# Patient Record
Sex: MALE | Race: WHITE | NOT HISPANIC OR LATINO | Employment: OTHER | URBAN - METROPOLITAN AREA
[De-identification: names, ages, dates, MRNs, and addresses within clinical notes are randomized per-mention and may not be internally consistent; named-entity substitution may affect disease eponyms.]

---

## 2017-02-23 ENCOUNTER — TELEPHONE (OUTPATIENT)
Dept: OTHER | Facility: MEDICAL CENTER | Age: 52
End: 2017-02-23

## 2017-02-23 NOTE — PROGRESS NOTES
Phoned to schedule visit to deliver and discuss colorectal cancer treatment summary/ survivorship care plan.  No answer, left message to call back.

## 2018-09-04 RX ORDER — DEXAMETHASONE 4 MG/1
2 TABLET ORAL EVERY 6 HOURS
COMMUNITY
End: 2019-02-27

## 2018-09-04 RX ORDER — TRAZODONE HYDROCHLORIDE 50 MG/1
100 TABLET ORAL
COMMUNITY
End: 2019-02-27

## 2018-09-04 RX ORDER — HYDROMORPHONE HYDROCHLORIDE 4 MG/1
12 TABLET ORAL EVERY 4 HOURS PRN
Status: ON HOLD | COMMUNITY
End: 2018-09-06

## 2018-09-04 RX ORDER — PREDNISONE 5 MG/1
5 TABLET ORAL DAILY
COMMUNITY

## 2018-09-04 RX ORDER — TAMSULOSIN HYDROCHLORIDE 0.4 MG/1
0.4 CAPSULE ORAL DAILY
COMMUNITY

## 2018-09-06 ENCOUNTER — HOSPITAL ENCOUNTER (OUTPATIENT)
Facility: MEDICAL CENTER | Age: 53
End: 2018-09-06
Attending: INTERNAL MEDICINE | Admitting: INTERNAL MEDICINE
Payer: COMMERCIAL

## 2018-09-06 ENCOUNTER — APPOINTMENT (OUTPATIENT)
Dept: RADIOLOGY | Facility: MEDICAL CENTER | Age: 53
End: 2018-09-06
Attending: INTERNAL MEDICINE
Payer: COMMERCIAL

## 2018-09-06 VITALS
DIASTOLIC BLOOD PRESSURE: 102 MMHG | SYSTOLIC BLOOD PRESSURE: 157 MMHG | WEIGHT: 182.54 LBS | TEMPERATURE: 99.1 F | OXYGEN SATURATION: 94 % | HEART RATE: 82 BPM | BODY MASS INDEX: 22.23 KG/M2 | RESPIRATION RATE: 20 BRPM | HEIGHT: 76 IN

## 2018-09-06 DIAGNOSIS — J18.9 UNRESOLVED PNEUMONIA: ICD-10-CM

## 2018-09-06 DIAGNOSIS — C18.7 MALIGNANT NEOPLASM OF SIGMOID COLON (HCC): ICD-10-CM

## 2018-09-06 LAB
BUN BLD-MCNC: 22 MG/DL (ref 8–22)
CA-I BLD ISE-SCNC: 1.09 MMOL/L (ref 1.1–1.3)
CHLORIDE BLD-SCNC: 96 MMOL/L (ref 96–112)
CO2 BLD-SCNC: 27 MMOL/L (ref 20–33)
CREAT BLD-MCNC: 1 MG/DL (ref 0.5–1.4)
EKG IMPRESSION: NORMAL
GLUCOSE BLD-MCNC: 98 MG/DL (ref 65–99)
HCT VFR BLD CALC: 44 % (ref 42–52)
HGB BLD-MCNC: 15 G/DL (ref 14–18)
POTASSIUM BLD-SCNC: 4.1 MMOL/L (ref 3.6–5.5)
SODIUM BLD-SCNC: 132 MMOL/L (ref 135–145)

## 2018-09-06 PROCEDURE — 700111 HCHG RX REV CODE 636 W/ 250 OVERRIDE (IP)

## 2018-09-06 PROCEDURE — 85014 HEMATOCRIT: CPT

## 2018-09-06 PROCEDURE — 80047 BASIC METABLC PNL IONIZED CA: CPT

## 2018-09-06 PROCEDURE — 93010 ELECTROCARDIOGRAM REPORT: CPT | Performed by: INTERNAL MEDICINE

## 2018-09-06 PROCEDURE — 700101 HCHG RX REV CODE 250

## 2018-09-06 PROCEDURE — 93005 ELECTROCARDIOGRAM TRACING: CPT | Performed by: INTERNAL MEDICINE

## 2018-09-06 RX ORDER — LIDOCAINE HYDROCHLORIDE AND EPINEPHRINE BITARTRATE 20; .01 MG/ML; MG/ML
INJECTION, SOLUTION SUBCUTANEOUS
Status: DISCONTINUED
Start: 2018-09-06 | End: 2018-09-06 | Stop reason: HOSPADM

## 2018-09-06 RX ORDER — HYDROMORPHONE HYDROCHLORIDE 8 MG/1
16 TABLET ORAL EVERY 4 HOURS PRN
COMMUNITY
End: 2019-07-23

## 2018-09-06 RX ORDER — HYDROMORPHONE HYDROCHLORIDE 2 MG/ML
INJECTION, SOLUTION INTRAMUSCULAR; INTRAVENOUS; SUBCUTANEOUS
Status: DISCONTINUED
Start: 2018-09-06 | End: 2018-09-06 | Stop reason: HOSPADM

## 2018-09-06 RX ORDER — SODIUM CHLORIDE 9 MG/ML
1000 INJECTION, SOLUTION INTRAVENOUS
Status: COMPLETED | OUTPATIENT
Start: 2018-09-06 | End: 2018-09-06

## 2018-09-06 RX ORDER — LIDOCAINE HYDROCHLORIDE 10 MG/ML
INJECTION, SOLUTION INFILTRATION; PERINEURAL
Status: COMPLETED
Start: 2018-09-06 | End: 2018-09-06

## 2018-09-06 RX ORDER — LIDOCAINE HYDROCHLORIDE 10 MG/ML
0.5 INJECTION, SOLUTION INFILTRATION; PERINEURAL
Status: DISCONTINUED | OUTPATIENT
Start: 2018-09-06 | End: 2018-09-06 | Stop reason: HOSPADM

## 2018-09-06 RX ORDER — FENTANYL 100 UG/1
1 PATCH TRANSDERMAL
COMMUNITY
End: 2019-02-27

## 2018-09-06 RX ADMIN — LIDOCAINE HYDROCHLORIDE 0.5 ML: 10 INJECTION, SOLUTION INFILTRATION; PERINEURAL at 10:52

## 2018-09-06 RX ADMIN — SODIUM CHLORIDE 1000 ML: 9 INJECTION, SOLUTION INTRAVENOUS at 10:52

## 2018-09-06 ASSESSMENT — PAIN SCALES - GENERAL: PAINLEVEL_OUTOF10: 3

## 2018-09-06 NOTE — OR NURSING
Cancelled in pre-op due to IR scheduling issue. Patient will be rescheduled for another day.     Wife called and message left by RN in IR.

## 2018-09-07 ENCOUNTER — HOSPITAL ENCOUNTER (OUTPATIENT)
Facility: MEDICAL CENTER | Age: 53
End: 2018-09-07
Attending: INTERNAL MEDICINE | Admitting: INTERNAL MEDICINE
Payer: COMMERCIAL

## 2018-09-07 ENCOUNTER — APPOINTMENT (OUTPATIENT)
Dept: RADIOLOGY | Facility: MEDICAL CENTER | Age: 53
End: 2018-09-07
Attending: INTERNAL MEDICINE
Payer: COMMERCIAL

## 2018-09-07 VITALS
SYSTOLIC BLOOD PRESSURE: 125 MMHG | HEART RATE: 72 BPM | BODY MASS INDEX: 22.18 KG/M2 | RESPIRATION RATE: 20 BRPM | OXYGEN SATURATION: 96 % | HEIGHT: 76 IN | WEIGHT: 182.1 LBS | DIASTOLIC BLOOD PRESSURE: 88 MMHG | TEMPERATURE: 98.4 F

## 2018-09-07 DIAGNOSIS — C18.7 MALIGNANT NEOPLASM OF SIGMOID COLON (HCC): ICD-10-CM

## 2018-09-07 PROCEDURE — 700111 HCHG RX REV CODE 636 W/ 250 OVERRIDE (IP)

## 2018-09-07 PROCEDURE — 700101 HCHG RX REV CODE 250

## 2018-09-07 PROCEDURE — C1894 INTRO/SHEATH, NON-LASER: HCPCS

## 2018-09-07 PROCEDURE — 160002 HCHG RECOVERY MINUTES (STAT)

## 2018-09-07 RX ORDER — LIDOCAINE HYDROCHLORIDE AND EPINEPHRINE 10; 10 MG/ML; UG/ML
INJECTION, SOLUTION INFILTRATION; PERINEURAL
Status: COMPLETED
Start: 2018-09-07 | End: 2018-09-07

## 2018-09-07 RX ORDER — HYDROMORPHONE HYDROCHLORIDE 2 MG/ML
INJECTION, SOLUTION INTRAMUSCULAR; INTRAVENOUS; SUBCUTANEOUS
Status: COMPLETED
Start: 2018-09-07 | End: 2018-09-07

## 2018-09-07 RX ORDER — HYDROMORPHONE HYDROCHLORIDE 2 MG/1
12 TABLET ORAL EVERY 4 HOURS PRN
Status: DISCONTINUED | OUTPATIENT
Start: 2018-09-07 | End: 2018-09-10 | Stop reason: HOSPADM

## 2018-09-07 RX ORDER — LIDOCAINE HYDROCHLORIDE 10 MG/ML
INJECTION, SOLUTION INFILTRATION; PERINEURAL
Status: COMPLETED
Start: 2018-09-07 | End: 2018-09-07

## 2018-09-07 RX ORDER — HYDROMORPHONE HYDROCHLORIDE 2 MG/ML
INJECTION, SOLUTION INTRAMUSCULAR; INTRAVENOUS; SUBCUTANEOUS
Status: DISPENSED
Start: 2018-09-07 | End: 2018-09-08

## 2018-09-07 RX ADMIN — HYDROMORPHONE HYDROCHLORIDE 12 MG: 2 TABLET ORAL at 13:30

## 2018-09-07 RX ADMIN — HEPARIN 500 UNITS: 100 SYRINGE at 12:39

## 2018-09-07 RX ADMIN — LIDOCAINE HYDROCHLORIDE: 10 INJECTION, SOLUTION INFILTRATION; PERINEURAL at 12:38

## 2018-09-07 RX ADMIN — HYDROMORPHONE HYDROCHLORIDE: 2 INJECTION INTRAMUSCULAR; INTRAVENOUS; SUBCUTANEOUS at 13:05

## 2018-09-07 RX ADMIN — LIDOCAINE HYDROCHLORIDE,EPINEPHRINE BITARTRATE: 10; .01 INJECTION, SOLUTION INFILTRATION; PERINEURAL at 12:38

## 2018-09-07 ASSESSMENT — PAIN SCALES - GENERAL
PAINLEVEL_OUTOF10: 0
PAINLEVEL_OUTOF10: 6
PAINLEVEL_OUTOF10: 5
PAINLEVEL_OUTOF10: 5
PAINLEVEL_OUTOF10: 7
PAINLEVEL_OUTOF10: 4

## 2018-09-07 NOTE — DISCHARGE INSTRUCTIONS
ACTIVITY: Rest and take it easy for the first 24 hours.  A responsible adult is recommended to remain with you during that time.  It is normal to feel sleepy.  We encourage you to not do anything that requires balance, judgment or coordination.    MILD FLU-LIKE SYMPTOMS ARE NORMAL. YOU MAY EXPERIENCE GENERALIZED MUSCLE ACHES, THROAT IRRITATION, HEADACHE AND/OR SOME NAUSEA.    FOR 24 HOURS DO NOT:  Drive, operate machinery or run household appliances.  Drink beer or alcoholic beverages.   Make important decisions or sign legal documents.    SPECIAL INSTRUCTIONS: see below    DIET: To avoid nausea, slowly advance diet as tolerated, avoiding spicy or greasy foods for the first day.  Add more substantial food to your diet according to your physician's instructions.  Babies can be fed formula or breast milk as soon as they are hungry.  INCREASE FLUIDS AND FIBER TO AVOID CONSTIPATION.    SURGICAL DRESSING/BATHING: okay to remove dressing after 24 hours. Do not submerge surgical site for 7 days. No swimming, bathing, hot tubs, etc. for 7 day.    FOLLOW-UP APPOINTMENT:  A follow-up appointment should be arranged with your docto; call to schedule.    You should CALL YOUR PHYSICIAN if you develop:  Fever greater than 101 degrees F.  Pain not relieved by medication, or persistent nausea or vomiting.  Excessive bleeding (blood soaking through dressing) or unexpected drainage from the wound.  Extreme redness or swelling around the incision site, drainage of pus or foul smelling drainage.  Inability to urinate or empty your bladder within 8 hours.  Problems with breathing or chest pain.    You should call 911 if you develop problems with breathing or chest pain.  If you are unable to contact your doctor or surgical center, you should go to the nearest emergency room or urgent care center.  Physician's telephone #: [phone]    If any questions arise, call your doctor.  If your doctor is not available, please feel free to call  the Surgical Center at [phone].  The Center is open Monday through Friday from 7AM to 7PM.  You can also call the HEALTH HOTLINE open 24 hours/day, 7 days/week and speak to a nurse at [phone], or toll free at [phone].    A registered nurse may call you a few days after your surgery to see how you are doing after your procedure.    MEDICATIONS: Resume taking daily medication.  Take prescribed pain medication with food.  If no medication is prescribed, you may take non-aspirin pain medication if needed.  PAIN MEDICATION CAN BE VERY CONSTIPATING.  Take a stool softener or laxative such as senokot, pericolace, or milk of magnesia if needed.    If your physician has prescribed pain medication that includes Acetaminophen (Tylenol), do not take additional Acetaminophen (Tylenol) while taking the prescribed medication.    Depression / Suicide Risk    As you are discharged from this [facility] facility, it is important to learn how to keep safe from harming yourself.    Recognize the warning signs:  · Abrupt changes in personality, positive or negative- including increase in energy   · Giving away possessions  · Change in eating patterns- significant weight changes-  positive or negative  · Change in sleeping patterns- unable to sleep or sleeping all the time   · Unwillingness or inability to communicate  · Depression  · Unusual sadness, discouragement and loneliness  · Talk of wanting to die  · Neglect of personal appearance   · Rebelliousness- reckless behavior  · Withdrawal from people/activities they love  · Confusion- inability to concentrate     If you or a loved one observes any of these behaviors or has concerns about self-harm, here's what you can do:  · Talk about it- your feelings and reasons for harming yourself  · Remove any means that you might use to hurt yourself (examples: pills, rope, extension cords, firearm)  · Get professional help from the community (Mental Health, Substance  Abuse, psychological counseling)  · Do not be alone:Call your Safe Contact- someone whom you trust who will be there for you.  · Call your local CRISIS HOTLINE 963-7216 or 336-248-9807  · Call your local Children's Mobile Crisis Response Team [place] (553) 970-4843 or www.SiVerion  · Call the toll free National Suicide Prevention Hotlines   · National Suicide Prevention Lifeline 844-414-LFGU (5361)  · National Hope Line Network 800-SUICIDE (977-8036)      Implanted Port Insertion, Care After  Refer to this sheet in the next few weeks. These instructions provide you with information on caring for yourself after your procedure. Your health care provider may also give you more specific instructions. Your treatment has been planned according to current medical practices, but problems sometimes occur. Call your health care provider if you have any problems or questions after your procedure.  WHAT TO EXPECT AFTER THE PROCEDURE  After your procedure, it is typical to have the following:   · Discomfort at the port insertion site. Ice packs to the area will help.  · Bruising on the skin over the port. This will subside in 3-4 days.  HOME CARE INSTRUCTIONS  · After your port is placed, you will get a 's information card. The card has information about your port. Keep this card with you at all times.    · Know what kind of port you have. There are many types of ports available.    · Wear a medical alert bracelet in case of an emergency. This can help alert health care workers that you have a port.    · The port can stay in for as long as your health care provider believes it is necessary.    · A home health care nurse may give medicines and take care of the port.    · You or a family member can get special training and directions for giving medicine and taking care of the port at home.    SEEK MEDICAL CARE IF:   · Your port does not flush or you are unable to get a blood return.    · You have a fever  or chills.  SEEK IMMEDIATE MEDICAL CARE IF:  · You have new fluid or pus coming from your incision.    · You notice a bad smell coming from your incision site.    · You have swelling, pain, or more redness at the incision or port site.    · You have chest pain or shortness of breath.     This information is not intended to replace advice given to you by your health care provider. Make sure you discuss any questions you have with your health care provider.     Document Released: 10/08/2014 Document Revised: 12/23/2014 Document Reviewed: 10/08/2014  [employer] Interactive Patient Education ©2016 [employer]    Implanted Port Home Guide  An implanted port is a type of central line that is placed under the skin. Central lines are used to provide IV access when treatment or nutrition needs to be given through a person's veins. Implanted ports are used for long-term IV access. An implanted port may be placed because:   · You need IV medicine that would be irritating to the small veins in your hands or arms.    · You need long-term IV medicines, such as antibiotics.    · You need IV nutrition for a long period.    · You need frequent blood draws for lab tests.    · You need dialysis.    Implanted ports are usually placed in the chest area, but they can also be placed in the upper arm, the abdomen, or the leg. An implanted port has two main parts:   · [place]. The reservoir is round and will appear as a small, raised area under your skin. The reservoir is the part where a needle is inserted to give medicines or draw blood.    · Catheter. The catheter is a thin, flexible tube that extends from the reservoir. The catheter is placed into a large vein. Medicine that is inserted into the reservoir goes into the catheter and then into the vein.    HOW WILL I CARE FOR MY INCISION SITE?  Do not get the incision site wet. Bathe or shower as directed by your health care provider.   HOW IS MY PORT ACCESSED?  Special steps must be  "taken to access the port:   · Before the port is accessed, a numbing cream can be placed on the skin. This helps numb the skin over the port site.    · Your health care provider uses a sterile technique to access the port.  ¨ Your health care provider must put on a mask and sterile gloves.  ¨ The skin over your port is cleaned carefully with an antiseptic and allowed to dry.  ¨ The port is gently pinched between sterile gloves, and a needle is inserted into the port.  · Only \"non-coring\" port needles should be used to access the port. Once the port is accessed, a blood return should be checked. This helps ensure that the port is in the vein and is not clogged.    · If your port needs to remain accessed for a constant infusion, a clear (transparent) bandage will be placed over the needle site. The bandage and needle will need to be changed every week, or as directed by your health care provider.    · Keep the bandage covering the needle clean and dry. Do not get it wet. Follow your health care provider's instructions on how to take a shower or bath while the port is accessed.    · If your port does not need to stay accessed, no bandage is needed over the port.    WHAT IS FLUSHING?  Flushing helps keep the port from getting clogged. Follow your health care provider's instructions on how and when to flush the port. Ports are usually flushed with saline solution or a medicine called heparin. The need for flushing will depend on how the port is used.   · If the port is used for intermittent medicines or blood draws, the port will need to be flushed:    ¨ After medicines have been given.    ¨ After blood has been drawn.    ¨ As part of routine maintenance.    · If a constant infusion is running, the port may not need to be flushed.    HOW LONG WILL MY PORT STAY IMPLANTED?  The port can stay in for as long as your health care provider thinks it is needed. When it is time for the port to come out, surgery will be done to " remove it. The procedure is similar to the one performed when the port was put in.   WHEN SHOULD I SEEK IMMEDIATE MEDICAL CARE?  When you have an implanted port, you should seek immediate medical care if:   · You notice a bad smell coming from the incision site.    · You have swelling, redness, or drainage at the incision site.    · You have more swelling or pain at the port site or the surrounding area.    · You have a fever that is not controlled with medicine.     This information is not intended to replace advice given to you by your health care provider. Make sure you discuss any questions you have with your health care provider.     Document Released: 12/18/2006 Document Revised: 10/08/2014 Document Reviewed: 08/25/2014  [employer]vier Interactive Patient Education ©2016 [employer]

## 2018-09-07 NOTE — PROGRESS NOTES
IR Procedure Note:    Site Marked and Confirmed ultrasound imaging by MD, RT and RN pre procedure.     Dr. Crystal performed a tunneled central venous port placement with general anesthesia by Dr Crews.  All vital signs monitoring and medication administration by anesthesia services. - See anesthesia record.     Sutures, steristrips, dermabond, tegaderm and gauze applied to left upper chest, CDI and soft.  Report given to AQUILES Fine.  RN transported pt to PPU with Dr. Crews. Sao2 monitor = 96% on oxygen via NC @ 3 lpm.

## 2018-09-07 NOTE — OR NURSING
D/c orders received. Pt changed into clothing with assistance of wife. Pt escorted to the bathroom via wheelchair and voided. Discharge instructions given; pt and family verbalized understanding and questions answered. IV removed, tip intact. Will follow-up with Dr. Crystal. Prescriptions with pt. Pt discharged home in w/c with volunteer in stable condition.

## 2018-09-07 NOTE — OR NURSING
1245: Report received from AQUILES Fine. Patient to ppu_06 pending transport.    1257: Patient to PPU 06. Report received from procedure RN and Anesthesia. Okay for patient to resume home pain management regimen at this time per Anesthesiologist. Left upper chest, port placement site CDI. Q15min vitals in place. Plan of care discussed with patient.    1320: Patient provided with food and water. Patient tolerating PO intake. Patient belongings returned to patient at bedside. Patient and family updated regarding Plan of care. Family on way back to hospital per telephone conversation upon patient arrival.    1330: Patient family administered home supply of Hydromorphone 12mg (see MAR).    1400: Report given to AQUILES Miles.

## 2018-09-07 NOTE — OR SURGEON
Immediate Post- Operative Note        PostOp Diagnosis: Patient requires port.       Procedure(s): Port placement with GA.       Estimated Blood Loss: Less than 5 ml        Complications: None            9/7/2018     1234 PM     Sky Crystal

## 2019-02-27 ENCOUNTER — HOSPITAL ENCOUNTER (OUTPATIENT)
Dept: RADIOLOGY | Facility: MEDICAL CENTER | Age: 54
End: 2019-02-27

## 2019-02-27 ENCOUNTER — HOSPITAL ENCOUNTER (INPATIENT)
Facility: MEDICAL CENTER | Age: 54
LOS: 7 days | DRG: 314 | End: 2019-03-06
Attending: EMERGENCY MEDICINE | Admitting: HOSPITALIST
Payer: COMMERCIAL

## 2019-02-27 DIAGNOSIS — C80.1 CANCER (HCC): ICD-10-CM

## 2019-02-27 DIAGNOSIS — J18.9 PNEUMONIA OF BOTH LOWER LOBES DUE TO INFECTIOUS ORGANISM: ICD-10-CM

## 2019-02-27 DIAGNOSIS — A41.9 SEPSIS, DUE TO UNSPECIFIED ORGANISM: ICD-10-CM

## 2019-02-27 LAB
ALBUMIN SERPL BCP-MCNC: 2.3 G/DL (ref 3.2–4.9)
ALBUMIN/GLOB SERPL: 0.9 G/DL
ALP SERPL-CCNC: 324 U/L (ref 30–99)
ALT SERPL-CCNC: 63 U/L (ref 2–50)
ANION GAP SERPL CALC-SCNC: 8 MMOL/L (ref 0–11.9)
AST SERPL-CCNC: 118 U/L (ref 12–45)
BASOPHILS # BLD AUTO: 0.2 % (ref 0–1.8)
BASOPHILS # BLD: 0.04 K/UL (ref 0–0.12)
BILIRUB SERPL-MCNC: 0.6 MG/DL (ref 0.1–1.5)
BUN SERPL-MCNC: 7 MG/DL (ref 8–22)
CALCIUM SERPL-MCNC: 7.4 MG/DL (ref 8.5–10.5)
CHLORIDE SERPL-SCNC: 100 MMOL/L (ref 96–112)
CO2 SERPL-SCNC: 23 MMOL/L (ref 20–33)
CREAT SERPL-MCNC: 1 MG/DL (ref 0.5–1.4)
EOSINOPHIL # BLD AUTO: 0 K/UL (ref 0–0.51)
EOSINOPHIL NFR BLD: 0 % (ref 0–6.9)
ERYTHROCYTE [DISTWIDTH] IN BLOOD BY AUTOMATED COUNT: 54 FL (ref 35.9–50)
GLOBULIN SER CALC-MCNC: 2.5 G/DL (ref 1.9–3.5)
GLUCOSE SERPL-MCNC: 90 MG/DL (ref 65–99)
HCT VFR BLD AUTO: 32.7 % (ref 42–52)
HGB BLD-MCNC: 11.5 G/DL (ref 14–18)
IMM GRANULOCYTES # BLD AUTO: 0.39 K/UL (ref 0–0.11)
IMM GRANULOCYTES NFR BLD AUTO: 1.6 % (ref 0–0.9)
LACTATE BLD-SCNC: 1.2 MMOL/L (ref 0.5–2)
LYMPHOCYTES # BLD AUTO: 0.76 K/UL (ref 1–4.8)
LYMPHOCYTES NFR BLD: 3.1 % (ref 22–41)
MCH RBC QN AUTO: 32.9 PG (ref 27–33)
MCHC RBC AUTO-ENTMCNC: 35.2 G/DL (ref 33.7–35.3)
MCV RBC AUTO: 93.4 FL (ref 81.4–97.8)
MONOCYTES # BLD AUTO: 1.64 K/UL (ref 0–0.85)
MONOCYTES NFR BLD AUTO: 6.8 % (ref 0–13.4)
NEUTROPHILS # BLD AUTO: 21.35 K/UL (ref 1.82–7.42)
NEUTROPHILS NFR BLD: 88.3 % (ref 44–72)
NRBC # BLD AUTO: 0 K/UL
NRBC BLD-RTO: 0 /100 WBC
PLATELET # BLD AUTO: 96 K/UL (ref 164–446)
PMV BLD AUTO: 10.2 FL (ref 9–12.9)
POTASSIUM SERPL-SCNC: 3.7 MMOL/L (ref 3.6–5.5)
PROT SERPL-MCNC: 4.8 G/DL (ref 6–8.2)
RBC # BLD AUTO: 3.5 M/UL (ref 4.7–6.1)
SODIUM SERPL-SCNC: 131 MMOL/L (ref 135–145)
WBC # BLD AUTO: 24.2 K/UL (ref 4.8–10.8)

## 2019-02-27 PROCEDURE — 700105 HCHG RX REV CODE 258: Performed by: EMERGENCY MEDICINE

## 2019-02-27 PROCEDURE — 87040 BLOOD CULTURE FOR BACTERIA: CPT

## 2019-02-27 PROCEDURE — 80053 COMPREHEN METABOLIC PANEL: CPT

## 2019-02-27 PROCEDURE — 36415 COLL VENOUS BLD VENIPUNCTURE: CPT

## 2019-02-27 PROCEDURE — 85025 COMPLETE CBC W/AUTO DIFF WBC: CPT

## 2019-02-27 PROCEDURE — 304561 HCHG STAT O2

## 2019-02-27 PROCEDURE — 84145 PROCALCITONIN (PCT): CPT

## 2019-02-27 PROCEDURE — 770020 HCHG ROOM/CARE - TELE (206)

## 2019-02-27 PROCEDURE — 99285 EMERGENCY DEPT VISIT HI MDM: CPT

## 2019-02-27 PROCEDURE — 83605 ASSAY OF LACTIC ACID: CPT

## 2019-02-27 RX ORDER — SODIUM CHLORIDE 9 MG/ML
1000 INJECTION, SOLUTION INTRAVENOUS
Status: COMPLETED | OUTPATIENT
Start: 2019-02-27 | End: 2019-03-01

## 2019-02-27 RX ORDER — SODIUM CHLORIDE 9 MG/ML
INJECTION, SOLUTION INTRAVENOUS CONTINUOUS
Status: DISCONTINUED | OUTPATIENT
Start: 2019-02-28 | End: 2019-03-01

## 2019-02-27 RX ORDER — SODIUM CHLORIDE 9 MG/ML
1000 INJECTION, SOLUTION INTRAVENOUS
Status: COMPLETED | OUTPATIENT
Start: 2019-02-27 | End: 2019-02-27

## 2019-02-27 RX ORDER — SODIUM CHLORIDE 9 MG/ML
30 INJECTION, SOLUTION INTRAVENOUS
Status: DISCONTINUED | OUTPATIENT
Start: 2019-02-27 | End: 2019-03-06 | Stop reason: HOSPADM

## 2019-02-27 RX ADMIN — SODIUM CHLORIDE 1000 ML: 9 INJECTION, SOLUTION INTRAVENOUS at 22:51

## 2019-02-27 ASSESSMENT — ENCOUNTER SYMPTOMS
PHOTOPHOBIA: 0
CONSTIPATION: 0
COUGH: 1
ABDOMINAL PAIN: 0
DIZZINESS: 0
VOMITING: 1
BLURRED VISION: 0
DOUBLE VISION: 0
WHEEZING: 1
WEAKNESS: 1
SHORTNESS OF BREATH: 0
NAUSEA: 1
SPUTUM PRODUCTION: 1
HEADACHES: 0
FEVER: 1
PALPITATIONS: 0
DIARRHEA: 0

## 2019-02-28 ENCOUNTER — APPOINTMENT (OUTPATIENT)
Dept: RADIOLOGY | Facility: MEDICAL CENTER | Age: 54
DRG: 314 | End: 2019-02-28
Attending: STUDENT IN AN ORGANIZED HEALTH CARE EDUCATION/TRAINING PROGRAM
Payer: COMMERCIAL

## 2019-02-28 PROBLEM — J44.9 COPD (CHRONIC OBSTRUCTIVE PULMONARY DISEASE) (HCC): Status: ACTIVE | Noted: 2019-02-28

## 2019-02-28 PROBLEM — B19.20 HEPATITIS C: Status: ACTIVE | Noted: 2019-02-28

## 2019-02-28 PROBLEM — E87.1 HYPONATREMIA: Status: ACTIVE | Noted: 2019-02-28

## 2019-02-28 PROBLEM — D64.9 NORMOCYTIC ANEMIA: Status: ACTIVE | Noted: 2019-02-28

## 2019-02-28 PROBLEM — A41.9 SEPSIS (HCC): Status: ACTIVE | Noted: 2019-02-28

## 2019-02-28 PROBLEM — R10.32 LEFT GROIN PAIN: Status: ACTIVE | Noted: 2019-02-28

## 2019-02-28 PROBLEM — D69.6 THROMBOCYTOPENIA (HCC): Status: ACTIVE | Noted: 2019-02-28

## 2019-02-28 PROBLEM — Z00.6 RESEARCH STUDY PATIENT: Status: ACTIVE | Noted: 2019-02-28

## 2019-02-28 PROBLEM — R79.89 ELEVATED LFTS: Status: ACTIVE | Noted: 2019-02-28

## 2019-02-28 PROBLEM — I10 HYPERTENSION: Status: ACTIVE | Noted: 2019-02-28

## 2019-02-28 LAB
ALBUMIN SERPL BCP-MCNC: 2 G/DL (ref 3.2–4.9)
ALBUMIN/GLOB SERPL: 1 G/DL
ALP SERPL-CCNC: 246 U/L (ref 30–99)
ALT SERPL-CCNC: 54 U/L (ref 2–50)
ANION GAP SERPL CALC-SCNC: 8 MMOL/L (ref 0–11.9)
APPEARANCE UR: CLEAR
APTT PPP: 36.7 SEC (ref 24.7–36)
AST SERPL-CCNC: 109 U/L (ref 12–45)
BASOPHILS # BLD AUTO: 0.2 % (ref 0–1.8)
BASOPHILS # BLD: 0.02 K/UL (ref 0–0.12)
BILIRUB SERPL-MCNC: 0.5 MG/DL (ref 0.1–1.5)
BILIRUB UR QL STRIP.AUTO: NEGATIVE
BUN SERPL-MCNC: 7 MG/DL (ref 8–22)
CALCIUM SERPL-MCNC: 6.7 MG/DL (ref 8.5–10.5)
CHLORIDE SERPL-SCNC: 108 MMOL/L (ref 96–112)
CO2 SERPL-SCNC: 20 MMOL/L (ref 20–33)
COLOR UR: YELLOW
CREAT SERPL-MCNC: 0.97 MG/DL (ref 0.5–1.4)
EKG IMPRESSION: NORMAL
EOSINOPHIL # BLD AUTO: 0.01 K/UL (ref 0–0.51)
EOSINOPHIL NFR BLD: 0.1 % (ref 0–6.9)
ERYTHROCYTE [DISTWIDTH] IN BLOOD BY AUTOMATED COUNT: 52.9 FL (ref 35.9–50)
FLUAV RNA SPEC QL NAA+PROBE: NEGATIVE
FLUBV RNA SPEC QL NAA+PROBE: NEGATIVE
GLOBULIN SER CALC-MCNC: 2 G/DL (ref 1.9–3.5)
GLUCOSE SERPL-MCNC: 83 MG/DL (ref 65–99)
GLUCOSE UR STRIP.AUTO-MCNC: NEGATIVE MG/DL
HCT VFR BLD AUTO: 29.4 % (ref 42–52)
HGB BLD-MCNC: 10.1 G/DL (ref 14–18)
IMM GRANULOCYTES # BLD AUTO: 0.09 K/UL (ref 0–0.11)
IMM GRANULOCYTES NFR BLD AUTO: 0.7 % (ref 0–0.9)
INR PPP: 1.32 (ref 0.87–1.13)
KETONES UR STRIP.AUTO-MCNC: ABNORMAL MG/DL
LEUKOCYTE ESTERASE UR QL STRIP.AUTO: NEGATIVE
LYMPHOCYTES # BLD AUTO: 1.14 K/UL (ref 1–4.8)
LYMPHOCYTES NFR BLD: 8.6 % (ref 22–41)
MAGNESIUM SERPL-MCNC: 1.8 MG/DL (ref 1.5–2.5)
MCH RBC QN AUTO: 31.2 PG (ref 27–33)
MCHC RBC AUTO-ENTMCNC: 34.4 G/DL (ref 33.7–35.3)
MCV RBC AUTO: 90.7 FL (ref 81.4–97.8)
MICRO URNS: ABNORMAL
MONOCYTES # BLD AUTO: 1.45 K/UL (ref 0–0.85)
MONOCYTES NFR BLD AUTO: 10.9 % (ref 0–13.4)
MRSA DNA SPEC QL NAA+PROBE: NORMAL
NEUTROPHILS # BLD AUTO: 10.55 K/UL (ref 1.82–7.42)
NEUTROPHILS NFR BLD: 79.5 % (ref 44–72)
NITRITE UR QL STRIP.AUTO: NEGATIVE
NRBC # BLD AUTO: 0 K/UL
NRBC BLD-RTO: 0 /100 WBC
PH UR STRIP.AUTO: 6.5 [PH]
PHOSPHATE SERPL-MCNC: 2.2 MG/DL (ref 2.5–4.5)
PLATELET # BLD AUTO: 164 K/UL (ref 164–446)
PMV BLD AUTO: 9.8 FL (ref 9–12.9)
POTASSIUM SERPL-SCNC: 3.7 MMOL/L (ref 3.6–5.5)
PROCALCITONIN SERPL-MCNC: 32.14 NG/ML
PROT SERPL-MCNC: 4 G/DL (ref 6–8.2)
PROT UR QL STRIP: NEGATIVE MG/DL
PROTHROMBIN TIME: 16.5 SEC (ref 12–14.6)
RBC # BLD AUTO: 3.24 M/UL (ref 4.7–6.1)
RBC UR QL AUTO: NEGATIVE
SIGNIFICANT IND 70042: NORMAL
SITE SITE: NORMAL
SODIUM SERPL-SCNC: 136 MMOL/L (ref 135–145)
SOURCE SOURCE: NORMAL
SP GR UR STRIP.AUTO: 1.01
UROBILINOGEN UR STRIP.AUTO-MCNC: 0.2 MG/DL
WBC # BLD AUTO: 13.3 K/UL (ref 4.8–10.8)

## 2019-02-28 PROCEDURE — 96365 THER/PROPH/DIAG IV INF INIT: CPT

## 2019-02-28 PROCEDURE — 93005 ELECTROCARDIOGRAM TRACING: CPT | Performed by: STUDENT IN AN ORGANIZED HEALTH CARE EDUCATION/TRAINING PROGRAM

## 2019-02-28 PROCEDURE — 85730 THROMBOPLASTIN TIME PARTIAL: CPT

## 2019-02-28 PROCEDURE — 700111 HCHG RX REV CODE 636 W/ 250 OVERRIDE (IP): Performed by: STUDENT IN AN ORGANIZED HEALTH CARE EDUCATION/TRAINING PROGRAM

## 2019-02-28 PROCEDURE — 770020 HCHG ROOM/CARE - TELE (206)

## 2019-02-28 PROCEDURE — 700105 HCHG RX REV CODE 258: Performed by: STUDENT IN AN ORGANIZED HEALTH CARE EDUCATION/TRAINING PROGRAM

## 2019-02-28 PROCEDURE — 99223 1ST HOSP IP/OBS HIGH 75: CPT | Mod: GC | Performed by: INTERNAL MEDICINE

## 2019-02-28 PROCEDURE — 85610 PROTHROMBIN TIME: CPT

## 2019-02-28 PROCEDURE — 83735 ASSAY OF MAGNESIUM: CPT

## 2019-02-28 PROCEDURE — 76705 ECHO EXAM OF ABDOMEN: CPT

## 2019-02-28 PROCEDURE — 87086 URINE CULTURE/COLONY COUNT: CPT

## 2019-02-28 PROCEDURE — 84100 ASSAY OF PHOSPHORUS: CPT

## 2019-02-28 PROCEDURE — 36415 COLL VENOUS BLD VENIPUNCTURE: CPT

## 2019-02-28 PROCEDURE — 700102 HCHG RX REV CODE 250 W/ 637 OVERRIDE(OP): Performed by: STUDENT IN AN ORGANIZED HEALTH CARE EDUCATION/TRAINING PROGRAM

## 2019-02-28 PROCEDURE — 99223 1ST HOSP IP/OBS HIGH 75: CPT | Mod: GC | Performed by: HOSPITALIST

## 2019-02-28 PROCEDURE — 80053 COMPREHEN METABOLIC PANEL: CPT

## 2019-02-28 PROCEDURE — 93010 ELECTROCARDIOGRAM REPORT: CPT | Performed by: INTERNAL MEDICINE

## 2019-02-28 PROCEDURE — 81003 URINALYSIS AUTO W/O SCOPE: CPT

## 2019-02-28 PROCEDURE — 85025 COMPLETE CBC W/AUTO DIFF WBC: CPT

## 2019-02-28 PROCEDURE — 87641 MR-STAPH DNA AMP PROBE: CPT

## 2019-02-28 PROCEDURE — A9270 NON-COVERED ITEM OR SERVICE: HCPCS | Performed by: STUDENT IN AN ORGANIZED HEALTH CARE EDUCATION/TRAINING PROGRAM

## 2019-02-28 PROCEDURE — 87502 INFLUENZA DNA AMP PROBE: CPT

## 2019-02-28 RX ORDER — TAMSULOSIN HYDROCHLORIDE 0.4 MG/1
0.4 CAPSULE ORAL
Status: DISCONTINUED | OUTPATIENT
Start: 2019-02-28 | End: 2019-02-28

## 2019-02-28 RX ORDER — HYDROMORPHONE HYDROCHLORIDE 4 MG/1
16 TABLET ORAL EVERY 4 HOURS PRN
Status: DISCONTINUED | OUTPATIENT
Start: 2019-02-28 | End: 2019-03-06 | Stop reason: HOSPADM

## 2019-02-28 RX ORDER — AMOXICILLIN 250 MG
2 CAPSULE ORAL 2 TIMES DAILY
Status: DISCONTINUED | OUTPATIENT
Start: 2019-02-28 | End: 2019-03-06 | Stop reason: HOSPADM

## 2019-02-28 RX ORDER — SPIRONOLACTONE 25 MG/1
50 TABLET ORAL
Status: DISCONTINUED | OUTPATIENT
Start: 2019-02-28 | End: 2019-02-28

## 2019-02-28 RX ORDER — BISACODYL 10 MG
10 SUPPOSITORY, RECTAL RECTAL
Status: DISCONTINUED | OUTPATIENT
Start: 2019-02-28 | End: 2019-03-06 | Stop reason: HOSPADM

## 2019-02-28 RX ORDER — ENALAPRILAT 1.25 MG/ML
1.25 INJECTION INTRAVENOUS EVERY 6 HOURS PRN
Status: DISCONTINUED | OUTPATIENT
Start: 2019-02-28 | End: 2019-03-06 | Stop reason: HOSPADM

## 2019-02-28 RX ORDER — LOSARTAN POTASSIUM 50 MG/1
50 TABLET ORAL DAILY
Status: DISCONTINUED | OUTPATIENT
Start: 2019-02-28 | End: 2019-02-28

## 2019-02-28 RX ORDER — MAGNESIUM SULFATE HEPTAHYDRATE 40 MG/ML
2 INJECTION, SOLUTION INTRAVENOUS ONCE
Status: COMPLETED | OUTPATIENT
Start: 2019-02-28 | End: 2019-02-28

## 2019-02-28 RX ORDER — ONDANSETRON 2 MG/ML
4 INJECTION INTRAMUSCULAR; INTRAVENOUS EVERY 6 HOURS PRN
Status: DISCONTINUED | OUTPATIENT
Start: 2019-02-28 | End: 2019-03-06 | Stop reason: HOSPADM

## 2019-02-28 RX ORDER — PREDNISONE 5 MG/1
5 TABLET ORAL DAILY
Status: DISCONTINUED | OUTPATIENT
Start: 2019-02-28 | End: 2019-03-06 | Stop reason: HOSPADM

## 2019-02-28 RX ORDER — FUROSEMIDE 20 MG/1
20 TABLET ORAL
Status: DISCONTINUED | OUTPATIENT
Start: 2019-02-28 | End: 2019-02-28

## 2019-02-28 RX ORDER — POLYETHYLENE GLYCOL 3350 17 G/17G
1 POWDER, FOR SOLUTION ORAL
Status: DISCONTINUED | OUTPATIENT
Start: 2019-02-28 | End: 2019-03-06 | Stop reason: HOSPADM

## 2019-02-28 RX ADMIN — SODIUM CHLORIDE: 9 INJECTION, SOLUTION INTRAVENOUS at 22:15

## 2019-02-28 RX ADMIN — HYDROMORPHONE HYDROCHLORIDE 16 MG: 4 TABLET ORAL at 19:56

## 2019-02-28 RX ADMIN — ENOXAPARIN SODIUM 40 MG: 100 INJECTION SUBCUTANEOUS at 05:27

## 2019-02-28 RX ADMIN — SODIUM CHLORIDE: 9 INJECTION, SOLUTION INTRAVENOUS at 02:56

## 2019-02-28 RX ADMIN — HYDROMORPHONE HYDROCHLORIDE 16 MG: 4 TABLET ORAL at 11:18

## 2019-02-28 RX ADMIN — HYDROMORPHONE HYDROCHLORIDE 16 MG: 4 TABLET ORAL at 02:56

## 2019-02-28 RX ADMIN — PREDNISONE 5 MG: 5 TABLET ORAL at 05:27

## 2019-02-28 RX ADMIN — HYDROMORPHONE HYDROCHLORIDE 16 MG: 4 TABLET ORAL at 07:06

## 2019-02-28 RX ADMIN — ONDANSETRON 4 MG: 2 INJECTION INTRAMUSCULAR; INTRAVENOUS at 19:03

## 2019-02-28 RX ADMIN — VANCOMYCIN HYDROCHLORIDE 1400 MG: 100 INJECTION, POWDER, LYOPHILIZED, FOR SOLUTION INTRAVENOUS at 12:10

## 2019-02-28 RX ADMIN — CEFTRIAXONE SODIUM 2 G: 2 INJECTION, POWDER, FOR SOLUTION INTRAMUSCULAR; INTRAVENOUS at 05:28

## 2019-02-28 RX ADMIN — SENNOSIDES AND DOCUSATE SODIUM 2 TABLET: 8.6; 5 TABLET ORAL at 18:07

## 2019-02-28 RX ADMIN — MAGNESIUM SULFATE HEPTAHYDRATE 2 G: 40 INJECTION, SOLUTION INTRAVENOUS at 13:01

## 2019-02-28 RX ADMIN — VANCOMYCIN HYDROCHLORIDE 1800 MG: 100 INJECTION, POWDER, LYOPHILIZED, FOR SOLUTION INTRAVENOUS at 01:30

## 2019-02-28 RX ADMIN — ONDANSETRON 4 MG: 2 INJECTION INTRAMUSCULAR; INTRAVENOUS at 13:00

## 2019-02-28 RX ADMIN — HYDROMORPHONE HYDROCHLORIDE 16 MG: 4 TABLET ORAL at 15:33

## 2019-02-28 RX ADMIN — SODIUM CHLORIDE: 9 INJECTION, SOLUTION INTRAVENOUS at 12:10

## 2019-02-28 ASSESSMENT — COPD QUESTIONNAIRES
IN THE PAST 12 MONTHS DO YOU DO LESS THAN YOU USED TO BECAUSE OF YOUR BREATHING PROBLEMS: AGREE
COPD SCREENING SCORE: 5
DURING THE PAST 4 WEEKS HOW MUCH DID YOU FEEL SHORT OF BREATH: SOME OF THE TIME
HAVE YOU SMOKED AT LEAST 100 CIGARETTES IN YOUR ENTIRE LIFE: YES
HAVE YOU SMOKED AT LEAST 100 CIGARETTES IN YOUR ENTIRE LIFE: YES
DO YOU EVER COUGH UP ANY MUCUS OR PHLEGM?: NO/ONLY WITH OCCASIONAL COLDS OR INFECTIONS
DO YOU EVER COUGH UP ANY MUCUS OR PHLEGM?: NO/ONLY WITH OCCASIONAL COLDS OR INFECTIONS
DURING THE PAST 4 WEEKS HOW MUCH DID YOU FEEL SHORT OF BREATH: SOME OF THE TIME
COPD SCREENING SCORE: 5

## 2019-02-28 ASSESSMENT — COGNITIVE AND FUNCTIONAL STATUS - GENERAL
CLIMB 3 TO 5 STEPS WITH RAILING: A LOT
MOVING FROM LYING ON BACK TO SITTING ON SIDE OF FLAT BED: A LITTLE
WALKING IN HOSPITAL ROOM: A LITTLE
DAILY ACTIVITIY SCORE: 22
PERSONAL GROOMING: A LITTLE
TOILETING: A LITTLE
SUGGESTED CMS G CODE MODIFIER MOBILITY: CK
MOBILITY SCORE: 19
SUGGESTED CMS G CODE MODIFIER DAILY ACTIVITY: CJ
STANDING UP FROM CHAIR USING ARMS: A LITTLE

## 2019-02-28 ASSESSMENT — ENCOUNTER SYMPTOMS
CLAUDICATION: 0
GASTROINTESTINAL NEGATIVE: 1
EYES NEGATIVE: 1
SHORTNESS OF BREATH: 1
SINUS PAIN: 0
DIARRHEA: 1
WEIGHT LOSS: 0
CARDIOVASCULAR NEGATIVE: 1
ORTHOPNEA: 0
DEPRESSION: 0
DIZZINESS: 0
WEAKNESS: 0
STRIDOR: 0
WHEEZING: 0
NAUSEA: 0
SPUTUM PRODUCTION: 0
DOUBLE VISION: 0
BLOOD IN STOOL: 0
MUSCULOSKELETAL NEGATIVE: 1
DIAPHORESIS: 1
HEMOPTYSIS: 0
DIARRHEA: 0
BLURRED VISION: 0
HEADACHES: 0
NERVOUS/ANXIOUS: 0
FEVER: 1
MYALGIAS: 0
CONSTIPATION: 0
PALPITATIONS: 0
MYALGIAS: 1
PSYCHIATRIC NEGATIVE: 1
SORE THROAT: 1
COUGH: 1
FEVER: 0
VOMITING: 0
ABDOMINAL PAIN: 0
HEARTBURN: 0
CHILLS: 1
SPUTUM PRODUCTION: 1
NEUROLOGICAL NEGATIVE: 1
SORE THROAT: 0
BACK PAIN: 0
SHORTNESS OF BREATH: 0

## 2019-02-28 ASSESSMENT — PATIENT HEALTH QUESTIONNAIRE - PHQ9
SUM OF ALL RESPONSES TO PHQ9 QUESTIONS 1 AND 2: 0
SUM OF ALL RESPONSES TO PHQ9 QUESTIONS 1 AND 2: 0
1. LITTLE INTEREST OR PLEASURE IN DOING THINGS: NOT AT ALL
2. FEELING DOWN, DEPRESSED, IRRITABLE, OR HOPELESS: NOT AT ALL
1. LITTLE INTEREST OR PLEASURE IN DOING THINGS: NOT AT ALL
2. FEELING DOWN, DEPRESSED, IRRITABLE, OR HOPELESS: NOT AT ALL

## 2019-02-28 ASSESSMENT — LIFESTYLE VARIABLES
ALCOHOL_USE: NO
EVER_SMOKED: YES
EVER_SMOKED: YES

## 2019-02-28 NOTE — PROGRESS NOTES
12 CC/Monitor Summary    Afebrile  NSR 80s with no ectopy noted  VSS    Room air    L subclavian port accessed @ outside facility and pt won't allow RNs to replace another line    Will try again this am

## 2019-02-28 NOTE — PROGRESS NOTES
Bedside report received. Patient resting in bed. 2 RN skin check completed with night shift RN. No needs voiced. Call bell within reach. Bed alarm on. Will continue to monitor.

## 2019-02-28 NOTE — PROGRESS NOTES
Admission 2 RN skin assessment    Pt skin examined head to toe and deemed without any breakdown as follows:    -BL ears c/d/i without redness  -BL elbows c/d/i without redness  -Sacrum/Coccyx c/d/i without redness  -BL heels very dry with cracks on outside aspect of heel. Pt stated he was working on moisturizing his feet before coming into the hospital    Pt turns self as needed.

## 2019-02-28 NOTE — ED TRIAGE NOTES
Pt transferred from [facility] for fever with n/v/d, patient is currently stage 4 colorectal cancer receiving chemo last week, 104.2 treated with tylenol. Given zithro pta, 1200ml bolus, Blood pressure (!) 97/65, pulse (!) 115, temperature 37.3 °C (99.1 °F), temperature source Temporal, height 1.829 m (6'), weight 72.6 kg (160 lb), SpO2 94 %.    ERP at bedside

## 2019-02-28 NOTE — ASSESSMENT & PLAN NOTE
- Hemoglobin 1.5, normocytic, normochromic  - Anemia due to chronic medical condition, GI malignancy  - Fecal hemoccult negative  - continue to monitor

## 2019-02-28 NOTE — PROGRESS NOTES
Internal Medicine Interval Note  Note Author: Tracy Mc M.D.     Name Erich Ingram     1965   Age/Sex 53 y.o. male   MRN [id number]   Code Status Full     After 5PM or if no immediate response to page, please call for cross-coverage  Attending/Team:Dr. Waterman/Madelin See Patient List for primary contact information  Call [phone] to page    1st Call - Day Intern (R1):    2nd Call - Day Sr. Resident (R2/R3):   Dr. Wilkins         Reason for interval visit  (Principal Problem)   Community-acquired pneumonia  Sepsis    Interval Problem Daily Status Update  (24 hours, problem oriented, brief subjective history, new lab/imaging data pertinent to that problem)   Mr. Ingram, 53-year-old male with history of colon cancer x 2 (2016, partial colon resection with chemotherapy and was in remission, 2018 - metastatic colon cancer with metastases to the lungs on chemotherapy, last chemotherapy on 2019) presented with complaint of fatigue, chills, productive cough of whitish sputum for the past 2 weeks.  Patient was found to have sepsis secondary to pneumonia.  Patient is on IV ceftriaxone and vancomycin    Overnight, patient denies any new complaints.  Patient states that he feels that he is improving.  No episodes of diarrhea reported.  Last diarrhea was 2 weeks ago.  Sepsis has been improving, WBC trending down.  MRSA by PCR test is pending and if negative, will plan to discontinue vancomycin.    Pulmonary service was consulted, patient will undergo bronchoscopy on 3/1/2019.    Review of Systems   Constitutional: Positive for chills, diaphoresis and malaise/fatigue. Negative for fever.   HENT: Negative for nosebleeds and sore throat.    Eyes: Negative for blurred vision and double vision.   Respiratory: Positive for cough and sputum production (Whitish). Negative for hemoptysis, shortness of breath and wheezing.    Cardiovascular: Negative for chest pain, palpitations and leg  swelling.   Gastrointestinal: Negative for abdominal pain, blood in stool, constipation, diarrhea, heartburn, nausea and vomiting.   Genitourinary: Negative for dysuria, frequency and urgency.   Musculoskeletal: Positive for myalgias. Negative for back pain and joint pain.   Skin: Negative for itching and rash.   Neurological: Negative for dizziness and headaches.   Psychiatric/Behavioral: Negative for depression. The patient is not nervous/anxious.        Disposition/Barriers to discharge:   Remain inpatient for IV antibiotics/can be discharged home when medically stable    Consultants/Specialty  PCP: BEENA Sterling      Quality Measures  Quality-Core Measures   Reviewed items::  Labs reviewed, Medications reviewed, Radiology images reviewed and EKG reviewed  Soriano catheter::  No Soriano  Central line in place:  Chemotherapy  DVT prophylaxis pharmacological::  Enoxaparin (Lovenox)  Ulcer Prophylaxis::  Not indicated  Antibiotics:  Treating active infection/contamination beyond 24 hours perioperative coverage      Physical Exam       Vitals:    02/28/19 0130 02/28/19 0215 02/28/19 0400 02/28/19 0850   BP:  125/87 121/82 126/86   Pulse: 96 78 87 83   Resp:  17 17 12   Temp:  36.8 °C (98.2 °F) 37.1 °C (98.7 °F) 36.3 °C (97.3 °F)   TempSrc:  Temporal Temporal Temporal   SpO2: 95% 95% 95% 93%   Weight:  80 kg (176 lb 5.9 oz)     Height:         Body mass index is 23.92 kg/m². Weight: 80 kg (176 lb 5.9 oz)  Oxygen Therapy:  Pulse Oximetry: 93 %, O2 (LPM): 0, O2 Delivery: None (Room Air)    Physical Exam   Constitutional: He is oriented to person, place, and time. He appears dehydrated. He appears unhealthy.   HENT:   Head: Normocephalic and atraumatic.   Eyes: Pupils are equal, round, and reactive to light. EOM are normal.   Neck: Normal range of motion. Neck supple. No thyromegaly present.   Cardiovascular: Normal rate, regular rhythm and normal heart sounds.    Pulmonary/Chest: Effort normal and breath sounds  normal. No respiratory distress. He has no wheezes.   Abdominal: Soft. Bowel sounds are normal. He exhibits no distension. There is no tenderness.   Musculoskeletal: Normal range of motion. He exhibits no edema.   Neurological: He is alert and oriented to person, place, and time. GCS score is 15.   Skin: Skin is warm and dry.   Psychiatric: Mood and affect normal.   Nursing note and vitals reviewed.    Assessment/Plan     * PNA (pneumonia)- (present on admission)   Assessment & Plan    -Cavitary lesions on CT chest completed at an outside facility, concern for MRSA.  -Patient presented with SIRS 3/4, white blood cell count 24.2, initial lactic acid 1.2 normal.   -Pro calcitonin- 32.14, indicating bacterial infectious process  -Patient given 1 dose of Rocephin and Zithromax in the emergency department in addition to 1 L IV fluid bolus.  -WBC improving  -Continue monitoring in telemetry for floor  -Continue IV fluids at 125 ml/hr  -Continue ceftriaxone and vancomycin  -Repeat pro calcitonin level in 48 hours  -Follow-up with MRSA by PCR result, if negative vancomycin can be discontinued  -Pulmonology consult for possible evaluation of obstructive pneumonia     Sepsis (HCC)   Assessment & Plan    This is sepsis (without associated acute organ dysfunction).   -Patient presented with 3/4 SIRS criteria (HR > 90, RR > 20, WBC >12 ) with the likely source of infection from the lungs  -Chest x-ray and CT chest done at an outside facility is concerning for pneumonia  -Improving, tachycardia resolved, respiratory rate decreased, WBC trending down from 24 to 13  -Continue IV antibiotic  -Follow-up blood, urine, sputum cultures  -Follow-up MRSA by PCR results     Normocytic anemia   Assessment & Plan    -Hemoglobin 11.5, normocytic, normochromic  -Anemia due to chronic medical condition, GI malignancy  -Hemoccult negative  -continue to monitor     Elevated LFTs   Assessment & Plan    - Elevated liver function tests likely  secondary to liver cirrhosis from hepatitis C   - , ALT 63, alkaline phosphatase 324.   - Follow-up RUQ ultrasound  - Continue to monitor     Hyponatremia   Assessment & Plan    - Improved  - Continue to monitor     Hepatitis C   Assessment & Plan    - Patient reports history of untreated hepatitis C   - Elevated liver function tests with , ALT 63, and alkaline phosphatase 324.  - Outpatient GI follow up when medically stable     COPD (chronic obstructive pulmonary disease) (HCC)   Assessment & Plan    - Patient reports history of COPD, does not use oxygen at home.  - Pt quit smoking couple years ago but was previously smoking 1 pack/day for 10 years.    - Saturating at 94% on room air, and no respiratory distress.  - Pt denies any SOB or wheezing  - Respiratory care per protocol     Colon cancer (HCC)- (present on admission)   Assessment & Plan    -H/O colon cancer diagnosed in 2016 who underwent partial colectomy followed by chemotherapy in remission  - recurrence of metastatic colon cancer 08/2018 with metastasis to the lungs, currently on chemotherapy  - Last chemotherapy was 2/8/2019  - oncology consult       Hypertension   Assessment & Plan    Stable of home medication   Consider restarting Losartan when his BP improves     Thrombocytopenia (HCC)   Assessment & Plan    -Platelets 96, likely secondary to liver cirrhosis and untreated hepatitis C.  -Patient is asymptomatic  -Continue to monitor

## 2019-02-28 NOTE — H&P
Senior Admission Note    In summary: Erich Ingram is a 53 y.o. male with past medical history of HCV, HTN, BPH, COPD, stage IV colon CA s/p resection and chemo last 3 wks ago presented to OSH ED with N/V/D of ~2 wks duration. Denied SOB/CP as well as F/C, has chronic cough and L groin pain. CXR and CT at OSH showed cavitary lesions and pt was noted to meet 4/4 SIRS so he was transferred to [facility] for further care.    In the ED WBC was 24, he was anemic/thrombocytopenic, he had LFT elevations and hypocalcemia/albuminemia. Procalcitonin was 32. He was started on C3 and Azithro and given sepsis protocol fluids and admitted for further mgmt of his symptoms.      Pertinent physical exam findings:    Abd: No abd pain/tenderness  Pulm: Bibasilar crackles   Cards: ST, no murmurs  Rectal: No masses/hemrrhoids, hemoccult negative    Assessment and plan in summary:    1.Sepsis    - 4/4 SIRS, CT shows cavitary lesions, hypotensive   - Likely 2/2 PNA, concern for MRSA due to cavitary lesions   - Continue C3, start Vanco   - Sepsis protocol fluids   - RT therapy and    - Sputum and blood cx   - CTM on tele with cardiac monitor     2.LFT Elevation   - Likely 2/2 HCV   - RUQ US to rule out obstructive process/infxn   - CTM    3.Thrombocytopenia  4. Normocytic anemia    - Likely 2/2 liver dz/malignancy/sepsis    - No signs of spontaneous bleed   - Hemoccult negative   - CTM and transfuse if Hgb <7      For full plan, please see Intern note for details   Kelvin Villeda M.D.  PGY 2

## 2019-02-28 NOTE — PROGRESS NOTES
"RN attempted to replace rose needle in L subclav port    Pt stated, \"it was just changed yesterday, you don't need to touch it.\"    RN educated pt why it  Needed changed but pt still refused  "

## 2019-02-28 NOTE — ASSESSMENT & PLAN NOTE
- Likely secondary to liver cirrhosis and untreated hepatitis C.  - Patient is asymptomatic  - Continue to monitor

## 2019-02-28 NOTE — CARE PLAN
Problem: Communication  Goal: The ability to communicate needs accurately and effectively will improve  Outcome: PROGRESSING AS EXPECTED      Problem: Safety  Goal: Will remain free from falls  Outcome: PROGRESSING AS EXPECTED      Problem: Infection  Goal: Will remain free from infection  Outcome: PROGRESSING AS EXPECTED      Problem: Venous Thromboembolism (VTW)/Deep Vein Thrombosis (DVT) Prevention:  Goal: Patient will participate in Venous Thrombosis (VTE)/Deep Vein Thrombosis (DVT)Prevention Measures  Outcome: PROGRESSING AS EXPECTED      Problem: Bowel/Gastric:  Goal: Normal bowel function is maintained or improved  Outcome: PROGRESSING AS EXPECTED    Goal: Will not experience complications related to bowel motility  Outcome: PROGRESSING AS EXPECTED      Problem: Knowledge Deficit  Goal: Knowledge of disease process/condition, treatment plan, diagnostic tests, and medications will improve  Outcome: PROGRESSING AS EXPECTED    Goal: Knowledge of the prescribed therapeutic regimen will improve  Outcome: PROGRESSING AS EXPECTED      Problem: Discharge Barriers/Planning  Goal: Patient's continuum of care needs will be met  Outcome: PROGRESSING AS EXPECTED      Problem: Fluid Volume:  Goal: Will maintain balanced intake and output  Outcome: PROGRESSING AS EXPECTED      Problem: Psychosocial Needs:  Goal: Level of anxiety will decrease  Outcome: PROGRESSING AS EXPECTED      Problem: Pain Management  Goal: Pain level will decrease to patient's comfort goal  Outcome: PROGRESSING AS EXPECTED      Problem: Respiratory:  Goal: Respiratory status will improve  Outcome: PROGRESSING AS EXPECTED      Problem: Mobility  Goal: Risk for activity intolerance will decrease  Outcome: PROGRESSING AS EXPECTED

## 2019-02-28 NOTE — ED PROVIDER NOTES
ED Provider Note    Scribed for Binu Sumner M.D. by Jacob Monson. 2/27/2019, 10:07 PM.    Primary care provider: BEENA Sterling  Means of arrival: Ambulance  History obtained from: Patient  History limited by: None    CHIEF COMPLAINT  Chief Complaint   Patient presents with   • Fever   • Diarrhea       HPI  Erich Ingram is a 53 y.o. male who presents to the Emergency Department for evaluation of constant fatigue onset 3 weeks ago, progressively worsening. The patient reports intermittent emesis that is non bilious or bloody, cough onset several weeks ago with yellow phlegm, fever onset today, generalized weakness, wheezing, and chronic groin pain onset 5 months ago. No exacerbating or alleviating factors noted. The patient has not been eating much secondary to nausea. He denies any chest pain, shortness of breath, dizziness, headache, vision changes, palpitations, abdominal pain, constipation, current diarrhea, difficulty swallowing and dysuria. The patient was diagnosed with colon cancer diagnosed 9/2018 and his tenth treatment of chemotherapy was 3 weeks ago. He also has a history of COPD and Hepatitis C, but has not been treated for it. The patient has not been diagnosed with cirrhosis. He takes Zofran with relief of nausea, Losartan, Prednisone and other medications, which he could not remember at this time. He denies any sick contacts.    REVIEW OF SYSTEMS  Review of Systems   Constitutional: Positive for fever and malaise/fatigue.   HENT:        Denies difficulty swallowing   Eyes: Negative for blurred vision, double vision and photophobia.   Respiratory: Positive for cough, sputum production and wheezing. Negative for shortness of breath.    Cardiovascular: Negative for chest pain and palpitations.   Gastrointestinal: Positive for nausea and vomiting. Negative for abdominal pain, constipation and diarrhea.   Genitourinary: Negative for dysuria.        Groin pain   Neurological:  Positive for weakness. Negative for dizziness and headaches.   All other systems reviewed and are negative.      PAST MEDICAL HISTORY   has a past medical history of Breath shortness; Cancer (HCC); Cancer (HCC) (08/2018); COPD (chronic obstructive pulmonary disease) (HCC); Hepatitis C; Hypertension; Infectious disease; and Psychiatric problem.    SURGICAL HISTORY   has a past surgical history that includes low anterior resection laparoscopic (1/12/2016) and cath placement (3/16/2016).    SOCIAL HISTORY  Social History   Substance Use Topics   • Smoking status: Former Smoker     Packs/day: 1.00     Years: 15.00     Start date: 1/12/2001     Quit date: 6/12/2015   • Smokeless tobacco: Former User   • Alcohol use No      History   Drug Use No       FAMILY HISTORY  None noted    CURRENT MEDICATIONS  Home Medications     Reviewed by Judit De Guzman (Pharmacy Tech) on 02/27/19 at 2328  Med List Status: Complete   Medication Last Dose Status   HYDROmorphone (DILAUDID) 8 MG tablet 2/27/2019 Active   losartan (COZAAR) 50 MG Tab 2/27/2019 Active   predniSONE (DELTASONE) 5 MG Tab 2/27/2019 Active   tamsulosin (FLOMAX) 0.4 MG capsule 2/27/2019 Active                ALLERGIES  No Known Allergies    PHYSICAL EXAM  VITAL SIGNS: BP (!) 97/65   Pulse (!) 115   Temp 37.3 °C (99.1 °F) (Temporal)   Ht 1.829 m (6')   Wt 72.6 kg (160 lb)   SpO2 94%   BMI 21.70 kg/m²     Constitutional: In moderate distress, Lethargic and pale.   HENT: Normocephalic, Atraumatic, Bilateral external ears normal, oropharynx dry, Erythematous posterior oropharynx, mildly erythematous tongue, No oral exudates, Nose normal. No leukoplakia  Eyes:conjunctiva is normal, there are no signs of exudate.   Cardiovascular: Regular rate and rhythm without murmurs gallops or rubs.   Thorax & Lungs: No respiratory distress. Breathing comfortably. Lungs are clear to auscultation bilaterally, there are no wheezes no rales. Chest wall is nontender.  Abdomen: Soft,  Tenderness at the left lower quadrant, nondistended. Bowel sounds are present.   Skin: Warm, Dry, No erythema,   Back: No tenderness, No CVA tenderness.  Musculoskeletal: Good range of motion in all major joints. No tenderness to palpation or major deformities noted. Intact distal pulses, no clubbing, no cyanosis, no edema,   Neurologic: Alert & oriented x 3, Moving all extremities. No gross abnormalities. Tremor to the bilateral upper extremities  Psychiatric: Affect normal, Judgment normal, Mood normal.     LABS  Results for orders placed or performed during the hospital encounter of 02/27/19   CBC WITH DIFFERENTIAL   Result Value Ref Range    WBC 24.2 (H) 4.8 - 10.8 K/uL    RBC 3.50 (L) 4.70 - 6.10 M/uL    Hemoglobin 11.5 (L) 14.0 - 18.0 g/dL    Hematocrit 32.7 (L) 42.0 - 52.0 %    MCV 93.4 81.4 - 97.8 fL    MCH 32.9 27.0 - 33.0 pg    MCHC 35.2 33.7 - 35.3 g/dL    RDW 54.0 (H) 35.9 - 50.0 fL    Platelet Count 96 (L) 164 - 446 K/uL    MPV 10.2 9.0 - 12.9 fL    Neutrophils-Polys 88.30 (H) 44.00 - 72.00 %    Lymphocytes 3.10 (L) 22.00 - 41.00 %    Monocytes 6.80 0.00 - 13.40 %    Eosinophils 0.00 0.00 - 6.90 %    Basophils 0.20 0.00 - 1.80 %    Immature Granulocytes 1.60 (H) 0.00 - 0.90 %    Nucleated RBC 0.00 /100 WBC    Neutrophils (Absolute) 21.35 (H) 1.82 - 7.42 K/uL    Lymphs (Absolute) 0.76 (L) 1.00 - 4.80 K/uL    Monos (Absolute) 1.64 (H) 0.00 - 0.85 K/uL    Eos (Absolute) 0.00 0.00 - 0.51 K/uL    Baso (Absolute) 0.04 0.00 - 0.12 K/uL    Immature Granulocytes (abs) 0.39 (H) 0.00 - 0.11 K/uL    NRBC (Absolute) 0.00 K/uL   COMP METABOLIC PANEL   Result Value Ref Range    Sodium 131 (L) 135 - 145 mmol/L    Potassium 3.7 3.6 - 5.5 mmol/L    Chloride 100 96 - 112 mmol/L    Co2 23 20 - 33 mmol/L    Anion Gap 8.0 0.0 - 11.9    Glucose 90 65 - 99 mg/dL    Bun 7 (L) 8 - 22 mg/dL    Creatinine 1.00 0.50 - 1.40 mg/dL    Calcium 7.4 (L) 8.5 - 10.5 mg/dL    AST(SGOT) 118 (H) 12 - 45 U/L    ALT(SGPT) 63 (H) 2 - 50 U/L     Alkaline Phosphatase 324 (H) 30 - 99 U/L    Total Bilirubin 0.6 0.1 - 1.5 mg/dL    Albumin 2.3 (L) 3.2 - 4.9 g/dL    Total Protein 4.8 (L) 6.0 - 8.2 g/dL    Globulin 2.5 1.9 - 3.5 g/dL    A-G Ratio 0.9 g/dL   LACTIC ACID   Result Value Ref Range    Lactic Acid 1.2 0.5 - 2.0 mmol/L   ESTIMATED GFR   Result Value Ref Range    GFR If African American >60 >60 mL/min/1.73 m 2    GFR If Non African American >60 >60 mL/min/1.73 m 2     All labs reviewed by me.    RADIOLOGY  OUTSIDE IMAGES-DX CHEST   Final Result      OUTSIDE IMAGES-CT CHEST   Final Result        The radiologist's interpretation of all radiological studies have been reviewed by me.    COURSE & MEDICAL DECISION MAKING  Pertinent Labs & Imaging studies reviewed. (See chart for details)    Chart Review: CT revealed multiple bilateral pulmonary nodules. Some of these nod  es have cavitations within them. Findings could represent cavitating pneumonia or neoplasm.     10:07 PM - Patient seen and examined at bedside. Ordered CBC, CMP, lactic acid, blood culture x2 and Procalcitonin to evaluate his symptoms. The differential diagnoses include but are not limited to: Pneumonia, chemotherapy side-effects, malignancy. I informed the patient that he will need to undergo blood work for evaluation.    11:42 PM [facility] paged.        HYDRATION: Based on the patient's presentation of Sepsis the patient was given IV fluids. IV Hydration was used because oral hydration was not adequate alone. Upon recheck following hydration, the patient was found to have a positive response.    Decision Making:  Patient presents ED for evaluation.  Clinically the patient otherwise appears well.  At this point the patient does have multiple cavitary lesions possible for tuberculosis neoplasm or other infectious etiology.  Clinically the patient is slightly cachectic and pale in appearance.  He will be given another liter bolus of normal saline here his lactic acid is normal.  At this point the  patient does meet septic criteria has been given Rocephin and Zithromax already.  At this point will admit the patient for further treatment and care.  We have spoken to urinary versus the internal medicine for further treatment and care.    DISPOSITION:  Patient will be admitted to [facility] in guarded condition.     FINAL IMPRESSION  1. Pneumonia of both lower lobes due to infectious organism (HCC)    2. Sepsis, due to unspecified organism (HCC)    3. Cancer (HCC)          IJacob (Scribe), am scribing for, and in the presence of, Binu Sumner M.D..    Electronically signed by: Jacob Monson (Scribe), 2/27/2019    IBinu M.D. personally performed the services described in this documentation, as scribed by Jacob Monson in my presence, and it is both accurate and complete. C    The note accurately reflects work and decisions made by me.  Binu Sumner  2/27/2019  11:43 PM

## 2019-02-28 NOTE — DIETARY
Nutrition services: Day 1 of admit.  Erich Ingram is a 53 y.o. male with admitting DX of sepsis, PNA and groin pain.     Consult received for poor PO and wt loss PTA.     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 Pt admitted per H&P with worsening fatigue over the past 3 weeks. Noted pt with last chem trx 3 weeks ago. Stated shortly after chemo he experience fatigue and diarrhea. Stated he was unable to hold any food down for the 2 weeks following chemo.     This RD spoke with pt in room. Pt stated his UBW used to be about 195#. Pt reiterated his N/V with chemo and states he has tried multiple anti-emetics. Currently he has Zofran PRN. Observed lunch at bedside, pt noted he consumed the most at this meal than he has for a while; his appetite may be improving, appeared to have consumed ~50%. Pt amenable to rotating boost and magic cup.  RN entered supplements per RD.     Assessment:  Height: 182.9 cm (6')  Weight: 80 kg (176 lb 5.9 oz)  Body mass index is 23.92 kg/m².   Diet/Intake: Regular no PO recorded at this time     Evaluation:   1. Patient described as slightly cachetic per MD physical exam on admit.   2. Per MD assessment and plan, day team to consider oncology consult as pt has Colon CA that likely has metastasized to his lung.   Meds: Rocephin, Prednisone 5mg QD, Senna (refused 2/28 AM), Vanco    Fluids: NS 125mL/hr   Skin: has chemo port in L upper chest   GI/: Hx of colon CA, last BM 2/27 per flowsheet   Labs: WBC 13.3 (down from 24.2 2/27), BUN 7 Ca 6.7  ALT 54 Alk Phos 246 Alb 2.0     Malnutrition Risk: Pt at risk for severe malnutrition in setting of chronic illness related to hx of colon CA and continued chemo AEB suspected PO </= 75% estimated energy needs >/= 1 month inferred from unable to hold down food following chemo 3 weeks ago and described as slightly cachectic appearing by MD.      Recommendations/Plan:  1. Diet as ordered.   2. Supplements. Amenable to boost/magic cup rotating.   3. Encourage intake of 50% or greater.   4. Document intake of all meals/supplements  as % taken in ADL's to provide interdisciplinary communication across all shifts.   5. Monitor weight.  6. Nutrition rep will continue to see patient for ongoing meal and snack preferences.     RD to continue to monitor for adequate PO intake.

## 2019-02-28 NOTE — ASSESSMENT & PLAN NOTE
- H/O colon cancer - First episode diagnosed in 2016, underwent partial colectomy followed by chemotherapy in remission  - Recurrence of metastatic colon cancer 08/2018 currently on chemotherapy  - Last chemotherapy was 2/8/2019  - Oncologist saw the patient, appreciate recommendations: Outpatient follow-up for restaging after active infection.  Hold all chemotherapy while inpatient  - Remove post-A-Cath as that grew gram positive bacillus. Pt can complete his chemo via peripheral IV or PICC as pt has 2 cycles remaining  - continue to monitor

## 2019-02-28 NOTE — H&P
Internal Medicine Admitting History and Physical    Note Author: Quincy Saravia M.D.       Name Erich Ingram     1965   Age/Sex 53 y.o. male   MRN [id number]   Code Status FULL     After 5PM or if no immediate response to page, please call for cross-coverage  Attending/Team: Dr Waterman See Patient List for primary contact information  Call [phone] to page    1st Call - Day Intern (R1):   Dr Mc 2nd Call - Day Sr. Resident (R2/R3):   Dr Wilkins       Chief Complaint:   Fatigue     HPI:    Patient is a 53-year-old male who presents to the emergency department with a chief complaint of fatigue that has been worsening for the past 3 weeks.  Patient has an element past medical history which includes colon cancer diagnosis in 2018 with 10 to chemotherapy treatment 3 weeks ago, history of COPD, hepatitis C.  Patient states that after he got his chemotherapy treatment 3 weeks ago he has been experiencing extreme fatigue and diarrhea shortly after chemotherapy.  He states he got lethargic and also was not able to hold any food down for 2 weeks after this.  Patient states he has been feeling chilly and experiencing more sweats recently, states he has had a cough from COPD which is per his normal, denies any blood in sputum states it is mostly clear sputum, states chronic left groin pain, denies fevers, shortness of breath, chest pain, leg swelling, numbness, tingling, dysuria, bloody stools, dark stools, recent travel, sick contacts.    In terms of family history, patient states he is adopted so he is not aware of any of his family members.  In terms of social history, patient states he was smoking 1 pack/day for 10 years but quit a couple years ago, denies any illicit drug use, states he smokes marijuana for medicinal purposes occasionally, and denies any alcohol use.    In the ED, patient temperature 99.1, tachycardic into 110s, hypotensive 80s-90s over 50s-60s, with leukocytosis  elevated white blood cell count 24.2, presenting with SIRS 3/4.  Initial lactic acid 1.2 normal.  Initial chemistry revealed hyponatremia with sodium 131, elevated liver function tests likely secondary to liver cirrhosis from hepatitis C with , ALT 63 alkaline phosphatase 324, and calcium 7.4, albumin 2.3, corrected calcium 8.8.  Per chart review CT chest from outside facility revealed multiple bilateral pulmonary nodules, cavitary findings which could represent cavitary pneumonia or neoplasm.  Blood cultures x2 were collected.  Calcitonin was ordered.  Patient was given 1 L IV fluid per sepsis protocol.  Patient was given Rocephin and Zithromax x1 in the emergency department.  Patient to be admitted to medical telemetry floor for further workup and management of sepsis secondary to pneumonia.      Review of Systems   Constitutional: Positive for chills, diaphoresis and malaise/fatigue. Negative for fever and weight loss.   HENT: Positive for sore throat. Negative for congestion, ear discharge, ear pain, hearing loss, nosebleeds, sinus pain and tinnitus.    Eyes: Negative.    Respiratory: Positive for cough and sputum production. Negative for hemoptysis, shortness of breath, wheezing and stridor.    Cardiovascular: Negative.    Gastrointestinal: Positive for diarrhea. Negative for abdominal pain, blood in stool, constipation, heartburn, melena, nausea and vomiting.   Genitourinary: Negative.         States chronic left groin pain   Musculoskeletal: Negative.    Skin: Negative.    Neurological: Negative.  Negative for weakness.   Endo/Heme/Allergies: Negative.    Psychiatric/Behavioral: Negative.    All other systems reviewed and are negative.       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   • COPD  (chronic obstructive pulmonary disease) (HCC)    • Hepatitis C    • Hypertension    • Infectious disease    • Psychiatric problem     anxiety         Past Surgical History:  Past Surgical History:   Procedure Laterality Date   • CATH PLACEMENT  3/16/2016    Procedure: CATH PLACEMENT POWER PORT ;  Surgeon: Luke Lima M.D.;  [place] [facility] [place]:    • LOW ANTERIOR RESECTION LAPAROSCOPIC  1/12/2016    Procedure: LOW ANTERIOR RESECTION LAPAROSCOPIC;  Surgeon: Luke Lima M.D.;  [place] [facility] [place]:        Current Outpatient Medications:  Home Medications     Reviewed by Judit De Guzman (Pharmacy Tech) on 02/27/19 at 2328  Med List Status: Complete   Medication Last Dose Status   HYDROmorphone (DILAUDID) 8 MG tablet 2/27/2019 Active   losartan (COZAAR) 50 MG Tab 2/27/2019 Active   predniSONE (DELTASONE) 5 MG Tab 2/27/2019 Active   tamsulosin (FLOMAX) 0.4 MG capsule 2/27/2019 Active                Medication Allergy/Sensitivities:  No Known Allergies      Family History (mandatory)   Family History   Problem Relation Age of Onset   • Adopted: Yes   • No Known Problems Mother    • No Known Problems Father    • No Known Problems Sister    • No Known Problems Brother    • No Known Problems Maternal Grandmother    • No Known Problems Maternal Grandfather    • No Known Problems Paternal Grandmother    • No Known Problems Paternal Grandfather        Social History (mandatory)   Social History     Social History   • Marital status:      Spouse name: N/A   • Number of children: N/A   • Years of education: N/A     Occupational History   • Not on file.     Social History Main Topics   • Smoking status: Former Smoker     Packs/day: 1.00     Years: 15.00     Start date: 1/12/2001     Quit date: 6/12/2015   • Smokeless tobacco: Former User   • Alcohol use No   • Drug use: No   • Sexual activity: Not on file     Other Topics Concern   • Not on file     Social History  Narrative   • No narrative on file     Living situation: Home   PCP : BEENA Sterling    Physical Exam     Vitals:    02/28/19 0049 02/28/19 0100 02/28/19 0130 02/28/19 0215   BP:    125/87   Pulse: 95 91 96 78   Resp: 20   17   Temp:    36.8 °C (98.2 °F)   TempSrc:    Temporal   SpO2: 94% 94% 95% 95%   Weight:    80 kg (176 lb 5.9 oz)   Height:         Body mass index is 23.92 kg/m².  /87   Pulse 78   Temp 36.8 °C (98.2 °F) (Temporal)   Resp 17   Ht 1.829 m (6')   Wt 80 kg (176 lb 5.9 oz)   SpO2 95%   BMI 23.92 kg/m²   O2 therapy: Pulse Oximetry: 95 %, O2 (LPM): 0, O2 Delivery: None (Room Air)    Physical Exam   Constitutional: He is oriented to person, place, and time. No distress.   Patient appears slightly cachectic.   HENT:   Head: Normocephalic and atraumatic.   Right Ear: External ear normal.   Left Ear: External ear normal.   Nose: Nose normal.   Mouth/Throat: Oropharynx is clear and moist. No oropharyngeal exudate.   Eyes: Pupils are equal, round, and reactive to light. Conjunctivae and EOM are normal. Right eye exhibits no discharge. Left eye exhibits no discharge. No scleral icterus.   Neck: Normal range of motion. Neck supple. No JVD present. No tracheal deviation present. No thyromegaly present.   Cardiovascular: Normal rate, regular rhythm, normal heart sounds and intact distal pulses.  Exam reveals no gallop and no friction rub.    No murmur heard.  Pulmonary/Chest: No stridor. No respiratory distress. He has no wheezes. He has rales (Mild scattered crackling appreciated bilaterally.). He exhibits no tenderness.   Patient has chemo port in left upper chest, nonerythematous, does not appear to be inflamed, skin integrity around port intact, no drainage, bleeding, purulence appreciated.   Abdominal: Soft. Bowel sounds are normal. He exhibits no distension and no mass. There is no tenderness. There is no rebound and no guarding.   Genitourinary: Rectal exam shows guaiac negative stool.    Genitourinary Comments: Digital rectal exam was performed with chaperone Dr. Villeda present at bedside, rectal tone normal, no internal or external hemorrhoids appreciated, no visualized blood, no masses palpated, nontender to palpation.  Occult blood negative.   Musculoskeletal: Normal range of motion. He exhibits no edema, tenderness or deformity.   Distal peripheral pulses 2+ bilaterally in all extremities.   Lymphadenopathy:     He has no cervical adenopathy.   Neurological: He is alert and oriented to person, place, and time. He has normal reflexes. He displays normal reflexes. No cranial nerve deficit. He exhibits normal muscle tone. Gait normal. Coordination normal. GCS score is 15.   Skin: No rash noted. He is not diaphoretic. No erythema.   Psychiatric: Mood, memory, affect and judgment normal.   Nursing note and vitals reviewed.        Data Review       Old Records Request:   Completed  Current Records review/summary: Completed    Lab Data Review:  Recent Results (from the past 24 hour(s))   CBC WITH DIFFERENTIAL    Collection Time: 02/27/19 10:47 PM   Result Value Ref Range    WBC 24.2 (H) 4.8 - 10.8 K/uL    RBC 3.50 (L) 4.70 - 6.10 M/uL    Hemoglobin 11.5 (L) 14.0 - 18.0 g/dL    Hematocrit 32.7 (L) 42.0 - 52.0 %    MCV 93.4 81.4 - 97.8 fL    MCH 32.9 27.0 - 33.0 pg    MCHC 35.2 33.7 - 35.3 g/dL    RDW 54.0 (H) 35.9 - 50.0 fL    Platelet Count 96 (L) 164 - 446 K/uL    MPV 10.2 9.0 - 12.9 fL    Neutrophils-Polys 88.30 (H) 44.00 - 72.00 %    Lymphocytes 3.10 (L) 22.00 - 41.00 %    Monocytes 6.80 0.00 - 13.40 %    Eosinophils 0.00 0.00 - 6.90 %    Basophils 0.20 0.00 - 1.80 %    Immature Granulocytes 1.60 (H) 0.00 - 0.90 %    Nucleated RBC 0.00 /100 WBC    Neutrophils (Absolute) 21.35 (H) 1.82 - 7.42 K/uL    Lymphs (Absolute) 0.76 (L) 1.00 - 4.80 K/uL    Monos (Absolute) 1.64 (H) 0.00 - 0.85 K/uL    Eos (Absolute) 0.00 0.00 - 0.51 K/uL    Baso (Absolute) 0.04 0.00 - 0.12 K/uL    Immature Granulocytes  (abs) 0.39 (H) 0.00 - 0.11 K/uL    NRBC (Absolute) 0.00 K/uL   COMP METABOLIC PANEL    Collection Time: 02/27/19 10:47 PM   Result Value Ref Range    Sodium 131 (L) 135 - 145 mmol/L    Potassium 3.7 3.6 - 5.5 mmol/L    Chloride 100 96 - 112 mmol/L    Co2 23 20 - 33 mmol/L    Anion Gap 8.0 0.0 - 11.9    Glucose 90 65 - 99 mg/dL    Bun 7 (L) 8 - 22 mg/dL    Creatinine 1.00 0.50 - 1.40 mg/dL    Calcium 7.4 (L) 8.5 - 10.5 mg/dL    AST(SGOT) 118 (H) 12 - 45 U/L    ALT(SGPT) 63 (H) 2 - 50 U/L    Alkaline Phosphatase 324 (H) 30 - 99 U/L    Total Bilirubin 0.6 0.1 - 1.5 mg/dL    Albumin 2.3 (L) 3.2 - 4.9 g/dL    Total Protein 4.8 (L) 6.0 - 8.2 g/dL    Globulin 2.5 1.9 - 3.5 g/dL    A-G Ratio 0.9 g/dL   LACTIC ACID    Collection Time: 02/27/19 10:47 PM   Result Value Ref Range    Lactic Acid 1.2 0.5 - 2.0 mmol/L   PROCALCITONIN    Collection Time: 02/27/19 10:47 PM   Result Value Ref Range    Procalcitonin 32.14 (H) <0.25 ng/mL   ESTIMATED GFR    Collection Time: 02/27/19 10:47 PM   Result Value Ref Range    GFR If African American >60 >60 mL/min/1.73 m 2    GFR If Non African American >60 >60 mL/min/1.73 m 2   Comp Metabolic Panel    Collection Time: 02/28/19  4:07 AM   Result Value Ref Range    Sodium 136 135 - 145 mmol/L    Potassium 3.7 3.6 - 5.5 mmol/L    Chloride 108 96 - 112 mmol/L    Co2 20 20 - 33 mmol/L    Anion Gap 8.0 0.0 - 11.9    Glucose 83 65 - 99 mg/dL    Bun 7 (L) 8 - 22 mg/dL    Creatinine 0.97 0.50 - 1.40 mg/dL    Calcium 6.7 (LL) 8.5 - 10.5 mg/dL    AST(SGOT) 109 (H) 12 - 45 U/L    ALT(SGPT) 54 (H) 2 - 50 U/L    Alkaline Phosphatase 246 (H) 30 - 99 U/L    Total Bilirubin 0.5 0.1 - 1.5 mg/dL    Albumin 2.0 (L) 3.2 - 4.9 g/dL    Total Protein 4.0 (L) 6.0 - 8.2 g/dL    Globulin 2.0 1.9 - 3.5 g/dL    A-G Ratio 1.0 g/dL   ESTIMATED GFR    Collection Time: 02/28/19  4:07 AM   Result Value Ref Range    GFR If African American >60 >60 mL/min/1.73 m 2    GFR If Non African American >60 >60 mL/min/1.73 m 2   EKG     Collection Time: 19  4:49 AM   Result Value Ref Range    Report       [facility]    Test Date:  2019  Pt Name:    GILBERTO WELLINGTON              Department: 183  MRN:        [id number]                      Room:       T819  Gender:     Male                         Technician: BRENDA  :        1965                   Requested By:AMEENA JALLOH  Order #:    [id number]                    Reading MD:    Measurements  Intervals                                Axis  Rate:       80                           P:          57  MO:         185                          QRS:        34  QRSD:       97                           T:          45  QT:         404  QTc:        466    Interpretive Statements  SINUS RHYTHM  BORDERLINE LOW VOLTAGE, EXTREMITY LEADS  Compared to ECG 2018 10:23:29  No significant changes         Imaging/Procedures Review:    Independant Imaging Review: Completed  OUTSIDE IMAGES-DX CHEST   Final Result      OUTSIDE IMAGES-CT CHEST   Final Result      US-RUQ    (Results Pending)            EKG:   EKG Independent Review: Completed      Records reviewed and summarized in current documentation :  Yes  [employer] teaching service handout given to patient:  Yes         Assessment/Plan     * PNA (pneumonia)- (present on admission)   Assessment & Plan    On CT from outside facility, multiple bilateral pulmonary nodules, cavitary findings which could represent cavitary pneumonia or neoplasm identified.  Patient presents with SIRS 3/4, white blood cell count 24.2, initial lactic acid 1.2 normal.  Patient given 1 dose of Rocephin and Zithromax in the emergency department in addition to 1 L IV fluid bolus.    Plan  - Admit patient to medical telemetry floor as SIRS 3/4  - Sepsis protocol in place  - 2 IV bore access  - RT protocol  - Patient given 1.5 L normal saline bolus, after initial 1 L bolus in the ED  - Continue NS IV hydration at 125 cc/h  - Pro calcitonin- 32.14, indicating bacterial infectious  process  - Continue ceftriaxone and vancomycin antibiotics at this time  - Follow-up respiratory aspirate cultures  - Follow-up a.m. labs, white blood cell count       Elevated LFTs   Assessment & Plan    Elevated liver function tests likely secondary to liver cirrhosis from hepatitis C with , ALT 63 alkaline phosphatase 324.     Plan  - Follow-up RUQ ultrasound  - Continue to monitor.     Hyponatremia   Assessment & Plan    Sodium 131 on admission.  Given 1 L normal saline bolus in ED, 1.5 mL normal saline bolus per sepsis protocol.    Plan  - Continue IV fluids normal saline  - Continue to monitor     Hepatitis C   Assessment & Plan    Patient reports past medical history of hepatitis C and has not yet received treatment. elevated liver function tests with , ALT 63 alkaline phosphatase 324.    Plan  - Elevated liver function tests likely secondary to liver cirrhosis from hepatitis C  - Outpatient management of hepatitis C treatment by PCP     COPD (chronic obstructive pulmonary disease) (HCC)   Assessment & Plan    Patient reports history of COPD, does not use home oxygen at home, quit smoking couple years ago but was previously smoking 1 pack/day for 10 years.  Saturating at 94% on room air, and no respiratory distress.    Plan  - RT per protocol  - Schedule DuoNeb and steroid treatment if necessary         Left groin pain   Assessment & Plan    Patient reports chronic left groin pain, states that it was this pain that discovered his colon cancer diagnosis.    Plan  - Continue to monitor and provide pain meds as needed      Colon cancer (HCC)- (present on admission)   Assessment & Plan    Colon cancer diagnosis in September 2018 with 10 to chemotherapy treatment 3 weeks ago.    Plan  - Day team to consider oncology consultation as patient has colon cancer that likely metastasized to lung  - Follow-up recommendations from oncology     Hypertension   Assessment & Plan    Held losartan home medication  at this time d/t hypotension     Normocytic anemia   Assessment & Plan    Hemoglobin 11.5, MCV 93.4, likely secondary to chronic medical conditions and colon cancer malignancy. AMOR performed, occult negative.    Plan  - CTM     Thrombocytopenia (HCC)   Assessment & Plan    Platelets 96, likely secondary to liver cirrhosis and untreated hepatitis C.    Plan  - CTM         Anticipated Hospital stay:  >2 midnights        Quality Measures  Quality-Core Measures   Reviewed items::  EKG reviewed, Labs reviewed and Radiology images reviewed  Soriano catheter::  No Soriano  DVT prophylaxis pharmacological::  Enoxaparin (Lovenox)    PCP: BEENA Sterling

## 2019-02-28 NOTE — PROGRESS NOTES
Pt admitted to T819 at this time    Placed on tele box and laying comfortably in bed at this time    Will continue to monitor

## 2019-02-28 NOTE — ASSESSMENT & PLAN NOTE
Patient reports chronic left groin pain, states that it was this pain that discovered his colon cancer diagnosis.    Plan  - Continue to monitor and provide pain meds as needed

## 2019-02-28 NOTE — ASSESSMENT & PLAN NOTE
- Patient reports history of untreated hepatitis C   - Elevated liver function tests with , ALT 63, and alkaline phosphatase 324.  - Outpatient GI follow up when medically stable

## 2019-02-28 NOTE — ASSESSMENT & PLAN NOTE
- Cavitary lesions on CT chest completed at an outside facility, concern for MRSA.  - Patient presented with SIRS 3/4, white blood cell count 24.2, initial lactic acid 1.2 normal.   - Pro calcitonin - 32.14, indicating bacterial infectious process, now improved  - Patient given 1 dose of Rocephin and Zithromax in the emergency department in addition to 1 L IV fluid bolus.  - WBC improved  - MRSA by PCR was negative - D/C vancomycin  - Blood culture from Port-A-Cath came positive for Gram positive rods, lactobacillus positive  - Blood culture from peripheral vein is positive for Gram positive rods  - Patient underwent bronchoscopy on 3/1/2019 with BAL that showed turbid fluid sent for cytology and culture  - ID service consulted. Appreciate recommendations as follows  - Start penicillin G 2 million units q4h  - Port-A-Cath removed on 3/4/019   - Follow-up blood cultures on 3/5 were negative.   - Patient to be discharged on Oral Amoxicillin 500mg TID  - Continue Penicillin G q4h. Received 1 day through Port-A-Cath.

## 2019-02-28 NOTE — ED NOTES
Med rec updated and complete.  Allergies reviewed. Pt denies oral antibiotic use in last   30 days at home. Pt reports taking 16 mg of Hydromorphone  To control his pain.

## 2019-02-28 NOTE — PROGRESS NOTES
PT Manjit called about 16mg Diludid PRN order    Stated it is a correct dose that patient takes at home    Will administer as needed

## 2019-02-28 NOTE — ASSESSMENT & PLAN NOTE
This was sepsis (without associated acute organ dysfunction).   Resolved  - Patient presented with 3/4 SIRS criteria (HR > 90, RR > 20, WBC >12 ) with the likely source of infection from the lungs  - Chest x-ray and CT chest done at an outside facility is concerning for pneumonia  - Improving, tachycardia resolved, respiratory rate decreased, WBC trending down from 24 to 13  - MRSA by PCR was negative, vancomycin discontinued  - Blood culture from Port-A-Cath showed gram-positive rods pending sensitivity  - ID service consulted. Appreciate recommendations as follows  - Stop ceftriaxone  - Start penicillin G 2 million units q4h through port  - Port removed 3/4/2019  - Follow-up blood cultures on 3/5 were negative.   - Patient to be discharged on Oral Amoxicillin 500mg TID

## 2019-02-28 NOTE — PROGRESS NOTES
Pharmacy Kinetics 53 y.o. male on vancomycin day # 1 2019    Currently on Vancomycin 1800 mg iv load, given @0130    Indication for Treatment: PNA    Pertinent history per medical record:   52 yo male with PMH of colon cancer s/p chemotherpay x3 weeks ago, COPD, HepC admitted on 2019 for sepsis, pneumonia, & groin pain.  Pt presents with complaints of feeling chilly and worsening fatigue x3 weeks.  Upon physical exam, pt found to have low grade fever, tachycardia, hypotension, & leukocytosis.  CT chest from outside facility shows multiple bilateral pulmonary nodules.    Other antibiotics:   - Ceftriaxone 2 g iv q24h    Allergies: Patient has no known allergies.     List concerns for renal function:   - Leukocytosis, TMax 99.1F  - Low albumin    Pertinent cultures to date:   - None    MRSA nares swab if pneumonia is a concern (ordered/positive/negative/n-a): Ordered    Recent Labs      19   2247   WBC  24.2*   NEUTSPOLYS  88.30*     Recent Labs      19   2247   BUN  7*   CREATININE  1.00   ALBUMIN  2.3*     No results for input(s): VANCOTROUGH, VANCOPEAK, VANCORANDOM in the last 72 hours.  Intake/Output Summary (Last 24 hours) at 19 0328  Last data filed at 19 0215   Gross per 24 hour   Intake                0 ml   Output              675 ml   Net             -675 ml      Blood pressure 125/87, pulse 78, temperature 36.8 °C (98.2 °F), temperature source Temporal, resp. rate 17, height 1.829 m (6'), weight 80 kg (176 lb 5.9 oz), SpO2 95 %. Temp (24hrs), Av.1 °C (98.7 °F), Min:36.8 °C (98.2 °F), Max:37.3 °C (99.1 °F)      A/P   1. Vancomycin dose change: 1400 mg iv q12h, start @1300  2. Next vancomycin level: Prior to the 3rd dose  3. Goal trough: 16 to 20 mcg/mL  4. Comments: Limited concern for renal accumulation, so maintenance dose to start at 17 mg/kg iv q12h.  Trough to be assessed at steady state. Pharmacy to follow and make dose adjustments as appropriate.      Moo  Aníbal, PharmD

## 2019-02-28 NOTE — RESPIRATORY CARE
COPD EDUCATION by COPD CLINICAL EDUCATOR  2/28/2019 at 8:07 AM by Kayy Yao     Patient reviewed by COPD education team. Patient does not qualify for COPD program.

## 2019-02-28 NOTE — CARE PLAN
Problem: Nutritional:  Goal: Achieve adequate nutritional intake  Patient will consume 50% or greater of meals/supplements  Outcome: PROGRESSING AS EXPECTED  See RD note 2/28.

## 2019-02-28 NOTE — ASSESSMENT & PLAN NOTE
- Elevated liver function tests likely secondary to liver cirrhosis from hepatitis C   - , ALT 63, alkaline phosphatase 324.   - RUQ ultrasound showed heterogeneous and echogenic appearance of the liver can be seen in hepatic steatosis or hepatocellular disease.  - Continue to monitor

## 2019-03-01 ENCOUNTER — APPOINTMENT (OUTPATIENT)
Dept: RADIOLOGY | Facility: MEDICAL CENTER | Age: 54
DRG: 314 | End: 2019-03-01
Attending: STUDENT IN AN ORGANIZED HEALTH CARE EDUCATION/TRAINING PROGRAM
Payer: COMMERCIAL

## 2019-03-01 ENCOUNTER — APPOINTMENT (OUTPATIENT)
Dept: RADIOLOGY | Facility: MEDICAL CENTER | Age: 54
DRG: 314 | End: 2019-03-01
Attending: INTERNAL MEDICINE
Payer: COMMERCIAL

## 2019-03-01 LAB
ANION GAP SERPL CALC-SCNC: 3 MMOL/L (ref 0–11.9)
APPEARANCE UR: CLEAR
BASOPHILS # BLD AUTO: 0.2 % (ref 0–1.8)
BASOPHILS # BLD: 0.03 K/UL (ref 0–0.12)
BILIRUB UR QL STRIP.AUTO: NEGATIVE
BUN SERPL-MCNC: 7 MG/DL (ref 8–22)
CALCIUM SERPL-MCNC: 7.5 MG/DL (ref 8.5–10.5)
CHLORIDE SERPL-SCNC: 106 MMOL/L (ref 96–112)
CO2 SERPL-SCNC: 22 MMOL/L (ref 20–33)
COLOR UR: YELLOW
CREAT SERPL-MCNC: 0.88 MG/DL (ref 0.5–1.4)
EOSINOPHIL # BLD AUTO: 0.02 K/UL (ref 0–0.51)
EOSINOPHIL NFR BLD: 0.2 % (ref 0–6.9)
ERYTHROCYTE [DISTWIDTH] IN BLOOD BY AUTOMATED COUNT: 55.7 FL (ref 35.9–50)
GLUCOSE SERPL-MCNC: 85 MG/DL (ref 65–99)
GLUCOSE UR STRIP.AUTO-MCNC: NEGATIVE MG/DL
GRAM STN SPEC: ABNORMAL
GRAM STN SPEC: NORMAL
HCT VFR BLD AUTO: 29.4 % (ref 42–52)
HGB BLD-MCNC: 10.1 G/DL (ref 14–18)
IMM GRANULOCYTES # BLD AUTO: 0.1 K/UL (ref 0–0.11)
IMM GRANULOCYTES NFR BLD AUTO: 0.8 % (ref 0–0.9)
KETONES UR STRIP.AUTO-MCNC: NEGATIVE MG/DL
LEUKOCYTE ESTERASE UR QL STRIP.AUTO: NEGATIVE
LYMPHOCYTES # BLD AUTO: 1.54 K/UL (ref 1–4.8)
LYMPHOCYTES NFR BLD: 12.5 % (ref 22–41)
MAGNESIUM SERPL-MCNC: 2 MG/DL (ref 1.5–2.5)
MCH RBC QN AUTO: 31.2 PG (ref 27–33)
MCHC RBC AUTO-ENTMCNC: 34.4 G/DL (ref 33.7–35.3)
MCV RBC AUTO: 90.7 FL (ref 81.4–97.8)
MICRO URNS: NORMAL
MONOCYTES # BLD AUTO: 1.86 K/UL (ref 0–0.85)
MONOCYTES NFR BLD AUTO: 15.1 % (ref 0–13.4)
NEUTROPHILS # BLD AUTO: 8.79 K/UL (ref 1.82–7.42)
NEUTROPHILS NFR BLD: 71.2 % (ref 44–72)
NITRITE UR QL STRIP.AUTO: NEGATIVE
NRBC # BLD AUTO: 0 K/UL
NRBC BLD-RTO: 0 /100 WBC
PH UR STRIP.AUTO: 7 [PH]
PLATELET # BLD AUTO: 207 K/UL (ref 164–446)
PMV BLD AUTO: 9.6 FL (ref 9–12.9)
POTASSIUM SERPL-SCNC: 3.4 MMOL/L (ref 3.6–5.5)
PROT UR QL STRIP: NEGATIVE MG/DL
RBC # BLD AUTO: 3.24 M/UL (ref 4.7–6.1)
RBC UR QL AUTO: NEGATIVE
RHODAMINE-AURAMINE STN SPEC: NORMAL
SIGNIFICANT IND 70042: ABNORMAL
SIGNIFICANT IND 70042: NORMAL
SIGNIFICANT IND 70042: NORMAL
SITE SITE: ABNORMAL
SITE SITE: NORMAL
SITE SITE: NORMAL
SODIUM SERPL-SCNC: 131 MMOL/L (ref 135–145)
SOURCE SOURCE: ABNORMAL
SOURCE SOURCE: NORMAL
SOURCE SOURCE: NORMAL
SP GR UR STRIP.AUTO: 1.01
UROBILINOGEN UR STRIP.AUTO-MCNC: 0.2 MG/DL
WBC # BLD AUTO: 12.3 K/UL (ref 4.8–10.8)

## 2019-03-01 PROCEDURE — 87070 CULTURE OTHR SPECIMN AEROBIC: CPT

## 2019-03-01 PROCEDURE — 71045 X-RAY EXAM CHEST 1 VIEW: CPT

## 2019-03-01 PROCEDURE — 700111 HCHG RX REV CODE 636 W/ 250 OVERRIDE (IP)

## 2019-03-01 PROCEDURE — 85025 COMPLETE CBC W/AUTO DIFF WBC: CPT

## 2019-03-01 PROCEDURE — A9270 NON-COVERED ITEM OR SERVICE: HCPCS | Performed by: STUDENT IN AN ORGANIZED HEALTH CARE EDUCATION/TRAINING PROGRAM

## 2019-03-01 PROCEDURE — 0B9F8ZX DRAINAGE OF RIGHT LOWER LUNG LOBE, VIA NATURAL OR ARTIFICIAL OPENING ENDOSCOPIC, DIAGNOSTIC: ICD-10-PCS | Performed by: INTERNAL MEDICINE

## 2019-03-01 PROCEDURE — 700111 HCHG RX REV CODE 636 W/ 250 OVERRIDE (IP): Performed by: STUDENT IN AN ORGANIZED HEALTH CARE EDUCATION/TRAINING PROGRAM

## 2019-03-01 PROCEDURE — 700102 HCHG RX REV CODE 250 W/ 637 OVERRIDE(OP): Performed by: STUDENT IN AN ORGANIZED HEALTH CARE EDUCATION/TRAINING PROGRAM

## 2019-03-01 PROCEDURE — 700105 HCHG RX REV CODE 258: Performed by: STUDENT IN AN ORGANIZED HEALTH CARE EDUCATION/TRAINING PROGRAM

## 2019-03-01 PROCEDURE — 700105 HCHG RX REV CODE 258: Performed by: INTERNAL MEDICINE

## 2019-03-01 PROCEDURE — 83735 ASSAY OF MAGNESIUM: CPT

## 2019-03-01 PROCEDURE — 87102 FUNGUS ISOLATION CULTURE: CPT

## 2019-03-01 PROCEDURE — 87015 SPECIMEN INFECT AGNT CONCNTJ: CPT

## 2019-03-01 PROCEDURE — 31624 DX BRONCHOSCOPE/LAVAGE: CPT | Performed by: INTERNAL MEDICINE

## 2019-03-01 PROCEDURE — 87040 BLOOD CULTURE FOR BACTERIA: CPT

## 2019-03-01 PROCEDURE — 770020 HCHG ROOM/CARE - TELE (206)

## 2019-03-01 PROCEDURE — 36415 COLL VENOUS BLD VENIPUNCTURE: CPT

## 2019-03-01 PROCEDURE — 88305 TISSUE EXAM BY PATHOLOGIST: CPT

## 2019-03-01 PROCEDURE — 302978 HCHG BRONCHOSCOPY-DIAGNOSTIC

## 2019-03-01 PROCEDURE — 87205 SMEAR GRAM STAIN: CPT | Mod: 91

## 2019-03-01 PROCEDURE — 87106 FUNGI IDENTIFICATION YEAST: CPT

## 2019-03-01 PROCEDURE — 81003 URINALYSIS AUTO W/O SCOPE: CPT

## 2019-03-01 PROCEDURE — 87206 SMEAR FLUORESCENT/ACID STAI: CPT

## 2019-03-01 PROCEDURE — 97162 PT EVAL MOD COMPLEX 30 MIN: CPT

## 2019-03-01 PROCEDURE — 160048 HCHG OR STATISTICAL LEVEL 1-5: Performed by: INTERNAL MEDICINE

## 2019-03-01 PROCEDURE — 99153 MOD SED SAME PHYS/QHP EA: CPT | Performed by: INTERNAL MEDICINE

## 2019-03-01 PROCEDURE — 99152 MOD SED SAME PHYS/QHP 5/>YRS: CPT | Performed by: INTERNAL MEDICINE

## 2019-03-01 PROCEDURE — 0BDF8ZX EXTRACTION OF RIGHT LOWER LUNG LOBE, VIA NATURAL OR ARTIFICIAL OPENING ENDOSCOPIC, DIAGNOSTIC: ICD-10-PCS | Performed by: INTERNAL MEDICINE

## 2019-03-01 PROCEDURE — 99233 SBSQ HOSP IP/OBS HIGH 50: CPT | Mod: GC | Performed by: HOSPITALIST

## 2019-03-01 PROCEDURE — 97165 OT EVAL LOW COMPLEX 30 MIN: CPT

## 2019-03-01 PROCEDURE — 80048 BASIC METABOLIC PNL TOTAL CA: CPT

## 2019-03-01 PROCEDURE — 88104 CYTOPATH FL NONGYN SMEARS: CPT

## 2019-03-01 PROCEDURE — 87116 MYCOBACTERIA CULTURE: CPT

## 2019-03-01 PROCEDURE — 0BD68ZX EXTRACTION OF RIGHT LOWER LOBE BRONCHUS, VIA NATURAL OR ARTIFICIAL OPENING ENDOSCOPIC, DIAGNOSTIC: ICD-10-PCS | Performed by: INTERNAL MEDICINE

## 2019-03-01 PROCEDURE — 88112 CYTOPATH CELL ENHANCE TECH: CPT

## 2019-03-01 RX ORDER — POTASSIUM CHLORIDE 20 MEQ/1
40 TABLET, EXTENDED RELEASE ORAL DAILY
Status: DISCONTINUED | OUTPATIENT
Start: 2019-03-02 | End: 2019-03-03

## 2019-03-01 RX ORDER — MIDAZOLAM HYDROCHLORIDE 1 MG/ML
INJECTION INTRAMUSCULAR; INTRAVENOUS
Status: ACTIVE
Start: 2019-03-01 | End: 2019-03-01

## 2019-03-01 RX ORDER — POTASSIUM CHLORIDE 20 MEQ/1
40 TABLET, EXTENDED RELEASE ORAL ONCE
Status: COMPLETED | OUTPATIENT
Start: 2019-03-01 | End: 2019-03-01

## 2019-03-01 RX ORDER — MIDAZOLAM HYDROCHLORIDE 1 MG/ML
.5-2 INJECTION INTRAMUSCULAR; INTRAVENOUS PRN
Status: ACTIVE | OUTPATIENT
Start: 2019-03-01 | End: 2019-03-01

## 2019-03-01 RX ORDER — MIDAZOLAM HYDROCHLORIDE 1 MG/ML
INJECTION INTRAMUSCULAR; INTRAVENOUS
Status: DISCONTINUED | OUTPATIENT
Start: 2019-03-01 | End: 2019-03-01 | Stop reason: HOSPADM

## 2019-03-01 RX ORDER — SODIUM CHLORIDE 9 MG/ML
500 INJECTION, SOLUTION INTRAVENOUS
Status: DISPENSED | OUTPATIENT
Start: 2019-03-01 | End: 2019-03-01

## 2019-03-01 RX ADMIN — SODIUM CHLORIDE: 9 INJECTION, SOLUTION INTRAVENOUS at 08:32

## 2019-03-01 RX ADMIN — CEFTRIAXONE SODIUM 2 G: 2 INJECTION, POWDER, FOR SOLUTION INTRAMUSCULAR; INTRAVENOUS at 04:27

## 2019-03-01 RX ADMIN — HYDROMORPHONE HYDROCHLORIDE 16 MG: 4 TABLET ORAL at 13:03

## 2019-03-01 RX ADMIN — ENOXAPARIN SODIUM 40 MG: 100 INJECTION SUBCUTANEOUS at 04:27

## 2019-03-01 RX ADMIN — SODIUM CHLORIDE 1000 ML: 9 INJECTION, SOLUTION INTRAVENOUS at 21:27

## 2019-03-01 RX ADMIN — HYDROMORPHONE HYDROCHLORIDE 16 MG: 4 TABLET ORAL at 04:26

## 2019-03-01 RX ADMIN — SENNOSIDES AND DOCUSATE SODIUM 2 TABLET: 8.6; 5 TABLET ORAL at 17:32

## 2019-03-01 RX ADMIN — ONDANSETRON 4 MG: 2 INJECTION INTRAMUSCULAR; INTRAVENOUS at 01:26

## 2019-03-01 RX ADMIN — HYDROMORPHONE HYDROCHLORIDE 16 MG: 4 TABLET ORAL at 08:33

## 2019-03-01 RX ADMIN — PREDNISONE 5 MG: 5 TABLET ORAL at 04:26

## 2019-03-01 RX ADMIN — HYDROMORPHONE HYDROCHLORIDE 16 MG: 4 TABLET ORAL at 21:28

## 2019-03-01 RX ADMIN — DIBASIC SODIUM PHOSPHATE, MONOBASIC POTASSIUM PHOSPHATE AND MONOBASIC SODIUM PHOSPHATE 1 TABLET: 852; 155; 130 TABLET ORAL at 21:28

## 2019-03-01 RX ADMIN — HYDROMORPHONE HYDROCHLORIDE 16 MG: 4 TABLET ORAL at 00:15

## 2019-03-01 RX ADMIN — POTASSIUM CHLORIDE 40 MEQ: 1500 TABLET, EXTENDED RELEASE ORAL at 19:28

## 2019-03-01 RX ADMIN — HYDROMORPHONE HYDROCHLORIDE 16 MG: 4 TABLET ORAL at 17:33

## 2019-03-01 RX ADMIN — ONDANSETRON 4 MG: 2 INJECTION INTRAMUSCULAR; INTRAVENOUS at 17:32

## 2019-03-01 RX ADMIN — ONDANSETRON 4 MG: 2 INJECTION INTRAMUSCULAR; INTRAVENOUS at 23:35

## 2019-03-01 ASSESSMENT — ENCOUNTER SYMPTOMS
DEPRESSION: 0
VOMITING: 0
DIAPHORESIS: 0
ABDOMINAL PAIN: 0
HEMOPTYSIS: 0
HEARTBURN: 0
BACK PAIN: 0
SORE THROAT: 0
COUGH: 1
CONSTIPATION: 0
MYALGIAS: 0
DIZZINESS: 0
BLOOD IN STOOL: 0
DIARRHEA: 0
NAUSEA: 0
SHORTNESS OF BREATH: 0
NERVOUS/ANXIOUS: 0
DOUBLE VISION: 0
SPUTUM PRODUCTION: 1
PALPITATIONS: 0
WHEEZING: 0
FEVER: 0
CHILLS: 0
HEADACHES: 0
BLURRED VISION: 0

## 2019-03-01 ASSESSMENT — GAIT ASSESSMENTS
GAIT LEVEL OF ASSIST: STAND BY ASSIST
DISTANCE (FEET): 20

## 2019-03-01 ASSESSMENT — COGNITIVE AND FUNCTIONAL STATUS - GENERAL
HELP NEEDED FOR BATHING: A LITTLE
MOBILITY SCORE: 23
CLIMB 3 TO 5 STEPS WITH RAILING: A LITTLE
SUGGESTED CMS G CODE MODIFIER DAILY ACTIVITY: CJ
DAILY ACTIVITIY SCORE: 22
SUGGESTED CMS G CODE MODIFIER MOBILITY: CI
PERSONAL GROOMING: A LITTLE

## 2019-03-01 ASSESSMENT — ACTIVITIES OF DAILY LIVING (ADL): TOILETING: INDEPENDENT

## 2019-03-01 NOTE — ASSESSMENT & PLAN NOTE
diagnosed 2016 treated with resection and chemo  had recurrence 08/2018, pulmonary nodules biopsied at that time showed adenocarcinoma  Currently on chemotherapy, 2 treatments so far

## 2019-03-01 NOTE — ASSESSMENT & PLAN NOTE
This is severe sepsis with the following associated acute organ dysfunction(s): acute respiratory failure. 4/4 SIRS. Pro michael 32  Flu negative, MRSA nares pending  Pulmonary source. CAP vs obstructive pneumonia   Pt is immunocompromised, chemotherapy for metastatic colon CA  Continue antibiotics and fluids

## 2019-03-01 NOTE — PROGRESS NOTES
Assumed care report received. Assessment completed. AOx4. Pt resting in bed complains of pain 9/10, medicated see MAR. No other complaints at this time. Plan of care discussed, verbalized understanding. Fall precautions in place. Call light within reach. Tele monitor in place. White board updated. Bed alarm in use.

## 2019-03-01 NOTE — CONSULTS
MEDICAL ONCOLOGY CONSULT    DATE OF SERVICE: 03/01/2019    REQUESTING MD: Pierre Waterman MD    REASON FOR CONSULT: 54 y/o man with is being seen in consultation for his history of metastatic colon cancer.     CC: N/V/Diarrhea    HISTORY OF PRESENT ILLNESS: 54 y/o man with multiple medical problems is being seen in consultation for his history of metastatic colon cancer. The patient reports he has been feeling ill since his last chemotherapy session. He has had a lot of issues with diarrhea and some with nausea/vomiting. He also had a lot of fatigue and generalized weakness. He went to the ER and CT showed concerns for cavitary lesions so he was transferred to [facility] for further care. He reports feeling better since admission. No further issues with diarrhea. He does have a cough.     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   • COPD (chronic obstructive pulmonary disease) (HCC)    • Hepatitis C    • Hypertension    • Infectious disease    • Psychiatric problem     anxiety     Past Surgical History:   Procedure Laterality Date   • CATH PLACEMENT  3/16/2016    Procedure: CATH PLACEMENT POWER PORT ;  Surgeon: Luke Lima M.D.;  Location: SURGERY [facility];  Service:    • LOW ANTERIOR RESECTION LAPAROSCOPIC  1/12/2016    Procedure: LOW ANTERIOR RESECTION LAPAROSCOPIC;  Surgeon: Luke Lima M.D.;  Location: SURGERY [facility];  Service:      Current Facility-Administered Medications   Medication Dose Route Frequency Provider Last Rate Last Dose   • MIDAZOLAM HCL 5 MG/5ML INJ SOLN            • cefTRIAXone (ROCEPHIN) 2 g in  mL IVPB  2 g Intravenous Q24HRS Quincy Saravia M.D.   Stopped at 03/01/19 8744   • predniSONE (DELTASONE) tablet 5 mg  5 mg Oral DAILY Quincy Saravia M.D.   5 mg at 03/01/19 2143   • senna-docusate (PERICOLACE or SENOKOT S) 8.6-50 MG per tablet 2  Tab  2 Tab Oral BID Quincy Saravia M.D.   2 Tab at 02/28/19 1807    And   • polyethylene glycol/lytes (MIRALAX) PACKET 1 Packet  1 Packet Oral QDAY PRN Quincy Saravia M.D.        And   • magnesium hydroxide (MILK OF MAGNESIA) suspension 30 mL  30 mL Oral QDAY PRN Quincy Saravia M.D.        And   • bisacodyl (DULCOLAX) suppository 10 mg  10 mg Rectal QDAY PRN Quincy Saravia M.D.       • Respiratory Care per Protocol   Nebulization Continuous RT Quincy Saravia M.D.       • enoxaparin (LOVENOX) inj 40 mg  40 mg Subcutaneous DAILY Quincy Saravia M.D.   40 mg at 03/01/19 0427   • enalaprilat (VASOTEC) injection 1.25 mg  1.25 mg Intravenous Q6HRS PRN Quincy Saravia M.D.       • HYDROmorphone (DILAUDID) tablet 16 mg  16 mg Oral Q4HRS PRN Quincy Saravia M.D.   16 mg at 03/01/19 1303   • ondansetron (ZOFRAN) syringe/vial injection 4 mg  4 mg Intravenous Q6HRS PRN Tracy Mc M.D.   4 mg at 03/01/19 0126   • NS infusion 2,178 mL  30 mL/kg Intravenous Once PRN Quincy Saravia M.D.       • NS (BOLUS) infusion 1,000 mL  1,000 mL Intravenous Once PRN Quincy Saravia M.D.       • NS infusion   Intravenous Continuous Kelvin Villeda M.D. 125 mL/hr at 03/01/19 0832       No Known Allergies    Social History     Social History   • Marital status:      Spouse name: N/A   • Number of children: N/A   • Years of education: N/A     Social History Main Topics   • Smoking status: Former Smoker     Packs/day: 1.00     Years: 15.00     Start date: 1/12/2001     Quit date: 6/12/2015   • Smokeless tobacco: Former User   • Alcohol use No   • Drug use: No   • Sexual activity: Not on file     Other Topics Concern   • Not on file     Social History Narrative   • No narrative on file     Family History   Problem Relation Age of Onset   • Adopted: Yes   • No Known Problems Mother    • No Known Problems Father    • No Known Problems Sister    • No Known Problems Brother    • No Known Problems  Maternal Grandmother    • No Known Problems Maternal Grandfather    • No Known Problems Paternal Grandmother    • No Known Problems Paternal Grandfather      Vitals:    03/01/19 1230 03/01/19 1300 03/01/19 1330 03/01/19 1400   BP: 144/100 157/102 148/104    Pulse: 78 74 93    Resp: 16 15 16    Temp: 37 °C (98.6 °F) 37 °C (98.6 °F) 36.9 °C (98.5 °F)    TempSrc: Temporal Temporal Temporal    SpO2: 96% 94% 96% (P) 96%   Weight:       Height:         Physical Exam:  General: no acute distress, sitting comfortably  Head: normocephalic and atraumatic  Eyes: no scleral icterus, conjunctive clear,   ENT: mucous membranes are moist, oropharynx clear  Neck: neck supple  Cardiovascular: normal rate  Chest/Respiratory: normal effort, normal RR rate  Skin: no rashes  Extremities: no edema  Psych: engaged during the visit  Neuro: AAO x 3, moving all extremities    Recent Labs      02/27/19 2247 02/28/19 0407 03/01/19   0448   WBC  24.2*  13.3*  12.3*   RBC  3.50*  3.24*  3.24*   HEMOGLOBIN  11.5*  10.1*  10.1*   HEMATOCRIT  32.7*  29.4*  29.4*   MCV  93.4  90.7  90.7   MCH  32.9  31.2  31.2   RDW  54.0*  52.9*  55.7*   PLATELETCT  96*  164  207   MPV  10.2  9.8  9.6   NEUTSPOLYS  88.30*  79.50*  71.20   LYMPHOCYTES  3.10*  8.60*  12.50*   MONOCYTES  6.80  10.90  15.10*   EOSINOPHILS  0.00  0.10  0.20   BASOPHILS  0.20  0.20  0.20     Recent Labs      02/27/19 2247 02/28/19 0407 03/01/19   0448   SODIUM  131*  136  131*   POTASSIUM  3.7  3.7  3.4*   CHLORIDE  100  108  106   CO2  23  20  22   GLUCOSE  90  83  85   BUN  7*  7*  7*     Recent Labs      02/27/19 2247 02/28/19 0407 02/28/19   0930   ASTSGOT  118*  109*   --    ALTSGPT  63*  54*   --    TBILIRUBIN  0.6  0.5   --    ALKPHOSPHAT  324*  246*   --    GLOBULIN  2.5  2.0   --    INR   --    --   1.32*       ASSESSMENT and PLAN:  54 y/o man with a history of multiple medical problems is being seen in consultation for his history of metastatic colon cancer.  He recently has been receiving FOLFIRI plus avastin and received his last cycle of treatment on 2/8/2019. He has had several issues with diarrhea which is a very common side effect from irinotecan chemotherapy. His diarrhea has now improved. His last full re-staging imaging was from 12/11/18 which showed stable disease.    He is now being admitted for new cavitary lesions seen on CT of the chest, he is being treated with broad spectrum antibiotics by medicine team. He had a bronchoscopy today and results are pending.      Pending hospital course, we can consider repeat staging work up, but likely this can be done as an outpatient. Hold all chemotherapy while hospitalized and being treated for an active infection. Reviewed with the patient.    Pt has high medical complexity and is at high risk for complications.

## 2019-03-01 NOTE — PROGRESS NOTES
Internal Medicine Interval Note  Note Author: Tracy Mc M.D.     Name Erich Ingram     1965   Age/Sex 53 y.o. male   MRN [id number]   Code Status Full     After 5PM or if no immediate response to page, please call for cross-coverage  Attending/Team:Dr. Waterman/Madelin See Patient List for primary contact information  Call [phone] to page    1st Call - Day Intern (R1):    2nd Call - Day Sr. Resident (R2/R3):   Dr. Wilkins       Reason for interval visit  (Principal Problem)   Community-acquired pneumonia  Sepsis  Advanced colon cancer      Interval Problem Daily Status Update  (24 hours, problem oriented, brief subjective history, new lab/imaging data pertinent to that problem)   Mr. Ingram, 53-year-old male with history of colon cancer x 2 (2016, partial colon resection with chemotherapy and was in remission, 2018 - metastatic colon cancer with metastases to the lungs on chemotherapy, last chemotherapy on 2019) presented with complaint of fatigue, chills, productive cough of whitish sputum for the past 2 weeks.  Patient was found to have sepsis secondary to pneumonia.  Patient is on IV ceftriaxone and vancomycin    Overnight, patient denies any new complaints.  Patient states that he feels that he is improving. WBC trending down.     Pulmonary service was consulted, patient underwent bronchoscopy, the airway mucosa was normal and chronic bronchitis findings were observed.  Turbid bronchoalveolar lavage from RLL segment fluid was removed and sent to the lab for culture and cytology.  Patient tolerated bronchoscopy without any immediate complications.  Appreciate pulmonary service for their assistance and recommendations    Blood culture grew gram-positive rods.  Will repeat blood culture    Review of Systems   Constitutional: Negative for chills, diaphoresis, fever and malaise/fatigue.   HENT: Negative for nosebleeds and sore throat.    Eyes: Negative for blurred vision  and double vision.   Respiratory: Positive for cough and sputum production (Whitish). Negative for hemoptysis, shortness of breath and wheezing.    Cardiovascular: Negative for chest pain, palpitations and leg swelling.   Gastrointestinal: Negative for abdominal pain, blood in stool, constipation, diarrhea, heartburn, nausea and vomiting.   Genitourinary: Negative for dysuria, frequency and urgency.   Musculoskeletal: Negative for back pain, joint pain and myalgias.   Skin: Negative for itching and rash.   Neurological: Negative for dizziness and headaches.   Psychiatric/Behavioral: Negative for depression. The patient is not nervous/anxious.        Disposition/Barriers to discharge:   Remain inpatient for IV antibiotics/can be discharged home when medically stable    Consultants/Specialty  PCP: BEENA Sterling      Quality Measures  Quality-Core Measures   Reviewed items::  Labs reviewed and Medications reviewed  Soriano catheter::  No Soriano  Central line in place:  Chemotherapy  DVT prophylaxis pharmacological::  Enoxaparin (Lovenox)  Ulcer Prophylaxis::  Not indicated  Antibiotics:  Treating active infection/contamination beyond 24 hours perioperative coverage      Physical Exam       Vitals:    03/01/19 1300 03/01/19 1330 03/01/19 1400 03/01/19 1430   BP: 157/102 148/104  148/100   Pulse: 74 93  87   Resp: 15 16  19   Temp: 37 °C (98.6 °F) 36.9 °C (98.5 °F)  37.2 °C (99 °F)   TempSrc: Temporal Temporal  Temporal   SpO2: 94% 96% (P) 96% 97%   Weight:       Height:         Body mass index is 23.11 kg/m². Weight: 77.3 kg (170 lb 6.7 oz)  Oxygen Therapy:  Pulse Oximetry: 97 %, O2 (LPM): 5, O2 Delivery: Nasal Cannula    Physical Exam   Constitutional: He is oriented to person, place, and time. No distress.   HENT:   Head: Normocephalic and atraumatic.   Eyes: Pupils are equal, round, and reactive to light. EOM are normal.   Neck: Normal range of motion. Neck supple. No thyromegaly present.   Cardiovascular:  Normal rate, regular rhythm and normal heart sounds.    Pulmonary/Chest: Effort normal and breath sounds normal. No respiratory distress. He has no wheezes.   Abdominal: Soft. Bowel sounds are normal. He exhibits no distension. There is no tenderness.   Musculoskeletal: Normal range of motion. He exhibits no edema.   Neurological: He is alert and oriented to person, place, and time. GCS score is 15.   Skin: Skin is warm and dry. He is not diaphoretic.   Psychiatric: Mood and affect normal.   Nursing note and vitals reviewed.    Assessment/Plan     * PNA (pneumonia)- (present on admission)   Assessment & Plan    -Cavitary lesions on CT chest completed at an outside facility, concern for MRSA.  -Patient presented with SIRS 3/4, white blood cell count 24.2, initial lactic acid 1.2 normal.   -Pro calcitonin - 32.14, indicating bacterial infectious process  -Patient given 1 dose of Rocephin and Zithromax in the emergency department in addition to 1 L IV fluid bolus.  -WBC improving  -Continue monitoring in telemetry for floor  - Blood culture came positive for Gram positive rods, pending sensitivity   - MRSA by PCR was negative - D/C vancomycin  -Continue ceftriaxone  -Repeat pro calcitonin level 3/2/2019  -Patient underwent bronchoscopy with BAL that showed turbid fluid sent for cytology and culture  -Continue to monitor for any complications.     Sepsis (HCC)- (present on admission)   Assessment & Plan    This is sepsis (without associated acute organ dysfunction).   -Patient presented with 3/4 SIRS criteria (HR > 90, RR > 20, WBC >12 ) with the likely source of infection from the lungs  -Chest x-ray and CT chest done at an outside facility is concerning for pneumonia  -Improving, tachycardia resolved, respiratory rate decreased, WBC trending down from 24 to 13  -MRSA by PCR was negative, vancomycin discontinued  -Blood culture showed gram-positive rods  -Continue IV ceftriaxone     Normocytic anemia- (present on  admission)   Assessment & Plan    -Hemoglobin 11.5, normocytic, normochromic  -Anemia due to chronic medical condition, GI malignancy  -Hemoccult negative  -continue to monitor     Elevated LFTs   Assessment & Plan    - Elevated liver function tests likely secondary to liver cirrhosis from hepatitis C   - , ALT 63, alkaline phosphatase 324.   - RUQ ultrasound - Heterogeneous and echogenic appearance of the liver can be seen in hepatic steatosis or hepatocellular disease.  - Continue to monitor     Hyponatremia   Assessment & Plan    - Improved  - Continue to monitor     Hepatitis C   Assessment & Plan    - Patient reports history of untreated hepatitis C   - Elevated liver function tests with , ALT 63, and alkaline phosphatase 324.  - Outpatient GI follow up when medically stable     COPD (chronic obstructive pulmonary disease) (HCC)- (present on admission)   Assessment & Plan    - Patient reports history of COPD, does not use oxygen at home.  - Pt quit smoking couple years ago but was previously smoking 1 pack/day for 10 years.    - Saturating at 94% on room air, and no respiratory distress.  - Pt denies any SOB or wheezing  - Respiratory care per protocol     Metastatic Colon cancer (HCC)- (present on admission)   Assessment & Plan    -H/O colon cancer diagnosed in 2016 who underwent partial colectomy followed by chemotherapy in remission  - recurrence of metastatic colon cancer 08/2018 with metastasis to the lungs, currently on chemotherapy  - Last chemotherapy was 2/8/2019     Hypertension   Assessment & Plan    Stable of home medication   Consider restarting Losartan when his BP improves     Thrombocytopenia (HCC)   Assessment & Plan    -Platelets 96, likely secondary to liver cirrhosis and untreated hepatitis C.  -Patient is asymptomatic  -Continue to monitor

## 2019-03-01 NOTE — PROCEDURES
"Date of Service: 3/1/2019    Procedure:  Diagnostic and therapeutic bronchoscopy with BAL and brush cytology     Indication: Multiple pulmonary lesions r/o infection   Contra-indications: None  Consent: Obtained in writing from pt prior to procedure   Sedation:  For the above procedure I also performed the conscious sedation and was directly involved with administration of medication, monitoring airway, vital signs, preventing complications.  Administered 14 mg of versed and 150 mcg of fentanyl IV in titration fashion until achieving a level of moderate procedural sedation.  Patient tolerated procedure well without complications from sedation and was left in the care of his bedside nurse and respiratory therapy. Procedural sedation time equals 19 minutes which was separate from procedure time as described above.  Start time: 1054  Stop time: 1113    Anesthesia: Lidocaine 2% total of 14 ml to nose and airways   Physician:  Katelin Garner MD    Post Procedure Diagnosis:  1.  Normal airway exam   2.  RLL sup segment BAL and brush     Narrative:  Appropriate consent was obtained and \"time out\" was performed.  A flexible fiberoptic bronchoscope was then inserted through the Left nostril without difficulty.  All airways were evaluated to the sub-segmental level.  The airway mucosa was normal with chronic bronchitis findings.  No endobronchial lesions were seen.  The bronchoscope was then wedged in a segment of the RLL bronchus.  60 cc of saline was instilled with moderate return of 14 turbid BAL fluid.  The BAL specimen will be sent for appropriate culture and cytology. Two brushes were performed in the same RLL segment; one for culture and one for cytology.  No immediate complications.  EBL <5 ml.    Katelin Garner M.D.      "

## 2019-03-01 NOTE — CONSULTS
Pulmonary Consult Note  Chief Complaint:   SOB     HPI:  52 y/o M with a pmh of metastatic stage IV colon cancer (diagnosed 2016 treated with resection and chemo, had recurrence 08/2018, pulmonary nodules biopsied at that time showed adenocarcinoma), COPD, HCV, HTN, went to ED for SOB, found to be septic at outside hospital and transferred here, SIRS 4/4. CT at outside hospital showed multiple pulmonary nodules (known metastasis) with cavitary lesions. Sepsis source lung, CAP vs obstructive pneumonia. Pt is currently on chemotherapy, 2 treatments so fat. Consult for bronch.     Review of Systems   Constitutional: Positive for chills, diaphoresis, fever and malaise/fatigue.   HENT: Positive for sore throat.    Eyes: Negative.  Negative for blurred vision.   Respiratory: Positive for cough and shortness of breath. Negative for hemoptysis and sputum production.    Cardiovascular: Negative.  Negative for chest pain, palpitations, orthopnea and claudication.   Gastrointestinal: Negative.  Negative for abdominal pain, heartburn, nausea and vomiting.   Genitourinary: Negative.  Negative for dysuria, frequency, hematuria and urgency.   Musculoskeletal: Negative.  Negative for myalgias.   Skin: Negative.  Negative for rash.   Neurological: Negative.  Negative for dizziness and headaches.   Endo/Heme/Allergies: Negative.              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   • COPD (chronic obstructive pulmonary disease) (HCC)    • Hepatitis C    • Hypertension    • Infectious disease    • Psychiatric problem     anxiety     Past Surgical History:  Past Surgical History:   Procedure Laterality Date   • CATH PLACEMENT  3/16/2016    Procedure: CATH PLACEMENT POWER PORT ;  Surgeon: Luke Lima M.D.;   Location: SURGERY [facility];  Service:    • LOW ANTERIOR RESECTION LAPAROSCOPIC  1/12/2016    Procedure: LOW ANTERIOR RESECTION LAPAROSCOPIC;  Surgeon: Luke Lima M.D.;  Location: SURGERY [facility];  Service:        Current Outpatient Medications:  Home Medications     Reviewed by Judit De Guzman (Pharmacy Tech) on 02/27/19 at 2328  Med List Status: Complete   Medication Last Dose Status   HYDROmorphone (DILAUDID) 8 MG tablet 2/27/2019 Active   losartan (COZAAR) 50 MG Tab 2/27/2019 Active   predniSONE (DELTASONE) 5 MG Tab 2/27/2019 Active   tamsulosin (FLOMAX) 0.4 MG capsule 2/27/2019 Active                Medication Allergy/Sensitivities:  No Known Allergies      Family History (mandatory)   Family History   Problem Relation Age of Onset   • Adopted: Yes   • No Known Problems Mother    • No Known Problems Father    • No Known Problems Sister    • No Known Problems Brother    • No Known Problems Maternal Grandmother    • No Known Problems Maternal Grandfather    • No Known Problems Paternal Grandmother    • No Known Problems Paternal Grandfather        Social History (mandatory)   Social History     Social History   • Marital status:      Spouse name: N/A   • Number of children: N/A   • Years of education: N/A     Occupational History   • Not on file.     Social History Main Topics   • Smoking status: Former Smoker     Packs/day: 1.00     Years: 15.00     Start date: 1/12/2001     Quit date: 6/12/2015   • Smokeless tobacco: Former User   • Alcohol use No   • Drug use: No   • Sexual activity: Not on file     Other Topics Concern   • Not on file     Social History Narrative   • No narrative on file     Physical Exam     Vitals:    02/28/19 0215 02/28/19 0400 02/28/19 0850 02/28/19 1217   BP: 125/87 121/82 126/86 140/86   Pulse: 78 87 83 89   Resp: 17 17 12 18   Temp: 36.8 °C (98.2 °F) 37.1 °C (98.7 °F) 36.3 °C (97.3 °F) 36.9 °C (98.4 °F)   TempSrc: Temporal Temporal Temporal Tympanic    SpO2: 95% 95% 93% 94%   Weight: 80 kg (176 lb 5.9 oz)      Height:         Body mass index is 23.92 kg/m².  /86   Pulse 89   Temp 36.9 °C (98.4 °F) (Tympanic)   Resp 18   Ht 1.829 m (6')   Wt 80 kg (176 lb 5.9 oz)   SpO2 94%   BMI 23.92 kg/m²   O2 therapy: Pulse Oximetry: 94 %, O2 (LPM): 0, O2 Delivery: None (Room Air)    Physical Exam   Constitutional: He is oriented to person, place, and time. He appears distressed.   Ill appearing    HENT:   Head: Normocephalic and atraumatic.   Mouth/Throat: No oropharyngeal exudate.   Eyes: Conjunctivae are normal. No scleral icterus.   Neck: Neck supple. No JVD present.   Cardiovascular: Normal rate, regular rhythm and normal heart sounds.    No murmur heard.  Pulmonary/Chest: Effort normal. No stridor. No respiratory distress. He has no wheezes. He has rales.   Abdominal: Soft. He exhibits no distension. There is no tenderness. There is no rebound and no guarding.   Musculoskeletal: He exhibits no edema.   Neurological: He is alert and oriented to person, place, and time.   Skin: Skin is warm and dry. He is not diaphoretic. No erythema.       Data Review       Lab Data Review:  Recent Results (from the past 24 hour(s))   CBC WITH DIFFERENTIAL    Collection Time: 02/27/19 10:47 PM   Result Value Ref Range    WBC 24.2 (H) 4.8 - 10.8 K/uL    RBC 3.50 (L) 4.70 - 6.10 M/uL    Hemoglobin 11.5 (L) 14.0 - 18.0 g/dL    Hematocrit 32.7 (L) 42.0 - 52.0 %    MCV 93.4 81.4 - 97.8 fL    MCH 32.9 27.0 - 33.0 pg    MCHC 35.2 33.7 - 35.3 g/dL    RDW 54.0 (H) 35.9 - 50.0 fL    Platelet Count 96 (L) 164 - 446 K/uL    MPV 10.2 9.0 - 12.9 fL    Neutrophils-Polys 88.30 (H) 44.00 - 72.00 %    Lymphocytes 3.10 (L) 22.00 - 41.00 %    Monocytes 6.80 0.00 - 13.40 %    Eosinophils 0.00 0.00 - 6.90 %    Basophils 0.20 0.00 - 1.80 %    Immature Granulocytes 1.60 (H) 0.00 - 0.90 %    Nucleated RBC 0.00 /100 WBC    Neutrophils (Absolute) 21.35 (H) 1.82 - 7.42 K/uL    Lymphs (Absolute) 0.76  (L) 1.00 - 4.80 K/uL    Monos (Absolute) 1.64 (H) 0.00 - 0.85 K/uL    Eos (Absolute) 0.00 0.00 - 0.51 K/uL    Baso (Absolute) 0.04 0.00 - 0.12 K/uL    Immature Granulocytes (abs) 0.39 (H) 0.00 - 0.11 K/uL    NRBC (Absolute) 0.00 K/uL   COMP METABOLIC PANEL    Collection Time: 02/27/19 10:47 PM   Result Value Ref Range    Sodium 131 (L) 135 - 145 mmol/L    Potassium 3.7 3.6 - 5.5 mmol/L    Chloride 100 96 - 112 mmol/L    Co2 23 20 - 33 mmol/L    Anion Gap 8.0 0.0 - 11.9    Glucose 90 65 - 99 mg/dL    Bun 7 (L) 8 - 22 mg/dL    Creatinine 1.00 0.50 - 1.40 mg/dL    Calcium 7.4 (L) 8.5 - 10.5 mg/dL    AST(SGOT) 118 (H) 12 - 45 U/L    ALT(SGPT) 63 (H) 2 - 50 U/L    Alkaline Phosphatase 324 (H) 30 - 99 U/L    Total Bilirubin 0.6 0.1 - 1.5 mg/dL    Albumin 2.3 (L) 3.2 - 4.9 g/dL    Total Protein 4.8 (L) 6.0 - 8.2 g/dL    Globulin 2.5 1.9 - 3.5 g/dL    A-G Ratio 0.9 g/dL   LACTIC ACID    Collection Time: 02/27/19 10:47 PM   Result Value Ref Range    Lactic Acid 1.2 0.5 - 2.0 mmol/L   PROCALCITONIN    Collection Time: 02/27/19 10:47 PM   Result Value Ref Range    Procalcitonin 32.14 (H) <0.25 ng/mL   ESTIMATED GFR    Collection Time: 02/27/19 10:47 PM   Result Value Ref Range    GFR If African American >60 >60 mL/min/1.73 m 2    GFR If Non African American >60 >60 mL/min/1.73 m 2   CBC WITH DIFFERENTIAL    Collection Time: 02/28/19  4:07 AM   Result Value Ref Range    WBC 13.3 (H) 4.8 - 10.8 K/uL    RBC 3.24 (L) 4.70 - 6.10 M/uL    Hemoglobin 10.1 (L) 14.0 - 18.0 g/dL    Hematocrit 29.4 (L) 42.0 - 52.0 %    MCV 90.7 81.4 - 97.8 fL    MCH 31.2 27.0 - 33.0 pg    MCHC 34.4 33.7 - 35.3 g/dL    RDW 52.9 (H) 35.9 - 50.0 fL    Platelet Count 164 164 - 446 K/uL    MPV 9.8 9.0 - 12.9 fL    Neutrophils-Polys 79.50 (H) 44.00 - 72.00 %    Lymphocytes 8.60 (L) 22.00 - 41.00 %    Monocytes 10.90 0.00 - 13.40 %    Eosinophils 0.10 0.00 - 6.90 %    Basophils 0.20 0.00 - 1.80 %    Immature Granulocytes 0.70 0.00 - 0.90 %    Nucleated RBC 0.00  /100 WBC    Neutrophils (Absolute) 10.55 (H) 1.82 - 7.42 K/uL    Lymphs (Absolute) 1.14 1.00 - 4.80 K/uL    Monos (Absolute) 1.45 (H) 0.00 - 0.85 K/uL    Eos (Absolute) 0.01 0.00 - 0.51 K/uL    Baso (Absolute) 0.02 0.00 - 0.12 K/uL    Immature Granulocytes (abs) 0.09 0.00 - 0.11 K/uL    NRBC (Absolute) 0.00 K/uL   Comp Metabolic Panel    Collection Time: 19  4:07 AM   Result Value Ref Range    Sodium 136 135 - 145 mmol/L    Potassium 3.7 3.6 - 5.5 mmol/L    Chloride 108 96 - 112 mmol/L    Co2 20 20 - 33 mmol/L    Anion Gap 8.0 0.0 - 11.9    Glucose 83 65 - 99 mg/dL    Bun 7 (L) 8 - 22 mg/dL    Creatinine 0.97 0.50 - 1.40 mg/dL    Calcium 6.7 (LL) 8.5 - 10.5 mg/dL    AST(SGOT) 109 (H) 12 - 45 U/L    ALT(SGPT) 54 (H) 2 - 50 U/L    Alkaline Phosphatase 246 (H) 30 - 99 U/L    Total Bilirubin 0.5 0.1 - 1.5 mg/dL    Albumin 2.0 (L) 3.2 - 4.9 g/dL    Total Protein 4.0 (L) 6.0 - 8.2 g/dL    Globulin 2.0 1.9 - 3.5 g/dL    A-G Ratio 1.0 g/dL   ESTIMATED GFR    Collection Time: 19  4:07 AM   Result Value Ref Range    GFR If African American >60 >60 mL/min/1.73 m 2    GFR If Non African American >60 >60 mL/min/1.73 m 2   EKG    Collection Time: 19  4:49 AM   Result Value Ref Range    Report       [facility]    Test Date:  2019  Pt Name:    GILBERTO WELLINGTON              Department: 183  MRN:        [id number]                      Room:       [facility]  Gender:     Male                         Technician: BRENDA  :        1965                   Requested By:AMEENA JALLOH  Order #:    [id number]                    Celeste MD: Sheeba Jarrett MD    Measurements  Intervals                                Axis  Rate:       80                           P:          57  PA:         185                          QRS:        34  QRSD:       97                           T:          45  QT:         404  QTc:        466    Interpretive Statements  SINUS RHYTHM  BORDERLINE LOW VOLTAGE, EXTREMITY LEADS  Compared to ECG  09/06/2018 10:23:29  No significant changes    Electronically Signed On 2- 7:58:42 PST by Sheeba Jarrett MD     MAGNESIUM    Collection Time: 02/28/19  8:51 AM   Result Value Ref Range    Magnesium 1.8 1.5 - 2.5 mg/dL   Phosphorus    Collection Time: 02/28/19  8:51 AM   Result Value Ref Range    Phosphorus 2.2 (L) 2.5 - 4.5 mg/dL   MRSA BY PCR (AMP)    Collection Time: 02/28/19  8:51 AM   Result Value Ref Range    Significant Indicator NEG     Source RESP     Site NARES     MRSA PCR Negative for MRSA by PCR.    Prothrombin time (INR)    Collection Time: 02/28/19  9:30 AM   Result Value Ref Range    PT 16.5 (H) 12.0 - 14.6 sec    INR 1.32 (H) 0.87 - 1.13   APTT    Collection Time: 02/28/19  9:30 AM   Result Value Ref Range    APTT 36.7 (H) 24.7 - 36.0 sec   Urinalysis    Collection Time: 02/28/19  9:58 AM   Result Value Ref Range    Color Yellow     Character Clear     Specific Gravity 1.006 <1.035    Ph 6.5 5.0 - 8.0    Glucose Negative Negative mg/dL    Ketones Trace (A) Negative mg/dL    Protein Negative Negative mg/dL    Bilirubin Negative Negative    Urobilinogen, Urine 0.2 Negative    Nitrite Negative Negative    Leukocyte Esterase Negative Negative    Occult Blood Negative Negative    Micro Urine Req see below    Influenza A/B By PCR (Adult - Flu Only)    Collection Time: 02/28/19 11:22 AM   Result Value Ref Range    Influenza virus A RNA Negative Negative    Influenza virus B, PCR Negative Negative       Imaging/Procedures Review:    US-RUQ   Final Result      Heterogeneous and echogenic appearance of the liver can be seen in hepatic steatosis or hepatocellular disease.      Prominence of the common duct measuring 8.7 mm. Further evaluation can be obtained with MRCP as clinically indicated.      Distended gallbladder. No gallstones are identified. Small amount of gallbladder sludge.            OUTSIDE IMAGES-DX CHEST   Final Result      OUTSIDE IMAGES-CT CHEST   Final Result             Assessment/Plan      * PNA (pneumonia)- (present on admission)   Assessment & Plan    CAP vs obstructive pneumonia   Pt is immunocompromised, chemotherapy for metastatic colon CA  Flu negative  MRSA nares pending   Cavitary lesions on CT  Continue ceftriaxone and vancomycin  Bronch and BAL planned for tomorrow      Sepsis (HCC)- (present on admission)   Assessment & Plan    This is severe sepsis with the following associated acute organ dysfunction(s): acute respiratory failure. 4/4 SIRS. Pro michael 32  Flu negative, MRSA nares pending  Pulmonary source. CAP vs obstructive pneumonia   Pt is immunocompromised, chemotherapy for metastatic colon CA  Continue antibiotics and fluids     COPD (chronic obstructive pulmonary disease) (HCC)- (present on admission)   Assessment & Plan    Smoking Hx, not on home inhalers  Acute hypoxia due to pneumonia. Currently on room air      Metastatic Colon cancer (HCC)- (present on admission)   Assessment & Plan    diagnosed 2016 treated with resection and chemo  had recurrence 08/2018, pulmonary nodules biopsied at that time showed adenocarcinoma  Currently on chemotherapy, 2 treatments so far        Quality-Core Measures

## 2019-03-01 NOTE — PROGRESS NOTES
Research Enrollment Information    This patient has been recruited in the study: Efficacy of prophylactic oral Vancomycin in preventing recurrent Clostridium Difficile infection in hospitalized patients requiring antibiotics.    Informed consent was obtained by Tonia Fragoso on 2/28/2019 at 4:04 PM before enrollment.   Subject Number:  [id number]  Treatment assignment group: C       Key:       Treatment group A: vancomycin 125 mg PO QD concurrent with antibiotics       Treatment group B: vancomycin 125 mg PO BID concurrent with antibiotics       Treatment group C: no oral vancomycin with antibiotics   Study Group: [facility]  Lu Wilson, Carol Arriaga, Tonia Fragoso, Shell Mensah, Agnieszka Brasher, Diane Smith, Edgar Rodríguez, Wendy Toro, Sherron Reynoso, Terrell Sena, Moshe Gomez, Valente Rea    Study Description:   This study will be conducted according to [place] and international standards of Good Clinical Practice in accordance with applicable Federal regulations, International Conference on Harmonization guidelines, and institutional research policies and procedures. This study is a prospective, randomized, open label evaluation of the efficacy of concurrent use of PO Vancomycin with C. difficile inducing antibiotics in reducing the recurrence rate of C. Difficile diarrheal illness in patients hospitalized with any bacterial infection. The study will assess the efficacy of oral Vancomycin prophylaxis in preventing recurrent CDI in hospitalized patients with suspected or confirmed bacterial infections requiring oral or intravenous antibiotics. Approximately 300 subjects will be randomized to one of three treatment arms in a 1:1:1 ratio (Vancomycin 125 mg PO every 12 hours for duration of antibiotic therapy, Vancomycin 125 mg PO every 24 hours for duration of antibiotic therapy, or no oral Vancomycin). Follow-up for endpoints will be done at 12 weeks following completion of antibiotic  therapy.Oral Vancomycin will be supplied as solution (50 mg/mL) drawn up into oral syringes as unit doses for further dilution with 30 mL water. Inpatient and outpatient doses will be prepared according to institutional policies and procedures for non-sterile oral compounding. Inpatient study medication will be labeled and dispensed as study drug using a specific medication record built in the EHR. This identifies the medication as part of the study and prevents billing to patient and/or third party insurance. Upon discharge, the patient will be provided with unit dosed oral Vancomycin syringes in a quantity sufficient to complete the study treatment. A licensed pharmacist of the research pharmacy will dispense research medication according to the protocol, policies and procedures of the [facility], and in compliance with [place] Board of Pharmacy regulations for outpatient dispensing. Ordering will be restricted to research staff. The duration of study treatment will depend upon the prescribed antibiotic course (oral vancomycin administration will be concurrent with antibiotic therapy only). Patients and/or insurance will not be billed for oral Vancomycin. Patients may withdraw their consent to participate in the study at any time and for any reason.  Contact Information:   Lu Wilson; [facility] Pharmacist; [email or web address]; [phone](O); [phone] (C); (Primary)   Agnieszka Brasher; [facility] Pharmacist; [email or web address]; [phone](O); [phone](C); (Primary)   Shell Mensah; [facility] Pharmacist; [email or web address]; [phone](O); [phone] (C); (Primary)    Carol Arriaga; [facility] Attending; [email or web address]; [phone](O); [phone](C)   Tonia Fragoso; [facility] Resident; [email or web address]; [phone](C)   Terrell Sena; [facility] Resident; [email or web address]; [phone](C)   Wendy Toro; [facility] Resident; [email or web address];  [phone](C)   Moshe Gomez; [facility] Med Resident; [email or web address]; [phone](C)   Sherron Reynoso; [facility]ending [facility] Residents; [email or web address]; [phone](C)   Edgar Rodríguez; Pending [facility] to [facility] Resident; [email or web address]; [phone](C)   Valente Rea; [facility] Med Resident; [email or web address]; [phone](C)

## 2019-03-01 NOTE — ASSESSMENT & PLAN NOTE
CAP vs obstructive pneumonia   Pt is immunocompromised, chemotherapy for metastatic colon CA  Flu negative  MRSA nares pending   Cavitary lesions on CT  Continue ceftriaxone and vancomycin  Bronch and BAL planned for tomorrow

## 2019-03-02 LAB
ALBUMIN SERPL BCP-MCNC: 2.5 G/DL (ref 3.2–4.9)
ALBUMIN/GLOB SERPL: 0.9 G/DL
ALP SERPL-CCNC: 314 U/L (ref 30–99)
ALT SERPL-CCNC: 75 U/L (ref 2–50)
ANION GAP SERPL CALC-SCNC: 7 MMOL/L (ref 0–11.9)
AST SERPL-CCNC: 128 U/L (ref 12–45)
BACTERIA BLD CULT: ABNORMAL
BACTERIA BLD CULT: ABNORMAL
BACTERIA UR CULT: NORMAL
BASOPHILS # BLD AUTO: 0.3 % (ref 0–1.8)
BASOPHILS # BLD: 0.03 K/UL (ref 0–0.12)
BILIRUB SERPL-MCNC: 0.4 MG/DL (ref 0.1–1.5)
BUN SERPL-MCNC: 5 MG/DL (ref 8–22)
CALCIUM SERPL-MCNC: 7.6 MG/DL (ref 8.5–10.5)
CHLORIDE SERPL-SCNC: 104 MMOL/L (ref 96–112)
CO2 SERPL-SCNC: 23 MMOL/L (ref 20–33)
CREAT SERPL-MCNC: 0.81 MG/DL (ref 0.5–1.4)
CYTOLOGY REG CYTOL: NORMAL
EOSINOPHIL # BLD AUTO: 0.02 K/UL (ref 0–0.51)
EOSINOPHIL NFR BLD: 0.2 % (ref 0–6.9)
ERYTHROCYTE [DISTWIDTH] IN BLOOD BY AUTOMATED COUNT: 55.1 FL (ref 35.9–50)
GLOBULIN SER CALC-MCNC: 2.8 G/DL (ref 1.9–3.5)
GLUCOSE SERPL-MCNC: 100 MG/DL (ref 65–99)
HCT VFR BLD AUTO: 31.6 % (ref 42–52)
HGB BLD-MCNC: 10.5 G/DL (ref 14–18)
IMM GRANULOCYTES # BLD AUTO: 0.11 K/UL (ref 0–0.11)
IMM GRANULOCYTES NFR BLD AUTO: 1 % (ref 0–0.9)
LYMPHOCYTES # BLD AUTO: 1.15 K/UL (ref 1–4.8)
LYMPHOCYTES NFR BLD: 10.2 % (ref 22–41)
MAGNESIUM SERPL-MCNC: 1.7 MG/DL (ref 1.5–2.5)
MCH RBC QN AUTO: 30.2 PG (ref 27–33)
MCHC RBC AUTO-ENTMCNC: 33.2 G/DL (ref 33.7–35.3)
MCV RBC AUTO: 90.8 FL (ref 81.4–97.8)
MONOCYTES # BLD AUTO: 1.3 K/UL (ref 0–0.85)
MONOCYTES NFR BLD AUTO: 11.5 % (ref 0–13.4)
NEUTROPHILS # BLD AUTO: 8.67 K/UL (ref 1.82–7.42)
NEUTROPHILS NFR BLD: 76.8 % (ref 44–72)
NRBC # BLD AUTO: 0 K/UL
NRBC BLD-RTO: 0 /100 WBC
PHOSPHATE SERPL-MCNC: 2.1 MG/DL (ref 2.5–4.5)
PLATELET # BLD AUTO: 257 K/UL (ref 164–446)
PMV BLD AUTO: 9.7 FL (ref 9–12.9)
POTASSIUM SERPL-SCNC: 4.1 MMOL/L (ref 3.6–5.5)
PROCALCITONIN SERPL-MCNC: 11.61 NG/ML
PROT SERPL-MCNC: 5.3 G/DL (ref 6–8.2)
RBC # BLD AUTO: 3.48 M/UL (ref 4.7–6.1)
SIGNIFICANT IND 70042: ABNORMAL
SIGNIFICANT IND 70042: NORMAL
SITE SITE: ABNORMAL
SITE SITE: NORMAL
SODIUM SERPL-SCNC: 134 MMOL/L (ref 135–145)
SOURCE SOURCE: ABNORMAL
SOURCE SOURCE: NORMAL
WBC # BLD AUTO: 11.3 K/UL (ref 4.8–10.8)

## 2019-03-02 PROCEDURE — 700105 HCHG RX REV CODE 258: Performed by: STUDENT IN AN ORGANIZED HEALTH CARE EDUCATION/TRAINING PROGRAM

## 2019-03-02 PROCEDURE — 700102 HCHG RX REV CODE 250 W/ 637 OVERRIDE(OP): Performed by: STUDENT IN AN ORGANIZED HEALTH CARE EDUCATION/TRAINING PROGRAM

## 2019-03-02 PROCEDURE — 700111 HCHG RX REV CODE 636 W/ 250 OVERRIDE (IP): Performed by: STUDENT IN AN ORGANIZED HEALTH CARE EDUCATION/TRAINING PROGRAM

## 2019-03-02 PROCEDURE — 99233 SBSQ HOSP IP/OBS HIGH 50: CPT | Mod: GC | Performed by: HOSPITALIST

## 2019-03-02 PROCEDURE — 84145 PROCALCITONIN (PCT): CPT

## 2019-03-02 PROCEDURE — A9270 NON-COVERED ITEM OR SERVICE: HCPCS | Performed by: STUDENT IN AN ORGANIZED HEALTH CARE EDUCATION/TRAINING PROGRAM

## 2019-03-02 PROCEDURE — 85025 COMPLETE CBC W/AUTO DIFF WBC: CPT

## 2019-03-02 PROCEDURE — 83735 ASSAY OF MAGNESIUM: CPT

## 2019-03-02 PROCEDURE — 770001 HCHG ROOM/CARE - MED/SURG/GYN PRIV*

## 2019-03-02 PROCEDURE — 80053 COMPREHEN METABOLIC PANEL: CPT

## 2019-03-02 PROCEDURE — 87040 BLOOD CULTURE FOR BACTERIA: CPT

## 2019-03-02 PROCEDURE — 84100 ASSAY OF PHOSPHORUS: CPT

## 2019-03-02 RX ORDER — TAMSULOSIN HYDROCHLORIDE 0.4 MG/1
0.4 CAPSULE ORAL
Status: DISCONTINUED | OUTPATIENT
Start: 2019-03-02 | End: 2019-03-06 | Stop reason: HOSPADM

## 2019-03-02 RX ORDER — PROCHLORPERAZINE MALEATE 5 MG/1
5 TABLET ORAL ONCE
Status: COMPLETED | OUTPATIENT
Start: 2019-03-02 | End: 2019-03-02

## 2019-03-02 RX ORDER — LOSARTAN POTASSIUM 50 MG/1
50 TABLET ORAL DAILY
Status: DISCONTINUED | OUTPATIENT
Start: 2019-03-02 | End: 2019-03-06 | Stop reason: HOSPADM

## 2019-03-02 RX ORDER — PROCHLORPERAZINE MALEATE 10 MG
10 TABLET ORAL ONCE
Status: COMPLETED | OUTPATIENT
Start: 2019-03-02 | End: 2019-03-02

## 2019-03-02 RX ORDER — MAGNESIUM SULFATE HEPTAHYDRATE 40 MG/ML
2 INJECTION, SOLUTION INTRAVENOUS ONCE
Status: COMPLETED | OUTPATIENT
Start: 2019-03-02 | End: 2019-03-02

## 2019-03-02 RX ADMIN — ONDANSETRON 4 MG: 2 INJECTION INTRAMUSCULAR; INTRAVENOUS at 05:54

## 2019-03-02 RX ADMIN — DIBASIC SODIUM PHOSPHATE, MONOBASIC POTASSIUM PHOSPHATE AND MONOBASIC SODIUM PHOSPHATE 1 TABLET: 852; 155; 130 TABLET ORAL at 12:27

## 2019-03-02 RX ADMIN — DIBASIC SODIUM PHOSPHATE, MONOBASIC POTASSIUM PHOSPHATE AND MONOBASIC SODIUM PHOSPHATE 1 TABLET: 852; 155; 130 TABLET ORAL at 01:44

## 2019-03-02 RX ADMIN — HYDROMORPHONE HYDROCHLORIDE 16 MG: 4 TABLET ORAL at 22:23

## 2019-03-02 RX ADMIN — POTASSIUM CHLORIDE 40 MEQ: 1500 TABLET, EXTENDED RELEASE ORAL at 23:57

## 2019-03-02 RX ADMIN — ENOXAPARIN SODIUM 40 MG: 100 INJECTION SUBCUTANEOUS at 05:45

## 2019-03-02 RX ADMIN — HYDROMORPHONE HYDROCHLORIDE 16 MG: 4 TABLET ORAL at 10:06

## 2019-03-02 RX ADMIN — HYDROMORPHONE HYDROCHLORIDE 16 MG: 4 TABLET ORAL at 14:06

## 2019-03-02 RX ADMIN — ONDANSETRON 4 MG: 2 INJECTION INTRAMUSCULAR; INTRAVENOUS at 18:20

## 2019-03-02 RX ADMIN — LOSARTAN POTASSIUM 50 MG: 50 TABLET ORAL at 08:24

## 2019-03-02 RX ADMIN — CEFTRIAXONE SODIUM 2 G: 2 INJECTION, POWDER, FOR SOLUTION INTRAMUSCULAR; INTRAVENOUS at 05:41

## 2019-03-02 RX ADMIN — PIPERACILLIN AND TAZOBACTAM 3.38 G: 3; .375 INJECTION, POWDER, LYOPHILIZED, FOR SOLUTION INTRAVENOUS; PARENTERAL at 23:08

## 2019-03-02 RX ADMIN — TAMSULOSIN HYDROCHLORIDE 0.4 MG: 0.4 CAPSULE ORAL at 08:24

## 2019-03-02 RX ADMIN — HYDROMORPHONE HYDROCHLORIDE 16 MG: 4 TABLET ORAL at 18:19

## 2019-03-02 RX ADMIN — DIBASIC SODIUM PHOSPHATE, MONOBASIC POTASSIUM PHOSPHATE AND MONOBASIC SODIUM PHOSPHATE 1 TABLET: 852; 155; 130 TABLET ORAL at 23:57

## 2019-03-02 RX ADMIN — PROCHLORPERAZINE MALEATE 5 MG: 5 TABLET, FILM COATED ORAL at 10:40

## 2019-03-02 RX ADMIN — PROCHLORPERAZINE MALEATE 10 MG: 10 TABLET, FILM COATED ORAL at 02:32

## 2019-03-02 RX ADMIN — HYDROMORPHONE HYDROCHLORIDE 16 MG: 4 TABLET ORAL at 05:44

## 2019-03-02 RX ADMIN — ONDANSETRON 4 MG: 2 INJECTION INTRAMUSCULAR; INTRAVENOUS at 11:56

## 2019-03-02 RX ADMIN — ONDANSETRON 4 MG: 2 INJECTION INTRAMUSCULAR; INTRAVENOUS at 23:57

## 2019-03-02 RX ADMIN — POLYETHYLENE GLYCOL 3350 1 PACKET: 17 POWDER, FOR SOLUTION ORAL at 12:27

## 2019-03-02 RX ADMIN — SENNOSIDES AND DOCUSATE SODIUM 2 TABLET: 8.6; 5 TABLET ORAL at 05:45

## 2019-03-02 RX ADMIN — PREDNISONE 5 MG: 5 TABLET ORAL at 05:45

## 2019-03-02 RX ADMIN — HYDROMORPHONE HYDROCHLORIDE 16 MG: 4 TABLET ORAL at 01:44

## 2019-03-02 RX ADMIN — MAGNESIUM SULFATE HEPTAHYDRATE 2 G: 40 INJECTION, SOLUTION INTRAVENOUS at 10:28

## 2019-03-02 RX ADMIN — DIBASIC SODIUM PHOSPHATE, MONOBASIC POTASSIUM PHOSPHATE AND MONOBASIC SODIUM PHOSPHATE 1 TABLET: 852; 155; 130 TABLET ORAL at 05:44

## 2019-03-02 ASSESSMENT — ENCOUNTER SYMPTOMS
MYALGIAS: 0
CHILLS: 0
BACK PAIN: 0
BLOOD IN STOOL: 0
HEMOPTYSIS: 0
FEVER: 0
SHORTNESS OF BREATH: 0
SORE THROAT: 0
NERVOUS/ANXIOUS: 0
DIAPHORESIS: 0
DEPRESSION: 0
PALPITATIONS: 0
VOMITING: 0
BLURRED VISION: 0
SPUTUM PRODUCTION: 1
CONSTIPATION: 0
HEADACHES: 0
ABDOMINAL PAIN: 0
COUGH: 1
DOUBLE VISION: 0
NAUSEA: 0
DIZZINESS: 0
DIARRHEA: 0

## 2019-03-02 NOTE — THERAPY
"Physical Therapy Evaluation completed.   Bed Mobility:  Supine to Sit: Stand by Assist  Transfers: Sit to Stand: Stand by Assist  Gait: Level Of Assist: Stand by Assist with No Equipment Needed       Plan of Care: Patient with no further skilled PT needs in the acute care setting at this time  Discharge Recommendations: Equipment: No Equipment Needed. Post-acute therapy Currently anticipate no further skilled therapy needs once patient is discharged from the inpatient setting.    Pt admitted for PNA and hyponatremia with hx of colon cancer. He presents very near his functional baseline but reports an increase in fatigue level. Pt did not require physical assist during mobility and reports having a wc at home for longer distance gait (reports walking/standing time limited to 10mins at home due to fatigue and groin pain from non-op tumor). At this time, pt does not require further acute PT intervention and appears functionally capable of return home with spouse.     See \"Rehab Therapy-Acute\" Patient Summary Report for complete documentation.     "

## 2019-03-02 NOTE — PROGRESS NOTES
Critical Care Progress Note    Date of admission  2/27/2019    Chief Complaint  53 y.o. male admitted 2/27/2019 with ***    Hospital Course    ***      Interval Problem Update  Reviewed last 24 hour events:  ***    Review of Systems  ROS     Vital Signs for last 24 hours   Temp:  [36.8 °C (98.3 °F)-37.4 °C (99.4 °F)] 37.4 °C (99.4 °F)  Pulse:  [69-97] 92  Resp:  [10-30] 16  BP: (140-157)/() 143/106  SpO2:  [92 %-99 %] 93 %    Hemodynamic parameters for last 24 hours       Respiratory Information for the last 24 hours       Physical Exam   Physical Exam    Medications  Current Facility-Administered Medications   Medication Dose Route Frequency Provider Last Rate Last Dose   • losartan (COZAAR) tablet 50 mg  50 mg Oral DAILY Tracy Mc M.D.   50 mg at 03/02/19 0824   • tamsulosin (FLOMAX) capsule 0.4 mg  0.4 mg Oral AFTER BREAKFAST Tracy Mc M.D.   0.4 mg at 03/02/19 0824   • magnesium sulfate IVPB premix 2 g  2 g Intravenous Once Tracy Mc M.D.       • phosphorus (K-PHOS-NEUTRAL, PHOSPHA 250 NEUTRAL) per tablet 1 Tab  1 Tab Oral Q6HRS Tracy Mc M.D.   1 Tab at 03/02/19 0544   • potassium chloride SA (Kdur) tablet 40 mEq  40 mEq Oral DAILY Tracy Mc M.D.       • cefTRIAXone (ROCEPHIN) 2 g in  mL IVPB  2 g Intravenous Q24HRS Quincy Saravia M.D.   Stopped at 03/02/19 0611   • predniSONE (DELTASONE) tablet 5 mg  5 mg Oral DAILY Quincy Saravia M.D.   5 mg at 03/02/19 0545   • senna-docusate (PERICOLACE or SENOKOT S) 8.6-50 MG per tablet 2 Tab  2 Tab Oral BID Quincy Saravia M.D.   2 Tab at 03/02/19 0545    And   • polyethylene glycol/lytes (MIRALAX) PACKET 1 Packet  1 Packet Oral QDAY PRN Quincy Saravia M.D.        And   • magnesium hydroxide (MILK OF MAGNESIA) suspension 30 mL  30 mL Oral QDAY PRN Quincy Saravia M.D.        And   • bisacodyl (DULCOLAX) suppository 10 mg  10 mg Rectal QDAY PRN Quincy Saravia M.D.       • Respiratory Care per  Protocol   Nebulization Continuous RT Quincy Saravia M.D.       • enoxaparin (LOVENOX) inj 40 mg  40 mg Subcutaneous DAILY Quincy Saravia M.D.   40 mg at 03/02/19 0545   • enalaprilat (VASOTEC) injection 1.25 mg  1.25 mg Intravenous Q6HRS PRN Quincy Saravia M.D.       • HYDROmorphone (DILAUDID) tablet 16 mg  16 mg Oral Q4HRS PRN Quincy Saravia M.D.   16 mg at 03/02/19 0544   • ondansetron (ZOFRAN) syringe/vial injection 4 mg  4 mg Intravenous Q6HRS PRN Tracy Mc M.D.   4 mg at 03/02/19 0554   • NS infusion 2,178 mL  30 mL/kg Intravenous Once PRN Quincy Saravia M.D.           Fluids    Intake/Output Summary (Last 24 hours) at 03/02/19 0930  Last data filed at 03/02/19 0904   Gross per 24 hour   Intake              250 ml   Output             2145 ml   Net            -1895 ml       Laboratory          Recent Labs      02/28/19 0407 02/28/19   0851  03/01/19 0448 03/02/19   0740   SODIUM  136   --   131*  134*   POTASSIUM  3.7   --   3.4*  4.1   CHLORIDE  108   --   106  104   CO2  20   --   22  23   BUN  7*   --   7*  5*   CREATININE  0.97   --   0.88  0.81   MAGNESIUM   --   1.8  2.0  1.7   PHOSPHORUS   --   2.2*   --   2.1*   CALCIUM  6.7*   --   7.5*  7.6*     Recent Labs      02/27/19 2247 02/28/19 0407 03/01/19 0448  03/02/19   0740   ALTSGPT  63*  54*   --   75*   ASTSGOT  118*  109*   --   128*   ALKPHOSPHAT  324*  246*   --   314*   TBILIRUBIN  0.6  0.5   --   0.4   GLUCOSE  90  83  85  100*     Recent Labs      02/27/19 2247 02/28/19 0407  03/01/19 0448  03/02/19   0740   WBC  24.2*  13.3*  12.3*  11.3*   NEUTSPOLYS  88.30*  79.50*  71.20  76.80*   LYMPHOCYTES  3.10*  8.60*  12.50*  10.20*   MONOCYTES  6.80  10.90  15.10*  11.50   EOSINOPHILS  0.00  0.10  0.20  0.20   BASOPHILS  0.20  0.20  0.20  0.30   ASTSGOT  118*  109*   --   128*   ALTSGPT  63*  54*   --   75*   ALKPHOSPHAT  324*  246*   --   314*   TBILIRUBIN  0.6  0.5   --   0.4     Recent Labs       02/28/19   0407  02/28/19   0930  03/01/19   0448  03/02/19   0740   RBC  3.24*   --   3.24*  3.48*   HEMOGLOBIN  10.1*   --   10.1*  10.5*   HEMATOCRIT  29.4*   --   29.4*  31.6*   PLATELETCT  164   --   207  257   PROTHROMBTM   --   16.5*   --    --    APTT   --   36.7*   --    --    INR   --   1.32*   --    --        Imaging  {IP IMAGING OPTIONS:9480455}    Assessment/Plan  * PNA (pneumonia)- (present on admission)   Assessment & Plan    CAP vs obstructive pneumonia   Pt is immunocompromised, chemotherapy for metastatic colon CA  Flu negative  MRSA nares pending   Cavitary lesions on CT  Continue ceftriaxone and vancomycin  Bronch and BAL planned for tomorrow      Sepsis (HCC)- (present on admission)   Assessment & Plan    This is severe sepsis with the following associated acute organ dysfunction(s): acute respiratory failure. 4/4 SIRS. Pro michael 32  Flu negative, MRSA nares pending  Pulmonary source. CAP vs obstructive pneumonia   Pt is immunocompromised, chemotherapy for metastatic colon CA  Continue antibiotics and fluids     COPD (chronic obstructive pulmonary disease) (HCC)- (present on admission)   Assessment & Plan    Smoking Hx, not on home inhalers  Acute hypoxia due to pneumonia. Currently on room air      Metastatic Colon cancer (HCC)- (present on admission)   Assessment & Plan    diagnosed 2016 treated with resection and chemo  had recurrence 08/2018, pulmonary nodules biopsied at that time showed adenocarcinoma  Currently on chemotherapy, 2 treatments so far           VTE:  {VTE SMARTLIST:644944842}  Ulcer: {ULCER SMARTLIST:347881448}  Lines: {LINE SMARLIST:592986151}    I have performed a physical exam and reviewed and updated ROS and Plan today (3/2/2019). In review of yesterday's note (3/1/2019), there are no changes except as documented above.     Discussed patient condition and risk of morbidity and/or mortality with {Discussed with...:101863}  The patient remains critically ill.  Critical care  time = *** minutes in directly providing and coordinating critical care and extensive data review.  No time overlap and excludes procedures.

## 2019-03-02 NOTE — PROGRESS NOTES
Assumed care of pt, bedside report received from AQUILES Olmedo.  Pt sitting up in bed in no acute distress. Reports pain 4/10 to groin, reports tolerating at this time. POC discussed, pt denies any needs currently. Bed low and locked, call light within reach. Will monitor.

## 2019-03-02 NOTE — PROGRESS NOTES
Internal Medicine Interval Note  Note Author: Tracy Mc M.D.     Name Erich Ingram     1965   Age/Sex 53 y.o. male   MRN [id number]   Code Status Full     After 5PM or if no immediate response to page, please call for cross-coverage  Attending/Team:Dr. Waterman/Madelin See Patient List for primary contact information  Call [phone] to page    1st Call - Day Intern (R1):    2nd Call - Day Sr. Resident (R2/R3):   Dr. Wilkins       Reason for interval visit  (Principal Problem)   Community-acquired pneumonia  Sepsis  Advanced colon cancer  Dysuria    Interval Problem Daily Status Update  (24 hours, problem oriented, brief subjective history, new lab/imaging data pertinent to that problem)   Mr. Ingram, 53-year-old male with history of colon cancer x 2 (2016, partial colon resection with chemotherapy and was in remission, 2018 - metastatic colon cancer with metastases to the lungs on chemotherapy, last chemotherapy on 2019) admitted due to sepsis secondary to pneumonia.  Cavitary lesion seen on the lung.  MRSA by PCR negative.  Patient underwent bronchoscopy with BAL pending culture and cytology.  Blood culture grew gram-positive rods pending sensitivity.  On ceftriaxone.    Overnight, patient reported dysuria.  Urinalysis was normal.  Patient has a history of BPH on tamsulosin which was held during admission.  Blood pressure is elevated.  Losartan was held during admission due to hypotension.  Will restart losartan and tamsulosin and continue to monitor.  Repeat blood culture obtained. Will consult ID for recommendations since blood culture from port-A-cath was positive.    Review of Systems   Constitutional: Negative for chills, diaphoresis, fever and malaise/fatigue.   HENT: Negative for congestion and sore throat.    Eyes: Negative for blurred vision and double vision.   Respiratory: Positive for cough and sputum production (Whitish). Negative for hemoptysis and shortness  of breath.    Cardiovascular: Negative for chest pain, palpitations and leg swelling.   Gastrointestinal: Negative for abdominal pain, blood in stool, constipation, diarrhea, nausea and vomiting.   Genitourinary: Positive for dysuria. Negative for frequency and urgency.   Musculoskeletal: Negative for back pain, joint pain and myalgias.   Skin: Negative for itching and rash.   Neurological: Negative for dizziness and headaches.   Psychiatric/Behavioral: Negative for depression. The patient is not nervous/anxious.        Disposition/Barriers to discharge:   Remain inpatient for IV antibiotics/can be discharged home with home health when medically stable    Consultants/Specialty  Pulmonary  Oncology  PCP: BEENA Sterling      Quality Measures  Quality-Core Measures   Reviewed items::  Labs reviewed and Medications reviewed  Soriano catheter::  No Soriano  Central line in place:  Chemotherapy  DVT prophylaxis pharmacological::  Enoxaparin (Lovenox)  Ulcer Prophylaxis::  Not indicated  Antibiotics:  Treating active infection/contamination beyond 24 hours perioperative coverage      Physical Exam       Vitals:    03/01/19 2045 03/01/19 2355 03/02/19 0407 03/02/19 0752   BP: 142/103 152/101 145/107 143/106   Pulse: 80 69 81 92   Resp: 16 18 16 16   Temp: 36.8 °C (98.3 °F) 36.9 °C (98.4 °F) 37 °C (98.6 °F) 37.4 °C (99.4 °F)   TempSrc: Temporal Temporal Temporal Temporal   SpO2:    93%   Weight: 78 kg (171 lb 15.3 oz)      Height:         Body mass index is 23.32 kg/m². Weight: 78 kg (171 lb 15.3 oz)  Oxygen Therapy:  Pulse Oximetry: 93 %, O2 (LPM): 0, O2 Delivery: None (Room Air)    Physical Exam   Constitutional: He is oriented to person, place, and time. No distress.   HENT:   Head: Normocephalic and atraumatic.   Eyes: Pupils are equal, round, and reactive to light. EOM are normal.   Neck: Normal range of motion. Neck supple. No thyromegaly present.   Cardiovascular: Normal rate, regular rhythm and normal heart  sounds.    Pulmonary/Chest: Effort normal and breath sounds normal. No respiratory distress. He has no wheezes.   Abdominal: Soft. Bowel sounds are normal. He exhibits no distension. There is no tenderness.   Musculoskeletal: Normal range of motion. He exhibits no edema.   Neurological: He is alert and oriented to person, place, and time. GCS score is 15.   Skin: Skin is warm and dry. He is not diaphoretic.   Psychiatric: Mood and affect normal.   Nursing note and vitals reviewed.    Assessment/Plan     * PNA (pneumonia)- (present on admission)   Assessment & Plan    -Cavitary lesions on CT chest completed at an outside facility, concern for MRSA.  -Patient presented with SIRS 3/4, white blood cell count 24.2, initial lactic acid 1.2 normal.   -Pro calcitonin - 32.14, indicating bacterial infectious process  -Patient given 1 dose of Rocephin and Zithromax in the emergency department in addition to 1 L IV fluid bolus.  -WBC improving  -Continue monitoring in telemetry for floor  - Blood culture from Port-A-Cath came positive for Gram positive rods, pending sensitivity   - MRSA by PCR was negative - D/C vancomycin  -Continue ceftriaxone  -pro calcitonin level improved  -Patient underwent bronchoscopy with BAL that showed turbid fluid sent for cytology and culture  -Continue to monitor for any complications.  -Follow-up blood culture  -consult ID     Sepsis (HCC)- (present on admission)   Assessment & Plan    This was sepsis (without associated acute organ dysfunction).   Resolved  -Patient presented with 3/4 SIRS criteria (HR > 90, RR > 20, WBC >12 ) with the likely source of infection from the lungs  -Chest x-ray and CT chest done at an outside facility is concerning for pneumonia  -Improving, tachycardia resolved, respiratory rate decreased, WBC trending down from 24 to 13  -MRSA by PCR was negative, vancomycin discontinued  -Blood culture from Port-A-Cath showed gram-positive rods pending sensitivity  -Continue IV  ceftriaxone     Normocytic anemia- (present on admission)   Assessment & Plan    -Hemoglobin 11.5, normocytic, normochromic  -Anemia due to chronic medical condition, GI malignancy  -Hemoccult negative  -continue to monitor     Elevated LFTs   Assessment & Plan    - Elevated liver function tests likely secondary to liver cirrhosis from hepatitis C   - , ALT 63, alkaline phosphatase 324.   - RUQ ultrasound - Heterogeneous and echogenic appearance of the liver can be seen in hepatic steatosis or hepatocellular disease.  - Continue to monitor     Hyponatremia   Assessment & Plan    - Improved  - Continue to monitor     Hepatitis C   Assessment & Plan    - Patient reports history of untreated hepatitis C   - Elevated liver function tests with , ALT 63, and alkaline phosphatase 324.  - Outpatient GI follow up when medically stable     COPD (chronic obstructive pulmonary disease) (HCC)- (present on admission)   Assessment & Plan    - Patient reports history of COPD, does not use oxygen at home.  - Pt quit smoking couple years ago but was previously smoking 1 pack/day for 10 years.    - Saturating at 94% on room air, and no respiratory distress.  - Pt denies any SOB or wheezing  - Respiratory care per protocol     Metastatic Colon cancer (HCC)- (present on admission)   Assessment & Plan    -H/O colon cancer diagnosed in 2016 who underwent partial colectomy followed by chemotherapy in remission  - recurrence of metastatic colon cancer 08/2018 with metastasis to the lungs, currently on chemotherapy  - Last chemotherapy was 2/8/2019  -Oncologist saw the patient, appreciate recommendations: Outpatient follow-up for restaging after active infection.  Hold all chemotherapy while inpatient  -Continue to monitor     Hypertension   Assessment & Plan    Stable of home medication   C[place]inue home losartan  Continue to monitor     Thrombocytopenia (HCC)   Assessment & Plan    -Likely secondary to liver cirrhosis and  untreated hepatitis C.  -Patient is asymptomatic  -Continue to monitor

## 2019-03-02 NOTE — THERAPY
"Occupational Therapy Evaluation completed.   Functional Status:  Spv w/bed mobility and LB dressing, walking in room no AD, able to complete grooming and self feeding seated. Generally c/o fatigue BTB post session   Plan of Care: Will benefit from Occupational Therapy 2 times per week  Discharge Recommendations:  Equipment: Will Continue to Assess for Equipment Needs. Post-acute therapy Recommend home health transitional care services for continued occupational therapy services      See \"Rehab Therapy-Acute\" Patient Summary Report for complete documentation.      53 yr old male admitted for fatigue and chills. PMHX, colon CA x3 w/mets receiving chemo. Now dx w/PNA, sepsis, anemia, and hyponatremia. Pt is demonstrating ability to complete most ADL's does have general deconditioning and fatigue may benefit from acute OT recommend HH at d/c at this time   "

## 2019-03-03 LAB
ANION GAP SERPL CALC-SCNC: 6 MMOL/L (ref 0–11.9)
BACTERIA SPEC RESP CULT: ABNORMAL
BACTERIA SPEC RESP CULT: ABNORMAL
BACTERIA SPEC RESP CULT: NORMAL
BASOPHILS # BLD AUTO: 0.6 % (ref 0–1.8)
BASOPHILS # BLD: 0.05 K/UL (ref 0–0.12)
BUN SERPL-MCNC: 5 MG/DL (ref 8–22)
CALCIUM SERPL-MCNC: 7.7 MG/DL (ref 8.5–10.5)
CHLORIDE SERPL-SCNC: 102 MMOL/L (ref 96–112)
CO2 SERPL-SCNC: 26 MMOL/L (ref 20–33)
CREAT SERPL-MCNC: 0.87 MG/DL (ref 0.5–1.4)
EOSINOPHIL # BLD AUTO: 0.01 K/UL (ref 0–0.51)
EOSINOPHIL NFR BLD: 0.1 % (ref 0–6.9)
ERYTHROCYTE [DISTWIDTH] IN BLOOD BY AUTOMATED COUNT: 55.7 FL (ref 35.9–50)
GLUCOSE SERPL-MCNC: 92 MG/DL (ref 65–99)
GRAM STN SPEC: ABNORMAL
GRAM STN SPEC: NORMAL
HCT VFR BLD AUTO: 31.4 % (ref 42–52)
HGB BLD-MCNC: 10.5 G/DL (ref 14–18)
IMM GRANULOCYTES # BLD AUTO: 0.11 K/UL (ref 0–0.11)
IMM GRANULOCYTES NFR BLD AUTO: 1.2 % (ref 0–0.9)
LYMPHOCYTES # BLD AUTO: 1.73 K/UL (ref 1–4.8)
LYMPHOCYTES NFR BLD: 19.2 % (ref 22–41)
MAGNESIUM SERPL-MCNC: 2.1 MG/DL (ref 1.5–2.5)
MCH RBC QN AUTO: 30.9 PG (ref 27–33)
MCHC RBC AUTO-ENTMCNC: 33.4 G/DL (ref 33.7–35.3)
MCV RBC AUTO: 92.4 FL (ref 81.4–97.8)
MONOCYTES # BLD AUTO: 1.15 K/UL (ref 0–0.85)
MONOCYTES NFR BLD AUTO: 12.8 % (ref 0–13.4)
NEUTROPHILS # BLD AUTO: 5.94 K/UL (ref 1.82–7.42)
NEUTROPHILS NFR BLD: 66.1 % (ref 44–72)
NRBC # BLD AUTO: 0.02 K/UL
NRBC BLD-RTO: 0.2 /100 WBC
PHOSPHATE SERPL-MCNC: 3 MG/DL (ref 2.5–4.5)
PLATELET # BLD AUTO: 239 K/UL (ref 164–446)
PMV BLD AUTO: 10.2 FL (ref 9–12.9)
POTASSIUM SERPL-SCNC: 4.8 MMOL/L (ref 3.6–5.5)
RBC # BLD AUTO: 3.4 M/UL (ref 4.7–6.1)
SIGNIFICANT IND 70042: ABNORMAL
SIGNIFICANT IND 70042: NORMAL
SITE SITE: ABNORMAL
SITE SITE: NORMAL
SODIUM SERPL-SCNC: 134 MMOL/L (ref 135–145)
SOURCE SOURCE: ABNORMAL
SOURCE SOURCE: NORMAL
WBC # BLD AUTO: 9 K/UL (ref 4.8–10.8)

## 2019-03-03 PROCEDURE — 700105 HCHG RX REV CODE 258: Performed by: STUDENT IN AN ORGANIZED HEALTH CARE EDUCATION/TRAINING PROGRAM

## 2019-03-03 PROCEDURE — 85025 COMPLETE CBC W/AUTO DIFF WBC: CPT

## 2019-03-03 PROCEDURE — 700111 HCHG RX REV CODE 636 W/ 250 OVERRIDE (IP): Performed by: STUDENT IN AN ORGANIZED HEALTH CARE EDUCATION/TRAINING PROGRAM

## 2019-03-03 PROCEDURE — 99221 1ST HOSP IP/OBS SF/LOW 40: CPT | Performed by: INTERNAL MEDICINE

## 2019-03-03 PROCEDURE — 80048 BASIC METABOLIC PNL TOTAL CA: CPT

## 2019-03-03 PROCEDURE — 700102 HCHG RX REV CODE 250 W/ 637 OVERRIDE(OP): Performed by: STUDENT IN AN ORGANIZED HEALTH CARE EDUCATION/TRAINING PROGRAM

## 2019-03-03 PROCEDURE — A9270 NON-COVERED ITEM OR SERVICE: HCPCS | Performed by: STUDENT IN AN ORGANIZED HEALTH CARE EDUCATION/TRAINING PROGRAM

## 2019-03-03 PROCEDURE — 83735 ASSAY OF MAGNESIUM: CPT

## 2019-03-03 PROCEDURE — 770006 HCHG ROOM/CARE - MED/SURG/GYN SEMI*

## 2019-03-03 PROCEDURE — 99232 SBSQ HOSP IP/OBS MODERATE 35: CPT | Mod: GC | Performed by: HOSPITALIST

## 2019-03-03 PROCEDURE — 84100 ASSAY OF PHOSPHORUS: CPT

## 2019-03-03 RX ORDER — MAGNESIUM SULFATE HEPTAHYDRATE 40 MG/ML
2 INJECTION, SOLUTION INTRAVENOUS ONCE
Status: DISCONTINUED | OUTPATIENT
Start: 2019-03-03 | End: 2019-03-03

## 2019-03-03 RX ORDER — PROCHLORPERAZINE MALEATE 5 MG/1
10 TABLET ORAL
Status: COMPLETED | OUTPATIENT
Start: 2019-03-03 | End: 2019-03-03

## 2019-03-03 RX ADMIN — ONDANSETRON 4 MG: 2 INJECTION INTRAMUSCULAR; INTRAVENOUS at 05:50

## 2019-03-03 RX ADMIN — HYDROMORPHONE HYDROCHLORIDE 16 MG: 4 TABLET ORAL at 10:44

## 2019-03-03 RX ADMIN — ENOXAPARIN SODIUM 40 MG: 100 INJECTION SUBCUTANEOUS at 05:52

## 2019-03-03 RX ADMIN — SODIUM CHLORIDE 2 MILLION UNITS: 9 INJECTION, SOLUTION INTRAVENOUS at 14:10

## 2019-03-03 RX ADMIN — SODIUM CHLORIDE 2 MILLION UNITS: 9 INJECTION, SOLUTION INTRAVENOUS at 22:00

## 2019-03-03 RX ADMIN — PROCHLORPERAZINE MALEATE 10 MG: 5 TABLET, FILM COATED ORAL at 22:47

## 2019-03-03 RX ADMIN — HYDROMORPHONE HYDROCHLORIDE 16 MG: 4 TABLET ORAL at 02:25

## 2019-03-03 RX ADMIN — ONDANSETRON 4 MG: 2 INJECTION INTRAMUSCULAR; INTRAVENOUS at 21:09

## 2019-03-03 RX ADMIN — LOSARTAN POTASSIUM 50 MG: 50 TABLET ORAL at 05:53

## 2019-03-03 RX ADMIN — HYDROMORPHONE HYDROCHLORIDE 16 MG: 4 TABLET ORAL at 18:39

## 2019-03-03 RX ADMIN — PIPERACILLIN AND TAZOBACTAM 3.38 G: 3; .375 INJECTION, POWDER, LYOPHILIZED, FOR SOLUTION INTRAVENOUS; PARENTERAL at 05:52

## 2019-03-03 RX ADMIN — ONDANSETRON 4 MG: 2 INJECTION INTRAMUSCULAR; INTRAVENOUS at 13:46

## 2019-03-03 RX ADMIN — PREDNISONE 5 MG: 5 TABLET ORAL at 05:53

## 2019-03-03 RX ADMIN — SODIUM CHLORIDE 2 MILLION UNITS: 9 INJECTION, SOLUTION INTRAVENOUS at 18:02

## 2019-03-03 RX ADMIN — HYDROMORPHONE HYDROCHLORIDE 16 MG: 4 TABLET ORAL at 14:45

## 2019-03-03 RX ADMIN — HYDROMORPHONE HYDROCHLORIDE 16 MG: 4 TABLET ORAL at 22:47

## 2019-03-03 RX ADMIN — TAMSULOSIN HYDROCHLORIDE 0.4 MG: 0.4 CAPSULE ORAL at 08:47

## 2019-03-03 RX ADMIN — HYDROMORPHONE HYDROCHLORIDE 16 MG: 4 TABLET ORAL at 06:27

## 2019-03-03 RX ADMIN — POLYETHYLENE GLYCOL 3350 1 PACKET: 17 POWDER, FOR SOLUTION ORAL at 18:02

## 2019-03-03 ASSESSMENT — ENCOUNTER SYMPTOMS
NERVOUS/ANXIOUS: 0
FEVER: 0
BLURRED VISION: 0
CONSTIPATION: 0
NAUSEA: 0
PALPITATIONS: 0
DIARRHEA: 0
ABDOMINAL PAIN: 0
HEADACHES: 0
DOUBLE VISION: 0
DIAPHORESIS: 0
SHORTNESS OF BREATH: 0
BACK PAIN: 0
HEMOPTYSIS: 0
SORE THROAT: 0
MYALGIAS: 0
DIZZINESS: 0
COUGH: 0
CHILLS: 0
BLOOD IN STOOL: 0
VOMITING: 0
DEPRESSION: 0

## 2019-03-03 NOTE — PROGRESS NOTES
Assumed care of pt, report received from AQUILES Rowan. Pt sitting at edge of bed, reports groin pain otherwise in no acute distress. Reports small BM this AM. IV zosyn infusing through chest port. Bed low and locked, call light within reach. Will monitor.

## 2019-03-03 NOTE — PROGRESS NOTES
Monitor summary    SR, rate 73-95  No ectopy  0.20/0.08/0.34    Pt placed on medical status this afternoon, no longer on cardiac monitor.

## 2019-03-03 NOTE — PROGRESS NOTES
Report given to precious Ball RN on Jerri 6. Transport here to take pt via wheelchair. Medicated for pain prior to transfer. No additional needs identified at time of transfer.

## 2019-03-03 NOTE — PROGRESS NOTES
Bedside report received, assumed care of patient. Patient a+o states he is comfortable. Requesting ginger ale, involved in plan of care. Call light within reach

## 2019-03-03 NOTE — CARE PLAN
Problem: Bowel/Gastric:  Goal: Normal bowel function is maintained or improved  Outcome: PROGRESSING AS EXPECTED  Pt had BM 3/2, but prefers to take miralax over senna.     Problem: Pain Management  Goal: Pain level will decrease to patient's comfort goal  Outcome: PROGRESSING AS EXPECTED  pts pain is currently being managed with po dilaudid, pt states pain decreases to a 3-4 on scale of 1-10 which is a tolerable level for him.

## 2019-03-03 NOTE — PROGRESS NOTES
Internal Medicine Interval Note  Note Author: Tracy Mc M.D.     Name Erich Ingram     1965   Age/Sex 53 y.o. male   MRN [id number]   Code Status Full     After 5PM or if no immediate response to page, please call for cross-coverage  Attending/Team:Dr. Waterman/Madelin See Patient List for primary contact information  Call [phone] to page    1st Call - Day Intern (R1):    2nd Call - Day Sr. Resident (R2/R3):   Dr. Wilkins       Reason for interval visit  (Principal Problem)   Community-acquired pneumonia  Sepsis/bacteremia  Advanced colon cancer    Interval Problem Daily Status Update  (24 hours, problem oriented, brief subjective history, new lab/imaging data pertinent to that problem)   Mr. Ingram, 53-year-old male with history of colon cancer x 2 (2016, partial colon resection with chemotherapy and was in remission, 2018 - metastatic colon cancer with metastases to the lungs on chemotherapy, last chemotherapy on 2019) admitted due to sepsis secondary to pneumonia.  Cavitary lesion seen on the lung.  MRSA by PCR negative.  Patient underwent bronchoscopy with BAL pending culture and cytology.  Blood culture grew gram-positive rods pending that grew lactobacillus spp    Overnight, patient denies any new complaints.  Patient states that his dysuria has resolved.  Because his blood culture from the Port-A-Cath and peripheral blood grew gram-positive rods, lactobacillus species, infectious disease specialist was consulted for their recommendations.    Infectious disease specialist saw the patient, appreciate his recommendation.  Patient was on lactobacillus, he then developed colitis which then resulted into bacteremia.  Because Port-A-Cath grew lactobacillus, recommend removal of Port-A-Cath and completion of his chemotherapy via peripheral line as patient has only 2 cycles remaining.  Ceftriaxone stopped as lactobacillus does not respond to ceftriaxone however because  patient received a dose of azithromycin in the ER that may have resulted in his clinical improvement.  Instead start IV penicillin G every 4 hourly, continue the same port for the antibiotic and schedule a removal of his Port-A-Cath.  Patient then can be discharged home on amoxicillin.  Patient understands and is agreeable with the plan    Review of Systems   Constitutional: Negative for chills, diaphoresis, fever and malaise/fatigue.   HENT: Negative for congestion and sore throat.    Eyes: Negative for blurred vision and double vision.   Respiratory: Negative for cough, hemoptysis and shortness of breath.    Cardiovascular: Negative for chest pain, palpitations and leg swelling.   Gastrointestinal: Negative for abdominal pain, blood in stool, constipation, diarrhea, nausea and vomiting.   Genitourinary: Negative for dysuria, frequency and urgency.   Musculoskeletal: Negative for back pain, joint pain and myalgias.   Skin: Negative for itching and rash.   Neurological: Negative for dizziness and headaches.   Psychiatric/Behavioral: Negative for depression. The patient is not nervous/anxious.      Disposition/Barriers to discharge:   Remain inpatient for IV antibiotics/can be discharged home with home health when medically stable    Consultants/Specialty  Pulmonary  Oncology  Infectious disease  PCP: Nina Hardy PLetyALety    Quality Measures  Quality-Core Measures   Reviewed items::  Labs reviewed and Medications reviewed  Soriano catheter::  No Soriano  Central line in place:  Chemotherapy  DVT prophylaxis pharmacological::  Enoxaparin (Lovenox)  Ulcer Prophylaxis::  Not indicated  Antibiotics:  Treating active infection/contamination beyond 24 hours perioperative coverage      Physical Exam       Vitals:    03/02/19 1607 03/02/19 2102 03/03/19 0511 03/03/19 1005   BP: 118/92 137/93 130/85 127/91   Pulse: 72 67 71 74   Resp: 16 18 16 18   Temp: 37.1 °C (98.7 °F) 36.4 °C (97.5 °F) 36.3 °C (97.3 °F) 37.3 °C (99.1 °F)    TempSrc: Temporal Temporal Temporal Temporal   SpO2: 93% 94% 96% 91%   Weight:  74.8 kg (164 lb 14.5 oz)     Height:         Body mass index is 22.37 kg/m². Weight: 74.8 kg (164 lb 14.5 oz)  Oxygen Therapy:  Pulse Oximetry: 91 %, O2 (LPM): 0, O2 Delivery: None (Room Air)    Physical Exam   Constitutional: He is oriented to person, place, and time. No distress.   HENT:   Head: Normocephalic and atraumatic.   Eyes: Pupils are equal, round, and reactive to light. EOM are normal.   Neck: Normal range of motion. Neck supple. No thyromegaly present.   Cardiovascular: Normal rate, regular rhythm and normal heart sounds.    Pulmonary/Chest: Effort normal and breath sounds normal. No respiratory distress. He has no wheezes.   Abdominal: Soft. Bowel sounds are normal. He exhibits no distension. There is no tenderness.   Musculoskeletal: Normal range of motion. He exhibits no edema.   Neurological: He is alert and oriented to person, place, and time. GCS score is 15.   Skin: Skin is warm and dry. He is not diaphoretic.   Psychiatric: Mood and affect normal.   Nursing note and vitals reviewed.    Assessment/Plan     * PNA (pneumonia)- (present on admission)   Assessment & Plan    -Cavitary lesions on CT chest completed at an outside facility, concern for MRSA.  -Patient presented with SIRS 3/4, white blood cell count 24.2, initial lactic acid 1.2 normal.   -Pro calcitonin - 32.14, indicating bacterial infectious process  -Patient given 1 dose of Rocephin and Zithromax in the emergency department in addition to 1 L IV fluid bolus.  -WBC improving  - MRSA by PCR was negative - D/C vancomycin  - Blood culture from Port-A-Cath came positive for Gram positive rods, lactobacillus positive  - Blood culture from peripheral vein is positive for Gram positive rods  - pro calcitonin level improved  - Patient underwent bronchoscopy on 3/1/2019 with BAL that showed turbid fluid sent for cytology and culture  - ID service consulted.  Appreciate recommendations as follows  - Start penicillin G 2 million units q4h, use the same port which can be removed prior to discharge and pen G can be switched to oral amoxicilin upon discharge for few days  - F/U repeat blood culture     Sepsis (HCC)- (present on admission)   Assessment & Plan    This was sepsis (without associated acute organ dysfunction).   Resolved  -Patient presented with 3/4 SIRS criteria (HR > 90, RR > 20, WBC >12 ) with the likely source of infection from the lungs  -Chest x-ray and CT chest done at an outside facility is concerning for pneumonia  -Improving, tachycardia resolved, respiratory rate decreased, WBC trending down from 24 to 13  -MRSA by PCR was negative, vancomycin discontinued  -Blood culture from Port-A-Cath showed gram-positive rods pending sensitivity  - ID service consulted. Appreciate recommendations as follows  - Stop ceftriaxone  - Start penicillin G 2 million units q4h, use the same port which can be removed prior to discharge and pen G can be switched to oral amoxicilin upon discharge for few days  - F/U repeat blood culture     Normocytic anemia- (present on admission)   Assessment & Plan    -Hemoglobin 1.5, normocytic, normochromic  -Anemia due to chronic medical condition, GI malignancy  -Fecal hemoccult negative  -continue to monitor     Elevated LFTs   Assessment & Plan    - Elevated liver function tests likely secondary to liver cirrhosis from hepatitis C   - , ALT 63, alkaline phosphatase 324.   - RUQ ultrasound - Heterogeneous and echogenic appearance of the liver can be seen in hepatic steatosis or hepatocellular disease.  - Continue to monitor     Hyponatremia   Assessment & Plan    - Improved  - Continue to monitor     Hepatitis C   Assessment & Plan    - Patient reports history of untreated hepatitis C   - Elevated liver function tests with , ALT 63, and alkaline phosphatase 324.  - Outpatient GI follow up when medically stable     COPD  (chronic obstructive pulmonary disease) (HCC)- (present on admission)   Assessment & Plan    - Patient reports history of COPD, does not use oxygen at home.  - Pt quit smoking couple years ago but was previously smoking 1 pack/day for 10 years.    - Saturating at 94% on room air, and no respiratory distress.  - Pt denies any SOB or wheezing  - Respiratory care per protocol     Metastatic Colon cancer (HCC)- (present on admission)   Assessment & Plan    - H/O colon cancer - First episode diagnosed in 2016, underwent partial colectomy followed by chemotherapy in remission  - Recurrence of metastatic colon cancer 08/2018 currently on chemotherapy  - Last chemotherapy was 2/8/2019  - Oncologist saw the patient, appreciate recommendations: Outpatient follow-up for restaging after active infection.  Hold all chemotherapy while inpatient  - Remove post-A-Cath as that grew gram positive bacillus. Pt can complete his chemo via peripheral IV as pt has 2 cycles remaining  - continue to monitor     Hypertension   Assessment & Plan    -Stable   -Continue losartan  -Continue to monitor     Thrombocytopenia (HCC)   Assessment & Plan    -Likely secondary to liver cirrhosis and untreated hepatitis C.  -Patient is asymptomatic  -Continue to monitor

## 2019-03-03 NOTE — PROGRESS NOTES
Vannesa from Lab called with critical result of blood culture dated 2/27 (right antecubital draw) positive for gram positive rods at 2230. Critical lab result read back to Vannesa.   Dr. Torres ([facility]) notified of critical lab result at 2240.  Critical lab result read back by Dr. Torres.

## 2019-03-03 NOTE — CONSULTS
DATE OF SERVICE:  03/03/2019    INFECTIOUS DISEASE CONSULTATION    REQUESTING PHYSICIAN:  Pierre Waterman MD    HISTORY OF PRESENT ILLNESS:  This is a 53-year-old male who is a patient of   Dr. Jeff at the cancer group here in [facility].  Since October, he has been   undergoing cycles of chemotherapy for metastatic colon cancer.  The patient   has a Port-A-Cath in his left upper chest for which he has been getting   infusions.  He has had increasing difficulty with each cycle of chemo with   increasing diarrhea.  The last cycle about 2 weeks ago was the most difficult   for him with severe diarrhea and vomiting.  He had to go to his local hospital    [facility] to get fluids for hydration.  However, he was not   having any fever with this until about 2 days prior to this admission when he   started having fever and chills and he came on Wednesday evening to [facility] and was transferred here for evaluation.  He was quite ill on admission,   high white count and he was thought to possibly have a community-acquired   pneumonia, treated with ceftriaxone and azithromycin.  Subsequently, the   patient's blood cultures have become positive from both the Port-A-Cath and   peripherally and the patient is now seen for antibiotic advice.  The patient   was switched yesterday to IV Zosyn for his therapy.  He has not had other   complications of his chemo in terms of infectious problems.  This is the first   one.  He did have a Port-A-Cath 3 years ago on the other side of his chest,   which was taken out after the chemotherapy was completed.  He has not been on   any recent antibiotics.  The patient does relate that he takes a number of   supplements regularly including probiotic combination.  He today is feeling   better.  He is actually starting to eat.  His diarrhea has come down and he   seen today for antibiotic advice.  He is now getting his infusions through his   Port-A-Cath, which has been working well.   The patient has not had any   respiratory complaints during this time.  He is not bringing up any phlegm.    He is not requiring supplemental oxygen.  He has not noted any rash.  He has   noted some swelling in his legs.  The patient has not had any increased   abdominal pain.  He has some discomfort in the left lower quadrant where the   cancer is.  He has also not noted any skin lesions.    PAST MEDICAL HISTORY:  The patient has a history of hepatitis C.  He has seen   Dr. Aguilar  for evaluation.  He was undergoing evaluation for hepatitis C treatment when the colon cancer recurred and that was put on hold.  He drinks very rarely, not sure how he became infected.    He tells me that Dr. Aguilar says that his liver is working well and he   has very low viral load.  Other problems include a history of COPD for which   he is on inhalers and hypertension and some history of anxiety.    HOME MEDICATIONS:  Included pain medicines for his cancer, losartan, low dose   prednisone and Flomax.  In addition, as I mentioned, he is taking a number of   supplements including probiotics on a regular basis.    ALLERGIES:  He has no known antibiotic allergies.    SOCIAL HISTORY:  He is .  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  He has   no children    SOCIAL HISTORY:  He used to  smoke, but he quit a few years ago.  Denies alcohol or   drug use.    PAST SURGICAL HISTORY:  Colon resection.    REVIEW OF SYSTEMS:  CONSTITUTIONAL:  As noted above, constitutional was positive for chills,   sweats, fatigue, and rigors.  This has resolved since he has been admitted.  HEENT:  He had a sore throat.  He has dentures.  He says he had a history of   thrush in the past.  RESPIRATORY:  Presently denies any cough or sputum production or wheezing.  He   is on oxygen.  CARDIAC:  He has no history of cardiac problems.  GASTROINTESTINAL:  As noted  above, discussion with the diarrhea that has now   resolved since he has been in the hospital and is eating better.  He has a   little left lower quadrant discomfort.  SKIN:  Negative.  ENDOCRINE:  Denies diabetes or other problems.  PSYCHIATRIC:  History of anxiety.    PHYSICAL EXAMINATION:  GENERAL:  He is a very cooperative, comfortable appearing male.  VITAL SIGNS:  He is presently afebrile, his blood pressure is 130/85, his   pulse is 71.  He is sitting comfortably up in bed.  He is eating a regular   diet.  Physical exam is remarkable.  HEENT:  Mouth, I see no thrush.  He has dentures.  NECK:  He has no jaundice.  He has no palpable axillary or cervical lymph   nodes.  LUNGS:  Clear bilaterally.  HEART:  I do not appreciate any murmur.  ABDOMEN:  He has a small left lower quadrant scar.  He has some discomfort on   palpation there.  I could not appreciate his liver or spleen.  SKIN:  He has palmar erythema bilaterally.  MUSCULOSKELETAL:  Normal, except he has 1+ edema around both ankles.  NEUROLOGIC:  He is fully oriented.    LABORATORY DATA:  White count is down from 24,000 to 9000 today.  Chemistry   panel shows an albumin 2.5, elevated transaminases, creatinine 0.81.  Chest   x-ray is with nonspecific changes.  He did have a CT scan, which was reviewed   from the outside hospital showing multiple cavitary lesions.  Microbiology:    He has lactobacillus from his Port-A-Cath, blood culture that became positive   on 03/01 at 0845 hours in the morning.  He has a peripheral blood cultures   also drawn at the same time on the 27th at 11:00 night.  This did, however,   Not become positive until 03/02 at 10:30 at night.  It is almost a day later from   the Port-A-Cath.  He has negative urine.  Bronchoalveolar culture showed just   yeast and he has so far negative blood cultures from the night of the 1st and   yesterday morning.    ASSESSMENT:  This is a 53-year-old male with metastatic colon cancer and has   been  undergoing chemotherapy through a Port-A-Cath, each cycle got more   Difficult to tolerate  and the last cycle with  severe gastrointestinal problems with   diarrhea and vomiting.  What was different about this time also was that about   2 days prior to admission, he started having fever and chills and was   transferred to [facility] with sepsis and leukocytosis.  He is now growing out   lactobacillus, predominantly initially from his Port-A-Cath and now also   positive peripherally.  My assessment is that most likely he developed some   degree of colitis from the chemo and now has developed translocation of the   lactobacillus into his blood causing this bacteremia.  Lactobacillus does not   respond to ceftriaxone, but it does respond to  macrolides, and I suspect he   has done better because of the azithromycin dose he got in the emergency   room.  Most likely, the lactobacillus was from the supplements that he was   taking on a regular basis and bacteremia secondary to supplement has   definitely been reported and seen previously.  Given the fact that the   Port-A-Cath became positive much quicker than the peripheral, I would be   concerned that the Port-A-Cath may be infected and my recommendation is that   we remove it.    RECOMMENDATIONS:  1.  Switch from IV Zosyn to IV penicillin, which like ampicillin  are excellent   drugs against lactobacillus.  2.  He is stable right now, but I suggest before discharge, we pull out the   Port-A-Cath and once that is out, we can probably switch him to oral   amoxicillin for a few more days for the bacteremia.  3.  Discontinue supplements.    Thank you very much for allowing me to consult on this patient.  I have   discussed my recommendations with him and he is in agreement about removing   the Port-A-Cath, as he has only has 2 more cycles of chemo.  most likely he can  just use a   peripheral IV for the infusions       ____________________________________     LUIS LYMAN  MD DENNIS MOORE / MAURO    DD:  03/03/2019 08:53:44  DT:  03/03/2019 09:44:42    D#:  [id number]  Job#:  [id number]

## 2019-03-04 ENCOUNTER — APPOINTMENT (OUTPATIENT)
Dept: RADIOLOGY | Facility: MEDICAL CENTER | Age: 54
DRG: 314 | End: 2019-03-04
Attending: STUDENT IN AN ORGANIZED HEALTH CARE EDUCATION/TRAINING PROGRAM
Payer: COMMERCIAL

## 2019-03-04 PROBLEM — T82.7XXA BACTEREMIA ASSOCIATED WITH INTRAVASCULAR LINE (HCC): Status: ACTIVE | Noted: 2019-03-04

## 2019-03-04 PROBLEM — R78.81 BACTEREMIA ASSOCIATED WITH INTRAVASCULAR LINE (HCC): Status: ACTIVE | Noted: 2019-03-04

## 2019-03-04 LAB
ANION GAP SERPL CALC-SCNC: 9 MMOL/L (ref 0–11.9)
APTT PPP: 28.8 SEC (ref 24.7–36)
BASOPHILS # BLD AUTO: 0.4 % (ref 0–1.8)
BASOPHILS # BLD: 0.04 K/UL (ref 0–0.12)
BUN SERPL-MCNC: 6 MG/DL (ref 8–22)
CALCIUM SERPL-MCNC: 8.1 MG/DL (ref 8.5–10.5)
CHLORIDE SERPL-SCNC: 99 MMOL/L (ref 96–112)
CO2 SERPL-SCNC: 26 MMOL/L (ref 20–33)
CREAT SERPL-MCNC: 0.88 MG/DL (ref 0.5–1.4)
EOSINOPHIL # BLD AUTO: 0.01 K/UL (ref 0–0.51)
EOSINOPHIL NFR BLD: 0.1 % (ref 0–6.9)
ERYTHROCYTE [DISTWIDTH] IN BLOOD BY AUTOMATED COUNT: 55.4 FL (ref 35.9–50)
GLUCOSE SERPL-MCNC: 120 MG/DL (ref 65–99)
HCT VFR BLD AUTO: 31.8 % (ref 42–52)
HGB BLD-MCNC: 10.5 G/DL (ref 14–18)
IMM GRANULOCYTES # BLD AUTO: 0.14 K/UL (ref 0–0.11)
IMM GRANULOCYTES NFR BLD AUTO: 1.5 % (ref 0–0.9)
INR PPP: 1.04 (ref 0.87–1.13)
LYMPHOCYTES # BLD AUTO: 1.74 K/UL (ref 1–4.8)
LYMPHOCYTES NFR BLD: 18.2 % (ref 22–41)
MCH RBC QN AUTO: 30.1 PG (ref 27–33)
MCHC RBC AUTO-ENTMCNC: 33 G/DL (ref 33.7–35.3)
MCV RBC AUTO: 91.1 FL (ref 81.4–97.8)
MONOCYTES # BLD AUTO: 1.27 K/UL (ref 0–0.85)
MONOCYTES NFR BLD AUTO: 13.3 % (ref 0–13.4)
NEUTROPHILS # BLD AUTO: 6.34 K/UL (ref 1.82–7.42)
NEUTROPHILS NFR BLD: 66.5 % (ref 44–72)
NRBC # BLD AUTO: 0 K/UL
NRBC BLD-RTO: 0 /100 WBC
PHOSPHATE SERPL-MCNC: 3.3 MG/DL (ref 2.5–4.5)
PLATELET # BLD AUTO: 251 K/UL (ref 164–446)
PMV BLD AUTO: 9.5 FL (ref 9–12.9)
POTASSIUM SERPL-SCNC: 4.1 MMOL/L (ref 3.6–5.5)
PROTHROMBIN TIME: 13.7 SEC (ref 12–14.6)
RBC # BLD AUTO: 3.49 M/UL (ref 4.7–6.1)
SODIUM SERPL-SCNC: 134 MMOL/L (ref 135–145)
WBC # BLD AUTO: 9.5 K/UL (ref 4.8–10.8)

## 2019-03-04 PROCEDURE — 0JPT0WZ REMOVAL OF TOTALLY IMPLANTABLE VASCULAR ACCESS DEVICE FROM TRUNK SUBCUTANEOUS TISSUE AND FASCIA, OPEN APPROACH: ICD-10-PCS | Performed by: RADIOLOGY

## 2019-03-04 PROCEDURE — 700102 HCHG RX REV CODE 250 W/ 637 OVERRIDE(OP): Performed by: STUDENT IN AN ORGANIZED HEALTH CARE EDUCATION/TRAINING PROGRAM

## 2019-03-04 PROCEDURE — A9270 NON-COVERED ITEM OR SERVICE: HCPCS | Performed by: STUDENT IN AN ORGANIZED HEALTH CARE EDUCATION/TRAINING PROGRAM

## 2019-03-04 PROCEDURE — 770006 HCHG ROOM/CARE - MED/SURG/GYN SEMI*

## 2019-03-04 PROCEDURE — 05PY33Z REMOVAL OF INFUSION DEVICE FROM UPPER VEIN, PERCUTANEOUS APPROACH: ICD-10-PCS | Performed by: RADIOLOGY

## 2019-03-04 PROCEDURE — 87071 CULTURE AEROBIC QUANT OTHER: CPT

## 2019-03-04 PROCEDURE — 36590 REMOVAL TUNNELED CV CATH: CPT

## 2019-03-04 PROCEDURE — 85610 PROTHROMBIN TIME: CPT

## 2019-03-04 PROCEDURE — 700111 HCHG RX REV CODE 636 W/ 250 OVERRIDE (IP): Performed by: STUDENT IN AN ORGANIZED HEALTH CARE EDUCATION/TRAINING PROGRAM

## 2019-03-04 PROCEDURE — 80048 BASIC METABOLIC PNL TOTAL CA: CPT

## 2019-03-04 PROCEDURE — 700101 HCHG RX REV CODE 250

## 2019-03-04 PROCEDURE — 99232 SBSQ HOSP IP/OBS MODERATE 35: CPT | Performed by: INTERNAL MEDICINE

## 2019-03-04 PROCEDURE — 85025 COMPLETE CBC W/AUTO DIFF WBC: CPT

## 2019-03-04 PROCEDURE — 84100 ASSAY OF PHOSPHORUS: CPT

## 2019-03-04 PROCEDURE — 99232 SBSQ HOSP IP/OBS MODERATE 35: CPT | Mod: GC | Performed by: HOSPITALIST

## 2019-03-04 PROCEDURE — 700105 HCHG RX REV CODE 258: Performed by: STUDENT IN AN ORGANIZED HEALTH CARE EDUCATION/TRAINING PROGRAM

## 2019-03-04 PROCEDURE — 85730 THROMBOPLASTIN TIME PARTIAL: CPT

## 2019-03-04 RX ORDER — PROCHLORPERAZINE MALEATE 5 MG/1
10 TABLET ORAL EVERY 6 HOURS PRN
Status: DISCONTINUED | OUTPATIENT
Start: 2019-03-04 | End: 2019-03-06 | Stop reason: HOSPADM

## 2019-03-04 RX ORDER — LIDOCAINE HYDROCHLORIDE AND EPINEPHRINE 10; 10 MG/ML; UG/ML
INJECTION, SOLUTION INFILTRATION; PERINEURAL
Status: COMPLETED
Start: 2019-03-04 | End: 2019-03-04

## 2019-03-04 RX ADMIN — HYDROMORPHONE HYDROCHLORIDE 16 MG: 4 TABLET ORAL at 11:01

## 2019-03-04 RX ADMIN — LOSARTAN POTASSIUM 50 MG: 50 TABLET ORAL at 04:09

## 2019-03-04 RX ADMIN — ONDANSETRON 4 MG: 2 INJECTION INTRAMUSCULAR; INTRAVENOUS at 12:27

## 2019-03-04 RX ADMIN — ONDANSETRON 4 MG: 2 INJECTION INTRAMUSCULAR; INTRAVENOUS at 02:53

## 2019-03-04 RX ADMIN — SODIUM CHLORIDE 2 MILLION UNITS: 9 INJECTION, SOLUTION INTRAVENOUS at 18:15

## 2019-03-04 RX ADMIN — SODIUM CHLORIDE 2 MILLION UNITS: 9 INJECTION, SOLUTION INTRAVENOUS at 22:24

## 2019-03-04 RX ADMIN — HYDROMORPHONE HYDROCHLORIDE 16 MG: 4 TABLET ORAL at 19:27

## 2019-03-04 RX ADMIN — PROCHLORPERAZINE MALEATE 10 MG: 5 TABLET, FILM COATED ORAL at 17:24

## 2019-03-04 RX ADMIN — LIDOCAINE HYDROCHLORIDE,EPINEPHRINE BITARTRATE 20 ML: 10; .01 INJECTION, SOLUTION INFILTRATION; PERINEURAL at 10:12

## 2019-03-04 RX ADMIN — HYDROMORPHONE HYDROCHLORIDE 16 MG: 4 TABLET ORAL at 06:42

## 2019-03-04 RX ADMIN — PREDNISONE 5 MG: 5 TABLET ORAL at 04:09

## 2019-03-04 RX ADMIN — SODIUM CHLORIDE 2 MILLION UNITS: 9 INJECTION, SOLUTION INTRAVENOUS at 12:00

## 2019-03-04 RX ADMIN — HYDROMORPHONE HYDROCHLORIDE 16 MG: 4 TABLET ORAL at 23:28

## 2019-03-04 RX ADMIN — ENOXAPARIN SODIUM 40 MG: 100 INJECTION SUBCUTANEOUS at 04:14

## 2019-03-04 RX ADMIN — HYDROMORPHONE HYDROCHLORIDE 16 MG: 4 TABLET ORAL at 15:25

## 2019-03-04 RX ADMIN — SODIUM CHLORIDE 2 MILLION UNITS: 9 INJECTION, SOLUTION INTRAVENOUS at 06:15

## 2019-03-04 RX ADMIN — ONDANSETRON 4 MG: 2 INJECTION INTRAMUSCULAR; INTRAVENOUS at 20:41

## 2019-03-04 RX ADMIN — TAMSULOSIN HYDROCHLORIDE 0.4 MG: 0.4 CAPSULE ORAL at 08:30

## 2019-03-04 RX ADMIN — HYDROMORPHONE HYDROCHLORIDE 16 MG: 4 TABLET ORAL at 02:47

## 2019-03-04 RX ADMIN — SODIUM CHLORIDE 2 MILLION UNITS: 9 INJECTION, SOLUTION INTRAVENOUS at 02:48

## 2019-03-04 ASSESSMENT — ENCOUNTER SYMPTOMS
PALPITATIONS: 0
VOMITING: 0
NERVOUS/ANXIOUS: 0
BACK PAIN: 0
DOUBLE VISION: 0
COUGH: 0
MYALGIAS: 0
DIZZINESS: 0
HEADACHES: 0
DEPRESSION: 0
POLYDIPSIA: 0
ABDOMINAL PAIN: 0
SHORTNESS OF BREATH: 0
BLURRED VISION: 0
SORE THROAT: 0
FEVER: 0
DIAPHORESIS: 0
CONSTIPATION: 0
BLOOD IN STOOL: 0
HEMOPTYSIS: 0
NAUSEA: 0
CHILLS: 0
DIARRHEA: 0

## 2019-03-04 NOTE — PROGRESS NOTES
Patient seen in f/u for lactobacillus bacteremia.     HPI:  This is a 53-year-old male with metastatic colon cancer and has   been undergoing chemotherapy through a Port-A-Cath, each cycle got more   Difficult to tolerate  and this most recent  Cycle again  with  severe gastrointestinal problems with   diarrhea and vomiting.  What was different about this time also was that about   2 days prior to admission, he started having fever and chills and was   transferred to [facility] from [facility]  with sepsis and leukocytosis.  blood cultures from both yeimi cath and peripheral  Sites  Grew lactobacillus,  My assessment was that most likely he developed some   degree of colitis from the chemo and now has developed translocation of the   lactobacillus into his blood causing this bacteremia.  Lactobacillus does not   respond to ceftriaxone, but it does respond to  macrolides, and I suspect he   has done better because of the azithromycin dose he got in the emergency   room.  Most likely, the lactobacillus was from the supplements that he was   taking on a regular basis and bacteremia secondary to supplement has   definitely been reported and seen previously.  Yesterday transferred to [facility], and started on iv PCN infusion through   Portacath. Another blood culture from 2 days ago has also become positive for the lactobacillus.   He feels better today, all systemic complaints have resolved. Eating well, no GI upset    Meds- iv PCN 2 million units every 4 hours       REVIEW OF SYSTEMS:  CONSTITUTIONAL:  As noted above, his chills,   sweats, fatigue, and rigors have resolved   HEENT:  He had a sore throat.  He has dentures.  He says he had a history of   thrush in the past.  RESPIRATORY:  Presently denies any cough or sputum production or wheezing.  He   is on oxygen.  CARDIAC:  He has no history of cardiac problems.  GASTROINTESTINAL:  As noted above, the diarrhea that has now   resolved since he has been in the  hospital and is eating better.  He has a   little left lower quadrant discomfort.  SKIN:  Negative.  ENDOCRINE:  Denies diabetes or other problems.  PSYCHIATRIC:  History of anxiety.    PHYSICAL EXAMINATION:  GENERAL:  He is a very cooperative, comfortable appearing male.  VITAL SIGNS:  He remains afebrile, and  is sitting comfortably up in bed.    HEENT:  Mouth, I see no thrush.  He has dentures.  NECK:  He has no jaundice.  He has no palpable axillary or cervical lymph   nodes.  LUNGS:  Clear bilaterally.  HEART:  I do not appreciate any murmur.  ABDOMEN:  He has a small left lower quadrant scar.  He has some discomfort on   palpation there.  I could not appreciate his liver or spleen.  SKIN:  He has palmar erythema bilaterally.  MUSCULOSKELETAL:  Normal, except he has 1+ edema around both ankles.  NEUROLOGIC:  He is fully oriented.    Results for GILBERTO WELLINGTON (MRN [id number]) as of 3/4/2019 09:35   Ref. Range 3/4/2019 04:15   WBC Latest Ref Range: 4.8 - 10.8 K/uL 9.5   RBC Latest Ref Range: 4.70 - 6.10 M/uL 3.49 (L)   Hemoglobin Latest Ref Range: 14.0 - 18.0 g/dL 10.5 (L)   Hematocrit Latest Ref Range: 42.0 - 52.0 % 31.8 (L)   MCV Latest Ref Range: 81.4 - 97.8 fL 91.1   MCH Latest Ref Range: 27.0 - 33.0 pg 30.1   MCHC Latest Ref Range: 33.7 - 35.3 g/dL 33.0 (L)   RDW Latest Ref Range: 35.9 - 50.0 fL 55.4 (H)   Platelet Count Latest Ref Range: 164 - 446 K/uL 251   MPV Latest Ref Range: 9.0 - 12.9 fL 9.5     Results for GILBERTO WELLINGTON (MRN [id number]) as of 3/4/2019 09:35   Ref. Range 3/1/2019 20:22 3/2/2019 09:00   Significant Indicator Unknown NEG POS (POS)   Site Unknown PERIPHERAL Peripheral   Source Unknown BLD BLD     Component   Significant Indicator  (Positive)   POS (POS)    Source   BLD    Site   Portacath    Blood Culture  (Abnormal)   Growth detected by Bactec instrument. 03/01/2019  08:45     Blood Culture  (Abnormal)   Lactobacillus species     Narrative     CALL  [facility]  183 tel.  [phone],         Assess: persistent Lactobacillus bacteremia, likely from dietary supplements combined with chemo induced colitis, doing better on PCN but needs Permacath removed as likely infected. Hopefully can be removed today  REC: cont PCN            Once permacath removed repeat blood cultures to document resolution of bacteremia           After neg blood cultures, can complete a few days of oral Amox 500mg TID as outpatient           Discussed this plan with patient and med team

## 2019-03-04 NOTE — ASSESSMENT & PLAN NOTE
Patient's blood cultures grew Gram positive rods (lactobalcillus) on blood from port-A-Cath. Despite antibiotic therapy, he was still growing on subsequent blood cultures. Port-A-Cath was removed 3/4/2019.     Plan:  - Follow-up blood cultures on 3/5 were negative.   - Patient to be discharged on Oral Amoxicillin 500mg TID  - Continue Penicillin G q4h. Received 1 day through Port-A-Cath.

## 2019-03-04 NOTE — CARE PLAN
Problem: Communication  Goal: The ability to communicate needs accurately and effectively will improve  Outcome: PROGRESSING AS EXPECTED  A&OX4. Pt able to verbalize needs.     Problem: Knowledge Deficit  Goal: Knowledge of disease process/condition, treatment plan, diagnostic tests, and medications will improve  Outcome: PROGRESSING AS EXPECTED  Pt updated on plan of care and medication. Pt verbalized understanding.

## 2019-03-04 NOTE — PROGRESS NOTES
Left chest port removal performed by Dr Gibbons with local anesthetic. Procedure BARs explained to patient by physician and consent obtained. Patient requested to be kept upright for comfort so Dr Gibbons agreed to do procedure in his bed. Port removal completed without incidence; device and catheter intact and sent to lab for culture. Pocket closed and sterile dressing applied; site CDI without swelling or pain. Patient tolerated procedure quite well and was returned to his room in good condition. Report to Quinn KWAN.

## 2019-03-04 NOTE — PROGRESS NOTES
A&OX4. Pt resting in bed throughout shift. PRN pain medication given. IV ABX given as scheduled. Ambulates independently. Updated on plan of care and medications. Pt verbalized understanding. Fall precautions in place. Call bell and bedside table within reach. Will continue to monitor.

## 2019-03-04 NOTE — OR SURGEON
Immediate Post- Operative Note        PostOp Diagnosis: chest port, CRC, bacteremia      Procedure(s): chest port removal      Estimated Blood Loss: Less than 5 ml        Complications: None            3/4/2019     10:25 AM     Richardson Gibbons

## 2019-03-04 NOTE — PROGRESS NOTES
Patient AAOx4, up self, no use of bed alarm.  Patient uses call bell appropriately when need arises.  Patient requested wheelchair to use to get of the unit for a few minutes, okay'd by RN staff. IVantibiotics infusing through chest port, TKO with NS in between doses.

## 2019-03-04 NOTE — CARE PLAN
Problem: Nutritional:  Goal: Achieve adequate nutritional intake  Patient will consume 50% or greater of meals/supplements   Outcome: MET Date Met: 03/04/19

## 2019-03-04 NOTE — PROGRESS NOTES
Internal Medicine Interval Note  Note Author: Yaneth Correa M.D.     Name Erich Ingram     1965   Age/Sex 53 y.o. male   MRN [id number]   Code Status Full     After 5PM or if no immediate response to page, please call for cross-coverage  Attending/Team:Dr. Watreman/Madelin See Patient List for primary contact information  Call [phone] to page    1st Call - Day Intern (R1):   Dr. Correa 2nd Call - Day Sr. Resident (R2/R3):   Dr. Wilkins       Reason for interval visit  (Principal Problem)   Community-acquired pneumonia  Sepsis/bacteremia  Advanced colon cancer    Interval Problem Daily Status Update  (24 hours, problem oriented, brief subjective history, new lab/imaging data pertinent to that problem)     No events overnight.  Bacteremia: Patient had Port-A-Cath removed today. As per ID, will do repeat blood cultures tomorrow. If negative, patient is safe for discharge with a few days of amoxicillin.   Advanced Colon cancer: Patient will likely need PICC for last 2 chemotherapy treatments.   Dispo: Patient can be discharged pending negative blood cultures.     Review of Systems   Constitutional: Negative for chills, diaphoresis, fever and malaise/fatigue.   HENT: Negative for congestion and sore throat.    Eyes: Negative for blurred vision and double vision.   Respiratory: Negative for cough, hemoptysis and shortness of breath.    Cardiovascular: Negative for chest pain, palpitations and leg swelling.   Gastrointestinal: Negative for abdominal pain, blood in stool, constipation, diarrhea, nausea and vomiting.   Genitourinary: Negative for dysuria, frequency and urgency.   Musculoskeletal: Negative for back pain, joint pain and myalgias.   Skin: Negative for itching and rash.   Neurological: Negative for dizziness and headaches.   Endo/Heme/Allergies: Negative for environmental allergies and polydipsia.   Psychiatric/Behavioral: Negative for depression. The patient is not nervous/anxious.       Disposition/Barriers to discharge:   Inpatient until negative blood cultures    Consultants/Specialty  Pulmonary  Oncology  Infectious disease  PCP: BEENA Sterling    Quality Measures  Quality-Core Measures   Reviewed items::  Labs reviewed and Medications reviewed  Soriano catheter::  No Soriano  Central line in place:  Chemotherapy  DVT prophylaxis pharmacological::  Enoxaparin (Lovenox)  Ulcer Prophylaxis::  Not indicated  Antibiotics:  Treating active infection/contamination beyond 24 hours perioperative coverage      Physical Exam       Vitals:    03/04/19 1000 03/04/19 1016 03/04/19 1028 03/04/19 1056   BP:    118/84   Pulse: 76 96 95 85   Resp:    18   Temp:    36.7 °C (98 °F)   TempSrc:    Temporal   SpO2: 94% 96% 96% 92%   Weight:       Height:         Body mass index is 22.37 kg/m².    Oxygen Therapy:  Pulse Oximetry: 92 %, O2 (LPM): 0, O2 Delivery: None (Room Air)    Physical Exam   Constitutional: He is oriented to person, place, and time. No distress.   HENT:   Head: Normocephalic and atraumatic.   Eyes: Pupils are equal, round, and reactive to light. EOM are normal.   Neck: Normal range of motion. Neck supple. No thyromegaly present.   Cardiovascular: Normal rate, regular rhythm and normal heart sounds.    Pulmonary/Chest: Effort normal and breath sounds normal. No respiratory distress. He has no wheezes.   Abdominal: Soft. Bowel sounds are normal. He exhibits no distension. There is no tenderness.   Musculoskeletal: Normal range of motion. He exhibits no edema.   Neurological: He is alert and oriented to person, place, and time. GCS score is 15.   Skin: Skin is warm and dry. He is not diaphoretic.   Psychiatric: Mood and affect normal.   Nursing note and vitals reviewed.    Assessment/Plan     * Bacteremia associated with intravascular line (HCC)   Assessment & Plan    Patient's blood cultures grew Gram positive rods (lactobalcillus) on blood from port-A-Cath. Despite antibiotic therapy, he was  still growing on subsequent blood cultures. Port-A-Cath was removed 3/4/2019.     Plan:  - Follow-up blood cultures on 3/5  - if negative, patient can be discharged on a few days of amoxicillin  - Continue Penicillin G q4h. Received 1 day through Port-A-Cath.  - Can be discharged on Amoxicillin      Sepsis (HCC)- (present on admission)   Assessment & Plan    This was sepsis (without associated acute organ dysfunction).   Resolved  - Patient presented with 3/4 SIRS criteria (HR > 90, RR > 20, WBC >12 ) with the likely source of infection from the lungs  - Chest x-ray and CT chest done at an outside facility is concerning for pneumonia  - Improving, tachycardia resolved, respiratory rate decreased, WBC trending down from 24 to 13  - MRSA by PCR was negative, vancomycin discontinued  - Blood culture from Port-A-Cath showed gram-positive rods pending sensitivity  - ID service consulted. Appreciate recommendations as follows  - Stop ceftriaxone  - Start penicillin G 2 million units q4h through port  - Port removed 3/4/2019  - can start oral Amoxicillin on discharge, until then continue Penicillin G  - F/U repeat blood culture 3/5/2019     PNA (pneumonia)- (present on admission)   Assessment & Plan    - Cavitary lesions on CT chest completed at an outside facility, concern for MRSA.  - Patient presented with SIRS 3/4, white blood cell count 24.2, initial lactic acid 1.2 normal.   - Pro calcitonin - 32.14, indicating bacterial infectious process, now improved  - Patient given 1 dose of Rocephin and Zithromax in the emergency department in addition to 1 L IV fluid bolus.  - WBC improved  - MRSA by PCR was negative - D/C vancomycin  - Blood culture from Port-A-Cath came positive for Gram positive rods, lactobacillus positive  - Blood culture from peripheral vein is positive for Gram positive rods  - Patient underwent bronchoscopy on 3/1/2019 with BAL that showed turbid fluid sent for cytology and culture  - ID service  consulted. Appreciate recommendations as follows  - Start penicillin G 2 million units q4h  - Port-A-Cath removed on 3/4/019   - Can switch to oral Amoxicillin on discharge  - F/U repeat blood culture on 3/5/2019     Normocytic anemia- (present on admission)   Assessment & Plan    - Hemoglobin 1.5, normocytic, normochromic  - Anemia due to chronic medical condition, GI malignancy  - Fecal hemoccult negative  - continue to monitor     Elevated LFTs   Assessment & Plan    - Elevated liver function tests likely secondary to liver cirrhosis from hepatitis C   - , ALT 63, alkaline phosphatase 324.   - RUQ ultrasound showed heterogeneous and echogenic appearance of the liver can be seen in hepatic steatosis or hepatocellular disease.  - Continue to monitor     Hyponatremia   Assessment & Plan    - Improved  - Continue to monitor     Hepatitis C   Assessment & Plan    - Patient reports history of untreated hepatitis C   - Elevated liver function tests with , ALT 63, and alkaline phosphatase 324.  - Outpatient GI follow up when medically stable      COPD (chronic obstructive pulmonary disease) (HCC)- (present on admission)   Assessment & Plan    - Patient reports history of COPD, does not use oxygen at home.  - Pt quit smoking couple years ago but was previously smoking 1 pack/day for 10 years   - Saturating at 94% on room air, and no respiratory distress.  - Pt denies any SOB or wheezing   - Respiratory care per protocol      Metastatic Colon cancer (HCC)- (present on admission)   Assessment & Plan    - H/O colon cancer - First episode diagnosed in 2016, underwent partial colectomy followed by chemotherapy in remission  - Recurrence of metastatic colon cancer 08/2018 currently on chemotherapy  - Last chemotherapy was 2/8/2019  - Oncologist saw the patient, appreciate recommendations: Outpatient follow-up for restaging after active infection.  Hold all chemotherapy while inpatient  - Remove post-A-Cath as that  grew gram positive bacillus. Pt can complete his chemo via peripheral IV or PICC as pt has 2 cycles remaining  - continue to monitor     Hypertension   Assessment & Plan    - Stable   - Continue losartan  - Continue to monitor     Thrombocytopenia (HCC)   Assessment & Plan    - Likely secondary to liver cirrhosis and untreated hepatitis C.  - Patient is asymptomatic  - Continue to monitor

## 2019-03-05 LAB
ALBUMIN SERPL BCP-MCNC: 3 G/DL (ref 3.2–4.9)
ALBUMIN/GLOB SERPL: 0.9 G/DL
ALP SERPL-CCNC: 290 U/L (ref 30–99)
ALT SERPL-CCNC: 93 U/L (ref 2–50)
ANION GAP SERPL CALC-SCNC: 11 MMOL/L (ref 0–11.9)
AST SERPL-CCNC: 138 U/L (ref 12–45)
BACTERIA BLD CULT: ABNORMAL
BASOPHILS # BLD AUTO: 0.7 % (ref 0–1.8)
BASOPHILS # BLD: 0.07 K/UL (ref 0–0.12)
BILIRUB SERPL-MCNC: 0.6 MG/DL (ref 0.1–1.5)
BUN SERPL-MCNC: 5 MG/DL (ref 8–22)
CALCIUM SERPL-MCNC: 8.7 MG/DL (ref 8.5–10.5)
CHLORIDE SERPL-SCNC: 97 MMOL/L (ref 96–112)
CO2 SERPL-SCNC: 24 MMOL/L (ref 20–33)
CREAT SERPL-MCNC: 0.99 MG/DL (ref 0.5–1.4)
EOSINOPHIL # BLD AUTO: 0.01 K/UL (ref 0–0.51)
EOSINOPHIL NFR BLD: 0.1 % (ref 0–6.9)
ERYTHROCYTE [DISTWIDTH] IN BLOOD BY AUTOMATED COUNT: 56.5 FL (ref 35.9–50)
GLOBULIN SER CALC-MCNC: 3.2 G/DL (ref 1.9–3.5)
GLUCOSE SERPL-MCNC: 101 MG/DL (ref 65–99)
HCT VFR BLD AUTO: 34 % (ref 42–52)
HGB BLD-MCNC: 11.4 G/DL (ref 14–18)
IMM GRANULOCYTES # BLD AUTO: 0.1 K/UL (ref 0–0.11)
IMM GRANULOCYTES NFR BLD AUTO: 1 % (ref 0–0.9)
LYMPHOCYTES # BLD AUTO: 1.99 K/UL (ref 1–4.8)
LYMPHOCYTES NFR BLD: 19.5 % (ref 22–41)
MCH RBC QN AUTO: 30.7 PG (ref 27–33)
MCHC RBC AUTO-ENTMCNC: 33.5 G/DL (ref 33.7–35.3)
MCV RBC AUTO: 91.6 FL (ref 81.4–97.8)
MONOCYTES # BLD AUTO: 1.34 K/UL (ref 0–0.85)
MONOCYTES NFR BLD AUTO: 13.1 % (ref 0–13.4)
NEUTROPHILS # BLD AUTO: 6.72 K/UL (ref 1.82–7.42)
NEUTROPHILS NFR BLD: 65.6 % (ref 44–72)
NRBC # BLD AUTO: 0 K/UL
NRBC BLD-RTO: 0 /100 WBC
PLATELET # BLD AUTO: 274 K/UL (ref 164–446)
PMV BLD AUTO: 10 FL (ref 9–12.9)
POTASSIUM SERPL-SCNC: 4.3 MMOL/L (ref 3.6–5.5)
PROT SERPL-MCNC: 6.2 G/DL (ref 6–8.2)
RBC # BLD AUTO: 3.71 M/UL (ref 4.7–6.1)
SIGNIFICANT IND 70042: ABNORMAL
SIGNIFICANT IND 70042: ABNORMAL
SITE SITE: ABNORMAL
SITE SITE: ABNORMAL
SODIUM SERPL-SCNC: 132 MMOL/L (ref 135–145)
SOURCE SOURCE: ABNORMAL
SOURCE SOURCE: ABNORMAL
WBC # BLD AUTO: 10.2 K/UL (ref 4.8–10.8)

## 2019-03-05 PROCEDURE — A9270 NON-COVERED ITEM OR SERVICE: HCPCS | Performed by: STUDENT IN AN ORGANIZED HEALTH CARE EDUCATION/TRAINING PROGRAM

## 2019-03-05 PROCEDURE — 99232 SBSQ HOSP IP/OBS MODERATE 35: CPT | Mod: GC | Performed by: HOSPITALIST

## 2019-03-05 PROCEDURE — 80053 COMPREHEN METABOLIC PANEL: CPT

## 2019-03-05 PROCEDURE — 87040 BLOOD CULTURE FOR BACTERIA: CPT | Mod: 91

## 2019-03-05 PROCEDURE — 36415 COLL VENOUS BLD VENIPUNCTURE: CPT

## 2019-03-05 PROCEDURE — 700111 HCHG RX REV CODE 636 W/ 250 OVERRIDE (IP): Performed by: STUDENT IN AN ORGANIZED HEALTH CARE EDUCATION/TRAINING PROGRAM

## 2019-03-05 PROCEDURE — 85025 COMPLETE CBC W/AUTO DIFF WBC: CPT

## 2019-03-05 PROCEDURE — 770006 HCHG ROOM/CARE - MED/SURG/GYN SEMI*

## 2019-03-05 PROCEDURE — 700102 HCHG RX REV CODE 250 W/ 637 OVERRIDE(OP): Performed by: STUDENT IN AN ORGANIZED HEALTH CARE EDUCATION/TRAINING PROGRAM

## 2019-03-05 PROCEDURE — 700105 HCHG RX REV CODE 258: Performed by: STUDENT IN AN ORGANIZED HEALTH CARE EDUCATION/TRAINING PROGRAM

## 2019-03-05 RX ADMIN — HYDROMORPHONE HYDROCHLORIDE 16 MG: 4 TABLET ORAL at 20:44

## 2019-03-05 RX ADMIN — SODIUM CHLORIDE 2 MILLION UNITS: 9 INJECTION, SOLUTION INTRAVENOUS at 22:08

## 2019-03-05 RX ADMIN — SODIUM CHLORIDE 2 MILLION UNITS: 9 INJECTION, SOLUTION INTRAVENOUS at 06:00

## 2019-03-05 RX ADMIN — ONDANSETRON 4 MG: 2 INJECTION INTRAMUSCULAR; INTRAVENOUS at 13:37

## 2019-03-05 RX ADMIN — SODIUM CHLORIDE 2 MILLION UNITS: 9 INJECTION, SOLUTION INTRAVENOUS at 12:17

## 2019-03-05 RX ADMIN — HYDROMORPHONE HYDROCHLORIDE 16 MG: 4 TABLET ORAL at 08:02

## 2019-03-05 RX ADMIN — PROCHLORPERAZINE MALEATE 10 MG: 5 TABLET, FILM COATED ORAL at 10:10

## 2019-03-05 RX ADMIN — HYDROMORPHONE HYDROCHLORIDE 16 MG: 4 TABLET ORAL at 16:22

## 2019-03-05 RX ADMIN — PROCHLORPERAZINE MALEATE 10 MG: 5 TABLET, FILM COATED ORAL at 19:35

## 2019-03-05 RX ADMIN — ENOXAPARIN SODIUM 40 MG: 100 INJECTION SUBCUTANEOUS at 05:02

## 2019-03-05 RX ADMIN — SODIUM CHLORIDE 2 MILLION UNITS: 9 INJECTION, SOLUTION INTRAVENOUS at 02:22

## 2019-03-05 RX ADMIN — PREDNISONE 5 MG: 5 TABLET ORAL at 05:01

## 2019-03-05 RX ADMIN — PROCHLORPERAZINE MALEATE 10 MG: 5 TABLET, FILM COATED ORAL at 02:22

## 2019-03-05 RX ADMIN — HYDROMORPHONE HYDROCHLORIDE 16 MG: 4 TABLET ORAL at 12:16

## 2019-03-05 RX ADMIN — SODIUM CHLORIDE 2 MILLION UNITS: 9 INJECTION, SOLUTION INTRAVENOUS at 15:19

## 2019-03-05 RX ADMIN — TAMSULOSIN HYDROCHLORIDE 0.4 MG: 0.4 CAPSULE ORAL at 08:02

## 2019-03-05 RX ADMIN — HYDROMORPHONE HYDROCHLORIDE 16 MG: 4 TABLET ORAL at 04:00

## 2019-03-05 ASSESSMENT — ENCOUNTER SYMPTOMS
VOMITING: 0
SHORTNESS OF BREATH: 0
CHILLS: 0
HEMOPTYSIS: 0
DOUBLE VISION: 0
CONSTIPATION: 0
DEPRESSION: 0
COUGH: 0
BACK PAIN: 0
DIARRHEA: 0
FEVER: 0
HEADACHES: 0
NERVOUS/ANXIOUS: 0
DIAPHORESIS: 0
MYALGIAS: 0
SORE THROAT: 0
DIZZINESS: 0
POLYDIPSIA: 0
ABDOMINAL PAIN: 0
BLOOD IN STOOL: 0
NAUSEA: 0
BLURRED VISION: 0
PALPITATIONS: 0

## 2019-03-05 NOTE — CARE PLAN
Problem: Communication  Goal: The ability to communicate needs accurately and effectively will improve  Outcome: PROGRESSING AS EXPECTED  A&OX4. Pt able to verbalize needs.     Problem: Pain Management  Goal: Pain level will decrease to patient's comfort goal  Outcome: PROGRESSING AS EXPECTED  PRN pain medication given. Will continue to monitor.

## 2019-03-05 NOTE — PROGRESS NOTES
Pulmonary follow up    Reviewed results of bronchoscopy with patient where there is NO malignancy on the BAL  Follow up imaging recommended 2 weeks after completing Antibiotics for known bacteremia  No additional recommendations

## 2019-03-05 NOTE — PROGRESS NOTES
Internal Medicine Interval Note  Note Author: Yaneth Correa M.D.     Name Erich Ingram     1965   Age/Sex 53 y.o. male   MRN [id number]   Code Status Full     After 5PM or if no immediate response to page, please call for cross-coverage  Attending/Team:Dr. Waterman/Madelin See Patient List for primary contact information  Call [phone] to page    1st Call - Day Intern (R1):   Dr. Correa 2nd Call - Day Sr. Resident (R2/R3):   Dr. Wilkins       Reason for interval visit  (Principal Problem)   Community-acquired pneumonia  Sepsis/bacteremia  Advanced colon cancer    Interval Problem Daily Status Update  (24 hours, problem oriented, brief subjective history, new lab/imaging data pertinent to that problem)     No events overnight.  Bacteremia: Blood cultures were taken this morning. Port-A-Cath was removed 3/4/2019. If negative, patient is safe for discharge with a few days of amoxicillin.   Advanced Colon cancer: Patient will likely need PICC for last 2 chemotherapy treatments. He will need to coordinate as outpatient with Oncology.   Dispo: Patient can be discharged pending negative blood cultures.     Review of Systems   Constitutional: Negative for chills, diaphoresis, fever and malaise/fatigue.   HENT: Negative for congestion and sore throat.    Eyes: Negative for blurred vision and double vision.   Respiratory: Negative for cough, hemoptysis and shortness of breath.    Cardiovascular: Negative for chest pain, palpitations and leg swelling.   Gastrointestinal: Negative for abdominal pain, blood in stool, constipation, diarrhea, nausea and vomiting.   Genitourinary: Negative for dysuria, frequency and urgency.   Musculoskeletal: Negative for back pain, joint pain and myalgias.   Skin: Negative for itching and rash.   Neurological: Negative for dizziness and headaches.   Endo/Heme/Allergies: Negative for environmental allergies and polydipsia.   Psychiatric/Behavioral: Negative for depression.  The patient is not nervous/anxious.      Disposition/Barriers to discharge:   Inpatient until negative blood cultures    Consultants/Specialty  Pulmonary  Oncology  Infectious disease  PCP: BEENA Sterling    Quality Measures  Quality-Core Measures   Reviewed items::  Labs reviewed and Medications reviewed  Soriano catheter::  No Soriano  Central line in place:  Chemotherapy  DVT prophylaxis pharmacological::  Enoxaparin (Lovenox)  Ulcer Prophylaxis::  Not indicated  Antibiotics:  Treating active infection/contamination beyond 24 hours perioperative coverage      Physical Exam       Vitals:    03/04/19 1600 03/04/19 2000 03/05/19 0400 03/05/19 0700   BP: 133/93 120/102 103/83 110/75   Pulse: 77 63 97 98   Resp: 17 18 18 18   Temp: 36.6 °C (97.8 °F) 36.6 °C (97.8 °F) 36.3 °C (97.4 °F) 36.8 °C (98.2 °F)   TempSrc: Axillary Temporal Temporal Temporal   SpO2: 93% 95% 95% 96%   Weight:       Height:         Body mass index is 22.37 kg/m².    Oxygen Therapy:  Pulse Oximetry: 96 %, O2 Delivery: None (Room Air)    Physical Exam   Constitutional: He is oriented to person, place, and time. No distress.   HENT:   Head: Normocephalic and atraumatic.   Eyes: Pupils are equal, round, and reactive to light. EOM are normal.   Neck: Normal range of motion. Neck supple. No thyromegaly present.   Cardiovascular: Normal rate, regular rhythm and normal heart sounds.    Pulmonary/Chest: Effort normal and breath sounds normal. No respiratory distress. He has no wheezes.   Abdominal: Soft. Bowel sounds are normal. He exhibits no distension. There is no tenderness.   Musculoskeletal: Normal range of motion. He exhibits no edema.   Neurological: He is alert and oriented to person, place, and time. GCS score is 15.   Skin: Skin is warm and dry. He is not diaphoretic.   Psychiatric: Mood and affect normal.   Nursing note and vitals reviewed.    Assessment/Plan     * Bacteremia associated with intravascular line (HCC)   Assessment & Plan     Patient's blood cultures grew Gram positive rods (lactobalcillus) on blood from port-A-Cath. Despite antibiotic therapy, he was still growing on subsequent blood cultures. Port-A-Cath was removed 3/4/2019.     Plan:  - Follow-up blood cultures on 3/5  - if negative, patient can be discharged on a few days of amoxicillin  - Continue Penicillin G q4h. Received 1 day through Port-A-Cath.     Sepsis (HCC)- (present on admission)   Assessment & Plan    This was sepsis (without associated acute organ dysfunction).   Resolved  - Patient presented with 3/4 SIRS criteria (HR > 90, RR > 20, WBC >12 ) with the likely source of infection from the lungs  - Chest x-ray and CT chest done at an outside facility is concerning for pneumonia  - Improving, tachycardia resolved, respiratory rate decreased, WBC trending down from 24 to 13  - MRSA by PCR was negative, vancomycin discontinued  - Blood culture from Port-A-Cath showed gram-positive rods pending sensitivity  - ID service consulted. Appreciate recommendations as follows  - Stop ceftriaxone  - Start penicillin G 2 million units q4h through port  - Port removed 3/4/2019  - can start oral Amoxicillin on discharge, until then continue Penicillin G  - F/U repeat blood culture 3/5/2019     PNA (pneumonia)- (present on admission)   Assessment & Plan    - Cavitary lesions on CT chest completed at an outside facility, concern for MRSA.  - Patient presented with SIRS 3/4, white blood cell count 24.2, initial lactic acid 1.2 normal.   - Pro calcitonin - 32.14, indicating bacterial infectious process, now improved  - Patient given 1 dose of Rocephin and Zithromax in the emergency department in addition to 1 L IV fluid bolus.  - WBC improved  - MRSA by PCR was negative - D/C vancomycin  - Blood culture from Port-A-Cath came positive for Gram positive rods, lactobacillus positive  - Blood culture from peripheral vein is positive for Gram positive rods  - Patient underwent bronchoscopy on  3/1/2019 with BAL that showed turbid fluid sent for cytology and culture  - ID service consulted. Appreciate recommendations as follows  - Start penicillin G 2 million units q4h  - Port-A-Cath removed on 3/4/019   - Can switch to oral Amoxicillin on discharge  - F/U repeat blood culture on 3/5/2019     Normocytic anemia- (present on admission)   Assessment & Plan    - Hemoglobin 1.5, normocytic, normochromic  - Anemia due to chronic medical condition, GI malignancy  - Fecal hemoccult negative  - continue to monitor     Elevated LFTs   Assessment & Plan    - Elevated liver function tests likely secondary to liver cirrhosis from hepatitis C   - , ALT 63, alkaline phosphatase 324.   - RUQ ultrasound showed heterogeneous and echogenic appearance of the liver can be seen in hepatic steatosis or hepatocellular disease.  - Continue to monitor     Hyponatremia   Assessment & Plan    - Improved  - Continue to monitor     Hepatitis C   Assessment & Plan    - Patient reports history of untreated hepatitis C   - Elevated liver function tests with , ALT 63, and alkaline phosphatase 324.  - Outpatient GI follow up when medically stable      COPD (chronic obstructive pulmonary disease) (HCC)- (present on admission)   Assessment & Plan    - Patient reports history of COPD, does not use oxygen at home.  - Pt quit smoking couple years ago but was previously smoking 1 pack/day for 10 years   - Saturating at 94% on room air, and no respiratory distress.  - Pt denies any SOB or wheezing   - Respiratory care per protocol      Metastatic Colon cancer (HCC)- (present on admission)   Assessment & Plan    - H/O colon cancer - First episode diagnosed in 2016, underwent partial colectomy followed by chemotherapy in remission  - Recurrence of metastatic colon cancer 08/2018 currently on chemotherapy  - Last chemotherapy was 2/8/2019  - Oncologist saw the patient, appreciate recommendations: Outpatient follow-up for restaging after  active infection.  Hold all chemotherapy while inpatient  - Remove post-A-Cath as that grew gram positive bacillus. Pt can complete his chemo via peripheral IV or PICC as pt has 2 cycles remaining  - continue to monitor     Hypertension   Assessment & Plan    - Stable   - Continue losartan  - Continue to monitor     Thrombocytopenia (HCC)   Assessment & Plan    - Likely secondary to liver cirrhosis and untreated hepatitis C.  - Patient is asymptomatic  - Continue to monitor

## 2019-03-06 ENCOUNTER — PATIENT OUTREACH (OUTPATIENT)
Dept: HEALTH INFORMATION MANAGEMENT | Facility: OTHER | Age: 54
End: 2019-03-06

## 2019-03-06 VITALS
BODY MASS INDEX: 22.34 KG/M2 | SYSTOLIC BLOOD PRESSURE: 114 MMHG | WEIGHT: 164.9 LBS | OXYGEN SATURATION: 94 % | TEMPERATURE: 97.5 F | HEIGHT: 72 IN | DIASTOLIC BLOOD PRESSURE: 78 MMHG | HEART RATE: 87 BPM | RESPIRATION RATE: 18 BRPM

## 2019-03-06 LAB
BACTERIA BLD CULT: NORMAL
BACTERIA BLD CULT: NORMAL
BASOPHILS # BLD AUTO: 0.9 % (ref 0–1.8)
BASOPHILS # BLD: 0.08 K/UL (ref 0–0.12)
EOSINOPHIL # BLD AUTO: 0.02 K/UL (ref 0–0.51)
EOSINOPHIL NFR BLD: 0.2 % (ref 0–6.9)
ERYTHROCYTE [DISTWIDTH] IN BLOOD BY AUTOMATED COUNT: 58.4 FL (ref 35.9–50)
HCT VFR BLD AUTO: 34.8 % (ref 42–52)
HGB BLD-MCNC: 11.6 G/DL (ref 14–18)
IMM GRANULOCYTES # BLD AUTO: 0.09 K/UL (ref 0–0.11)
IMM GRANULOCYTES NFR BLD AUTO: 1 % (ref 0–0.9)
LYMPHOCYTES # BLD AUTO: 2.31 K/UL (ref 1–4.8)
LYMPHOCYTES NFR BLD: 25.2 % (ref 22–41)
MCH RBC QN AUTO: 30.7 PG (ref 27–33)
MCHC RBC AUTO-ENTMCNC: 33.3 G/DL (ref 33.7–35.3)
MCV RBC AUTO: 92.1 FL (ref 81.4–97.8)
MONOCYTES # BLD AUTO: 1.34 K/UL (ref 0–0.85)
MONOCYTES NFR BLD AUTO: 14.6 % (ref 0–13.4)
NEUTROPHILS # BLD AUTO: 5.34 K/UL (ref 1.82–7.42)
NEUTROPHILS NFR BLD: 58.1 % (ref 44–72)
NRBC # BLD AUTO: 0 K/UL
NRBC BLD-RTO: 0 /100 WBC
PLATELET # BLD AUTO: 270 K/UL (ref 164–446)
PMV BLD AUTO: 10.3 FL (ref 9–12.9)
RBC # BLD AUTO: 3.78 M/UL (ref 4.7–6.1)
SIGNIFICANT IND 70042: NORMAL
SIGNIFICANT IND 70042: NORMAL
SITE SITE: NORMAL
SITE SITE: NORMAL
SOURCE SOURCE: NORMAL
SOURCE SOURCE: NORMAL
WBC # BLD AUTO: 9.2 K/UL (ref 4.8–10.8)

## 2019-03-06 PROCEDURE — 700111 HCHG RX REV CODE 636 W/ 250 OVERRIDE (IP): Performed by: STUDENT IN AN ORGANIZED HEALTH CARE EDUCATION/TRAINING PROGRAM

## 2019-03-06 PROCEDURE — 85025 COMPLETE CBC W/AUTO DIFF WBC: CPT

## 2019-03-06 PROCEDURE — 700105 HCHG RX REV CODE 258: Performed by: STUDENT IN AN ORGANIZED HEALTH CARE EDUCATION/TRAINING PROGRAM

## 2019-03-06 PROCEDURE — A9270 NON-COVERED ITEM OR SERVICE: HCPCS | Performed by: STUDENT IN AN ORGANIZED HEALTH CARE EDUCATION/TRAINING PROGRAM

## 2019-03-06 PROCEDURE — 36415 COLL VENOUS BLD VENIPUNCTURE: CPT

## 2019-03-06 PROCEDURE — 700102 HCHG RX REV CODE 250 W/ 637 OVERRIDE(OP): Performed by: STUDENT IN AN ORGANIZED HEALTH CARE EDUCATION/TRAINING PROGRAM

## 2019-03-06 PROCEDURE — 99239 HOSP IP/OBS DSCHRG MGMT >30: CPT | Mod: GC | Performed by: HOSPITALIST

## 2019-03-06 RX ORDER — AMOXICILLIN 500 MG/1
500 CAPSULE ORAL 3 TIMES DAILY
Qty: 30 CAP | Refills: 0 | Status: ON HOLD | OUTPATIENT
Start: 2019-03-06 | End: 2019-03-17

## 2019-03-06 RX ORDER — POLYETHYLENE GLYCOL 3350 17 G/17G
17 POWDER, FOR SOLUTION ORAL
Qty: 30 EACH | Refills: 0 | Status: SHIPPED | OUTPATIENT
Start: 2019-03-06 | End: 2019-03-13

## 2019-03-06 RX ORDER — AMOXICILLIN 500 MG/1
500 CAPSULE ORAL 3 TIMES DAILY
Qty: 30 CAP | Refills: 0 | Status: SHIPPED | OUTPATIENT
Start: 2019-03-06 | End: 2019-03-06

## 2019-03-06 RX ORDER — POLYETHYLENE GLYCOL 3350 17 G/17G
17 POWDER, FOR SOLUTION ORAL
Qty: 30 EACH | Refills: 0 | Status: SHIPPED | OUTPATIENT
Start: 2019-03-06 | End: 2019-03-06

## 2019-03-06 RX ADMIN — HYDROMORPHONE HYDROCHLORIDE 16 MG: 4 TABLET ORAL at 02:59

## 2019-03-06 RX ADMIN — ONDANSETRON 4 MG: 2 INJECTION INTRAMUSCULAR; INTRAVENOUS at 08:50

## 2019-03-06 RX ADMIN — ENOXAPARIN SODIUM 40 MG: 100 INJECTION SUBCUTANEOUS at 05:45

## 2019-03-06 RX ADMIN — PREDNISONE 5 MG: 5 TABLET ORAL at 05:44

## 2019-03-06 RX ADMIN — HYDROMORPHONE HYDROCHLORIDE 16 MG: 4 TABLET ORAL at 11:38

## 2019-03-06 RX ADMIN — SODIUM CHLORIDE 2 MILLION UNITS: 9 INJECTION, SOLUTION INTRAVENOUS at 02:20

## 2019-03-06 RX ADMIN — SODIUM CHLORIDE 2 MILLION UNITS: 9 INJECTION, SOLUTION INTRAVENOUS at 05:45

## 2019-03-06 RX ADMIN — LOSARTAN POTASSIUM 50 MG: 50 TABLET ORAL at 05:45

## 2019-03-06 RX ADMIN — SODIUM CHLORIDE 2 MILLION UNITS: 9 INJECTION, SOLUTION INTRAVENOUS at 11:40

## 2019-03-06 RX ADMIN — SODIUM CHLORIDE 2 MILLION UNITS: 9 INJECTION, SOLUTION INTRAVENOUS at 14:52

## 2019-03-06 RX ADMIN — TAMSULOSIN HYDROCHLORIDE 0.4 MG: 0.4 CAPSULE ORAL at 07:31

## 2019-03-06 RX ADMIN — POLYETHYLENE GLYCOL 3350 1 PACKET: 17 POWDER, FOR SOLUTION ORAL at 10:55

## 2019-03-06 RX ADMIN — HYDROMORPHONE HYDROCHLORIDE 16 MG: 4 TABLET ORAL at 07:31

## 2019-03-06 ASSESSMENT — ENCOUNTER SYMPTOMS
NERVOUS/ANXIOUS: 0
POLYDIPSIA: 0
DEPRESSION: 0
MYALGIAS: 0
FEVER: 0
DOUBLE VISION: 0
DIAPHORESIS: 0
PALPITATIONS: 0
NAUSEA: 0
BACK PAIN: 0
CONSTIPATION: 0
DIARRHEA: 0
BLURRED VISION: 0
HEADACHES: 0
HEMOPTYSIS: 0
VOMITING: 0
BLOOD IN STOOL: 0
DIZZINESS: 0
SORE THROAT: 0
SHORTNESS OF BREATH: 0
ABDOMINAL PAIN: 0
CHILLS: 0
COUGH: 0

## 2019-03-06 NOTE — PROGRESS NOTES
Assumed care of Pt at shift change. Pt is A&Ox4. Pt reports pain and was given medication. Call light with in reach. Traction socks on pt and bed in lowest position.

## 2019-03-06 NOTE — PROGRESS NOTES
"Pt states he does not feel good this morning and complaining of being cold.  Pt unable to describe \"not feeling good\".  Pt denies nausea, HA, no fever.  Warm extra blankets provided.  "

## 2019-03-06 NOTE — CARE PLAN
Problem: Safety  Goal: Will remain free from falls  Pt is up self with no assistance and uses a wheelchair for roaming around the unit.  Advised pt to notify a staff member if assigned RN not available regarding wheelchair use and destination to enhance safety.    Problem: Pain Management  Goal: Pain level will decrease to patient's comfort goal  Assist pt with pain management via medications per MAR and offer other non-pharmacological methods to assist in relieving pain.  Pt refused other methods suggested.

## 2019-03-06 NOTE — DISCHARGE PLANNING
Medical Social Work  PC from [facility] at [facility], calling requesting that patients' d/c summary and last doctor note be faxed to them at [phone].

## 2019-03-06 NOTE — DISCHARGE INSTRUCTIONS
Discharge Instructions    Discharged to home by car with relative. Discharged via walking, hospital escort: Refused.  Special equipment needed: Not Applicable    Be sure to schedule a follow-up appointment with your primary care doctor or any specialists as instructed.     Discharge Plan:   Diet Plan: Discussed  Activity Level: Discussed  Confirmed Follow up Appointment: Patient to Call and Schedule Appointment  Confirmed Symptoms Management: Discussed  Medication Reconciliation Updated: Yes  Pneumococcal Vaccine Administered/Refused: Not given - Patient refused pneumococcal vaccine  Influenza Vaccine Indication: Patient Refuses    I understand that a diet low in cholesterol, fat, and sodium is recommended for good health. Unless I have been given specific instructions below for another diet, I accept this instruction as my diet prescription.   Other diet: regular      Special Instructions: None    · Is patient discharged on Warfarin / Coumadin?   No     Depression / Suicide Risk    As you are discharged from this [facility] facility, it is important to learn how to keep safe from harming yourself.    Recognize the warning signs:  · Abrupt changes in personality, positive or negative- including increase in energy   · Giving away possessions  · Change in eating patterns- significant weight changes-  positive or negative  · Change in sleeping patterns- unable to sleep or sleeping all the time   · Unwillingness or inability to communicate  · Depression  · Unusual sadness, discouragement and loneliness  · Talk of wanting to die  · Neglect of personal appearance   · Rebelliousness- reckless behavior  · Withdrawal from people/activities they love  · Confusion- inability to concentrate     If you or a loved one observes any of these behaviors or has concerns about self-harm, here's what you can do:  · Talk about it- your feelings and reasons for harming yourself  · Remove any means that you might use to hurt yourself  (examples: pills, rope, extension cords, firearm)  · Get professional help from the community (Mental Health, Substance Abuse, psychological counseling)  · Do not be alone:Call your Safe Contact- someone whom you trust who will be there for you.  · Call your local CRISIS HOTLINE 650-9517 or 414-788-9642  · Call your local Children's Mobile Crisis Response Team [place] (863) 493-6958 or www.SwipeToSpin  · Call the toll free National Suicide Prevention Hotlines   · National Suicide Prevention Lifeline 678-380-HUEO (9233)  · National Hope Line Network 800-SUICIDE (300-5961)

## 2019-03-06 NOTE — PROGRESS NOTES
Patient AAOx4, up self, receiving IV antibiotics q4, hooked up to NS TKO in between doses.  Asked to be unhooked so he can motor around the unit in wheelchair a few times on shift, actually got off unit for a bit after informing RN staff.  Made requests for pain medications as they became available throughout shift.  Lost IV access just prior to 1800, missed dose of PCN as IV access is still pending at this time.

## 2019-03-06 NOTE — DISCHARGE SUMMARY
Discharge Summary    CHIEF COMPLAINT ON ADMISSION  Chief Complaint   Patient presents with   • Fever   • Diarrhea       Reason for Admission  EMS     Admission Date  2/27/2019    CODE STATUS  Full Code    HPI & HOSPITAL COURSE  This is a 53 y.o. male here with a history of HCV, COPD, and stage 4 colon cancer s/p resection in 2016 and currently on chemotherapy for lung metastasis who presented with Sepsis secondary to pneumonia as there were cavitary lesions seen on his CT.     Bacteremia:  Patient's blood cultures grew lactobacillus from both his port and his peripheral vein. While blood cultures were pending the patient had already received Vancomycin, Rocephin, and Zithromax. In light of blood culture results, The patient was started on Penicillin G 2 million units q4h on 3/3/2019. Given the patient's positive blood cultures from port, his port was removed on 3/4/2019.  His blood cultures on 3/5 were negative. As per ID recommendations, he was discharged on 10 days of Amoxicillin 500mg TID.     Pnuemonia: Patient underwent BAL that did not show any malignancy on report. It did grow yeast.     Stage 4 colon cancer: The patient has 2 more rounds left of chemotherapy. He had his Port removed on 3/4/2019. He will be discharged home without a port. He will need to follow up with [facility] in regards to whether or not he can receive his chemotherapy via a peripheral line.     COPD: Patient does not use oxygen at home. He was not using oxygen while admitted.     Hepatitis C: Patient reports history of untreated hepatitis C. Patient will need outpatient follow-up with GI when medically stable.     Hypertension: Continue home dose Losartan.     Therefore, he is discharged in good and stable condition to home with close outpatient follow-up.    The patient met 2-midnight criteria for an inpatient stay at the time of discharge.    Discharge Date  3/6/2019    FOLLOW UP ITEMS POST DISCHARGE  1) Please follow-up  with PCP regarding recent hospitalization  2) Please follow-up with [facility] for 2 more chemotherapies and whether or not it can be done peripherally.   3) PCP to refer to GI for untreated hepatitis C    DISCHARGE DIAGNOSES  Principal Problem:    Bacteremia associated with intravascular line (HCC) POA: Unknown  Active Problems:    PNA (pneumonia) POA: Yes    Sepsis (HCC) POA: Yes    Metastatic Colon cancer (HCC) POA: Yes      Overview: diagnosed 2016 treated with resection and chemo      had recurrence 08/2018, pulmonary nodules biopsied at that time showed       adenocarcinoma    COPD (chronic obstructive pulmonary disease) (HCC) POA: Yes    Hepatitis C POA: Unknown    Hyponatremia POA: Unknown    Elevated LFTs POA: Unknown    Normocytic anemia POA: Yes    Thrombocytopenia (HCC) POA: Unknown    Hypertension POA: Unknown    Research study patient POA: Unknown      Overview: This patient has been recruited in the study: Efficacy of prophylactic       oral Vancomycin in preventing recurrent Clostridium Difficile infection in       hospitalized patients requiring antibiotics. Approximately 300 subjects       will be randomized to one of three treatment arms in a 1:1:1 ratio       (Vancomycin 125 mg PO every 12 hours for duration of antibiotic therapy,       Vancomycin 125 mg PO every 24 hours for duration of antibiotic therapy, or       no oral Vancomycin). Follow-up for endpoints will be done at 12 weeks       following completion of antibiotic therapy.      Please contact study group before discharging this patient. This patient       may need [facility] at discharge. [facility] will provide       the required antibiotics.               Resolved Problems:    * No resolved hospital problems. *      FOLLOW UP  No future appointments.  Nina Hardy, P.A.  [address] [facility] [address]  [phone]      [facility]  called office and left a voice mail requesting office to  call to schedule your appointment. If you do not hear from them please call to schedule. Thank you       MEDICATIONS ON DISCHARGE     Medication List      START taking these medications      Instructions   amoxicillin 500 MG Caps  Commonly known as:  AMOXIL   Take 1 Cap by mouth 3 times a day for 10 days.  Dose:  500 mg     polyethylene glycol/lytes Pack  Commonly known as:  MIRALAX   Take 1 Packet by mouth 1 time daily as needed (if sennosides and docusate ineffective after 24 hours) for up to 7 days.  Dose:  17 g        CONTINUE taking these medications      Instructions   DILAUDID 8 MG tablet  Generic drug:  HYDROmorphone   Take 16 mg by mouth every four hours as needed for Severe Pain.  Dose:  16 mg     losartan 50 MG Tabs  Commonly known as:  COZAAR   Take 50 mg by mouth every day.  Dose:  50 mg     predniSONE 5 MG Tabs  Commonly known as:  DELTASONE   Take 5 mg by mouth every day.  Dose:  5 mg     tamsulosin 0.4 MG capsule  Commonly known as:  FLOMAX   Take 0.4 mg by mouth ONE-HALF HOUR AFTER BREAKFAST.  Dose:  0.4 mg            Allergies  No Known Allergies    DIET  Orders Placed This Encounter   Procedures   • Diet Order Regular     Standing Status:   Standing     Number of Occurrences:   1     Order Specific Question:   Diet:     Answer:   Regular [1]       ACTIVITY  As tolerated.  Weight bearing as tolerated    CONSULTATIONS  Dr. Peguero- Oncology  Dr. Pompa- ID  Dr. Tobar- Pulmonary    PROCEDURES  BAL done on 3/1/2019  Port-A-Cath removal on 3/4/2019    LABORATORY  Lab Results   Component Value Date    SODIUM 132 (L) 03/05/2019    POTASSIUM 4.3 03/05/2019    CHLORIDE 97 03/05/2019    CO2 24 03/05/2019    GLUCOSE 101 (H) 03/05/2019    BUN 5 (L) 03/05/2019    CREATININE 0.99 03/05/2019        Lab Results   Component Value Date    WBC 9.2 03/06/2019    HEMOGLOBIN 11.6 (L) 03/06/2019    HEMATOCRIT 34.8 (L) 03/06/2019    PLATELETCT 270 03/06/2019        Total time of the discharge process exceeds 50  minutes.

## 2019-03-06 NOTE — CARE PLAN
Problem: Infection  Goal: Will remain free from infection    Intervention: Assess signs and symptoms of infection  Pt assessed for signs and symptoms of infection. Pts vitals are stable and the pt' MEWS score is low.       Problem: Psychosocial Needs:  Goal: Level of anxiety will decrease    Intervention: Identify and develop with patient strategies to cope with anxiety triggers  Pt anxious this morning. Pt and RN developed coping strategies. That where effective at decreasing anxiety.

## 2019-03-07 NOTE — PROGRESS NOTES
Internal Medicine Interval Note  Note Author: Yaneth Correa M.D.     Name Erich Ingram     1965   Age/Sex 53 y.o. male   MRN [id number]   Code Status Full     After 5PM or if no immediate response to page, please call for cross-coverage  Attending/Team:Dr. Waterman/Madelin See Patient List for primary contact information  Call [phone] to page    1st Call - Day Intern (R1):   Dr. Correa 2nd Call - Day Sr. Resident (R2/R3):   Dr. Wilkins       Reason for interval visit  (Principal Problem)   Community-acquired pneumonia  Sepsis/bacteremia  Advanced colon cancer    Interval Problem Daily Status Update  (24 hours, problem oriented, brief subjective history, new lab/imaging data pertinent to that problem)     No events overnight.  Bacteremia: Blood cultures were taken this morning. Port-A-Cath was removed 3/4/2019. If negative, patient is safe for discharge with 10 days of Amoxicillin 500mg TID.   Advanced Colon cancer: Patient will likely need PICC for last 2 chemotherapy treatments. He will need to coordinate as outpatient with Oncology.   Dispo: Patient can be discharged with 10 days of Amoxicillin TID.     Review of Systems   Constitutional: Negative for chills, diaphoresis, fever and malaise/fatigue.   HENT: Negative for congestion and sore throat.    Eyes: Negative for blurred vision and double vision.   Respiratory: Negative for cough, hemoptysis and shortness of breath.    Cardiovascular: Negative for chest pain, palpitations and leg swelling.   Gastrointestinal: Negative for abdominal pain, blood in stool, constipation, diarrhea, nausea and vomiting.   Genitourinary: Negative for dysuria, frequency and urgency.   Musculoskeletal: Negative for back pain, joint pain and myalgias.   Skin: Negative for itching and rash.   Neurological: Negative for dizziness and headaches.   Endo/Heme/Allergies: Negative for environmental allergies and polydipsia.   Psychiatric/Behavioral: Negative for  depression. The patient is not nervous/anxious.      Disposition/Barriers to discharge:   Inpatient until negative blood cultures    Consultants/Specialty  Pulmonary  Oncology  Infectious disease  PCP: BEENA Sterling    Quality Measures  Quality-Core Measures   Reviewed items::  Labs reviewed and Medications reviewed  Soriano catheter::  No Soriano  DVT prophylaxis pharmacological::  Enoxaparin (Lovenox)  Ulcer Prophylaxis::  Not indicated  Antibiotics:  Treating active infection/contamination beyond 24 hours perioperative coverage      Physical Exam       Vitals:    03/05/19 1525 03/05/19 2000 03/06/19 0400 03/06/19 0710   BP: 108/84 118/83 118/81 114/78   Pulse: (!) 120 92 87 87   Resp: 18 18 18 18   Temp: 35.9 °C (96.7 °F) 36.8 °C (98.3 °F) 36.5 °C (97.7 °F) 36.4 °C (97.5 °F)   TempSrc: Temporal Temporal Temporal Temporal   SpO2: 95% 93% 96% 94%   Weight:       Height:         Body mass index is 22.37 kg/m².    Oxygen Therapy:  Pulse Oximetry: 94 %, O2 Delivery: None (Room Air)    Physical Exam   Constitutional: He is oriented to person, place, and time. No distress.   HENT:   Head: Normocephalic and atraumatic.   Eyes: Pupils are equal, round, and reactive to light. EOM are normal.   Neck: Normal range of motion. Neck supple. No thyromegaly present.   Cardiovascular: Normal rate, regular rhythm and normal heart sounds.    Pulmonary/Chest: Effort normal and breath sounds normal. No respiratory distress. He has no wheezes.   Abdominal: Soft. Bowel sounds are normal. He exhibits no distension. There is no tenderness.   Musculoskeletal: Normal range of motion. He exhibits no edema.   Neurological: He is alert and oriented to person, place, and time. GCS score is 15.   Skin: Skin is warm and dry. He is not diaphoretic.   Psychiatric: Mood and affect normal.   Nursing note and vitals reviewed.    Assessment/Plan     * Bacteremia associated with intravascular line (HCC)   Assessment & Plan    Patient's blood  cultures grew Gram positive rods (lactobalcillus) on blood from port-A-Cath. Despite antibiotic therapy, he was still growing on subsequent blood cultures. Port-A-Cath was removed 3/4/2019.     Plan:  - Follow-up blood cultures on 3/5 were negative.   - Patient to be discharged on Oral Amoxicillin 500mg TID  - Continue Penicillin G q4h. Received 1 day through Port-A-Cath.     Sepsis (HCC)- (present on admission)   Assessment & Plan    This was sepsis (without associated acute organ dysfunction).   Resolved  - Patient presented with 3/4 SIRS criteria (HR > 90, RR > 20, WBC >12 ) with the likely source of infection from the lungs  - Chest x-ray and CT chest done at an outside facility is concerning for pneumonia  - Improving, tachycardia resolved, respiratory rate decreased, WBC trending down from 24 to 13  - MRSA by PCR was negative, vancomycin discontinued  - Blood culture from Port-A-Cath showed gram-positive rods pending sensitivity  - ID service consulted. Appreciate recommendations as follows  - Stop ceftriaxone  - Start penicillin G 2 million units q4h through port  - Port removed 3/4/2019  - Follow-up blood cultures on 3/5 were negative.   - Patient to be discharged on Oral Amoxicillin 500mg TID     PNA (pneumonia)- (present on admission)   Assessment & Plan    - Cavitary lesions on CT chest completed at an outside facility, concern for MRSA.  - Patient presented with SIRS 3/4, white blood cell count 24.2, initial lactic acid 1.2 normal.   - Pro calcitonin - 32.14, indicating bacterial infectious process, now improved  - Patient given 1 dose of Rocephin and Zithromax in the emergency department in addition to 1 L IV fluid bolus.  - WBC improved  - MRSA by PCR was negative - D/C vancomycin  - Blood culture from Port-A-Cath came positive for Gram positive rods, lactobacillus positive  - Blood culture from peripheral vein is positive for Gram positive rods  - Patient underwent bronchoscopy on 3/1/2019 with BAL  that showed turbid fluid sent for cytology and culture  - ID service consulted. Appreciate recommendations as follows  - Start penicillin G 2 million units q4h  - Port-A-Cath removed on 3/4/019   - Follow-up blood cultures on 3/5 were negative.   - Patient to be discharged on Oral Amoxicillin 500mg TID  - Continue Penicillin G q4h. Received 1 day through Port-A-Cath.     Normocytic anemia- (present on admission)   Assessment & Plan    - Hemoglobin 1.5, normocytic, normochromic  - Anemia due to chronic medical condition, GI malignancy  - Fecal hemoccult negative  - continue to monitor     Elevated LFTs   Assessment & Plan    - Elevated liver function tests likely secondary to liver cirrhosis from hepatitis C   - , ALT 63, alkaline phosphatase 324.   - RUQ ultrasound showed heterogeneous and echogenic appearance of the liver can be seen in hepatic steatosis or hepatocellular disease.  - Continue to monitor     Hyponatremia   Assessment & Plan    - Improved  - Continue to monitor     Hepatitis C   Assessment & Plan    - Patient reports history of untreated hepatitis C   - Elevated liver function tests with , ALT 63, and alkaline phosphatase 324.  - Outpatient GI follow up when medically stable      COPD (chronic obstructive pulmonary disease) (HCC)- (present on admission)   Assessment & Plan    - Patient reports history of COPD, does not use oxygen at home.  - Pt quit smoking couple years ago but was previously smoking 1 pack/day for 10 years   - Saturating at 94% on room air, and no respiratory distress.  - Pt denies any SOB or wheezing   - Respiratory care per protocol      Metastatic Colon cancer (HCC)- (present on admission)   Assessment & Plan    - H/O colon cancer - First episode diagnosed in 2016, underwent partial colectomy followed by chemotherapy in remission  - Recurrence of metastatic colon cancer 08/2018 currently on chemotherapy  - Last chemotherapy was 2/8/2019  - Oncologist saw the  patient, appreciate recommendations: Outpatient follow-up for restaging after active infection.  Hold all chemotherapy while inpatient  - Remove post-A-Cath as that grew gram positive bacillus. Pt can complete his chemo via peripheral IV or PICC as pt has 2 cycles remaining  - continue to monitor     Hypertension   Assessment & Plan    - Stable   - Continue losartan  - Continue to monitor     Thrombocytopenia (HCC)   Assessment & Plan    - Likely secondary to liver cirrhosis and untreated hepatitis C.  - Patient is asymptomatic  - Continue to monitor

## 2019-03-08 LAB
CC # CATH TIP CULT: ABNORMAL /ML
CC # CATH TIP CULT: ABNORMAL /ML
SIGNIFICANT IND 70042: ABNORMAL
SITE SITE: ABNORMAL
SOURCE SOURCE: ABNORMAL

## 2019-03-10 LAB
BACTERIA BLD CULT: NORMAL
BACTERIA BLD CULT: NORMAL
SIGNIFICANT IND 70042: NORMAL
SIGNIFICANT IND 70042: NORMAL
SITE SITE: NORMAL
SITE SITE: NORMAL
SOURCE SOURCE: NORMAL
SOURCE SOURCE: NORMAL

## 2019-03-14 ENCOUNTER — HOSPITAL ENCOUNTER (INPATIENT)
Facility: MEDICAL CENTER | Age: 54
LOS: 3 days | DRG: 864 | End: 2019-03-17
Attending: HOSPITALIST | Admitting: HOSPITALIST
Payer: COMMERCIAL

## 2019-03-14 ENCOUNTER — HOSPITAL ENCOUNTER (OUTPATIENT)
Facility: MEDICAL CENTER | Age: 54
DRG: 864 | End: 2019-03-14
Admitting: HOSPITALIST
Payer: COMMERCIAL

## 2019-03-14 PROBLEM — A41.9 SEPSIS (HCC): Status: RESOLVED | Noted: 2019-02-28 | Resolved: 2019-03-14

## 2019-03-14 PROBLEM — R52 BURNING PAIN: Status: ACTIVE | Noted: 2019-03-14

## 2019-03-14 PROBLEM — R79.89 ELEVATED LACTIC ACID LEVEL: Status: ACTIVE | Noted: 2019-03-14

## 2019-03-14 LAB
ALBUMIN SERPL BCP-MCNC: 2.7 G/DL (ref 3.2–4.9)
ALBUMIN/GLOB SERPL: 1.1 G/DL
ALP SERPL-CCNC: 143 U/L (ref 30–99)
ALT SERPL-CCNC: 29 U/L (ref 2–50)
ANION GAP SERPL CALC-SCNC: 5 MMOL/L (ref 0–11.9)
AST SERPL-CCNC: 38 U/L (ref 12–45)
BASOPHILS # BLD AUTO: 0.6 % (ref 0–1.8)
BASOPHILS # BLD: 0.05 K/UL (ref 0–0.12)
BILIRUB SERPL-MCNC: 0.4 MG/DL (ref 0.1–1.5)
BUN SERPL-MCNC: 7 MG/DL (ref 8–22)
CALCIUM SERPL-MCNC: 7.8 MG/DL (ref 8.5–10.5)
CHLORIDE SERPL-SCNC: 103 MMOL/L (ref 96–112)
CO2 SERPL-SCNC: 23 MMOL/L (ref 20–33)
CREAT SERPL-MCNC: 0.84 MG/DL (ref 0.5–1.4)
EOSINOPHIL # BLD AUTO: 0.02 K/UL (ref 0–0.51)
EOSINOPHIL NFR BLD: 0.3 % (ref 0–6.9)
ERYTHROCYTE [DISTWIDTH] IN BLOOD BY AUTOMATED COUNT: 57.7 FL (ref 35.9–50)
GLOBULIN SER CALC-MCNC: 2.4 G/DL (ref 1.9–3.5)
GLUCOSE SERPL-MCNC: 143 MG/DL (ref 65–99)
HCT VFR BLD AUTO: 32 % (ref 42–52)
HGB BLD-MCNC: 10.3 G/DL (ref 14–18)
IMM GRANULOCYTES # BLD AUTO: 0.02 K/UL (ref 0–0.11)
IMM GRANULOCYTES NFR BLD AUTO: 0.3 % (ref 0–0.9)
LACTATE BLD-SCNC: 2.2 MMOL/L (ref 0.5–2)
LYMPHOCYTES # BLD AUTO: 1.01 K/UL (ref 1–4.8)
LYMPHOCYTES NFR BLD: 12.8 % (ref 22–41)
MCH RBC QN AUTO: 30.6 PG (ref 27–33)
MCHC RBC AUTO-ENTMCNC: 32.2 G/DL (ref 33.7–35.3)
MCV RBC AUTO: 95 FL (ref 81.4–97.8)
MONOCYTES # BLD AUTO: 0.65 K/UL (ref 0–0.85)
MONOCYTES NFR BLD AUTO: 8.3 % (ref 0–13.4)
NEUTROPHILS # BLD AUTO: 6.11 K/UL (ref 1.82–7.42)
NEUTROPHILS NFR BLD: 77.7 % (ref 44–72)
NRBC # BLD AUTO: 0 K/UL
NRBC BLD-RTO: 0 /100 WBC
PLATELET # BLD AUTO: 230 K/UL (ref 164–446)
PMV BLD AUTO: 9.6 FL (ref 9–12.9)
POTASSIUM SERPL-SCNC: 4.1 MMOL/L (ref 3.6–5.5)
PROT SERPL-MCNC: 5.1 G/DL (ref 6–8.2)
RBC # BLD AUTO: 3.37 M/UL (ref 4.7–6.1)
SODIUM SERPL-SCNC: 131 MMOL/L (ref 135–145)
WBC # BLD AUTO: 7.9 K/UL (ref 4.8–10.8)

## 2019-03-14 PROCEDURE — 36415 COLL VENOUS BLD VENIPUNCTURE: CPT

## 2019-03-14 PROCEDURE — 700105 HCHG RX REV CODE 258: Performed by: STUDENT IN AN ORGANIZED HEALTH CARE EDUCATION/TRAINING PROGRAM

## 2019-03-14 PROCEDURE — A9270 NON-COVERED ITEM OR SERVICE: HCPCS | Performed by: STUDENT IN AN ORGANIZED HEALTH CARE EDUCATION/TRAINING PROGRAM

## 2019-03-14 PROCEDURE — 700102 HCHG RX REV CODE 250 W/ 637 OVERRIDE(OP): Performed by: STUDENT IN AN ORGANIZED HEALTH CARE EDUCATION/TRAINING PROGRAM

## 2019-03-14 PROCEDURE — 85025 COMPLETE CBC W/AUTO DIFF WBC: CPT

## 2019-03-14 PROCEDURE — 87040 BLOOD CULTURE FOR BACTERIA: CPT | Mod: 91

## 2019-03-14 PROCEDURE — 770006 HCHG ROOM/CARE - MED/SURG/GYN SEMI*

## 2019-03-14 PROCEDURE — 83605 ASSAY OF LACTIC ACID: CPT

## 2019-03-14 PROCEDURE — 80053 COMPREHEN METABOLIC PANEL: CPT

## 2019-03-14 PROCEDURE — 700111 HCHG RX REV CODE 636 W/ 250 OVERRIDE (IP): Performed by: STUDENT IN AN ORGANIZED HEALTH CARE EDUCATION/TRAINING PROGRAM

## 2019-03-14 RX ORDER — TAMSULOSIN HYDROCHLORIDE 0.4 MG/1
0.4 CAPSULE ORAL
Status: DISCONTINUED | OUTPATIENT
Start: 2019-03-15 | End: 2019-03-17 | Stop reason: HOSPADM

## 2019-03-14 RX ORDER — LANSOPRAZOLE 30 MG/1
30 CAPSULE, DELAYED RELEASE ORAL DAILY
Status: DISCONTINUED | OUTPATIENT
Start: 2019-03-15 | End: 2019-03-14

## 2019-03-14 RX ORDER — ONDANSETRON 4 MG/1
8 TABLET, ORALLY DISINTEGRATING ORAL EVERY 6 HOURS PRN
COMMUNITY
End: 2021-10-11

## 2019-03-14 RX ORDER — SODIUM CHLORIDE 9 MG/ML
500 INJECTION, SOLUTION INTRAVENOUS ONCE
Status: COMPLETED | OUTPATIENT
Start: 2019-03-15 | End: 2019-03-15

## 2019-03-14 RX ORDER — LOSARTAN POTASSIUM 50 MG/1
50 TABLET ORAL DAILY
Status: DISCONTINUED | OUTPATIENT
Start: 2019-03-15 | End: 2019-03-17 | Stop reason: HOSPADM

## 2019-03-14 RX ORDER — OMEPRAZOLE 20 MG/1
20 CAPSULE, DELAYED RELEASE ORAL DAILY
Status: DISCONTINUED | OUTPATIENT
Start: 2019-03-15 | End: 2019-03-17 | Stop reason: HOSPADM

## 2019-03-14 RX ORDER — BISACODYL 10 MG
10 SUPPOSITORY, RECTAL RECTAL
Status: DISCONTINUED | OUTPATIENT
Start: 2019-03-14 | End: 2019-03-17 | Stop reason: HOSPADM

## 2019-03-14 RX ORDER — POLYETHYLENE GLYCOL 3350 17 G/17G
1 POWDER, FOR SOLUTION ORAL
Status: DISCONTINUED | OUTPATIENT
Start: 2019-03-14 | End: 2019-03-14

## 2019-03-14 RX ORDER — LANSOPRAZOLE 30 MG/1
30 CAPSULE, DELAYED RELEASE ORAL DAILY
COMMUNITY
End: 2021-10-11

## 2019-03-14 RX ORDER — AMOXICILLIN 250 MG
2 CAPSULE ORAL 2 TIMES DAILY
Status: DISCONTINUED | OUTPATIENT
Start: 2019-03-14 | End: 2019-03-17 | Stop reason: HOSPADM

## 2019-03-14 RX ORDER — ONDANSETRON 2 MG/ML
4 INJECTION INTRAMUSCULAR; INTRAVENOUS EVERY 4 HOURS PRN
Status: DISCONTINUED | OUTPATIENT
Start: 2019-03-14 | End: 2019-03-17 | Stop reason: HOSPADM

## 2019-03-14 RX ORDER — POLYETHYLENE GLYCOL 3350 17 G/17G
1 POWDER, FOR SOLUTION ORAL
Status: DISCONTINUED | OUTPATIENT
Start: 2019-03-14 | End: 2019-03-17 | Stop reason: HOSPADM

## 2019-03-14 RX ORDER — PREDNISONE 5 MG/1
5 TABLET ORAL DAILY
Status: DISCONTINUED | OUTPATIENT
Start: 2019-03-15 | End: 2019-03-17 | Stop reason: HOSPADM

## 2019-03-14 RX ORDER — PROMETHAZINE HYDROCHLORIDE 25 MG/1
50 TABLET ORAL EVERY 6 HOURS PRN
Status: DISCONTINUED | OUTPATIENT
Start: 2019-03-14 | End: 2019-03-14

## 2019-03-14 RX ORDER — BISACODYL 10 MG
10 SUPPOSITORY, RECTAL RECTAL
Status: DISCONTINUED | OUTPATIENT
Start: 2019-03-14 | End: 2019-03-14

## 2019-03-14 RX ORDER — PROMETHAZINE HCL 50 MG
50 TABLET ORAL EVERY 6 HOURS PRN
COMMUNITY
End: 2021-10-11

## 2019-03-14 RX ORDER — SODIUM CHLORIDE 9 MG/ML
INJECTION, SOLUTION INTRAVENOUS CONTINUOUS
Status: DISCONTINUED | OUTPATIENT
Start: 2019-03-14 | End: 2019-03-17 | Stop reason: HOSPADM

## 2019-03-14 RX ORDER — AMOXICILLIN 250 MG
2 CAPSULE ORAL 2 TIMES DAILY
Status: DISCONTINUED | OUTPATIENT
Start: 2019-03-14 | End: 2019-03-14

## 2019-03-14 RX ORDER — HYDROMORPHONE HYDROCHLORIDE 2 MG/ML
2.5 INJECTION, SOLUTION INTRAMUSCULAR; INTRAVENOUS; SUBCUTANEOUS EVERY 4 HOURS PRN
Status: DISCONTINUED | OUTPATIENT
Start: 2019-03-14 | End: 2019-03-15

## 2019-03-14 RX ADMIN — POLYETHYLENE GLYCOL 3350 1 PACKET: 17 POWDER, FOR SOLUTION ORAL at 23:21

## 2019-03-14 RX ADMIN — SODIUM CHLORIDE: 9 INJECTION, SOLUTION INTRAVENOUS at 23:32

## 2019-03-14 RX ADMIN — HYDROMORPHONE HYDROCHLORIDE 2.5 MG: 2 INJECTION, SOLUTION INTRAMUSCULAR; INTRAVENOUS; SUBCUTANEOUS at 22:56

## 2019-03-14 RX ADMIN — PIPERACILLIN AND TAZOBACTAM 3.38 G: 3; .375 INJECTION, POWDER, LYOPHILIZED, FOR SOLUTION INTRAVENOUS; PARENTERAL at 23:04

## 2019-03-14 ASSESSMENT — ENCOUNTER SYMPTOMS
ABDOMINAL PAIN: 1
PSYCHIATRIC NEGATIVE: 1
DIZZINESS: 0
CARDIOVASCULAR NEGATIVE: 1
WEIGHT LOSS: 1
NAUSEA: 1
WEAKNESS: 1
HEADACHES: 0
EYES NEGATIVE: 1
VOMITING: 1
RESPIRATORY NEGATIVE: 1
CONSTIPATION: 1
MUSCULOSKELETAL NEGATIVE: 1
CHILLS: 1
FEVER: 1
SEIZURES: 0

## 2019-03-14 ASSESSMENT — LIFESTYLE VARIABLES: EVER_SMOKED: YES

## 2019-03-14 NOTE — DOCUMENTATION QUERY
[facility]                                                                         Query Response Note      PATIENT:               GILBERTO WELLINGTON  ACCT #:                  [id number]  MRN:                       [id number]  :                       1965  ADMIT DATE:       2019 10:03 PM  DISCH DATE:        3/6/2019 3:42 PM  RESPONDING  PROVIDER #:        [id number]           RESPONSE TEXT:    Sepsis and acute respiratory failure are unrelated to each other    QUERY TEXT:    Conflicting Documentation Clarification [email or web address]    There is conflicting documentation in the medical record.      Severe sepsis with associated acute respiratory failure is documented per  Pulmonary consult, Resident note.    Sepsis without associated acute organ dysfunction is documented per  Hospitalist Resident progress notes.  Based on treatment, clinical findings and risk factors, can this documentation be further clarified?    NOTE:  If an appropriate response is not listed below, please respond with a new note.    The patient's Clinical Indicators include:   Pulmonary consult, Resident note: severe sepsis with the following associated acute organ dysfunction(s): acute respiratory failure. 4/4 SIRS. Pro michael 32     Resident PN, Tracy Mc MD: sepsis (without associated acute organ dysfunction).   -Patient presented with 3/4 SIRS criteria (HR > 90, RR > 20, WBC >12     Treatment: Pulmonary consult, bronch with BAL and brushings, RT protocol  Risk factors: Sepsis vs severe sepsis, acute respiratory failure, pneumonia, stage 4 colon cancer, COPD, Hep C, HTN  Query created by: Alberto Carter on 3/13/2019 6:10 PM        Electronically signed by:  JORDAN WILKES MD 3/14/2019 9:52 AM

## 2019-03-15 ENCOUNTER — APPOINTMENT (OUTPATIENT)
Dept: RADIOLOGY | Facility: MEDICAL CENTER | Age: 54
DRG: 864 | End: 2019-03-15
Attending: STUDENT IN AN ORGANIZED HEALTH CARE EDUCATION/TRAINING PROGRAM
Payer: COMMERCIAL

## 2019-03-15 PROBLEM — B99.9 FEVER DUE TO INFECTION: Status: ACTIVE | Noted: 2019-03-04

## 2019-03-15 PROBLEM — R74.8 ELEVATED ALKALINE PHOSPHATASE LEVEL: Status: ACTIVE | Noted: 2019-02-28

## 2019-03-15 LAB
ALBUMIN SERPL BCP-MCNC: 2.5 G/DL (ref 3.2–4.9)
ALBUMIN/GLOB SERPL: 1.2 G/DL
ALP SERPL-CCNC: 122 U/L (ref 30–99)
ALT SERPL-CCNC: 26 U/L (ref 2–50)
ANION GAP SERPL CALC-SCNC: 4 MMOL/L (ref 0–11.9)
APPEARANCE UR: CLEAR
AST SERPL-CCNC: 33 U/L (ref 12–45)
BASOPHILS # BLD AUTO: 0.7 % (ref 0–1.8)
BASOPHILS # BLD: 0.04 K/UL (ref 0–0.12)
BILIRUB SERPL-MCNC: 0.5 MG/DL (ref 0.1–1.5)
BILIRUB UR QL STRIP.AUTO: NEGATIVE
BUN SERPL-MCNC: 6 MG/DL (ref 8–22)
CALCIUM SERPL-MCNC: 7.6 MG/DL (ref 8.5–10.5)
CHLORIDE SERPL-SCNC: 107 MMOL/L (ref 96–112)
CO2 SERPL-SCNC: 23 MMOL/L (ref 20–33)
COLOR UR: YELLOW
CREAT SERPL-MCNC: 0.73 MG/DL (ref 0.5–1.4)
EKG IMPRESSION: NORMAL
EOSINOPHIL # BLD AUTO: 0.07 K/UL (ref 0–0.51)
EOSINOPHIL NFR BLD: 1.2 % (ref 0–6.9)
ERYTHROCYTE [DISTWIDTH] IN BLOOD BY AUTOMATED COUNT: 57.2 FL (ref 35.9–50)
GLOBULIN SER CALC-MCNC: 2.1 G/DL (ref 1.9–3.5)
GLUCOSE SERPL-MCNC: 96 MG/DL (ref 65–99)
GLUCOSE UR STRIP.AUTO-MCNC: NEGATIVE MG/DL
HCT VFR BLD AUTO: 29.5 % (ref 42–52)
HGB BLD-MCNC: 9.6 G/DL (ref 14–18)
IMM GRANULOCYTES # BLD AUTO: 0.01 K/UL (ref 0–0.11)
IMM GRANULOCYTES NFR BLD AUTO: 0.2 % (ref 0–0.9)
KETONES UR STRIP.AUTO-MCNC: NEGATIVE MG/DL
LACTATE BLD-SCNC: 1.2 MMOL/L (ref 0.5–2)
LEUKOCYTE ESTERASE UR QL STRIP.AUTO: NEGATIVE
LYMPHOCYTES # BLD AUTO: 1.29 K/UL (ref 1–4.8)
LYMPHOCYTES NFR BLD: 21.3 % (ref 22–41)
MAGNESIUM SERPL-MCNC: 1.7 MG/DL (ref 1.5–2.5)
MCH RBC QN AUTO: 31.1 PG (ref 27–33)
MCHC RBC AUTO-ENTMCNC: 32.5 G/DL (ref 33.7–35.3)
MCV RBC AUTO: 95.5 FL (ref 81.4–97.8)
MICRO URNS: NORMAL
MONOCYTES # BLD AUTO: 0.68 K/UL (ref 0–0.85)
MONOCYTES NFR BLD AUTO: 11.2 % (ref 0–13.4)
NEUTROPHILS # BLD AUTO: 3.96 K/UL (ref 1.82–7.42)
NEUTROPHILS NFR BLD: 65.4 % (ref 44–72)
NITRITE UR QL STRIP.AUTO: NEGATIVE
NRBC # BLD AUTO: 0 K/UL
NRBC BLD-RTO: 0 /100 WBC
PH UR STRIP.AUTO: 7.5 [PH]
PLATELET # BLD AUTO: 220 K/UL (ref 164–446)
PMV BLD AUTO: 9.6 FL (ref 9–12.9)
POTASSIUM SERPL-SCNC: 3.8 MMOL/L (ref 3.6–5.5)
PROCALCITONIN SERPL-MCNC: 0.63 NG/ML
PROT SERPL-MCNC: 4.6 G/DL (ref 6–8.2)
PROT UR QL STRIP: NEGATIVE MG/DL
RBC # BLD AUTO: 3.09 M/UL (ref 4.7–6.1)
RBC UR QL AUTO: NEGATIVE
SODIUM SERPL-SCNC: 134 MMOL/L (ref 135–145)
SP GR UR STRIP.AUTO: 1
UROBILINOGEN UR STRIP.AUTO-MCNC: 0.2 MG/DL
WBC # BLD AUTO: 6.1 K/UL (ref 4.8–10.8)

## 2019-03-15 PROCEDURE — A9270 NON-COVERED ITEM OR SERVICE: HCPCS | Performed by: STUDENT IN AN ORGANIZED HEALTH CARE EDUCATION/TRAINING PROGRAM

## 2019-03-15 PROCEDURE — 83735 ASSAY OF MAGNESIUM: CPT

## 2019-03-15 PROCEDURE — 71045 X-RAY EXAM CHEST 1 VIEW: CPT

## 2019-03-15 PROCEDURE — 85025 COMPLETE CBC W/AUTO DIFF WBC: CPT

## 2019-03-15 PROCEDURE — 83605 ASSAY OF LACTIC ACID: CPT

## 2019-03-15 PROCEDURE — 700111 HCHG RX REV CODE 636 W/ 250 OVERRIDE (IP): Performed by: STUDENT IN AN ORGANIZED HEALTH CARE EDUCATION/TRAINING PROGRAM

## 2019-03-15 PROCEDURE — 93010 ELECTROCARDIOGRAM REPORT: CPT | Performed by: INTERNAL MEDICINE

## 2019-03-15 PROCEDURE — 93005 ELECTROCARDIOGRAM TRACING: CPT | Performed by: STUDENT IN AN ORGANIZED HEALTH CARE EDUCATION/TRAINING PROGRAM

## 2019-03-15 PROCEDURE — 700102 HCHG RX REV CODE 250 W/ 637 OVERRIDE(OP): Performed by: STUDENT IN AN ORGANIZED HEALTH CARE EDUCATION/TRAINING PROGRAM

## 2019-03-15 PROCEDURE — 36415 COLL VENOUS BLD VENIPUNCTURE: CPT

## 2019-03-15 PROCEDURE — 99223 1ST HOSP IP/OBS HIGH 75: CPT | Mod: GC | Performed by: INTERNAL MEDICINE

## 2019-03-15 PROCEDURE — 770006 HCHG ROOM/CARE - MED/SURG/GYN SEMI*

## 2019-03-15 PROCEDURE — 80053 COMPREHEN METABOLIC PANEL: CPT

## 2019-03-15 PROCEDURE — 84145 PROCALCITONIN (PCT): CPT

## 2019-03-15 PROCEDURE — 81003 URINALYSIS AUTO W/O SCOPE: CPT

## 2019-03-15 PROCEDURE — 700105 HCHG RX REV CODE 258: Performed by: STUDENT IN AN ORGANIZED HEALTH CARE EDUCATION/TRAINING PROGRAM

## 2019-03-15 RX ORDER — HYDROMORPHONE HYDROCHLORIDE 4 MG/1
16 TABLET ORAL EVERY 4 HOURS PRN
Status: DISCONTINUED | OUTPATIENT
Start: 2019-03-15 | End: 2019-03-17 | Stop reason: HOSPADM

## 2019-03-15 RX ORDER — POTASSIUM CHLORIDE 20 MEQ/1
20 TABLET, EXTENDED RELEASE ORAL ONCE
Status: COMPLETED | OUTPATIENT
Start: 2019-03-15 | End: 2019-03-15

## 2019-03-15 RX ORDER — MAGNESIUM SULFATE HEPTAHYDRATE 40 MG/ML
2 INJECTION, SOLUTION INTRAVENOUS ONCE
Status: COMPLETED | OUTPATIENT
Start: 2019-03-15 | End: 2019-03-15

## 2019-03-15 RX ADMIN — VANCOMYCIN HYDROCHLORIDE 1100 MG: 100 INJECTION, POWDER, LYOPHILIZED, FOR SOLUTION INTRAVENOUS at 13:11

## 2019-03-15 RX ADMIN — TAMSULOSIN HYDROCHLORIDE 0.4 MG: 0.4 CAPSULE ORAL at 09:20

## 2019-03-15 RX ADMIN — MAGNESIUM SULFATE 2 G: 2 INJECTION INTRAVENOUS at 08:22

## 2019-03-15 RX ADMIN — POTASSIUM CHLORIDE 20 MEQ: 1500 TABLET, EXTENDED RELEASE ORAL at 07:20

## 2019-03-15 RX ADMIN — OMEPRAZOLE 20 MG: 20 CAPSULE, DELAYED RELEASE ORAL at 07:20

## 2019-03-15 RX ADMIN — LOSARTAN POTASSIUM 50 MG: 50 TABLET ORAL at 07:20

## 2019-03-15 RX ADMIN — ONDANSETRON 4 MG: 2 INJECTION INTRAMUSCULAR; INTRAVENOUS at 13:21

## 2019-03-15 RX ADMIN — ONDANSETRON 4 MG: 2 INJECTION INTRAMUSCULAR; INTRAVENOUS at 17:57

## 2019-03-15 RX ADMIN — SENNOSIDES AND DOCUSATE SODIUM 2 TABLET: 8.6; 5 TABLET ORAL at 07:20

## 2019-03-15 RX ADMIN — HYDROMORPHONE HYDROCHLORIDE 16 MG: 4 TABLET ORAL at 23:21

## 2019-03-15 RX ADMIN — HYDROMORPHONE HYDROCHLORIDE 16 MG: 4 TABLET ORAL at 15:15

## 2019-03-15 RX ADMIN — PIPERACILLIN AND TAZOBACTAM 3.38 G: 3; .375 INJECTION, POWDER, LYOPHILIZED, FOR SOLUTION INTRAVENOUS; PARENTERAL at 13:21

## 2019-03-15 RX ADMIN — VANCOMYCIN HYDROCHLORIDE 1900 MG: 100 INJECTION, POWDER, LYOPHILIZED, FOR SOLUTION INTRAVENOUS at 00:21

## 2019-03-15 RX ADMIN — PREDNISONE 5 MG: 5 TABLET ORAL at 07:20

## 2019-03-15 RX ADMIN — SODIUM CHLORIDE: 9 INJECTION, SOLUTION INTRAVENOUS at 13:24

## 2019-03-15 RX ADMIN — ONDANSETRON 4 MG: 2 INJECTION INTRAMUSCULAR; INTRAVENOUS at 23:29

## 2019-03-15 RX ADMIN — HYDROMORPHONE HYDROCHLORIDE 16 MG: 4 TABLET ORAL at 02:55

## 2019-03-15 RX ADMIN — ONDANSETRON 4 MG: 2 INJECTION INTRAMUSCULAR; INTRAVENOUS at 09:18

## 2019-03-15 RX ADMIN — VANCOMYCIN HYDROCHLORIDE 1100 MG: 100 INJECTION, POWDER, LYOPHILIZED, FOR SOLUTION INTRAVENOUS at 23:22

## 2019-03-15 RX ADMIN — PIPERACILLIN AND TAZOBACTAM 3.38 G: 3; .375 INJECTION, POWDER, LYOPHILIZED, FOR SOLUTION INTRAVENOUS; PARENTERAL at 20:25

## 2019-03-15 RX ADMIN — HYDROMORPHONE HYDROCHLORIDE 16 MG: 4 TABLET ORAL at 07:20

## 2019-03-15 RX ADMIN — HYDROMORPHONE HYDROCHLORIDE 16 MG: 4 TABLET ORAL at 19:15

## 2019-03-15 RX ADMIN — HYDROMORPHONE HYDROCHLORIDE 16 MG: 4 TABLET ORAL at 11:18

## 2019-03-15 RX ADMIN — SODIUM CHLORIDE 500 ML: 9 INJECTION, SOLUTION INTRAVENOUS at 00:20

## 2019-03-15 RX ADMIN — PIPERACILLIN AND TAZOBACTAM 3.38 G: 3; .375 INJECTION, POWDER, LYOPHILIZED, FOR SOLUTION INTRAVENOUS; PARENTERAL at 02:55

## 2019-03-15 ASSESSMENT — ENCOUNTER SYMPTOMS
BLURRED VISION: 0
DOUBLE VISION: 0
NECK PAIN: 0
NAUSEA: 1
SHORTNESS OF BREATH: 0
FEVER: 1
PALPITATIONS: 0
SINUS PAIN: 0
SEIZURES: 0
HALLUCINATIONS: 0
BRUISES/BLEEDS EASILY: 0
LOSS OF CONSCIOUSNESS: 0
SENSORY CHANGE: 0
DIARRHEA: 0
SPUTUM PRODUCTION: 0
BACK PAIN: 0
FOCAL WEAKNESS: 0
COUGH: 0
POLYDIPSIA: 0
SPEECH CHANGE: 0
CHILLS: 0
ABDOMINAL PAIN: 0
VOMITING: 1
CONSTIPATION: 0

## 2019-03-15 ASSESSMENT — COGNITIVE AND FUNCTIONAL STATUS - GENERAL
DAILY ACTIVITIY SCORE: 24
SUGGESTED CMS G CODE MODIFIER MOBILITY: CH
SUGGESTED CMS G CODE MODIFIER DAILY ACTIVITY: CH
MOBILITY SCORE: 24

## 2019-03-15 ASSESSMENT — LIFESTYLE VARIABLES: ALCOHOL_USE: NO

## 2019-03-15 ASSESSMENT — PATIENT HEALTH QUESTIONNAIRE - PHQ9
1. LITTLE INTEREST OR PLEASURE IN DOING THINGS: NOT AT ALL
2. FEELING DOWN, DEPRESSED, IRRITABLE, OR HOPELESS: NOT AT ALL
2. FEELING DOWN, DEPRESSED, IRRITABLE, OR HOPELESS: NOT AT ALL
1. LITTLE INTEREST OR PLEASURE IN DOING THINGS: NOT AT ALL
SUM OF ALL RESPONSES TO PHQ9 QUESTIONS 1 AND 2: 0
SUM OF ALL RESPONSES TO PHQ9 QUESTIONS 1 AND 2: 0

## 2019-03-15 NOTE — RESPIRATORY CARE
COPD EDUCATION by COPD CLINICAL EDUCATOR  3/15/2019 at 9:18 AM by Kayy Yao     Patient reviewed by COPD education team. Patient does not qualify for COPD program.

## 2019-03-15 NOTE — ASSESSMENT & PLAN NOTE
- H/O colon cancer - First episode diagnosed in 2016, underwent partial colectomy followed by chemotherapy in remission  - Recurrence of metastatic colon cancer 08/2018 currently on chemotherapy  - Will need FU with onc after discharge, Dr. Jeff

## 2019-03-15 NOTE — ASSESSMENT & PLAN NOTE
- Normocytic, normochromic anemia  - Anemia due to chronic medical condition, GI malignancy  - Fecal hemoccult negative during last hospitalization

## 2019-03-15 NOTE — ASSESSMENT & PLAN NOTE
- dysuria, few days  - no frequency / urgency / hematuria / cloudy urine  - UA clear  - BCx pending from [facility] and OSH  - Continue Zosyn and vancomycin

## 2019-03-15 NOTE — ASSESSMENT & PLAN NOTE
- Elevated liver function tests likely secondary to liver cirrhosis from hepatitis C   - , ALT 63,  during last hospitalization.    - This adm : , normal bili, transaminases - normal  - RUQ US Feb 2019 : heterogeneous and echogenic appearance of the liver--> hepatic steatosis or hepatocellular disease. No gall stones, sludge +, mild dilation of CBD

## 2019-03-15 NOTE — DIETARY
"Nutrition services: Day 1 of admit.  Erich Ingram is a 53 y.o. male with admitting DX of SIRS.  Consult received for poor PO intake pta and recent unplanned wt loss per nutrition admit screen.    Visited pt at bedside, pt appears very thin with sunken depression in temporal region and pronounced zygomatic arches. Pt states that his poor PO intake is r/t N/V related to chemotherapy. Pt provided some foods that he tolerates more so than others (pasta and hamburgers) - will update preferences for kitchen staff to have. Pt also states that he drink 1 nutrition supplement such as Boost/Ensure per day at home - but states he feels he needs to increase this to 2 per day. Will provide Boost Plus BID during admit to supplement intake.     Pt states that his UBW ~7 months ago was 185-190#. Currently pt weighs 165#, indicating a 20-25# (~13%) wt loss over the past 7 months - which is significant. A more recent review of weight hx per chart review shows pt weighed 170# per stand up scale on 2/18, indicating a 5# (3%) weight loss in the past 2 weeks which is significant as well.     Assessment:  Height: 190.5 cm (6' 3\")  Weight: 74.7 kg (164 lb 10.9 oz)  Body mass index is 20.58 kg/m².   Diet/Intake: Regular; 1 meal recorded at this time, % intake    Evaluation:   1. History of stage 4 colon cancer s/p resection in 2016 - currently on chemotherapy for lung metastasis  2. Other pertinent past medical hx includes COPD and hepatitis C  3. MAR: prednisone, zofran PRN, dilaudid PRN    Malnutrition Risk: Pt with moderate chronic disease related malnutrition, r/t hypermetabolic disease state of stage 4 colon cancer with lung metastasis, evidenced by severe sunken depression in temporal region and pronounced zygomatic arches showing severe fat and muscle loss, as well as significant weight loss hx of 13% x 7 months and 3% in past 2 weeks.     Recommendations/Plan:  1. Boost Plus BID to supplement intake   2. Encourage " intake of meals and supplements  3. Document intake of all meals and supplements as % taken in ADL's to provide interdisciplinary communication across all shifts.   4. Monitor weight.  5. Nutrition rep will continue to see patient for ongoing meal and snack preferences.   6. RD to monitor PO intake, wt trends, and nutrition labs/meds    RD to follow

## 2019-03-15 NOTE — PROGRESS NOTES
· 2 RN skin check complete with AQUILES Chu.  · Devices in place : Peripheral IV.  · Skin assessed under devices: glasses.  · Confirmed pressure ulcers found on: n/a.  · New potential pressure ulcers noted on : n/a .     Pt. Noted to have an old scab on L cheek, no drainage. Heels are dry but blanching. Skin behind the ears are intact and both blanching from use of eyeglasses. L upper chest area has a transparent film dressing from previous port that was removed from that site, intact no drainage.     The following interventions in place: pt. Able to get up and ambulate and turn side to side in the bed. Pt. Is continent and alert and oriented.

## 2019-03-15 NOTE — H&P
Internal Medicine Admitting History and Physical    Note Author: Les Restrepo M.D.     Name Erich Ingram     1965   Age/Sex 53 y.o. male   MRN [id number]   Code Status Full Code     After 5PM or if no immediate response to page, please call for cross-coverage  Attending/Team:  / Madelin See Patient List for primary contact information  Call [phone] to page    1st Call - Day Intern (R1):   Dr. Correa 2nd Call - Day Sr. Resident (R2/R3):   Dr. Toro     Chief Complaint: Fever and burning in urine      HPI:  Erich Ingram is a 53 y.o. old patient with PMH of history of HCV, COPD, and stage 4 colon cancer s/p resection in 2016 and currently on chemotherapy for lung metastasis and hypertension who was recently admitted to the hospital for sepsis secondary to pneumonia.  He grew lactobacillus from both his port and his peripheral vein and he was treated with vancomycin, Rocephin and Zithromax.  He was sent home on amoxicillin.  His blood culture before discharge were negative for lactobacillus.  He grew Candida albicans on the catheter tip.  His chest x-ray performed at outside hospital demonstrated resolution of pneumonia.  He went to [facility] where he received 3 IV bolus of normal saline, Rocephin, Flagyl and Tylenol for his fever.  His fever was unremarkable when he arrived here.   He started noticing burning in his urine for the past 5 days.  He also started noticing fever since this morning.  He has chronic nausea and vomiting from his chemotherapy however well controlled with Zofran.  He did not notice any unusual change in the pattern of nausea vomiting.  Denied headache, chest pain, shortness of breath, unchanged abdominal pain other than his left lower quadrant pain (from his colon cancer) or leg pain.   In the hospital, the patient was stable. Vitals unremarkable. The patient will be admitted to the hospital for further evaluation and management  of fever.    Review of Systems   Constitutional: Positive for chills, fever and weight loss.   HENT: Negative.    Eyes: Negative.    Respiratory: Negative.    Cardiovascular: Negative.    Gastrointestinal: Positive for abdominal pain, constipation, nausea and vomiting.   Genitourinary: Negative.    Musculoskeletal: Negative.    Skin: Negative.    Neurological: Positive for weakness. Negative for dizziness, seizures and headaches.   Endo/Heme/Allergies: Negative.    Psychiatric/Behavioral: Negative.    All other systems reviewed and are negative.    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   • COPD (chronic obstructive pulmonary disease) (HCC)    • Hepatitis C    • Hypertension    • Infectious disease    • Psychiatric problem     anxiety       Past Surgical History:  Past Surgical History:   Procedure Laterality Date   • BRONCHOSCOPY-ENDO N/A 3/1/2019    Procedure: BRONCHOSCOPY-ENDO;  Surgeon: Katelin Garner M.D.;  Location: ENDOSCOPY [facility];  Service: Pulmonary   • CATH PLACEMENT  3/16/2016    Procedure: CATH PLACEMENT POWER PORT ;  Surgeon: Luke Lima M.D.;  Location: SURGERY [facility];  Service:    • LOW ANTERIOR RESECTION LAPAROSCOPIC  1/12/2016    Procedure: LOW ANTERIOR RESECTION LAPAROSCOPIC;  Surgeon: Luke Lima M.D.;  Location: SURGERY [facility];  Service:        Current Outpatient Medications:  Home Medications     Reviewed by Ayana Mullins R.N. (Registered Nurse) on 03/14/19 at 2122  Med List Status: Complete   Medication Last Dose Status   amoxicillin (AMOXIL) 500 MG Cap 3/14/2019 Active   HYDROmorphone (DILAUDID) 8 MG tablet 3/14/2019 Active   lansoprazole (PREVACID) 30 MG CAPSULE DELAYED RELEASE 3/14/2019 Active   losartan (COZAAR) 50 MG Tab 3/14/2019  "Active   ondansetron (ZOFRAN ODT) 4 MG TABLET DISPERSIBLE  Active   predniSONE (DELTASONE) 5 MG Tab 3/14/2019 Active   promethazine (PHENERGAN) 50 MG Tab  Active   tamsulosin (FLOMAX) 0.4 MG capsule 3/14/2019 Active                Medication Allergy/Sensitivities:  No Known Allergies    Family History (mandatory)   Family History   Problem Relation Age of Onset   • Adopted: Yes   • No Known Problems Mother    • No Known Problems Father    • No Known Problems Sister    • No Known Problems Brother    • No Known Problems Maternal Grandmother    • No Known Problems Maternal Grandfather    • No Known Problems Paternal Grandmother    • No Known Problems Paternal Grandfather        Social History (mandatory)   Social History     Social History   • Marital status:      Spouse name: N/A   • Number of children: N/A   • Years of education: N/A     Occupational History   • Not on file.     Social History Main Topics   • Smoking status: Former Smoker     Packs/day: 1.00     Years: 15.00     Start date: 1/12/2001     Quit date: 6/12/2015   • Smokeless tobacco: Former User   • Alcohol use No   • Drug use: No   • Sexual activity: Not on file     Other Topics Concern   • Not on file     Social History Narrative   • No narrative on file     Living situation: Home  PCP : BEENA Sterling    Physical Exam     Vitals:    03/14/19 2115   BP: 113/76   Pulse: 87   Resp: 16   Temp: 36.4 °C (97.6 °F)   TempSrc: Temporal   SpO2: 95%   Weight: 74.7 kg (164 lb 10.9 oz)   Height: 1.905 m (6' 3\")     Body mass index is 20.58 kg/m².  /76   Pulse 87   Temp 36.4 °C (97.6 °F) (Temporal)   Resp 16   Ht 1.905 m (6' 3\")   Wt 74.7 kg (164 lb 10.9 oz)   SpO2 95%   BMI 20.58 kg/m²   O2 therapy: Pulse Oximetry: 95 %, O2 (LPM): 0    Physical Exam   Constitutional: He is oriented to person, place, and time. No distress.   HENT:   Head: Normocephalic and atraumatic.   Eyes: Pupils are equal, round, and reactive to light. EOM are " normal.   Neck: Normal range of motion. Neck supple.   Cardiovascular: Normal rate, regular rhythm and normal heart sounds.    No murmur heard.  Pulmonary/Chest: Effort normal and breath sounds normal. No respiratory distress. He has no wheezes.   Abdominal: Soft. Bowel sounds are normal. There is tenderness in the left lower quadrant.   Musculoskeletal: Normal range of motion. He exhibits no edema or tenderness.   Neurological: He is alert and oriented to person, place, and time.   Skin: Skin is warm and dry.   Vitals reviewed.      Data Review       Old Records Request:   Completed  Current Records review/summary: Completed    Lab Data Review:  Recent Results (from the past 24 hour(s))   LACTIC ACID    Collection Time: 03/14/19 10:50 PM   Result Value Ref Range    Lactic Acid 2.2 (H) 0.5 - 2.0 mmol/L   CBC WITH DIFFERENTIAL    Collection Time: 03/14/19 10:50 PM   Result Value Ref Range    WBC 7.9 4.8 - 10.8 K/uL    RBC 3.37 (L) 4.70 - 6.10 M/uL    Hemoglobin 10.3 (L) 14.0 - 18.0 g/dL    Hematocrit 32.0 (L) 42.0 - 52.0 %    MCV 95.0 81.4 - 97.8 fL    MCH 30.6 27.0 - 33.0 pg    MCHC 32.2 (L) 33.7 - 35.3 g/dL    RDW 57.7 (H) 35.9 - 50.0 fL    Platelet Count 230 164 - 446 K/uL    MPV 9.6 9.0 - 12.9 fL    Neutrophils-Polys 77.70 (H) 44.00 - 72.00 %    Lymphocytes 12.80 (L) 22.00 - 41.00 %    Monocytes 8.30 0.00 - 13.40 %    Eosinophils 0.30 0.00 - 6.90 %    Basophils 0.60 0.00 - 1.80 %    Immature Granulocytes 0.30 0.00 - 0.90 %    Nucleated RBC 0.00 /100 WBC    Neutrophils (Absolute) 6.11 1.82 - 7.42 K/uL    Lymphs (Absolute) 1.01 1.00 - 4.80 K/uL    Monos (Absolute) 0.65 0.00 - 0.85 K/uL    Eos (Absolute) 0.02 0.00 - 0.51 K/uL    Baso (Absolute) 0.05 0.00 - 0.12 K/uL    Immature Granulocytes (abs) 0.02 0.00 - 0.11 K/uL    NRBC (Absolute) 0.00 K/uL   Comp Metabolic Panel    Collection Time: 03/14/19 10:50 PM   Result Value Ref Range    Sodium 131 (L) 135 - 145 mmol/L    Potassium 4.1 3.6 - 5.5 mmol/L    Chloride 103  96 - 112 mmol/L    Co2 23 20 - 33 mmol/L    Anion Gap 5.0 0.0 - 11.9    Glucose 143 (H) 65 - 99 mg/dL    Bun 7 (L) 8 - 22 mg/dL    Creatinine 0.84 0.50 - 1.40 mg/dL    Calcium 7.8 (L) 8.5 - 10.5 mg/dL    AST(SGOT) 38 12 - 45 U/L    ALT(SGPT) 29 2 - 50 U/L    Alkaline Phosphatase 143 (H) 30 - 99 U/L    Total Bilirubin 0.4 0.1 - 1.5 mg/dL    Albumin 2.7 (L) 3.2 - 4.9 g/dL    Total Protein 5.1 (L) 6.0 - 8.2 g/dL    Globulin 2.4 1.9 - 3.5 g/dL    A-G Ratio 1.1 g/dL   ESTIMATED GFR    Collection Time: 03/14/19 10:50 PM   Result Value Ref Range    GFR If African American >60 >60 mL/min/1.73 m 2    GFR If Non African American >60 >60 mL/min/1.73 m 2   URINALYSIS    Collection Time: 03/15/19 12:11 AM   Result Value Ref Range    Color Yellow     Character Clear     Specific Gravity 1.004 <1.035    Ph 7.5 5.0 - 8.0    Glucose Negative Negative mg/dL    Ketones Negative Negative mg/dL    Protein Negative Negative mg/dL    Bilirubin Negative Negative    Urobilinogen, Urine 0.2 Negative    Nitrite Negative Negative    Leukocyte Esterase Negative Negative    Occult Blood Negative Negative    Micro Urine Req see below        Imaging/Procedures Review:    Independant Imaging Review: Completed  No orders to display        EKG:   EKG Independant Review: Deferred    Records reviewed and summarized in current documentation :  Yes  [employer] teaching service handout given to patient:  Yes         Assessment/Plan     * Bacteremia associated with intravascular line (HCC)- (present on admission)   Assessment & Plan    Patient's blood cultures grew Gram positive rods (lactobalcillus) on blood from port-A-Cath. Despite antibiotic therapy, he was still growing on subsequent blood cultures. Port-A-Cath was removed 3/4/2019. Patient was discharged on Oral Amoxicillin 500mg TID.  He was compliant with his medication and left for 2 more days of amoxicillin.  He is spiking fever being on antibiotics.  -He grew lactobacillus in blood (3/2) from his port  and peripheral vein however repeat blood culture negative on 3/5, he grew Candida albicans on the catheter tip resulted on March 8.  His port was removed during last hospitalization (3/4).  -Continue Zosyn and vancomycin for unknown source of infection  -Day team to consider infectious disease consult again     Elevated lactic acid level   Assessment & Plan    2.7 at prior Hospital, trended down to 2.6  -Follow-up with lactic acid level at the hospital  -Continue IV fluid     Burning pain   Assessment & Plan    With urination, UA was negative at the prior hospital  -Follow-up with urinalysis and urine culture  -Continue Zosyn and vancomycin     Normocytic anemia- (present on admission)   Assessment & Plan    Hemoglobin 11.6 on discharge, normocytic, normochromic.  11.9 at outside facility today, stable.  - Anemia due to chronic medical condition, GI malignancy  - Fecal hemoccult negative during last hospitalization     Elevated LFTs- (present on admission)   Assessment & Plan    Elevated liver function tests likely secondary to liver cirrhosis from hepatitis C   - , ALT 63, alkaline phosphatase 324 during last hospitalization.  - RUQ ultrasound performed during last hospitalization showed heterogeneous and echogenic appearance of the liver can be seen in hepatic steatosis or hepatocellular disease.     Hyponatremia- (present on admission)   Assessment & Plan    Continue IV fluids     Hepatitis C- (present on admission)   Assessment & Plan    Patient reports history of untreated hepatitis C.   - Patient will need outpatient follow-up with GI when medically stable.      COPD (chronic obstructive pulmonary disease) (HCC)- (present on admission)   Assessment & Plan    Patient reports history of COPD, does not use oxygen at home.  - Pt quit smoking couple years ago but was previously smoking 1 pack/day for 10 years   - Saturating at 94% on room air, and no respiratory distress.  - Pt denies any SOB or wheezing   -  Respiratory care per protocol      Metastatic Colon cancer (HCC)- (present on admission)   Assessment & Plan    H/O colon cancer - First episode diagnosed in 2016, underwent partial colectomy followed by chemotherapy in remission  - Recurrence of metastatic colon cancer 08/2018 currently on chemotherapy  - Last chemotherapy was 2/8/2019  - Oncologist saw the patient, appreciate recommendations: Outpatient follow-up for restaging after active infection.  Hold all chemotherapy while inpatient  - Remove post-A-Cath as that grew gram positive bacillus. Pt can complete his chemo via peripheral IV or PICC as pt has 2 cycles remaining     Hypertension- (present on admission)   Assessment & Plan    Continue home dose Losartan     Thrombocytopenia (HCC)- (present on admission)   Assessment & Plan    ikely secondary to liver cirrhosis and untreated hepatitis C.  - Patient is asymptomatic       Anticipated Hospital stay:  >2 midnights    Quality Measures  Quality-Core Measures   Reviewed items::  Labs reviewed, Medications reviewed and Radiology images reviewed  Soriano catheter::  No Soriano  DVT prophylaxis pharmacological::  Heparin  DVT prophylaxis - mechanical:  SCDs  Ulcer Prophylaxis::  Not indicated  Antibiotics:  Treating active infection/contamination beyond 24 hours perioperative coverage      PCP: BEENA Sterling M.D.

## 2019-03-15 NOTE — PROGRESS NOTES
Direct admit from [facility]. Accepted by Dr. Lopes for SIRS.  ADT signed & held, needs to be released upon pt arrival.  No written orders received.  Pt coming by ground.

## 2019-03-15 NOTE — SENIOR ADMIT NOTE
Senior Admission Note    In summary: Erich Ingram is a 53 y.o. male with past medical history of HCV, COPD, and stage 4 colon cancer s/p resection in 2016 and currently on chemotherapy for lung metastasis presented to OSH ED with nausea vomiting and fever.  Patient was recently discharged for pneumonia and sepsis, discharged on 3\14.  During that hospitalization he had negative blood cultures but catheter tip showed Candida.  He was discharged on 10 days of amoxicillin.  At outside hospital CT abdomen pelvis was within normal limits.  Chest x-ray showed resolution of pneumonia.  Labs showed leukocytosis and lactic acidosis, patient was started on C3 and Flagyl along with sepsis fluid bolus and directly admitted to [facility].  At time of my exam patient remained slightly nauseous and had chronic left lower quadrant abdominal pain, patient also noted dysuria however UA at outside hospital was within normal limits.  Vitals were within normal limits, giving patient 1 out of 4 Sirs criteria at this time, fever of 102 was noted at outside hospital however.    Pertinent physical exam findings:    Abd: LLQ tenderness (chronic 2/2 malignancy)    Assessment and plan in summary:    1.  Leukocytosis  2. Fever  3. Lactic acidosis   -Developed while on amoxicillin, recently had port removed   -Concern for infection involving resistant organisms   -Possibly blood borne from port, chest x-ray does not suggest pneumonia, CT abdomen pelvis was not suggestive of infection, LFTs within normal limits and no right upper quadrant abdominal pain, does note dysuria will recheck UA   -Blood cultures, call outside hospital in 2 days for results of the cultures   -Start on Vanc and Zosyn, continue IV fluids   -Trend lactate    2.  Nausea   -Chronic, continue Zofran and IV fluids    For full plan, please see Intern note for details   Kelvin Villeda M.D.  PGY 2

## 2019-03-15 NOTE — PROGRESS NOTES
Received call back from Dr. Villeda, updated about the pt. Pt. Had initially mentioned about his Dilaudid PRN pain med regimen for his chronic pain and Zofran. Both are PRN's. Per MD ok to resume pain med and zofran.

## 2019-03-15 NOTE — ASSESSMENT & PLAN NOTE
Patient reports history of untreated hepatitis C.   - Patient will need outpatient follow-up with GI when medically stable.

## 2019-03-15 NOTE — PROGRESS NOTES
Received pt. Directly admitted under hospitalist services from [facility], pt. Came in via [facility] flight services to our facility. Pt. Ambulatory able to get up. Fall risk education provided, welcome packet and carepac provided. Oriented on the unit policy. Pt. Has an IV on R wrist g.20 hooked to NS at level 500, per report received from [place] from previous facility; pt. To complete  this 3rd bag of NS as part of their initial treatment. Pt. Was also given IV antibiotics Flagyl and Rocephin prior to transfer and Tylenol for his fever. Pt. Has complaints of chronic pain as pt. Has history of cancer, per report of [facility] pt. Took 16mg Dilaudid around 1830 today, this is pt. Home regimen for pain relief.     Pt. Belongings include his laptop, wallet which he has on his jacket that he is wearing and cellphone. Offered pt. Safekeeping but refused. Paged hospitalist service per order.

## 2019-03-15 NOTE — PROGRESS NOTES
"Pharmacy Kinetics 53 y.o. male on vancomycin day # 1  3/15/2019    Currently on Vancomycin 1100 mg iv q12hr (start 3/15 @12)    Indication for Treatment: Intra-abdominal infection    Pertinent history per medical record: Admitted on 3/14/2019 for patient noted to be have slight nausea with chronic left lower quadrant pain on examination. Dysuria also noted. Patient was sent from OSH and has a PMH stage 4 colon cancer s/p resection in 2016 and is currently undergoing chemotherapy for lung mets. He presented to OSH with N/V and fever. Patient was also recently discharged for PAN/sepsis on 3/14 and received a 10 day course of amoxicillin.     Other antibiotics:   Piperacillin/tazobactam 3.375g iv q8hrs    Allergies: Patient has no known allergies.     List concerns for renal function: malnutrition/low albumin, and nephrotoxic drugs (pip/tazo).    Pertinent cultures to date:   19 - Bld Portacath - Lactobacillus sp.  19 - Bld - Lactobacillus  19 - MRSA - Nares  19 - Urine - Neg  3/1/19 - BAL - Rare GPCs, Rare Yeast  3/1/19 - C. albicans, C. glabrata   3/1/19 - Blood - Neg  3/2/19 - Blood - Lactobacillus  3/4/19 - Art line - C. Albicans  3/5/19 - Blood - Neg    MRSA nares swab if pneumonia is a concern (ordered/positive/negative/n-a): Na    Recent Labs      19   2250  03/15/19   0251   WBC  7.9  6.1   NEUTSPOLYS  77.70*  65.40     Recent Labs      19   2250  03/15/19   0251   BUN  7*  6*   CREATININE  0.84  0.73   ALBUMIN  2.7*  2.5*     Intake/Output Summary (Last 24 hours) at 03/15/19 1153  Last data filed at 03/15/19 0700   Gross per 24 hour   Intake                0 ml   Output             1750 ml   Net            -1750 ml      Blood pressure 125/83, pulse 60, temperature 36.8 °C (98.3 °F), temperature source Temporal, resp. rate 16, height 1.905 m (6' 3\"), weight 74.7 kg (164 lb 10.9 oz), SpO2 92 %. Temp (24hrs), Av.9 °C (98.4 °F), Min:36.4 °C (97.6 °F), Max:37.4 °C (99.3 " °F)      A/P   1. Vancomycin dose change: No change  2. Next vancomycin level: 3/16 @ 11 prior to 4th dose  3. Goal trough: 12-16mcg/mL  4. Comments: Urine output improved and adequate. Renal function also improved from prior. Will draw trough prior to 4th dose to assess need for adjustment. Pharmacy will continue to monitor and make dose adjustments as appropriate.    Jackie Escamilla, JessicaD

## 2019-03-15 NOTE — CARE PLAN
Problem: Bowel/Gastric:  Goal: Normal bowel function is maintained or improved  Outcome: PROGRESSING AS EXPECTED  Per pt. Had BM in the morning of 3/14, pt. On stool softeners since pt. Is on chronic pain medication regimen.     Problem: Knowledge Deficit  Goal: Knowledge of disease process/condition, treatment plan, diagnostic tests, and medications will improve  Outcome: PROGRESSING AS EXPECTED  POC discussed with pt. Answered pt. Question regarding plan.     Problem: Pain Management  Goal: Pain level will decrease to patient's comfort goal   03/14/19 3630   OTHER   Pain Rating Scale (NPRS) 5   Non Verbal Scale  Calm   Comfort Goal Comfort at Rest;Perform Activity;Comfort with Movement     PRN pain med given to alleviate pt. Pain, pt. Was able to sleep afterwards.

## 2019-03-15 NOTE — PROGRESS NOTES
Patient has stage IV cancer with recent admission here. He came to the ER at [facility] with vomiting and WBC 14. They do not have beds.

## 2019-03-15 NOTE — PROGRESS NOTES
"Pharmacy Kinetics 53 y.o. male on vancomycin day # 1 3/15/2019    Currently on Vancomycin 1900 mg iv load, given 3/15@0000    Indication for Treatment: Intra-abdominal infection    Pertinent history per medical record:   52 yo male with PMH of HCV, COPD, stage 4 colon cancer s/p resection in 2016, current chemotherapy for lung mets, & HTN admitted on 3/14/2019 for SIRS.  Pt was recently discharged on 3/6 for PNA & sepsis.  Pt presented to ED at [facility] with vomiting and WBC 14 and was sent to to [facility] after receiving ceftriaxone and metronidazole from outside facility.  Pt complained of burning in urine x5 days.    Other antibiotics:   - Zosyn 3.375 g iv q8h    Allergies: Patient has no known allergies.     List concerns for renal function:  - Low albumin  - Current chemotherapy    Pertinent cultures to date:   - 3/4 Art line: (+) Candida albicans  - 3/1 BLDx1: (+) Lactobacillus  - 3/1 Resp lavage: (+) Candida albicans    MRSA nares swab if pneumonia is a concern (ordered/positive/negative/n-a): n/a    Recent Labs      19   2250   WBC  7.9   NEUTSPOLYS  77.70*     Recent Labs      19   2250   BUN  7*   CREATININE  0.84   ALBUMIN  2.7*     No results for input(s): VANCOTROUGH, VANCOPEAK, VANCORANDOM in the last 72 hours.  Intake/Output Summary (Last 24 hours) at 03/15/19 0310  Last data filed at 03/15/19 0033   Gross per 24 hour   Intake                0 ml   Output             1000 ml   Net            -1000 ml      Blood pressure 113/76, pulse 87, temperature 36.4 °C (97.6 °F), temperature source Temporal, resp. rate 16, height 1.905 m (6' 3\"), weight 74.7 kg (164 lb 10.9 oz), SpO2 95 %. Temp (24hrs), Av.4 °C (97.6 °F), Min:36.4 °C (97.6 °F), Max:36.4 °C (97.6 °F)      A/P   1. Vancomycin dose change: 1100 mg iv q12h, start 3/15@1200  2. Next vancomycin level: Prior to the 3rd dose (not ordered)  3. Goal trough: 12 to 16 mcg/mL  4. Comments: Limited risk of renal accumulation, so maintenance dose " to start at 15 mg/kg q12h.  Trough to be assessed at steady state.  Pharmacy to follow and make dose adjustments as appropriate.      Jessica HassanD

## 2019-03-15 NOTE — CARE PLAN
Problem: Nutritional:  Goal: Achieve adequate nutritional intake  Patient will consume >50% of meals and supplements  Outcome: PROGRESSING AS EXPECTED

## 2019-03-15 NOTE — PROGRESS NOTES
Assumed care of patient at 0700. Patient is alert and oriented x 4. Complains of constant pain near L groin area r/t cancer. Current pain regimen is ordered. Discussed plan for day, continue IVF/Antibiotcs. No other questions at this time. Safety measures in place, call light and belongings in reach.

## 2019-03-15 NOTE — NON-PROVIDER
**MED STUDENT NOTE**        Attending: Carol Arriaga M.D.  Student: Haresh Zhang, Student    Patient: Erich Ingram; [id number]; 1965    ID: 53 y.o. male admitted for fevers and vomiting       HPI:                ROS             PMH:  Past Medical History:   Diagnosis Date   • 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   • Breath shortness     COPD   • Cancer (HCC)     colon ca jan 2016   • COPD (chronic obstructive pulmonary disease) (HCC)    • Hepatitis C    • Hypertension    • Infectious disease    • Psychiatric problem     anxiety       Meds:  Prior to Admission medications    Medication Sig Start Date End Date Taking? Authorizing Provider   lansoprazole (PREVACID) 30 MG CAPSULE DELAYED RELEASE Take 30 mg by mouth every day.   Yes Physician Outpatient   ondansetron (ZOFRAN ODT) 4 MG TABLET DISPERSIBLE Take 8 mg by mouth every 6 hours as needed for Nausea.   Yes Physician Outpatient   promethazine (PHENERGAN) 50 MG Tab Take 50 mg by mouth every 6 hours as needed for Nausea/Vomiting.   Yes Physician Outpatient   amoxicillin (AMOXIL) 500 MG Cap Take 1 Cap by mouth 3 times a day for 10 days. 3/6/19 3/16/19  Yaneth Correa M.D.   HYDROmorphone (DILAUDID) 8 MG tablet Take 16 mg by mouth every four hours as needed for Severe Pain.    Physician Outpatient   predniSONE (DELTASONE) 5 MG Tab Take 5 mg by mouth every day.    Physician Outpatient   tamsulosin (FLOMAX) 0.4 MG capsule Take 0.4 mg by mouth ONE-HALF HOUR AFTER BREAKFAST.    Physician Outpatient   losartan (COZAAR) 50 MG Tab Take 50 mg by mouth every day.    Physician Outpatient     Medications   losartan (COZAAR) tablet 50 mg (not administered)   predniSONE (DELTASONE) tablet 5 mg (not administered)   tamsulosin (FLOMAX) capsule 0.4 mg (not administered)   ondansetron (ZOFRAN) syringe/vial injection 4 mg (not administered)   senna-docusate (PERICOLACE or SENOKOT S) 8.6-50 MG per tablet 2 Tab (2 Tabs Oral Refused 3/14/19 6043)     And    polyethylene glycol/lytes (MIRALAX) PACKET 1 Packet (1 Packet Oral Given 3/14/19 2321)     And   magnesium hydroxide (MILK OF MAGNESIA) suspension 30 mL (not administered)     And   bisacodyl (DULCOLAX) suppository 10 mg (not administered)   piperacillin-tazobactam (ZOSYN) 3.375 g in  mL IVPB (0 g Intravenous Stopped 3/14/19 2334)     And   piperacillin-tazobactam (ZOSYN) 3.375 g in  mL IVPB (3.375 g Intravenous New Bag 3/15/19 0255)   MD Alert...Vancomycin per Pharmacy (not administered)   NS infusion ( Intravenous New Bag 3/14/19 2332)   omeprazole (PRILOSEC) capsule 20 mg (not administered)   HYDROmorphone (DILAUDID) tablet 16 mg (16 mg Oral Given 3/15/19 0255)   vancomycin 1,100 mg in  mL IVPB (not administered)   magnesium sulfate IVPB premix 2 g (not administered)   potassium chloride SA (Kdur) tablet 20 mEq (not administered)   vancomycin 1,900 mg in  mL IVPB (0 mg Intravenous Stopped 3/15/19 0321)   NS (BOLUS) infusion 500 mL (500 mL Intravenous Given 3/15/19 0020)     Administrations This Visit     HYDROmorphone (DILAUDID) injection 2.5 mg     Admin Date  03/14/2019 Action  Given Dose  2.5 mg Route  Intravenous Site            HYDROmorphone (DILAUDID) tablet 16 mg     Admin Date  03/15/2019 Action  Given Dose  16 mg Route  Oral Site            NS (BOLUS) infusion 500 mL     Admin Date  03/15/2019 Action  Given Dose  500 mL Rate  1,000 mL/hr Route  Intravenous Site            NS infusion     Admin Date  03/14/2019 Action  New Bag Dose   Rate  100 mL/hr Route  Intravenous Site            piperacillin-tazobactam (ZOSYN) 3.375 g in  mL IVPB     Admin Date  03/14/2019 Action  New Bag Dose  3.375 g Rate  200 mL/hr Route  Intravenous Site             Admin Date  03/15/2019 Action  New Bag Dose  3.375 g Rate  25 mL/hr Route  Intravenous Site            polyethylene glycol/lytes (MIRALAX) PACKET 1 Packet     Admin Date  03/14/2019 Action  Given Dose  1 Packet Route  Oral Site          "   vancomycin 1,900 mg in  mL IVPB     Admin Date  03/15/2019 Action  New Bag Dose  1900 mg Rate  166.7 mL/hr Route  Intravenous Site                  FamHx:  Family History   Problem Relation Age of Onset   • Adopted: Yes   • No Known Problems Mother    • No Known Problems Father    • No Known Problems Sister    • No Known Problems Brother    • No Known Problems Maternal Grandmother    • No Known Problems Maternal Grandfather    • No Known Problems Paternal Grandmother    • No Known Problems Paternal Grandfather        Allergies:  Patient has no known allergies.      SocHx:  Social History     Social History   • Marital status:      Spouse name: N/A   • Number of children: N/A   • Years of education: N/A     Occupational History   • Not on file.     Social History Main Topics   • Smoking status: Former Smoker     Packs/day: 1.00     Years: 15.00     Start date: 1/12/2001     Quit date: 6/12/2015   • Smokeless tobacco: Former User   • Alcohol use No   • Drug use: No   • Sexual activity: Not on file     Other Topics Concern   • Not on file     Social History Narrative   • No narrative on file         PHYSICAL EXAM:  Vitals:  Vitals:    03/14/19 2115 03/14/19 2250 03/15/19 0240 03/15/19 0405   BP: 113/76   113/86   Pulse: 87   68   Resp: 16 16 16 16   Temp: 36.4 °C (97.6 °F)   37.4 °C (99.3 °F)   TempSrc: Temporal   Temporal   SpO2: 95%   95%   Weight: 74.7 kg (164 lb 10.9 oz)      Height: 1.905 m (6' 3\")          Physical Exam      Labs:  Recent Labs      03/14/19   2250  03/15/19   0251   WBC  7.9  6.1   RBC  3.37*  3.09*   HEMOGLOBIN  10.3*  9.6*   HEMATOCRIT  32.0*  29.5*   MCV  95.0  95.5   MCH  30.6  31.1   RDW  57.7*  57.2*   PLATELETCT  230  220   MPV  9.6  9.6   NEUTSPOLYS  77.70*  65.40   LYMPHOCYTES  12.80*  21.30*   MONOCYTES  8.30  11.20   EOSINOPHILS  0.30  1.20   BASOPHILS  0.60  0.70     Recent Labs      03/14/19   2250  03/15/19   0251   SODIUM  131*  134*   POTASSIUM  4.1  3.8   CHLORIDE "  103  107   CO2  23  23   GLUCOSE  143*  96   BUN  7*  6*     Recent Labs      03/14/19   2250  03/15/19   0251   ALBUMIN  2.7*  2.5*   TBILIRUBIN  0.4  0.5   ALKPHOSPHAT  143*  122*   TOTPROTEIN  5.1*  4.6*   ALTSGPT  29  26   ASTSGOT  38  33   CREATININE  0.84  0.73     Recent Labs      03/14/19   2250  03/15/19   0251   SODIUM  131*  134*   POTASSIUM  4.1  3.8   CHLORIDE  103  107   CO2  23  23   BUN  7*  6*   CREATININE  0.84  0.73   MAGNESIUM   --   1.7   CALCIUM  7.8*  7.6*     Recent Labs      03/14/19   2250  03/15/19   0251   ALTSGPT  29  26   ASTSGOT  38  33   ALKPHOSPHAT  143*  122*   TBILIRUBIN  0.4  0.5   GLUCOSE  143*  96     Recent Labs      03/14/19   2250  03/15/19   0251   RBC  3.37*  3.09*   HEMOGLOBIN  10.3*  9.6*   HEMATOCRIT  32.0*  29.5*   PLATELETCT  230  220       Imaging:    ASSESSMENT and PLAN:  Erich Ingram is a 54yo with history of recurring metastatic colon cancer to lung s/p partial colectomy and on second trial of chemo, HCV, mild COPD, and recurrent C. Diff, presented to field ER positive for SIRS criteria (fever, WBC>12, ?) secondary to resistant infection. Port site cultures were positive for gram positive lactobacilli and catheter tip positive for Candida Albicans. Patient brought in to ED stable and currently being medically managed and monitored.    # Bacteremia Related to Port Site  - ***What is Problem***  - ***How bad is problem***  - ***Other considerations***  - ***What are you doing for the problem***  - ***What are you monitoring and anticipating***    # COPD (mild, Stage I)  - ***What is Problem***  - ***How bad is problem***  - ***Other considerations***  - ***What are you doing for the problem***  - ***What are you monitoring and anticipating***    # HCV  # Hepatic Dysfunction  - ***What is Problem***  - ***How bad is problem***  - ***Other considerations***  - ***What are you doing for the problem***  - ***What are you monitoring and anticipating***    # Lung  Metastases  - ***What is Problem***  - ***How bad is problem***  - ***Other considerations***  - ***What are you doing for the problem***  - ***What are you monitoring and anticipating***    # HTN  - ***What is Problem***  - ***How bad is problem***  - ***Other considerations***  - ***What are you doing for the problem***  - ***What are you monitoring and anticipating***    # Recurrent C. diff  - ***What is Problem***  - ***How bad is problem***  - ***Other considerations***  - ***What are you doing for the problem***  - ***What are you monitoring and anticipating***      Haresh Zhang, [facility]

## 2019-03-15 NOTE — ASSESSMENT & PLAN NOTE
Patient's blood cultures grew Gram positive rods (lactobalcillus) on blood from port-A-Cath. Despite antibiotic therapy, he was still growing on subsequent blood cultures. Port-A-Cath was removed 3/4/2019. Patient was discharged on Oral Amoxicillin 500mg TID.  He was compliant with his medication and left for 2 more days of amoxicillin.  He is spiking fever being on antibiotics.  -He grew lactobacillus in blood (3/2) from his port and peripheral vein however repeat blood culture negative on 3/5, he grew Candida albicans on the catheter tip resulted on March 8.  His port was removed during last hospitalization (3/4).  - Presented to OSH as fever of 100 F 3/14, asymptomatic  - Labs at OSH : WBC 14.8, lactate 2.6 --> 2.7 despite IVF, , normal Bilirubin; given C3 and flagy  - At [facility] : WBC 7.9, lactate down to 1.2, procal elevated 0.63 today  - on Zosyn and vancomycin for unknown source of infection  - possible sources of infection : lung vs left kidney  - UA negative  - CT Thorax from prior admission --> has lung cavities, mets  - CT Abdo 3/14 at OSH --> chronic left HNsis, fatty liver  - US from prior admission - no gall stones, sludge present    Plan  - Patient can be discharged home today as he has remained afebrile  - Blood cultures from [facility] are pending, but the patient states that they never bc cultures. Unlikely to get these records today since medical records office is closed on weekends. Blood cultures drawn here are negative to date.

## 2019-03-15 NOTE — PROGRESS NOTES
Received call back from Dr. Gleason from hospitalist service informing this RN that [facility] will be taking over the patient for this admission. Awaiting call from [facility] MD to inform of admission.

## 2019-03-16 ENCOUNTER — APPOINTMENT (OUTPATIENT)
Dept: RADIOLOGY | Facility: MEDICAL CENTER | Age: 54
DRG: 864 | End: 2019-03-16
Attending: STUDENT IN AN ORGANIZED HEALTH CARE EDUCATION/TRAINING PROGRAM
Payer: COMMERCIAL

## 2019-03-16 PROBLEM — E87.1 HYPONATREMIA: Status: RESOLVED | Noted: 2019-02-28 | Resolved: 2019-03-16

## 2019-03-16 LAB
ALBUMIN SERPL BCP-MCNC: 2.6 G/DL (ref 3.2–4.9)
ALBUMIN/GLOB SERPL: 1.1 G/DL
ALP SERPL-CCNC: 122 U/L (ref 30–99)
ALT SERPL-CCNC: 24 U/L (ref 2–50)
ANION GAP SERPL CALC-SCNC: 6 MMOL/L (ref 0–11.9)
AST SERPL-CCNC: 33 U/L (ref 12–45)
BASOPHILS # BLD AUTO: 0.9 % (ref 0–1.8)
BASOPHILS # BLD: 0.07 K/UL (ref 0–0.12)
BILIRUB SERPL-MCNC: 0.4 MG/DL (ref 0.1–1.5)
BUN SERPL-MCNC: 5 MG/DL (ref 8–22)
CALCIUM SERPL-MCNC: 8 MG/DL (ref 8.5–10.5)
CHLORIDE SERPL-SCNC: 107 MMOL/L (ref 96–112)
CO2 SERPL-SCNC: 24 MMOL/L (ref 20–33)
CREAT SERPL-MCNC: 1.04 MG/DL (ref 0.5–1.4)
EOSINOPHIL # BLD AUTO: 0.17 K/UL (ref 0–0.51)
EOSINOPHIL NFR BLD: 2.1 % (ref 0–6.9)
ERYTHROCYTE [DISTWIDTH] IN BLOOD BY AUTOMATED COUNT: 56.6 FL (ref 35.9–50)
GLOBULIN SER CALC-MCNC: 2.4 G/DL (ref 1.9–3.5)
GLUCOSE SERPL-MCNC: 84 MG/DL (ref 65–99)
HCT VFR BLD AUTO: 32.4 % (ref 42–52)
HGB BLD-MCNC: 10.5 G/DL (ref 14–18)
IMM GRANULOCYTES # BLD AUTO: 0.02 K/UL (ref 0–0.11)
IMM GRANULOCYTES NFR BLD AUTO: 0.3 % (ref 0–0.9)
LYMPHOCYTES # BLD AUTO: 2.05 K/UL (ref 1–4.8)
LYMPHOCYTES NFR BLD: 25.8 % (ref 22–41)
MCH RBC QN AUTO: 30.8 PG (ref 27–33)
MCHC RBC AUTO-ENTMCNC: 32.4 G/DL (ref 33.7–35.3)
MCV RBC AUTO: 95 FL (ref 81.4–97.8)
MONOCYTES # BLD AUTO: 0.91 K/UL (ref 0–0.85)
MONOCYTES NFR BLD AUTO: 11.4 % (ref 0–13.4)
NEUTROPHILS # BLD AUTO: 4.74 K/UL (ref 1.82–7.42)
NEUTROPHILS NFR BLD: 59.5 % (ref 44–72)
NRBC # BLD AUTO: 0 K/UL
NRBC BLD-RTO: 0 /100 WBC
PLATELET # BLD AUTO: 243 K/UL (ref 164–446)
PMV BLD AUTO: 9.6 FL (ref 9–12.9)
POTASSIUM SERPL-SCNC: 4.2 MMOL/L (ref 3.6–5.5)
PROT SERPL-MCNC: 5 G/DL (ref 6–8.2)
RBC # BLD AUTO: 3.41 M/UL (ref 4.7–6.1)
SODIUM SERPL-SCNC: 137 MMOL/L (ref 135–145)
VANCOMYCIN TROUGH SERPL-MCNC: 22.6 UG/ML (ref 10–20)
WBC # BLD AUTO: 8 K/UL (ref 4.8–10.8)

## 2019-03-16 PROCEDURE — 700102 HCHG RX REV CODE 250 W/ 637 OVERRIDE(OP): Performed by: STUDENT IN AN ORGANIZED HEALTH CARE EDUCATION/TRAINING PROGRAM

## 2019-03-16 PROCEDURE — 700111 HCHG RX REV CODE 636 W/ 250 OVERRIDE (IP): Performed by: STUDENT IN AN ORGANIZED HEALTH CARE EDUCATION/TRAINING PROGRAM

## 2019-03-16 PROCEDURE — 85025 COMPLETE CBC W/AUTO DIFF WBC: CPT

## 2019-03-16 PROCEDURE — 71250 CT THORAX DX C-: CPT

## 2019-03-16 PROCEDURE — 36415 COLL VENOUS BLD VENIPUNCTURE: CPT

## 2019-03-16 PROCEDURE — 80053 COMPREHEN METABOLIC PANEL: CPT

## 2019-03-16 PROCEDURE — A9270 NON-COVERED ITEM OR SERVICE: HCPCS | Performed by: STUDENT IN AN ORGANIZED HEALTH CARE EDUCATION/TRAINING PROGRAM

## 2019-03-16 PROCEDURE — 80202 ASSAY OF VANCOMYCIN: CPT

## 2019-03-16 PROCEDURE — 770006 HCHG ROOM/CARE - MED/SURG/GYN SEMI*

## 2019-03-16 PROCEDURE — 700105 HCHG RX REV CODE 258: Performed by: STUDENT IN AN ORGANIZED HEALTH CARE EDUCATION/TRAINING PROGRAM

## 2019-03-16 PROCEDURE — 99232 SBSQ HOSP IP/OBS MODERATE 35: CPT | Mod: GC | Performed by: HOSPITALIST

## 2019-03-16 RX ORDER — PROMETHAZINE HYDROCHLORIDE 25 MG/1
12.5 TABLET ORAL EVERY 6 HOURS PRN
Status: DISCONTINUED | OUTPATIENT
Start: 2019-03-16 | End: 2019-03-17

## 2019-03-16 RX ADMIN — LOSARTAN POTASSIUM 50 MG: 50 TABLET ORAL at 05:50

## 2019-03-16 RX ADMIN — PREDNISONE 5 MG: 5 TABLET ORAL at 05:50

## 2019-03-16 RX ADMIN — PIPERACILLIN AND TAZOBACTAM 3.38 G: 3; .375 INJECTION, POWDER, LYOPHILIZED, FOR SOLUTION INTRAVENOUS; PARENTERAL at 05:50

## 2019-03-16 RX ADMIN — HYDROMORPHONE HYDROCHLORIDE 16 MG: 4 TABLET ORAL at 11:30

## 2019-03-16 RX ADMIN — HYDROMORPHONE HYDROCHLORIDE 16 MG: 4 TABLET ORAL at 19:33

## 2019-03-16 RX ADMIN — ONDANSETRON 4 MG: 2 INJECTION INTRAMUSCULAR; INTRAVENOUS at 06:34

## 2019-03-16 RX ADMIN — HYDROMORPHONE HYDROCHLORIDE 16 MG: 4 TABLET ORAL at 23:28

## 2019-03-16 RX ADMIN — ONDANSETRON 4 MG: 2 INJECTION INTRAMUSCULAR; INTRAVENOUS at 12:45

## 2019-03-16 RX ADMIN — HYDROMORPHONE HYDROCHLORIDE 16 MG: 4 TABLET ORAL at 15:31

## 2019-03-16 RX ADMIN — TAMSULOSIN HYDROCHLORIDE 0.4 MG: 0.4 CAPSULE ORAL at 09:31

## 2019-03-16 RX ADMIN — PROMETHAZINE HYDROCHLORIDE 12.5 MG: 25 TABLET ORAL at 19:53

## 2019-03-16 RX ADMIN — HYDROMORPHONE HYDROCHLORIDE 16 MG: 4 TABLET ORAL at 03:29

## 2019-03-16 RX ADMIN — VANCOMYCIN HYDROCHLORIDE 1100 MG: 100 INJECTION, POWDER, LYOPHILIZED, FOR SOLUTION INTRAVENOUS at 12:45

## 2019-03-16 RX ADMIN — ONDANSETRON 4 MG: 2 INJECTION INTRAMUSCULAR; INTRAVENOUS at 18:09

## 2019-03-16 RX ADMIN — HYDROMORPHONE HYDROCHLORIDE 16 MG: 4 TABLET ORAL at 07:31

## 2019-03-16 RX ADMIN — PIPERACILLIN AND TAZOBACTAM 3.38 G: 3; .375 INJECTION, POWDER, LYOPHILIZED, FOR SOLUTION INTRAVENOUS; PARENTERAL at 19:33

## 2019-03-16 RX ADMIN — OMEPRAZOLE 20 MG: 20 CAPSULE, DELAYED RELEASE ORAL at 05:50

## 2019-03-16 RX ADMIN — SODIUM CHLORIDE: 9 INJECTION, SOLUTION INTRAVENOUS at 07:50

## 2019-03-16 RX ADMIN — PIPERACILLIN AND TAZOBACTAM 3.38 G: 3; .375 INJECTION, POWDER, LYOPHILIZED, FOR SOLUTION INTRAVENOUS; PARENTERAL at 13:53

## 2019-03-16 RX ADMIN — SENNOSIDES AND DOCUSATE SODIUM 2 TABLET: 8.6; 5 TABLET ORAL at 17:25

## 2019-03-16 RX ADMIN — ENOXAPARIN SODIUM 40 MG: 100 INJECTION SUBCUTANEOUS at 05:50

## 2019-03-16 ASSESSMENT — ENCOUNTER SYMPTOMS
SENSORY CHANGE: 0
VOMITING: 0
SPUTUM PRODUCTION: 0
SINUS PAIN: 0
NAUSEA: 1
NECK PAIN: 0
SEIZURES: 0
POLYDIPSIA: 0
FEVER: 0
SHORTNESS OF BREATH: 0
FOCAL WEAKNESS: 0
SPEECH CHANGE: 0
PALPITATIONS: 0
DOUBLE VISION: 0
CHILLS: 0
BRUISES/BLEEDS EASILY: 0
LOSS OF CONSCIOUSNESS: 0
ABDOMINAL PAIN: 0
DIARRHEA: 0
BACK PAIN: 0
BLURRED VISION: 0
HALLUCINATIONS: 0
CONSTIPATION: 0
COUGH: 0

## 2019-03-16 NOTE — PROGRESS NOTES
Assumed care of patient at change of shift. Patient is alert and oriented x4, c/o 10/10 left groin pain. Will give pain medication as ordered. Otherwise in good spirits. Discussed plan of care and all questions answered.IVF and antibiotics continue. Safety measures In place, call light within reach. Will continue to monitor.

## 2019-03-16 NOTE — PROGRESS NOTES
Internal Medicine Interval Note  Note Author: Yaneth Correa M.D.     Name Erich Ingram     1965   Age/Sex 53 y.o. male   MRN [id number]   Code Status FULL     After 5PM or if no immediate response to page, please call for cross-coverage  Attending/Team: Dr. Arriaga / Mad[place] See Patient List for primary contact information  Call [phone] to page    1st Call - Day Intern (R1):   Dr. Correa 2nd Call - Day Sr. Resident (R2/R3):   Dr. Toro         Reason for interval visit  (Principal Problem)   Fever due to infection    Interval Problem Daily Status Update  (24 hours)   As above  Denies chills / diaphoresis / cough / sputum / chest pain / abdo pain / diarrhea / neck pain  Procalcitonin 0.63    Nausea: Patient is complaining of nausea this morning. He states that his last chemotherapy/immunotherapy was 1 month ago, but he frequently gets nausea because of his cancer.     Fever: Patient has been afebrile. He is still on vanc/zosyn. Blood cultures are negative to date. Possible sources of infection - left hydronephrosis, pulmonary cavities. Will consider ID consult if no improvement. Pending culture reports from [facility].   BCx sent from [facility] as well that have been negative to date.     Review of Systems   Constitutional: Negative for chills and fever.   HENT: Negative for congestion and sinus pain.    Eyes: Negative for blurred vision and double vision.   Respiratory: Negative for cough, sputum production and shortness of breath.    Cardiovascular: Negative for chest pain, palpitations and leg swelling.   Gastrointestinal: Positive for nausea. Negative for abdominal pain, constipation, diarrhea and vomiting.   Genitourinary: Negative for dysuria, frequency and urgency.   Musculoskeletal: Negative for back pain and neck pain.   Skin: Negative for itching and rash.   Neurological: Negative for sensory change, speech change, focal weakness, seizures and loss of consciousness.    Endo/Heme/Allergies: Negative for polydipsia. Does not bruise/bleed easily.   Psychiatric/Behavioral: Negative for hallucinations and suicidal ideas.       Consultants/Specialty  None - consider ID consult  PCP: BEENA Sterling    Disposition  In-patient, trying to find source for infection. Barriers to discharge: none    Quality Measures  Quality-Core Measures   Reviewed items::  EKG reviewed, Radiology images reviewed, Labs reviewed and Medications reviewed  Soriano catheter::  No Soriano  DVT prophylaxis pharmacological::  Enoxaparin (Lovenox)  Ulcer Prophylaxis::  Yes  Antibiotics:  Treating active infection/contamination beyond 24 hours perioperative coverage      Physical Exam       Vitals:    03/15/19 1500 03/15/19 1907 03/16/19 0317 03/16/19 0715   BP: 116/84 137/92 129/88 135/90   Pulse: 70 60 (!) 54 60   Resp: 16 16 16 20   Temp: 36.3 °C (97.4 °F) 37 °C (98.6 °F) 36.2 °C (97.1 °F) 36.2 °C (97.2 °F)   TempSrc: Temporal Temporal Temporal Temporal   SpO2: 95% 96% 96% 96%   Weight:       Height:         Body mass index is 20.58 kg/m².    Oxygen Therapy:  Pulse Oximetry: 96 %, O2 Delivery: None (Room Air)    Physical Exam   Constitutional: He is oriented to person, place, and time and well-developed, well-nourished, and in no distress. No distress.   HENT:   Head: Normocephalic and atraumatic.   Mouth/Throat: No oropharyngeal exudate.   Eyes: Pupils are equal, round, and reactive to light. Conjunctivae are normal. Right eye exhibits no discharge. Left eye exhibits no discharge. No scleral icterus.   Neck: Normal range of motion. Neck supple. No JVD present.   Cardiovascular: Normal rate, regular rhythm and normal heart sounds.    No murmur heard.  Pulmonary/Chest: Effort normal and breath sounds normal. No respiratory distress. He has no wheezes. He has no rales.   Abdominal: Soft. Bowel sounds are normal. He exhibits no distension. There is no tenderness. There is no rebound and no guarding.    Musculoskeletal: Normal range of motion. He exhibits no edema, tenderness or deformity.   Lymphadenopathy:     He has no cervical adenopathy.   Neurological: He is alert and oriented to person, place, and time. GCS score is 15.   Skin: Skin is warm and dry. No rash noted. He is not diaphoretic. No erythema.   Psychiatric: Mood, memory, affect and judgment normal.       Lab Data Review:     3/15/2019  6:58 PM    Recent Labs      03/14/19   2250  03/15/19   0251  03/16/19   0258   SODIUM  131*  134*  137   POTASSIUM  4.1  3.8  4.2   CHLORIDE  103  107  107   CO2  23  23  24   BUN  7*  6*  5*   CREATININE  0.84  0.73  1.04   MAGNESIUM   --   1.7   --    CALCIUM  7.8*  7.6*  8.0*       Recent Labs      03/14/19   2250  03/15/19   0251  03/16/19   0258   ALTSGPT  29  26  24   ASTSGOT  38  33  33   ALKPHOSPHAT  143*  122*  122*   TBILIRUBIN  0.4  0.5  0.4   GLUCOSE  143*  96  84       Recent Labs      03/14/19   2250  03/15/19   0251  03/16/19   0258   RBC  3.37*  3.09*  3.41*   HEMOGLOBIN  10.3*  9.6*  10.5*   HEMATOCRIT  32.0*  29.5*  32.4*   PLATELETCT  230  220  243       Recent Labs      03/14/19   2250  03/15/19   0251  03/16/19   0258   WBC  7.9  6.1  8.0   NEUTSPOLYS  77.70*  65.40  59.50   LYMPHOCYTES  12.80*  21.30*  25.80   MONOCYTES  8.30  11.20  11.40   EOSINOPHILS  0.30  1.20  2.10   BASOPHILS  0.60  0.70  0.90   ASTSGOT  38  33  33   ALTSGPT  29  26  24   ALKPHOSPHAT  143*  122*  122*   TBILIRUBIN  0.4  0.5  0.4           Assessment/Plan     * Fever due to infection- (present on admission)   Assessment & Plan    Patient's blood cultures grew Gram positive rods (lactobalcillus) on blood from port-A-Cath. Despite antibiotic therapy, he was still growing on subsequent blood cultures. Port-A-Cath was removed 3/4/2019. Patient was discharged on Oral Amoxicillin 500mg TID.  He was compliant with his medication and left for 2 more days of amoxicillin.  He is spiking fever being on antibiotics.  -He grew  lactobacillus in blood (3/2) from his port and peripheral vein however repeat blood culture negative on 3/5, he grew Candida albicans on the catheter tip resulted on March 8.  His port was removed during last hospitalization (3/4).  - Presented to OSH as fever of 100 F 3/14, asymptomatic  - Labs at OSH : WBC 14.8, lactate 2.6 --> 2.7 despite IVF, , normal Bilirubin; given C3 and flagy  - At [facility] : WBC 7.9, lactate down to 1.2, procal elevated 0.63 today  - on Zosyn and vancomycin for unknown source of infection  - possible sources of infection : lung vs left kidney  - UA negative  - CT Thorax from prior admission --> has lung cavities, mets  - CT Abdo 3/14 at OSH --> chronic left HNsis, fatty liver  - US from prior admission - no gall stones, sludge present    Plan  - continue Vanc and zosyn  - Blood cultures from [facility] are pending. Blood cultures to date are negative.   - Consider CT Thorax if no clear source identified  - Consider ID consult after BCx available     Elevated lactic acid level   Assessment & Plan    - 2.7 ---> 2.6, down to 1.2.   - Continue IV fluid and vanc, zosyn     Metastatic Colon cancer (HCC)- (present on admission)   Assessment & Plan    - H/O colon cancer - First episode diagnosed in 2016, underwent partial colectomy followed by chemotherapy in remission  - Recurrence of metastatic colon cancer 08/2018 currently on chemotherapy  - Will need FU with onc after discharge, Dr. Jeff       Burning pain   Assessment & Plan    - dysuria, few days  - no frequency / urgency / hematuria / cloudy urine  - UA clear  - BCx pending from [facility] and OSH  - Continue Zosyn and vancomycin     Normocytic anemia- (present on admission)   Assessment & Plan    - Normocytic, normochromic anemia  - Anemia due to chronic medical condition, GI malignancy  - Fecal hemoccult negative during last hospitalization     Elevated alkaline phosphatase level- (present on admission)   Assessment & Plan    - Elevated  liver function tests likely secondary to liver cirrhosis from hepatitis C   - , ALT 63,  during last hospitalization.    - This adm : , normal bili, transaminases - normal  - RUQ US Feb 2019 : heterogeneous and echogenic appearance of the liver--> hepatic steatosis or hepatocellular disease. No gall stones, sludge +, mild dilation of CBD     Hypertension- (present on admission)   Assessment & Plan    Continue home dose Losartan     Thrombocytopenia (HCC)- (present on admission)   Assessment & Plan    - likely secondary to liver cirrhosis and untreated hepatitis C.  - Patient is asymptomatic     Hepatitis C- (present on admission)   Assessment & Plan    Patient reports history of untreated hepatitis C.   - Patient will need outpatient follow-up with GI when medically stable.      COPD (chronic obstructive pulmonary disease) (HCC)- (present on admission)   Assessment & Plan    - h/o COPD, not on home oxygen  - ex-smoker for 2 years, 10-pack-year Hx  - ctm, IS

## 2019-03-16 NOTE — CARE PLAN
Problem: Knowledge Deficit  Goal: Knowledge of disease process/condition, treatment plan, diagnostic tests, and medications will improve    Intervention: Explain information regarding disease process/condition, treatment plan, diagnostic tests, and medications and document in education  Educated patient on the importance of oral care. Encouraged patient to brush his teeth several times a day. Verbalized understanding       Problem: Pain Management  Goal: Pain level will decrease to patient's comfort goal    Intervention: Follow pain managment plan developed in collaboration with patient and Interdisciplinary Team  Educated patient on his pain control options including rest, medication, and distraction. Verbalized understanding

## 2019-03-16 NOTE — PROGRESS NOTES
Patient is AOx4. Plan of care discussed. Verbalized understanding. Complains of pain, encouraged rest and repositioning, educated when next pain medication will be available. Tolerated all oral medications. Educated patient to call and wait for staff assistance before attempting to ambulate. Verbalized understanding. Call light in use, belongings within reach, treaded socks on, bed locked in low position

## 2019-03-16 NOTE — PROGRESS NOTES
Internal Medicine Interval Note  Note Author: Wendy Toro M.D.     Name Erich Ingram     1965   Age/Sex 53 y.o. male   MRN [id number]   Code Status FULL     After 5PM or if no immediate response to page, please call for cross-coverage  Attending/Team: Dr. Arriaga / Madelin See Patient List for primary contact information  Call [phone] to page    1st Call - Day Intern (R1):   Dr. Correa 2nd Call - Day Sr. Resident (R2/R3):   Dr. Toro         Reason for interval visit  (Principal Problem)   Fever due to infection    ID :  This 53 year old gentleman with PMHx of HTN, stage IV colon cancer (s/p resection in ) with pulmonary mets, on chemotherapy (completed 10 cycles, due for two more), recent admission  - 3/6 for sepsis secondary to pneumonia and bacteremia (lactobacillus on BCx from peripheral and port draw), s/p removal of Port 2019, candida in the port tip, cavitary lesions on CT Chest, chronic left hydronephrosis, was transferred from OSH after he presented there with fever. He was discharged on 3/6 with 10-day course of amoxicillin and was advised to return if he had a temperature. Patient noted temp of 100 F on 3/14, without any localizing symptoms. He did have nausea, vomiting (endorses this is chronic, related to chemo). Also has chronic left loin discomfort due to hydronephrosis, LLQ abdo discomfort due to colon cancer (chronic), but no new symptoms. WBC was 14.8, Lactate was 2.6 --> 2.7, and had temp 100.4 F at OSH, and was hence transferred. CT Abdomen at OSH - nil acute, chronic left hydronephrosis, pulmonary lesions in keeping with mets, with a new 6-7 mm LLL lesion. Admits to some dysuria for a week, but UA completely clear at OSH and here. Was given C3 and flagyl at OSH. Was commenced on vanc + zosyn at this facility. Lactate improved following fluid boluses and antibiotics. WBC down to 7.9, lactate 1.2.     Interval Problem Daily Status Update  (  hours)   As above  Denies chills / diaphoresis / cough / sputum / chest pain / abdo pain / diarrhea / neck pain  Procalcitonin 0.63    Possible sources of infection - left hydronephrosis, pulmonary cavities  Consider ID consult if no improvement  To get culture reports from OSH  BCx sent from [facility] as well      Review of Systems   Constitutional: Positive for fever. Negative for chills.   HENT: Negative for congestion and sinus pain.    Eyes: Negative for blurred vision and double vision.   Respiratory: Negative for cough, sputum production and shortness of breath.    Cardiovascular: Negative for chest pain, palpitations and leg swelling.   Gastrointestinal: Positive for nausea and vomiting. Negative for abdominal pain, constipation and diarrhea.   Genitourinary: Positive for dysuria. Negative for frequency and urgency.   Musculoskeletal: Negative for back pain and neck pain.   Skin: Negative for itching and rash.   Neurological: Negative for sensory change, speech change, focal weakness, seizures and loss of consciousness.   Endo/Heme/Allergies: Negative for polydipsia. Does not bruise/bleed easily.   Psychiatric/Behavioral: Negative for hallucinations and suicidal ideas.       Consultants/Specialty  None - consider ID consult  PCP: @PCP    Disposition  In-patient, trying to find source for infection    Quality Measures  Quality-Core Measures   Reviewed items::  EKG reviewed, Radiology images reviewed, Labs reviewed and Medications reviewed  Soriano catheter::  No Soriano  DVT prophylaxis pharmacological::  Enoxaparin (Lovenox)  Ulcer Prophylaxis::  Yes  Antibiotics:  Treating active infection/contamination beyond 24 hours perioperative coverage      Physical Exam       Vitals:    03/15/19 0240 03/15/19 0405 03/15/19 0803 03/15/19 1500   BP:  113/86 125/83 116/84   Pulse:  68 60 70   Resp: 16 16 16 16   Temp:  37.4 °C (99.3 °F) 36.8 °C (98.3 °F) 36.3 °C (97.4 °F)   TempSrc:  Temporal Temporal Temporal   SpO2:  95% 92%  95%   Weight:       Height:         Body mass index is 20.58 kg/m². Weight: 74.7 kg (164 lb 10.9 oz)  Oxygen Therapy:  Pulse Oximetry: 95 %, O2 (LPM): 0, O2 Delivery: None (Room Air)    Physical Exam   Constitutional: He is oriented to person, place, and time and well-developed, well-nourished, and in no distress. No distress.   HENT:   Head: Normocephalic and atraumatic.   Mouth/Throat: No oropharyngeal exudate.   Eyes: Pupils are equal, round, and reactive to light. Conjunctivae are normal. Right eye exhibits no discharge. Left eye exhibits no discharge. No scleral icterus.   Neck: Normal range of motion. Neck supple. No JVD present.   Cardiovascular: Normal rate, regular rhythm and normal heart sounds.    No murmur heard.  Pulmonary/Chest: Effort normal and breath sounds normal. No respiratory distress. He has no wheezes. He has no rales.   Abdominal: Soft. Bowel sounds are normal. He exhibits no distension. There is no tenderness. There is no rebound and no guarding.   Musculoskeletal: Normal range of motion. He exhibits no edema, tenderness or deformity.   Lymphadenopathy:     He has no cervical adenopathy.   Neurological: He is alert and oriented to person, place, and time. GCS score is 15.   Skin: Skin is warm and dry. No rash noted. He is not diaphoretic. No erythema.   Psychiatric: Mood, memory, affect and judgment normal.       Lab Data Review:     3/15/2019  6:58 PM    Recent Labs      03/14/19   2250  03/15/19   0251   SODIUM  131*  134*   POTASSIUM  4.1  3.8   CHLORIDE  103  107   CO2  23  23   BUN  7*  6*   CREATININE  0.84  0.73   MAGNESIUM   --   1.7   CALCIUM  7.8*  7.6*       Recent Labs      03/14/19   2250  03/15/19   0251   ALTSGPT  29  26   ASTSGOT  38  33   ALKPHOSPHAT  143*  122*   TBILIRUBIN  0.4  0.5   GLUCOSE  143*  96       Recent Labs      03/14/19   2250  03/15/19   0251   RBC  3.37*  3.09*   HEMOGLOBIN  10.3*  9.6*   HEMATOCRIT  32.0*  29.5*   PLATELETCT  230  220       Recent Labs       03/14/19   2250  03/15/19   0251   WBC  7.9  6.1   NEUTSPOLYS  77.70*  65.40   LYMPHOCYTES  12.80*  21.30*   MONOCYTES  8.30  11.20   EOSINOPHILS  0.30  1.20   BASOPHILS  0.60  0.70   ASTSGOT  38  33   ALTSGPT  29  26   ALKPHOSPHAT  143*  122*   TBILIRUBIN  0.4  0.5           Assessment/Plan     * Fever due to infection- (present on admission)   Assessment & Plan    Patient's blood cultures grew Gram positive rods (lactobalcillus) on blood from port-A-Cath. Despite antibiotic therapy, he was still growing on subsequent blood cultures. Port-A-Cath was removed 3/4/2019. Patient was discharged on Oral Amoxicillin 500mg TID.  He was compliant with his medication and left for 2 more days of amoxicillin.  He is spiking fever being on antibiotics.  -He grew lactobacillus in blood (3/2) from his port and peripheral vein however repeat blood culture negative on 3/5, he grew Candida albicans on the catheter tip resulted on March 8.  His port was removed during last hospitalization (3/4).  - Presented to OSH as fever of 100 F 3/14, asymptomatic  - Labs at OSH : WBC 14.8, lactate 2.6 --> 2.7 despite IVF, , normal Bilirubin; given C3 and flagy  - At [facility] : WBC 7.9, lactate down to 1.2, procal elevated 0.63 today  - on Zosyn and vancomycin for unknown source of infection  - possible sources of infection : lung vs left kidney  - UA negative  - CT Thorax from prior admission --> has lung cavities, mets  - CT Abdo 3/14 at OSH --> chronic left HNsis, fatty liver  - US from prior admission - no gall stones, sludge present    Plan  - continue Vanc and zosyn  - anna BCx from OSH (could not contact today - has to be done tomorrow) and at [facility] [place]  - Consider CT Thorax if no clear source identified  - Consider ID consult after BCx available     Elevated lactic acid level   Assessment & Plan    - 2.7 ---> 2.6, down to 1.2 this am  - Continue IV fluid and vanc, zosyn     Metastatic Colon cancer (HCC)- (present on admission)    Assessment & Plan    - H/O colon cancer - First episode diagnosed in 2016, underwent partial colectomy followed by chemotherapy in remission  - Recurrence of metastatic colon cancer 08/2018 currently on chemotherapy  - Will need FU with onc after discharge, Dr. Jeff       Burning pain   Assessment & Plan    - dysuria, few days  - no frequency / urgency / hematuria / cloudy urine  - UA clear  - BCx pending from [facility] and OSH  - Continue Zosyn and vancomycin     Normocytic anemia- (present on admission)   Assessment & Plan    - Normocytic, normochromic anemia  - Anemia due to chronic medical condition, GI malignancy  - Fecal hemoccult negative during last hospitalization     Elevated alkaline phosphatase level- (present on admission)   Assessment & Plan    - Elevated liver function tests likely secondary to liver cirrhosis from hepatitis C   - , ALT 63,  during last hospitalization.    - This adm : , normal bili, transaminases - normal  - RUQ US Feb 2019 : heterogeneous and echogenic appearance of the liver--> hepatic steatosis or hepatocellular disease. No gall stones, sludge +, mild dilation of CBD     Hyponatremia- (present on admission)   Assessment & Plan    - Na 134   - continue IVF for hydration      Hypertension- (present on admission)   Assessment & Plan    Continue home dose Losartan     Thrombocytopenia (HCC)- (present on admission)   Assessment & Plan    - likely secondary to liver cirrhosis and untreated hepatitis C.  - Patient is asymptomatic     Hepatitis C- (present on admission)   Assessment & Plan    Patient reports history of untreated hepatitis C.   - Patient will need outpatient follow-up with GI when medically stable.      COPD (chronic obstructive pulmonary disease) (HCC)- (present on admission)   Assessment & Plan    - h/o COPD, not on home oxygen  - ex-smoker for 2 years, 10-pack-year Hx  - ctm, IS

## 2019-03-16 NOTE — PROGRESS NOTES
"Pharmacy Kinetics 53 y.o. male on vancomycin day # 2  3/16/2019    Currently Dose: Vancomycin 1100 mg iv q12hr (~15 mg/kg)  Received Load Dose: Yes    Indication for Treatment: intra-abdominal infection  ID Service Following: No    Pertinent history per medical record: Admitted on 3/14/2019 for suspect sepsis secondary to infection. Recent episode of lactobacillus bacteremia (secondary to probiotics) treated with amoxicillin for 10 days. Noted left lower quadrant pain and nausea on admission to [facility] from outside facility. Noted history of stage 4 colon cancer with resection in 2016. Currently undergoing chemotherapy for lung metastases.    Other antibiotics: piperacillin/tazobactam 3.375 gm iv q8h    Allergies: Patient has no known allergies.     List concerns for accumulation of vancomycin: abnormal LFT's, low albumin/malnutrition    Pertinent cultures to date:   Results     Procedure Component Value Units Date/Time    Blood Culture [[id number]] Collected:  03/14/19 2250    Order Status:  Completed Specimen:  Blood from Peripheral Updated:  03/16/19 0856     Significant Indicator NEG     Source BLD     Site PERIPHERAL     Blood Culture No Growth    Note: Blood cultures are incubated for 5 days and  are monitored continuously.Positive blood cultures  are called to the RN and reported as soon as  they are identified.      Narrative:       Per Hospital Policy: Only change Specimen Src: to \"Line\" if  specified by physician order.    Blood Culture [[id number]] Collected:  03/14/19 2250    Order Status:  Completed Specimen:  Blood from Peripheral Updated:  03/16/19 0856     Significant Indicator NEG     Source BLD     Site PERIPHERAL     Blood Culture No Growth    Note: Blood cultures are incubated for 5 days and  are monitored continuously.Positive blood cultures  are called to the RN and reported as soon as  they are identified.      Narrative:       Per Hospital Policy: Only change Specimen Src: to \"Line\" if  specified " "by physician order.    URINE CULTURE(NEW) [[id number]]     Order Status:  No result Specimen:  Urine from Urine, Clean Catch     BLOOD CULTURE [[id number]]     Order Status:  Canceled Specimen:  Blood from Peripheral     BLOOD CULTURE [[id number]]     Order Status:  Canceled Specimen:  Blood from Peripheral     URINALYSIS [[id number]] Collected:  03/15/19 0011    Order Status:  Completed Updated:  03/15/19 0026     Color Yellow     Character Clear     Specific Gravity 1.004     Ph 7.5     Glucose Negative mg/dL      Ketones Negative mg/dL      Protein Negative mg/dL      Bilirubin Negative     Urobilinogen, Urine 0.2     Nitrite Negative     Leukocyte Esterase Negative     Occult Blood Negative     Micro Urine Req see below     Comment: Microscopic examination not performed when specimen is clear  and chemically negative for protein, blood, leukocyte esterase  and nitrite.         URINALYSIS [[id number]]     Order Status:  No result Specimen:  Urine from Urine, Clean Catch         MRSA nares swab if pneumonia is a concern (ordered/positive/negative/n-a): none    Recent Labs      03/14/19   2250  03/15/19   0251  03/16/19   0258   WBC  7.9  6.1  8.0   NEUTSPOLYS  77.70*  65.40  59.50     Recent Labs      03/14/19   2250  03/15/19   0251  19   0258   BUN  7*  6*  5*   CREATININE  0.84  0.73  1.04   ALBUMIN  2.7*  2.5*  2.6*      3/16/2019 12:20   Vancomycin Trough 22.6 (H)     Intake/Output Summary (Last 24 hours) at 19 0818  Last data filed at 19 0550   Gross per 24 hour   Intake             2210 ml   Output             2200 ml   Net               10 ml      Blood pressure 135/90, pulse 60, temperature 36.2 °C (97.2 °F), temperature source Temporal, resp. rate 20, height 1.905 m (6' 3\"), weight 74.7 kg (164 lb 10.9 oz), SpO2 96 %. Temp (24hrs), Av.4 °C (97.6 °F), Min:36.2 °C (97.1 °F), Max:37 °C (98.6 °F)    Estimated Creatinine Clearance: 84.8 mL/min (by C-G formula based on SCr of 1.04 " mg/dL).    A/P   1. Vancomycin dose change: 1300 mg iv q24h (~17.5 mg/kg)   2. Next vancomycin level: 3/19/19 @1230 (ordered)  3. Goal trough: 12-16 mcg/mL  4. Comments: VS stable. Afebrile. WBC stable. CrCl ~85 mL/min (SCr with increase overnight). Microbiology pending. Factors for accumulation noted. Most recent trough noted and above goal (drawn appropriately). Dose adjusted via linear kinetics for goal trough of ~13.5 mcg/mL. Repeat BMP with AM labs. Repeat trough placed in ~2-3 days to assess clearance. Concerns that SCr with continue to rise and will monitor closely. Pharmacy will continue to follow.    Luke Tan, PharmD

## 2019-03-17 ENCOUNTER — PATIENT OUTREACH (OUTPATIENT)
Dept: HEALTH INFORMATION MANAGEMENT | Facility: OTHER | Age: 54
End: 2019-03-17

## 2019-03-17 VITALS
TEMPERATURE: 97.8 F | WEIGHT: 164.68 LBS | RESPIRATION RATE: 20 BRPM | HEART RATE: 61 BPM | OXYGEN SATURATION: 94 % | BODY MASS INDEX: 20.48 KG/M2 | HEIGHT: 75 IN | DIASTOLIC BLOOD PRESSURE: 90 MMHG | SYSTOLIC BLOOD PRESSURE: 123 MMHG

## 2019-03-17 LAB
ALBUMIN SERPL BCP-MCNC: 3 G/DL (ref 3.2–4.9)
ALBUMIN/GLOB SERPL: 1.2 G/DL
ALP SERPL-CCNC: 119 U/L (ref 30–99)
ALT SERPL-CCNC: 23 U/L (ref 2–50)
ANION GAP SERPL CALC-SCNC: 7 MMOL/L (ref 0–11.9)
AST SERPL-CCNC: 34 U/L (ref 12–45)
BASOPHILS # BLD AUTO: 0.7 % (ref 0–1.8)
BASOPHILS # BLD: 0.06 K/UL (ref 0–0.12)
BILIRUB SERPL-MCNC: 0.3 MG/DL (ref 0.1–1.5)
BUN SERPL-MCNC: 4 MG/DL (ref 8–22)
CALCIUM SERPL-MCNC: 7.9 MG/DL (ref 8.5–10.5)
CHLORIDE SERPL-SCNC: 108 MMOL/L (ref 96–112)
CO2 SERPL-SCNC: 24 MMOL/L (ref 20–33)
CREAT SERPL-MCNC: 1.01 MG/DL (ref 0.5–1.4)
EOSINOPHIL # BLD AUTO: 0.16 K/UL (ref 0–0.51)
EOSINOPHIL NFR BLD: 2 % (ref 0–6.9)
ERYTHROCYTE [DISTWIDTH] IN BLOOD BY AUTOMATED COUNT: 56 FL (ref 35.9–50)
GLOBULIN SER CALC-MCNC: 2.6 G/DL (ref 1.9–3.5)
GLUCOSE SERPL-MCNC: 90 MG/DL (ref 65–99)
HCT VFR BLD AUTO: 32.7 % (ref 42–52)
HGB BLD-MCNC: 10.5 G/DL (ref 14–18)
IMM GRANULOCYTES # BLD AUTO: 0.01 K/UL (ref 0–0.11)
IMM GRANULOCYTES NFR BLD AUTO: 0.1 % (ref 0–0.9)
LYMPHOCYTES # BLD AUTO: 2.24 K/UL (ref 1–4.8)
LYMPHOCYTES NFR BLD: 27.7 % (ref 22–41)
MCH RBC QN AUTO: 30.3 PG (ref 27–33)
MCHC RBC AUTO-ENTMCNC: 32.1 G/DL (ref 33.7–35.3)
MCV RBC AUTO: 94.2 FL (ref 81.4–97.8)
MONOCYTES # BLD AUTO: 0.82 K/UL (ref 0–0.85)
MONOCYTES NFR BLD AUTO: 10.1 % (ref 0–13.4)
NEUTROPHILS # BLD AUTO: 4.81 K/UL (ref 1.82–7.42)
NEUTROPHILS NFR BLD: 59.4 % (ref 44–72)
NRBC # BLD AUTO: 0 K/UL
NRBC BLD-RTO: 0 /100 WBC
PLATELET # BLD AUTO: 251 K/UL (ref 164–446)
PMV BLD AUTO: 9.3 FL (ref 9–12.9)
POTASSIUM SERPL-SCNC: 4 MMOL/L (ref 3.6–5.5)
PROT SERPL-MCNC: 5.6 G/DL (ref 6–8.2)
RBC # BLD AUTO: 3.47 M/UL (ref 4.7–6.1)
SODIUM SERPL-SCNC: 139 MMOL/L (ref 135–145)
WBC # BLD AUTO: 8.1 K/UL (ref 4.8–10.8)

## 2019-03-17 PROCEDURE — 80053 COMPREHEN METABOLIC PANEL: CPT

## 2019-03-17 PROCEDURE — A9270 NON-COVERED ITEM OR SERVICE: HCPCS | Performed by: STUDENT IN AN ORGANIZED HEALTH CARE EDUCATION/TRAINING PROGRAM

## 2019-03-17 PROCEDURE — 85025 COMPLETE CBC W/AUTO DIFF WBC: CPT

## 2019-03-17 PROCEDURE — 36415 COLL VENOUS BLD VENIPUNCTURE: CPT

## 2019-03-17 PROCEDURE — 700111 HCHG RX REV CODE 636 W/ 250 OVERRIDE (IP): Performed by: STUDENT IN AN ORGANIZED HEALTH CARE EDUCATION/TRAINING PROGRAM

## 2019-03-17 PROCEDURE — 700105 HCHG RX REV CODE 258: Performed by: STUDENT IN AN ORGANIZED HEALTH CARE EDUCATION/TRAINING PROGRAM

## 2019-03-17 PROCEDURE — 700102 HCHG RX REV CODE 250 W/ 637 OVERRIDE(OP): Performed by: STUDENT IN AN ORGANIZED HEALTH CARE EDUCATION/TRAINING PROGRAM

## 2019-03-17 PROCEDURE — 99239 HOSP IP/OBS DSCHRG MGMT >30: CPT | Mod: GC | Performed by: HOSPITALIST

## 2019-03-17 RX ORDER — PROMETHAZINE HYDROCHLORIDE 25 MG/1
25 TABLET ORAL EVERY 6 HOURS PRN
Status: DISCONTINUED | OUTPATIENT
Start: 2019-03-17 | End: 2019-03-17 | Stop reason: HOSPADM

## 2019-03-17 RX ORDER — AMOXICILLIN 500 MG/1
500 CAPSULE ORAL 3 TIMES DAILY
Qty: 6 CAP | Refills: 0
Start: 2019-03-17 | End: 2019-03-19

## 2019-03-17 RX ADMIN — OMEPRAZOLE 20 MG: 20 CAPSULE, DELAYED RELEASE ORAL at 05:19

## 2019-03-17 RX ADMIN — ENOXAPARIN SODIUM 40 MG: 100 INJECTION SUBCUTANEOUS at 05:20

## 2019-03-17 RX ADMIN — TAMSULOSIN HYDROCHLORIDE 0.4 MG: 0.4 CAPSULE ORAL at 09:52

## 2019-03-17 RX ADMIN — PIPERACILLIN AND TAZOBACTAM 3.38 G: 3; .375 INJECTION, POWDER, LYOPHILIZED, FOR SOLUTION INTRAVENOUS; PARENTERAL at 05:20

## 2019-03-17 RX ADMIN — PREDNISONE 5 MG: 5 TABLET ORAL at 05:19

## 2019-03-17 RX ADMIN — ONDANSETRON 4 MG: 2 INJECTION INTRAMUSCULAR; INTRAVENOUS at 08:53

## 2019-03-17 RX ADMIN — SODIUM CHLORIDE: 9 INJECTION, SOLUTION INTRAVENOUS at 08:53

## 2019-03-17 RX ADMIN — HYDROMORPHONE HYDROCHLORIDE 16 MG: 4 TABLET ORAL at 03:29

## 2019-03-17 RX ADMIN — LOSARTAN POTASSIUM 50 MG: 50 TABLET ORAL at 05:19

## 2019-03-17 RX ADMIN — HYDROMORPHONE HYDROCHLORIDE 16 MG: 4 TABLET ORAL at 11:29

## 2019-03-17 RX ADMIN — SENNOSIDES AND DOCUSATE SODIUM 2 TABLET: 8.6; 5 TABLET ORAL at 05:20

## 2019-03-17 RX ADMIN — HYDROMORPHONE HYDROCHLORIDE 16 MG: 4 TABLET ORAL at 07:31

## 2019-03-17 RX ADMIN — ONDANSETRON 4 MG: 2 INJECTION INTRAMUSCULAR; INTRAVENOUS at 03:32

## 2019-03-17 ASSESSMENT — ENCOUNTER SYMPTOMS
ABDOMINAL PAIN: 0
NECK PAIN: 0
SEIZURES: 0
COUGH: 0
FOCAL WEAKNESS: 0
POLYDIPSIA: 0
NAUSEA: 1
LOSS OF CONSCIOUSNESS: 0
BLURRED VISION: 0
SINUS PAIN: 0
CONSTIPATION: 0
DOUBLE VISION: 0
SENSORY CHANGE: 0
DIARRHEA: 0
FEVER: 0
SPUTUM PRODUCTION: 0
HALLUCINATIONS: 0
PALPITATIONS: 0
VOMITING: 0
BACK PAIN: 0
SHORTNESS OF BREATH: 0
CHILLS: 0
SPEECH CHANGE: 0
BRUISES/BLEEDS EASILY: 0

## 2019-03-17 NOTE — PROGRESS NOTES
Assumed care of patient. Patient is alert and oriented x4. Complaining of pain L groin, will give pain medication as ordered.  States that his pain is worse in the mornings and better as the day goes on. Is happy that his appetite is finally coming back. Hopeful for discharge today. IVF and antibiotics continue. All questions answered. Safety measures in place. Call light and belongings within reach.

## 2019-03-17 NOTE — DISCHARGE SUMMARY
Discharge Summary    CHIEF COMPLAINT ON ADMISSION  Fever    Reason for Admission  SIRS  2/4    Admission Date  3/14/2019    CODE STATUS  Full Code    HPI & HOSPITAL COURSE  This is a 53 y.o. male here with a history of COPD, hepatitis C (untreated), and stage 4 colon cancer s/p resection in 2016 with pulmonary metastasis (completed 10 of 12 cycles of immuno/chemotherapy, under c/o Dr. Jeff). He is currently on immunotherapy for lung metastasis with last treatment 1 month prior to admission. He was recently discharged on 3/6/2019 when he was admitted for lactobacillus bacteremia at which point his portacath was removed (candida on the tip of port, repeat blood cultures were negative). He was discharged on a 10 day course of amoxicillin 500mg TID. The patient had 2 days left of the amoxicillin. The patient states that he was having fevers to 101 at home and immediately went to [facility]. His temp was 100.4, WBC was 14.8 and lactate was elevated at 2.7 at outside hospital. VT Abdomen did not reveal any new changes / causes for concern except for chronic left hydronephrosis. He was started on some antibiotics there and was transferred to [facility].     Fevers: The patient was afebrile when he arrived to [facility]. At the outside hospital his temperature in the ED was 100.4. Lactate was 2.7 at outside hospital. It was 2.2 at [facility] and trended down to 1.2. The patient remained afebrile throughout admission. Records were pending from [facility], but did not arrive as their medical records office is not open on weekends. There was no source of infection that we could identify. His blood cultures taken on admission to [facility] were negative to date. However, the patient was started on antibiotics at [facility] prior to blood cultures being taken. The patient's UA was negative for infection. He did not complain of a productive cough. A CT thorax without contrast was ordered for evaluation of possible pneumonia in the  setting of pulmonary metastasis. The CT did not show any consolidations but did show the known multifocal cavitations and airspace processes that were consistent with metastatic disease as already known from prior admission. There was no clear source of infection that could explain the fevers the patient was having at home. The patient received 3 days of Vancomycin and Zosyn. The patient was told to finish his course of Amoxicillin, since he still had 2 days left, so that he has a total of 5 days of antibiotics.      Hydronephrosis: The patient has chronic  hydronephrosis seen on CT scan that appears to be from already known obstruction from colon cancer. His renal function was normal, he was not acutely tender in his left loin, his urine analysis was negative, hence no intervention performed. PCP to put in urology referral for further evaluation of hydronephrosis as appropriate.     Stage 4 colon cancer: The patient should follow up with his oncologist for his 2 more rounds of immunotherapy. The patient follows with [facility], Dr. Jeff. Long term goals of care to be discussed based on his response to immuno/chemotherapy for his metastatic disease.     Hepatitis C: The patient reported an untreated history of hepatitis C. Patient will need outpatient follow-up with GI when medically stable.    Hypertension: The patient's blood pressures remained stable during admission on his home dose of Losartan. No changes were made.     Normocytic anemia: Likely secondary to GI malignancy. Fecal occult was negataive on last admission. PCP to continue following up.     Elevated alk phos: The patient had an elevated alk phos. Last admission it was 324. This admission it was 122. His transaminases and bili are normal on this admission. RUQ US done last admission 2/2019, showed heterogenous and echogenic appearance of the liver either hepatic steatosis or hepatocellular disease, no gall stones. The patient should  continue to follow-up with GI once medically stable.     Therefore, he is discharged in good and stable condition to home with close outpatient follow-up.    The patient met 2-midnight criteria for an inpatient stay at the time of discharge.    Discharge Date  03/17/19    FOLLOW UP ITEMS POST DISCHARGE  1) Please follow up with PCP for GI and Urology referrals.  2) Please follow-up with Dr. Jeff for ongoing immunotherapy.   3) Please continue to take the last 2 days of Amoxicillin for a total of 5 days of antibiotics.    DISCHARGE DIAGNOSES  Principal Problem:    Fever due to infection POA: Yes  Active Problems:    Metastatic Colon cancer (HCC) POA: Yes      Overview: diagnosed 2016 treated with resection and chemo      had recurrence 08/2018, pulmonary nodules biopsied at that time showed       adenocarcinoma    Elevated lactic acid level POA: Unknown    Elevated alkaline phosphatase level POA: Yes    Normocytic anemia POA: Yes    Burning pain POA: Unknown    COPD (chronic obstructive pulmonary disease) (HCC) POA: Yes    Hepatitis C POA: Yes    Thrombocytopenia (HCC) POA: Yes    Hypertension POA: Yes  Resolved Problems:    Hyponatremia POA: Yes      FOLLOW UP  No future appointments.  No follow-up provider specified.    MEDICATIONS ON DISCHARGE     Medication List      CONTINUE taking these medications      Instructions   amoxicillin 500 MG Caps  Commonly known as:  AMOXIL   Take 1 Cap by mouth 3 times a day for 2 days.  Dose:  500 mg     DILAUDID 8 MG tablet  Generic drug:  HYDROmorphone   Take 16 mg by mouth every four hours as needed for Severe Pain.  Dose:  16 mg     lansoprazole 30 MG Cpdr  Commonly known as:  PREVACID   Take 30 mg by mouth every day.  Dose:  30 mg     losartan 50 MG Tabs  Commonly known as:  COZAAR   Take 50 mg by mouth every day.  Dose:  50 mg     ondansetron 4 MG Tbdp  Commonly known as:  ZOFRAN ODT   Take 8 mg by mouth every 6 hours as needed for Nausea.  Dose:  8 mg     predniSONE 5 MG  Tabs  Commonly known as:  DELTASONE   Take 5 mg by mouth every day.  Dose:  5 mg     promethazine 50 MG Tabs  Commonly known as:  PHENERGAN   Take 50 mg by mouth every 6 hours as needed for Nausea/Vomiting.  Dose:  50 mg     tamsulosin 0.4 MG capsule  Commonly known as:  FLOMAX   Take 0.4 mg by mouth ONE-HALF HOUR AFTER BREAKFAST.  Dose:  0.4 mg            Allergies  No Known Allergies    DIET  Orders Placed This Encounter   Procedures   • Diet Order Regular     Standing Status:   Standing     Number of Occurrences:   1     Order Specific Question:   Diet:     Answer:   Regular [1]       ACTIVITY  As tolerated.  Weight bearing as tolerated    CONSULTATIONS  None    PROCEDURES  None    LABORATORY  Lab Results   Component Value Date    SODIUM 139 03/17/2019    POTASSIUM 4.0 03/17/2019    CHLORIDE 108 03/17/2019    CO2 24 03/17/2019    GLUCOSE 90 03/17/2019    BUN 4 (L) 03/17/2019    CREATININE 1.01 03/17/2019        Lab Results   Component Value Date    WBC 8.1 03/17/2019    HEMOGLOBIN 10.5 (L) 03/17/2019    HEMATOCRIT 32.7 (L) 03/17/2019    PLATELETCT 251 03/17/2019        Total time of the discharge process exceeds 50 minutes.

## 2019-03-17 NOTE — PROGRESS NOTES
Patient is AOx4. Plan of care discussed. Verbalized understanding. Complains of pain, encouraged rest and repositioning, medicated with PRN see MAR. Tolerated all oral medications. Call light in use, belongings within reach, treaded socks on, bed locked in low position    Patient complaining of nausea. Patient stated he typically takes 50 mg of phenergan at home and in the hospital he is only taking 12.5 mg. Patient agreed to try the 12.5 mg dose but is requesting his dose be increased tomorrow.

## 2019-03-17 NOTE — CARE PLAN
Problem: Knowledge Deficit  Goal: Knowledge of disease process/condition, treatment plan, diagnostic tests, and medications will improve    Intervention: Explain information regarding disease process/condition, treatment plan, diagnostic tests, and medications and document in education  Educated patient on his IV antibiotic regimen and lab results. Nodded understanding       Problem: Pain Management  Goal: Pain level will decrease to patient's comfort goal    Intervention: Follow pain managment plan developed in collaboration with patient and Interdisciplinary Team  Educated patient on his pain control options. Verbalized understanding

## 2019-03-17 NOTE — DISCHARGE INSTRUCTIONS
Discharge Instructions    Discharged to home by car with relative. Discharged via walking, hospital escort: Refused.  Special equipment needed: Not Applicable    Be sure to schedule a follow-up appointment with your primary care doctor or any specialists as instructed.     1)Please follow up with PCP for GI and Urology referrals.  2) Please follow-up with Dr. Jeff for ongoing immunotherapy.   3) Please continue to take the last 2 days of Amoxicillin for a total of 5 days of antibiotics.    Discharge Plan:   Activity Level: Discussed  Confirmed Follow up Appointment: Appointment Scheduled  Confirmed Symptoms Management: Discussed  Medication Reconciliation Updated: Yes  Pneumococcal Vaccine Administered/Refused: Not given - Patient refused pneumococcal vaccine  Influenza Vaccine Indication: Patient Refuses    I understand that a diet low in cholesterol, fat, and sodium is recommended for good health. Unless I have been given specific instructions below for another diet, I accept this instruction as my diet prescription.   Other diet: Regular     Special Instructions:     Sepsis, Adult  Sepsis is a serious infection of your blood or tissues that affects your whole body. The infection that causes sepsis may be bacterial, viral, fungal, or parasitic. Sepsis may be life threatening. Sepsis can cause your blood pressure to drop. This may result in shock. Shock causes your central nervous system and your organs to stop working correctly.  What increases the risk?  Sepsis can happen in anyone, but it is more likely to happen in people who have weakened immune systems.  What are the signs or symptoms?  Symptoms of sepsis can include:  · Fever or low body temperature (hypothermia).  · Rapid breathing (hyperventilation).  · Chills.  · Rapid heartbeat (tachycardia).  · Confusion or light-headedness.  · Trouble breathing.  · Urinating much less than usual.  · Cool, clammy skin or red, flushed skin.  · Other problems with the  heart, kidneys, or brain.  How is this diagnosed?  Your health care provider will likely do tests to look for an infection, to see if the infection has spread to your blood, and to see how serious your condition is. Tests can include:  · Blood tests, including cultures of your blood.  · Cultures of other fluids from your body, such as:  ¨ Urine.  ¨ Pus from wounds.  ¨ Mucus coughed up from your lungs.  · Urine tests other than cultures.  · X-ray exams or other imaging tests.  How is this treated?  Treatment will begin with elimination of the source of infection. If your sepsis is likely caused by a bacterial or fungal infection, you will be given antibiotic or antifungal medicines.  You may also receive:  · Oxygen.  · Fluids through an IV tube.  · Medicines to increase your blood pressure.  · A machine to clean your blood (dialysis) if your kidneys fail.  · A machine to help you breathe if your lungs fail.  Get help right away if:  You get an infection or develop any of the signs and symptoms of sepsis after surgery or a hospitalization.  This information is not intended to replace advice given to you by your health care provider. Make sure you discuss any questions you have with your health care provider.  Document Released: 09/15/2004 Document Revised: 05/25/2017 Document Reviewed: 08/25/2014  [employer] Patient Education © 2017 [employer]      · Is patient discharged on Warfarin / Coumadin?   No         Hydronephrosis  Introduction  Hydronephrosis is the enlargement of a kidney due to a blockage that stops urine from flowing out of the body.  What are the causes?  Common causes of this condition include:  · A birth (congenital) defect of the kidney.  · A congenital defect of the tube through which urine travels (ureter).  · Kidney stones.  · An enlarged prostate gland.  · A tumor.  · Cancer of the prostate, bladder, uterus, ovary, or colon.  · A blood clot.  What are the signs or symptoms?  Symptoms  of this condition include:  · Pain or discomfort in your side (flank).  · Swelling of the abdomen.  · Pain in the abdomen.  · Nausea and vomiting.  · Fever.  · Pain while passing urine.  · Feeling of urgency to urinate.  · Frequent urination.  · Infection of the urinary tract.  In some cases, there are no symptoms.  How is this diagnosed?  This condition may be diagnosed with:  · A medical history.  · A physical exam.  · Blood and urine tests to check kidney function.  · Imaging tests, such as an X-ray, ultrasound, CT scan, or MRI.  · A test in which a rigid or flexible telescope (cystoscope) is used to view the site of the blockage.  How is this treated?  Treatment for this condition depends on where the blockage is located, how long it has been there, and what caused it. The goal of treatment is to remove the blockage. Treatment options include:  · A procedure to put in a soft tube to help drain urine.  · Antibiotic medicines to treat or prevent infection.  · Shock-wave therapy (lithotripsy) to help eliminate kidney stones.  Follow these instructions at home:  · Get lots of rest.  · Drink enough fluid to keep your urine clear or pale yellow.  · If you have a drain in, follow your health care provider's instructions about how to care for it.  · Take medicines only as directed by your health care provider.  · If you were prescribed an antibiotic medicine, finish all of it even if you start to feel better.  · Keep all follow-up visits as directed by your health care provider. This is important.  Contact a health care provider if:  · You continue to have symptoms after treatment.  · You develop new symptoms.  · You have a problem with a drainage device.  · Your urine becomes cloudy or bloody.  · You have a fever.  Get help right away if:  · You have severe flank or abdominal pain.  · You develop vomiting and are unable to keep fluids down.  This information is not intended to replace advice given to you by your health  care provider. Make sure you discuss any questions you have with your health care provider.  Document Released: 10/14/2008 Document Revised: 05/25/2017 Document Reviewed: 12/14/2015  © 2017 [employer]      Depression / Suicide Risk    As you are discharged from this [facility] facility, it is important to learn how to keep safe from harming yourself.    Recognize the warning signs:  · Abrupt changes in personality, positive or negative- including increase in energy   · Giving away possessions  · Change in eating patterns- significant weight changes-  positive or negative  · Change in sleeping patterns- unable to sleep or sleeping all the time   · Unwillingness or inability to communicate  · Depression  · Unusual sadness, discouragement and loneliness  · Talk of wanting to die  · Neglect of personal appearance   · Rebelliousness- reckless behavior  · Withdrawal from people/activities they love  · Confusion- inability to concentrate     If you or a loved one observes any of these behaviors or has concerns about self-harm, here's what you can do:  · Talk about it- your feelings and reasons for harming yourself  · Remove any means that you might use to hurt yourself (examples: pills, rope, extension cords, firearm)  · Get professional help from the community (Mental Health, Substance Abuse, psychological counseling)  · Do not be alone:Call your Safe Contact- someone whom you trust who will be there for you.  · Call your local CRISIS HOTLINE 394-1677 or 370-666-4181  · Call your local Children's Mobile Crisis Response Team [place] (519) 207-8415 or www.[employer].com  · Call the toll free National Suicide Prevention Hotlines   · National Suicide Prevention Lifeline 774-210-EJJO (9025)  · National Hope Line Network 800-SUICIDE (958-4580)

## 2019-03-17 NOTE — PROGRESS NOTES
Faxed request for medical record for [facility] in [facility] per MD request. Placed in chart.

## 2019-03-17 NOTE — PROGRESS NOTES
Gave patient VAS and instructions on medication including amoxicillin and follow up appointments. Patient verbalized understanding of al information presented. Patient has all his belongings and will be discharging via wheelchair to [facility] to meet his wife who will drive him  Home.

## 2019-03-17 NOTE — PROGRESS NOTES
Internal Medicine Interval Note  Note Author: Yaneth Correa M.D.     Name Erich Ingram     1965   Age/Sex 53 y.o. male   MRN [id number]   Code Status FULL     After 5PM or if no immediate response to page, please call for cross-coverage  Attending/Team: Dr. Waterman/ [place] See Patient List for primary contact information  Call [phone] to page    1st Call - Day Intern (R1):   Dr. Correa 2nd Call - Day Sr. Resident (R2/R3):   Dr. Toro         Reason for interval visit  (Principal Problem)   Fever due to infection    Interval Problem Daily Status Update  (24 hours)   No acute events overnight.   Patient has remained afebrile.   Nausea has improved.   CT scan did now show any consolidations, but was consistent with known metastatic disease.  Blood cultures are negative to date.   Still pending OSH records, but unlikely we will get them as their medical records office is closed on weekends.   Patient would like to go home today if possible.     Review of Systems   Constitutional: Negative for chills and fever.   HENT: Negative for congestion and sinus pain.    Eyes: Negative for blurred vision and double vision.   Respiratory: Negative for cough, sputum production and shortness of breath.    Cardiovascular: Negative for chest pain, palpitations and leg swelling.   Gastrointestinal: Positive for nausea. Negative for abdominal pain, constipation, diarrhea and vomiting.   Genitourinary: Negative for dysuria, frequency and urgency.   Musculoskeletal: Negative for back pain and neck pain.   Skin: Negative for itching and rash.   Neurological: Negative for sensory change, speech change, focal weakness, seizures and loss of consciousness.   Endo/Heme/Allergies: Negative for polydipsia. Does not bruise/bleed easily.   Psychiatric/Behavioral: Negative for hallucinations and suicidal ideas.       Consultants/Specialty  None - consider ID consult  PCP: BEENA Sterling    Disposition  Patient  can be discharged home today.    Quality Measures  Quality-Core Measures   Reviewed items::  EKG reviewed, Radiology images reviewed, Labs reviewed and Medications reviewed  Soriano catheter::  No Soriano  DVT prophylaxis pharmacological::  Enoxaparin (Lovenox)  Ulcer Prophylaxis::  Yes  Antibiotics:  Treating active infection/contamination beyond 24 hours perioperative coverage      Physical Exam       Vitals:    03/16/19 0715 03/16/19 1438 03/17/19 0515 03/17/19 0710   BP: 135/90 109/48 135/90 123/90   Pulse: 60 68 73 61   Resp: 20 20 16 20   Temp: 36.2 °C (97.2 °F) 36.4 °C (97.6 °F) 36.6 °C (97.9 °F) 36.6 °C (97.8 °F)   TempSrc: Temporal Temporal Oral Temporal   SpO2: 96% 100% 95% 94%   Weight:       Height:         Body mass index is 20.58 kg/m².    Oxygen Therapy:  Pulse Oximetry: 94 %, O2 (LPM): 0, O2 Delivery: None (Room Air)    Physical Exam   Constitutional: He is oriented to person, place, and time and well-developed, well-nourished, and in no distress. No distress.   HENT:   Head: Normocephalic and atraumatic.   Mouth/Throat: No oropharyngeal exudate.   Eyes: Pupils are equal, round, and reactive to light. Conjunctivae are normal. Right eye exhibits no discharge. Left eye exhibits no discharge. No scleral icterus.   Neck: Normal range of motion. Neck supple. No JVD present.   Cardiovascular: Normal rate, regular rhythm and normal heart sounds.    No murmur heard.  Pulmonary/Chest: Effort normal and breath sounds normal. No respiratory distress. He has no wheezes. He has no rales.   Abdominal: Soft. Bowel sounds are normal. He exhibits no distension. There is no tenderness. There is no rebound and no guarding.   Musculoskeletal: Normal range of motion. He exhibits no edema, tenderness or deformity.   Lymphadenopathy:     He has no cervical adenopathy.   Neurological: He is alert and oriented to person, place, and time. GCS score is 15.   Skin: Skin is warm and dry. No rash noted. He is not diaphoretic. No  erythema.   Psychiatric: Mood, memory, affect and judgment normal.       Lab Data Review:     3/15/2019  6:58 PM    Recent Labs      03/15/19   0251  03/16/19   0258  03/17/19   0316   SODIUM  134*  137  139   POTASSIUM  3.8  4.2  4.0   CHLORIDE  107  107  108   CO2  23  24  24   BUN  6*  5*  4*   CREATININE  0.73  1.04  1.01   MAGNESIUM  1.7   --    --    CALCIUM  7.6*  8.0*  7.9*       Recent Labs      03/15/19   0251  03/16/19   0258  03/17/19   0316   ALTSGPT  26  24  23   ASTSGOT  33  33  34   ALKPHOSPHAT  122*  122*  119*   TBILIRUBIN  0.5  0.4  0.3   GLUCOSE  96  84  90       Recent Labs      03/15/19   0251  03/16/19   0258  03/17/19   0316   RBC  3.09*  3.41*  3.47*   HEMOGLOBIN  9.6*  10.5*  10.5*   HEMATOCRIT  29.5*  32.4*  32.7*   PLATELETCT  220  243  251       Recent Labs      03/15/19   0251  03/16/19   0258  03/17/19   0316   WBC  6.1  8.0  8.1   NEUTSPOLYS  65.40  59.50  59.40   LYMPHOCYTES  21.30*  25.80  27.70   MONOCYTES  11.20  11.40  10.10   EOSINOPHILS  1.20  2.10  2.00   BASOPHILS  0.70  0.90  0.70   ASTSGOT  33  33  34   ALTSGPT  26  24  23   ALKPHOSPHAT  122*  122*  119*   TBILIRUBIN  0.5  0.4  0.3           Assessment/Plan     * Fever due to infection- (present on admission)   Assessment & Plan    Patient's blood cultures grew Gram positive rods (lactobalcillus) on blood from port-A-Cath. Despite antibiotic therapy, he was still growing on subsequent blood cultures. Port-A-Cath was removed 3/4/2019. Patient was discharged on Oral Amoxicillin 500mg TID.  He was compliant with his medication and left for 2 more days of amoxicillin.  He is spiking fever being on antibiotics.  -He grew lactobacillus in blood (3/2) from his port and peripheral vein however repeat blood culture negative on 3/5, he grew Candida albicans on the catheter tip resulted on March 8.  His port was removed during last hospitalization (3/4).  - Presented to OSH as fever of 100 F 3/14, asymptomatic  - Labs at OSH : WBC  14.8, lactate 2.6 --> 2.7 despite IVF, , normal Bilirubin; given C3 and flagy  - At [facility] : WBC 7.9, lactate down to 1.2, procal elevated 0.63 today  - on Zosyn and vancomycin for unknown source of infection  - possible sources of infection : lung vs left kidney  - UA negative  - CT Thorax from prior admission --> has lung cavities, mets  - CT Abdo 3/14 at OSH --> chronic left HNsis, fatty liver  - US from prior admission - no gall stones, sludge present    Plan  - Patient can be discharged home today as he has remained afebrile  - Blood cultures from [facility] are pending, but the patient states that they never bc cultures. Unlikely to get these records today since medical records office is closed on weekends. Blood cultures drawn here are negative to date.      Elevated lactic acid level   Assessment & Plan    - 2.7 ---> 2.6, down to 1.2.   - Continue IV fluid and vanc, zosyn     Metastatic Colon cancer (HCC)- (present on admission)   Assessment & Plan    - H/O colon cancer - First episode diagnosed in 2016, underwent partial colectomy followed by chemotherapy in remission  - Recurrence of metastatic colon cancer 08/2018 currently on chemotherapy  - Will need FU with onc after discharge, Dr. Jeff       Burning pain   Assessment & Plan    - dysuria, few days  - no frequency / urgency / hematuria / cloudy urine  - UA clear  - BCx pending from [facility] and OSH  - Continue Zosyn and vancomycin     Normocytic anemia- (present on admission)   Assessment & Plan    - Normocytic, normochromic anemia  - Anemia due to chronic medical condition, GI malignancy  - Fecal hemoccult negative during last hospitalization     Elevated alkaline phosphatase level- (present on admission)   Assessment & Plan    - Elevated liver function tests likely secondary to liver cirrhosis from hepatitis C   - , ALT 63,  during last hospitalization.    - This adm : , normal bili, transaminases - normal  - RUQ US Feb 2019  : heterogeneous and echogenic appearance of the liver--> hepatic steatosis or hepatocellular disease. No gall stones, sludge +, mild dilation of CBD     Hypertension- (present on admission)   Assessment & Plan    Continue home dose Losartan     Thrombocytopenia (HCC)- (present on admission)   Assessment & Plan    - likely secondary to liver cirrhosis and untreated hepatitis C.  - Patient is asymptomatic     Hepatitis C- (present on admission)   Assessment & Plan    Patient reports history of untreated hepatitis C.   - Patient will need outpatient follow-up with GI when medically stable.      COPD (chronic obstructive pulmonary disease) (HCC)- (present on admission)   Assessment & Plan    - h/o COPD, not on home oxygen  - ex-smoker for 2 years, 10-pack-year Hx  - ctm, IS

## 2019-03-27 LAB
FUNGUS SPEC CULT: ABNORMAL
SIGNIFICANT IND 70042: ABNORMAL
SITE SITE: ABNORMAL
SOURCE SOURCE: ABNORMAL

## 2019-04-03 ENCOUNTER — HOSPITAL ENCOUNTER (INPATIENT)
Facility: MEDICAL CENTER | Age: 54
LOS: 3 days | DRG: 392 | End: 2019-04-06
Attending: EMERGENCY MEDICINE | Admitting: INTERNAL MEDICINE
Payer: COMMERCIAL

## 2019-04-03 DIAGNOSIS — C18.9 METASTASIS FROM COLON CANCER (HCC): ICD-10-CM

## 2019-04-03 DIAGNOSIS — R10.31 RIGHT LOWER QUADRANT ABDOMINAL PAIN: ICD-10-CM

## 2019-04-03 DIAGNOSIS — C79.9 METASTASIS FROM COLON CANCER (HCC): ICD-10-CM

## 2019-04-03 PROBLEM — I10 ESSENTIAL HYPERTENSION: Status: ACTIVE | Noted: 2019-04-03

## 2019-04-03 PROBLEM — F41.9 ANXIETY: Status: ACTIVE | Noted: 2019-04-03

## 2019-04-03 PROBLEM — K37 APPENDICITIS: Status: ACTIVE | Noted: 2019-04-03

## 2019-04-03 PROBLEM — R11.2 NAUSEA & VOMITING: Status: ACTIVE | Noted: 2019-04-03

## 2019-04-03 PROBLEM — N40.0 BENIGN PROSTATE HYPERPLASIA: Status: ACTIVE | Noted: 2019-04-03

## 2019-04-03 LAB
ALBUMIN SERPL BCP-MCNC: 3.2 G/DL (ref 3.2–4.9)
ALBUMIN/GLOB SERPL: 1.1 G/DL
ALP SERPL-CCNC: 109 U/L (ref 30–99)
ALT SERPL-CCNC: 43 U/L (ref 2–50)
ANION GAP SERPL CALC-SCNC: 7 MMOL/L (ref 0–11.9)
AST SERPL-CCNC: 70 U/L (ref 12–45)
BASOPHILS # BLD AUTO: 0.4 % (ref 0–1.8)
BASOPHILS # BLD: 0.04 K/UL (ref 0–0.12)
BILIRUB SERPL-MCNC: 0.9 MG/DL (ref 0.1–1.5)
BUN SERPL-MCNC: 9 MG/DL (ref 8–22)
CALCIUM SERPL-MCNC: 8.8 MG/DL (ref 8.5–10.5)
CHLORIDE SERPL-SCNC: 102 MMOL/L (ref 96–112)
CO2 SERPL-SCNC: 24 MMOL/L (ref 20–33)
CREAT SERPL-MCNC: 0.74 MG/DL (ref 0.5–1.4)
EOSINOPHIL # BLD AUTO: 0.02 K/UL (ref 0–0.51)
EOSINOPHIL NFR BLD: 0.2 % (ref 0–6.9)
ERYTHROCYTE [DISTWIDTH] IN BLOOD BY AUTOMATED COUNT: 51.8 FL (ref 35.9–50)
GLOBULIN SER CALC-MCNC: 2.9 G/DL (ref 1.9–3.5)
GLUCOSE SERPL-MCNC: 100 MG/DL (ref 65–99)
HCT VFR BLD AUTO: 37.5 % (ref 42–52)
HGB BLD-MCNC: 12.2 G/DL (ref 14–18)
IMM GRANULOCYTES # BLD AUTO: 0.04 K/UL (ref 0–0.11)
IMM GRANULOCYTES NFR BLD AUTO: 0.4 % (ref 0–0.9)
LIPASE SERPL-CCNC: 6 U/L (ref 11–82)
LYMPHOCYTES # BLD AUTO: 1.15 K/UL (ref 1–4.8)
LYMPHOCYTES NFR BLD: 10.5 % (ref 22–41)
MAGNESIUM SERPL-MCNC: 1.8 MG/DL (ref 1.5–2.5)
MCH RBC QN AUTO: 30.4 PG (ref 27–33)
MCHC RBC AUTO-ENTMCNC: 32.5 G/DL (ref 33.7–35.3)
MCV RBC AUTO: 93.5 FL (ref 81.4–97.8)
MONOCYTES # BLD AUTO: 1.25 K/UL (ref 0–0.85)
MONOCYTES NFR BLD AUTO: 11.4 % (ref 0–13.4)
NEUTROPHILS # BLD AUTO: 8.45 K/UL (ref 1.82–7.42)
NEUTROPHILS NFR BLD: 77.1 % (ref 44–72)
NRBC # BLD AUTO: 0 K/UL
NRBC BLD-RTO: 0 /100 WBC
PLATELET # BLD AUTO: 163 K/UL (ref 164–446)
PMV BLD AUTO: 9.5 FL (ref 9–12.9)
POTASSIUM SERPL-SCNC: 3.9 MMOL/L (ref 3.6–5.5)
PROT SERPL-MCNC: 6.1 G/DL (ref 6–8.2)
RBC # BLD AUTO: 4.01 M/UL (ref 4.7–6.1)
SODIUM SERPL-SCNC: 133 MMOL/L (ref 135–145)
WBC # BLD AUTO: 11 K/UL (ref 4.8–10.8)

## 2019-04-03 PROCEDURE — 700102 HCHG RX REV CODE 250 W/ 637 OVERRIDE(OP): Performed by: STUDENT IN AN ORGANIZED HEALTH CARE EDUCATION/TRAINING PROGRAM

## 2019-04-03 PROCEDURE — 80053 COMPREHEN METABOLIC PANEL: CPT

## 2019-04-03 PROCEDURE — A9270 NON-COVERED ITEM OR SERVICE: HCPCS | Performed by: STUDENT IN AN ORGANIZED HEALTH CARE EDUCATION/TRAINING PROGRAM

## 2019-04-03 PROCEDURE — 770020 HCHG ROOM/CARE - TELE (206)

## 2019-04-03 PROCEDURE — 700111 HCHG RX REV CODE 636 W/ 250 OVERRIDE (IP): Performed by: STUDENT IN AN ORGANIZED HEALTH CARE EDUCATION/TRAINING PROGRAM

## 2019-04-03 PROCEDURE — 85025 COMPLETE CBC W/AUTO DIFF WBC: CPT

## 2019-04-03 PROCEDURE — 99285 EMERGENCY DEPT VISIT HI MDM: CPT

## 2019-04-03 PROCEDURE — 83690 ASSAY OF LIPASE: CPT

## 2019-04-03 PROCEDURE — 96375 TX/PRO/DX INJ NEW DRUG ADDON: CPT

## 2019-04-03 PROCEDURE — 700105 HCHG RX REV CODE 258: Performed by: STUDENT IN AN ORGANIZED HEALTH CARE EDUCATION/TRAINING PROGRAM

## 2019-04-03 PROCEDURE — 700111 HCHG RX REV CODE 636 W/ 250 OVERRIDE (IP): Performed by: EMERGENCY MEDICINE

## 2019-04-03 PROCEDURE — 83735 ASSAY OF MAGNESIUM: CPT

## 2019-04-03 PROCEDURE — 87040 BLOOD CULTURE FOR BACTERIA: CPT | Mod: 91

## 2019-04-03 RX ORDER — HYDRALAZINE HYDROCHLORIDE 20 MG/ML
10 INJECTION INTRAMUSCULAR; INTRAVENOUS EVERY 4 HOURS PRN
Status: DISCONTINUED | OUTPATIENT
Start: 2019-04-03 | End: 2019-04-06 | Stop reason: HOSPADM

## 2019-04-03 RX ORDER — POLYETHYLENE GLYCOL 3350 17 G/17G
1 POWDER, FOR SOLUTION ORAL
Status: DISCONTINUED | OUTPATIENT
Start: 2019-04-03 | End: 2019-04-06 | Stop reason: HOSPADM

## 2019-04-03 RX ORDER — PROMETHAZINE HYDROCHLORIDE 25 MG/1
12.5-25 TABLET ORAL EVERY 4 HOURS PRN
Status: DISCONTINUED | OUTPATIENT
Start: 2019-04-03 | End: 2019-04-03

## 2019-04-03 RX ORDER — MAGNESIUM SULFATE 1 G/100ML
1 INJECTION INTRAVENOUS ONCE
Status: COMPLETED | OUTPATIENT
Start: 2019-04-04 | End: 2019-04-04

## 2019-04-03 RX ORDER — HYDROMORPHONE HYDROCHLORIDE 2 MG/1
8 TABLET ORAL EVERY 4 HOURS PRN
Status: DISCONTINUED | OUTPATIENT
Start: 2019-04-03 | End: 2019-04-03

## 2019-04-03 RX ORDER — BISACODYL 10 MG
10 SUPPOSITORY, RECTAL RECTAL
Status: DISCONTINUED | OUTPATIENT
Start: 2019-04-03 | End: 2019-04-06 | Stop reason: HOSPADM

## 2019-04-03 RX ORDER — TAMSULOSIN HYDROCHLORIDE 0.4 MG/1
0.4 CAPSULE ORAL
Status: DISCONTINUED | OUTPATIENT
Start: 2019-04-04 | End: 2019-04-06 | Stop reason: HOSPADM

## 2019-04-03 RX ORDER — PROMETHAZINE HYDROCHLORIDE 25 MG/1
50 TABLET ORAL EVERY 6 HOURS PRN
Status: DISCONTINUED | OUTPATIENT
Start: 2019-04-03 | End: 2019-04-03

## 2019-04-03 RX ORDER — MORPHINE SULFATE 4 MG/ML
4 INJECTION, SOLUTION INTRAMUSCULAR; INTRAVENOUS ONCE
Status: COMPLETED | OUTPATIENT
Start: 2019-04-03 | End: 2019-04-03

## 2019-04-03 RX ORDER — ONDANSETRON 2 MG/ML
4 INJECTION INTRAMUSCULAR; INTRAVENOUS EVERY 4 HOURS PRN
Status: DISCONTINUED | OUTPATIENT
Start: 2019-04-03 | End: 2019-04-06 | Stop reason: HOSPADM

## 2019-04-03 RX ORDER — ONDANSETRON 4 MG/1
4 TABLET, ORALLY DISINTEGRATING ORAL EVERY 4 HOURS PRN
Status: DISCONTINUED | OUTPATIENT
Start: 2019-04-03 | End: 2019-04-06 | Stop reason: HOSPADM

## 2019-04-03 RX ORDER — DEXTROSE AND SODIUM CHLORIDE 5; .45 G/100ML; G/100ML
INJECTION, SOLUTION INTRAVENOUS CONTINUOUS
Status: DISCONTINUED | OUTPATIENT
Start: 2019-04-03 | End: 2019-04-06

## 2019-04-03 RX ORDER — MORPHINE SULFATE 4 MG/ML
2 INJECTION, SOLUTION INTRAMUSCULAR; INTRAVENOUS
Status: DISCONTINUED | OUTPATIENT
Start: 2019-04-03 | End: 2019-04-04

## 2019-04-03 RX ORDER — ONDANSETRON 4 MG/1
8 TABLET, ORALLY DISINTEGRATING ORAL EVERY 6 HOURS PRN
Status: DISCONTINUED | OUTPATIENT
Start: 2019-04-03 | End: 2019-04-03

## 2019-04-03 RX ORDER — LOSARTAN POTASSIUM 50 MG/1
50 TABLET ORAL DAILY
Status: DISCONTINUED | OUTPATIENT
Start: 2019-04-04 | End: 2019-04-06 | Stop reason: HOSPADM

## 2019-04-03 RX ORDER — AMOXICILLIN 250 MG
2 CAPSULE ORAL 2 TIMES DAILY
Status: DISCONTINUED | OUTPATIENT
Start: 2019-04-03 | End: 2019-04-06 | Stop reason: HOSPADM

## 2019-04-03 RX ORDER — PROMETHAZINE HYDROCHLORIDE 25 MG/1
12.5-25 SUPPOSITORY RECTAL EVERY 4 HOURS PRN
Status: DISCONTINUED | OUTPATIENT
Start: 2019-04-03 | End: 2019-04-06 | Stop reason: HOSPADM

## 2019-04-03 RX ORDER — ACETAMINOPHEN 325 MG/1
650 TABLET ORAL EVERY 6 HOURS PRN
Status: DISCONTINUED | OUTPATIENT
Start: 2019-04-03 | End: 2019-04-06 | Stop reason: HOSPADM

## 2019-04-03 RX ORDER — PROMETHAZINE HYDROCHLORIDE 25 MG/1
50 TABLET ORAL EVERY 4 HOURS PRN
Status: DISCONTINUED | OUTPATIENT
Start: 2019-04-03 | End: 2019-04-06 | Stop reason: HOSPADM

## 2019-04-03 RX ADMIN — ONDANSETRON 4 MG: 4 TABLET, ORALLY DISINTEGRATING ORAL at 22:44

## 2019-04-03 RX ADMIN — MORPHINE SULFATE 4 MG: 4 INJECTION INTRAVENOUS at 22:15

## 2019-04-03 RX ADMIN — DEXTROSE AND SODIUM CHLORIDE: 5; 450 INJECTION, SOLUTION INTRAVENOUS at 23:49

## 2019-04-03 RX ADMIN — HYDROMORPHONE HYDROCHLORIDE 8 MG: 2 TABLET ORAL at 22:44

## 2019-04-03 ASSESSMENT — LIFESTYLE VARIABLES: DO YOU DRINK ALCOHOL: NO

## 2019-04-04 ENCOUNTER — APPOINTMENT (OUTPATIENT)
Dept: RADIOLOGY | Facility: MEDICAL CENTER | Age: 54
DRG: 392 | End: 2019-04-04
Attending: STUDENT IN AN ORGANIZED HEALTH CARE EDUCATION/TRAINING PROGRAM
Payer: COMMERCIAL

## 2019-04-04 PROBLEM — F11.90 CHRONIC, CONTINUOUS USE OF OPIOIDS: Status: ACTIVE | Noted: 2019-04-04

## 2019-04-04 PROBLEM — R10.31 RIGHT LOWER QUADRANT ABDOMINAL PAIN: Status: ACTIVE | Noted: 2019-04-03

## 2019-04-04 PROBLEM — R10.11 RIGHT UPPER QUADRANT ABDOMINAL PAIN: Status: ACTIVE | Noted: 2019-04-03

## 2019-04-04 LAB
ALBUMIN SERPL BCP-MCNC: 3.2 G/DL (ref 3.2–4.9)
ALBUMIN/GLOB SERPL: 1 G/DL
ALP SERPL-CCNC: 109 U/L (ref 30–99)
ALT SERPL-CCNC: 48 U/L (ref 2–50)
ANION GAP SERPL CALC-SCNC: 8 MMOL/L (ref 0–11.9)
APPEARANCE UR: CLEAR
AST SERPL-CCNC: 84 U/L (ref 12–45)
BILIRUB SERPL-MCNC: 0.5 MG/DL (ref 0.1–1.5)
BILIRUB UR QL STRIP.AUTO: NEGATIVE
BUN SERPL-MCNC: 7 MG/DL (ref 8–22)
CALCIUM SERPL-MCNC: 8.8 MG/DL (ref 8.5–10.5)
CHLORIDE SERPL-SCNC: 101 MMOL/L (ref 96–112)
CO2 SERPL-SCNC: 24 MMOL/L (ref 20–33)
COLOR UR: YELLOW
CREAT SERPL-MCNC: 0.76 MG/DL (ref 0.5–1.4)
ERYTHROCYTE [DISTWIDTH] IN BLOOD BY AUTOMATED COUNT: 49.3 FL (ref 35.9–50)
FOLATE SERPL-MCNC: 21.5 NG/ML
GLOBULIN SER CALC-MCNC: 3.1 G/DL (ref 1.9–3.5)
GLUCOSE SERPL-MCNC: 117 MG/DL (ref 65–99)
GLUCOSE UR STRIP.AUTO-MCNC: NEGATIVE MG/DL
HCT VFR BLD AUTO: 37.6 % (ref 42–52)
HGB BLD-MCNC: 12.2 G/DL (ref 14–18)
HGB RETIC QN AUTO: 30 PG/CELL (ref 29–35)
IMM RETICS NFR: 9 % (ref 9.3–17.4)
INR PPP: 1.12 (ref 0.87–1.13)
IRON SATN MFR SERPL: 10 % (ref 15–55)
IRON SERPL-MCNC: 29 UG/DL (ref 50–180)
KETONES UR STRIP.AUTO-MCNC: ABNORMAL MG/DL
LACTATE BLD-SCNC: 1.1 MMOL/L (ref 0.5–2)
LEUKOCYTE ESTERASE UR QL STRIP.AUTO: NEGATIVE
MCH RBC QN AUTO: 29.7 PG (ref 27–33)
MCHC RBC AUTO-ENTMCNC: 32.4 G/DL (ref 33.7–35.3)
MCV RBC AUTO: 91.5 FL (ref 81.4–97.8)
MICRO URNS: ABNORMAL
NITRITE UR QL STRIP.AUTO: NEGATIVE
PH UR STRIP.AUTO: 8.5 [PH]
PLATELET # BLD AUTO: 178 K/UL (ref 164–446)
PMV BLD AUTO: 9.8 FL (ref 9–12.9)
POTASSIUM SERPL-SCNC: 3.4 MMOL/L (ref 3.6–5.5)
PROCALCITONIN SERPL-MCNC: 6.76 NG/ML
PROT SERPL-MCNC: 6.3 G/DL (ref 6–8.2)
PROT UR QL STRIP: NEGATIVE MG/DL
PROTHROMBIN TIME: 14.5 SEC (ref 12–14.6)
RBC # BLD AUTO: 4.11 M/UL (ref 4.7–6.1)
RBC UR QL AUTO: NEGATIVE
RETICS # AUTO: 0.05 M/UL (ref 0.04–0.06)
RETICS/RBC NFR: 1.1 % (ref 0.8–2.1)
SODIUM SERPL-SCNC: 133 MMOL/L (ref 135–145)
SP GR UR STRIP.AUTO: 1.02
TIBC SERPL-MCNC: 288 UG/DL (ref 250–450)
UROBILINOGEN UR STRIP.AUTO-MCNC: 1 MG/DL
VIT B12 SERPL-MCNC: 876 PG/ML (ref 211–911)
WBC # BLD AUTO: 8.6 K/UL (ref 4.8–10.8)

## 2019-04-04 PROCEDURE — 85046 RETICYTE/HGB CONCENTRATE: CPT

## 2019-04-04 PROCEDURE — 700111 HCHG RX REV CODE 636 W/ 250 OVERRIDE (IP): Performed by: STUDENT IN AN ORGANIZED HEALTH CARE EDUCATION/TRAINING PROGRAM

## 2019-04-04 PROCEDURE — 99223 1ST HOSP IP/OBS HIGH 75: CPT | Mod: GC | Performed by: INTERNAL MEDICINE

## 2019-04-04 PROCEDURE — 82746 ASSAY OF FOLIC ACID SERUM: CPT

## 2019-04-04 PROCEDURE — 83540 ASSAY OF IRON: CPT

## 2019-04-04 PROCEDURE — 83550 IRON BINDING TEST: CPT

## 2019-04-04 PROCEDURE — 94760 N-INVAS EAR/PLS OXIMETRY 1: CPT

## 2019-04-04 PROCEDURE — 700105 HCHG RX REV CODE 258: Performed by: STUDENT IN AN ORGANIZED HEALTH CARE EDUCATION/TRAINING PROGRAM

## 2019-04-04 PROCEDURE — A9270 NON-COVERED ITEM OR SERVICE: HCPCS | Performed by: STUDENT IN AN ORGANIZED HEALTH CARE EDUCATION/TRAINING PROGRAM

## 2019-04-04 PROCEDURE — 82607 VITAMIN B-12: CPT

## 2019-04-04 PROCEDURE — 770020 HCHG ROOM/CARE - TELE (206)

## 2019-04-04 PROCEDURE — 700102 HCHG RX REV CODE 250 W/ 637 OVERRIDE(OP): Performed by: STUDENT IN AN ORGANIZED HEALTH CARE EDUCATION/TRAINING PROGRAM

## 2019-04-04 PROCEDURE — 700101 HCHG RX REV CODE 250: Performed by: STUDENT IN AN ORGANIZED HEALTH CARE EDUCATION/TRAINING PROGRAM

## 2019-04-04 PROCEDURE — 83605 ASSAY OF LACTIC ACID: CPT

## 2019-04-04 PROCEDURE — 74018 RADEX ABDOMEN 1 VIEW: CPT

## 2019-04-04 PROCEDURE — 36415 COLL VENOUS BLD VENIPUNCTURE: CPT

## 2019-04-04 PROCEDURE — 99222 1ST HOSP IP/OBS MODERATE 55: CPT | Mod: GC | Performed by: INTERNAL MEDICINE

## 2019-04-04 PROCEDURE — 71100 X-RAY EXAM RIBS UNI 2 VIEWS: CPT | Mod: RT

## 2019-04-04 PROCEDURE — 85027 COMPLETE CBC AUTOMATED: CPT

## 2019-04-04 PROCEDURE — 96368 THER/DIAG CONCURRENT INF: CPT

## 2019-04-04 PROCEDURE — 94762 N-INVAS EAR/PLS OXIMTRY CONT: CPT

## 2019-04-04 PROCEDURE — 81003 URINALYSIS AUTO W/O SCOPE: CPT

## 2019-04-04 PROCEDURE — 85610 PROTHROMBIN TIME: CPT

## 2019-04-04 PROCEDURE — 96365 THER/PROPH/DIAG IV INF INIT: CPT

## 2019-04-04 PROCEDURE — 84145 PROCALCITONIN (PCT): CPT

## 2019-04-04 PROCEDURE — 80053 COMPREHEN METABOLIC PANEL: CPT

## 2019-04-04 RX ORDER — SODIUM CHLORIDE 9 MG/ML
INJECTION, SOLUTION INTRAVENOUS
Status: ACTIVE
Start: 2019-04-04 | End: 2019-04-04

## 2019-04-04 RX ORDER — HYDROMORPHONE HYDROCHLORIDE 4 MG/1
16 TABLET ORAL
Status: DISCONTINUED | OUTPATIENT
Start: 2019-04-04 | End: 2019-04-04

## 2019-04-04 RX ORDER — NALOXONE HYDROCHLORIDE 0.4 MG/ML
0.4 INJECTION, SOLUTION INTRAMUSCULAR; INTRAVENOUS; SUBCUTANEOUS ONCE
Status: DISCONTINUED | OUTPATIENT
Start: 2019-04-04 | End: 2019-04-04

## 2019-04-04 RX ORDER — HYDROMORPHONE HYDROCHLORIDE 4 MG/1
16 TABLET ORAL EVERY 4 HOURS PRN
Status: DISCONTINUED | OUTPATIENT
Start: 2019-04-04 | End: 2019-04-06 | Stop reason: HOSPADM

## 2019-04-04 RX ORDER — HYDROMORPHONE HYDROCHLORIDE 1 MG/ML
1 INJECTION, SOLUTION INTRAMUSCULAR; INTRAVENOUS; SUBCUTANEOUS ONCE
Status: COMPLETED | OUTPATIENT
Start: 2019-04-04 | End: 2019-04-04

## 2019-04-04 RX ORDER — POTASSIUM CHLORIDE 20 MEQ/1
40 TABLET, EXTENDED RELEASE ORAL ONCE
Status: COMPLETED | OUTPATIENT
Start: 2019-04-04 | End: 2019-04-04

## 2019-04-04 RX ORDER — LIDOCAINE 50 MG/G
1 PATCH TOPICAL EVERY 24 HOURS
Status: DISCONTINUED | OUTPATIENT
Start: 2019-04-04 | End: 2019-04-06 | Stop reason: HOSPADM

## 2019-04-04 RX ORDER — MAGNESIUM SULFATE 1 G/100ML
1 INJECTION INTRAVENOUS ONCE
Status: COMPLETED | OUTPATIENT
Start: 2019-04-04 | End: 2019-04-04

## 2019-04-04 RX ADMIN — HYDROMORPHONE HYDROCHLORIDE 16 MG: 4 TABLET ORAL at 02:48

## 2019-04-04 RX ADMIN — LOSARTAN POTASSIUM 50 MG: 50 TABLET ORAL at 07:02

## 2019-04-04 RX ADMIN — HYDROMORPHONE HYDROCHLORIDE 16 MG: 4 TABLET ORAL at 07:01

## 2019-04-04 RX ADMIN — HYDROMORPHONE HYDROCHLORIDE 1 MG: 1 INJECTION, SOLUTION INTRAMUSCULAR; INTRAVENOUS; SUBCUTANEOUS at 20:54

## 2019-04-04 RX ADMIN — TAMSULOSIN HYDROCHLORIDE 0.4 MG: 0.4 CAPSULE ORAL at 08:54

## 2019-04-04 RX ADMIN — ONDANSETRON 4 MG: 2 INJECTION INTRAMUSCULAR; INTRAVENOUS at 07:09

## 2019-04-04 RX ADMIN — SENNOSIDES,DOCUSATE SODIUM 2 TABLET: 8.6; 5 TABLET, FILM COATED ORAL at 16:10

## 2019-04-04 RX ADMIN — POTASSIUM CHLORIDE 40 MEQ: 1500 TABLET, EXTENDED RELEASE ORAL at 16:10

## 2019-04-04 RX ADMIN — MAGNESIUM SULFATE IN DEXTROSE 1 G: 10 INJECTION, SOLUTION INTRAVENOUS at 16:10

## 2019-04-04 RX ADMIN — METRONIDAZOLE 500 MG: 500 INJECTION, SOLUTION INTRAVENOUS at 00:31

## 2019-04-04 RX ADMIN — ENOXAPARIN SODIUM 40 MG: 100 INJECTION SUBCUTANEOUS at 12:06

## 2019-04-04 RX ADMIN — LIDOCAINE 1 PATCH: 50 PATCH TOPICAL at 11:59

## 2019-04-04 RX ADMIN — AMPICILLIN AND SULBACTAM 3 G: 2; 1 INJECTION, POWDER, FOR SOLUTION INTRAVENOUS at 12:05

## 2019-04-04 RX ADMIN — HYDROMORPHONE HYDROCHLORIDE 16 MG: 4 TABLET ORAL at 11:59

## 2019-04-04 RX ADMIN — DEXTROSE AND SODIUM CHLORIDE: 5; 450 INJECTION, SOLUTION INTRAVENOUS at 16:09

## 2019-04-04 RX ADMIN — PROMETHAZINE HYDROCHLORIDE 50 MG: 25 TABLET ORAL at 09:02

## 2019-04-04 RX ADMIN — SENNOSIDES,DOCUSATE SODIUM 2 TABLET: 8.6; 5 TABLET, FILM COATED ORAL at 07:02

## 2019-04-04 RX ADMIN — DEXTROSE AND SODIUM CHLORIDE: 5; 450 INJECTION, SOLUTION INTRAVENOUS at 08:54

## 2019-04-04 RX ADMIN — METRONIDAZOLE 500 MG: 500 INJECTION, SOLUTION INTRAVENOUS at 07:03

## 2019-04-04 RX ADMIN — HYDROMORPHONE HYDROCHLORIDE 16 MG: 4 TABLET ORAL at 23:42

## 2019-04-04 RX ADMIN — ONDANSETRON 4 MG: 2 INJECTION INTRAMUSCULAR; INTRAVENOUS at 12:03

## 2019-04-04 RX ADMIN — HYDROMORPHONE HYDROCHLORIDE 16 MG: 4 TABLET ORAL at 19:46

## 2019-04-04 RX ADMIN — MAGNESIUM SULFATE IN DEXTROSE 1 G: 10 INJECTION, SOLUTION INTRAVENOUS at 00:22

## 2019-04-04 RX ADMIN — HYDROMORPHONE HYDROCHLORIDE 16 MG: 4 TABLET ORAL at 16:09

## 2019-04-04 RX ADMIN — DEXTROSE AND SODIUM CHLORIDE: 5; 450 INJECTION, SOLUTION INTRAVENOUS at 23:11

## 2019-04-04 RX ADMIN — CEFTRIAXONE SODIUM 2 G: 2 INJECTION, POWDER, FOR SOLUTION INTRAMUSCULAR; INTRAVENOUS at 07:02

## 2019-04-04 ASSESSMENT — ENCOUNTER SYMPTOMS
NAUSEA: 1
ABDOMINAL PAIN: 1
NERVOUS/ANXIOUS: 0
DEPRESSION: 0
FEVER: 0
BLOOD IN STOOL: 0
WHEEZING: 0
CONSTIPATION: 0
CHILLS: 0
VOMITING: 1
SHORTNESS OF BREATH: 0
HEMOPTYSIS: 0
DIZZINESS: 0
SPUTUM PRODUCTION: 0
MYALGIAS: 0
DIARRHEA: 0
PALPITATIONS: 0
COUGH: 0

## 2019-04-04 ASSESSMENT — COGNITIVE AND FUNCTIONAL STATUS - GENERAL
SUGGESTED CMS G CODE MODIFIER MOBILITY: CK
WALKING IN HOSPITAL ROOM: A LITTLE
HELP NEEDED FOR BATHING: A LITTLE
MOVING FROM LYING ON BACK TO SITTING ON SIDE OF FLAT BED: A LITTLE
SUGGESTED CMS G CODE MODIFIER DAILY ACTIVITY: CK
PERSONAL GROOMING: A LITTLE
MOVING TO AND FROM BED TO CHAIR: A LITTLE
TURNING FROM BACK TO SIDE WHILE IN FLAT BAD: A LITTLE
CLIMB 3 TO 5 STEPS WITH RAILING: A LITTLE
DAILY ACTIVITIY SCORE: 19
DRESSING REGULAR LOWER BODY CLOTHING: A LITTLE
STANDING UP FROM CHAIR USING ARMS: A LITTLE
TOILETING: A LITTLE
MOBILITY SCORE: 18
DRESSING REGULAR UPPER BODY CLOTHING: A LITTLE

## 2019-04-04 ASSESSMENT — PATIENT HEALTH QUESTIONNAIRE - PHQ9
SUM OF ALL RESPONSES TO PHQ9 QUESTIONS 1 AND 2: 0
1. LITTLE INTEREST OR PLEASURE IN DOING THINGS: NOT AT ALL
2. FEELING DOWN, DEPRESSED, IRRITABLE, OR HOPELESS: NOT AT ALL

## 2019-04-04 ASSESSMENT — COPD QUESTIONNAIRES
IN THE PAST 12 MONTHS DO YOU DO LESS THAN YOU USED TO BECAUSE OF YOUR BREATHING PROBLEMS: AGREE
DO YOU EVER COUGH UP ANY MUCUS OR PHLEGM?: YES, A FEW DAYS A WEEK OR MONTH
DURING THE PAST 4 WEEKS HOW MUCH DID YOU FEEL SHORT OF BREATH: SOME OF THE TIME
DURING THE PAST 4 WEEKS HOW MUCH DID YOU FEEL SHORT OF BREATH: SOME OF THE TIME
HAVE YOU SMOKED AT LEAST 100 CIGARETTES IN YOUR ENTIRE LIFE: YES
COPD SCREENING SCORE: 6
HAVE YOU SMOKED AT LEAST 100 CIGARETTES IN YOUR ENTIRE LIFE: YES
COPD SCREENING SCORE: 6
DO YOU EVER COUGH UP ANY MUCUS OR PHLEGM?: YES, A FEW DAYS A WEEK OR MONTH

## 2019-04-04 ASSESSMENT — LIFESTYLE VARIABLES
ALCOHOL_USE: NO
EVER_SMOKED: YES
EVER_SMOKED: YES

## 2019-04-04 NOTE — ASSESSMENT & PLAN NOTE
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 Hydromorphone home dose 16 mg q4 hrs prn  Recommended outpatient oncology follow-up

## 2019-04-04 NOTE — ED NOTES
"Pt concerned about his dilaudid medication dose not being the same as his \"normal\" home amount. Admitting MD paged for orders.  "

## 2019-04-04 NOTE — ED TRIAGE NOTES
"Chief Complaint   Patient presents with   • RLQ Pain     BIB EMS as transfer from [place] for above. Pt reports falling out of bed three days ago onto his right side, since then he developed right side and RLQ pain, +N/V, +dysuria. PT also being treated for Stage 4 colorectal cancer. Pt placed on cardiac monitor, BP cuff and pulse ox.      /94   Pulse 72   Temp 36.6 °C (97.9 °F) (Temporal)   Resp 18   Ht 1.905 m (6' 3\")   Wt 75.1 kg (165 lb 9.6 oz)   SpO2 96%   BMI 20.70 kg/m²     "

## 2019-04-04 NOTE — ED PROVIDER NOTES
ED Provider Note    Scribed for Gopi Williamson M.D. by Cristóbal Cotton. 4/3/2019  7:39 PM    Primary care provider: BEENA Sterling  Means of arrival: Ambulance  History obtained from: Patient  History limited by: None    CHIEF COMPLAINT  Chief Complaint   Patient presents with   • RLQ Pain       HPI  Erich Ingram is a 53 y.o. Male with a history of stage four colon cancer who presents to the Emergency Department with persistent right lower quadrant abdominal pain that started a few days ago. The patient states that five days ago he fell while walking and hit his side. He then developed right lower quadrant pain. The patient was seen yesterday and had a CT with contrast done in [place] for his current pain. Due to his history of colon cancer the patient was transferred to this facility. The patient states that he currently sees Dr. Rico for his current oncology needs. He states that he was treating his cancer with chemotherapy, but due to complications he is no longer being treated. The patient associates nausea, and vomiting.    REVIEW OF SYSTEMS  As above, all other systems reviewed and are negative.   See HPI for further details.     PAST MEDICAL HISTORY   has a past medical history of Breath shortness; Cancer (HCC); Cancer (HCC) (08/2018); COPD (chronic obstructive pulmonary disease) (HCC); Hepatitis C; Hypertension; Infectious disease; and Psychiatric problem.    SURGICAL HISTORY   has a past surgical history that includes low anterior resection laparoscopic (1/12/2016); cath placement (3/16/2016); and bronchoscopy-endo (N/A, 3/1/2019).    SOCIAL HISTORY  Social History   Substance Use Topics   • Smoking status: Former Smoker     Packs/day: 1.00     Years: 15.00     Start date: 1/12/2001     Quit date: 6/12/2015   • Smokeless tobacco: Former User   • Alcohol use No      History   Drug Use No       FAMILY HISTORY  Family History   Problem Relation Age of Onset   • Adopted: Yes   • No Known  "Problems Mother    • No Known Problems Father    • No Known Problems Sister    • No Known Problems Brother    • No Known Problems Maternal Grandmother    • No Known Problems Maternal Grandfather    • No Known Problems Paternal Grandmother    • No Known Problems Paternal Grandfather        CURRENT MEDICATIONS  Home Medications    **Home medications have not yet been reviewed for this encounter**         ALLERGIES  No Known Allergies    PHYSICAL EXAM  VITAL SIGNS: /94   Pulse 72   Temp 36.6 °C (97.9 °F) (Temporal)   Resp 18   Ht 1.905 m (6' 3\")   Wt 75.1 kg (165 lb 9.6 oz)   SpO2 96%   BMI 20.70 kg/m²   Constitutional: Well developed, Well nourished, No acute distress, Non-toxic appearance.   HENT: Normocephalic, Atraumatic, Bilateral external ears normal, Oropharynx is clear mucous membranes are moist. No oral exudates or nasal discharge.   Eyes: Pupils are equal round and reactive, EOMI, Conjunctiva normal, No discharge.   Neck: Normal range of motion, No tenderness, Supple, No stridor. No meningismus.  Lymphatic: No lymphadenopathy noted.   Cardiovascular: Regular rate and rhythm without murmur rub or gallop.  Thorax & Lungs: Clear breath sounds bilaterally without wheezes, rhonchi or rales. There is no chest wall tenderness.   Abdomen: Right lower quadrant tenderness to palpation. There is no rebound or guarding. No organomegaly is appreciated. Bowel sounds are normal.  Skin: Normal without rash.   Back: No CVA or spinal tenderness.   Extremities: Intact distal pulses, No edema, No tenderness, No cyanosis, No clubbing. Capillary refill is less than 2 seconds.  Musculoskeletal: Good range of motion in all major joints. No tenderness to palpation or major deformities noted.   Neurologic: Alert & oriented x 3, Normal motor function, Normal sensory function, No focal deficits noted. Reflexes are normal.  Psychiatric: Affect normal, Judgment normal, Mood normal. There is no suicidal ideation or patient " reported hallucinations.       DIAGNOSTIC STUDIES / PROCEDURES    LABS  Labs Reviewed   CBC WITH DIFFERENTIAL - Abnormal; Notable for the following:        Result Value    WBC 11.0 (*)     RBC 4.01 (*)     Hemoglobin 12.2 (*)     Hematocrit 37.5 (*)     MCHC 32.5 (*)     RDW 51.8 (*)     Platelet Count 163 (*)     Neutrophils-Polys 77.10 (*)     Lymphocytes 10.50 (*)     Neutrophils (Absolute) 8.45 (*)     Monos (Absolute) 1.25 (*)     All other components within normal limits   COMP METABOLIC PANEL - Abnormal; Notable for the following:     Sodium 133 (*)     Glucose 100 (*)     AST(SGOT) 70 (*)     Alkaline Phosphatase 109 (*)     All other components within normal limits   LIPASE - Abnormal; Notable for the following:     Lipase 6 (*)     All other components within normal limits   ESTIMATED GFR   URINALYSIS,CULTURE IF INDICATED      All labs reviewed by me.    COURSE & MEDICAL DECISION MAKING  Nursing notes, VS, PMSFHx reviewed in chart.    7:39 PM Patient seen and examined at bedside. Ordered for Labs to evaluate. , Oncology, and R Internal Med have been paged at this time.    Laboratory evaluation reveals leukocytosis at 11,000 with 77% PMNs shift.  Serum electrolytes are unremarkable.  No evidence of acidosis.  Both AST and alkaline phosphatase are slightly elevated but they have actually improved slightly from previous measurement.  Lipase is normal ruling out pancreatitis.    8:13 PM-  I spoke with Dr. Rico, Oncology, he will follow up with the patient as an in patient.  Reexamination at this time shows that the patient is pain control and does not have evidence of a surgical abdomen at this time.    8:18 PM- I discussed the pertinent details of the patient's case. The hospitalist accepts the patient for admission. Care is turned over at this time.    ROR:  The patient was seen yesterday 4/2/2019 at [facility]. Chest CT Scan yesterday shows bilateral pulmonary nodules, worries of  subplural consolidation with both lobes. Decrease in size of cavitary nodules present. Abdominal CT scan shows  irregular soft tissue of the proximal sigmoid colon.  There is appendiceal thickening but there is no asymmetric wall swelling no enhancement of the mesenteric fat. White count is 12.6 no significant shift    DISPOSITION:  Patient will be admitted to [facility] in guarded condition.  Patient is kept n.p.o. at this time    FINAL IMPRESSION  1. Right lower quadrant abdominal pain    2. Metastasis from colon cancer (HCC)          Cristóbal BARBA (Terrieibkatie), am scribing for, and in the presence of, Gopi Williamson M.D..    Electronically signed by: Cristóbal Cotton (Danny), 4/3/2019    Gopi BARBA M.D. personally performed the services described in this documentation, as scribed by Cristóbal Cotton in my presence, and it is both accurate and complete.    The note accurately reflects work and decisions made by me.  Gopi Williamson  4/3/2019  9:03 PM

## 2019-04-04 NOTE — PROGRESS NOTES
Internal Medicine Interval Note  Note Author: Delia Quintana M.D.     Name Erich Ingram     1965   Age/Sex 53 y.o. male   MRN [id number]   Code Status Full     After 5PM or if no immediate response to page, please call for cross-coverage  Attending/Team: Dr Arriaga/Madelin  See Patient List for primary contact information  Call [phone] to page    1st Call - Day Intern (R1):   Dr Quintana 2nd Call - Day Sr. Resident (R2/R3):   Dr Pelayo         Reason for interval visit  (Principal Problem)   Abdominal pain       Interval Problem Daily Status Update  (24 hours, problem oriented, brief subjective history, new lab/imaging data pertinent to that problem)   He was admitted overnight for RLQ and RUQ abdominal pain after sustaining GLF 4 days ago. Fell off his bed and onto his side now has bruising which he feels like is the cause his pain. CT abdomen showed enlarged appendix (increased in size from previous CT in  but was previously large and no fat stranding seen) so he was sent here from a hospital in [place]. He has chills but no fevers. Nausea and vomiting for 2 days and poor po intake. No recent travel no diarrhea. Has not vomited since admit, improved with phenergan and zofran (home meds). Surgery evaluated patient and he does not need surgery, does not appear to be appendicitis. Started on clear liquid diet.     Patient has no pulmonary sx but does have new bilateral opacities on chest CT from outside hospital. Procal very high at 6.76, blood cultures pending. WBC slightly elevated 11 which is higher than baseline. Denies any cough, sob, does have right sided pleuritic chest pain.     Says he is not on any treatment for his stage 4 colorectal cancer. Last f/u with oncologist Dr Jeff 2 weeks ago, I do not see these notes.       Review of Systems   Constitutional: Negative for chills and fever.   Respiratory: Negative for cough, hemoptysis, sputum production, shortness of breath and  wheezing.    Cardiovascular: Negative for chest pain and palpitations.   Gastrointestinal: Positive for abdominal pain, nausea and vomiting. Negative for blood in stool, constipation and diarrhea.   Genitourinary: Negative for dysuria and urgency.   Musculoskeletal: Negative for joint pain and myalgias.   Neurological: Negative for dizziness.   Psychiatric/Behavioral: Negative for depression. The patient is not nervous/anxious.        Disposition/Barriers to discharge:   Inpatient for abx and improvement with N/V then home    Consultants/Specialty  Surgery - Dr England  PCP: BEENA Sterling      Quality Measures  Quality-Core Measures   Reviewed items::  Labs reviewed, Medications reviewed and Radiology images reviewed  Soriano catheter::  No Soriano  DVT prophylaxis pharmacological::  Enoxaparin (Lovenox)  Ulcer Prophylaxis::  No          Physical Exam       Vitals:    04/04/19 0200 04/04/19 0249 04/04/19 0420 04/04/19 0745   BP:  147/94 129/93 133/93   Pulse: 61 65 78 (!) 20   Resp: 19 18 18 20   Temp:  36.6 °C (97.9 °F) 36.2 °C (97.2 °F) 36.7 °C (98 °F)   TempSrc:  Temporal Temporal Temporal   SpO2: 97%  96% 94%   Weight:  73.6 kg (162 lb 4.1 oz)     Height:         Body mass index is 20.28 kg/m². Weight: 73.6 kg (162 lb 4.1 oz)  Oxygen Therapy:  Pulse Oximetry: 94 %, O2 (LPM): 0, O2 Delivery: None (Room Air)    Physical Exam   Constitutional: He is oriented to person, place, and time and well-developed, well-nourished, and in no distress.   HENT:   Head: Normocephalic and atraumatic.   Mouth/Throat: Oropharynx is clear and moist. No oropharyngeal exudate.   Eyes: Pupils are equal, round, and reactive to light. Conjunctivae and EOM are normal. Right eye exhibits no discharge. No scleral icterus.   Neck: Neck supple.   Cardiovascular: Normal rate, regular rhythm, normal heart sounds and intact distal pulses.  Exam reveals no gallop and no friction rub.    No murmur heard.  Pulmonary/Chest: Effort normal and breath  sounds normal. No respiratory distress. He has no wheezes.   Abdominal: Soft. Bowel sounds are normal. He exhibits no distension. There is tenderness.   RUQ and RLQ, - Waldron and McBurneys  Side/low back bruising right, tender   Lymphadenopathy:     He has no cervical adenopathy.   Neurological: He is alert and oriented to person, place, and time.   Skin: Skin is warm and dry. No rash noted. He is not diaphoretic.   Psychiatric:   irritable   Nursing note and vitals reviewed.            Assessment/Plan     * Right lower quadrant abdominal pain   Assessment & Plan    And right upper abdomen  Patient complaining of right lower quadrant pain, nausea, vomiting.  White blood cell count slightly elevated at 11.  However, no appendiceal signs or rebound tenderness.  Outside CT report demonstrates a enlarged lumen thought to represent the appendix, however no fat stranding or wall thickening.    Do not believe this is appendicitis, likely soft tissue vs rib fx from fall?  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 Hydromorphone home dose 16 mg q4 hrs prn       Benign prostate hyperplasia   Assessment & Plan    Continue home Flomax     Nausea & vomiting   Assessment & Plan    Continue home Zofran and Phenergan  No witnessed vomiting, no diarrhea  Gastroenteritis?  IVF     Essential hypertension   Assessment & Plan     Continue home losartan     Anxiety   Assessment & Plan    Patient has a history on file for anxiety.  He is  visibly anxious, reports feeling anxious, and is argumentative with staff.  He is not currently on anxiolytics.    Plan:  Can try hydroxyzine prn  Outpatient follow-up for anxiety  Nonpharmacologic interventions to reduce anxiety, anxiety may influence pain

## 2019-04-04 NOTE — PROGRESS NOTES
Assumed care at 0230, bedside report received from day shift RN. Pt on the monitor. Initial assessment completed, orders reviewed, call light within reach,  and hourly rounding in place. POC addressed with patient, no additional questions at this time.

## 2019-04-04 NOTE — ASSESSMENT & PLAN NOTE
-Chronic nausea on home Phenergan and Zofran.  -Improved with improvement in abdominal pain.  Tolerating diet.

## 2019-04-04 NOTE — CONSULTS
DATE OF SERVICE:  04/04/2019    SURGICAL CONSULTATION    HISTORY OF PRESENT ILLNESS:  The patient is a 53-year-old man who I have been   asked to evaluate further for question of acute appendicitis.  He was worked   up for right lower quadrant pain and had a CT with just a potential   appendicitis.  He does give a history of falling and striking his right side.    He states that where he struck himself is where he hurts.  He does have some   bruising in that area.  He also has the unfortunate diagnosis of stage IV   colon cancer.  Family states that he has some soreness which is not different   than it has been for the 4 days and is quite concern, that is exactly the spot   where he hit himself on the side.  He does again have a medical history which   is significant for stage IV colon cancer, COPD, hepatitis C, hypertension and   psychiatric disorders.    PAST SURGICAL HISTORY:  Includes a low anterior resection of his colon then   laparoscopically, port placement and bronchoscopy.    MEDICATIONS:  Prior to this admission include Dilaudid 16 mg p.o. q. 4 hours   p.r.n., Prevacid 30 mg daily, Cozaar 50 mg daily, Zofran 8 mg q. 6 hours   p.r.n., prednisone 5 mg daily, Phenergan 50 mg q. 6 hours p.r.n., and Flomax   0.4 mg daily.      ALLERGIES:  He has no stated drug allergies.    SOCIAL HISTORY:  He did smoke including 2015.  Does not use alcohol, does not   use illicit drugs.    FAMILY HISTORY:  Essentially negative.    REVIEW OF SYSTEMS:  He has had ongoing problems with abdominal pain due to his   colon cancer as well as some shortness of breath, again also positive for   fall with a blow to his right flank 3 days ago.    PHYSICAL EXAMINATION:  VITAL SIGNS:  He has a temperature of 36.7, pulse of 106, blood pressure   133/93.  HEENT:  Unremarkable.  His pupils are equal.  Oropharynx without lesions.  NECK:  Supple.  LUNGS:  Clear.  CARDIOVASCULAR:  Reveals regular rate and rhythm.  ABDOMEN:  Soft, does not have  any real tenderness.  No peritoneal signs.  EXTREMITIES:  Symmetrical, without clubbing, cyanosis or edema.  He does have   some bruising on his right flank.  NEUROLOGIC:  He is neurologically intact without any gross detectable motor or   sensory deficits and is appropriate, alert and oriented.  He has palpable   radial and femoral pulses.  SKIN:  Warm and dry.    LABORATORY DATA:  Includes a white count of 11, hematocrit 37.5 and platelets   of 163.  His sodium is 133, potassium is 3.9, chloride 102, CO2 is 24, BUN is   9, creatinine is 0.74.  Transaminases are normal.  He had a CAT scan done at   the referring facility, which showed what was felt to be dilated appendix   without any periappendiceal fat stranding and there was fluid in the appendix.    There is also significant evidence of metastatic disease throughout.      IMPRESSION:  A 53-year-old man with right-sided pain which he attributes to a   fall and blow to that area.  I believe that his perception of the etiology of   his pain is correct.  Based on his exam, physical, and imaging as well as his   history, I do not think he has acute appendicitis and again I do think that   his pain is likely from his fall.  No surgical intervention is indicated.  I   relayed this to the nurse and I will relay to his treating team.  From my   standpoint, he can eat and does not need antibiotics.       ____________________________________     MD MARCI GREENE / MAURO    DD:  04/04/2019 11:05:55  DT:  04/04/2019 11:36:39    D#:  [id number]  Job#:  [id number]

## 2019-04-04 NOTE — PROGRESS NOTES
Notified Dr Quintana with [facility] that procalcitonin resulted at 6.76. MD was aware - Unasyn ordered for that purpose. Also notified that pt's wife was requesting an update phone call from the MD. MD to call wife. RN will continue to monitor.

## 2019-04-04 NOTE — ED NOTES
Med rec updated and complete.  Allergies reviewed.  Met with pt at bedside and dicussed current medications and  Last doses taken.  Confirmed with [employer] pts current narcotic count.

## 2019-04-04 NOTE — CARE PLAN
Problem: Communication  Goal: The ability to communicate needs accurately and effectively will improve  Outcome: PROGRESSING AS EXPECTED    Intervention: Chloride patient and significant other/support system to call light to alert staff of needs  Patient oriented to call light system and has been able to communicate needs accurately and effectively.      Problem: Safety  Goal: Will remain free from falls  Outcome: PROGRESSING AS EXPECTED    Intervention: Assess risk factors for falls  Patient has been assessed for fall risks and fall precautions have been put in place.

## 2019-04-04 NOTE — SENIOR ADMIT NOTE
"Mr Ingram is a 53 y.o. male with PMHx of stage IV colon cancer s/p resection in 2016 and immuno / chemotherapy for lung metastases, COPD, hepatitis C-untreated, hypertension who presented as a transfer from [place] for further evaluation of his right lower quadrant abdominal pain.  Patient not a very good historian, was getting irritated easily and did not want to answer questions.  History limited because of this - reports having a fall 3 days back on his right side and since then has been having right lower quadrant pain, associated with nausea, vomiting-many episodes, consisting of food and water.  Denies any blood in emesis.  Also complains of chills and dysuria.  Denies any fevers, cough, shortness of breath, chest pain or focal weakness.     CT was done at the outside hospital -showed bilateral pulmonary nodules with subpleural consolidation in both lobes and decrease in size of cavitary nodules.  Abdominal CT showed irregular soft tissue in the proximal sigmoid colon.  Appendiceal thickening but no asymmetric wall swelling and no enhancement of mesenteric fat and left severe hydronephrosis. He was transferred to [facility] as he is followed up for his colon cancer here.    Patient was recently discharged on 3/17 after being treated for fevers (unclear source) with vancomycin and Zosyn and was discharged with amoxicillin.  This port was removed in February as he was bacteremic-lactobacillus on blood cultures and port cultures.    Follows up with Dr. Urbina as an outpatient.      Patient does report improvement in pain after getting pain medications in the ED.ED physician consulted surgery Dr. England for possible appendicitis and appendectomy and heme oncology Dr. Malik for further recommendations.    Exam:  Vitals stable on admission  /94   Pulse 63   Temp 36.6 °C (97.9 °F) (Temporal)   Resp 19   Ht 1.905 m (6' 3\")   Wt 75.1 kg (165 lb 9.6 oz)   SpO2 96%   BMI 20.70 kg/m²     Physical exam: "   General: In mild distress due to pain , irritated mood and anxious.  HEENT: NC/AT. PERRLA, EOMI. moist mucous membranes. No lymphadenopathy.  CVS: RRR, S1S2 WNL, no MRG  RS: CTA, good air entry. No wheezes or ronchi.  Abdomen: Soft, non distended, bruising seen on the right lower quadrant, tender around the bruising area, no mc lc's tenderness. No guarding or rigidity. Pushed my hand away saying he didn't want me to examine more. But reports tenderness in his LLQ too where he has his cancer. Wouldn't let me palpate it.   Ext: No pedal edema  Neuro: Could not do a neuro exam, patient not co operative    Labs:  Leukocytosis at 11, normocytic anemia at 12.2, lipase 6 mild hyponatremia at 133, elevated AST at 70, elevated alk phos at 109.    No orders to display       Assessment and Plan:    #Right lower quadrant pain  #Recent fall  #Stage IV colon cancer with metastases to lungs  #Leukocytosis with left shift  #Normocytic anemia  #Mild hyponatremia-likely from dehydration  #Elevated AST - has h/o untreated hep c  #Elevated alk phos-improved from prior  #Hypertension  #COPD  #Hepatitis C-untreated    -Admit to inpatient with telemetry and continuous pulse oximetry  -Right lower quadrant pain-likely secondary to bruising from the fall vs ?  Appendicitis vs ?  Related to cancer  -Unclear if this is appendicitis, denies fevers but has nausea and vomiting.  Has bruising on the right lower quadrant, no McBurney's tenderness.  -Afebrile, mild leukocytosis-given immunocompromised state and recent bacteremia, started C3 and Flagyl for possible appendicitis-day team to de-escalate  -Follow-up lactic acid and blood cultures  -Supportive care with IV fluids and pain control with morphine  -Zofran as needed for nausea  -Continue home blood pressure medications  -N.p.o. and SCDs for DVT prophylaxis, given possible intervention by surgery  -PT/INR ordered  -Aspiration seizure and fall precautions  -RT and oxygen per  protocol    For further details , please refer to H&P by Dr. Mack

## 2019-04-04 NOTE — CARE PLAN
Problem: Safety  Goal: Will remain free from injury  Outcome: PROGRESSING AS EXPECTED  High fall risk precautions in place; pt verbalizes understanding and agrees to call for assistance prior to mobility.     Problem: Venous Thromboembolism (VTW)/Deep Vein Thrombosis (DVT) Prevention:  Goal: Patient will participate in Venous Thrombosis (VTE)/Deep Vein Thrombosis (DVT)Prevention Measures  Outcome: PROGRESSING AS EXPECTED  Pt is receiving subq lovenox for VTE prophylaxis.

## 2019-04-04 NOTE — ASSESSMENT & PLAN NOTE
Patient complaining of right lower quadrant pain, nausea, vomiting.  White blood cell count slightly elevated at 11.  However, no appendiceal signs or rebound tenderness.  Outside CT report demonstrates a enlarged lumen thought to represent the appendix, however no fat stranding or wall thickening.    Rib series negative for fracture  Appears to be due to constipation    Plan:  -Abdominal pain much better after getting large bowel movement last night.  -Patient's pain is at baseline and willing to go home today.

## 2019-04-04 NOTE — PROGRESS NOTES
CROSS COVER: Patient is verbally aggressive and very anxious about the fact that his Phenergan dose had been cut from 50-25 mg.  He states that he takes 50 mg every 4 hours around the clock at home.  Pharmacist check confirms that this is his home dose.  Phenergan dose increased to 50 mg.

## 2019-04-04 NOTE — CONSULTS
"Consult Note: Oncology    Date of consultation: 4/4/2019 10:59 AM    Referring provider: No ref. provider found    Reason for consultation: History of metastatic colon cancer      History of presenting illness:     53-year-old gentleman with a past medical history significant for metastatic colon cancer, status post FOLFIRI Chemotherapy treatment with his last cycle of treatment on 2/8/19 and resection.  Patient also has a past medical history significant for hepatitis C, COPD, hypertension, and anxiety.  Patient presented to the emergency department due to a fall and rib pain secondary to fall.     CT of the abdomen and pelvis performed in [facility] revealed, \"interval significant decrease in size and number of cavitary nodules scattered throughout both lungs, subpleural consolidation posteriorly in the lower lobes, left-sided severe hydronephrosis secondary to obstruction in the left ureter at L4/L5 by abnormal soft tissue, suspected enlarged appendix, possible appendicitis (no asymmetric wall swelling, enhancement, or infiltration of the adjacent mesenteric fat), may be attenuated due to the recent treatment of antibiotics , consider mucocele.\"    In the emergency department, patient's vital signs were within normal limits except for slight hypertension at 152/94.    Patient's CBC revealed leukocytosis with neutrophilia, anemia, and mild thrombocytopenia.  CMP revealed slight hyponatremia and elevated AST and alk phos.  AST to ALT ratio is almost 2:1.  Lipase was 6 and lactic acid 1.1.    UA revealed trace ketonuria.    Pro-calcitonin was elevated at 6.76.    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   • COPD (chronic obstructive pulmonary disease) (HCC)    • Hepatitis C    • Hypertension    • Infectious disease    • Psychiatric problem     anxiety       Past " surgical history:    Past Surgical History:   Procedure Laterality Date   • BRONCHOSCOPY-ENDO N/A 3/1/2019    Procedure: BRONCHOSCOPY-ENDO;  Surgeon: Katelin Garner M.D.;  Location: ENDOSCOPY [facility];  Service: Pulmonary   • CATH PLACEMENT  3/16/2016    Procedure: CATH PLACEMENT POWER PORT ;  Surgeon: Luke Lima M.D.;  Location: SURGERY [facility];  Service:    • LOW ANTERIOR RESECTION LAPAROSCOPIC  1/12/2016    Procedure: LOW ANTERIOR RESECTION LAPAROSCOPIC;  Surgeon: Luke Lima M.D.;  Location: SURGERY [facility];  Service:        Allergies:  Patient has no known allergies.    Medications:    Current Facility-Administered Medications   Medication Dose Route Frequency Provider Last Rate Last Dose   • HYDROmorphone (DILAUDID) tablet 16 mg  16 mg Oral Q4HRS PRN Delia Quintana M.D.       • lidocaine (LIDODERM) 5 % 1 Patch  1 Patch Transdermal Q24HR Delia Quintana M.D.       • losartan (COZAAR) tablet 50 mg  50 mg Oral DAILY Kvng Mack M.D.   50 mg at 04/04/19 0702   • tamsulosin (FLOMAX) capsule 0.4 mg  0.4 mg Oral AFTER BREAKFAST Kvng Mack M.D.   0.4 mg at 04/04/19 0854   • senna-docusate (PERICOLACE or SENOKOT S) 8.6-50 MG per tablet 2 Tab  2 Tab Oral BID Kvng Mack M.D.   2 Tab at 04/04/19 0702    And   • polyethylene glycol/lytes (MIRALAX) PACKET 1 Packet  1 Packet Oral QDAY PRN Kvng Mack M.D.        And   • magnesium hydroxide (MILK OF MAGNESIA) suspension 30 mL  30 mL Oral QDAY PRN Kvng Mack M.D.        And   • bisacodyl (DULCOLAX) suppository 10 mg  10 mg Rectal QDAY PRN Kvng Mack M.D.       • Respiratory Care per Protocol   Nebulization Continuous RT Kvng Mack M.D.       • D5 1/2 NS infusion   Intravenous Continuous Kvng Mack M.D. 150 mL/hr at 04/04/19 0854     • acetaminophen (TYLENOL) tablet 650 mg  650 mg Oral Q6HRS PRN Kvng Mack M.D.       • hydrALAZINE (APRESOLINE) injection 10 mg  10 mg Intravenous Q4HRS PRN Kvng Mack  M.D.       • ondansetron (ZOFRAN) syringe/vial injection 4 mg  4 mg Intravenous Q4HRS PRSASKIA Mack M.D.   4 mg at 04/04/19 0709   • promethazine (PHENERGAN) suppository 12.5-25 mg  12.5-25 mg Rectal Q4HRS PRN Kvng Mack M.D.       • prochlorperazine (COMPAZINE) injection 5-10 mg  5-10 mg Intravenous Q4HRS PRN Kvng Mack M.D.       • ondansetron (ZOFRAN ODT) dispertab 4 mg  4 mg Oral Q4HRS PRN Kvng Mack M.D.   4 mg at 04/03/19 2244   • promethazine (PHENERGAN) tablet 50 mg  50 mg Oral Q4HRS PRN Kvng Mack M.D.   50 mg at 04/04/19 0902       Social History:     Social History     Social History   • Marital status:      Spouse name: N/A   • Number of children: N/A   • Years of education: N/A     Occupational History   • Not on file.     Social History Main Topics   • Smoking status: Former Smoker     Packs/day: 1.00     Years: 15.00     Start date: 1/12/2001     Quit date: 6/12/2015   • Smokeless tobacco: Former User   • Alcohol use No   • Drug use: No   • Sexual activity: Not on file     Other Topics Concern   • Not on file     Social History Narrative   • No narrative on file       Family History:     Family History   Problem Relation Age of Onset   • Adopted: Yes   • No Known Problems Mother    • No Known Problems Father    • No Known Problems Sister    • No Known Problems Brother    • No Known Problems Maternal Grandmother    • No Known Problems Maternal Grandfather    • No Known Problems Paternal Grandmother    • No Known Problems Paternal Grandfather        Review of Systems:  All other review of systems are negative except what was mentioned above in the HPI.    Constitutional: No fever, chills, weight loss ,malaise/fatigue.    HEENT: No new auditory or visual complaints. No sore throat and neck pain.     Respiratory:No new cough, sputum production, shortness of breath and wheezing.    Cardiovascular: No new chest pain, palpitations, orthopnea and leg swelling.    Gastrointestinal:  "(+) Right quadrant and rib pain  Genitourinary: Negative for dysuria, hematuria    Musculoskeletal: No new arthralgias or myalgias   Skin: Negative for rash and itching.    Neurological: Negative for focal weakness or headaches.    Endo/Heme/Allergies: No abnormal bleed/bruise.    Psychiatric/Behavioral: No new depression/anxiety.    Physical Exam:  Vitals:   /93   Pulse (!) 20   Temp 36.7 °C (98 °F) (Temporal)   Resp 20   Ht 1.905 m (6' 3\")   Wt 73.6 kg (162 lb 4.1 oz)   SpO2 94%   BMI 20.28 kg/m²      General: Not in acute distress, alert and oriented x 3  HEENT: No pallor, icterus. Oropharynx clear.   Neck: Supple, no palpable masses.  Lymph nodes: No palpable cervical, supraclavicular, axillary or inguinal lymphadenopathy.    CVS: regular rate and rhythm, no rubs or gallops  RESP: Clear to auscultate bilaterally, no wheezing or crackles.   ABD: (+) tenderness with palpation, no guarding, mild rebound tenderness  EXT: No edema or cyanosis  CNS: Alert and oriented x3, No focal deficits.  Skin- No rash      Labs:   Recent Labs      04/03/19   1938   RBC  4.01*   HEMOGLOBIN  12.2*   HEMATOCRIT  37.5*   PLATELETCT  163*     Lab Results   Component Value Date/Time    SODIUM 133 (L) 04/03/2019 07:38 PM    POTASSIUM 3.9 04/03/2019 07:38 PM    CHLORIDE 102 04/03/2019 07:38 PM    CO2 24 04/03/2019 07:38 PM    GLUCOSE 100 (H) 04/03/2019 07:38 PM    BUN 9 04/03/2019 07:38 PM    CREATININE 0.74 04/03/2019 07:38 PM        Assessment and Plan:    Abdominal pain  History of metastatic colon cancer  Patient has a history metastatic colon cancer, status post FOLFIRI chemotherapy treatment with his last cycle of treatment on 2/8/19.   Patient was admitted due to concern for appendicitis noted on imaging. Patient reports that he fell and hurt his ribs and that's why he is having pain.   Plan  - It appears patients source of pain is secondary to his fall and rib trauma, pending xray.   - Mass concerning for appendicitis " was noted, suggest patient following up with oncology to correlate images for any new abnormalities from previous scans        He agreed and verbalized his agreement and understanding with the current plan.  I answered all questions and concerns he has at this time.              Thank you for allowing me to participate in his care.    Please note that this dictation was created using voice recognition software. I have made every reasonable attempt to correct obvious errors, but I expect that there are errors of grammar and possibly content that I did not discover before finalizing the note.      SIGNATURES:  Jesús Keen    CC:  Nina Hardy, P.A.  No ref. provider found

## 2019-04-04 NOTE — H&P
"      Internal Medicine Admitting History and Physical    Note Author: Kvng Mack M.D.       Name Erich Ingram     1965   Age/Sex 53 y.o. male   MRN [id number]   Code Status Full Code     After 5PM or if no immediate response to page, please call for cross-coverage  Attending/Team: Yellow See Patient List for primary contact information  Call [phone] to page    1st Call - Day Intern (R1):   [place] 2nd Call - Day Sr. Resident (R2/R3):   in       Chief Complaint:  Right lower quadrant pain    HPI:  53-year-old male past medical history metastatic sigmoid colorectal cancer to lymph nodes, status post resection (), hepatitis C (per patient, low viral load, plan to treat \"after my cancer is under control \"), COPD, hypertension, anxiety presents the emergency department for 2 days of right lower quadrant pain associated with nausea and vomiting.  He is anxious and argumentative, limiting his history.  He reports that he is having severe pain, tenderness is mild on exam, no rebound tenderness or peritoneal signs.  Imaging from [facility] (CT chest/abdomen/pelvis) demonstrated interval significant decrease in size and number of c cavitary nodules scattered throughout both lungs, subpleural consolidation posteriorly in the lower lobes, left sided severe hydronephrosis secondary to obstruction of the left ureter at L4/L5 by abnormal soft tissue, suspected enlarged appendix, possible appendicitis (no asymmetric wall swelling, enhancement, or infiltration of the adjacent mesenteric fat), may to be attenuated due to recent treatment antibiotics, consider mucocele.  He reports that he fell 5 days ago and hit his flank.  His WBC count is 11.  General surgery (Dr. England) and oncology (dr. Malik) consulted from emergency department.    Review of Systems:   Constitutional: Negative for chills and fever.   Eyes: Negative for blurred vision.   Respiratory: Negative for cough and shortness of breath.  "   Cardiovascular: Negative for chest pain, palpitations and leg swelling.   Gastrointestinal: Positive for ABDOMINAL PAIN and NAUSEA/VOMITING.  Negative for constipation, diarrhea.   Genitourinary: Negative for dysuria, frequency and urgency.   Musculoskeletal: Negative.    Neurological: Negative for dizziness, focal weakness and headaches.   Integumentary: Negative for rash.  Psychiatric/Behavioral: Negative.    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   • COPD (chronic obstructive pulmonary disease) (HCC)    • Hepatitis C    • Hypertension    • Infectious disease    • Psychiatric problem     anxiety       Past Surgical History:  Past Surgical History:   Procedure Laterality Date   • BRONCHOSCOPY-ENDO N/A 3/1/2019    Procedure: BRONCHOSCOPY-ENDO;  Surgeon: Katelin Garner M.D.;  Location: ENDOSCOPY [facility];  Service: Pulmonary   • CATH PLACEMENT  3/16/2016    Procedure: CATH PLACEMENT POWER PORT ;  Surgeon: Luke Lima M.D.;  Location: SURGERY [facility];  Service:    • LOW ANTERIOR RESECTION LAPAROSCOPIC  1/12/2016    Procedure: LOW ANTERIOR RESECTION LAPAROSCOPIC;  Surgeon: Luke Lima M.D.;  Location: SURGERY [facility];  Service:        Current Outpatient Medications:  Home Medications     Reviewed by Judit De Guzman (Pharmacy Tech) on 04/03/19 at 2049  Med List Status: Complete   Medication Last Dose Status   HYDROmorphone (DILAUDID) 8 MG tablet 4/3/2019 Active   lansoprazole (PREVACID) 30 MG CAPSULE DELAYED RELEASE 4/2/2019 Active   losartan (COZAAR) 50 MG Tab 4/2/2019 Active   ondansetron (ZOFRAN ODT) 4 MG TABLET DISPERSIBLE 4/3/2019 Active   predniSONE (DELTASONE) 5 MG Tab 4/2/2019 Active   promethazine (PHENERGAN) 50 MG Tab 4/2/2019 Active   tamsulosin (FLOMAX) 0.4 MG capsule 4/2/2019 Active                Medication  "Allergy/Sensitivities:  No Known Allergies    Family History:  Family History   Problem Relation Age of Onset   • Adopted: Yes   • No Known Problems Mother    • No Known Problems Father    • No Known Problems Sister    • No Known Problems Brother    • No Known Problems Maternal Grandmother    • No Known Problems Maternal Grandfather    • No Known Problems Paternal Grandmother    • No Known Problems Paternal Grandfather        Social History:  Social History     Social History   • Marital status:      Spouse name: N/A   • Number of children: N/A   • Years of education: N/A     Social History Main Topics   • Smoking status: Former Smoker     Packs/day: 1.00     Years: 15.00     Start date: 1/12/2001     Quit date: 6/12/2015   • Smokeless tobacco: Former User   • Alcohol use No   • Drug use: No   • Sexual activity: Not on file     Other Topics Concern   • Not on file     Social History Narrative   • No narrative on file       PCP : BEENA Sterling      Physical Exam     Vitals:    04/03/19 1928 04/03/19 1929 04/03/19 2000   BP:  152/94    Pulse:  72 63   Resp:  18 19   Temp:  36.6 °C (97.9 °F)    TempSrc:  Temporal    SpO2:  96% 96%   Weight: 75.1 kg (165 lb 9.6 oz)     Height: 1.905 m (6' 3\")       Body mass index is 20.7 kg/m².  /94   Pulse 63   Temp 36.6 °C (97.9 °F) (Temporal)   Resp 19   Ht 1.905 m (6' 3\")   Wt 75.1 kg (165 lb 9.6 oz)   SpO2 96%   BMI 20.70 kg/m²   O2 therapy: Pulse Oximetry: 96 %, O2 Delivery: None (Room Air)       Constitutional: Oriented to person, place, and time and in no distress.   HENT:   Head: Normocephalic and atraumatic.   Eyes: Pupils are equal, round, and reactive to light. EOM are normal.   Neck: Normal range of motion. Neck supple. No JVD present. No tracheal deviation present. No thyromegaly present.   Cardiovascular: Regular rhythm.  Exam reveals no gallop and no friction rub.    No murmur heard.  Pulmonary/Chest: Effort normal and breath sounds normal. " "No stridor. No respiratory distress. No wheezes. He has no rales. No tenderness.   Abdominal: Soft. Bowel sounds are normal. No distension and no mass.  There is TENDERNESS overlying the right lower quadrant and left lower quadrant (\"my cancer spot \", has been present for years).  Psoas sign and foot tap negative.  There is no rebound and no guarding.   Musculoskeletal: Normal range of motion. No edema, tenderness or deformity.   Lymphadenopathy:     No cervical adenopathy.   Neurological: Alert and oriented to person, place, and time. No cranial nerve deficit.   Skin: Skin is warm and dry. No rash noted. No erythema.   Psychiatric: Memory normal.  Mood is \"anxious \", affect elevated, patient is argumentative and may have impaired judgment.  Vitals reviewed.          Data Review       Old Records Request:   Deferred  Current Records review/summary: Completed    Lab Data Review:  Recent Results (from the past 24 hour(s))   CBC WITH DIFFERENTIAL    Collection Time: 04/03/19  7:38 PM   Result Value Ref Range    WBC 11.0 (H) 4.8 - 10.8 K/uL    RBC 4.01 (L) 4.70 - 6.10 M/uL    Hemoglobin 12.2 (L) 14.0 - 18.0 g/dL    Hematocrit 37.5 (L) 42.0 - 52.0 %    MCV 93.5 81.4 - 97.8 fL    MCH 30.4 27.0 - 33.0 pg    MCHC 32.5 (L) 33.7 - 35.3 g/dL    RDW 51.8 (H) 35.9 - 50.0 fL    Platelet Count 163 (L) 164 - 446 K/uL    MPV 9.5 9.0 - 12.9 fL    Neutrophils-Polys 77.10 (H) 44.00 - 72.00 %    Lymphocytes 10.50 (L) 22.00 - 41.00 %    Monocytes 11.40 0.00 - 13.40 %    Eosinophils 0.20 0.00 - 6.90 %    Basophils 0.40 0.00 - 1.80 %    Immature Granulocytes 0.40 0.00 - 0.90 %    Nucleated RBC 0.00 /100 WBC    Neutrophils (Absolute) 8.45 (H) 1.82 - 7.42 K/uL    Lymphs (Absolute) 1.15 1.00 - 4.80 K/uL    Monos (Absolute) 1.25 (H) 0.00 - 0.85 K/uL    Eos (Absolute) 0.02 0.00 - 0.51 K/uL    Baso (Absolute) 0.04 0.00 - 0.12 K/uL    Immature Granulocytes (abs) 0.04 0.00 - 0.11 K/uL    NRBC (Absolute) 0.00 K/uL   COMP METABOLIC PANEL    " Collection Time: 04/03/19  7:38 PM   Result Value Ref Range    Sodium 133 (L) 135 - 145 mmol/L    Potassium 3.9 3.6 - 5.5 mmol/L    Chloride 102 96 - 112 mmol/L    Co2 24 20 - 33 mmol/L    Anion Gap 7.0 0.0 - 11.9    Glucose 100 (H) 65 - 99 mg/dL    Bun 9 8 - 22 mg/dL    Creatinine 0.74 0.50 - 1.40 mg/dL    Calcium 8.8 8.5 - 10.5 mg/dL    AST(SGOT) 70 (H) 12 - 45 U/L    ALT(SGPT) 43 2 - 50 U/L    Alkaline Phosphatase 109 (H) 30 - 99 U/L    Total Bilirubin 0.9 0.1 - 1.5 mg/dL    Albumin 3.2 3.2 - 4.9 g/dL    Total Protein 6.1 6.0 - 8.2 g/dL    Globulin 2.9 1.9 - 3.5 g/dL    A-G Ratio 1.1 g/dL   LIPASE    Collection Time: 04/03/19  7:38 PM   Result Value Ref Range    Lipase 6 (L) 11 - 82 U/L   ESTIMATED GFR    Collection Time: 04/03/19  7:38 PM   Result Value Ref Range    GFR If African American >60 >60 mL/min/1.73 m 2    GFR If Non African American >60 >60 mL/min/1.73 m 2       Imaging/Procedures Review:    Independant Imaging Review: Completed  No orders to display        CT chest[facility]abdomen/pelvis from [facility]: Image unavailable, report reviewed, see HPI.     EKG:   No new EKG    Records reviewed and summarized in current documentation :  Yes  [employer] teaching service handout given to patient:  No             Assessment/Plan     #Appendicitis versus metastases: Patient complaining of right lower quadrant pain, nausea, vomiting.  White blood cell count slightly elevated at 11.  However, no appendiceal signs or rebound tenderness.  Outside CT report demonstrates a enlarged lumen thought to represent the appendix, however no fat stranding or wall thickening.  These may be obstructive effects of the cancer, or his symptoms may be cancer pain.    Admit  Surgical consult  Oncology consult  N.p.o. for possible procedure  Hold enoxaparin for possible procedure  IV fluid resuscitation  Routine vital signs  Blood cultures  Urine cultures    #Anxiety: Patient has a history on file for anxiety.  He is visibly anxious,  "reports feeling anxious, and is argumentative with staff.  He is not currently on anxiolytics.    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      Oncology consulted  Continue home p.o. Hydromorphone    #Essential hypertension: Continue home losartan  #Nausea: Continue home Zofran and Phenergan  #Benign prostatic hyperplasia: Continue home Flomax    Anticipated Hospital stay: Observation admit        Quality Measures  Quality-Core Measures   Reviewed items::  EKG reviewed, Labs reviewed, Medications reviewed and Radiology images reviewed  Soriano catheter::  No Soriano  DVT: Held for possible surgical intervention.    PCP: BEENA Sterling  "

## 2019-04-05 ENCOUNTER — HOSPITAL ENCOUNTER (OUTPATIENT)
Dept: RADIOLOGY | Facility: MEDICAL CENTER | Age: 54
End: 2019-04-05

## 2019-04-05 ENCOUNTER — APPOINTMENT (OUTPATIENT)
Dept: RADIOLOGY | Facility: MEDICAL CENTER | Age: 54
DRG: 392 | End: 2019-04-05
Attending: STUDENT IN AN ORGANIZED HEALTH CARE EDUCATION/TRAINING PROGRAM
Payer: COMMERCIAL

## 2019-04-05 PROBLEM — K59.09 OTHER CONSTIPATION: Status: ACTIVE | Noted: 2019-04-05

## 2019-04-05 LAB
ALBUMIN SERPL BCP-MCNC: 3.5 G/DL (ref 3.2–4.9)
ALBUMIN/GLOB SERPL: 1.1 G/DL
ALP SERPL-CCNC: 105 U/L (ref 30–99)
ALT SERPL-CCNC: 46 U/L (ref 2–50)
ANION GAP SERPL CALC-SCNC: 8 MMOL/L (ref 0–11.9)
AST SERPL-CCNC: 72 U/L (ref 12–45)
BASOPHILS # BLD AUTO: 0.2 % (ref 0–1.8)
BASOPHILS # BLD: 0.02 K/UL (ref 0–0.12)
BILIRUB SERPL-MCNC: 0.6 MG/DL (ref 0.1–1.5)
BUN SERPL-MCNC: 4 MG/DL (ref 8–22)
CALCIUM SERPL-MCNC: 8.5 MG/DL (ref 8.5–10.5)
CHLORIDE SERPL-SCNC: 99 MMOL/L (ref 96–112)
CO2 SERPL-SCNC: 23 MMOL/L (ref 20–33)
CREAT SERPL-MCNC: 0.67 MG/DL (ref 0.5–1.4)
EOSINOPHIL # BLD AUTO: 0.01 K/UL (ref 0–0.51)
EOSINOPHIL NFR BLD: 0.1 % (ref 0–6.9)
ERYTHROCYTE [DISTWIDTH] IN BLOOD BY AUTOMATED COUNT: 47.7 FL (ref 35.9–50)
GLOBULIN SER CALC-MCNC: 3.2 G/DL (ref 1.9–3.5)
GLUCOSE SERPL-MCNC: 156 MG/DL (ref 65–99)
HCT VFR BLD AUTO: 37.8 % (ref 42–52)
HGB BLD-MCNC: 12.5 G/DL (ref 14–18)
IMM GRANULOCYTES # BLD AUTO: 0.03 K/UL (ref 0–0.11)
IMM GRANULOCYTES NFR BLD AUTO: 0.3 % (ref 0–0.9)
LYMPHOCYTES # BLD AUTO: 0.63 K/UL (ref 1–4.8)
LYMPHOCYTES NFR BLD: 6.7 % (ref 22–41)
MCH RBC QN AUTO: 29.8 PG (ref 27–33)
MCHC RBC AUTO-ENTMCNC: 33.1 G/DL (ref 33.7–35.3)
MCV RBC AUTO: 90.2 FL (ref 81.4–97.8)
MONOCYTES # BLD AUTO: 1.01 K/UL (ref 0–0.85)
MONOCYTES NFR BLD AUTO: 10.7 % (ref 0–13.4)
NEUTROPHILS # BLD AUTO: 7.74 K/UL (ref 1.82–7.42)
NEUTROPHILS NFR BLD: 82 % (ref 44–72)
NRBC # BLD AUTO: 0 K/UL
NRBC BLD-RTO: 0 /100 WBC
PLATELET # BLD AUTO: 189 K/UL (ref 164–446)
PMV BLD AUTO: 9.8 FL (ref 9–12.9)
POTASSIUM SERPL-SCNC: 3.8 MMOL/L (ref 3.6–5.5)
PROCALCITONIN SERPL-MCNC: 5.13 NG/ML
PROT SERPL-MCNC: 6.7 G/DL (ref 6–8.2)
RBC # BLD AUTO: 4.19 M/UL (ref 4.7–6.1)
SODIUM SERPL-SCNC: 130 MMOL/L (ref 135–145)
WBC # BLD AUTO: 9.4 K/UL (ref 4.8–10.8)

## 2019-04-05 PROCEDURE — 770020 HCHG ROOM/CARE - TELE (206)

## 2019-04-05 PROCEDURE — 700101 HCHG RX REV CODE 250: Performed by: STUDENT IN AN ORGANIZED HEALTH CARE EDUCATION/TRAINING PROGRAM

## 2019-04-05 PROCEDURE — A9270 NON-COVERED ITEM OR SERVICE: HCPCS | Performed by: STUDENT IN AN ORGANIZED HEALTH CARE EDUCATION/TRAINING PROGRAM

## 2019-04-05 PROCEDURE — 99232 SBSQ HOSP IP/OBS MODERATE 35: CPT | Mod: GC | Performed by: INTERNAL MEDICINE

## 2019-04-05 PROCEDURE — 700111 HCHG RX REV CODE 636 W/ 250 OVERRIDE (IP): Performed by: STUDENT IN AN ORGANIZED HEALTH CARE EDUCATION/TRAINING PROGRAM

## 2019-04-05 PROCEDURE — 36415 COLL VENOUS BLD VENIPUNCTURE: CPT

## 2019-04-05 PROCEDURE — 700102 HCHG RX REV CODE 250 W/ 637 OVERRIDE(OP): Performed by: STUDENT IN AN ORGANIZED HEALTH CARE EDUCATION/TRAINING PROGRAM

## 2019-04-05 PROCEDURE — 700105 HCHG RX REV CODE 258: Performed by: STUDENT IN AN ORGANIZED HEALTH CARE EDUCATION/TRAINING PROGRAM

## 2019-04-05 PROCEDURE — 80053 COMPREHEN METABOLIC PANEL: CPT

## 2019-04-05 PROCEDURE — 85025 COMPLETE CBC W/AUTO DIFF WBC: CPT

## 2019-04-05 PROCEDURE — 84145 PROCALCITONIN (PCT): CPT

## 2019-04-05 PROCEDURE — 71046 X-RAY EXAM CHEST 2 VIEWS: CPT

## 2019-04-05 RX ORDER — ENEMA 19; 7 G/133ML; G/133ML
1 ENEMA RECTAL ONCE
Status: COMPLETED | OUTPATIENT
Start: 2019-04-05 | End: 2019-04-05

## 2019-04-05 RX ORDER — POLYETHYLENE GLYCOL 3350 17 G/17G
1 POWDER, FOR SOLUTION ORAL DAILY
Status: DISCONTINUED | OUTPATIENT
Start: 2019-04-05 | End: 2019-04-05

## 2019-04-05 RX ORDER — POLYETHYLENE GLYCOL 3350 17 G/17G
2 POWDER, FOR SOLUTION ORAL DAILY
Status: DISCONTINUED | OUTPATIENT
Start: 2019-04-05 | End: 2019-04-06 | Stop reason: HOSPADM

## 2019-04-05 RX ADMIN — DEXTROSE AND SODIUM CHLORIDE: 5; 450 INJECTION, SOLUTION INTRAVENOUS at 17:55

## 2019-04-05 RX ADMIN — BISACODYL 10 MG: 10 SUPPOSITORY RECTAL at 04:08

## 2019-04-05 RX ADMIN — ONDANSETRON 4 MG: 2 INJECTION INTRAMUSCULAR; INTRAVENOUS at 04:04

## 2019-04-05 RX ADMIN — HYDROMORPHONE HYDROCHLORIDE 16 MG: 4 TABLET ORAL at 12:42

## 2019-04-05 RX ADMIN — ONDANSETRON 4 MG: 2 INJECTION INTRAMUSCULAR; INTRAVENOUS at 16:48

## 2019-04-05 RX ADMIN — HYDROMORPHONE HYDROCHLORIDE 16 MG: 4 TABLET ORAL at 08:25

## 2019-04-05 RX ADMIN — PROCHLORPERAZINE EDISYLATE 10 MG: 5 INJECTION INTRAMUSCULAR; INTRAVENOUS at 14:41

## 2019-04-05 RX ADMIN — MAGNESIUM HYDROXIDE 30 ML: 400 SUSPENSION ORAL at 16:48

## 2019-04-05 RX ADMIN — HYDROMORPHONE HYDROCHLORIDE 16 MG: 4 TABLET ORAL at 04:07

## 2019-04-05 RX ADMIN — SENNOSIDES,DOCUSATE SODIUM 2 TABLET: 8.6; 5 TABLET, FILM COATED ORAL at 16:53

## 2019-04-05 RX ADMIN — ONDANSETRON 4 MG: 2 INJECTION INTRAMUSCULAR; INTRAVENOUS at 12:39

## 2019-04-05 RX ADMIN — SENNOSIDES,DOCUSATE SODIUM 2 TABLET: 8.6; 5 TABLET, FILM COATED ORAL at 06:42

## 2019-04-05 RX ADMIN — ENOXAPARIN SODIUM 40 MG: 100 INJECTION SUBCUTANEOUS at 06:41

## 2019-04-05 RX ADMIN — HYDROMORPHONE HYDROCHLORIDE 16 MG: 4 TABLET ORAL at 16:48

## 2019-04-05 RX ADMIN — PROMETHAZINE HYDROCHLORIDE 50 MG: 25 TABLET ORAL at 11:42

## 2019-04-05 RX ADMIN — MAGNESIUM CITRATE 296 ML: 1.75 LIQUID ORAL at 16:49

## 2019-04-05 RX ADMIN — LOSARTAN POTASSIUM 50 MG: 50 TABLET ORAL at 06:42

## 2019-04-05 RX ADMIN — POLYETHYLENE GLYCOL 3350 2 PACKET: 17 POWDER, FOR SOLUTION ORAL at 08:26

## 2019-04-05 RX ADMIN — HYDROMORPHONE HYDROCHLORIDE 16 MG: 4 TABLET ORAL at 20:48

## 2019-04-05 RX ADMIN — ONDANSETRON 4 MG: 2 INJECTION INTRAMUSCULAR; INTRAVENOUS at 08:22

## 2019-04-05 RX ADMIN — DEXTROSE AND SODIUM CHLORIDE: 5; 450 INJECTION, SOLUTION INTRAVENOUS at 08:35

## 2019-04-05 RX ADMIN — TAMSULOSIN HYDROCHLORIDE 0.4 MG: 0.4 CAPSULE ORAL at 08:25

## 2019-04-05 RX ADMIN — SODIUM PHOSPHATE 133 ML: 7; 19 ENEMA RECTAL at 14:46

## 2019-04-05 ASSESSMENT — ENCOUNTER SYMPTOMS
ABDOMINAL PAIN: 1
VOMITING: 0
MYALGIAS: 0
SHORTNESS OF BREATH: 0
FEVER: 0
CHILLS: 0
DIARRHEA: 0
DEPRESSION: 0
DIZZINESS: 0
HEADACHES: 0
CONSTIPATION: 1
COUGH: 0
NAUSEA: 1
NERVOUS/ANXIOUS: 0

## 2019-04-05 NOTE — CARE PLAN
Problem: Communication  Goal: The ability to communicate needs accurately and effectively will improve  Outcome: PROGRESSING AS EXPECTED    Intervention: [facility] patient and significant other/support system to call light to alert staff of needs  Patient oriented to call light system and has been able to communicate needs accurately and effectively.      Problem: Safety  Goal: Will remain free from falls  Outcome: PROGRESSING AS EXPECTED    Intervention: Assess risk factors for falls  Patient has been assessed for fall risks and fall precautions have been put in place.

## 2019-04-05 NOTE — PROGRESS NOTES
[place] Summary    SR/ST 61-95, up to 160s nonsustained when pt ambulating  R PAC, R PVC  .16/.08/.40    12 hour chart check

## 2019-04-05 NOTE — PROGRESS NOTES
Internal Medicine Interval Note  Note Author: Delia Quintana M.D.     Name Erich Ingram     1965   Age/Sex 53 y.o. male   MRN [id number]   Code Status Full     After 5PM or if no immediate response to page, please call for cross-coverage  Attending/Team: Dr Arriaga/Madelin See Patient List for primary contact information  Call [phone] to page    1st Call - Day Intern (R1):   Dr Quintana 2nd Call - Day Sr. Resident (R2/R3):   Dr Pelayo         Reason for interval visit  (Principal Problem)   Abdominal pain       Interval Problem Daily Status Update  (24 hours, problem oriented, brief subjective history, new lab/imaging data pertinent to that problem)   - increased pain overnight, given 1 mg IV dilaudid and this helped a little, abdominal xray showed very large amount of stool burdon   - was given dulcolax suppository and had a small hard BM  - will try enema and scheduled miralax x2   - attempting to call Dr Urbina  - consulting palliative for help with pain management  - spoke with pt about this is likely due to constipation and the more opioids the worse the constipation will be   - no pulmonary sx such as cough, sob, pleuritic cp, and no fevers or chills     Review of Systems   Constitutional: Negative for chills and fever.   Respiratory: Negative for cough and shortness of breath.    Gastrointestinal: Positive for abdominal pain, constipation and nausea. Negative for diarrhea and vomiting.   Musculoskeletal: Negative for joint pain and myalgias.   Neurological: Negative for dizziness and headaches.   Psychiatric/Behavioral: Negative for depression. The patient is not nervous/anxious.        Disposition/Barriers to discharge:   Inpt until improvement in pain     Consultants/Specialty  Surgery - Dr England  palliative  PCP: Nina Hardy, P.A.      Quality Measures  Quality-Core Measures  Reviewed items::  Labs reviewed, Medications reviewed and Radiology images reviewed  Soriano catheter::   No Soriano  DVT prophylaxis pharmacological::  Enoxaparin (Lovenox)  Ulcer Prophylaxis::  No      Physical Exam       Vitals:    04/04/19 2030 04/04/19 2344 04/05/19 0440 04/05/19 0800   BP: (!) 163/109 153/109 156/102 (!) 161/111   Pulse: 85 81 88 66   Resp: 18 18 18 18   Temp: 36.8 °C (98.2 °F) 36.8 °C (98.2 °F) 37.2 °C (99 °F) 36.6 °C (97.9 °F)   TempSrc: Temporal Temporal Temporal Temporal   SpO2: 97% 97% 96% 98%   Weight:       Height:         Body mass index is 21.3 kg/m². Weight: 77.3 kg (170 lb 6.7 oz)  Oxygen Therapy:  Pulse Oximetry: 98 %, O2 (LPM): 0, O2 Delivery: None (Room Air)    Physical Exam  Constitutional: He is oriented to person, place, and time and well-developed, well-nourished, and in no distress.   HENT:   Head: Normocephalic and atraumatic.   Mouth/Throat: Oropharynx is clear and moist. No oropharyngeal exudate.   Eyes: Pupils are equal, round, and reactive to light. Conjunctivae and EOM are normal. Right eye exhibits no discharge. No scleral icterus.   Neck: Neck supple.   Cardiovascular: Normal rate, regular rhythm, normal heart sounds and intact distal pulses.  Exam reveals no gallop and no friction rub.    No murmur heard.  Pulmonary/Chest: Effort normal and breath sounds normal. No respiratory distress. He has no wheezes.   Abdominal: Soft. Bowel sounds are normal. He exhibits no distension. There is tenderness, no guarding.   RUQ, - Steward and McBurneys,Side/low back bruising right, tender   Lymphadenopathy:     He has no cervical adenopathy.   Neurological: He is alert and oriented to person, place, and time.   Skin: Skin is warm and dry. No rash noted. He is not diaphoretic.   Psychiatric:   irritable   Nursing note and vitals reviewed        Assessment/Plan     * Right upper quadrant abdominal pain   Assessment & Plan    And right upper abdomen  Patient complaining of right lower quadrant pain, nausea, vomiting.  White blood cell count slightly elevated at 11.  However, no  appendiceal signs or rebound tenderness.  Outside CT report demonstrates a enlarged lumen thought to represent the appendix, however no fat stranding or wall thickening.    Do not believe this is appendicitis, likely soft tissue vs rib fx from fall?  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 Hydromorphone home dose 16 mg q4 hrs prn  Wife bringing in CT abdomen and chest today        Chronic, continuous use of opioids   Assessment & Plan    Associated with constipation  Aggressive bowel protocol      Benign prostate hyperplasia   Assessment & Plan    Continue home Flomax     Nausea & vomiting   Assessment & Plan    Continue home Zofran and Phenergan  No witnessed vomiting, no diarrhea  Gastroenteritis? Constipation?   IVF  Aggressive bowel protocol     Essential hypertension   Assessment & Plan     Continue home losartan     Anxiety   Assessment & Plan    Patient has a history on file for anxiety.  He is visibly anxious, reports feeling anxious, and is argumentative with staff.  He is not currently on anxiolytics.    Plan:  Can try hydroxyzine prn  Outpatient follow-up for anxiety  Nonpharmacologic interventions to  reduce anxiety, anxiety may influence pain

## 2019-04-05 NOTE — ASSESSMENT & PLAN NOTE
Associated with constipation  Educated to prevent constipation with regular MiraLAX titrate to one bowel movement a day.

## 2019-04-05 NOTE — RESPIRATORY CARE
COPD EDUCATION by COPD CLINICAL EDUCATOR  4/4/2019 at 6:35 AM by Kayy Yao     Patient reviewed by COPD education team. Patient does not qualify for COPD program.

## 2019-04-05 NOTE — PROGRESS NOTES
"PROGRESS NOTE    Patient was complaining of severe pain 30 minutes after 16 mg oral dilaudid.  I came to bedside to assess.  Vitals were ordered his /109, HR 85, RR 18, 97% on RA, We started pulse oximetry and I gave 1 mg Dilaudid IV push to be able to examine the abdomen. The patient was guarding but comfortable. He continued to  Complain of \"Maximum pain.\" after 1 mg dilaudid he was resting comfortably at 95% on RA.   He continued to state 1 mg would do nothing, I receive 4 mg IV Dilaudid.  The patient indicated that his last BM was yesterday.     Abdominal exam had guarding but was soft without peritoneal signs. The pain was right upper quadrant pain, he had tenderness to palpation on the LLQ as well.   Psoas sign was negative, rocking the bed did not elicit pain, Colten score was 0, The patient would not participate in psoas exam.      Wife called to explain onset of ground level fall, had ground level fall but that onset of pain was not related to fall.  Per wife, he had to complete \"barium\" for CT. When he completed of the barium, the pain began.    Plan  - The pain I think is related constipation vs related cancer pain. He is hemodynamically stable, Not in acute respiratory distress, and comfortable.  I believe he is in pain. He is tachycardic and hypertensive.   - Patient received 16 mg oral Dilaudid home dose and 1 mg IV dilaudid. Was cautious with more pain meds due to respiratory depression.  - Start Pulse oximetry 24/7.  - I will discuss a palliative consult for pain management with the day team.  - Stat abdominal XR      Addendum:    Followed up with nursing, patient just received Q4H dilaudid dose.  Abd XR shows heavy stool burden in colon, pain likely due to constipation exacerbated by contrast induced peristalsis.  Doculax ordered, with enema to follow is necessary.  I continue to monitor. Remains hypertensive, but was sleeping and is more calm per nursing.    "

## 2019-04-05 NOTE — DIETARY
"Nutrition services: Day 2 of admit. Erich Ingram is a 53 y.o. male with admitting DX of Right lower quadrant pain.    Consult received for wt loss PTA.    Assessment:  Height: 190.5 cm (6' 3\")  Weight: 77.3 kg (170 lb 6.7 oz)  Body mass index is 21.3 kg/m² - WNL  Diet/Intake: Clear liquids (ADAT), PO intake 50-75% x1, 0% x1 meal    Evaluation:   1. Pt visited in room for nutrition interview, which was kept short 2' pt appeared visually nauseous, pale, and kept his eyes closed. He did not want to discuss much today. Untouched meal tray seen at bedside.   2. Per bedside assessment, pt noted with pronounced hollowness of the orbital region, moderate to severe losses of buccal fat pads, protruding/prominent clavicles.  3. He confirms RLQ pain with N/V x2 days PTA. He states that his nausea was worsened by drinking contrast for CT. Per MD note, pt with a lot of colonic stool. ?OIC contributing to N/V. Aggressive bowel protocol to be started.  4. Per review of chart, pt with hx of sigmoid colon CA with lung metastasis and 'large mass in pelvis/abdomen pushing on L ureter' per MD note.   5. Records indicate that the pt has been consistently losing weight 2' periods of N/V correlating with chemo treatments.   6. Skin: Pt refused skin check.  7. Per previous RD note, pt reported UBW as 185-190# approximately 7 months ago. Per records, wt trends have been: 191# (6/12/16) --> 176# (2/28/19) --> 164# (3/15/19). Severe wt loss of 6.81% noted over prior 5 weeks (based on 2/28 to 3/15) and 13.68% over prior 7 months (based on UBW to 3/15).  8. Due to hx of severe wt loss in the setting of hypermetabolic disease state, will follow closely and offer supplements/snacks as appropriate as pt has been amenable to these in the past.     Malnutrition Risk: Pt with severe malnutrition in the context of chronic disease related malnutrition r/t hypermetabolism 2' metastatic colon CA as evidenced by severe 6.81% wt loss over prior 5 " weeks and severe 13.68% wt loss over prior 7 months as well as severe fat/muscle losses as noted in assessment above.    Recommendations/Plan:  1. Continue clear liquids diet. ADAT. Consider adding Boost Plus with meals once diet is advanced to full liquids or beyond.  2. Encourage intake of meals  3. Document intake of all meals as % taken in ADL's to provide interdisciplinary communication across all shifts.   4. Monitor weight.  5. Nutrition rep will continue to see patient for ongoing meal and snack preferences.     RD monitoring.

## 2019-04-05 NOTE — CARE PLAN
Problem: Nutritional:  Goal: Achieve adequate nutritional intake  Diet advancement per pt tolerance + PO intake >75% of meals  Outcome: NOT MET

## 2019-04-05 NOTE — PROGRESS NOTES
Assumed care at 1900, bedside report received from day shift RN. Pt on the monitor. Initial assessment completed, orders reviewed, call light within reach,  and hourly rounding in place. POC addressed with patient, no additional questions at this time.

## 2019-04-05 NOTE — PROGRESS NOTES
Received report from NOC RN and assumed care of pt at 0700. Pt AOx4, c/o 8/10 abdominal pain. Plan of care discussed with patient. Bed in low and locked position. Call light and personal belongings in reach.

## 2019-04-06 ENCOUNTER — PATIENT OUTREACH (OUTPATIENT)
Dept: HEALTH INFORMATION MANAGEMENT | Facility: OTHER | Age: 54
End: 2019-04-06

## 2019-04-06 VITALS
RESPIRATION RATE: 18 BRPM | HEIGHT: 75 IN | BODY MASS INDEX: 19.76 KG/M2 | WEIGHT: 158.95 LBS | DIASTOLIC BLOOD PRESSURE: 96 MMHG | HEART RATE: 92 BPM | OXYGEN SATURATION: 98 % | TEMPERATURE: 98.4 F | SYSTOLIC BLOOD PRESSURE: 120 MMHG

## 2019-04-06 PROBLEM — F41.9 ANXIETY: Status: RESOLVED | Noted: 2019-04-03 | Resolved: 2019-04-06

## 2019-04-06 LAB
ALBUMIN SERPL BCP-MCNC: 3.7 G/DL (ref 3.2–4.9)
ALBUMIN/GLOB SERPL: 1.1 G/DL
ALP SERPL-CCNC: 112 U/L (ref 30–99)
ALT SERPL-CCNC: 37 U/L (ref 2–50)
ANION GAP SERPL CALC-SCNC: 10 MMOL/L (ref 0–11.9)
AST SERPL-CCNC: 46 U/L (ref 12–45)
BASOPHILS # BLD AUTO: 0.2 % (ref 0–1.8)
BASOPHILS # BLD: 0.02 K/UL (ref 0–0.12)
BILIRUB SERPL-MCNC: 0.8 MG/DL (ref 0.1–1.5)
BUN SERPL-MCNC: 3 MG/DL (ref 8–22)
CALCIUM SERPL-MCNC: 8.6 MG/DL (ref 8.5–10.5)
CHLORIDE SERPL-SCNC: 99 MMOL/L (ref 96–112)
CO2 SERPL-SCNC: 24 MMOL/L (ref 20–33)
CREAT SERPL-MCNC: 0.67 MG/DL (ref 0.5–1.4)
EOSINOPHIL # BLD AUTO: 0.03 K/UL (ref 0–0.51)
EOSINOPHIL NFR BLD: 0.3 % (ref 0–6.9)
ERYTHROCYTE [DISTWIDTH] IN BLOOD BY AUTOMATED COUNT: 45.2 FL (ref 35.9–50)
GLOBULIN SER CALC-MCNC: 3.3 G/DL (ref 1.9–3.5)
GLUCOSE SERPL-MCNC: 155 MG/DL (ref 65–99)
HCT VFR BLD AUTO: 40.4 % (ref 42–52)
HGB BLD-MCNC: 13.7 G/DL (ref 14–18)
IMM GRANULOCYTES # BLD AUTO: 0.02 K/UL (ref 0–0.11)
IMM GRANULOCYTES NFR BLD AUTO: 0.2 % (ref 0–0.9)
LYMPHOCYTES # BLD AUTO: 1.28 K/UL (ref 1–4.8)
LYMPHOCYTES NFR BLD: 14.9 % (ref 22–41)
MAGNESIUM SERPL-MCNC: 2 MG/DL (ref 1.5–2.5)
MCH RBC QN AUTO: 30 PG (ref 27–33)
MCHC RBC AUTO-ENTMCNC: 33.9 G/DL (ref 33.7–35.3)
MCV RBC AUTO: 88.6 FL (ref 81.4–97.8)
MONOCYTES # BLD AUTO: 1.25 K/UL (ref 0–0.85)
MONOCYTES NFR BLD AUTO: 14.6 % (ref 0–13.4)
NEUTROPHILS # BLD AUTO: 5.98 K/UL (ref 1.82–7.42)
NEUTROPHILS NFR BLD: 69.8 % (ref 44–72)
NRBC # BLD AUTO: 0 K/UL
NRBC BLD-RTO: 0 /100 WBC
PLATELET # BLD AUTO: 227 K/UL (ref 164–446)
PMV BLD AUTO: 9.4 FL (ref 9–12.9)
POTASSIUM SERPL-SCNC: 3.1 MMOL/L (ref 3.6–5.5)
PROT SERPL-MCNC: 7 G/DL (ref 6–8.2)
RBC # BLD AUTO: 4.56 M/UL (ref 4.7–6.1)
SODIUM SERPL-SCNC: 133 MMOL/L (ref 135–145)
WBC # BLD AUTO: 8.6 K/UL (ref 4.8–10.8)

## 2019-04-06 PROCEDURE — 700111 HCHG RX REV CODE 636 W/ 250 OVERRIDE (IP): Performed by: STUDENT IN AN ORGANIZED HEALTH CARE EDUCATION/TRAINING PROGRAM

## 2019-04-06 PROCEDURE — 700101 HCHG RX REV CODE 250: Performed by: STUDENT IN AN ORGANIZED HEALTH CARE EDUCATION/TRAINING PROGRAM

## 2019-04-06 PROCEDURE — 83735 ASSAY OF MAGNESIUM: CPT

## 2019-04-06 PROCEDURE — A9270 NON-COVERED ITEM OR SERVICE: HCPCS | Performed by: INTERNAL MEDICINE

## 2019-04-06 PROCEDURE — 700105 HCHG RX REV CODE 258: Performed by: STUDENT IN AN ORGANIZED HEALTH CARE EDUCATION/TRAINING PROGRAM

## 2019-04-06 PROCEDURE — A9270 NON-COVERED ITEM OR SERVICE: HCPCS | Performed by: STUDENT IN AN ORGANIZED HEALTH CARE EDUCATION/TRAINING PROGRAM

## 2019-04-06 PROCEDURE — 85025 COMPLETE CBC W/AUTO DIFF WBC: CPT

## 2019-04-06 PROCEDURE — 700102 HCHG RX REV CODE 250 W/ 637 OVERRIDE(OP): Performed by: STUDENT IN AN ORGANIZED HEALTH CARE EDUCATION/TRAINING PROGRAM

## 2019-04-06 PROCEDURE — 80053 COMPREHEN METABOLIC PANEL: CPT

## 2019-04-06 PROCEDURE — 700102 HCHG RX REV CODE 250 W/ 637 OVERRIDE(OP): Performed by: INTERNAL MEDICINE

## 2019-04-06 PROCEDURE — 700111 HCHG RX REV CODE 636 W/ 250 OVERRIDE (IP): Performed by: INTERNAL MEDICINE

## 2019-04-06 PROCEDURE — 36415 COLL VENOUS BLD VENIPUNCTURE: CPT

## 2019-04-06 PROCEDURE — 99238 HOSP IP/OBS DSCHRG MGMT 30/<: CPT | Mod: GC | Performed by: INTERNAL MEDICINE

## 2019-04-06 RX ORDER — POTASSIUM CHLORIDE 20 MEQ/1
40 TABLET, EXTENDED RELEASE ORAL ONCE
Status: COMPLETED | OUTPATIENT
Start: 2019-04-06 | End: 2019-04-06

## 2019-04-06 RX ORDER — PREDNISONE 5 MG/1
5 TABLET ORAL DAILY
Status: DISCONTINUED | OUTPATIENT
Start: 2019-04-06 | End: 2019-04-06 | Stop reason: HOSPADM

## 2019-04-06 RX ORDER — POLYETHYLENE GLYCOL 3350 17 G/17G
17 POWDER, FOR SOLUTION ORAL DAILY
Qty: 30 EACH | Refills: 3 | Status: SHIPPED | OUTPATIENT
Start: 2019-04-07 | End: 2021-10-11

## 2019-04-06 RX ADMIN — ONDANSETRON 4 MG: 4 TABLET, ORALLY DISINTEGRATING ORAL at 08:26

## 2019-04-06 RX ADMIN — DEXTROSE AND SODIUM CHLORIDE: 5; 450 INJECTION, SOLUTION INTRAVENOUS at 00:36

## 2019-04-06 RX ADMIN — HYDROMORPHONE HYDROCHLORIDE 16 MG: 4 TABLET ORAL at 13:05

## 2019-04-06 RX ADMIN — HYDROMORPHONE HYDROCHLORIDE 16 MG: 4 TABLET ORAL at 04:57

## 2019-04-06 RX ADMIN — HYDRALAZINE HYDROCHLORIDE 10 MG: 20 INJECTION INTRAMUSCULAR; INTRAVENOUS at 09:16

## 2019-04-06 RX ADMIN — ENOXAPARIN SODIUM 40 MG: 100 INJECTION SUBCUTANEOUS at 04:59

## 2019-04-06 RX ADMIN — PREDNISONE 5 MG: 5 TABLET ORAL at 10:29

## 2019-04-06 RX ADMIN — ONDANSETRON 4 MG: 2 INJECTION INTRAMUSCULAR; INTRAVENOUS at 13:05

## 2019-04-06 RX ADMIN — LOSARTAN POTASSIUM 50 MG: 50 TABLET ORAL at 04:59

## 2019-04-06 RX ADMIN — HYDROMORPHONE HYDROCHLORIDE 16 MG: 4 TABLET ORAL at 00:31

## 2019-04-06 RX ADMIN — POTASSIUM CHLORIDE 40 MEQ: 1500 TABLET, EXTENDED RELEASE ORAL at 08:26

## 2019-04-06 RX ADMIN — HYDROMORPHONE HYDROCHLORIDE 16 MG: 4 TABLET ORAL at 08:57

## 2019-04-06 RX ADMIN — TAMSULOSIN HYDROCHLORIDE 0.4 MG: 0.4 CAPSULE ORAL at 08:26

## 2019-04-06 RX ADMIN — LIDOCAINE 1 PATCH: 50 PATCH TOPICAL at 09:17

## 2019-04-06 NOTE — CARE PLAN
Problem: Safety  Goal: Will remain free from falls  Fall precautions in place. Bed in lowest position. Non-skid socks in place. Personal possessions within reach. Mobility sign on door. Bed-alarm on. Call light within reach. Pt educated regarding fall prevention and states understanding.     Problem: Knowledge Deficit  Goal: Knowledge of disease process/condition, treatment plan, diagnostic tests, and medications will improve  Pt educated regarding plan of care and medications. All questions answered.

## 2019-04-06 NOTE — DISCHARGE PLANNING
Care Transition Team Discharge Planning     Anticipated Discharge Disposition: Home     Action: This RN CM spoke with BS RN about anticipated D/C needs.     Barriers to Discharge: None     Plan: No D/C needs identified at this time.

## 2019-04-06 NOTE — PROGRESS NOTES
Monitor Summary:    S.R./S.T.    Up to the 160's non sustaining with ambulation   (R)PVC's  (R)PVC's  16/10/32

## 2019-04-06 NOTE — CARE PLAN
Problem: Communication  Goal: The ability to communicate needs accurately and effectively will improve  Outcome: PROGRESSING AS EXPECTED    Intervention: [facility] patient and significant other/support system to call light to alert staff of needs  Patient oriented to call light system and has been able to communicate needs accurately and effectively.      Problem: Safety  Goal: Will remain free from falls  Outcome: PROGRESSING AS EXPECTED    Intervention: Assess risk factors for falls  Patient has been assessed for fall risks and fall precautions have been put in place.

## 2019-04-06 NOTE — PROGRESS NOTES
Internal Medicine Interval Note  Note Author: Kiley Pelayo M.D.     Name Erich Ingram     1965   Age/Sex 53 y.o. male   MRN [id number]   Code Status Full      After 5PM or if no immediate response to page, please call for cross-coverage  Attending/Team: Dr Arriaga/Madelin See Patient List for primary contact information  Call [phone] to page    1st Call - Day Intern (R1):   Dr Quintana 2nd Call - Day Sr. Resident (R2/R3):   Dr Pelayo          Reason for interval visit  (Principal Problem)   Abdominal pain likely due to constipation      Interval Problem Daily Status Update  (24 hours, problem oriented, brief subjective history, new lab/imaging data pertinent to that problem)   -Patient had a large bowel movement last night.  -His abdominal pain is much improved and he is tolerating diet.  He is willing to go home today.     Review of Systems   All other systems reviewed and are negative.      Disposition/Barriers to discharge:   None    Consultants/Specialty  None  PCP: Nina Hardy PLetyALety      Quality Measures  Quality-Core Measures   Reviewed items::  Medications reviewed and Labs reviewed  Soriano catheter::  No Soriano  DVT prophylaxis pharmacological::  Enoxaparin (Lovenox)          Physical Exam       Vitals:    19 0815 19 1032 19 1122 19 1559   BP: 154/122 133/93 142/90 120/96   Pulse: (!) 113  92 92   Resp: 18  18 18   Temp: 37.1 °C (98.7 °F)  36.8 °C (98.2 °F) 36.9 °C (98.4 °F)   TempSrc: Temporal  Temporal Temporal   SpO2: 94%  95% 98%   Weight:       Height:         Body mass index is 19.87 kg/m². Weight: 72.1 kg (158 lb 15.2 oz)  Oxygen Therapy:  Pulse Oximetry: 98 %, O2 (LPM): 0, O2 Delivery: None (Room Air)    Physical Exam   Constitutional: He is oriented to person, place, and time and well-developed, well-nourished, and in no distress.   HENT:   Head: Normocephalic and atraumatic.   Eyes: Pupils are equal, round, and reactive to light.   Neck: Neck supple.    Cardiovascular: Normal rate, regular rhythm and normal heart sounds.    Pulmonary/Chest: Effort normal and breath sounds normal.   Abdominal: Soft. Bowel sounds are normal. He exhibits no distension. There is no rebound and no guarding.   Mild diffuse chronic abdominal tenderness.  Much improved compared to admission.   Musculoskeletal: He exhibits no edema.   Neurological: He is alert and oriented to person, place, and time.   Skin: Skin is warm and dry.   Psychiatric: Mood and affect normal.             Assessment/Plan     * Right upper quadrant abdominal pain   Assessment & Plan    Patient complaining of right lower quadrant pain, nausea, vomiting.  White blood cell count slightly elevated at 11.  However, no appendiceal signs or rebound tenderness.  Outside CT report demonstrates a enlarged lumen thought to represent the appendix, however no fat stranding or wall thickening.    Rib series negative for fracture  Appears to be due to constipation    Plan:  -Abdominal pain much better after getting large bowel movement last night.  -Patient's pain is at baseline and willing to go home today.     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 Hydromorphone home dose 16 mg q4 hrs prn  Recommended outpatient oncology follow-up     Chronic, continuous use of opioids   Assessment & Plan    Associated with constipation  Educated to prevent constipation with regular MiraLAX titrate to one bowel movement a day.     Benign prostate hyperplasia   Assessment & Plan    Continue home Flomax     Nausea & vomiting   Assessment & Plan    -Chronic nausea on home Phenergan and Zofran.  -Improved with improvement in abdominal pain.   Tolerating diet.     Essential hypertension   Assessment & Plan     Continue home losartan     Anxiety   Assessment & Plan    Resolved with improvement of abdominal pain.         * Right upper quadrant abdominal pain   Assessment & Plan    Patient complaining of right lower quadrant pain, nausea, vomiting.  White blood cell count slightly elevated at 11.  However, no appendiceal signs or rebound tenderness.  Outside CT report demonstrates a enlarged lumen thought to represent the appendix, however no fat stranding or wall thickening.    Rib series negative for fracture  Appears to be due to constipation    Plan:  -Abdominal pain much better after getting large bowel movement last night.  -Patient's pain is at baseline and willing to go home today.     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 Hydromorphone home dose 16 mg q4 hrs prn  Recommended outpatient oncology follow-up     Chronic, continuous use of opioids   Assessment & Plan    Associated with constipation  Educated to prevent constipation with regular MiraLAX titrate to one bowel movement a day.     Benign prostate hyperplasia   Assessment & Plan    Continue home Flomax     Nausea & vomiting   Assessment & Plan    -Chronic nausea on home Phenergan and Zofran.  -Improved with improvement in abdominal pain.  Tolerating diet.     Essential hypertension   Assessment & Plan     Continue home losartan     Anxiety   Assessment & Plan    Resolved with improvement of abdominal pain.

## 2019-04-06 NOTE — DISCHARGE INSTRUCTIONS
Discharge Instructions    Discharged to home by car with relative. Discharged via wheelchair, hospital escort: Yes.  Special equipment needed: Not Applicable    Be sure to schedule a follow-up appointment with your primary care doctor or any specialists as instructed.     Discharge Plan:   Diet Plan: Discussed  Activity Level: Discussed  Smoking Cessation Offered: Patient Refused  Confirmed Follow up Appointment: Appointment Scheduled  Confirmed Symptoms Management: Discussed  Medication Reconciliation Updated: Yes  Influenza Vaccine Indication: Patient Refuses    I understand that a diet low in cholesterol, fat, and sodium is recommended for good health. Unless I have been given specific instructions below for another diet, I accept this instruction as my diet prescription.   Other diet: Soft-Food Meal Plan  A soft-food meal plan includes foods that are safe and easy to swallow. This meal plan typically is used:  · If you are having trouble chewing or swallowing foods.  · As a transition meal plan after only having had liquid meals for a long period.  What do I need to know about the soft-food meal plan?  A soft-food meal plan includes tender foods that are soft and easy to chew and swallow. In most cases, bite-sized pieces of food are easier to swallow. A bite-sized piece is about ½ inch or smaller. Foods in this plan do not need to be ground or pureed.  Foods that are very hard, crunchy, or sticky should be avoided. Also, breads, cereals, yogurts, and desserts with nuts, seeds, or fruits should be avoided.  What foods can I eat?  Grains   Rice and wild rice. Moist bread, dressing, pasta, and noodles. Well-moistened dry or cooked cereals, such as farina (cooked wheat cereal), oatmeal, or grits. Biscuits, breads, muffins, pancakes, and waffles that have been well moistened.  Vegetables   Shredded lettuce. Cooked, tender vegetables, including potatoes without skins. Vegetable juices. Broths or creamed soups made  with vegetables that are not stringy or chewy. Strained tomatoes (without seeds).  Fruits   Canned or well-cooked fruits. Soft (ripe), peeled fresh fruits, such as peaches, nectarines, kiwi, cantaloupe, honeydew melon, and watermelon (without seeds). Soft berries with small seeds, such as strawberries. Fruit juices (without pulp).  Meats and Other Protein Sources   Moist, tender, lean beef. Mutton. Lamb. Veal. Chicken. Turkey. Liver. Ham. Fish without bones. Eggs.  Dairy   Milk, milk drinks, and cream. Plain cream cheese and cottage cheese. Plain yogurt.  Sweets/Desserts   Flavored gelatin desserts. Custard. Plain ice cream, frozen yogurt, sherbet, milk shakes, and malts. Plain cakes and cookies. Plain hard candy.  Other   Butter, margarine (without trans fat), and cooking oils. Mayonnaise. Cream sauces. Mild spices, salt, and sugar. Syrup, molasses, honey, and jelly.  The items listed above may not be a complete list of recommended foods or beverages. Contact your dietitian for more options.   What foods are not recommended?  Grains   Dry bread, toast, crackers that have not been moistened. Coarse or dry cereals, such as bran, granola, and shredded wheat. Tough or chewy crusty breads, such as Ukrainian bread or baguettes.  Vegetables   Corn. Raw vegetables except shredded lettuce. Cooked vegetables that are tough or stringy. Tough, crisp, fried potatoes and potato skins.  Fruits   Fresh fruits with skins or seeds or both, such as apples, pears, or grapes. Stringy, high-pulp fruits, such as papaya, pineapple, coconut, or hailey. Fruit leather, fruit roll-ups, and all dried fruits.  Meats and Other Protein Sources   Sausages and hot dogs. Meats with gristle. Fish with bones. Nuts, seeds, and chunky peanut or other nut butters.  Sweets/Desserts   Cakes or cookies that are very dry or chewy.  The items listed above may not be a complete list of foods and beverages to avoid. Contact your dietitian for more information.    This information is not intended to replace advice given to you by your health care provider. Make sure you discuss any questions you have with your health care provider.  Document Released: 03/26/2009 Document Revised: 05/25/2017 Document Reviewed: 11/14/2014  [employer] Patient Education © 2017 [employer]      Special Instructions: None    · Is patient discharged on Warfarin / Coumadin?   No         Depression / Suicide Risk    As you are discharged from this [facility] facility, it is important to learn how to keep safe from harming yourself.    Recognize the warning signs:  · Abrupt changes in personality, positive or negative- including increase in energy   · Giving away possessions  · Change in eating patterns- significant weight changes-  positive or negative  · Change in sleeping patterns- unable to sleep or sleeping all the time   · Unwillingness or inability to communicate  · Depression  · Unusual sadness, discouragement and loneliness  · Talk of wanting to die  · Neglect of personal appearance   · Rebelliousness- reckless behavior  · Withdrawal from people/activities they love  · Confusion- inability to concentrate     If you or a loved one observes any of these behaviors or has concerns about self-harm, here's what you can do:  · Talk about it- your feelings and reasons for harming yourself  · Remove any means that you might use to hurt yourself (examples: pills, rope, extension cords, firearm)  · Get professional help from the community (Mental Health, Substance Abuse, psychological counseling)  · Do not be alone:Call your Safe Contact- someone whom you trust who will be there for you.  · Call your local CRISIS HOTLINE 859-2086 or 797-309-3130  · Call your local Children's Mobile Crisis Response Team [place] (746) 689-7994 or www.[employer]  · Call the toll free National Suicide Prevention Hotlines   · National Suicide Prevention Lifeline 537-346-HTNY (3642)  · National  [facility] 800-SUICIDE (951-7622)

## 2019-04-06 NOTE — PROGRESS NOTES
Pt dc'd at 1615. IV and monitor removed; monitor room notified. Pt left unit via wheelchair with RN. Personal belongings with pt when leaving unit. Pt given discharge instructions prior to leaving unit including prescription and when to visit with physician; verbalizes understanding. Copy of discharge instructions with pt and in the chart.

## 2019-04-06 NOTE — PROGRESS NOTES
Assumed care of pt.  Assessment complete.  A&O x 4.  No signs of distress.  Discussed plan of care.  Call light within reach.  Bed alarm active.  Treaded socks on pt.  Will continue to monitor.

## 2019-04-07 NOTE — DISCHARGE SUMMARY
Internal Medicine Discharge Summary  Note Author: Kiley Pelayo M.D.       Name Erich Ingram     1965   Age/Sex 53 y.o. male   MRN [id number]         Admit Date:  4/3/2019       Discharge Date:   19      Service:   [facility] Internal Medicine Yellow Team  Attending Physician(s):  Carol Arriaga MD         Senior Resident(s):   Kiley Pelayo MD  Gagan Resident(s):   Delia Quintana MD    PCP: Nina Hardy PLUIS      Primary Diagnosis:   Worsening abdominal pain due to opiate induced constipation    Secondary Diagnoses:                Principal Problem:    Right upper quadrant abdominal pain POA: Unknown  Active Problems:    Colon neoplasm POA: Yes    Hepatitis C POA: Yes    Anxiety POA: Unknown    Essential hypertension POA: Unknown    Nausea & vomiting POA: Unknown    Benign prostate hyperplasia POA: Unknown    Chronic, continuous use of opioids POA: Unknown    Other constipation POA: Unknown  Resolved Problems:    * No resolved hospital problems. *      Hospital Summary (Brief Narrative):       53-year-old with PMHx of stage IV colon cancer s/p resection in 2016 and immuno / chemotherapy for lung metastases, COPD, hepatitis C-untreated, hypertension who presented as a transfer from [place] for further evaluation of his right lower quadrant abdominal pain with a CT finding at outside facility concerning for appendicitis due to wall thickening.  He also had a fall 3 days ago and had a big bruise on the right trunk of the body.  The CT chest from outside showed no pulmonary nodules and possible subpleural consolidation concerning for pneumonia.  He has a mild leukocytosis 11, he is on prednisone 5 mg daily which he takes for COPD with plan for taper.  He was treated with IV antibiotic initially to cover for intra-abdominal infection and pneumonia, however, repeat chest x-ray here did not show any acute infiltrate except for known pulmonary nodules and he denies any respiratory symptoms.   The abdominal exam at right lower quadrant area was nontender and there was no peritoneal sign. The surgeon agreed that there is no suspicion for appendicitis and recommended no surgical treatment for abdominal pain.  He was started on aggressive bowel regimen and finally achieved large bowel movement with improvement of abdominal pain.  He feels much better today after that bowel movement and is willing to go home.  He is medically stable to go home with counseling to prevent constipation as he is on large doses of opiate at baseline, Dilaudid 16 mg every 4 hours.  He is educated to use MiraLAX every day which has been successful in the past.    He is recommended to follow-up with oncology for left-sided hydronephrosis likely from external compression with metastatic colon cancer.  He currently denies any CVA tenderness and not symptomatic from hydronephrosis.  His creatinine is normal.  He will follow-up with Dr. Urbina after being discharged from hospital.       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 Hydromorphone home dose 16 mg q4 hrs prn  Recommended outpatient oncology follow-up     * Right upper quadrant abdominal pain   Assessment & Plan    Patient complaining of right lower quadrant pain, nausea, vomiting.  White blood cell count slightly elevated at 11.  However, no appendiceal signs or rebound tenderness.  Outside CT report demonstrates a enlarged lumen thought to represent the appendix, however no fat stranding or wall thickening.    Rib series negative for fracture  Appears to be due to constipation    Plan:  -Abdominal pain much better after getting large  bowel movement last night.  -Patient's pain is at baseline and willing to go home today.     Chronic, continuous use of opioids   Assessment & Plan    Associated with constipation  Educated to prevent constipation with regular MiraLAX titrate to one bowel movement a day.     Benign prostate hyperplasia   Assessment & Plan    Continue home Flomax     Nausea & vomiting   Assessment & Plan    -Chronic nausea on home Phenergan and Zofran.  -Improved with improvement in abdominal pain.  Tolerating diet.     Essential hypertension   Assessment & Plan     Continue home losartan     Anxiety   Assessment & Plan    Resolved with improvement of abdominal pain.           Consultants:     None    Procedures:        None    Imaging/ Testing:      OUTSIDE IMAGES-CT ABDOMEN /PELVIS   Final Result      OUTSIDE IMAGES-CT CHEST   Final Result      DX-CHEST-2 VIEWS   Final Result      1.  Multiple faint pulmonary nodules with evidence of cavitation suspicious for pulmonary metastases.      2.  No lobar consolidation or evidence of congestive failure.      ZX-NEEDUCL-7 VIEW   Final Result         1.  Moderate stool in the colon suggests changes of constipation, otherwise nonspecific bowel gas pattern   2.  Linear density left lung base appearance favors atelectasis, infiltrate at the costophrenic angle not excluded.      These findings were discussed with the patient's clinician, Dr. Love, on 4/4/2019 11:31 PM.      [id number] (W/O CXR) RIGHT   Final Result      No displaced rib fracture is identified.      Linear right basilar atelectasis.            Discharge Medications:         Medication Reconciliation: Completed       Medication List      START taking these medications      Instructions   polyethylene glycol/lytes Pack  Start taking on:  4/7/2019  Commonly known as:  MIRALAX   Take 1 Packet by mouth every day. Can use up to 2 times daily if no bowel movement in 24 hours.  Dose:  17 g        CONTINUE taking these  medications      Instructions   DILAUDID 8 MG tablet  Generic drug:  HYDROmorphone   Take 16 mg by mouth every four hours as needed for Severe Pain.  Dose:  16 mg     lansoprazole 30 MG Cpdr  Commonly known as:  PREVACID   Take 30 mg by mouth every day.  Dose:  30 mg     losartan 50 MG Tabs  Commonly known as:  COZAAR   Take 50 mg by mouth every day.  Dose:  50 mg     ondansetron 4 MG Tbdp  Commonly known as:  ZOFRAN ODT   Take 8 mg by mouth every 6 hours as needed for Nausea.  Dose:  8 mg     predniSONE 5 MG Tabs  Commonly known as:  DELTASONE   Take 5 mg by mouth every day.  Dose:  5 mg     promethazine 50 MG Tabs  Commonly known as:  PHENERGAN   Take 50 mg by mouth every 6 hours as needed for Nausea/Vomiting.  Dose:  50 mg     tamsulosin 0.4 MG capsule  Commonly known as:  FLOMAX   Take 0.4 mg by mouth ONE-HALF HOUR AFTER BREAKFAST.  Dose:  0.4 mg                Disposition:   Home    Diet:   Regular    Activity:   As tolerated    Instructions:         The patient was instructed to return to the ER in the event of worsening symptoms. I have counseled the patient on the importance of compliance and the patient has agreed to proceed with all medical recommendations and follow up plan indicated above.   The patient understands that all medications come with benefits and risks. Risks may include permanent injury or death and these risks can be minimized with close reassessment and monitoring.        Primary Care Provider:    BEENA Sterling    Discharge summary faxed to primary care provider:  Deferred  Copy of discharge summary given to the patient: Deferred      Follow up appointment details :        BEENA Sterling  [address] [facility] [address]  [phone]      PLease call to schedule your follow up appointment, Office is closed for the weekend thank you     Senait Urbina  [address] [place] [id number] [phone]      PLease call to schedule your follow up appointment,  thank you         Pending Studies:        -Final blood culture result    Time spent on discharge day patient visit, preparing discharge paperwork and arranging for patient follow up.    Summary of follow up issues:   -Continue to prevent constipation.  May need better pain management, non-opiate therapy.  -Follow-up with oncology for metastasis from colon cancer and left-sided hydronephrosis.    Discharge Time (Minutes) :    60 minutes  Hospital Course Type: Observation Stay       Most Recent Labs:    Lab Results   Component Value Date/Time    WBC 8.6 04/06/2019 12:57 AM    RBC 4.56 (L) 04/06/2019 12:57 AM    HEMOGLOBIN 13.7 (L) 04/06/2019 12:57 AM    HEMATOCRIT 40.4 (L) 04/06/2019 12:57 AM    MCV 88.6 04/06/2019 12:57 AM    MCH 30.0 04/06/2019 12:57 AM    MCHC 33.9 04/06/2019 12:57 AM    MPV 9.4 04/06/2019 12:57 AM    NEUTSPOLYS 69.80 04/06/2019 12:57 AM    LYMPHOCYTES 14.90 (L) 04/06/2019 12:57 AM    MONOCYTES 14.60 (H) 04/06/2019 12:57 AM    EOSINOPHILS 0.30 04/06/2019 12:57 AM    BASOPHILS 0.20 04/06/2019 12:57 AM    HYPOCHROMIA 1+ 06/15/2016 02:05 AM    ANISOCYTOSIS 2+ 06/15/2016 02:05 AM      Lab Results   Component Value Date/Time    SODIUM 133 (L) 04/06/2019 12:56 AM    POTASSIUM 3.1 (L) 04/06/2019 12:56 AM    CHLORIDE 99 04/06/2019 12:56 AM    CO2 24 04/06/2019 12:56 AM    GLUCOSE 155 (H) 04/06/2019 12:56 AM    BUN 3 (L) 04/06/2019 12:56 AM    CREATININE 0.67 04/06/2019 12:56 AM      Lab Results   Component Value Date/Time    ALTSGPT 37 04/06/2019 12:56 AM    ASTSGOT 46 (H) 04/06/2019 12:56 AM    ALKPHOSPHAT 112 (H) 04/06/2019 12:56 AM    TBILIRUBIN 0.8 04/06/2019 12:56 AM    LIPASE 6 (L) 04/03/2019 07:38 PM    ALBUMIN 3.7 04/06/2019 12:56 AM    GLOBULIN 3.3 04/06/2019 12:56 AM    INR 1.12 04/04/2019 01:01 PM    MACROCYTOSIS 1+ 06/15/2016 02:05 AM     Lab Results   Component Value Date/Time    PROTHROMBTM 14.5 04/04/2019 01:01 PM    INR 1.12 04/04/2019 01:01 PM

## 2019-04-26 ENCOUNTER — HOSPITAL ENCOUNTER (OUTPATIENT)
Dept: RADIOLOGY | Facility: MEDICAL CENTER | Age: 54
End: 2019-04-26
Attending: INTERNAL MEDICINE
Payer: COMMERCIAL

## 2019-04-26 DIAGNOSIS — C18.7 MALIGNANT NEOPLASM OF SIGMOID COLON (HCC): ICD-10-CM

## 2019-04-26 LAB
MYCOBACTERIUM SPEC CULT: NORMAL
RHODAMINE-AURAMINE STN SPEC: NORMAL
SIGNIFICANT IND 70042: NORMAL
SITE SITE: NORMAL
SOURCE SOURCE: NORMAL

## 2019-04-26 PROCEDURE — 36573 INSJ PICC RS&I 5 YR+: CPT

## 2019-04-26 NOTE — PROGRESS NOTES
Provider order, indication(s), contraindications for peripherally inserted central catheter, and labs reviewed.  Nursing care plan includes knowledge deficit, potential for pain and anxiety, potential for infection. Risks and benefits of procedure explained to patient/family emphasizing risk of central bloodstream infection. POC: patient teaching, comfort measures, and sterile technique using five step bundle to prevent CR-BSI. Questions answered. Patient/Family verbalized understanding. Consent obtained/confirmed.    Vessel patency confirmed with ultrasound. Upper arm circumference 10 cm above antecubital on PICC arm is 26 cm.  Patient unable to lie flat due to pain in abdomen in supine position.  Patient accommodated.    Sterile procedure followed and patient full-body draped per protocol. 3 cc of 1% lidocaine injected intradermally to insertion site at RUE. 21 gauge Micro-introducer needle used to access Basilic vein. Modified Seldinger technique used to insert 4 Fr single lumen 43 cm catheter with blood return noted through each lumen. Each lumen flushed using pulsatile method without resistance with 10 cc normal saline. 3CG/EKG technology used with appropriate waveform printed and placed on chart.  PICC secured with Statlock at 1 cm adalberto. Biopatch and Tegaderm applied over insertion site.     # of attempts  one    BARD Power PICC LOT# [id number]    Time out and LDA documentation completed. Patient condition and procedure outcome reported to unit RN and /or ordering provider via this post-procedure note.    Patient/Family educated re: PICC care. Written post-procedure instructions given to patient/family.

## 2019-05-01 ENCOUNTER — HOSPITAL ENCOUNTER (OUTPATIENT)
Dept: LAB | Facility: MEDICAL CENTER | Age: 54
End: 2019-05-01
Attending: NURSE PRACTITIONER
Payer: COMMERCIAL

## 2019-05-01 LAB
ALBUMIN SERPL BCP-MCNC: 4 G/DL (ref 3.2–4.9)
ALBUMIN/GLOB SERPL: 1.5 G/DL
ALP SERPL-CCNC: 116 U/L (ref 30–99)
ALT SERPL-CCNC: 11 U/L (ref 2–50)
ANION GAP SERPL CALC-SCNC: 10 MMOL/L (ref 0–11.9)
AST SERPL-CCNC: 19 U/L (ref 12–45)
BILIRUB SERPL-MCNC: 0.4 MG/DL (ref 0.1–1.5)
BUN SERPL-MCNC: 7 MG/DL (ref 8–22)
CALCIUM SERPL-MCNC: 9.4 MG/DL (ref 8.5–10.5)
CEA SERPL-MCNC: 34.6 NG/ML (ref 0–3)
CHLORIDE SERPL-SCNC: 98 MMOL/L (ref 96–112)
CO2 SERPL-SCNC: 27 MMOL/L (ref 20–33)
CREAT SERPL-MCNC: 0.84 MG/DL (ref 0.5–1.4)
GLOBULIN SER CALC-MCNC: 2.7 G/DL (ref 1.9–3.5)
GLUCOSE SERPL-MCNC: 109 MG/DL (ref 65–99)
MAGNESIUM SERPL-MCNC: 1.8 MG/DL (ref 1.5–2.5)
POTASSIUM SERPL-SCNC: 4 MMOL/L (ref 3.6–5.5)
PROT SERPL-MCNC: 6.7 G/DL (ref 6–8.2)
SODIUM SERPL-SCNC: 135 MMOL/L (ref 135–145)

## 2019-05-01 PROCEDURE — 80053 COMPREHEN METABOLIC PANEL: CPT

## 2019-05-01 PROCEDURE — 82378 CARCINOEMBRYONIC ANTIGEN: CPT

## 2019-05-01 PROCEDURE — 83735 ASSAY OF MAGNESIUM: CPT

## 2019-05-14 ENCOUNTER — HOSPITAL ENCOUNTER (OUTPATIENT)
Facility: MEDICAL CENTER | Age: 54
End: 2019-05-14
Attending: INTERNAL MEDICINE
Payer: COMMERCIAL

## 2019-05-14 ENCOUNTER — HOSPITAL ENCOUNTER (OUTPATIENT)
Dept: LAB | Facility: MEDICAL CENTER | Age: 54
End: 2019-05-14
Attending: INTERNAL MEDICINE
Payer: COMMERCIAL

## 2019-05-14 LAB
ALBUMIN SERPL BCP-MCNC: 4 G/DL (ref 3.2–4.9)
ALBUMIN/GLOB SERPL: 1.4 G/DL
ALP SERPL-CCNC: 106 U/L (ref 30–99)
ALT SERPL-CCNC: 25 U/L (ref 2–50)
ANION GAP SERPL CALC-SCNC: 9 MMOL/L (ref 0–11.9)
AST SERPL-CCNC: 30 U/L (ref 12–45)
BILIRUB SERPL-MCNC: 0.3 MG/DL (ref 0.1–1.5)
BUN SERPL-MCNC: 8 MG/DL (ref 8–22)
CALCIUM SERPL-MCNC: 9.2 MG/DL (ref 8.5–10.5)
CHLORIDE SERPL-SCNC: 99 MMOL/L (ref 96–112)
CO2 SERPL-SCNC: 26 MMOL/L (ref 20–33)
CREAT SERPL-MCNC: 0.83 MG/DL (ref 0.5–1.4)
GLOBULIN SER CALC-MCNC: 2.8 G/DL (ref 1.9–3.5)
GLUCOSE SERPL-MCNC: 110 MG/DL (ref 65–99)
MAGNESIUM SERPL-MCNC: 1.9 MG/DL (ref 1.5–2.5)
POTASSIUM SERPL-SCNC: 4.5 MMOL/L (ref 3.6–5.5)
PROT SERPL-MCNC: 6.8 G/DL (ref 6–8.2)
SODIUM SERPL-SCNC: 134 MMOL/L (ref 135–145)

## 2019-05-14 PROCEDURE — 83735 ASSAY OF MAGNESIUM: CPT

## 2019-05-14 PROCEDURE — 80053 COMPREHEN METABOLIC PANEL: CPT

## 2019-07-03 ENCOUNTER — APPOINTMENT (OUTPATIENT)
Dept: ADMISSIONS | Facility: MEDICAL CENTER | Age: 54
End: 2019-07-03
Payer: COMMERCIAL

## 2019-07-05 ENCOUNTER — APPOINTMENT (OUTPATIENT)
Dept: ADMISSIONS | Facility: MEDICAL CENTER | Age: 54
End: 2019-07-05
Attending: PAIN MEDICINE
Payer: COMMERCIAL

## 2019-07-05 NOTE — OR NURSING
Telephone Preadmit appt:  Patient instructed to continue regularly prescribed medications through day before surgery.  Instructed to take the following medications the day of surgery with a sip of water per anesthesia protocol:dilaudid prn, prevacid, zofran prn, prednisone, flomax. Pt to have labs done at [facility] in [place] today.

## 2019-07-09 ENCOUNTER — APPOINTMENT (OUTPATIENT)
Dept: RADIOLOGY | Facility: MEDICAL CENTER | Age: 54
End: 2019-07-09
Attending: PAIN MEDICINE
Payer: COMMERCIAL

## 2019-07-09 ENCOUNTER — HOSPITAL ENCOUNTER (OUTPATIENT)
Facility: MEDICAL CENTER | Age: 54
End: 2019-07-10
Attending: PAIN MEDICINE | Admitting: PAIN MEDICINE
Payer: COMMERCIAL

## 2019-07-09 ENCOUNTER — ANESTHESIA EVENT (OUTPATIENT)
Dept: SURGERY | Facility: MEDICAL CENTER | Age: 54
End: 2019-07-09
Payer: COMMERCIAL

## 2019-07-09 ENCOUNTER — ANESTHESIA (OUTPATIENT)
Dept: SURGERY | Facility: MEDICAL CENTER | Age: 54
End: 2019-07-09
Payer: COMMERCIAL

## 2019-07-09 LAB
ERYTHROCYTE [DISTWIDTH] IN BLOOD BY AUTOMATED COUNT: 45.6 FL (ref 35.9–50)
HCT VFR BLD AUTO: 44.9 % (ref 42–52)
HGB BLD-MCNC: 15 G/DL (ref 14–18)
MCH RBC QN AUTO: 27.6 PG (ref 27–33)
MCHC RBC AUTO-ENTMCNC: 33.4 G/DL (ref 33.7–35.3)
MCV RBC AUTO: 82.5 FL (ref 81.4–97.8)
PLATELET # BLD AUTO: 285 K/UL (ref 164–446)
PMV BLD AUTO: 8.8 FL (ref 9–12.9)
RBC # BLD AUTO: 5.44 M/UL (ref 4.7–6.1)
WBC # BLD AUTO: 12.1 K/UL (ref 4.8–10.8)

## 2019-07-09 PROCEDURE — C1772 INFUSION PUMP, PROGRAMMABLE: HCPCS | Performed by: PAIN MEDICINE

## 2019-07-09 PROCEDURE — A6402 STERILE GAUZE <= 16 SQ IN: HCPCS | Performed by: PAIN MEDICINE

## 2019-07-09 PROCEDURE — A9270 NON-COVERED ITEM OR SERVICE: HCPCS | Performed by: PAIN MEDICINE

## 2019-07-09 PROCEDURE — 160029 HCHG SURGERY MINUTES - 1ST 30 MINS LEVEL 4: Performed by: PAIN MEDICINE

## 2019-07-09 PROCEDURE — 160009 HCHG ANES TIME/MIN: Performed by: PAIN MEDICINE

## 2019-07-09 PROCEDURE — 500447 HCHG DRESSING, TEGADERM 8X12: Performed by: PAIN MEDICINE

## 2019-07-09 PROCEDURE — A9270 NON-COVERED ITEM OR SERVICE: HCPCS | Performed by: ANESTHESIOLOGY

## 2019-07-09 PROCEDURE — 700111 HCHG RX REV CODE 636 W/ 250 OVERRIDE (IP): Performed by: ANESTHESIOLOGY

## 2019-07-09 PROCEDURE — 501445 HCHG STAPLER, SKIN DISP: Performed by: PAIN MEDICINE

## 2019-07-09 PROCEDURE — C1755 CATHETER, INTRASPINAL: HCPCS | Performed by: PAIN MEDICINE

## 2019-07-09 PROCEDURE — 700102 HCHG RX REV CODE 250 W/ 637 OVERRIDE(OP): Performed by: PAIN MEDICINE

## 2019-07-09 PROCEDURE — 160048 HCHG OR STATISTICAL LEVEL 1-5: Performed by: PAIN MEDICINE

## 2019-07-09 PROCEDURE — A6222 GAUZE <=16 IN NO W/SAL W/O B: HCPCS | Performed by: PAIN MEDICINE

## 2019-07-09 PROCEDURE — 700111 HCHG RX REV CODE 636 W/ 250 OVERRIDE (IP): Performed by: PAIN MEDICINE

## 2019-07-09 PROCEDURE — 502000 HCHG MISC OR IMPLANTS RC 0278: Performed by: PAIN MEDICINE

## 2019-07-09 PROCEDURE — 96375 TX/PRO/DX INJ NEW DRUG ADDON: CPT

## 2019-07-09 PROCEDURE — 700102 HCHG RX REV CODE 250 W/ 637 OVERRIDE(OP)

## 2019-07-09 PROCEDURE — 700105 HCHG RX REV CODE 258: Performed by: ANESTHESIOLOGY

## 2019-07-09 PROCEDURE — 501838 HCHG SUTURE GENERAL: Performed by: PAIN MEDICINE

## 2019-07-09 PROCEDURE — 700101 HCHG RX REV CODE 250: Performed by: PAIN MEDICINE

## 2019-07-09 PROCEDURE — 96365 THER/PROPH/DIAG IV INF INIT: CPT

## 2019-07-09 PROCEDURE — 160041 HCHG SURGERY MINUTES - EA ADDL 1 MIN LEVEL 4: Performed by: PAIN MEDICINE

## 2019-07-09 PROCEDURE — 160035 HCHG PACU - 1ST 60 MINS PHASE I: Performed by: PAIN MEDICINE

## 2019-07-09 PROCEDURE — A9270 NON-COVERED ITEM OR SERVICE: HCPCS

## 2019-07-09 PROCEDURE — 160002 HCHG RECOVERY MINUTES (STAT): Performed by: PAIN MEDICINE

## 2019-07-09 PROCEDURE — 700105 HCHG RX REV CODE 258: Performed by: PAIN MEDICINE

## 2019-07-09 PROCEDURE — 85027 COMPLETE CBC AUTOMATED: CPT

## 2019-07-09 PROCEDURE — 700111 HCHG RX REV CODE 636 W/ 250 OVERRIDE (IP)

## 2019-07-09 PROCEDURE — 700101 HCHG RX REV CODE 250: Performed by: ANESTHESIOLOGY

## 2019-07-09 PROCEDURE — G0378 HOSPITAL OBSERVATION PER HR: HCPCS

## 2019-07-09 PROCEDURE — 700102 HCHG RX REV CODE 250 W/ 637 OVERRIDE(OP): Performed by: ANESTHESIOLOGY

## 2019-07-09 PROCEDURE — 160036 HCHG PACU - EA ADDL 30 MINS PHASE I: Performed by: PAIN MEDICINE

## 2019-07-09 DEVICE — IMPLANTABLE DEVICE: Type: IMPLANTABLE DEVICE | Site: BACK | Status: FUNCTIONAL

## 2019-07-09 RX ORDER — CEFAZOLIN SODIUM 1 G/3ML
INJECTION, POWDER, FOR SOLUTION INTRAMUSCULAR; INTRAVENOUS PRN
Status: DISCONTINUED | OUTPATIENT
Start: 2019-07-09 | End: 2019-07-09 | Stop reason: SURG

## 2019-07-09 RX ORDER — LABETALOL HYDROCHLORIDE 5 MG/ML
5 INJECTION, SOLUTION INTRAVENOUS
Status: DISCONTINUED | OUTPATIENT
Start: 2019-07-09 | End: 2019-07-09 | Stop reason: HOSPADM

## 2019-07-09 RX ORDER — PROMETHAZINE HYDROCHLORIDE 25 MG/1
50 TABLET ORAL EVERY 6 HOURS PRN
Status: DISCONTINUED | OUTPATIENT
Start: 2019-07-09 | End: 2019-07-10 | Stop reason: HOSPADM

## 2019-07-09 RX ORDER — ACETAMINOPHEN 500 MG
1000 TABLET ORAL ONCE
Status: COMPLETED | OUTPATIENT
Start: 2019-07-09 | End: 2019-07-09

## 2019-07-09 RX ORDER — BACITRACIN 50000 [IU]/1
INJECTION, POWDER, FOR SOLUTION INTRAMUSCULAR
Status: DISCONTINUED | OUTPATIENT
Start: 2019-07-09 | End: 2019-07-09 | Stop reason: HOSPADM

## 2019-07-09 RX ORDER — HYDROMORPHONE HYDROCHLORIDE 2 MG/ML
1 INJECTION, SOLUTION INTRAMUSCULAR; INTRAVENOUS; SUBCUTANEOUS ONCE
Status: DISCONTINUED | OUTPATIENT
Start: 2019-07-09 | End: 2019-07-10 | Stop reason: HOSPADM

## 2019-07-09 RX ORDER — NALOXONE HYDROCHLORIDE 0.4 MG/ML
0.4 INJECTION, SOLUTION INTRAMUSCULAR; INTRAVENOUS; SUBCUTANEOUS PRN
Status: DISCONTINUED | OUTPATIENT
Start: 2019-07-09 | End: 2019-07-09

## 2019-07-09 RX ORDER — OMEPRAZOLE 20 MG/1
20 CAPSULE, DELAYED RELEASE ORAL DAILY
Status: DISCONTINUED | OUTPATIENT
Start: 2019-07-09 | End: 2019-07-10 | Stop reason: HOSPADM

## 2019-07-09 RX ORDER — NALOXONE HYDROCHLORIDE 0.4 MG/ML
0.4 INJECTION, SOLUTION INTRAMUSCULAR; INTRAVENOUS; SUBCUTANEOUS PRN
Status: DISCONTINUED | OUTPATIENT
Start: 2019-07-09 | End: 2019-07-10 | Stop reason: HOSPADM

## 2019-07-09 RX ORDER — OXYCODONE HCL 20 MG/1
TABLET, FILM COATED, EXTENDED RELEASE ORAL
Status: COMPLETED
Start: 2019-07-09 | End: 2019-07-09

## 2019-07-09 RX ORDER — ACETAMINOPHEN 500 MG
1000 TABLET ORAL EVERY 6 HOURS
Status: DISCONTINUED | OUTPATIENT
Start: 2019-07-09 | End: 2019-07-09

## 2019-07-09 RX ORDER — ONDANSETRON 4 MG/1
8 TABLET, ORALLY DISINTEGRATING ORAL EVERY 6 HOURS PRN
Status: DISCONTINUED | OUTPATIENT
Start: 2019-07-09 | End: 2019-07-10 | Stop reason: HOSPADM

## 2019-07-09 RX ORDER — TAMSULOSIN HYDROCHLORIDE 0.4 MG/1
0.4 CAPSULE ORAL
Status: DISCONTINUED | OUTPATIENT
Start: 2019-07-09 | End: 2019-07-10 | Stop reason: HOSPADM

## 2019-07-09 RX ORDER — HYDROMORPHONE HYDROCHLORIDE 2 MG/1
8 TABLET ORAL
Status: DISCONTINUED | OUTPATIENT
Start: 2019-07-09 | End: 2019-07-10 | Stop reason: HOSPADM

## 2019-07-09 RX ORDER — IPRATROPIUM BROMIDE AND ALBUTEROL SULFATE 2.5; .5 MG/3ML; MG/3ML
3 SOLUTION RESPIRATORY (INHALATION)
Status: DISCONTINUED | OUTPATIENT
Start: 2019-07-09 | End: 2019-07-09 | Stop reason: HOSPADM

## 2019-07-09 RX ORDER — HYDROMORPHONE HYDROCHLORIDE 1 MG/ML
0.1 INJECTION, SOLUTION INTRAMUSCULAR; INTRAVENOUS; SUBCUTANEOUS
Status: DISCONTINUED | OUTPATIENT
Start: 2019-07-09 | End: 2019-07-09 | Stop reason: HOSPADM

## 2019-07-09 RX ORDER — HYDROMORPHONE HYDROCHLORIDE 1 MG/ML
1 INJECTION, SOLUTION INTRAMUSCULAR; INTRAVENOUS; SUBCUTANEOUS
Status: DISCONTINUED | OUTPATIENT
Start: 2019-07-09 | End: 2019-07-09

## 2019-07-09 RX ORDER — OXYCODONE HCL 5 MG/5 ML
10 SOLUTION, ORAL ORAL
Status: COMPLETED | OUTPATIENT
Start: 2019-07-09 | End: 2019-07-09

## 2019-07-09 RX ORDER — GABAPENTIN 300 MG/1
300 CAPSULE ORAL ONCE
Status: COMPLETED | OUTPATIENT
Start: 2019-07-09 | End: 2019-07-09

## 2019-07-09 RX ORDER — HYDROMORPHONE HYDROCHLORIDE 1 MG/ML
0.2 INJECTION, SOLUTION INTRAMUSCULAR; INTRAVENOUS; SUBCUTANEOUS
Status: DISCONTINUED | OUTPATIENT
Start: 2019-07-09 | End: 2019-07-09 | Stop reason: HOSPADM

## 2019-07-09 RX ORDER — LABETALOL HYDROCHLORIDE 5 MG/ML
10 INJECTION, SOLUTION INTRAVENOUS ONCE
Status: DISCONTINUED | OUTPATIENT
Start: 2019-07-09 | End: 2019-07-10 | Stop reason: HOSPADM

## 2019-07-09 RX ORDER — ONDANSETRON 2 MG/ML
4 INJECTION INTRAMUSCULAR; INTRAVENOUS EVERY 4 HOURS PRN
Status: DISCONTINUED | OUTPATIENT
Start: 2019-07-09 | End: 2019-07-10 | Stop reason: HOSPADM

## 2019-07-09 RX ORDER — HYDROMORPHONE HYDROCHLORIDE 1 MG/ML
1 INJECTION, SOLUTION INTRAMUSCULAR; INTRAVENOUS; SUBCUTANEOUS
Status: DISCONTINUED | OUTPATIENT
Start: 2019-07-09 | End: 2019-07-10 | Stop reason: HOSPADM

## 2019-07-09 RX ORDER — DEXMEDETOMIDINE HYDROCHLORIDE 100 UG/ML
INJECTION, SOLUTION INTRAVENOUS PRN
Status: DISCONTINUED | OUTPATIENT
Start: 2019-07-09 | End: 2019-07-09 | Stop reason: SURG

## 2019-07-09 RX ORDER — HALOPERIDOL 5 MG/ML
1 INJECTION INTRAMUSCULAR
Status: DISCONTINUED | OUTPATIENT
Start: 2019-07-09 | End: 2019-07-09 | Stop reason: HOSPADM

## 2019-07-09 RX ORDER — HYDROMORPHONE HYDROCHLORIDE 1 MG/ML
1 INJECTION, SOLUTION INTRAMUSCULAR; INTRAVENOUS; SUBCUTANEOUS ONCE
Status: DISCONTINUED | OUTPATIENT
Start: 2019-07-09 | End: 2019-07-10 | Stop reason: HOSPADM

## 2019-07-09 RX ORDER — PREDNISONE 5 MG/1
5 TABLET ORAL DAILY
Status: DISCONTINUED | OUTPATIENT
Start: 2019-07-09 | End: 2019-07-10 | Stop reason: HOSPADM

## 2019-07-09 RX ORDER — OXYCODONE HCL 20 MG/1
20 TABLET, FILM COATED, EXTENDED RELEASE ORAL ONCE
Status: COMPLETED | OUTPATIENT
Start: 2019-07-09 | End: 2019-07-09

## 2019-07-09 RX ORDER — SODIUM CHLORIDE, SODIUM LACTATE, POTASSIUM CHLORIDE, CALCIUM CHLORIDE 600; 310; 30; 20 MG/100ML; MG/100ML; MG/100ML; MG/100ML
INJECTION, SOLUTION INTRAVENOUS CONTINUOUS
Status: DISPENSED | OUTPATIENT
Start: 2019-07-09 | End: 2019-07-10

## 2019-07-09 RX ORDER — HYDROMORPHONE HYDROCHLORIDE 1 MG/ML
INJECTION, SOLUTION INTRAMUSCULAR; INTRAVENOUS; SUBCUTANEOUS
Status: COMPLETED
Start: 2019-07-09 | End: 2019-07-09

## 2019-07-09 RX ORDER — GABAPENTIN 300 MG/1
CAPSULE ORAL
Status: COMPLETED
Start: 2019-07-09 | End: 2019-07-09

## 2019-07-09 RX ORDER — DIPHENHYDRAMINE HYDROCHLORIDE 50 MG/ML
12.5 INJECTION INTRAMUSCULAR; INTRAVENOUS
Status: DISCONTINUED | OUTPATIENT
Start: 2019-07-09 | End: 2019-07-09 | Stop reason: HOSPADM

## 2019-07-09 RX ORDER — ONDANSETRON 2 MG/ML
INJECTION INTRAMUSCULAR; INTRAVENOUS PRN
Status: DISCONTINUED | OUTPATIENT
Start: 2019-07-09 | End: 2019-07-09 | Stop reason: SURG

## 2019-07-09 RX ORDER — SODIUM CHLORIDE, SODIUM LACTATE, POTASSIUM CHLORIDE, CALCIUM CHLORIDE 600; 310; 30; 20 MG/100ML; MG/100ML; MG/100ML; MG/100ML
INJECTION, SOLUTION INTRAVENOUS
Status: DISCONTINUED | OUTPATIENT
Start: 2019-07-09 | End: 2019-07-09 | Stop reason: SURG

## 2019-07-09 RX ORDER — LANSOPRAZOLE 30 MG/1
30 CAPSULE, DELAYED RELEASE ORAL DAILY
Status: DISCONTINUED | OUTPATIENT
Start: 2019-07-09 | End: 2019-07-09

## 2019-07-09 RX ORDER — ACETAMINOPHEN 500 MG
1000 TABLET ORAL EVERY 6 HOURS
Status: DISCONTINUED | OUTPATIENT
Start: 2019-07-09 | End: 2019-07-10 | Stop reason: HOSPADM

## 2019-07-09 RX ORDER — HYDROMORPHONE HYDROCHLORIDE 1 MG/ML
0.4 INJECTION, SOLUTION INTRAMUSCULAR; INTRAVENOUS; SUBCUTANEOUS
Status: DISCONTINUED | OUTPATIENT
Start: 2019-07-09 | End: 2019-07-09 | Stop reason: HOSPADM

## 2019-07-09 RX ORDER — ONDANSETRON 2 MG/ML
4 INJECTION INTRAMUSCULAR; INTRAVENOUS
Status: DISCONTINUED | OUTPATIENT
Start: 2019-07-09 | End: 2019-07-09 | Stop reason: HOSPADM

## 2019-07-09 RX ORDER — DEXAMETHASONE SODIUM PHOSPHATE 4 MG/ML
INJECTION, SOLUTION INTRA-ARTICULAR; INTRALESIONAL; INTRAMUSCULAR; INTRAVENOUS; SOFT TISSUE PRN
Status: DISCONTINUED | OUTPATIENT
Start: 2019-07-09 | End: 2019-07-09 | Stop reason: SURG

## 2019-07-09 RX ORDER — ONDANSETRON 2 MG/ML
4 INJECTION INTRAMUSCULAR; INTRAVENOUS EVERY 4 HOURS PRN
Status: DISCONTINUED | OUTPATIENT
Start: 2019-07-09 | End: 2019-07-09

## 2019-07-09 RX ORDER — POLYETHYLENE GLYCOL 3350 17 G/17G
1 POWDER, FOR SOLUTION ORAL DAILY
Status: DISCONTINUED | OUTPATIENT
Start: 2019-07-09 | End: 2019-07-10 | Stop reason: HOSPADM

## 2019-07-09 RX ORDER — OXYCODONE HCL 5 MG/5 ML
5 SOLUTION, ORAL ORAL
Status: COMPLETED | OUTPATIENT
Start: 2019-07-09 | End: 2019-07-09

## 2019-07-09 RX ORDER — LOSARTAN POTASSIUM 25 MG/1
50 TABLET ORAL DAILY
Status: DISCONTINUED | OUTPATIENT
Start: 2019-07-09 | End: 2019-07-10 | Stop reason: HOSPADM

## 2019-07-09 RX ORDER — ACETAMINOPHEN 500 MG
TABLET ORAL
Status: COMPLETED
Start: 2019-07-09 | End: 2019-07-09

## 2019-07-09 RX ADMIN — HYDROMORPHONE HYDROCHLORIDE 0.2 MG: 1 INJECTION, SOLUTION INTRAMUSCULAR; INTRAVENOUS; SUBCUTANEOUS at 12:09

## 2019-07-09 RX ADMIN — PREDNISONE 5 MG: 5 TABLET ORAL at 14:38

## 2019-07-09 RX ADMIN — PROMETHAZINE HYDROCHLORIDE 50 MG: 25 TABLET ORAL at 18:34

## 2019-07-09 RX ADMIN — ONDANSETRON 4 MG: 2 INJECTION INTRAMUSCULAR; INTRAVENOUS at 10:18

## 2019-07-09 RX ADMIN — HYDROMORPHONE HYDROCHLORIDE 8 MG: 2 TABLET ORAL at 14:37

## 2019-07-09 RX ADMIN — OXYCODONE HCL 20 MG: 20 TABLET, FILM COATED, EXTENDED RELEASE ORAL at 09:08

## 2019-07-09 RX ADMIN — MIDAZOLAM HYDROCHLORIDE 2 MG: 1 INJECTION, SOLUTION INTRAMUSCULAR; INTRAVENOUS at 09:42

## 2019-07-09 RX ADMIN — PROPOFOL 100 MG: 10 INJECTION, EMULSION INTRAVENOUS at 09:35

## 2019-07-09 RX ADMIN — DEXMEDETOMIDINE HYDROCHLORIDE 50 MCG: 100 INJECTION, SOLUTION, CONCENTRATE INTRAVENOUS at 09:44

## 2019-07-09 RX ADMIN — GABAPENTIN 300 MG: 300 CAPSULE ORAL at 09:08

## 2019-07-09 RX ADMIN — ROCURONIUM BROMIDE 50 MG: 10 INJECTION INTRAVENOUS at 09:24

## 2019-07-09 RX ADMIN — FENTANYL CITRATE 250 MCG: 50 INJECTION, SOLUTION INTRAMUSCULAR; INTRAVENOUS at 09:20

## 2019-07-09 RX ADMIN — POLYETHYLENE GLYCOL 3350 1 PACKET: 17 POWDER, FOR SOLUTION ORAL at 14:37

## 2019-07-09 RX ADMIN — OXYCODONE HYDROCHLORIDE 10 MG: 5 SOLUTION ORAL at 11:53

## 2019-07-09 RX ADMIN — SUGAMMADEX 200 MG: 100 INJECTION, SOLUTION INTRAVENOUS at 10:18

## 2019-07-09 RX ADMIN — Medication 1000 MG: at 09:07

## 2019-07-09 RX ADMIN — DEXAMETHASONE SODIUM PHOSPHATE 4 MG: 4 INJECTION, SOLUTION INTRAMUSCULAR; INTRAVENOUS at 09:40

## 2019-07-09 RX ADMIN — HYDROMORPHONE HYDROCHLORIDE 1 MG: 1 INJECTION, SOLUTION INTRAMUSCULAR; INTRAVENOUS; SUBCUTANEOUS at 12:41

## 2019-07-09 RX ADMIN — CEFAZOLIN 2 G: 1 INJECTION, POWDER, FOR SOLUTION INTRAVENOUS at 09:29

## 2019-07-09 RX ADMIN — SODIUM CHLORIDE, POTASSIUM CHLORIDE, SODIUM LACTATE AND CALCIUM CHLORIDE: 600; 310; 30; 20 INJECTION, SOLUTION INTRAVENOUS at 09:17

## 2019-07-09 RX ADMIN — SODIUM CHLORIDE 1 G: 900 INJECTION INTRAVENOUS at 14:37

## 2019-07-09 RX ADMIN — ACETAMINOPHEN 1000 MG: 500 TABLET, FILM COATED ORAL at 18:15

## 2019-07-09 RX ADMIN — HYDROMORPHONE HYDROCHLORIDE 0.4 MG: 1 INJECTION, SOLUTION INTRAMUSCULAR; INTRAVENOUS; SUBCUTANEOUS at 12:04

## 2019-07-09 RX ADMIN — LIDOCAINE HYDROCHLORIDE 100 MG: 20 INJECTION, SOLUTION INFILTRATION; PERINEURAL at 09:24

## 2019-07-09 RX ADMIN — HYDROMORPHONE HYDROCHLORIDE 0.4 MG: 1 INJECTION, SOLUTION INTRAMUSCULAR; INTRAVENOUS; SUBCUTANEOUS at 11:50

## 2019-07-09 RX ADMIN — HYDROMORPHONE HYDROCHLORIDE 1 MG: 1 INJECTION, SOLUTION INTRAMUSCULAR; INTRAVENOUS; SUBCUTANEOUS at 22:35

## 2019-07-09 RX ADMIN — OMEPRAZOLE 20 MG: 20 CAPSULE, DELAYED RELEASE ORAL at 14:37

## 2019-07-09 RX ADMIN — ONDANSETRON 4 MG: 2 INJECTION INTRAMUSCULAR; INTRAVENOUS at 19:55

## 2019-07-09 RX ADMIN — ACETAMINOPHEN 1000 MG: 500 TABLET, FILM COATED ORAL at 14:38

## 2019-07-09 RX ADMIN — TAMSULOSIN HYDROCHLORIDE 0.4 MG: 0.4 CAPSULE ORAL at 14:38

## 2019-07-09 RX ADMIN — OXYCODONE HYDROCHLORIDE 20 MG: 20 TABLET, FILM COATED, EXTENDED RELEASE ORAL at 09:08

## 2019-07-09 RX ADMIN — LOSARTAN POTASSIUM 50 MG: 25 TABLET, FILM COATED ORAL at 14:38

## 2019-07-09 RX ADMIN — PROPOFOL 300 MG: 10 INJECTION, EMULSION INTRAVENOUS at 09:23

## 2019-07-09 RX ADMIN — HYDROMORPHONE HYDROCHLORIDE 8 MG: 2 TABLET ORAL at 22:22

## 2019-07-09 RX ADMIN — ACETAMINOPHEN 1000 MG: 500 TABLET, FILM COATED ORAL at 09:07

## 2019-07-09 RX ADMIN — HYDROMORPHONE HYDROCHLORIDE 8 MG: 2 TABLET ORAL at 18:21

## 2019-07-09 ASSESSMENT — COGNITIVE AND FUNCTIONAL STATUS - GENERAL
SUGGESTED CMS G CODE MODIFIER DAILY ACTIVITY: CK
DAILY ACTIVITIY SCORE: 18
TURNING FROM BACK TO SIDE WHILE IN FLAT BAD: A LITTLE
HELP NEEDED FOR BATHING: A LITTLE
TOILETING: A LITTLE
MOBILITY SCORE: 18
CLIMB 3 TO 5 STEPS WITH RAILING: A LITTLE
MOVING TO AND FROM BED TO CHAIR: A LITTLE
DRESSING REGULAR LOWER BODY CLOTHING: A LITTLE
DRESSING REGULAR UPPER BODY CLOTHING: A LITTLE
WALKING IN HOSPITAL ROOM: A LITTLE
EATING MEALS: A LITTLE
STANDING UP FROM CHAIR USING ARMS: A LITTLE
MOVING FROM LYING ON BACK TO SITTING ON SIDE OF FLAT BED: A LITTLE
PERSONAL GROOMING: A LITTLE
SUGGESTED CMS G CODE MODIFIER MOBILITY: CK

## 2019-07-09 ASSESSMENT — LIFESTYLE VARIABLES
EVER_SMOKED: YES
ALCOHOL_USE: NO

## 2019-07-09 ASSESSMENT — COPD QUESTIONNAIRES
DO YOU EVER COUGH UP ANY MUCUS OR PHLEGM?: NO/ONLY WITH OCCASIONAL COLDS OR INFECTIONS
DURING THE PAST 4 WEEKS HOW MUCH DID YOU FEEL SHORT OF BREATH: NONE/LITTLE OF THE TIME
IN THE PAST 12 MONTHS DO YOU DO LESS THAN YOU USED TO BECAUSE OF YOUR BREATHING PROBLEMS: DISAGREE/UNSURE
HAVE YOU SMOKED AT LEAST 100 CIGARETTES IN YOUR ENTIRE LIFE: YES
COPD SCREENING SCORE: 3

## 2019-07-09 ASSESSMENT — PAIN SCALES - GENERAL: PAIN_LEVEL: 8

## 2019-07-09 NOTE — OR SURGEON
Immediate Post OP Note    PreOp Diagnosis: metastatic colon cancer    PostOp Diagnosis: as above    Procedure(s):  REVISION, INSERTION, OR REPLACEMENT, INTRATHECAL ANALGESIC PUMP - Wound Class: Clean    Surgeon(s):  Candelario Reina M.D.    Anesthesiologist/Type of Anesthesia:  Anesthesiologist: Ashley Love M.D./General    Surgical Staff:  Circulator: Elek Waters R.N.  Scrub Person: Marin Garcia  Radiology Technologist: Teagan Deleon    Specimens removed if any:  * No specimens in log *    Estimated Blood Loss: <25cc    Findings: none    Complications: none        7/9/2019 10:50 AM Candelario Reina M.D.

## 2019-07-09 NOTE — OR NURSING
1230 Dr. Love rounded with patient who is complaining of increased pain when arroused. Dr. Love ordered patient to receive 1 mg Dilaudid before going to the floor. 1245 Report given to Humza KWAN.

## 2019-07-09 NOTE — ANESTHESIA PREPROCEDURE EVALUATION
55yo M w/ chronic cancer pain on opiates here for pain pump implantation.    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      Current Outpatient Prescriptions on File Prior to Encounter   Medication Sig Dispense Refill   • polyethylene glycol/lytes (MIRALAX) Pack Take 1 Packet by mouth every day. Can use up to 2 times daily if no bowel movement in 24 hours. 30 Each 3   • lansoprazole (PREVACID) 30 MG CAPSULE DELAYED RELEASE Take 30 mg by mouth every day.     • ondansetron (ZOFRAN ODT) 4 MG TABLET DISPERSIBLE Take 8 mg by mouth every 6 hours as needed for Nausea.     • promethazine (PHENERGAN) 50 MG Tab Take 50 mg by mouth every 6 hours as needed for Nausea/Vomiting.     • HYDROmorphone (DILAUDID) 8 MG tablet Take 16 mg by mouth every 4 hours.     • predniSONE (DELTASONE) 5 MG Tab Take 5 mg by mouth every day.     • tamsulosin (FLOMAX) 0.4 MG capsule Take 0.4 mg by mouth ONE-HALF HOUR AFTER BREAKFAST.     • losartan (COZAAR) 50 MG Tab Take 50 mg by mouth every day.        Past Surgical History:   Procedure Laterality Date   • BRONCHOSCOPY-ENDO N/A 3/1/2019    Procedure: BRONCHOSCOPY-ENDO;  Surgeon: Katelin Garner M.D.;   Location: ENDOSCOPY [facility];  Service: Pulmonary   • CATH PLACEMENT  3/16/2016    Procedure: CATH PLACEMENT POWER PORT ;  Surgeon: Luke Lima M.D.;  Location: SURGERY [facility];  Service:    • LOW ANTERIOR RESECTION LAPAROSCOPIC  1/12/2016    Procedure: LOW ANTERIOR RESECTION LAPAROSCOPIC;  Surgeon: Luke Lima M.D.;  Location: SURGERY [facility];  Service:    • OTHER ORTHOPEDIC SURGERY Right     Rt knee ACL   • OTHER ORTHOPEDIC SURGERY Right     Rt knee meniscus repair x2   • OTHER ORTHOPEDIC SURGERY Left     Lt knee meniscus repair x2      Physical Exam    Airway   Mallampati: III  TM distance: >3 FB  Neck ROM: limited       Cardiovascular   Rhythm: regular  Rate: normal  (-) murmur     Dental   (+) upper dentures         Pulmonary   Breath sounds clear to auscultation  (-) wheezes     Abdominal    Neurological - normal exam                 Anesthesia Plan    ASA 3   ASA physical status 3 criteria: alcohol and/or substance dependence or abuse    Plan - general       Airway plan will be ETT        Induction: intravenous      Pertinent diagnostic labs and testing reviewed    Informed Consent:    Anesthetic plan and risks discussed with patient.

## 2019-07-09 NOTE — ANESTHESIA TIME REPORT
Anesthesia Start and Stop Event Times     Date Time Event    7/9/2019 0917 Anesthesia Start     1054 Anesthesia Stop        Responsible Staff  07/09/19    Name Role Begin End    Ashley Love M.D. Anesth 0917 1054        Preop Diagnosis (Free Text):  Pre-op Diagnosis     colon cancer         Preop Diagnosis (Codes):  Diagnosis Information     Diagnosis Code(s):         Post op Diagnosis  Cancer associated pain      Premium Reason  Non-Premium    Comments:

## 2019-07-09 NOTE — ANESTHESIA PROCEDURE NOTES
Airway  Date/Time: 7/9/2019 9:26 AM  Performed by: CHOLO GIBSON  Authorized by: CHOLO GIBSON     Location:  OR  Urgency:  Elective  Indications for Airway Management:  Anesthesia  Spontaneous Ventilation: absent    Sedation Level:  Deep  Preoxygenated: Yes    Patient Position:  Sniffing  Mask Difficulty Assessment:  1 - vent by mask  Final Airway Type:  Endotracheal airway  Final Endotracheal Airway:  ETT  Cuffed: Yes    Technique Used for Successful ETT Placement:  Direct laryngoscopy  Devices/Methods Used in Placement:  Intubating stylet  Insertion Site:  Oral  Blade Type:  Tonya  Laryngoscope Blade/Videolaryngoscope Blade Size:  4  ETT Size (mm):  7.5  Leak Pressue (cm H2O):  35  Measured from:  Teeth  ETT to Teeth (cm):  24  Placement Verified by: auscultation and capnometry    Cormack-Lehane Classification:  Grade I - full view of glottis  Number of Attempts at Approach:  1

## 2019-07-09 NOTE — ANESTHESIA POSTPROCEDURE EVALUATION
Patient: Erich Ingram    Procedure Summary     Date:  07/09/19 Room / Location:   OR  / SURGERY [facility]    Anesthesia Start:  0917 Anesthesia Stop:  1054    Procedure:  REVISION, INSERTION, OR REPLACEMENT, INTRATHECAL ANALGESIC PUMP (Back) Diagnosis:  (colon cancer )    Surgeon:  Candelario Reina M.D. Responsible Provider:  Ashley Love M.D.    Anesthesia Type:  general ASA Status:  3          Final Anesthesia Type: general  Last vitals  BP   Blood Pressure: (!) 161/99    Temp   36.3 °C (97.3 °F)    Pulse   Heart Rate (Monitored): 69   Resp   12    SpO2   93 %      Anesthesia Post Evaluation    Patient location during evaluation: PACU  Patient participation: complete - patient participated  Level of consciousness: awake and alert  Pain score: 8  Pt with chronic pain on chronic opiate therapy. Always at high pain score.  Procedure was a pain procedure.  Airway patency: patent  Anesthetic complications: no  Cardiovascular status: hemodynamically stable  Respiratory status: acceptable  Hydration status: euvolemic    PONV: none

## 2019-07-09 NOTE — PROGRESS NOTES
Alerted by patient's wife that pt's L side of face suddenly red and eye watery. Pt without any other symptoms, vision not affected. Notified Dr. Reina. Only new medication given to patient was IV ancef. Per MD, pt was positioned on L side during surgery, this may be the cause. Orders received to DC ancef. Pt's eye watering and face redness already subsiding within minutes. Warm washcloth given.

## 2019-07-09 NOTE — CARE PLAN
Problem: Safety  Goal: Will remain free from injury  Outcome: PROGRESSING AS EXPECTED  Treaded socks on, call light within reach, bed locked in low position     Problem: Infection  Goal: Will remain free from infection  Outcome: PROGRESSING AS EXPECTED      Problem: Knowledge Deficit  Goal: Knowledge of disease process/condition, treatment plan, diagnostic tests, and medications will improve  Outcome: PROGRESSING AS EXPECTED  Plan of care discussed, pt verbalizes understanding

## 2019-07-09 NOTE — PROCEDURES
DATE OF SERVICE:  07/09/2019    PROCEDURE:  Implantation of Medtronic intrathecal pump and catheter.    PREPROCEDURAL DIAGNOSES:  Patient with intractable abdominal pain secondary to   metastatic colon cancer with failure of high-dose oral opiates and dramatic   improvement with spinal narcotic trial.    POSTPROCEDURAL DIAGNOSES:  Patient with intractable abdominal pain secondary   to metastatic colon cancer with failure of high-dose oral opiates and dramatic   improvement with spinal narcotic trial.    PROCEDURE PERFORMED BY:  Candelario Reina MD    ANESTHESIA:  General.    ANESTHESIOLOGIST:  Ashley Love MD    PROCEDURE IN DETAIL:  Patient was brought into the preop holding area.  The   procedure had been thoroughly discussed with he and his wife.  They understood   and accepted the risks.  He was then brought in the operating room.  General   anesthesia was induced.  He was placed in a left lateral decubitus position.    The right lower quadrant and the lumbar region were sterilely prepped.    Patient was sterilely draped.  A total of 20 mL of 0.5% bupivacaine with   1:200,000 epinephrine was infiltrated over the right paramedian at the L2-L3,   L3-L4 level and also over the right lower quadrant as well.  A 16-gauge Tuohy   needle was advanced paramedian approach into the L2-L3 interspace and stylet   was removed.  Clear CSF was noted to be flowing from the tip of the needle.    Stylet and catheter were then guided up to the T5-T6 level under fluoroscopic   imaging.  Stylet was removed.  Clear CSF was noted to be flowing from the   distal aspect of the catheter.  Needle was pulled back 2 cm.  A 4 cm incision   was made.  Dissection down to the fascia was performed.  Electrocautery was   used to staunch the bleeding.  A 0 Ethibond pursestring was placed around the   needle and catheter and tied.  Needle was then removed.  Silastic anchor was   deployed and tied to the fascia using the 0 Ethibond.  A 10 cm  incision was   then made in the abdominal side longitudinally.  Blunt dissection was used to   make a pocket for the pump.  Electrocautery was again used to staunch the   bleeding.  Tunneling binta was passed from the abdominal wound site to the   lumbar wound site.  The catheter was passed back through.  A 70.6 cm of the   distal aspect of the catheter was ligated.  The sutureless connector was then   used to connect the pump and catheter together.  A 24-gauge Viramontes needle was   then advanced into the access port and aspirated confirming patency of the   entire system.  The pump was then anchored to the abdominal wall using 0   Ethibond sutures.  Irrigation of the anterior and posterior wound site was   performed using bacitracin solution.  Wounds were closed using 3-0 Vicryl and   staples on the skin for the abdominal wound site and a mattress stitch.  A 3-0   nylon suture was used for the lumbar wound site.  Patient will be started at   1 mg a day with a 0.05 mg bolus, max of 24 boluses in 24 hours.  He will be   kept 23-hour short stay, schedule follow up in our office for further   evaluation.       ____________________________________     MD JOSE GRANT / MAURO    DD:  07/09/2019 10:58:56  DT:  07/09/2019 12:18:26    D#:  [id number]  Job#:  [id number]

## 2019-07-09 NOTE — OR NURSING
1051- To PACU from OR via vik. Sleeping, respirations spontaneous and non-labored with 6L via mask. Abdominal binder over c/d/i abdomen and back surgical incisions. LR infusing via home PICC line.  1115- Rouses to voice. Reports pain in right side, but falls to sleep.  1140- Reports pain 8/10. See MAR.  1200- Continued pain. See MAR.  1210- Report given to AQUILES Gallegos.  1248- Assumed care of patient from AQUILES Gallegos.  1259- Patient meets criteria for discharge to room.

## 2019-07-10 VITALS
RESPIRATION RATE: 18 BRPM | WEIGHT: 158.07 LBS | DIASTOLIC BLOOD PRESSURE: 88 MMHG | OXYGEN SATURATION: 93 % | SYSTOLIC BLOOD PRESSURE: 147 MMHG | TEMPERATURE: 98 F | HEART RATE: 86 BPM | HEIGHT: 75 IN | BODY MASS INDEX: 19.65 KG/M2

## 2019-07-10 PROCEDURE — G0378 HOSPITAL OBSERVATION PER HR: HCPCS

## 2019-07-10 PROCEDURE — 700102 HCHG RX REV CODE 250 W/ 637 OVERRIDE(OP): Performed by: PAIN MEDICINE

## 2019-07-10 PROCEDURE — 700111 HCHG RX REV CODE 636 W/ 250 OVERRIDE (IP): Performed by: PAIN MEDICINE

## 2019-07-10 PROCEDURE — A9270 NON-COVERED ITEM OR SERVICE: HCPCS | Performed by: PAIN MEDICINE

## 2019-07-10 PROCEDURE — 96376 TX/PRO/DX INJ SAME DRUG ADON: CPT

## 2019-07-10 RX ADMIN — HYDROMORPHONE HYDROCHLORIDE 8 MG: 2 TABLET ORAL at 10:58

## 2019-07-10 RX ADMIN — POLYETHYLENE GLYCOL 3350 1 PACKET: 17 POWDER, FOR SOLUTION ORAL at 05:29

## 2019-07-10 RX ADMIN — OMEPRAZOLE 20 MG: 20 CAPSULE, DELAYED RELEASE ORAL at 05:29

## 2019-07-10 RX ADMIN — HYDROMORPHONE HYDROCHLORIDE 8 MG: 2 TABLET ORAL at 08:04

## 2019-07-10 RX ADMIN — ACETAMINOPHEN 1000 MG: 500 TABLET, FILM COATED ORAL at 05:29

## 2019-07-10 RX ADMIN — LOSARTAN POTASSIUM 50 MG: 25 TABLET, FILM COATED ORAL at 05:29

## 2019-07-10 RX ADMIN — PREDNISONE 5 MG: 5 TABLET ORAL at 05:29

## 2019-07-10 RX ADMIN — ONDANSETRON 4 MG: 2 INJECTION INTRAMUSCULAR; INTRAVENOUS at 03:01

## 2019-07-10 RX ADMIN — HYDROMORPHONE HYDROCHLORIDE 8 MG: 2 TABLET ORAL at 05:33

## 2019-07-10 RX ADMIN — HYDROMORPHONE HYDROCHLORIDE 1 MG: 1 INJECTION, SOLUTION INTRAMUSCULAR; INTRAVENOUS; SUBCUTANEOUS at 03:10

## 2019-07-10 RX ADMIN — TAMSULOSIN HYDROCHLORIDE 0.4 MG: 0.4 CAPSULE ORAL at 08:04

## 2019-07-10 NOTE — PROGRESS NOTES
Pressure dressing changed per order. Area remains hard to touch, warm, rounded, dressing saturated with bright red blood. Staples intact. No bleeding noted when cleansed and changed.4/4's added,  tegaderm ,ace bandage and abdominal binder applied for pressure. Ice also applied. Pt reports his pain is improving from one hour ago.

## 2019-07-10 NOTE — PROGRESS NOTES
Pt is AAO x4.  Pt reports an 8/10 R abd pain level.  Medicated per MAR.  Denies nausea at this time.  VS : slightly high BP, will continue to monitor.  R abd dressing in place with old, dry drainage.  Abd binder in place.   R PICC in place, + blood return.  POC discussed.  All needs met at this time.  Bed in low position.  Call light within reach.  Rounding in place.

## 2019-07-10 NOTE — PROGRESS NOTES
Pt discharged to home via own wheelchair escorted with transport tech..  All discharge instructions given, questions and concerns addressed. .   Extra dressings given.  New abd binder placed.  POC discussed with wife.  Follow up appt discussed.   All belongings with pt.   UT'ed with PICC in place.

## 2019-07-10 NOTE — CARE PLAN
Problem: Pain Management  Goal: Pain level will decrease to patient's comfort goal  Will control pain with PRN meds.    Problem: Knowledge Deficit  Goal: Knowledge of the prescribed therapeutic regimen will improve  POC discussed. Pt understands the plan is to DC once pain/nausea under control. All questions and concerns addressed.

## 2019-07-10 NOTE — PROGRESS NOTES
Tolerated morning meds by mouth. Slept aprox 4 hours this shift.blood pressure elevated, see flow sheet. Morning htn meds given. Dressing remains intact with moderate amount of red blood at bottom of dressing. picc line remains patent infusing lr@10ml/hr.

## 2019-07-10 NOTE — DISCHARGE INSTRUCTIONS
Discharge Instructions    Discharged to home by car with relative. Discharged via wheelchair, hospital escort: Yes.  Special equipment needed: Not Applicable    Be sure to schedule a follow-up appointment with your primary care doctor or any specialists as instructed.     Discharge Plan:   Diet Plan: Discussed  Activity Level: Discussed  Confirmed Follow up Appointment: Patient to Call and Schedule Appointment  Confirmed Symptoms Management: Discussed  Medication Reconciliation Updated: Yes  Influenza Vaccine Indication: Patient Refuses    I understand that a diet low in cholesterol, fat, and sodium is recommended for good health. Unless I have been given specific instructions below for another diet, I accept this instruction as my diet prescription.   Other diet: REgular    Special Instructions: None    · Is patient discharged on Warfarin / Coumadin?   No     Depression / Suicide Risk    As you are discharged from this [facility] facility, it is important to learn how to keep safe from harming yourself.    Recognize the warning signs:  · Abrupt changes in personality, positive or negative- including increase in energy   · Giving away possessions  · Change in eating patterns- significant weight changes-  positive or negative  · Change in sleeping patterns- unable to sleep or sleeping all the time   · Unwillingness or inability to communicate  · Depression  · Unusual sadness, discouragement and loneliness  · Talk of wanting to die  · Neglect of personal appearance   · Rebelliousness- reckless behavior  · Withdrawal from people/activities they love  · Confusion- inability to concentrate     If you or a loved one observes any of these behaviors or has concerns about self-harm, here's what you can do:  · Talk about it- your feelings and reasons for harming yourself  · Remove any means that you might use to hurt yourself (examples: pills, rope, extension cords, firearm)  · Get professional help from the community  (Mental Health, Substance Abuse, psychological counseling)  · Do not be alone:Call your Safe Contact- someone whom you trust who will be there for you.  · Call your local CRISIS HOTLINE 484-8481 or 990-892-0812  · Call your local Children's Mobile Crisis Response Team [place] (678) 776-1841 or www.Edgar  · Call the toll free National Suicide Prevention Hotlines   · National Suicide Prevention Lifeline 911-423-XLJN (6067)  · [facility] Hope Line Network 800-SUICIDE (858-7946)      ACTIVITY: Rest and take it easy for the first 24 hours.  A responsible adult is recommended to remain with you during that time.  It is normal to feel sleepy.  We encourage you to not do anything that requires balance, judgment or coordination.    MILD FLU-LIKE SYMPTOMS ARE NORMAL. YOU MAY EXPERIENCE GENERALIZED MUSCLE ACHES, THROAT IRRITATION, HEADACHE AND/OR SOME NAUSEA.    FOR 24 HOURS DO NOT:  Drive, operate machinery or run household appliances.  Drink beer or alcoholic beverages.   Make important decisions or sign legal documents.    SPECIAL INSTRUCTIONS: Keep dressing clean, dry and intact until instructed otherwise by surgeon. May use ice on surgical area for comfort.    DIET: To avoid nausea, slowly advance diet as tolerated, avoiding spicy or greasy foods for the first day.  Add more substantial food to your diet according to your physician's instructions.  INCREASE FLUIDS AND FIBER TO AVOID CONSTIPATION.    FOLLOW-UP APPOINTMENT:  A follow-up appointment should be arranged with your doctor; call to schedule.    You should CALL YOUR PHYSICIAN if you develop:  Fever greater than 101 degrees F.  Pain not relieved by medication, or persistent nausea or vomiting.  Excessive bleeding (blood soaking through dressing) or unexpected drainage from the wound.  Extreme redness or swelling around the incision site, drainage of pus or foul smelling drainage.  Inability to urinate or empty your bladder within 8 hours.  Problems  with breathing or chest pain.    You should call 911 if you develop problems with breathing or chest pain.  If you are unable to contact your doctor or surgical center, you should go to the nearest emergency room or urgent care center.     Dr. Reina's telephone #: [phone]    If any questions arise, call your doctor.  If your doctor is not available, please feel free to call the Surgical Center at [phone].  The Center is open Monday through Friday from 7AM to 7PM.  You can also call the HEALTH HOTLINE open 24 hours/day, 7 days/week and speak to a nurse at [phone], or toll free at [phone].    A registered nurse may call you a few days after your surgery to see how you are doing after your procedure.    MEDICATIONS: Resume taking daily medication.  Take prescribed pain medication with food.  If no medication is prescribed, you may take non-aspirin pain medication if needed.  PAIN MEDICATION CAN BE VERY CONSTIPATING.  Take a stool softener or laxative such as senokot, pericolace, or milk of magnesia if neeeded.    LAST PAIN MED GIVEN 1100am 7/10/19      If your physician has prescribed pain medication that includes Acetaminophen (Tylenol), do not take additional Acetaminophen (Tylenol) while taking the prescribed medication.    Depression / Suicide Risk    As you are discharged from this [facility] facility, it is important to learn how to keep safe from harming yourself.    Recognize the warning signs:  · Abrupt changes in personality, positive or negative- including increase in energy   · Giving away possessions  · Change in eating patterns- significant weight changes-  positive or negative  · Change in sleeping patterns- unable to sleep or sleeping all the time   · Unwillingness or inability to communicate  · Depression  · Unusual sadness, discouragement and loneliness  · Talk of wanting to die  · Neglect of personal appearance   · Rebelliousness- reckless behavior  · Withdrawal from  people/activities they love  · Confusion- inability to concentrate     If you or a loved one observes any of these behaviors or has concerns about self-harm, here's what you can do:  · Talk about it- your feelings and reasons for harming yourself  · Remove any means that you might use to hurt yourself (examples: pills, rope, extension cords, firearm)  · Get professional help from the community (Mental Health, Substance Abuse, psychological counseling)  · Do not be alone:Call your Safe Contact- someone whom you trust who will be there for you.  · Call your local CRISIS HOTLINE 387-3142 or 269-311-9298  · Call your local Children's Mobile Crisis Response Team [place] (829) 363-1765 or www.[employer]!  · Call the toll free National Suicide Prevention Hotlines   · National Suicide Prevention Lifeline 449-184-WSAZ (6160)  · National Hope Line Network 800-SUICIDE (250-3982)

## 2019-07-10 NOTE — CARE PLAN
Problem: Pain Management  Goal: Pain level will decrease to patient's comfort goal  Outcome: PROGRESSING AS EXPECTED      Problem: Safety  Goal: Will remain free from falls  Outcome: PROGRESSING AS EXPECTED      Problem: Knowledge Deficit  Goal: Knowledge of the prescribed therapeutic regimen will improve  Outcome: PROGRESSING AS EXPECTED

## 2019-07-10 NOTE — PROGRESS NOTES
Blood pressure down to normal at this time. Pt resting quietly in bed with his eyes closed. Dressing remains dry and intact under binder. Continuing to monitor pt for bleeding or adverse effects of surgery.

## 2019-07-10 NOTE — PROGRESS NOTES
Abdominal Binder delivered and placed at pt bedside, pt refused to have product applied at this time and wanted to be left alone to sleep.

## 2019-07-10 NOTE — PROGRESS NOTES
Noted pt in severe pain. Po meds given as ordered. Pt reports wetness in abdominal area. Noted bright red blood right side surgical area dripping down under pressure dressing onto binder.area hard, rounded and swollen. rapid called.  Pt attempted to stand up to pee and blood dripped down his leg. Doc notified of above. 1x dose of pain med dilaudid 1mg iv. Tolerated well, htn med also ordered iv for increased bp, held due to pt blood pressure going down once pain was under control. See flow sheet.no further orders at this time. Will continue to moniter for further bleeding.

## 2019-07-10 NOTE — RESPIRATORY CARE
Respiratory Rapid Response Note    Symptoms : post surgical bleeding.  BS clear, Sp02 97% on RA.   Transferred to ICU: no

## 2019-07-10 NOTE — PROGRESS NOTES
Pt medicated numerous times with dilaudid for pain. Mildly effective. Pt sleeps aprox 1-2 hour intervals and wakes up in pain. Encouraged to bolus self with pain pump. Dressing appears to have moderate area of bright red blooding seeping through gauze on lower portion of dressing. Binder and ace bandage remains in place. Dressing remains intact. Vs wnl. respers 16.

## 2019-07-10 NOTE — PROGRESS NOTES
Assumed care. Pt isolative, sad affect. Drowsy. Compliant with medications. Reports pain is never tolerable. Reports nausea is persisting since earlier dose of antinausea med. Another dose of zofran given via IV which pt reports was effective. Safety maintained. Vs slightly elevated 128/93, p-66. Pt has hx of htn, meds given on day tour.

## 2019-07-23 ENCOUNTER — HOSPITAL ENCOUNTER (INPATIENT)
Facility: MEDICAL CENTER | Age: 54
LOS: 6 days | DRG: 862 | End: 2019-07-29
Attending: EMERGENCY MEDICINE | Admitting: INTERNAL MEDICINE
Payer: COMMERCIAL

## 2019-07-23 ENCOUNTER — HOSPITAL ENCOUNTER (OUTPATIENT)
Dept: RADIOLOGY | Facility: MEDICAL CENTER | Age: 54
End: 2019-07-23

## 2019-07-23 ENCOUNTER — APPOINTMENT (OUTPATIENT)
Dept: RADIOLOGY | Facility: MEDICAL CENTER | Age: 54
DRG: 862 | End: 2019-07-23
Attending: EMERGENCY MEDICINE
Payer: COMMERCIAL

## 2019-07-23 DIAGNOSIS — R52 INADEQUATE PAIN CONTROL: ICD-10-CM

## 2019-07-23 DIAGNOSIS — C18.9 MALIGNANT NEOPLASM OF COLON, UNSPECIFIED PART OF COLON (HCC): ICD-10-CM

## 2019-07-23 DIAGNOSIS — J44.9 CHRONIC OBSTRUCTIVE PULMONARY DISEASE, UNSPECIFIED COPD TYPE (HCC): ICD-10-CM

## 2019-07-23 DIAGNOSIS — A41.9 SEPSIS, DUE TO UNSPECIFIED ORGANISM: ICD-10-CM

## 2019-07-23 DIAGNOSIS — R41.0 DELIRIUM: ICD-10-CM

## 2019-07-23 PROBLEM — G93.41 ACUTE METABOLIC ENCEPHALOPATHY: Status: ACTIVE | Noted: 2019-07-23

## 2019-07-23 PROBLEM — E87.29 HIGH ANION GAP METABOLIC ACIDOSIS: Status: ACTIVE | Noted: 2019-07-23

## 2019-07-23 LAB
ALBUMIN SERPL BCP-MCNC: 4.3 G/DL (ref 3.2–4.9)
ALBUMIN/GLOB SERPL: 1.3 G/DL
ALP SERPL-CCNC: 100 U/L (ref 30–99)
ALT SERPL-CCNC: 10 U/L (ref 2–50)
ANION GAP SERPL CALC-SCNC: 12 MMOL/L (ref 0–11.9)
APTT PPP: 23.1 SEC (ref 24.7–36)
AST SERPL-CCNC: 17 U/L (ref 12–45)
BASOPHILS # BLD AUTO: 0.2 % (ref 0–1.8)
BASOPHILS # BLD: 0.03 K/UL (ref 0–0.12)
BILIRUB SERPL-MCNC: 1.1 MG/DL (ref 0.1–1.5)
BUN SERPL-MCNC: 20 MG/DL (ref 8–22)
BURR CELLS/RBC NFR CSF MANUAL: 0 %
CALCIUM SERPL-MCNC: 9.8 MG/DL (ref 8.5–10.5)
CHLORIDE SERPL-SCNC: 106 MMOL/L (ref 96–112)
CLARITY CSF: CLEAR
CO2 SERPL-SCNC: 19 MMOL/L (ref 20–33)
COLOR CSF: COLORLESS
COLOR SPUN CSF: COLORLESS
CREAT SERPL-MCNC: 1.21 MG/DL (ref 0.5–1.4)
EOSINOPHIL # BLD AUTO: 0.04 K/UL (ref 0–0.51)
EOSINOPHIL NFR BLD: 0.3 % (ref 0–6.9)
ERYTHROCYTE [DISTWIDTH] IN BLOOD BY AUTOMATED COUNT: 60.1 FL (ref 35.9–50)
GLOBULIN SER CALC-MCNC: 3.3 G/DL (ref 1.9–3.5)
GLUCOSE CSF-MCNC: 67 MG/DL (ref 40–80)
GLUCOSE SERPL-MCNC: 113 MG/DL (ref 65–99)
GRAM STN SPEC: NORMAL
HCT VFR BLD AUTO: 38.1 % (ref 42–52)
HGB BLD-MCNC: 12.4 G/DL (ref 14–18)
IMM GRANULOCYTES # BLD AUTO: 0.05 K/UL (ref 0–0.11)
IMM GRANULOCYTES NFR BLD AUTO: 0.4 % (ref 0–0.9)
INR PPP: 1.1 (ref 0.87–1.13)
LACTATE BLD-SCNC: 1.2 MMOL/L (ref 0.5–2)
LACTATE BLD-SCNC: 1.4 MMOL/L (ref 0.5–2)
LYMPHOCYTES # BLD AUTO: 1.54 K/UL (ref 1–4.8)
LYMPHOCYTES NFR BLD: 10.8 % (ref 22–41)
LYMPHOCYTES NFR CSF: 75 %
MCH RBC QN AUTO: 28.4 PG (ref 27–33)
MCHC RBC AUTO-ENTMCNC: 32.5 G/DL (ref 33.7–35.3)
MCV RBC AUTO: 87.4 FL (ref 81.4–97.8)
MONOCYTES # BLD AUTO: 1.17 K/UL (ref 0–0.85)
MONOCYTES NFR BLD AUTO: 8.2 % (ref 0–13.4)
MONONUC CELLS NFR CSF: 24 %
NEUTROPHILS # BLD AUTO: 11.43 K/UL (ref 1.82–7.42)
NEUTROPHILS NFR BLD: 80.1 % (ref 44–72)
NEUTROPHILS NFR CSF: 1 %
NRBC # BLD AUTO: 0 K/UL
NRBC BLD-RTO: 0 /100 WBC
PLATELET # BLD AUTO: 374 K/UL (ref 164–446)
PMV BLD AUTO: 8.9 FL (ref 9–12.9)
POTASSIUM SERPL-SCNC: 4.1 MMOL/L (ref 3.6–5.5)
PROT CSF-MCNC: 56 MG/DL (ref 15–45)
PROT SERPL-MCNC: 7.6 G/DL (ref 6–8.2)
PROTHROMBIN TIME: 14.5 SEC (ref 12–14.6)
RBC # BLD AUTO: 4.36 M/UL (ref 4.7–6.1)
RBC # CSF: 54 CELLS/UL
SIGNIFICANT IND 70042: NORMAL
SITE SITE: NORMAL
SODIUM SERPL-SCNC: 137 MMOL/L (ref 135–145)
SOURCE SOURCE: NORMAL
SPECIMEN VOL CSF: 3.5 ML
TUBE # CSF: 3
TUBE # CSF: 4
WBC # BLD AUTO: 14.3 K/UL (ref 4.8–10.8)
WBC # CSF: 4 CELLS/UL (ref 0–10)

## 2019-07-23 PROCEDURE — 84157 ASSAY OF PROTEIN OTHER: CPT

## 2019-07-23 PROCEDURE — 87040 BLOOD CULTURE FOR BACTERIA: CPT

## 2019-07-23 PROCEDURE — 700105 HCHG RX REV CODE 258: Performed by: EMERGENCY MEDICINE

## 2019-07-23 PROCEDURE — 96365 THER/PROPH/DIAG IV INF INIT: CPT

## 2019-07-23 PROCEDURE — 83605 ASSAY OF LACTIC ACID: CPT

## 2019-07-23 PROCEDURE — 36415 COLL VENOUS BLD VENIPUNCTURE: CPT

## 2019-07-23 PROCEDURE — 700111 HCHG RX REV CODE 636 W/ 250 OVERRIDE (IP): Performed by: EMERGENCY MEDICINE

## 2019-07-23 PROCEDURE — 96375 TX/PRO/DX INJ NEW DRUG ADDON: CPT

## 2019-07-23 PROCEDURE — 87070 CULTURE OTHR SPECIMN AEROBIC: CPT

## 2019-07-23 PROCEDURE — 71045 X-RAY EXAM CHEST 1 VIEW: CPT

## 2019-07-23 PROCEDURE — 96367 TX/PROPH/DG ADDL SEQ IV INF: CPT

## 2019-07-23 PROCEDURE — 87205 SMEAR GRAM STAIN: CPT

## 2019-07-23 PROCEDURE — 99285 EMERGENCY DEPT VISIT HI MDM: CPT

## 2019-07-23 PROCEDURE — 85730 THROMBOPLASTIN TIME PARTIAL: CPT

## 2019-07-23 PROCEDURE — 80053 COMPREHEN METABOLIC PANEL: CPT

## 2019-07-23 PROCEDURE — 85610 PROTHROMBIN TIME: CPT

## 2019-07-23 PROCEDURE — 62270 DX LMBR SPI PNXR: CPT

## 2019-07-23 PROCEDURE — 99223 1ST HOSP IP/OBS HIGH 75: CPT | Performed by: INTERNAL MEDICINE

## 2019-07-23 PROCEDURE — 82945 GLUCOSE OTHER FLUID: CPT

## 2019-07-23 PROCEDURE — 85025 COMPLETE CBC W/AUTO DIFF WBC: CPT

## 2019-07-23 PROCEDURE — 770006 HCHG ROOM/CARE - MED/SURG/GYN SEMI*

## 2019-07-23 PROCEDURE — 89051 BODY FLUID CELL COUNT: CPT

## 2019-07-23 RX ORDER — ONDANSETRON 4 MG/1
4 TABLET, ORALLY DISINTEGRATING ORAL EVERY 4 HOURS PRN
Status: DISCONTINUED | OUTPATIENT
Start: 2019-07-23 | End: 2019-07-29 | Stop reason: HOSPADM

## 2019-07-23 RX ORDER — TAMSULOSIN HYDROCHLORIDE 0.4 MG/1
0.4 CAPSULE ORAL
Status: DISCONTINUED | OUTPATIENT
Start: 2019-07-24 | End: 2019-07-29 | Stop reason: HOSPADM

## 2019-07-23 RX ORDER — HYDROMORPHONE HYDROCHLORIDE 1 MG/ML
1 INJECTION, SOLUTION INTRAMUSCULAR; INTRAVENOUS; SUBCUTANEOUS
Status: DISCONTINUED | OUTPATIENT
Start: 2019-07-23 | End: 2019-07-27

## 2019-07-23 RX ORDER — PROMETHAZINE HYDROCHLORIDE 25 MG/1
12.5-25 SUPPOSITORY RECTAL EVERY 4 HOURS PRN
Status: DISCONTINUED | OUTPATIENT
Start: 2019-07-23 | End: 2019-07-29 | Stop reason: HOSPADM

## 2019-07-23 RX ORDER — SODIUM CHLORIDE 9 MG/ML
INJECTION, SOLUTION INTRAVENOUS CONTINUOUS
Status: DISCONTINUED | OUTPATIENT
Start: 2019-07-23 | End: 2019-07-24

## 2019-07-23 RX ORDER — ONDANSETRON 2 MG/ML
4 INJECTION INTRAMUSCULAR; INTRAVENOUS EVERY 4 HOURS PRN
Status: DISCONTINUED | OUTPATIENT
Start: 2019-07-23 | End: 2019-07-29 | Stop reason: HOSPADM

## 2019-07-23 RX ORDER — PREDNISONE 5 MG/1
5 TABLET ORAL DAILY
Status: DISCONTINUED | OUTPATIENT
Start: 2019-07-24 | End: 2019-07-29 | Stop reason: HOSPADM

## 2019-07-23 RX ORDER — ENALAPRILAT 1.25 MG/ML
1.25 INJECTION INTRAVENOUS EVERY 6 HOURS PRN
Status: DISCONTINUED | OUTPATIENT
Start: 2019-07-23 | End: 2019-07-29 | Stop reason: HOSPADM

## 2019-07-23 RX ORDER — HYDROMORPHONE HYDROCHLORIDE 4 MG/1
4 TABLET ORAL
COMMUNITY

## 2019-07-23 RX ORDER — ACETAMINOPHEN 325 MG/1
650 TABLET ORAL EVERY 6 HOURS PRN
Status: DISCONTINUED | OUTPATIENT
Start: 2019-07-23 | End: 2019-07-29 | Stop reason: HOSPADM

## 2019-07-23 RX ORDER — AMOXICILLIN 250 MG
2 CAPSULE ORAL 2 TIMES DAILY
Status: DISCONTINUED | OUTPATIENT
Start: 2019-07-23 | End: 2019-07-29 | Stop reason: HOSPADM

## 2019-07-23 RX ORDER — SODIUM CHLORIDE 9 MG/ML
1000 INJECTION, SOLUTION INTRAVENOUS ONCE
Status: COMPLETED | OUTPATIENT
Start: 2019-07-23 | End: 2019-07-24

## 2019-07-23 RX ORDER — PROMETHAZINE HYDROCHLORIDE 25 MG/1
12.5-25 TABLET ORAL EVERY 4 HOURS PRN
Status: DISCONTINUED | OUTPATIENT
Start: 2019-07-23 | End: 2019-07-29 | Stop reason: HOSPADM

## 2019-07-23 RX ORDER — BISACODYL 10 MG
10 SUPPOSITORY, RECTAL RECTAL
Status: DISCONTINUED | OUTPATIENT
Start: 2019-07-23 | End: 2019-07-29 | Stop reason: HOSPADM

## 2019-07-23 RX ORDER — HYDROMORPHONE HYDROCHLORIDE 1 MG/ML
1 INJECTION, SOLUTION INTRAMUSCULAR; INTRAVENOUS; SUBCUTANEOUS ONCE
Status: COMPLETED | OUTPATIENT
Start: 2019-07-23 | End: 2019-07-23

## 2019-07-23 RX ORDER — SODIUM CHLORIDE 9 MG/ML
30 INJECTION, SOLUTION INTRAVENOUS
Status: DISCONTINUED | OUTPATIENT
Start: 2019-07-23 | End: 2019-07-29 | Stop reason: HOSPADM

## 2019-07-23 RX ORDER — HYDROMORPHONE HYDROCHLORIDE 4 MG/1
4 TABLET ORAL EVERY 6 HOURS PRN
Status: DISCONTINUED | OUTPATIENT
Start: 2019-07-23 | End: 2019-07-25

## 2019-07-23 RX ORDER — SODIUM CHLORIDE 9 MG/ML
1000 INJECTION, SOLUTION INTRAVENOUS
Status: DISCONTINUED | OUTPATIENT
Start: 2019-07-23 | End: 2019-07-29 | Stop reason: HOSPADM

## 2019-07-23 RX ORDER — LOSARTAN POTASSIUM 50 MG/1
50 TABLET ORAL DAILY
Status: DISCONTINUED | OUTPATIENT
Start: 2019-07-24 | End: 2019-07-29 | Stop reason: HOSPADM

## 2019-07-23 RX ORDER — OMEPRAZOLE 20 MG/1
20 CAPSULE, DELAYED RELEASE ORAL DAILY
Status: DISCONTINUED | OUTPATIENT
Start: 2019-07-24 | End: 2019-07-29 | Stop reason: HOSPADM

## 2019-07-23 RX ORDER — POLYETHYLENE GLYCOL 3350 17 G/17G
1 POWDER, FOR SOLUTION ORAL
Status: DISCONTINUED | OUTPATIENT
Start: 2019-07-23 | End: 2019-07-29 | Stop reason: HOSPADM

## 2019-07-23 RX ADMIN — AMPICILLIN AND SULBACTAM 3 G: 1; 2 INJECTION, POWDER, FOR SOLUTION INTRAMUSCULAR; INTRAVENOUS at 20:57

## 2019-07-23 RX ADMIN — SODIUM CHLORIDE 1000 ML: 9 INJECTION, SOLUTION INTRAVENOUS at 20:59

## 2019-07-23 RX ADMIN — VANCOMYCIN HYDROCHLORIDE 1900 MG: 500 INJECTION, POWDER, LYOPHILIZED, FOR SOLUTION INTRAVENOUS at 22:23

## 2019-07-23 RX ADMIN — HYDROMORPHONE HYDROCHLORIDE 1 MG: 1 INJECTION, SOLUTION INTRAMUSCULAR; INTRAVENOUS; SUBCUTANEOUS at 20:52

## 2019-07-24 ENCOUNTER — APPOINTMENT (OUTPATIENT)
Dept: RADIOLOGY | Facility: MEDICAL CENTER | Age: 54
DRG: 862 | End: 2019-07-24
Attending: HOSPITALIST
Payer: COMMERCIAL

## 2019-07-24 LAB
ANION GAP SERPL CALC-SCNC: 10 MMOL/L (ref 0–11.9)
BASOPHILS # BLD AUTO: 0.1 % (ref 0–1.8)
BASOPHILS # BLD: 0.02 K/UL (ref 0–0.12)
BUN SERPL-MCNC: 18 MG/DL (ref 8–22)
CALCIUM SERPL-MCNC: 9 MG/DL (ref 8.5–10.5)
CHLORIDE SERPL-SCNC: 110 MMOL/L (ref 96–112)
CO2 SERPL-SCNC: 18 MMOL/L (ref 20–33)
CREAT SERPL-MCNC: 1.19 MG/DL (ref 0.5–1.4)
EOSINOPHIL # BLD AUTO: 0.05 K/UL (ref 0–0.51)
EOSINOPHIL NFR BLD: 0.4 % (ref 0–6.9)
ERYTHROCYTE [DISTWIDTH] IN BLOOD BY AUTOMATED COUNT: 57.9 FL (ref 35.9–50)
GLUCOSE SERPL-MCNC: 104 MG/DL (ref 65–99)
HCT VFR BLD AUTO: 34.8 % (ref 42–52)
HGB BLD-MCNC: 11.3 G/DL (ref 14–18)
IMM GRANULOCYTES # BLD AUTO: 0.04 K/UL (ref 0–0.11)
IMM GRANULOCYTES NFR BLD AUTO: 0.3 % (ref 0–0.9)
LACTATE BLD-SCNC: 0.7 MMOL/L (ref 0.5–2)
LACTATE BLD-SCNC: 1.1 MMOL/L (ref 0.5–2)
LACTATE BLD-SCNC: 1.7 MMOL/L (ref 0.5–2)
LYMPHOCYTES # BLD AUTO: 1.54 K/UL (ref 1–4.8)
LYMPHOCYTES NFR BLD: 11.5 % (ref 22–41)
MCH RBC QN AUTO: 27.6 PG (ref 27–33)
MCHC RBC AUTO-ENTMCNC: 32.5 G/DL (ref 33.7–35.3)
MCV RBC AUTO: 85.1 FL (ref 81.4–97.8)
MONOCYTES # BLD AUTO: 1.15 K/UL (ref 0–0.85)
MONOCYTES NFR BLD AUTO: 8.6 % (ref 0–13.4)
NEUTROPHILS # BLD AUTO: 10.56 K/UL (ref 1.82–7.42)
NEUTROPHILS NFR BLD: 79.1 % (ref 44–72)
NRBC # BLD AUTO: 0 K/UL
NRBC BLD-RTO: 0 /100 WBC
PLATELET # BLD AUTO: 318 K/UL (ref 164–446)
PMV BLD AUTO: 8.4 FL (ref 9–12.9)
POTASSIUM SERPL-SCNC: 4.1 MMOL/L (ref 3.6–5.5)
RBC # BLD AUTO: 4.09 M/UL (ref 4.7–6.1)
SODIUM SERPL-SCNC: 138 MMOL/L (ref 135–145)
WBC # BLD AUTO: 13.4 K/UL (ref 4.8–10.8)

## 2019-07-24 PROCEDURE — 74177 CT ABD & PELVIS W/CONTRAST: CPT

## 2019-07-24 PROCEDURE — 87070 CULTURE OTHR SPECIMN AEROBIC: CPT

## 2019-07-24 PROCEDURE — 36415 COLL VENOUS BLD VENIPUNCTURE: CPT

## 2019-07-24 PROCEDURE — 700105 HCHG RX REV CODE 258: Performed by: HOSPITALIST

## 2019-07-24 PROCEDURE — 99233 SBSQ HOSP IP/OBS HIGH 50: CPT | Performed by: HOSPITALIST

## 2019-07-24 PROCEDURE — 700117 HCHG RX CONTRAST REV CODE 255: Performed by: HOSPITALIST

## 2019-07-24 PROCEDURE — 700105 HCHG RX REV CODE 258: Performed by: INTERNAL MEDICINE

## 2019-07-24 PROCEDURE — 700111 HCHG RX REV CODE 636 W/ 250 OVERRIDE (IP): Performed by: INTERNAL MEDICINE

## 2019-07-24 PROCEDURE — 51798 US URINE CAPACITY MEASURE: CPT

## 2019-07-24 PROCEDURE — 700102 HCHG RX REV CODE 250 W/ 637 OVERRIDE(OP): Performed by: INTERNAL MEDICINE

## 2019-07-24 PROCEDURE — A9270 NON-COVERED ITEM OR SERVICE: HCPCS | Performed by: INTERNAL MEDICINE

## 2019-07-24 PROCEDURE — 83605 ASSAY OF LACTIC ACID: CPT | Mod: 91

## 2019-07-24 PROCEDURE — 85025 COMPLETE CBC W/AUTO DIFF WBC: CPT

## 2019-07-24 PROCEDURE — 70450 CT HEAD/BRAIN W/O DYE: CPT

## 2019-07-24 PROCEDURE — 87205 SMEAR GRAM STAIN: CPT

## 2019-07-24 PROCEDURE — 80048 BASIC METABOLIC PNL TOTAL CA: CPT

## 2019-07-24 PROCEDURE — 770006 HCHG ROOM/CARE - MED/SURG/GYN SEMI*

## 2019-07-24 RX ORDER — SODIUM CHLORIDE 9 MG/ML
INJECTION, SOLUTION INTRAVENOUS CONTINUOUS
Status: DISCONTINUED | OUTPATIENT
Start: 2019-07-24 | End: 2019-07-25

## 2019-07-24 RX ADMIN — LOSARTAN POTASSIUM 50 MG: 50 TABLET ORAL at 04:58

## 2019-07-24 RX ADMIN — AMPICILLIN SODIUM AND SULBACTAM SODIUM 3 G: 2; 1 INJECTION, POWDER, FOR SOLUTION INTRAMUSCULAR; INTRAVENOUS at 13:09

## 2019-07-24 RX ADMIN — AMPICILLIN SODIUM AND SULBACTAM SODIUM 3 G: 2; 1 INJECTION, POWDER, FOR SOLUTION INTRAMUSCULAR; INTRAVENOUS at 17:28

## 2019-07-24 RX ADMIN — SODIUM CHLORIDE: 9 INJECTION, SOLUTION INTRAVENOUS at 00:37

## 2019-07-24 RX ADMIN — HYDROMORPHONE HYDROCHLORIDE 4 MG: 4 TABLET ORAL at 21:17

## 2019-07-24 RX ADMIN — PREDNISONE 5 MG: 5 TABLET ORAL at 04:57

## 2019-07-24 RX ADMIN — OMEPRAZOLE 20 MG: 20 CAPSULE, DELAYED RELEASE ORAL at 04:57

## 2019-07-24 RX ADMIN — HYDROMORPHONE HYDROCHLORIDE 1 MG: 1 INJECTION, SOLUTION INTRAMUSCULAR; INTRAVENOUS; SUBCUTANEOUS at 16:42

## 2019-07-24 RX ADMIN — AMPICILLIN SODIUM AND SULBACTAM SODIUM 3 G: 2; 1 INJECTION, POWDER, FOR SOLUTION INTRAMUSCULAR; INTRAVENOUS at 00:37

## 2019-07-24 RX ADMIN — HYDROMORPHONE HYDROCHLORIDE 4 MG: 4 TABLET ORAL at 13:20

## 2019-07-24 RX ADMIN — AMPICILLIN SODIUM AND SULBACTAM SODIUM 3 G: 2; 1 INJECTION, POWDER, FOR SOLUTION INTRAMUSCULAR; INTRAVENOUS at 04:58

## 2019-07-24 RX ADMIN — SODIUM CHLORIDE: 9 INJECTION, SOLUTION INTRAVENOUS at 21:55

## 2019-07-24 RX ADMIN — VANCOMYCIN HYDROCHLORIDE 1500 MG: 500 INJECTION, POWDER, LYOPHILIZED, FOR SOLUTION INTRAVENOUS at 21:55

## 2019-07-24 RX ADMIN — IOHEXOL 100 ML: 350 INJECTION, SOLUTION INTRAVENOUS at 17:00

## 2019-07-24 RX ADMIN — TAMSULOSIN HYDROCHLORIDE 0.4 MG: 0.4 CAPSULE ORAL at 09:10

## 2019-07-24 ASSESSMENT — ENCOUNTER SYMPTOMS
MYALGIAS: 0
DIARRHEA: 0
PALPITATIONS: 0
NAUSEA: 0
DIAPHORESIS: 0
DEPRESSION: 0
CHILLS: 0
SPUTUM PRODUCTION: 0
LOSS OF CONSCIOUSNESS: 1
ABDOMINAL PAIN: 0
WHEEZING: 0
CLAUDICATION: 0
CONSTIPATION: 0
EYE PAIN: 0
VOMITING: 0
SPEECH CHANGE: 0
SORE THROAT: 0
FOCAL WEAKNESS: 0
BRUISES/BLEEDS EASILY: 0
WEAKNESS: 0
EYE DISCHARGE: 0
BACK PAIN: 0
HEMOPTYSIS: 0
SHORTNESS OF BREATH: 0
FEVER: 0
MEMORY LOSS: 1
HEADACHES: 0
SENSORY CHANGE: 0
COUGH: 0
DIZZINESS: 0
NECK PAIN: 0

## 2019-07-24 ASSESSMENT — LIFESTYLE VARIABLES: SUBSTANCE_ABUSE: 0

## 2019-07-24 NOTE — H&P
Hospital Medicine History & Physical Note    Date of Service  7/23/2019    Primary Care Physician  YOU Sterling.    Consultants  Pain management-Dr. Reina    Code Status  Full, unable to obtain this from him at this time, no polst on file    Chief Complaint  Altered level consciousness    History of Presenting Illness  54 y.o. male who presented 7/23/2019 with altered level consciousness.  At this point in time, patient is confused, unable to obtain information from him, most information obtained from the chart.  Patient recently underwent pain pump implantation approximately 9 days ago.  He states he began having bruising approximately 5 days ago.  Patient initially presented to outside facility, transferred here for higher level of care.  Dr. Reina was the physician who inserted the pump, has been consulted.  Patient does have a history of metastatic colon cancer, pain pump was implanted due to intractable pain.  I did discuss the case including labs and imaging with the ER physician.    Review of Systems  Review of Systems   Unable to perform ROS: Acuity of condition       Past Medical History   has a past medical history of Breath shortness; Cancer (HCC); Cancer (HCC) (08/2018); COPD (chronic obstructive pulmonary disease) (HCC); Hepatitis C; Hypertension; PICC (peripherally inserted central catheter) in place (05/2019); Psychiatric problem; and Renal disorder.    Surgical History   has a past surgical history that includes low anterior resection laparoscopic (1/12/2016); cath placement (3/16/2016); bronchoscopy-endo (N/A, 3/1/2019); other orthopedic surgery (Right); other orthopedic surgery (Right); other orthopedic surgery (Left); and pump revision (7/9/2019).     Family History  family history includes No Known Problems in his brother, father, maternal grandfather, maternal grandmother, mother, paternal grandfather, paternal grandmother, and sister. He was adopted.     Social History   reports that  he quit smoking about 4 years ago. He started smoking about 18 years ago. He has a 15.00 pack-year smoking history. He has quit using smokeless tobacco. He reports that he uses drugs, including Inhaled and Oral. He reports that he does not drink alcohol.    Allergies  Allergies   Allergen Reactions   • Levaquin    • Nubain [Nalbuphine]        Medications  Prior to Admission Medications   Prescriptions Last Dose Informant Patient Reported? Taking?   HYDROmorphone (DILAUDID) 4 MG Tab UNK at UNK Family Member Yes Yes   Sig: Take 4 mg by mouth every 6 hours as needed for Severe Pain.   Pain Pump (PATIENT SUPPLIED) XX SHAWNA  Family Member Yes Yes   Sig: by Injection route Continuous.   lansoprazole (PREVACID) 30 MG CAPSULE DELAYED RELEASE UNK at UNK Family Member Yes No   Sig: Take 30 mg by mouth every day.   losartan (COZAAR) 50 MG Tab UNK at UNK Family Member Yes No   Sig: Take 50 mg by mouth every day.   ondansetron (ZOFRAN ODT) 4 MG TABLET DISPERSIBLE UNK at UNK Family Member Yes No   Sig: Take 8 mg by mouth every 6 hours as needed for Nausea.   polyethylene glycol/lytes (MIRALAX) Pack UNK at UNK Family Member No No   Sig: Take 1 Packet by mouth every day. Can use up to 2 times daily if no bowel movement in 24 hours.   predniSONE (DELTASONE) 5 MG Tab UNK at UNK Family Member Yes No   Sig: Take 5 mg by mouth every day.   promethazine (PHENERGAN) 50 MG Tab UNK at UNK Family Member Yes No   Sig: Take 50 mg by mouth every 6 hours as needed for Nausea/Vomiting.   tamsulosin (FLOMAX) 0.4 MG capsule UNK at UNK Family Member Yes No   Sig: Take 0.4 mg by mouth ONE-HALF HOUR AFTER BREAKFAST.      Facility-Administered Medications: None       Physical Exam  Temp:  [36.9 °C (98.4 °F)] 36.9 °C (98.4 °F)  Pulse:  [103] 103  Resp:  [16] 16  SpO2:  [98 %] 98 %    Physical Exam   Constitutional: He appears cachectic.  Non-toxic appearance. He appears ill. No distress.   HENT:   Head: Normocephalic and atraumatic. Not macrocephalic and  not microcephalic. Head is without raccoon's eyes and without Amanda's sign.   Right Ear: External ear normal.   Left Ear: External ear normal.   Mouth/Throat: Mucous membranes are dry. No oropharyngeal exudate.   Eyes: Conjunctivae are normal. Right eye exhibits no discharge. Left eye exhibits no discharge. No scleral icterus.   Neck: Normal range of motion. Neck supple. No tracheal deviation, no edema and no erythema present.   Cardiovascular: Regular rhythm, normal heart sounds and intact distal pulses.  Tachycardia present.  Exam reveals no gallop, no friction rub and no decreased pulses.    No murmur heard.  Pulmonary/Chest: Effort normal and breath sounds normal. No stridor. No respiratory distress. He has no decreased breath sounds. He has no wheezes. He has no rhonchi. He has no rales.   Abdominal: Soft. Bowel sounds are normal. He exhibits no distension. There is no splenomegaly or hepatomegaly.   Musculoskeletal: He exhibits no edema or deformity.   Lymphadenopathy:     He has no cervical adenopathy.   Neurological:   Slowed and confused   Skin: Skin is warm and dry. No rash noted. He is not diaphoretic. No cyanosis or erythema. No pallor. Nails show no clubbing.   Significant bruising with associated hematoma RLQ associated with new pain pump   Psychiatric: His speech is delayed. He is slowed. Cognition and memory are impaired.   Nursing note and vitals reviewed.      Laboratory:  Recent Labs      07/23/19 1955   WBC  14.3*   RBC  4.36*   HEMOGLOBIN  12.4*   HEMATOCRIT  38.1*   MCV  87.4   MCH  28.4   MCHC  32.5*   RDW  60.1*   PLATELETCT  374   MPV  8.9*     Recent Labs      07/23/19 1955   SODIUM  137   POTASSIUM  4.1   CHLORIDE  106   CO2  19*   GLUCOSE  113*   BUN  20   CREATININE  1.21   CALCIUM  9.8     Recent Labs      07/23/19 1955   ALTSGPT  10   ASTSGOT  17   ALKPHOSPHAT  100*   TBILIRUBIN  1.1   GLUCOSE  113*         No results for input(s): NTPROBNP in the last 72 hours.      No results  for input(s): TROPONINT in the last 72 hours.    Urinalysis:    No results found     Imaging:  DX-CHEST-PORTABLE (1 VIEW)   Final Result         1.  No acute cardiopulmonary disease.            Assessment/Plan:  I anticipate this patient will require at least two midnights for appropriate medical management, necessitating inpatient admission.    Sepsis (HCC)- (present on admission)   Assessment & Plan    -This is severe sepsis with the following associated acute organ dysfunction(s): metabolic/septic encephalopathy.   -Post recent pain pump insertion with associated hematoma  -ERP did perform a lumbar puncture, clear fluid was removed, unlikely to be meningitis  -Start broad-spectrum antibiotics, Unasyn and vancomycin  -Await culture results  -Sepsis order set has been initiated, patient will receive significant IV fluids  -Trend lactic acid  -Patient is currently hemodynamically stable     Acute metabolic encephalopathy   Assessment & Plan    -Due to sepsis however I do not know his current baseline     Metastatic Colon cancer (HCC)- (present on admission)   Assessment & Plan    -Chronic, continue outpatient follow-up     High anion gap metabolic acidosis   Assessment & Plan    -Likely due to sepsis yet lactic acid is normal  -Repeat BMP in the morning     Benign prostate hyperplasia- (present on admission)   Assessment & Plan    -Continue Flomax     Essential hypertension- (present on admission)   Assessment & Plan    -Continue home losartan  -Place PRN enalapril  -Adjust as needed     COPD (chronic obstructive pulmonary disease) (HCC)- (present on admission)   Assessment & Plan    -No acute exacerbation         VTE prophylaxis: SCDs

## 2019-07-24 NOTE — ED NOTES
Med Rec PARTIALLY complete per Pt's wife  Allergies Reviewed  No ABX in the last 14 days    All oral medications verified with Pt's wife  Pt had pain pump placed by Dr Hernández office. Will attempt to contact Dr Reina's office when open for pain pump information.  Pt's wife states that Pt's pain pump has DILAUDID in pump.

## 2019-07-24 NOTE — ED PROVIDER NOTES
"ED Provider Note    CHIEF COMPLAINT  Chief Complaint   Patient presents with   • ALOC       HPI  Erich Ingram is a 54 y.o. male who presents via transfer for evaluation of altered mentation and a possibly infected pain pump.  The history is very unclear at this point and the patient is obviously altered so history is not fully obtainable at this point.  The patient has a history of metastatic colon cancer and had intractable pain so recently underwent implantation of a pain pump in the right side of his abdomen.  He then was discharged home apparently normal, apparently a couple days ago he returns to the hospital where he was evaluated for generalized weakness and admitted for physical therapy.  Then, by report, he was transferred to the emergency department for \"higher level of care\" where he underwent CT scanning of the abdomen to evaluate for this inserted pain pump.  No blood work was obtained, results of the CT scan was not obtained prior to his transfer here for further evaluation including consultation by Dr. Reina, the physician who instructed the pain pump.  The patient states he has pain surrounding this area, he otherwise only is able to tell me that he has to urinate.    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   • COPD (chronic obstructive pulmonary disease) (HCC)    • Hepatitis C    • Hypertension    • PICC (peripherally inserted central catheter) in place 05/2019   • Psychiatric problem     anxiety   • Renal disorder     rt kidney is only functioning kidney due to cancer        FAMILY HISTORY  Family History   Problem Relation Age of Onset   • Adopted: Yes   • No Known Problems Mother    • No Known Problems Father    • No Known Problems Sister    • No Known Problems Brother    • No " "Known Problems Maternal Grandmother    • No Known Problems Maternal Grandfather    • No Known Problems Paternal Grandmother    • No Known Problems Paternal Grandfather        SOCIAL HISTORY  Social History   Substance Use Topics   • Smoking status: Former Smoker     Packs/day: 1.00     Years: 15.00     Start date: 1/12/2001     Quit date: 6/12/2015   • Smokeless tobacco: Former User   • Alcohol use No       SURGICAL HISTORY  Past Surgical History:   Procedure Laterality Date   • PUMP REVISION  7/9/2019    Procedure: REVISION, INSERTION, OR REPLACEMENT, INTRATHECAL ANALGESIC PUMP;  Surgeon: Candelario Reina M.D.;  Location: SURGERY [facility];  Service: Pain Management   • BRONCHOSCOPY-ENDO N/A 3/1/2019    Procedure: BRONCHOSCOPY-ENDO;  Surgeon: Katelin Garner M.D.;  Location: ENDOSCOPY [facility];  Service: Pulmonary   • CATH PLACEMENT  3/16/2016    Procedure: CATH PLACEMENT POWER PORT ;  Surgeon: Luke Lima M.D.;  Location: SURGERY [facility];  Service:    • LOW ANTERIOR RESECTION LAPAROSCOPIC  1/12/2016    Procedure: LOW ANTERIOR RESECTION LAPAROSCOPIC;  Surgeon: Luke Lima M.D.;  Location: SURGERY [facility];  Service:    • OTHER ORTHOPEDIC SURGERY Right     Rt knee ACL   • OTHER ORTHOPEDIC SURGERY Right     Rt knee meniscus repair x2   • OTHER ORTHOPEDIC SURGERY Left     Lt knee meniscus repair x2       CURRENT MEDICATIONS  I personally reviewed the medication list in the charting documentation.     ALLERGIES  Allergies   Allergen Reactions   • Levaquin    • Nubain [Nalbuphine]        MEDICAL RECORD  I have reviewed patient's medical record and pertinent results are listed above.      PHYSICAL EXAM  VITAL SIGNS: Pulse (!) 103   Temp 36.9 °C (98.4 °F) (Temporal)   Resp 15   Ht 1.905 m (6' 3\")   Wt 74.8 kg (165 lb)   SpO2 98%   BMI 20.62 kg/m²    Constitutional: Somewhat agitated, elderly, appears somewhat ill  HENT: Mucus membranes dry.    Eyes: No scleral icterus. Normal " conjunctiva   Neck: Supple, comfortable, nonpainful range of motion.   Cardiovascular: Tachycardic but regular  Thorax & Lungs: Chest is nontender.  Lungs are clear to auscultation with good air movement bilaterally.  No wheeze, rhonchi, nor rales.   Abdomen: Soft, tenderness with some distention in the right lower quadrant, has a surgical incision with staples intact and a small area of dehiscence and drainage of sanguinous fluid from the inferior portion of this incision.  A large area of ecchymosis surrounding incision.  Skin: Warm, dry. No rash appreciated  Extremities/Musculoskeletal: No sign of trauma. No asymmetric calf tenderness, erythema or edema. Normal range of motion   Neurologic: Alert & oriented to person.  Intermittently oriented to place but not oriented to time.  Psychiatric: Normal affect appropriate for the clinical situation.    DIAGNOSTIC STUDIES / PROCEDURES    LABS/EKGs  Results for orders placed or performed during the hospital encounter of 07/23/19   Lactic acid (lactate)   Result Value Ref Range    Lactic Acid 1.4 0.5 - 2.0 mmol/L   Lactic acid (lactate)   Result Value Ref Range    Lactic Acid 1.2 0.5 - 2.0 mmol/L   CBC WITH DIFFERENTIAL   Result Value Ref Range    WBC 14.3 (H) 4.8 - 10.8 K/uL    RBC 4.36 (L) 4.70 - 6.10 M/uL    Hemoglobin 12.4 (L) 14.0 - 18.0 g/dL    Hematocrit 38.1 (L) 42.0 - 52.0 %    MCV 87.4 81.4 - 97.8 fL    MCH 28.4 27.0 - 33.0 pg    MCHC 32.5 (L) 33.7 - 35.3 g/dL    RDW 60.1 (H) 35.9 - 50.0 fL    Platelet Count 374 164 - 446 K/uL    MPV 8.9 (L) 9.0 - 12.9 fL    Neutrophils-Polys 80.10 (H) 44.00 - 72.00 %    Lymphocytes 10.80 (L) 22.00 - 41.00 %    Monocytes 8.20 0.00 - 13.40 %    Eosinophils 0.30 0.00 - 6.90 %    Basophils 0.20 0.00 - 1.80 %    Immature Granulocytes 0.40 0.00 - 0.90 %    Nucleated RBC 0.00 /100 WBC    Neutrophils (Absolute) 11.43 (H) 1.82 - 7.42 K/uL    Lymphs (Absolute) 1.54 1.00 - 4.80 K/uL    Monos (Absolute) 1.17 (H) 0.00 - 0.85 K/uL    Eos  (Absolute) 0.04 0.00 - 0.51 K/uL    Baso (Absolute) 0.03 0.00 - 0.12 K/uL    Immature Granulocytes (abs) 0.05 0.00 - 0.11 K/uL    NRBC (Absolute) 0.00 K/uL   COMP METABOLIC PANEL   Result Value Ref Range    Sodium 137 135 - 145 mmol/L    Potassium 4.1 3.6 - 5.5 mmol/L    Chloride 106 96 - 112 mmol/L    Co2 19 (L) 20 - 33 mmol/L    Anion Gap 12.0 (H) 0.0 - 11.9    Glucose 113 (H) 65 - 99 mg/dL    Bun 20 8 - 22 mg/dL    Creatinine 1.21 0.50 - 1.40 mg/dL    Calcium 9.8 8.5 - 10.5 mg/dL    AST(SGOT) 17 12 - 45 U/L    ALT(SGPT) 10 2 - 50 U/L    Alkaline Phosphatase 100 (H) 30 - 99 U/L    Total Bilirubin 1.1 0.1 - 1.5 mg/dL    Albumin 4.3 3.2 - 4.9 g/dL    Total Protein 7.6 6.0 - 8.2 g/dL    Globulin 3.3 1.9 - 3.5 g/dL    A-G Ratio 1.3 g/dL   CSF PROTEIN   Result Value Ref Range    Total Protein, CSF 56 (H) 15 - 45 mg/dL   CSF GLUCOSE   Result Value Ref Range    Glucose CSF 67 40 - 80 mg/dL   CSF CELL COUNT   Result Value Ref Range    Number Of Tubes 4     Volume 3.5 mL    Color-Body Fluid Colorless     Character-Body Fluid Clear     Supernatant Appearance Colorless     Total RBC Count 54 cells/uL    Crenated RBC 0 %    Total WBC Count 4 0 - 10 cells/uL    Polys 1 %    Lymphs 75 %    Mononuclear Cells - CSF 24 %    CSF Tube Number 3    ESTIMATED GFR   Result Value Ref Range    GFR If African American >60 >60 mL/min/1.73 m 2    GFR If Non African American >60 >60 mL/min/1.73 m 2   Prothrombin time (INR)   Result Value Ref Range    PT 14.5 12.0 - 14.6 sec    INR 1.10 0.87 - 1.13   APTT   Result Value Ref Range    APTT 23.1 (L) 24.7 - 36.0 sec   GRAM STAIN   Result Value Ref Range    Significant Indicator .     Source CSF     Site TAP     Gram Stain Result No organisms seen.         RADIOLOGY  DX-CHEST-PORTABLE (1 VIEW)   Final Result         1.  No acute cardiopulmonary disease.              Lumbar Puncture Procedure Note    Indication: Suspected meningitis    Consent: The patient provided verbal consent for this  procedure.    Procedure: The patient was placed in the sitting, supported by bed side stand, position and the appropriate landmarks were identified. The area was prepped and draped in the usual sterile fashion. Anesthesia was obtained using 1.5 cc of 1% Lidocaine without epinephrine. A spinal needle was inserted at the L3- L4 level with the stylet in place until spinal fluid was returned. Opening pressure was not measured. At this point 4.0 cc of clear cerebral spinal fluid was obtained and sent for appropriate testing. The stylet was then replaced and the needle was withdrawn. A sterile dressing was placed over the site and the patient was placed in the supine position.    The patient tolerated the procedure well.    Complications: None      COURSE & MEDICAL DECISION MAKING  I have reviewed any medical record information, laboratory studies and radiographic results as noted above.  Differential diagnoses includes: Sepsis, pneumonia, UTI, meningitis, anemia, dehydration, electrolyte abnormalities, postoperative hematoma, infected pump site    Encounter Summary: This is a 54 y.o. male with altered mentation, sent here for evaluation of recently placed pain pump in his abdomen, he apparently has been generally weak but not altered until the past day or 2.  History is very limited.  I discussed the case with Dr. Reina, the surgeon who placed the pump about a week or 2 ago and he will evaluate the patient for what he suspects is a hematoma around the pump site.  In the meantime the patient has multiple markers of sepsis, tachycardia leukocytosis and his altered mentation, he will receive broad-spectrum antibiotics, this is an intrathecal pain pump so a lumbar puncture will be obtained to rule out meningitis, the regular septic work-up will be obtained and the patient ultimately will require admission the hospital ------ LP is negative, no obvious etiology of infection has been determined but he has been treated  appropriately broad-spectrum antibiotics will be admitted to the hospital for further evaluation including evaluation by Dr. Reina    CRITICAL CARE  The very real possibilty of a deterioration of this patient's condition required the highest level of my preparedness for sudden, emergent intervention.  I provided critical care services, which included medication orders, frequent reevaluations of the patient's condition and response to treatment, ordering and reviewing test results, and discussing the case with various consultants.  The critical care time associated with the care of the patient was 48 minutes. Review chart for interventions. This time is exclusive of any other billable procedures.         DISPOSITION: Admit in guarded condition      FINAL IMPRESSION  1. Delirium    2. Sepsis, due to unspecified organism (HCC)           This dictation was created using voice recognition software. The accuracy of the dictation is limited to the abilities of the software. I expect there may be some errors of grammar and possibly content. The nursing notes were reviewed and certain aspects of this information were incorporated into this note.    Electronically signed by: Lenny Carrillo, 7/23/2019 8:30 PM

## 2019-07-24 NOTE — ASSESSMENT & PLAN NOTE
-This is severe sepsis with the following associated acute organ dysfunction(s): metabolic/septic encephalopathy.   -Post recent pain pump insertion with associated hematoma  -ERP did perform a lumbar puncture, clear fluid was removed, unlikely to be meningitis  CSF culture negative.   blood cultures x2 no growth.  -Sepsis order set has been initiated, patient will receive significant IV fluids  -Trend lactic acid  -Patient is currently hemodynamically stable  wound culture since persistent drainage for 9 days at abdominal insertion site no growth x 24 hous..  Appears to be a dark hematogenous drainage.  Extensive ecchymosis.  Hold on any anticoagulation for DVT prophylaxis.     CT abdomen pelvis negative for abscess pocket.  Changed vancomycin to doxycycline with unasyn IV to augmentin.  Wound culture was obtained after start of antibiotics, therefore, yeast growth likely contaminant.  Treat for at lease 7 days po abx.  Per Dr. Reina, would like to keep pain pump if possible.

## 2019-07-24 NOTE — PROGRESS NOTES
Report received from AQUILES [facility]. Pt up to unit at 2315 via [employer]. Pt ambulatory with x1 assist with a wide based gait. 2 RN skin check completed. Pt A&Ox3, reoriented to time, PERRL, REDD and follows commands. RLQ surgical site assessed, draining sanguinous fluid with staples in place. Dressing changed. Pt able to void using urinal, hesitancy noted. Medication regimen and POC discussed with pt. SCDs and waffle mattress in place. Pt on RA,  in place. Universal fall precautions in place. No needs at this time.

## 2019-07-24 NOTE — PROGRESS NOTES
"Pharmacy Kinetics 54 y.o. male on vancomycin day # 1 2019    Currently on Vancomycin 1900 mg iv  Loading dose followed by Vancomycin 1500 mg q24h    Indication for Treatment: Skin and soft tissue infection    Pertinent history per medical record: Admitted on 2019 for sepsis.  Patient has a history of colon cancer and recently and a pain pump implanted.  They are complaining of pain around the area.  Patient is septic and empiric antibiotics started for a possible infected pain pump    Other antibiotics: Unasyn 3g q6h    Allergies: Levaquin and Nubain [nalbuphine]     List concerns for renal function: elevated SCr    Pertinent cultures to date:     Resp culture -- ordered    MRSA nares swab if pneumonia is a concern (ordered/positive/negative/n-a): n/a    Recent Labs      19   WBC  14.3*   NEUTSPOLYS  80.10*     Recent Labs      19   BUN  20   CREATININE  1.21   ALBUMIN  4.3     No results for input(s): VANCOTROUGH, VANCOPEAK, VANCORANDOM in the last 72 hours.  Intake/Output Summary (Last 24 hours) at 19 0136  Last data filed at 19 2223   Gross per 24 hour   Intake              100 ml   Output                0 ml   Net              100 ml      Pulse (!) 103   Temp 36.9 °C (98.4 °F) (Temporal)   Resp 15   Ht 1.905 m (6' 3\")   Wt 74.8 kg (165 lb)   SpO2 98%  Temp (24hrs), Av.9 °C (98.4 °F), Min:36.9 °C (98.4 °F), Max:36.9 °C (98.4 °F)      A/P   1. Vancomycin dose change: New start  2. Next vancomycin level: Prior to 3rd dose (not ordered)  3. Goal trough: 12-16 mcg/mL  4. Comments: Patient has some risk for accumulation as his current SCr is ~0.4 above baseline.  Patient had an elevated trough level earlier this year on vancomycin 15 mg/kg q12h.  Will start vancomycin 20 mg/kg q24h and check a level prior to third dose to assess for clearance.  Pharmacy will continue to follow.      Hua Scott, PharmD      "

## 2019-07-24 NOTE — PROGRESS NOTES
2 RN skin check completed with AQUILES Wyman    - Circular redness to coccyx, slow to kenneth.   - Bilateral ears, heels, and elbows pink, blanching  - Hematoma to abdominal RLQ  - Surgical scar to R posterior back

## 2019-07-24 NOTE — RESPIRATORY CARE
COPD EDUCATION by COPD CLINICAL EDUCATOR  7/24/2019 at 6:57 AM by Denia Morales     Patient reviewed by COPD education team. Patient does not have an order for Respiratory Care Protocol  therefore does not qualify for the COPD program.

## 2019-07-24 NOTE — ED NOTES
Report to Andrzej KWAN. Pt transported to S178-02 in fair condition with all belongings in possession.

## 2019-07-24 NOTE — PROGRESS NOTES
[facility] Medicine Daily Progress Note    Date of Service  7/24/2019    Chief Complaint  54 y.o. male admitted 7/23/2019 with altered mentation.    Hospital Course    7/24:  This 55 yo male with stage IV colon cancer status post resection 18 inch colectomy, but no chemotherapy or radiation therapy who has been suffering from left groin intractable pain had intrathecal pain pump placed by Dr. Reina 9 days ago.  Patient was seen in [place] ER with altered mental status and transferred via care flight to [facility] for higher level of care.  The patient was started on vancomycin and Unasyn IV blood cultures obtained.  Sepsis protocol initiated.  Dr. Reina have been consulted by the admitting physician.  I have ordered a CT head without contrast due to the altered mental status.  His confusion did appear to be improving he does not recall any reason why he was transferred to [facility] but now understands it was due to a possible infection in his intrathecal pain pump.  I have ordered wound culture of the pain pump drainage which is a dark thick hematogenous discharge as well as a CT abdomen pelvis.  Patient does have elevated white count 14,000-13,000 today.*        Consultants/Specialty  Dr. Reina    Code Status  full    Disposition  Lives in [place] with his wife.  careflighted to [facility].  Home once medically cleared.    Review of Systems  Review of Systems   Constitutional: Negative for chills, diaphoresis, fever and malaise/fatigue.   HENT: Negative for congestion and sore throat.    Eyes: Negative for pain and discharge.   Respiratory: Negative for cough, hemoptysis, sputum production, shortness of breath and wheezing.    Cardiovascular: Negative for chest pain, palpitations, claudication and leg swelling.   Gastrointestinal: Negative for abdominal pain, constipation, diarrhea, melena, nausea and vomiting.   Genitourinary: Negative for dysuria, frequency and urgency.   Musculoskeletal: Positive for joint pain (left  groin pain). Negative for back pain, myalgias and neck pain.   Skin: Negative for itching and rash.   Neurological: Positive for loss of consciousness. Negative for dizziness, sensory change, speech change, focal weakness, weakness and headaches.   Endo/Heme/Allergies: Does not bruise/bleed easily.   Psychiatric/Behavioral: Positive for memory loss. Negative for depression, substance abuse and suicidal ideas.        Physical Exam  Temp:  [36.1 °C (97 °F)-36.9 °C (98.4 °F)] 36.3 °C (97.4 °F)  Pulse:  [] 75  Resp:  [15-18] 17  BP: (110-129)/(73-79) 121/78  SpO2:  [97 %-99 %] 99 %    Physical Exam   Constitutional: He is oriented to person, place, and time. He appears well-developed and well-nourished. No distress.   HENT:   Head: Normocephalic and atraumatic.   Mouth/Throat: Oropharynx is clear and moist. No oropharyngeal exudate.   Eyes: Pupils are equal, round, and reactive to light. Conjunctivae and EOM are normal. Right eye exhibits no discharge. Left eye exhibits no discharge. No scleral icterus.   Neck: Normal range of motion. Neck supple. No JVD present. No tracheal deviation present. No thyromegaly present.   Cardiovascular: Normal rate, regular rhythm and normal heart sounds.  Exam reveals no gallop and no friction rub.    No murmur heard.  Pulmonary/Chest: Effort normal and breath sounds normal. No respiratory distress. He has no wheezes. He has no rales. He exhibits no tenderness.   Abdominal: Soft. Bowel sounds are normal. He exhibits no distension and no mass. There is no tenderness. There is no rebound and no guarding.   Area of pain pump insertion with staples intact.    Dark bloody drainage from wound site x 9 days  Extensive ecchymosis noted to abdomen from pain pump insertion.    Lumbar incision CD&I with running suture in place, no drainage or erythema noted.   Musculoskeletal: Normal range of motion. He exhibits no edema or tenderness.   Lymphadenopathy:     He has no cervical adenopathy.    Neurological: He is alert and oriented to person, place, and time. No cranial nerve deficit. He exhibits normal muscle tone.   Skin: Skin is warm and dry. No rash noted. He is not diaphoretic. No erythema.   Psychiatric: He has a normal mood and affect. His behavior is normal. Judgment and thought content normal.   Nursing note and vitals reviewed.      Fluids    Intake/Output Summary (Last 24 hours) at 07/24/19 1938  Last data filed at 07/23/19 2223   Gross per 24 hour   Intake              100 ml   Output                0 ml   Net              100 ml       Laboratory  Recent Labs      07/23/19 1955 07/24/19 0148   WBC  14.3*  13.4*   RBC  4.36*  4.09*   HEMOGLOBIN  12.4*  11.3*   HEMATOCRIT  38.1*  34.8*   MCV  87.4  85.1   MCH  28.4  27.6   MCHC  32.5*  32.5*   RDW  60.1*  57.9*   PLATELETCT  374  318   MPV  8.9*  8.4*     Recent Labs      07/23/19 1955 07/24/19 0148   SODIUM  137  138   POTASSIUM  4.1  4.1   CHLORIDE  106  110   CO2  19*  18*   GLUCOSE  113*  104*   BUN  20  18   CREATININE  1.21  1.19   CALCIUM  9.8  9.0     Recent Labs      07/23/19 1955   APTT  23.1*   INR  1.10               Imaging  CT-ABDOMEN-PELVIS WITH   Final Result      1.  Noncalcified pulmonary nodules are unchanged in each lung which are suspicious for metastases.      2.  Left hydronephrosis and proximal hydroureter caused by soft tissue density obstruction in the left retroperitoneum. There is possible thickening of the adjacent sigmoid colon. Neoplastic obstruction is a possibility. Inflammation or infection in this    area is also possible.      3.  Postoperative changes and mechanical pump are noted in the lower abdominal wall as described above. Soft tissue emphysema is present likely related to surgery. No localized fluid collection is appreciated. Soft tissues of the abdominal wall in this    region are not well visualized due to beam hardening artifact arising from the pump. Small fluid collection hematoma or  abscess immediately adjacent to the pump is possible.         CT-HEAD W/O   Final Result      Prominent subdural spaces bilaterally likely related to underlying atrophy versus subdural hygromas.      DX-CHEST-PORTABLE (1 VIEW)   Final Result         1.  No acute cardiopulmonary disease.           Assessment/Plan  Sepsis (HCC)- (present on admission)   Assessment & Plan    -This is severe sepsis with the following associated acute organ dysfunction(s): metabolic/septic encephalopathy.   -Post recent pain pump insertion with associated hematoma  -ERP did perform a lumbar puncture, clear fluid was removed, unlikely to be meningitis  -Start broad-spectrum antibiotics, Unasyn and vancomycin  -Await blood culture results  -Sepsis order set has been initiated, patient will receive significant IV fluids  -Trend lactic acid  -Patient is currently hemodynamically stable  I have ordered wound culture since contact persistent drainage for 9 days at abdominal insertion site.  Appears to be a dark hematogenous drainage.  Extensive ecchymosis.  Hold on any anticoagulation for DVT prophylaxis.  I have also ordered CT abdomen pelvis to look for abscess pocket.     Acute metabolic encephalopathy   Assessment & Plan    -Due to sepsis  Improvement the following day on vancomycin and Unasyn IV antibiotics.     Metastatic Colon cancer (HCC)- (present on admission)   Assessment & Plan    -Chronic, continue outpatient follow-up  Patient appears to have stage IV metastatic colon cancer.  He has had previous colectomy 18 inches removed.  He is never undergone chemotherapy or radiation treatment.  He is under pain management with Dr. Reina for right groin pain.     High anion gap metabolic acidosis- (present on admission)   Assessment & Plan    -Likely due to sepsis yet lactic acid is normal  -Repeat BMP in the morning     Benign prostate hyperplasia- (present on admission)   Assessment & Plan    -Continue Flomax     Essential hypertension-  (present on admission)   Assessment & Plan    -Continue home losartan  -Place PRN enalapril  -Adjust as needed     COPD (chronic obstructive pulmonary disease) (HCC)- (present on admission)   Assessment & Plan    -No acute exacerbation          VTE prophylaxis: scds

## 2019-07-24 NOTE — CARE PLAN
Problem: Communication  Goal: The ability to communicate needs accurately and effectively will improve  Outcome: PROGRESSING SLOWER THAN EXPECTED  Pt is A&Ox3, does not call appropriately using call light but is able to make needs known to staff.     Problem: Safety  Goal: Will remain free from injury  Outcome: PROGRESSING AS EXPECTED  [place] fall precautions in place. Bed locked and in lowest position. Bed alarm on, call light within reach. No needs at this time.

## 2019-07-24 NOTE — ED TRIAGE NOTES
Chief Complaint   Patient presents with   • ALOC     Pt med flight transfer from [facility] where pt was seen for aloc, combativeness, and growing hematoma around pain pump insertion site x2 weeks ago for end stage colon cancer.  Pt arrives AAOx2, intermittently following commands.     Per ems, pt is saturating one gauze pad per hour over site of pain pump placement. Area is noted to have large hematoma and pt reports it is painful.   Per ems, no lab work completed at prior facility, CT scan was in process of being read.     Pt given 5mg Haldol pta for CT scan, pain pump administers Dilaudid at 4mg/hr. Pt has PICC line in L upper arm.      Janine (wife) [phone] will arrive at [facility] tomorrow.     Sepsis score of 3, pt tachycardic in 120's. Sepsis protocol initiated per erp.

## 2019-07-24 NOTE — PROGRESS NOTES
Med Rec complete per Pt's wife at bedside and Dr Reina's office  Pt unable to participate in interview  Allergies Reviewed  No ABX in the last 14 days

## 2019-07-24 NOTE — ASSESSMENT & PLAN NOTE
-Chronic, continue outpatient follow-up  Patient appears to have stage IV metastatic colon cancer.  He has had previous colectomy 18 inches removed.  He is followed by Dr. Jeff, oncology.  Discussed code status:  Wants full code, wants to be given a chance to recover per patient.  He is under pain management with Dr. Reina for bilateral groin and bilateral testicular pain.  Palliative care consult, patient requesting.  Hospice consult placed.

## 2019-07-25 LAB
ANION GAP SERPL CALC-SCNC: 9 MMOL/L (ref 0–11.9)
BASOPHILS # BLD AUTO: 0.3 % (ref 0–1.8)
BASOPHILS # BLD: 0.03 K/UL (ref 0–0.12)
BUN SERPL-MCNC: 15 MG/DL (ref 8–22)
CALCIUM SERPL-MCNC: 8.6 MG/DL (ref 8.5–10.5)
CHLORIDE SERPL-SCNC: 104 MMOL/L (ref 96–112)
CO2 SERPL-SCNC: 21 MMOL/L (ref 20–33)
CREAT SERPL-MCNC: 1.25 MG/DL (ref 0.5–1.4)
EOSINOPHIL # BLD AUTO: 0.23 K/UL (ref 0–0.51)
EOSINOPHIL NFR BLD: 2.4 % (ref 0–6.9)
ERYTHROCYTE [DISTWIDTH] IN BLOOD BY AUTOMATED COUNT: 57.6 FL (ref 35.9–50)
GLUCOSE SERPL-MCNC: 107 MG/DL (ref 65–99)
GRAM STN SPEC: NORMAL
HCT VFR BLD AUTO: 32.6 % (ref 42–52)
HGB BLD-MCNC: 10.8 G/DL (ref 14–18)
IMM GRANULOCYTES # BLD AUTO: 0.02 K/UL (ref 0–0.11)
IMM GRANULOCYTES NFR BLD AUTO: 0.2 % (ref 0–0.9)
LYMPHOCYTES # BLD AUTO: 2.02 K/UL (ref 1–4.8)
LYMPHOCYTES NFR BLD: 20.7 % (ref 22–41)
MCH RBC QN AUTO: 28.4 PG (ref 27–33)
MCHC RBC AUTO-ENTMCNC: 33.1 G/DL (ref 33.7–35.3)
MCV RBC AUTO: 85.8 FL (ref 81.4–97.8)
MONOCYTES # BLD AUTO: 0.76 K/UL (ref 0–0.85)
MONOCYTES NFR BLD AUTO: 7.8 % (ref 0–13.4)
NEUTROPHILS # BLD AUTO: 6.7 K/UL (ref 1.82–7.42)
NEUTROPHILS NFR BLD: 68.6 % (ref 44–72)
NRBC # BLD AUTO: 0 K/UL
NRBC BLD-RTO: 0 /100 WBC
PLATELET # BLD AUTO: 285 K/UL (ref 164–446)
PMV BLD AUTO: 8.7 FL (ref 9–12.9)
POTASSIUM SERPL-SCNC: 4 MMOL/L (ref 3.6–5.5)
RBC # BLD AUTO: 3.8 M/UL (ref 4.7–6.1)
SIGNIFICANT IND 70042: NORMAL
SITE SITE: NORMAL
SODIUM SERPL-SCNC: 134 MMOL/L (ref 135–145)
SOURCE SOURCE: NORMAL
WBC # BLD AUTO: 9.8 K/UL (ref 4.8–10.8)

## 2019-07-25 PROCEDURE — 700111 HCHG RX REV CODE 636 W/ 250 OVERRIDE (IP): Performed by: INTERNAL MEDICINE

## 2019-07-25 PROCEDURE — A9270 NON-COVERED ITEM OR SERVICE: HCPCS | Performed by: HOSPITALIST

## 2019-07-25 PROCEDURE — A9270 NON-COVERED ITEM OR SERVICE: HCPCS | Performed by: INTERNAL MEDICINE

## 2019-07-25 PROCEDURE — 700102 HCHG RX REV CODE 250 W/ 637 OVERRIDE(OP): Performed by: HOSPITALIST

## 2019-07-25 PROCEDURE — 99233 SBSQ HOSP IP/OBS HIGH 50: CPT | Performed by: HOSPITALIST

## 2019-07-25 PROCEDURE — 85025 COMPLETE CBC W/AUTO DIFF WBC: CPT

## 2019-07-25 PROCEDURE — 770006 HCHG ROOM/CARE - MED/SURG/GYN SEMI*

## 2019-07-25 PROCEDURE — 80048 BASIC METABOLIC PNL TOTAL CA: CPT

## 2019-07-25 PROCEDURE — 700105 HCHG RX REV CODE 258: Performed by: INTERNAL MEDICINE

## 2019-07-25 PROCEDURE — 700102 HCHG RX REV CODE 250 W/ 637 OVERRIDE(OP): Performed by: INTERNAL MEDICINE

## 2019-07-25 RX ORDER — DOXYCYCLINE 100 MG/1
100 TABLET ORAL EVERY 12 HOURS
Status: COMPLETED | OUTPATIENT
Start: 2019-07-25 | End: 2019-07-27

## 2019-07-25 RX ORDER — HYDROMORPHONE HYDROCHLORIDE 4 MG/1
4 TABLET ORAL EVERY 6 HOURS PRN
Status: DISCONTINUED | OUTPATIENT
Start: 2019-07-25 | End: 2019-07-26

## 2019-07-25 RX ORDER — HYDROMORPHONE HYDROCHLORIDE 4 MG/1
4 TABLET ORAL EVERY 4 HOURS PRN
Status: DISCONTINUED | OUTPATIENT
Start: 2019-07-25 | End: 2019-07-25

## 2019-07-25 RX ADMIN — AMPICILLIN SODIUM AND SULBACTAM SODIUM 3 G: 2; 1 INJECTION, POWDER, FOR SOLUTION INTRAMUSCULAR; INTRAVENOUS at 06:27

## 2019-07-25 RX ADMIN — HYDROMORPHONE HYDROCHLORIDE 4 MG: 4 TABLET ORAL at 22:59

## 2019-07-25 RX ADMIN — AMPICILLIN SODIUM AND SULBACTAM SODIUM 3 G: 2; 1 INJECTION, POWDER, FOR SOLUTION INTRAMUSCULAR; INTRAVENOUS at 23:00

## 2019-07-25 RX ADMIN — AMPICILLIN SODIUM AND SULBACTAM SODIUM 3 G: 2; 1 INJECTION, POWDER, FOR SOLUTION INTRAMUSCULAR; INTRAVENOUS at 17:40

## 2019-07-25 RX ADMIN — DOXYCYCLINE 100 MG: 100 TABLET, FILM COATED ORAL at 17:40

## 2019-07-25 RX ADMIN — SENNOSIDES, DOCUSATE SODIUM 2 TABLET: 50; 8.6 TABLET, FILM COATED ORAL at 17:40

## 2019-07-25 RX ADMIN — HYDROMORPHONE HYDROCHLORIDE 4 MG: 4 TABLET ORAL at 03:08

## 2019-07-25 RX ADMIN — HYDROMORPHONE HYDROCHLORIDE 4 MG: 4 TABLET ORAL at 09:27

## 2019-07-25 RX ADMIN — ONDANSETRON 4 MG: 2 INJECTION INTRAMUSCULAR; INTRAVENOUS at 20:31

## 2019-07-25 RX ADMIN — LOSARTAN POTASSIUM 50 MG: 50 TABLET ORAL at 06:27

## 2019-07-25 RX ADMIN — OMEPRAZOLE 20 MG: 20 CAPSULE, DELAYED RELEASE ORAL at 06:27

## 2019-07-25 RX ADMIN — SENNOSIDES, DOCUSATE SODIUM 2 TABLET: 50; 8.6 TABLET, FILM COATED ORAL at 06:27

## 2019-07-25 RX ADMIN — TAMSULOSIN HYDROCHLORIDE 0.4 MG: 0.4 CAPSULE ORAL at 09:27

## 2019-07-25 RX ADMIN — HYDROMORPHONE HYDROCHLORIDE 4 MG: 4 TABLET ORAL at 15:57

## 2019-07-25 RX ADMIN — PREDNISONE 5 MG: 5 TABLET ORAL at 06:27

## 2019-07-25 RX ADMIN — AMPICILLIN SODIUM AND SULBACTAM SODIUM 3 G: 2; 1 INJECTION, POWDER, FOR SOLUTION INTRAMUSCULAR; INTRAVENOUS at 00:43

## 2019-07-25 RX ADMIN — AMPICILLIN SODIUM AND SULBACTAM SODIUM 3 G: 2; 1 INJECTION, POWDER, FOR SOLUTION INTRAMUSCULAR; INTRAVENOUS at 12:19

## 2019-07-25 ASSESSMENT — ENCOUNTER SYMPTOMS
DIZZINESS: 0
PALPITATIONS: 0
COUGH: 0
SHORTNESS OF BREATH: 0
SORE THROAT: 0
DIARRHEA: 0
MEMORY LOSS: 1
CONSTIPATION: 0
VOMITING: 0
FOCAL WEAKNESS: 0
FEVER: 0
HEADACHES: 0
EYE PAIN: 0
DEPRESSION: 0
CHILLS: 0
NAUSEA: 0
BACK PAIN: 0
CLAUDICATION: 0
WEAKNESS: 0
BRUISES/BLEEDS EASILY: 0
ABDOMINAL PAIN: 0
SPEECH CHANGE: 0
SPUTUM PRODUCTION: 0
HEMOPTYSIS: 0
DIAPHORESIS: 0
LOSS OF CONSCIOUSNESS: 1
NECK PAIN: 0
SENSORY CHANGE: 0
EYE DISCHARGE: 0
MYALGIAS: 0
WHEEZING: 0

## 2019-07-25 ASSESSMENT — LIFESTYLE VARIABLES: SUBSTANCE_ABUSE: 0

## 2019-07-25 NOTE — PROGRESS NOTES
"Pharmacy Kinetics 54 y.o. male on vancomycin day # 3 2019    Currently on Vancomycin 1500 mg iv q24hr (~ 15 mg/kg dose)    Indication for Treatment: SSTI    Pertinent history per medical record: Admitted on 2019 for concerns of infection at recent pain pump implantation site/pain pump implant infection. Chart review also notes concerns for hematoma at pump implant site. Noted history of colon cancer. Dr Reina consulted per primary provider for assessment and possible device removal. Also with charted PICC in place on admit to [facility].    Other antibiotics: Unasyn    Allergies: Levaquin and Nubain [nalbuphine]     List concerns for renal function: Scr elevated from baseline, concurrent losartan,     Pertinent cultures to date:    BCx 2/2 ngtd   CSF ngtd    MRSA nares swab if pneumonia is a concern (ordered/positive/negative/n-a): n/a    Recent Labs      19   0243   WBC  14.3*  13.4*  9.8   NEUTSPOLYS  80.10*  79.10*  68.60     Recent Labs      19   0304   BUN  20  18  15   CREATININE  1.21  1.19  1.25   ALBUMIN  4.3   --    --      Intake/Output Summary (Last 24 hours) at 19 0815  Last data filed at 19   Gross per 24 hour   Intake              240 ml   Output                0 ml   Net              240 ml      /87   Pulse 65   Temp 36.8 °C (98.2 °F) (Temporal)   Resp 17   Ht 1.905 m (6' 3\")   Wt 74.8 kg (165 lb)   SpO2 97%  Temp (24hrs), Av.7 °C (98.1 °F), Min:36.3 °C (97.4 °F), Max:36.9 °C (98.5 °F)      A/P   1. Vancomycin dose change: not indicated at this time  2. Next vancomycin level: 19 @ 2200  3. Goal trough: 12-16 mcg/mL  4. Comments: VS stable. Afebrile. Leukocytosis resolved. Scr elevated but stable. Will f/u on micro results, vanco trough, renal function, and provider plans to optimize therapy.    Carly Love, PharmD      "

## 2019-07-25 NOTE — CARE PLAN
Problem: Communication  Goal: The ability to communicate needs accurately and effectively will improve  Outcome: PROGRESSING AS EXPECTED  Pt A&Ox4, calls appropriately using call light and is able to make needs known staff.     Problem: Safety  Goal: Will remain free from injury  Outcome: PROGRESSING AS EXPECTED  Pt is on universal fall precautions. Bed locked and in lowest position. Bed alarm on. Call light within reach.

## 2019-07-25 NOTE — PROGRESS NOTES
"Pharmacy Kinetics 54 y.o. male on vancomycin day # 2  7/24/2019    Currently Dose: Vancomycin 1500 mg iv q24hr (~15 mg/kg/dose)  Received Load Dose: Yes    Indication for Treatment: SSTI  ID Service Following: No    Pertinent history per medical record: Admitted on 7/23/2019 for concerns of infection at recent pain pump implantation site/pain pump implant infection. Chart review also notes concerns for hematoma at pump implant site. Noted history of colon cancer. Dr Reina consulted per primary provider for assessment and possible device removal. Also with charted PICC in place on admit to [facility]    Other antibiotics: ampicillin/sulbactam 3 gm iv q6h    Allergies: Levaquin and Nubain [nalbuphine]     List concerns for accumulation of vancomycin: age 54, BUN:SCr ~20:1, abnormal LFT's, SCR elevations above baseline    Pertinent cultures to date:   Results     Procedure Component Value Units Date/Time    CSF CULTURE [[id number]] Collected:  07/23/19 2132    Order Status:  Completed Specimen:  CSF from Tap Updated:  07/24/19 1405     Significant Indicator NEG     Source CSF     Site TAP     Culture Result No growth at 24 hours.     Gram Stain Result No organisms seen.    CULTURE WOUND W/ GRAM STAIN [[id number]]     Order Status:  No result Specimen:  Wound from Abdominal     BLOOD CULTURE [[id number]] Collected:  07/23/19 2115    Order Status:  Completed Specimen:  Blood from Peripheral Updated:  07/24/19 0845     Significant Indicator NEG     Source BLD     Site PERIPHERAL     Culture Result No Growth  Note: Blood cultures are incubated for 5 days and  are monitored continuously.Positive blood cultures  are called to the RN and reported as soon as  they are identified.      Narrative:       Per Hospital Policy: Only change Specimen Src: to \"Line\" if  specified by physician order.  Left AC    BLOOD CULTURE [[id number]] Collected:  07/23/19 2115    Order Status:  Completed Specimen:  Blood from Peripheral Updated:  07/24/19 " "0845     Significant Indicator NEG     Source BLD     Site PERIPHERAL     Culture Result No Growth  Note: Blood cultures are incubated for 5 days and  are monitored continuously.Positive blood cultures  are called to the RN and reported as soon as  they are identified.      Narrative:       Per Hospital Policy: Only change Specimen Src: to \"Line\" if  specified by physician order.  Right Hand    GRAM STAIN [[id number]] Collected:  19    Order Status:  Completed Specimen:  CSF Updated:  19     Significant Indicator .     Source CSF     Site TAP     Gram Stain Result No organisms seen.    Urinalysis [[id number]]     Order Status:  Canceled Specimen:  Urine     Culture Respiratory W/ GRM STN [[id number]]     Order Status:  Completed Specimen:  Respirate from Sputum     URINALYSIS [] Collected:  19 0000    Order Status:  Canceled Specimen:  Urine     URINE CULTURE(NEW) [] Collected:  19 0000    Order Status:  Canceled Specimen:  Urine     Blood Culture [[id number]] Collected:  19 0000    Order Status:  Canceled Specimen:  Other from Peripheral     Blood Culture [[id number]] Collected:  19 0000    Order Status:  Canceled Specimen:  Other from Peripheral           MRSA nares swab if pneumonia is a concern (ordered/positive/negative/n-a): n/a    Recent Labs      19   0148   WBC  14.3*  13.4*   NEUTSPOLYS  80.10*  79.10*     Recent Labs      19   0148   BUN  20  18   CREATININE  1.21  1.19   ALBUMIN  4.3   --      Intake/Output Summary (Last 24 hours) at 19 1744  Last data filed at 19 2223   Gross per 24 hour   Intake              100 ml   Output                0 ml   Net              100 ml      /78   Pulse 75   Temp 36.3 °C (97.4 °F) (Temporal)   Resp 17   Ht 1.905 m (6' 3\")   Wt 74.8 kg (165 lb)   SpO2 99%  Temp (24hrs), Av.4 °C (97.6 °F), Min:36.1 °C (97 °F), Max:36.9 °C (98.4 " °F)    Estimated Creatinine Clearance: 73.3 mL/min (by C-G formula based on SCr of 1.19 mg/dL).    A/P   1. Vancomycin dose change: not indicated   2. Next vancomycin level: 7/26/19 @2200 (ordered)  3. Goal trough: 12-16 mcg/mL  4. Comments: VS stable. Afebrile. WBC elevated. CrCl ~73 ml/min (SCr stable). Microbiology pending. Concerns for accumulation of vancomycin noted. Vancomycin level in place prior to 3rd total dose to assess clearance. BMP with AM labs. Appears surgical intervention likely per discussion with primary provider. Pharmacy will continue to follow.    Luke Tan, PharmD

## 2019-07-25 NOTE — CONSULTS
DATE OF SERVICE:  07/24/2019    REASON FOR CONSULTATION:  Patient with a recent implantation of [employer]   intrathecal infusion pump for metastatic colon cancer, intractable abdominal   pain, unresponsive to high dose oral opiates for spinal narcotic management   through a [employer] intrathecal infusion pump, complicated by recent altered   mental status 14 days after surgery, possibly associated with implantation   infection.  In regards to that evaluation, the patient presents today.  He, at   this point, has been in the hospital since 7 ___ evening.  He has been   undergoing a course of antibiotics.  His mental status has significantly   improved.  His pain; however, still seems to be somewhat elevated.  In regards   to reading his pump today, it does show him running at 2 mg of hydromorphone   a day with PTM bolus of 0.1 mg of hydromorphone and 16 boluses in 24 hours.    After evaluating the patient today, he is alert, oriented, answers questions   appropriately.  Pupils are slightly constricted, but the patient appears   completely oriented and is much improved from our visit 7 days ago.    ASSESSMENT:  Patient with metastatic colon cancer, intractable abdominal pain,   currently under IV antibiotics with dramatic improvement after 12 hours of   therapy.    TREATMENT PLAN:  At this point, looking at the wound sites show still a lot of   ecchymotic bruising.  He does have a hematoma in the area.  Palpation of the   pump site does not show, however, any rubor or erythema.  In regards to   treatment plan, at this point, I do not feel the patient have an infected pump   pocket of the intrathecal catheter.  A lumbar puncture showed   normal-appearing CSF.  Plan at this point is to go ahead and increase his pump   today for better pain management.  We will increase him from 2 to 2.5 mg of   hydromorphone a day.  We are also going from to 0.15 of bolus with 16 boluses   in 24 hours.  I am going to follow up with him  probably tomorrow to determine   his response.  We will continue to have the hospitalist follow him and   consider explantation if it is felt that the pump is infected.                   ____________________________________     MD JOSE GRANT / MAURO    DD:  07/24/2019 13:03:54  DT:  07/24/2019 14:08:57    D#:  [id number]  Job#:  [id number]

## 2019-07-25 NOTE — PROGRESS NOTES
Hospital Medicine Daily Progress Note    Date of Service  7/25/2019    Chief Complaint  54 y.o. male admitted 7/23/2019 with altered mentation.    Hospital Course    7/24:  This 55 yo male with stage IV colon cancer status post resection 18 inch colectomy, but no chemotherapy or radiation therapy who has been suffering from left groin intractable pain had intrathecal pain pump placed by Dr. Reina 9 days ago.  Patient was seen in [place] ER with altered mental status and transferred via care flight to [facility] for higher level of care.  The patient was started on vancomycin and Unasyn IV blood cultures obtained.  Sepsis protocol initiated.  Dr. Reina have been consulted by the admitting physician.  I have ordered a CT head without contrast due to the altered mental status.  His confusion did appear to be improving he does not recall any reason why he was transferred to [facility] but now understands it was due to a possible infection in his intrathecal pain pump.  I have ordered wound culture of the pain pump drainage which is a dark thick hematogenous discharge as well as a CT abdomen pelvis.  Patient does have elevated white count 14,000-13,000 today.  7/25: wound culture no growth x 24 hours, mentation back to normal.  Less drainage noted from RLQ abdominal incision.  Staples intact.  CT head negative. CT abdomen/pelvis negative for abscess.  Wbc improved from 13 to 9.8.  BCs negative x2, CSF culture no growth.  dc'd vancomycin since likely not MRSA.  Added doxycycline, continue unasyn IV.  Patient asking to increase IV dilaudid from q 6 to q 4 hours because that is what he was getting in [place], however Dr. Reina increased basal and bolus dilaudid pump amounts yesterday.*        Consultants/Specialty  Dr. Reina    Code Status  full    Disposition  Lives in [place] with his wife.  careflighted to [facility].  Home once medically cleared.    Review of Systems  Review of Systems   Constitutional: Negative for  chills, diaphoresis, fever and malaise/fatigue.   HENT: Negative for congestion and sore throat.    Eyes: Negative for pain and discharge.   Respiratory: Negative for cough, hemoptysis, sputum production, shortness of breath and wheezing.    Cardiovascular: Negative for chest pain, palpitations, claudication and leg swelling.   Gastrointestinal: Negative for abdominal pain, constipation, diarrhea, melena, nausea and vomiting.   Genitourinary: Negative for dysuria, frequency and urgency.   Musculoskeletal: Positive for joint pain (left groin pain). Negative for back pain, myalgias and neck pain.   Skin: Negative for itching and rash.   Neurological: Positive for loss of consciousness. Negative for dizziness, sensory change, speech change, focal weakness, weakness and headaches.   Endo/Heme/Allergies: Does not bruise/bleed easily.   Psychiatric/Behavioral: Positive for memory loss. Negative for depression, substance abuse and suicidal ideas.        Physical Exam  Temp:  [36.2 °C (97.1 °F)-36.9 °C (98.5 °F)] 36.6 °C (97.8 °F)  Pulse:  [65-88] 79  Resp:  [17-19] 17  BP: (107-146)/(72-88) 146/84  SpO2:  [95 %-97 %] 96 %    Physical Exam   Constitutional: He is oriented to person, place, and time. He appears well-developed and well-nourished. No distress.   HENT:   Head: Normocephalic and atraumatic.   Mouth/Throat: Oropharynx is clear and moist. No oropharyngeal exudate.   Eyes: Pupils are equal, round, and reactive to light. Conjunctivae and EOM are normal. Right eye exhibits no discharge. Left eye exhibits no discharge. No scleral icterus.   Neck: Normal range of motion. Neck supple. No JVD present. No tracheal deviation present. No thyromegaly present.   Cardiovascular: Normal rate, regular rhythm and normal heart sounds.  Exam reveals no gallop and no friction rub.    No murmur heard.  Pulmonary/Chest: Effort normal and breath sounds normal. No respiratory distress. He has no wheezes. He has no rales. He exhibits no  tenderness.   Abdominal: Soft. Bowel sounds are normal. He exhibits no distension and no mass. There is no tenderness. There is no rebound and no guarding.   Area of pain pump insertion with staples intact.    Dark bloody drainage from wound site x 9 days  Extensive ecchymosis noted to abdomen from pain pump insertion.    Lumbar incision CD&I with running suture in place, no drainage or erythema noted.   Musculoskeletal: Normal range of motion. He exhibits no edema or tenderness.   Lymphadenopathy:     He has no cervical adenopathy.   Neurological: He is alert and oriented to person, place, and time. No cranial nerve deficit. He exhibits normal muscle tone.   Skin: Skin is warm and dry. No rash noted. He is not diaphoretic. No erythema.   Psychiatric: He has a normal mood and affect. His behavior is normal. Judgment and thought content normal.   Nursing note and vitals reviewed.      Fluids    Intake/Output Summary (Last 24 hours) at 07/25/19 1635  Last data filed at 07/24/19 2000   Gross per 24 hour   Intake              240 ml   Output                0 ml   Net              240 ml       Laboratory  Recent Labs      07/23/19 1955 07/24/19 0148 07/25/19   0243   WBC  14.3*  13.4*  9.8   RBC  4.36*  4.09*  3.80*   HEMOGLOBIN  12.4*  11.3*  10.8*   HEMATOCRIT  38.1*  34.8*  32.6*   MCV  87.4  85.1  85.8   MCH  28.4  27.6  28.4   MCHC  32.5*  32.5*  33.1*   RDW  60.1*  57.9*  57.6*   PLATELETCT  374  318  285   MPV  8.9*  8.4*  8.7*     Recent Labs      07/23/19 1955 07/24/19 0148  07/25/19   0304   SODIUM  137  138  134*   POTASSIUM  4.1  4.1  4.0   CHLORIDE  106  110  104   CO2  19*  18*  21   GLUCOSE  113*  104*  107*   BUN  20  18  15   CREATININE  1.21  1.19  1.25   CALCIUM  9.8  9.0  8.6     Recent Labs      07/23/19 1955   APTT  23.1*   INR  1.10               Imaging  CT-ABDOMEN-PELVIS WITH   Final Result      1.  Noncalcified pulmonary nodules are unchanged in each lung which are suspicious for  metastases.      2.  Left hydronephrosis and proximal hydroureter caused by soft tissue density obstruction in the left retroperitoneum. There is possible thickening of the adjacent sigmoid colon. Neoplastic obstruction is a possibility. Inflammation or infection in this    area is also possible.      3.  Postoperative changes and mechanical pump are noted in the lower abdominal wall as described above. Soft tissue emphysema is present likely related to surgery. No localized fluid collection is appreciated. Soft tissues of the abdominal wall in this    region are not well visualized due to beam hardening artifact arising from the pump. Small fluid collection hematoma or abscess immediately adjacent to the pump is possible.         CT-HEAD W/O   Final Result      Prominent subdural spaces bilaterally likely related to underlying atrophy versus subdural hygromas.      DX-CHEST-PORTABLE (1 VIEW)   Final Result         1.  No acute cardiopulmonary disease.           Assessment/Plan  Sepsis (HCC)- (present on admission)   Assessment & Plan    -This is severe sepsis with the following associated acute organ dysfunction(s): metabolic/septic encephalopathy.   -Post recent pain pump insertion with associated hematoma  -ERP did perform a lumbar puncture, clear fluid was removed, unlikely to be meningitis  CSF culture negative.   blood cultures x2 no growth.  -Sepsis order set has been initiated, patient will receive significant IV fluids  -Trend lactic acid  -Patient is currently hemodynamically stable  wound culture since persistent drainage for 9 days at abdominal insertion site no growth x 24 hous..  Appears to be a dark hematogenous drainage.  Extensive ecchymosis.  Hold on any anticoagulation for DVT prophylaxis.     CT abdomen pelvis negative for abscess pocket.  Changed vancomycin to doxycycline with unasyn IV pending wound cultures.  Per Dr. Reina, would like to keep pain pump if possible.     Acute metabolic  encephalopathy   Assessment & Plan    -Due to sepsis  Improvement the following day on IV antibiotics.  resolved       Metastatic Colon cancer (HCC)- (present on admission)   Assessment & Plan    -Chronic, continue outpatient follow-up  Patient appears to have stage IV metastatic colon cancer.  He has had previous colectomy 18 inches removed.  He is never undergone chemotherapy or radiation treatment.  He is not interested in XRT or chemo  Discussed code status:  Wants full code, wants to be given a chance to recover per patient.  He is under pain management with Dr. Reina for left groin and testicular pain.     High anion gap metabolic acidosis- (present on admission)   Assessment & Plan    -Likely due to sepsis yet lactic acid is normal  -Repeat BMP in the morning     Benign prostate hyperplasia- (present on admission)   Assessment & Plan    -Continue Flomax     Essential hypertension- (present on admission)   Assessment & Plan    -Continue home losartan  -Place PRN enalapril  -Adjust as needed     COPD (chronic obstructive pulmonary disease) (HCC)- (present on admission)   Assessment & Plan    -No acute exacerbation          VTE prophylaxis: scds

## 2019-07-25 NOTE — PROGRESS NOTES
Assumed pt care at 1900. Bedside report received from AQUILES Randall. Pt resting comfortably in bed, no family at bedside. Pt A&Ox4, REDD, PERRL, and follows commands. Medication regimen and POC discussed with pt. Sanguinous drainage to RLQ, dressing changed. PICC line in place, dressing changed per protocol. Pt c/o pain, medicated per MAR. Universal fall precautions in place. No needs at this time. Hourly rounding in place.

## 2019-07-25 NOTE — PROGRESS NOTES
who placed patients pain pump called to give this RN his cell number in case nursing and MD staff need to speak with him.       [phone]

## 2019-07-26 PROBLEM — R52 INADEQUATE PAIN CONTROL: Status: ACTIVE | Noted: 2019-07-26

## 2019-07-26 LAB
ANION GAP SERPL CALC-SCNC: 10 MMOL/L (ref 0–11.9)
BACTERIA CSF CULT: NORMAL
BACTERIA WND AEROBE CULT: ABNORMAL
BACTERIA WND AEROBE CULT: ABNORMAL
BASOPHILS # BLD AUTO: 0.3 % (ref 0–1.8)
BASOPHILS # BLD: 0.03 K/UL (ref 0–0.12)
BUN SERPL-MCNC: 12 MG/DL (ref 8–22)
CALCIUM SERPL-MCNC: 9.3 MG/DL (ref 8.5–10.5)
CHLORIDE SERPL-SCNC: 105 MMOL/L (ref 96–112)
CO2 SERPL-SCNC: 23 MMOL/L (ref 20–33)
CREAT SERPL-MCNC: 1.14 MG/DL (ref 0.5–1.4)
EOSINOPHIL # BLD AUTO: 0.18 K/UL (ref 0–0.51)
EOSINOPHIL NFR BLD: 2 % (ref 0–6.9)
ERYTHROCYTE [DISTWIDTH] IN BLOOD BY AUTOMATED COUNT: 56.5 FL (ref 35.9–50)
GLUCOSE SERPL-MCNC: 106 MG/DL (ref 65–99)
GRAM STN SPEC: ABNORMAL
GRAM STN SPEC: NORMAL
HCT VFR BLD AUTO: 35.2 % (ref 42–52)
HGB BLD-MCNC: 11.9 G/DL (ref 14–18)
IMM GRANULOCYTES # BLD AUTO: 0.02 K/UL (ref 0–0.11)
IMM GRANULOCYTES NFR BLD AUTO: 0.2 % (ref 0–0.9)
LYMPHOCYTES # BLD AUTO: 1.94 K/UL (ref 1–4.8)
LYMPHOCYTES NFR BLD: 21.4 % (ref 22–41)
MCH RBC QN AUTO: 28.8 PG (ref 27–33)
MCHC RBC AUTO-ENTMCNC: 33.8 G/DL (ref 33.7–35.3)
MCV RBC AUTO: 85.2 FL (ref 81.4–97.8)
MONOCYTES # BLD AUTO: 0.79 K/UL (ref 0–0.85)
MONOCYTES NFR BLD AUTO: 8.7 % (ref 0–13.4)
NEUTROPHILS # BLD AUTO: 6.09 K/UL (ref 1.82–7.42)
NEUTROPHILS NFR BLD: 67.4 % (ref 44–72)
NRBC # BLD AUTO: 0 K/UL
NRBC BLD-RTO: 0 /100 WBC
PLATELET # BLD AUTO: 325 K/UL (ref 164–446)
PMV BLD AUTO: 8.6 FL (ref 9–12.9)
POTASSIUM SERPL-SCNC: 3.9 MMOL/L (ref 3.6–5.5)
RBC # BLD AUTO: 4.13 M/UL (ref 4.7–6.1)
SIGNIFICANT IND 70042: ABNORMAL
SIGNIFICANT IND 70042: NORMAL
SITE SITE: ABNORMAL
SITE SITE: NORMAL
SODIUM SERPL-SCNC: 138 MMOL/L (ref 135–145)
SOURCE SOURCE: ABNORMAL
SOURCE SOURCE: NORMAL
WBC # BLD AUTO: 9.1 K/UL (ref 4.8–10.8)

## 2019-07-26 PROCEDURE — 770006 HCHG ROOM/CARE - MED/SURG/GYN SEMI*

## 2019-07-26 PROCEDURE — 80048 BASIC METABOLIC PNL TOTAL CA: CPT

## 2019-07-26 PROCEDURE — 99232 SBSQ HOSP IP/OBS MODERATE 35: CPT | Performed by: HOSPITALIST

## 2019-07-26 PROCEDURE — A9270 NON-COVERED ITEM OR SERVICE: HCPCS | Performed by: INTERNAL MEDICINE

## 2019-07-26 PROCEDURE — 700111 HCHG RX REV CODE 636 W/ 250 OVERRIDE (IP): Performed by: INTERNAL MEDICINE

## 2019-07-26 PROCEDURE — 700105 HCHG RX REV CODE 258: Performed by: INTERNAL MEDICINE

## 2019-07-26 PROCEDURE — 700102 HCHG RX REV CODE 250 W/ 637 OVERRIDE(OP): Performed by: HOSPITALIST

## 2019-07-26 PROCEDURE — A9270 NON-COVERED ITEM OR SERVICE: HCPCS | Performed by: HOSPITALIST

## 2019-07-26 PROCEDURE — 700102 HCHG RX REV CODE 250 W/ 637 OVERRIDE(OP): Performed by: INTERNAL MEDICINE

## 2019-07-26 PROCEDURE — 85025 COMPLETE CBC W/AUTO DIFF WBC: CPT

## 2019-07-26 RX ORDER — AMOXICILLIN AND CLAVULANATE POTASSIUM 875; 125 MG/1; MG/1
1 TABLET, FILM COATED ORAL EVERY 12 HOURS
Status: DISCONTINUED | OUTPATIENT
Start: 2019-07-26 | End: 2019-07-27

## 2019-07-26 RX ORDER — SODIUM CHLORIDE 9 MG/ML
INJECTION, SOLUTION INTRAVENOUS CONTINUOUS
Status: DISCONTINUED | OUTPATIENT
Start: 2019-07-26 | End: 2019-07-29 | Stop reason: HOSPADM

## 2019-07-26 RX ORDER — HYDROMORPHONE HYDROCHLORIDE 4 MG/1
4 TABLET ORAL EVERY 4 HOURS PRN
Status: DISCONTINUED | OUTPATIENT
Start: 2019-07-26 | End: 2019-07-27

## 2019-07-26 RX ADMIN — TAMSULOSIN HYDROCHLORIDE 0.4 MG: 0.4 CAPSULE ORAL at 08:12

## 2019-07-26 RX ADMIN — SENNOSIDES, DOCUSATE SODIUM 2 TABLET: 50; 8.6 TABLET, FILM COATED ORAL at 17:28

## 2019-07-26 RX ADMIN — AMPICILLIN SODIUM AND SULBACTAM SODIUM 3 G: 2; 1 INJECTION, POWDER, FOR SOLUTION INTRAMUSCULAR; INTRAVENOUS at 05:43

## 2019-07-26 RX ADMIN — ONDANSETRON 4 MG: 2 INJECTION INTRAMUSCULAR; INTRAVENOUS at 20:38

## 2019-07-26 RX ADMIN — OMEPRAZOLE 20 MG: 20 CAPSULE, DELAYED RELEASE ORAL at 05:43

## 2019-07-26 RX ADMIN — ONDANSETRON 4 MG: 2 INJECTION INTRAMUSCULAR; INTRAVENOUS at 17:18

## 2019-07-26 RX ADMIN — ONDANSETRON 4 MG: 2 INJECTION INTRAMUSCULAR; INTRAVENOUS at 08:12

## 2019-07-26 RX ADMIN — SENNOSIDES, DOCUSATE SODIUM 2 TABLET: 50; 8.6 TABLET, FILM COATED ORAL at 05:43

## 2019-07-26 RX ADMIN — AMOXICILLIN AND CLAVULANATE POTASSIUM 1 TABLET: 875; 125 TABLET, FILM COATED ORAL at 17:19

## 2019-07-26 RX ADMIN — HYDROMORPHONE HYDROCHLORIDE 4 MG: 4 TABLET ORAL at 05:43

## 2019-07-26 RX ADMIN — LOSARTAN POTASSIUM 50 MG: 50 TABLET ORAL at 05:43

## 2019-07-26 RX ADMIN — HYDROMORPHONE HYDROCHLORIDE 4 MG: 4 TABLET ORAL at 11:48

## 2019-07-26 RX ADMIN — AMPICILLIN SODIUM AND SULBACTAM SODIUM 3 G: 2; 1 INJECTION, POWDER, FOR SOLUTION INTRAMUSCULAR; INTRAVENOUS at 11:48

## 2019-07-26 RX ADMIN — PREDNISONE 5 MG: 5 TABLET ORAL at 05:43

## 2019-07-26 RX ADMIN — DOXYCYCLINE 100 MG: 100 TABLET, FILM COATED ORAL at 17:18

## 2019-07-26 RX ADMIN — ONDANSETRON 4 MG: 2 INJECTION INTRAMUSCULAR; INTRAVENOUS at 03:21

## 2019-07-26 RX ADMIN — HYDROMORPHONE HYDROCHLORIDE 4 MG: 4 TABLET ORAL at 20:58

## 2019-07-26 RX ADMIN — PROMETHAZINE HYDROCHLORIDE 12.5 MG: 25 TABLET ORAL at 00:02

## 2019-07-26 RX ADMIN — HYDROMORPHONE HYDROCHLORIDE 4 MG: 4 TABLET ORAL at 17:18

## 2019-07-26 RX ADMIN — DOXYCYCLINE 100 MG: 100 TABLET, FILM COATED ORAL at 05:43

## 2019-07-26 ASSESSMENT — ENCOUNTER SYMPTOMS
DIARRHEA: 0
DEPRESSION: 0
BRUISES/BLEEDS EASILY: 0
EYE DISCHARGE: 0
MYALGIAS: 0
NECK PAIN: 0
HEMOPTYSIS: 0
SPEECH CHANGE: 0
WEAKNESS: 0
SENSORY CHANGE: 0
HEADACHES: 0
WHEEZING: 0
MEMORY LOSS: 1
LOSS OF CONSCIOUSNESS: 1
COUGH: 0
CLAUDICATION: 0
NAUSEA: 0
DIZZINESS: 0
FEVER: 0
SHORTNESS OF BREATH: 0
VOMITING: 0
ABDOMINAL PAIN: 0
DIAPHORESIS: 0
SORE THROAT: 0
PALPITATIONS: 0
FOCAL WEAKNESS: 0
SPUTUM PRODUCTION: 0
EYE PAIN: 0
CONSTIPATION: 0
CHILLS: 0
BACK PAIN: 0

## 2019-07-26 ASSESSMENT — LIFESTYLE VARIABLES: SUBSTANCE_ABUSE: 0

## 2019-07-26 NOTE — PROGRESS NOTES
Assumed pt care at 1900. Bedside report received from AQUILES Randall. Pt resting comfortably in bed, no family at bedside. Pt A&Ox4, REDD, PERRL, and follows commands. Medication regimen and POC discussed with pt. Abdominal dressing CDI. PICC line in place. Pt c/o nausea, medicated per MAR. Universal fall precautions in place. No needs at this time. Hourly rounding in place.

## 2019-07-26 NOTE — PROGRESS NOTES
[facility] Medicine Daily Progress Note    Date of Service  7/26/2019    Chief Complaint  54 y.o. male admitted 7/23/2019 with altered mentation.    Hospital Course    7/24:  This 55 yo male with stage IV colon cancer status post resection 18 inch colectomy, but no chemotherapy or radiation therapy who has been suffering from left groin intractable pain had intrathecal pain pump placed by Dr. Reina 9 days ago.  Patient was seen in [facility] with altered mental status and transferred via care flight to [facility] for higher level of care.  The patient was started on vancomycin and Unasyn IV blood cultures obtained.  Sepsis protocol initiated.  Dr. Reina have been consulted by the admitting physician.  I have ordered a CT head without contrast due to the altered mental status.  His confusion did appear to be improving he does not recall any reason why he was transferred to [facility] but now understands it was due to a possible infection in his intrathecal pain pump.  I have ordered wound culture of the pain pump drainage which is a dark thick hematogenous discharge as well as a CT abdomen pelvis.  Patient does have elevated white count 14,000-13,000 today.  7/25: wound culture no growth x 24 hours, mentation back to normal.  Less drainage noted from RLQ abdominal incision.  Staples intact.  CT head negative. CT abdomen/pelvis negative for abscess.  Wbc improved from 13 to 9.8.  BCs negative x2, CSF culture no growth.  dc'd vancomycin since likely not MRSA.  Added doxycycline, continue unasyn IV.  Patient asking to increase IV dilaudid from q 6 to q 4 hours because that is what he was getting in [place], however Dr. Reina increased basal and bolus dilaudid pump amounts yesterday.  7/26: patient appears more comfortable today, but states he is in excruciating pain and cannot go home.  I left a message with Dr. Reina about increasing his pain pump further, no call back yet.  Changed unasyn IV to augmentin po with  doxycycline.  Wound culture with yeast growth, likely skin contaminant.  Culture done after start of vanco, unasyn antibiotics.  Called wife per patient request. *        Consultants/Specialty  Dr. Reina    Code Status  full    Disposition  Lives in [place] with his wife.  careflighted to [facility].  Home once medically cleared.  Anticipate home in a.m.m 7/27.    Review of Systems  Review of Systems   Constitutional: Negative for chills, diaphoresis, fever and malaise/fatigue.   HENT: Negative for congestion and sore throat.    Eyes: Negative for pain and discharge.   Respiratory: Negative for cough, hemoptysis, sputum production, shortness of breath and wheezing.    Cardiovascular: Negative for chest pain, palpitations, claudication and leg swelling.   Gastrointestinal: Negative for abdominal pain, constipation, diarrhea, melena, nausea and vomiting.   Genitourinary: Negative for dysuria, frequency and urgency.   Musculoskeletal: Positive for joint pain (left groin pain). Negative for back pain, myalgias and neck pain.   Skin: Negative for itching and rash.   Neurological: Positive for loss of consciousness. Negative for dizziness, sensory change, speech change, focal weakness, weakness and headaches.   Endo/Heme/Allergies: Does not bruise/bleed easily.   Psychiatric/Behavioral: Positive for memory loss. Negative for depression, substance abuse and suicidal ideas.        Physical Exam  Temp:  [36.6 °C (97.8 °F)-37 °C (98.6 °F)] 36.6 °C (97.8 °F)  Pulse:  [70-84] 71  Resp:  [16-18] 18  BP: (114-146)/(84-89) 137/87  SpO2:  [95 %-99 %] 99 %    Physical Exam   Constitutional: He is oriented to person, place, and time. He appears well-developed and well-nourished. No distress.   HENT:   Head: Normocephalic and atraumatic.   Mouth/Throat: Oropharynx is clear and moist. No oropharyngeal exudate.   Eyes: Pupils are equal, round, and reactive to light. Conjunctivae and EOM are normal. Right eye exhibits no discharge. Left  eye exhibits no discharge. No scleral icterus.   Neck: Normal range of motion. Neck supple. No JVD present. No tracheal deviation present. No thyromegaly present.   Cardiovascular: Normal rate, regular rhythm and normal heart sounds.  Exam reveals no gallop and no friction rub.    No murmur heard.  Pulmonary/Chest: Effort normal and breath sounds normal. No respiratory distress. He has no wheezes. He has no rales. He exhibits no tenderness.   Abdominal: Soft. Bowel sounds are normal. He exhibits no distension and no mass. There is no tenderness. There is no rebound and no guarding.   Area of pain pump insertion with staples intact.    RLQ abdominal incision now CD&I staples intact.  Extensive ecchymosis noted to abdomen from pain pump insertion.    Lumbar incision CD&I with running suture in place, no drainage or erythema noted.   Musculoskeletal: Normal range of motion. He exhibits no edema or tenderness.   Lymphadenopathy:     He has no cervical adenopathy.   Neurological: He is alert and oriented to person, place, and time. No cranial nerve deficit. He exhibits normal muscle tone.   Skin: Skin is warm and dry. No rash noted. He is not diaphoretic. No erythema.   Psychiatric: He has a normal mood and affect. His behavior is normal. Judgment and thought content normal.   Nursing note and vitals reviewed.      Fluids    Intake/Output Summary (Last 24 hours) at 07/26/19 1507  Last data filed at 07/25/19 2000   Gross per 24 hour   Intake              240 ml   Output                0 ml   Net              240 ml       Laboratory  Recent Labs      07/24/19   0148  07/25/19   0243  07/26/19   0331   WBC  13.4*  9.8  9.1   RBC  4.09*  3.80*  4.13*   HEMOGLOBIN  11.3*  10.8*  11.9*   HEMATOCRIT  34.8*  32.6*  35.2*   MCV  85.1  85.8  85.2   MCH  27.6  28.4  28.8   MCHC  32.5*  33.1*  33.8   RDW  57.9*  57.6*  56.5*   PLATELETCT  318  285  325   MPV  8.4*  8.7*  8.6*     Recent Labs      07/24/19   0148  07/25/19   0304   07/26/19   0331   SODIUM  138  134*  138   POTASSIUM  4.1  4.0  3.9   CHLORIDE  110  104  105   CO2  18*  21  23   GLUCOSE  104*  107*  106*   BUN  18  15  12   CREATININE  1.19  1.25  1.14   CALCIUM  9.0  8.6  9.3     Recent Labs      07/23/19 1955   APTT  23.1*   INR  1.10               Imaging  CT-ABDOMEN-PELVIS WITH   Final Result      1.  Noncalcified pulmonary nodules are unchanged in each lung which are suspicious for metastases.      2.  Left hydronephrosis and proximal hydroureter caused by soft tissue density obstruction in the left retroperitoneum. There is possible thickening of the adjacent sigmoid colon. Neoplastic obstruction is a possibility. Inflammation or infection in this    area is also possible.      3.  Postoperative changes and mechanical pump are noted in the lower abdominal wall as described above. Soft tissue emphysema is present likely related to surgery. No localized fluid collection is appreciated. Soft tissues of the abdominal wall in this    region are not well visualized due to beam hardening artifact arising from the pump. Small fluid collection hematoma or abscess immediately adjacent to the pump is possible.         CT-HEAD W/O   Final Result      Prominent subdural spaces bilaterally likely related to underlying atrophy versus subdural hygromas.      DX-CHEST-PORTABLE (1 VIEW)   Final Result         1.  No acute cardiopulmonary disease.           Assessment/Plan  Sepsis (HCC)- (present on admission)   Assessment & Plan    -This is severe sepsis with the following associated acute organ dysfunction(s): metabolic/septic encephalopathy.   -Post recent pain pump insertion with associated hematoma  -ERP did perform a lumbar puncture, clear fluid was removed, unlikely to be meningitis  CSF culture negative.   blood cultures x2 no growth.  -Sepsis order set has been initiated, patient will receive significant IV fluids  -Trend lactic acid  -Patient is currently hemodynamically  stable  wound culture since persistent drainage for 9 days at abdominal insertion site no growth x 24 hous..  Appears to be a dark hematogenous drainage.  Extensive ecchymosis.  Hold on any anticoagulation for DVT prophylaxis.     CT abdomen pelvis negative for abscess pocket.  Changed vancomycin to doxycycline with unasyn IV to augmentin.  Wound culture was obtained after start of antibiotics, therefore, yeast growth likely contaminant.  Treat for at lease 7 days po abx.  Per Dr. Reina, would like to keep pain pump if possible.     Acute metabolic encephalopathy- (present on admission)   Assessment & Plan    -Due to sepsis  Improvement the following day on IV antibiotics.  Resolved  Head CT negative.       Metastatic Colon cancer (HCC)- (present on admission)   Assessment & Plan    -Chronic, continue outpatient follow-up  Patient appears to have stage IV metastatic colon cancer.  He has had previous colectomy 18 inches removed.  He is followed by Dr. Jeff, oncology.  Discussed code status:  Wants full code, wants to be given a chance to recover per patient.  He is under pain management with Dr. Reina for bilateral groin and bilateral testicular pain.     Inadequate pain control   Assessment & Plan    Patient states he cannot go home as he is in excruciating pain.  Dr. Reina had increased pain pump dilaudid dose on 7/25.  Call to Dr. Reina regarding ongoing severe pain.  Left message, no call back.     High anion gap metabolic acidosis- (present on admission)   Assessment & Plan    -Likely due to sepsis yet lactic acid is normal  -Repeat BMP in the morning     Benign prostate hyperplasia- (present on admission)   Assessment & Plan    -Continue Flomax     Essential hypertension- (present on admission)   Assessment & Plan    -Continue home losartan  -Place PRN enalapril  -Adjust as needed     COPD (chronic obstructive pulmonary disease) (HCC)- (present on admission)   Assessment & Plan    -No acute  exacerbation          VTE prophylaxis: scds

## 2019-07-26 NOTE — PROGRESS NOTES
Patient is resting in bed. Patient is A&Ox4. Patient c/o 6/10 pain, pain pump in place. Up to bathroom with SBA, patient had one BM this morning. Patient c/o nausea this morning, IV zofran given. POC discussed with patient. Bed alarm on.

## 2019-07-26 NOTE — ASSESSMENT & PLAN NOTE
Patient states he cannot go home as he is in excruciating pain.  Dr. Reina had increased pain pump dilaudid dose on 7/25.  Call to Dr. Reina regarding ongoing severe pain.  Left message, no call back.  Hospice consult placed.

## 2019-07-26 NOTE — CARE PLAN
Problem: Communication  Goal: The ability to communicate needs accurately and effectively will improve  Outcome: PROGRESSING AS EXPECTED  POC discussed with patient.     Problem: Safety  Goal: Will remain free from injury  Outcome: PROGRESSING AS EXPECTED  Up to bathroom with SBA, safety education provided.

## 2019-07-27 LAB
ANION GAP SERPL CALC-SCNC: 9 MMOL/L (ref 0–11.9)
BASOPHILS # BLD AUTO: 0.5 % (ref 0–1.8)
BASOPHILS # BLD: 0.04 K/UL (ref 0–0.12)
BUN SERPL-MCNC: 12 MG/DL (ref 8–22)
CALCIUM SERPL-MCNC: 9.1 MG/DL (ref 8.5–10.5)
CHLORIDE SERPL-SCNC: 103 MMOL/L (ref 96–112)
CO2 SERPL-SCNC: 22 MMOL/L (ref 20–33)
CREAT SERPL-MCNC: 1.22 MG/DL (ref 0.5–1.4)
EOSINOPHIL # BLD AUTO: 0.19 K/UL (ref 0–0.51)
EOSINOPHIL NFR BLD: 2.2 % (ref 0–6.9)
ERYTHROCYTE [DISTWIDTH] IN BLOOD BY AUTOMATED COUNT: 56.8 FL (ref 35.9–50)
GLUCOSE SERPL-MCNC: 105 MG/DL (ref 65–99)
HCT VFR BLD AUTO: 37.2 % (ref 42–52)
HGB BLD-MCNC: 12.5 G/DL (ref 14–18)
IMM GRANULOCYTES # BLD AUTO: 0.02 K/UL (ref 0–0.11)
IMM GRANULOCYTES NFR BLD AUTO: 0.2 % (ref 0–0.9)
LYMPHOCYTES # BLD AUTO: 2.51 K/UL (ref 1–4.8)
LYMPHOCYTES NFR BLD: 28.5 % (ref 22–41)
MCH RBC QN AUTO: 28.8 PG (ref 27–33)
MCHC RBC AUTO-ENTMCNC: 33.6 G/DL (ref 33.7–35.3)
MCV RBC AUTO: 85.7 FL (ref 81.4–97.8)
MONOCYTES # BLD AUTO: 0.77 K/UL (ref 0–0.85)
MONOCYTES NFR BLD AUTO: 8.7 % (ref 0–13.4)
NEUTROPHILS # BLD AUTO: 5.28 K/UL (ref 1.82–7.42)
NEUTROPHILS NFR BLD: 59.9 % (ref 44–72)
NRBC # BLD AUTO: 0 K/UL
NRBC BLD-RTO: 0 /100 WBC
PLATELET # BLD AUTO: 330 K/UL (ref 164–446)
PMV BLD AUTO: 8.5 FL (ref 9–12.9)
POTASSIUM SERPL-SCNC: 3.7 MMOL/L (ref 3.6–5.5)
RBC # BLD AUTO: 4.34 M/UL (ref 4.7–6.1)
SODIUM SERPL-SCNC: 134 MMOL/L (ref 135–145)
WBC # BLD AUTO: 8.8 K/UL (ref 4.8–10.8)

## 2019-07-27 PROCEDURE — 97165 OT EVAL LOW COMPLEX 30 MIN: CPT

## 2019-07-27 PROCEDURE — 99232 SBSQ HOSP IP/OBS MODERATE 35: CPT | Performed by: HOSPITALIST

## 2019-07-27 PROCEDURE — 97161 PT EVAL LOW COMPLEX 20 MIN: CPT

## 2019-07-27 PROCEDURE — 770006 HCHG ROOM/CARE - MED/SURG/GYN SEMI*

## 2019-07-27 PROCEDURE — A9270 NON-COVERED ITEM OR SERVICE: HCPCS | Performed by: INTERNAL MEDICINE

## 2019-07-27 PROCEDURE — 85025 COMPLETE CBC W/AUTO DIFF WBC: CPT

## 2019-07-27 PROCEDURE — 700111 HCHG RX REV CODE 636 W/ 250 OVERRIDE (IP): Performed by: INTERNAL MEDICINE

## 2019-07-27 PROCEDURE — A9270 NON-COVERED ITEM OR SERVICE: HCPCS | Performed by: HOSPITALIST

## 2019-07-27 PROCEDURE — 700105 HCHG RX REV CODE 258: Performed by: HOSPITALIST

## 2019-07-27 PROCEDURE — 700102 HCHG RX REV CODE 250 W/ 637 OVERRIDE(OP): Performed by: HOSPITALIST

## 2019-07-27 PROCEDURE — 80048 BASIC METABOLIC PNL TOTAL CA: CPT

## 2019-07-27 PROCEDURE — 700102 HCHG RX REV CODE 250 W/ 637 OVERRIDE(OP): Performed by: INTERNAL MEDICINE

## 2019-07-27 RX ORDER — LACTOBACILLUS RHAMNOSUS GG 10B CELL
1 CAPSULE ORAL
Status: DISCONTINUED | OUTPATIENT
Start: 2019-07-28 | End: 2019-07-29 | Stop reason: HOSPADM

## 2019-07-27 RX ORDER — HYDROMORPHONE HYDROCHLORIDE 4 MG/1
4 TABLET ORAL EVERY 6 HOURS PRN
Status: DISCONTINUED | OUTPATIENT
Start: 2019-07-27 | End: 2019-07-27

## 2019-07-27 RX ORDER — SCOLOPAMINE TRANSDERMAL SYSTEM 1 MG/1
1 PATCH, EXTENDED RELEASE TRANSDERMAL
Status: DISCONTINUED | OUTPATIENT
Start: 2019-07-27 | End: 2019-07-29 | Stop reason: HOSPADM

## 2019-07-27 RX ORDER — HYDROMORPHONE HYDROCHLORIDE 4 MG/1
4 TABLET ORAL EVERY 4 HOURS PRN
Status: DISCONTINUED | OUTPATIENT
Start: 2019-07-27 | End: 2019-07-29 | Stop reason: HOSPADM

## 2019-07-27 RX ORDER — HYDROMORPHONE HYDROCHLORIDE 1 MG/ML
1 INJECTION, SOLUTION INTRAMUSCULAR; INTRAVENOUS; SUBCUTANEOUS EVERY 4 HOURS PRN
Status: DISCONTINUED | OUTPATIENT
Start: 2019-07-27 | End: 2019-07-29 | Stop reason: HOSPADM

## 2019-07-27 RX ADMIN — HYDROMORPHONE HYDROCHLORIDE 4 MG: 4 TABLET ORAL at 09:17

## 2019-07-27 RX ADMIN — SODIUM CHLORIDE: 9 INJECTION, SOLUTION INTRAVENOUS at 11:44

## 2019-07-27 RX ADMIN — ONDANSETRON 4 MG: 2 INJECTION INTRAMUSCULAR; INTRAVENOUS at 01:00

## 2019-07-27 RX ADMIN — ONDANSETRON 4 MG: 2 INJECTION INTRAMUSCULAR; INTRAVENOUS at 04:48

## 2019-07-27 RX ADMIN — HYDROMORPHONE HYDROCHLORIDE 4 MG: 4 TABLET ORAL at 19:53

## 2019-07-27 RX ADMIN — SENNOSIDES, DOCUSATE SODIUM 2 TABLET: 50; 8.6 TABLET, FILM COATED ORAL at 04:58

## 2019-07-27 RX ADMIN — HYDROMORPHONE HYDROCHLORIDE 4 MG: 4 TABLET ORAL at 15:04

## 2019-07-27 RX ADMIN — PROMETHAZINE HYDROCHLORIDE 25 MG: 25 TABLET ORAL at 07:37

## 2019-07-27 RX ADMIN — ONDANSETRON 4 MG: 2 INJECTION INTRAMUSCULAR; INTRAVENOUS at 15:05

## 2019-07-27 RX ADMIN — HYDROMORPHONE HYDROCHLORIDE 4 MG: 4 TABLET ORAL at 04:58

## 2019-07-27 RX ADMIN — SCOPALAMINE 1 PATCH: 1 PATCH, EXTENDED RELEASE TRANSDERMAL at 11:29

## 2019-07-27 RX ADMIN — OMEPRAZOLE 20 MG: 20 CAPSULE, DELAYED RELEASE ORAL at 04:57

## 2019-07-27 RX ADMIN — HYDROCORTISONE 2.5%: 25 CREAM TOPICAL at 20:00

## 2019-07-27 RX ADMIN — ONDANSETRON 4 MG: 2 INJECTION INTRAMUSCULAR; INTRAVENOUS at 19:55

## 2019-07-27 RX ADMIN — ONDANSETRON 4 MG: 2 INJECTION INTRAMUSCULAR; INTRAVENOUS at 09:19

## 2019-07-27 RX ADMIN — SODIUM CHLORIDE: 9 INJECTION, SOLUTION INTRAVENOUS at 00:59

## 2019-07-27 RX ADMIN — DOXYCYCLINE 100 MG: 100 TABLET, FILM COATED ORAL at 04:57

## 2019-07-27 RX ADMIN — HYDROCORTISONE 2.5%: 25 CREAM TOPICAL at 11:30

## 2019-07-27 RX ADMIN — PREDNISONE 5 MG: 5 TABLET ORAL at 04:57

## 2019-07-27 RX ADMIN — TAMSULOSIN HYDROCHLORIDE 0.4 MG: 0.4 CAPSULE ORAL at 04:57

## 2019-07-27 RX ADMIN — AMOXICILLIN AND CLAVULANATE POTASSIUM 1 TABLET: 875; 125 TABLET, FILM COATED ORAL at 04:57

## 2019-07-27 RX ADMIN — HYDROMORPHONE HYDROCHLORIDE 4 MG: 4 TABLET ORAL at 00:58

## 2019-07-27 RX ADMIN — LOSARTAN POTASSIUM 50 MG: 50 TABLET ORAL at 04:57

## 2019-07-27 RX ADMIN — DOXYCYCLINE 100 MG: 100 TABLET, FILM COATED ORAL at 17:19

## 2019-07-27 ASSESSMENT — COGNITIVE AND FUNCTIONAL STATUS - GENERAL
CLIMB 3 TO 5 STEPS WITH RAILING: A LITTLE
WALKING IN HOSPITAL ROOM: A LITTLE
SUGGESTED CMS G CODE MODIFIER DAILY ACTIVITY: CH
MOBILITY SCORE: 22
DAILY ACTIVITIY SCORE: 24
SUGGESTED CMS G CODE MODIFIER MOBILITY: CJ

## 2019-07-27 ASSESSMENT — PATIENT HEALTH QUESTIONNAIRE - PHQ9
2. FEELING DOWN, DEPRESSED, IRRITABLE, OR HOPELESS: NOT AT ALL
1. LITTLE INTEREST OR PLEASURE IN DOING THINGS: NOT AT ALL
SUM OF ALL RESPONSES TO PHQ9 QUESTIONS 1 AND 2: 0

## 2019-07-27 ASSESSMENT — ENCOUNTER SYMPTOMS
PALPITATIONS: 0
SENSORY CHANGE: 0
WEAKNESS: 0
HEMOPTYSIS: 0
ABDOMINAL PAIN: 0
EYE DISCHARGE: 0
BACK PAIN: 0
DIARRHEA: 0
SPUTUM PRODUCTION: 0
NECK PAIN: 0
SHORTNESS OF BREATH: 0
EYE PAIN: 0
FOCAL WEAKNESS: 0
FEVER: 0
VOMITING: 0
DIZZINESS: 0
LOSS OF CONSCIOUSNESS: 0
DIAPHORESIS: 0
CLAUDICATION: 0
CHILLS: 0
MYALGIAS: 0
HEADACHES: 0
BRUISES/BLEEDS EASILY: 0
COUGH: 0
MEMORY LOSS: 0
SPEECH CHANGE: 0
CONSTIPATION: 0
SORE THROAT: 0
DEPRESSION: 0
NAUSEA: 0
WHEEZING: 0

## 2019-07-27 ASSESSMENT — GAIT ASSESSMENTS
GAIT LEVEL OF ASSIST: SUPERVISED
DISTANCE (FEET): 80
DEVIATION: OTHER (COMMENT)

## 2019-07-27 ASSESSMENT — ACTIVITIES OF DAILY LIVING (ADL): TOILETING: INDEPENDENT

## 2019-07-27 ASSESSMENT — LIFESTYLE VARIABLES: SUBSTANCE_ABUSE: 0

## 2019-07-27 NOTE — PROGRESS NOTES
Patient resting in bed. Patient is A&Ox4. Patient c/o 6/10 pain, pain pump to RLQ. Patient c/o N/V. Patient vomited about 200 ml this morning. Phenegran 25mg given. Will continue to monitor. MD aware of nausea and vomiting, scopolamine patch ordered.       1130: Patient requesting hemorrhoid cream. MD notified. Proctozone BID ordered.     1200: Patient upset because Dilaudid PO was changed from q4hr to q6hr. MD notified.

## 2019-07-27 NOTE — THERAPY
"Physical Therapy Evaluation completed.   Bed Mobility:  Supine to Sit: Supervised  Transfers: Sit to Stand: Supervised  Gait: Level Of Assist: Supervised with No Equipment Needed       Plan of Care: Patient with no further skilled PT needs in the acute care setting at this time  Discharge Recommendations: Equipment: No Equipment Needed. See below    Pt presents to PT with increased pain, nausea, and limited activity tolerance. Pt able to perform bed mobility and sit <> to stands with Spv, and ambulate with no AD for 80' with Spv. Pt notes increased pain and nausea with ambulation. Pt states that he is in the end of his life, and wishes \"they would just give me what I need to be comfortable.\" Pt has tried to participate in home health before, and reports that they were unable to come to his home in [place]. Pt has no further acute PT needs at this time, but would likely benefit from a palliative care consult to discuss the benefits of hospice/comfort care services. Spoke with nursing post eval regarding aforementioned concerns from pt.     See \"Rehab Therapy-Acute\" Patient Summary Report for complete documentation.     "

## 2019-07-28 LAB
BACTERIA BLD CULT: NORMAL
SIGNIFICANT IND 70042: NORMAL
SITE SITE: NORMAL
SOURCE SOURCE: NORMAL

## 2019-07-28 PROCEDURE — 99232 SBSQ HOSP IP/OBS MODERATE 35: CPT | Performed by: HOSPITALIST

## 2019-07-28 PROCEDURE — 700102 HCHG RX REV CODE 250 W/ 637 OVERRIDE(OP): Performed by: HOSPITALIST

## 2019-07-28 PROCEDURE — A9270 NON-COVERED ITEM OR SERVICE: HCPCS | Performed by: HOSPITALIST

## 2019-07-28 PROCEDURE — 770006 HCHG ROOM/CARE - MED/SURG/GYN SEMI*

## 2019-07-28 PROCEDURE — 99497 ADVNCD CARE PLAN 30 MIN: CPT | Performed by: NURSE PRACTITIONER

## 2019-07-28 PROCEDURE — A9270 NON-COVERED ITEM OR SERVICE: HCPCS | Performed by: INTERNAL MEDICINE

## 2019-07-28 PROCEDURE — 700102 HCHG RX REV CODE 250 W/ 637 OVERRIDE(OP): Performed by: INTERNAL MEDICINE

## 2019-07-28 PROCEDURE — 700105 HCHG RX REV CODE 258: Performed by: HOSPITALIST

## 2019-07-28 PROCEDURE — 700111 HCHG RX REV CODE 636 W/ 250 OVERRIDE (IP): Performed by: HOSPITALIST

## 2019-07-28 PROCEDURE — 700111 HCHG RX REV CODE 636 W/ 250 OVERRIDE (IP): Performed by: INTERNAL MEDICINE

## 2019-07-28 RX ADMIN — HYDROMORPHONE HYDROCHLORIDE 1 MG: 1 INJECTION, SOLUTION INTRAMUSCULAR; INTRAVENOUS; SUBCUTANEOUS at 11:13

## 2019-07-28 RX ADMIN — HYDROCORTISONE 2.5%: 25 CREAM TOPICAL at 18:42

## 2019-07-28 RX ADMIN — OMEPRAZOLE 20 MG: 20 CAPSULE, DELAYED RELEASE ORAL at 06:07

## 2019-07-28 RX ADMIN — SENNOSIDES, DOCUSATE SODIUM 2 TABLET: 50; 8.6 TABLET, FILM COATED ORAL at 06:07

## 2019-07-28 RX ADMIN — LOSARTAN POTASSIUM 50 MG: 50 TABLET ORAL at 06:07

## 2019-07-28 RX ADMIN — TAMSULOSIN HYDROCHLORIDE 0.4 MG: 0.4 CAPSULE ORAL at 09:00

## 2019-07-28 RX ADMIN — PROCHLORPERAZINE EDISYLATE 10 MG: 5 INJECTION INTRAMUSCULAR; INTRAVENOUS at 06:29

## 2019-07-28 RX ADMIN — HYDROMORPHONE HYDROCHLORIDE 1 MG: 1 INJECTION, SOLUTION INTRAMUSCULAR; INTRAVENOUS; SUBCUTANEOUS at 06:29

## 2019-07-28 RX ADMIN — PROCHLORPERAZINE EDISYLATE 10 MG: 5 INJECTION INTRAMUSCULAR; INTRAVENOUS at 19:45

## 2019-07-28 RX ADMIN — SODIUM CHLORIDE: 9 INJECTION, SOLUTION INTRAVENOUS at 03:07

## 2019-07-28 RX ADMIN — Medication 1 CAPSULE: at 09:00

## 2019-07-28 RX ADMIN — PREDNISONE 5 MG: 5 TABLET ORAL at 06:07

## 2019-07-28 RX ADMIN — HYDROCORTISONE 2.5%: 25 CREAM TOPICAL at 06:08

## 2019-07-28 RX ADMIN — SODIUM CHLORIDE: 9 INJECTION, SOLUTION INTRAVENOUS at 18:42

## 2019-07-28 RX ADMIN — HYDROMORPHONE HYDROCHLORIDE 1 MG: 1 INJECTION, SOLUTION INTRAMUSCULAR; INTRAVENOUS; SUBCUTANEOUS at 19:45

## 2019-07-28 RX ADMIN — HYDROMORPHONE HYDROCHLORIDE 1 MG: 1 INJECTION, SOLUTION INTRAMUSCULAR; INTRAVENOUS; SUBCUTANEOUS at 15:39

## 2019-07-28 RX ADMIN — PROCHLORPERAZINE EDISYLATE 10 MG: 5 INJECTION INTRAMUSCULAR; INTRAVENOUS at 11:13

## 2019-07-28 RX ADMIN — ONDANSETRON 4 MG: 2 INJECTION INTRAMUSCULAR; INTRAVENOUS at 14:52

## 2019-07-28 RX ADMIN — HYDROMORPHONE HYDROCHLORIDE 1 MG: 1 INJECTION, SOLUTION INTRAMUSCULAR; INTRAVENOUS; SUBCUTANEOUS at 00:50

## 2019-07-28 RX ADMIN — PROCHLORPERAZINE EDISYLATE 10 MG: 5 INJECTION INTRAMUSCULAR; INTRAVENOUS at 00:50

## 2019-07-28 ASSESSMENT — LIFESTYLE VARIABLES: SUBSTANCE_ABUSE: 0

## 2019-07-28 ASSESSMENT — ENCOUNTER SYMPTOMS
SPEECH CHANGE: 0
DEPRESSION: 0
NECK PAIN: 0
FOCAL WEAKNESS: 0
NAUSEA: 0
SENSORY CHANGE: 0
MYALGIAS: 0
WEAKNESS: 0
HEMOPTYSIS: 0
ABDOMINAL PAIN: 0
VOMITING: 0
CLAUDICATION: 0
HEADACHES: 0
FEVER: 0
LOSS OF CONSCIOUSNESS: 0
EYE DISCHARGE: 0
DIZZINESS: 0
CHILLS: 0
PALPITATIONS: 0
SPUTUM PRODUCTION: 0
DIARRHEA: 0
CONSTIPATION: 0
MEMORY LOSS: 0
BACK PAIN: 0
BRUISES/BLEEDS EASILY: 0
SHORTNESS OF BREATH: 0
DIAPHORESIS: 0
EYE PAIN: 0
COUGH: 0
WHEEZING: 0
SORE THROAT: 0

## 2019-07-28 ASSESSMENT — PATIENT HEALTH QUESTIONNAIRE - PHQ9
1. LITTLE INTEREST OR PLEASURE IN DOING THINGS: NOT AT ALL
SUM OF ALL RESPONSES TO PHQ9 QUESTIONS 1 AND 2: 0
2. FEELING DOWN, DEPRESSED, IRRITABLE, OR HOPELESS: NOT AT ALL

## 2019-07-28 NOTE — CONSULTS
"Reason for Palliative Care Consult: Advance Care Planning    Consulted by: Manasa Dueñas MD    HPI: Erich Ingram is a 53 y/o male who was transferred from [facility] via care flight to [facility] for higher level of care.  He was admitted with acute metabolic encephalopathy, sepsis, and intractable left groin pain after intrathecal pain pump had been placed by Dr. Reina 9 days previous to admission.  He has a PMHx significant for stage IV colon cancer s/p resection with 18 inch colectomy.  Workup included head CT (negative), CT abdomen pelvis (negative for abscess), blood cultures (negative X2) and wound culture from pain pump drainage with yeast growth, likely skin contaminant.  Patient was initially started on vancomycin and Unasyn, which was subsequently changed to PO Augmentin with doxycycline.  Patient's hospitalization has been complicated by continued intractable pain and persistent nausea.    Past medical/surgical history: stage IV metastatic colon cancer, COPD, Hepatitis C, HTN, BPH, renal disorder, anxiety, thrombocytopenia, chronic opioid use    Assessment:  Neuro: Patient alert & oriented X4; patient's affect was somewhat flat and withdrawn  Dyspnea: No-    Last BM: 07/27/19-    Pain: Yes-    Depression: Mood appropriate for situation-    Dementia: No;       Living situation & psychosocial: Patient is  and lives with his wife and cat \"Mama Francesca\" in [place].  He reports he and his wife have been together for 20 years, and  for 7 years.    Spiritual:  Is Lutheran or spirituality important for coping with this illness? No-    Has a  or spiritual provider visit been requested? No    Palliative Performance Scale: 40%    Advance Directive: None-    DPOA: No-    POLST: No-      Code Status: Full-      Outcome:  Met with Erich at bedside. Introduced myself and explained the role of palliative care. From a functional standpoint Erich reports he has essentially been \"bedridden " "for 2 years\" since he was diagnosed with cancer.  He reports difficulty ambulating or even standing for more than 2-3 minutes at a time.  When shopping, he has to use a motorized shopping cart to get around the store. He confirms he is able to perform his ADL's, including showering, but needs assistance with driving and meal preparation.    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  It's a sport that he has enjoyed for the last 30+ years.  He also enjoys \"off-roading with my motorcycle\" although he states he hasn't been able to ride his motorcycle \"for months, due to my health.\"    When queried about his understanding of his current health situation, Erich said, \"I was taking too much pain medication to live on a day-to-day basis.\"  He reports he worked with Dr. Reina about an alternative.  \"He hooked us up with a pain pump with the medication delivered directly to my spinal cord.  It was implanted and had an effect on my body and started to get infected.\"  He confirms he was admitted with altered mental status secondary to sepsis/infection.    When asked what his biggest fears/worries are related to the future with his health he said, \"I'm afraid to die.  I'd like to get cured, but I'm not sure it's possible.\"  Erich reports he has endured both colon surgery and chemotherapy for his cancer.  Discussed benefit vs burden of aggressive disease-modifying treatment.  Erich exhibited some ambivalence about whether or not he wanted to continue pursuing aggressive disease-modifying treatment.    Discussed hospice and provided a general overview of hospice services.  I advised Erich that hospice is for patients who have gotten to the point where they wish to pursue a comfort-focused path, rather than a curative path.  At this point Erich said, \"I'm pretty much there.  I'd rather be more comfort than cure at " "this point.\"  He expressed that he feels his wife would also benefit from the support that hospice offers.  I explained that hospice does not provide 24/7 care, but is comprised of an interdisciplinary team that supports both the patient and family.  I advised that the focus of hospice is to control the patient's symptoms at home.  Reviewed hospice choice form with patient.  He selected [facility], as it is possibly the only agency to provide [place] services in [place].  I reassured patient that signing choice form does not bind him into accepting hospice, but allows [facility] representative to come speak with him about the services they provide.  Advised Erich he can change his mind at any time regarding hospice services.        Discussed code status and educated patient about term.  Discussed measures employed in a full code.  Despite his interest in going on hospice, patient verbalized his desire to remain a full code at this time stating, \"I'd like them to at least try to bring me back and give me a chance.  But I wouldn't want to be on a breathing machine for more than a few days.\"    At this point in the conversation, patient disengaged somewhat and asked if visit was over.  Elected to defer discussion about Advance Directive due to patient fatigue.  Received patient permission to place hospice referral.    Provided therapeutic communication including open-ended questions, reflective listening, therapeutic silence, and empathic support throughout encounter. Provided business card with palliative care contact information and encouraged Erich to call with any questions or needs.     Plan: Patient signed hospice choice form for [facility], which I then submitted to [facility] SARAY Sewell.  She will also check to see if [facility] provides services in [place].    Recommendations: I recommend a hospice consult.    Updated: Bedside RN Eva ; Floor SARAY Sewell    Thank you for allowing " Palliative Care to participate in Erich's care. Please call our team with questions and/or additional needs.    As appropriate, Palliative Care encourages medical team to engage in further conversation, education for patient/family at bedside regarding code status, GOC/POC.    Total visit time was 35 minutes discussing advance care planning.     ALDO Davies, [facility]  Palliative Care Nurse Practitioner  [phone]

## 2019-07-28 NOTE — THERAPY
"Occupational Therapy Evaluation completed.   Functional Status:    55 y/o male admitted to hospital from ALOC and pain pump infection, which was inserted ~2 weeks ago fr end stage colon cancer. Pt doing well at this time, though does endorse pain as his biggest limitation. Pt completed bed mobility, LB dressing, UB dressing, and STS with supv. Standing/ambulation time limited to ~3 minutes, at which point his pain becomes intolerable. Pt also nauseated as a result of the pain. He has no further acute OT needs at this time, though he would benefit from palliative care consult.     Plan of Care: Patient with no further skilled OT needs in the acute care setting at this time  Discharge Recommendations:  Equipment: No Equipment Needed. Post-acute therapy Currently anticipate no further skilled therapy needs once patient is discharged from the inpatient setting.    See \"Rehab Therapy-Acute\" Patient Summary Report for complete documentation.    "

## 2019-07-28 NOTE — PROGRESS NOTES
[facility] Medicine Daily Progress Note    Date of Service  7/27/2019    Chief Complaint  54 y.o. male admitted 7/23/2019 with altered mentation.    Hospital Course    7/24:  This 53 yo male with stage IV colon cancer status post resection 18 inch colectomy, but no chemotherapy or radiation therapy who has been suffering from left groin intractable pain had intrathecal pain pump placed by Dr. Reina 9 days ago.  Patient was seen in [facility] with altered mental status and transferred via care flight to [facility] for higher level of care.  The patient was started on vancomycin and Unasyn IV blood cultures obtained.  Sepsis protocol initiated.  Dr. Reina have been consulted by the admitting physician.  I have ordered a CT head without contrast due to the altered mental status.  His confusion did appear to be improving he does not recall any reason why he was transferred to [facility] but now understands it was due to a possible infection in his intrathecal pain pump.  I have ordered wound culture of the pain pump drainage which is a dark thick hematogenous discharge as well as a CT abdomen pelvis.  Patient does have elevated white count 14,000-13,000 today.  7/25: wound culture no growth x 24 hours, mentation back to normal.  Less drainage noted from RLQ abdominal incision.  Staples intact.  CT head negative. CT abdomen/pelvis negative for abscess.  Wbc improved from 13 to 9.8.  BCs negative x2, CSF culture no growth.  dc'd vancomycin since likely not MRSA.  Added doxycycline, continue unasyn IV.  Patient asking to increase IV dilaudid from q 6 to q 4 hours because that is what he was getting in [place], however Dr. Reina increased basal and bolus dilaudid pump amounts yesterday.  7/26: patient appears more comfortable today, but states he is in excruciating pain and cannot go home.  I left a message with Dr. Reina about increasing his pain pump further, no call back yet.  Changed unasyn IV to augmentin po with  doxycycline.  Wound culture with yeast growth, likely skin contaminant.  Culture done after start of vanco, unasyn antibiotics.  Called wife per patient request.   7/27:  Seen with PT, telling PT wants hospice care.  States intractable pain despite increased dose of pain pump yesterday.  Palliative care consult placed.  Persistent nausea.  dc'd augmentin, continue doxycycline x 5 days total.  No further drainage from pain pump site.  *        Consultants/Specialty  Dr. Reina    Code Status  full    Disposition  Lives in [place] with his wife.  careflighted to [facility].  Home once medically cleared.  Anticipate home in a.m.m 7/27. PT no needs.    Review of Systems  Review of Systems   Constitutional: Negative for chills, diaphoresis, fever and malaise/fatigue.   HENT: Negative for congestion and sore throat.    Eyes: Negative for pain and discharge.   Respiratory: Negative for cough, hemoptysis, sputum production, shortness of breath and wheezing.    Cardiovascular: Negative for chest pain, palpitations, claudication and leg swelling.   Gastrointestinal: Negative for abdominal pain, constipation, diarrhea, melena, nausea and vomiting.   Genitourinary: Negative for dysuria, frequency and urgency.   Musculoskeletal: Negative for back pain, joint pain (testicular pain bilateral), myalgias and neck pain.   Skin: Negative for itching and rash.   Neurological: Negative for dizziness, sensory change, speech change, focal weakness, loss of consciousness, weakness and headaches.   Endo/Heme/Allergies: Does not bruise/bleed easily.   Psychiatric/Behavioral: Negative for depression, memory loss, substance abuse and suicidal ideas.        Physical Exam  Temp:  [35.9 °C (96.7 °F)-37.3 °C (99.1 °F)] 37.3 °C (99.1 °F)  Pulse:  [69-88] 69  Resp:  [16-18] 16  BP: (121-154)/(88-98) 146/98  SpO2:  [95 %-96 %] 95 %    Physical Exam   Constitutional: He is oriented to person, place, and time. He appears well-developed and  well-nourished. No distress.   HENT:   Head: Normocephalic and atraumatic.   Mouth/Throat: Oropharynx is clear and moist. No oropharyngeal exudate.   Eyes: Pupils are equal, round, and reactive to light. Conjunctivae and EOM are normal. Right eye exhibits no discharge. Left eye exhibits no discharge. No scleral icterus.   Neck: Normal range of motion. Neck supple. No JVD present. No tracheal deviation present. No thyromegaly present.   Cardiovascular: Normal rate, regular rhythm and normal heart sounds.  Exam reveals no gallop and no friction rub.    No murmur heard.  Pulmonary/Chest: Effort normal and breath sounds normal. No respiratory distress. He has no wheezes. He has no rales. He exhibits no tenderness.   Abdominal: Soft. Bowel sounds are normal. He exhibits no distension and no mass. There is no tenderness. There is no rebound and no guarding.   Area of pain pump insertion with staples intact.    RLQ abdominal incision now CD&I staples intact.  Extensive ecchymosis noted to abdomen from pain pump insertion.    Lumbar incision CD&I with running suture in place, no drainage or erythema noted.   Musculoskeletal: Normal range of motion. He exhibits no edema or tenderness.   Lymphadenopathy:     He has no cervical adenopathy.   Neurological: He is alert and oriented to person, place, and time. No cranial nerve deficit. He exhibits normal muscle tone.   Skin: Skin is warm and dry. No rash noted. He is not diaphoretic. No erythema.   Psychiatric: He has a normal mood and affect. His behavior is normal. Judgment and thought content normal.   Nursing note and vitals reviewed.      Fluids    Intake/Output Summary (Last 24 hours) at 07/27/19 1719  Last data filed at 07/27/19 1155   Gross per 24 hour   Intake              120 ml   Output              800 ml   Net             -680 ml       Laboratory  Recent Labs      07/25/19   0243  07/26/19   0331  07/27/19   0450   WBC  9.8  9.1  8.8   RBC  3.80*  4.13*  4.34*    HEMOGLOBIN  10.8*  11.9*  12.5*   HEMATOCRIT  32.6*  35.2*  37.2*   MCV  85.8  85.2  85.7   MCH  28.4  28.8  28.8   MCHC  33.1*  33.8  33.6*   RDW  57.6*  56.5*  56.8*   PLATELETCT  285  325  330   MPV  8.7*  8.6*  8.5*     Recent Labs      07/25/19   0304  07/26/19   0331  07/27/19   0450   SODIUM  134*  138  134*   POTASSIUM  4.0  3.9  3.7   CHLORIDE  104  105  103   CO2  21  23  22   GLUCOSE  107*  106*  105*   BUN  15  12  12   CREATININE  1.25  1.14  1.22   CALCIUM  8.6  9.3  9.1                   Imaging  CT-ABDOMEN-PELVIS WITH   Final Result      1.  Noncalcified pulmonary nodules are unchanged in each lung which are suspicious for metastases.      2.  Left hydronephrosis and proximal hydroureter caused by soft tissue density obstruction in the left retroperitoneum. There is possible thickening of the adjacent sigmoid colon. Neoplastic obstruction is a possibility. Inflammation or infection in this    area is also possible.      3.  Postoperative changes and mechanical pump are noted in the lower abdominal wall as described above. Soft tissue emphysema is present likely related to surgery. No localized fluid collection is appreciated. Soft tissues of the abdominal wall in this    region are not well visualized due to beam hardening artifact arising from the pump. Small fluid collection hematoma or abscess immediately adjacent to the pump is possible.         CT-HEAD W/O   Final Result      Prominent subdural spaces bilaterally likely related to underlying atrophy versus subdural hygromas.      DX-CHEST-PORTABLE (1 VIEW)   Final Result         1.  No acute cardiopulmonary disease.           Assessment/Plan  Sepsis (HCC)- (present on admission)   Assessment & Plan    -This is severe sepsis with the following associated acute organ dysfunction(s): metabolic/septic encephalopathy.   -Post recent pain pump insertion with associated hematoma  -ERP did perform a lumbar puncture, clear fluid was removed, unlikely  to be meningitis  CSF culture negative.   blood cultures x2 no growth.  -Sepsis order set has been initiated, patient will receive significant IV fluids  -Trend lactic acid  -Patient is currently hemodynamically stable  wound culture since persistent drainage for 9 days at abdominal insertion site no growth x 24 hous..  Appears to be a dark hematogenous drainage.  Extensive ecchymosis.  Hold on any anticoagulation for DVT prophylaxis.     CT abdomen pelvis negative for abscess pocket.  Changed vancomycin to doxycycline with unasyn IV to augmentin.  Wound culture was obtained after start of antibiotics, therefore, yeast growth likely contaminant.  Treat for at lease 7 days po abx.  Per Dr. Reina, would like to keep pain pump if possible.     Acute metabolic encephalopathy- (present on admission)   Assessment & Plan    -Due to sepsis  Improvement the following day on IV antibiotics.  Resolved  Head CT negative.       Metastatic Colon cancer (HCC)- (present on admission)   Assessment & Plan    -Chronic, continue outpatient follow-up  Patient appears to have stage IV metastatic colon cancer.  He has had previous colectomy 18 inches removed.  He is followed by Dr. Jeff, oncology.  Discussed code status:  Wants full code, wants to be given a chance to recover per patient.  He is under pain management with Dr. Reina for bilateral groin and bilateral testicular pain.  Palliative care consult, patient requesting.     Inadequate pain control   Assessment & Plan    Patient states he cannot go home as he is in excruciating pain.  Dr. Reina had increased pain pump dilaudid dose on 7/25.  Call to Dr. Reina regarding ongoing severe pain.  Left message, no call back.     High anion gap metabolic acidosis- (present on admission)   Assessment & Plan    -Likely due to sepsis yet lactic acid is normal  -Repeat BMP in the morning     Benign prostate hyperplasia- (present on admission)   Assessment & Plan    -Continue Flomax      Essential hypertension- (present on admission)   Assessment & Plan    -Continue home losartan  -Place PRN enalapril  -Adjust as needed     COPD (chronic obstructive pulmonary disease) (HCC)- (present on admission)   Assessment & Plan    -No acute exacerbation          VTE prophylaxis: scds

## 2019-07-29 VITALS
WEIGHT: 147.49 LBS | DIASTOLIC BLOOD PRESSURE: 77 MMHG | BODY MASS INDEX: 18.34 KG/M2 | OXYGEN SATURATION: 95 % | SYSTOLIC BLOOD PRESSURE: 115 MMHG | RESPIRATION RATE: 16 BRPM | HEART RATE: 70 BPM | TEMPERATURE: 98.2 F | HEIGHT: 75 IN

## 2019-07-29 PROBLEM — T81.41XA INFECTION OF SUPERFICIAL INCISIONAL SURGICAL SITE AFTER PROCEDURE: Status: ACTIVE | Noted: 2019-07-29

## 2019-07-29 PROBLEM — E87.29 HIGH ANION GAP METABOLIC ACIDOSIS: Status: RESOLVED | Noted: 2019-07-23 | Resolved: 2019-07-29

## 2019-07-29 PROBLEM — A41.9 SEPSIS (HCC): Status: RESOLVED | Noted: 2019-02-28 | Resolved: 2019-07-29

## 2019-07-29 PROBLEM — R52 INADEQUATE PAIN CONTROL: Status: RESOLVED | Noted: 2019-07-26 | Resolved: 2019-07-29

## 2019-07-29 PROBLEM — G93.41 ACUTE METABOLIC ENCEPHALOPATHY: Status: RESOLVED | Noted: 2019-07-23 | Resolved: 2019-07-29

## 2019-07-29 LAB
BACTERIA BLD CULT: NORMAL
SIGNIFICANT IND 70042: NORMAL
SITE SITE: NORMAL
SOURCE SOURCE: NORMAL

## 2019-07-29 PROCEDURE — 700111 HCHG RX REV CODE 636 W/ 250 OVERRIDE (IP): Performed by: HOSPITALIST

## 2019-07-29 PROCEDURE — 700102 HCHG RX REV CODE 250 W/ 637 OVERRIDE(OP): Performed by: HOSPITALIST

## 2019-07-29 PROCEDURE — A9270 NON-COVERED ITEM OR SERVICE: HCPCS | Performed by: INTERNAL MEDICINE

## 2019-07-29 PROCEDURE — A9270 NON-COVERED ITEM OR SERVICE: HCPCS | Performed by: HOSPITALIST

## 2019-07-29 PROCEDURE — 700105 HCHG RX REV CODE 258: Performed by: HOSPITALIST

## 2019-07-29 PROCEDURE — 700102 HCHG RX REV CODE 250 W/ 637 OVERRIDE(OP): Performed by: INTERNAL MEDICINE

## 2019-07-29 PROCEDURE — 99239 HOSP IP/OBS DSCHRG MGMT >30: CPT | Performed by: HOSPITALIST

## 2019-07-29 PROCEDURE — 700111 HCHG RX REV CODE 636 W/ 250 OVERRIDE (IP): Performed by: INTERNAL MEDICINE

## 2019-07-29 RX ORDER — LACTOBACILLUS RHAMNOSUS GG 10B CELL
1 CAPSULE ORAL
Qty: 30 CAP | Refills: 0 | Status: SHIPPED | OUTPATIENT
Start: 2019-07-30 | End: 2020-01-24

## 2019-07-29 RX ORDER — SCOLOPAMINE TRANSDERMAL SYSTEM 1 MG/1
1 PATCH, EXTENDED RELEASE TRANSDERMAL
Qty: 4 PATCH | Refills: 3 | Status: SHIPPED | OUTPATIENT
Start: 2019-07-30 | End: 2020-01-24

## 2019-07-29 RX ADMIN — HYDROMORPHONE HYDROCHLORIDE 1 MG: 1 INJECTION, SOLUTION INTRAMUSCULAR; INTRAVENOUS; SUBCUTANEOUS at 09:11

## 2019-07-29 RX ADMIN — SODIUM CHLORIDE: 9 INJECTION, SOLUTION INTRAVENOUS at 09:06

## 2019-07-29 RX ADMIN — PREDNISONE 5 MG: 5 TABLET ORAL at 05:06

## 2019-07-29 RX ADMIN — PROCHLORPERAZINE EDISYLATE 10 MG: 5 INJECTION INTRAMUSCULAR; INTRAVENOUS at 05:06

## 2019-07-29 RX ADMIN — HYDROMORPHONE HYDROCHLORIDE 1 MG: 1 INJECTION, SOLUTION INTRAMUSCULAR; INTRAVENOUS; SUBCUTANEOUS at 13:20

## 2019-07-29 RX ADMIN — PROCHLORPERAZINE EDISYLATE 10 MG: 5 INJECTION INTRAMUSCULAR; INTRAVENOUS at 00:43

## 2019-07-29 RX ADMIN — TAMSULOSIN HYDROCHLORIDE 0.4 MG: 0.4 CAPSULE ORAL at 08:58

## 2019-07-29 RX ADMIN — LOSARTAN POTASSIUM 50 MG: 50 TABLET ORAL at 05:06

## 2019-07-29 RX ADMIN — Medication 1 CAPSULE: at 08:58

## 2019-07-29 RX ADMIN — PROCHLORPERAZINE EDISYLATE 10 MG: 5 INJECTION INTRAMUSCULAR; INTRAVENOUS at 13:20

## 2019-07-29 RX ADMIN — PROCHLORPERAZINE EDISYLATE 10 MG: 5 INJECTION INTRAMUSCULAR; INTRAVENOUS at 09:11

## 2019-07-29 RX ADMIN — HYDROMORPHONE HYDROCHLORIDE 1 MG: 1 INJECTION, SOLUTION INTRAMUSCULAR; INTRAVENOUS; SUBCUTANEOUS at 05:06

## 2019-07-29 RX ADMIN — OMEPRAZOLE 20 MG: 20 CAPSULE, DELAYED RELEASE ORAL at 05:06

## 2019-07-29 RX ADMIN — HYDROMORPHONE HYDROCHLORIDE 1 MG: 1 INJECTION, SOLUTION INTRAMUSCULAR; INTRAVENOUS; SUBCUTANEOUS at 00:43

## 2019-07-29 NOTE — PROGRESS NOTES
[facility] Medicine Daily Progress Note    Date of Service  7/28/2019    Chief Complaint  54 y.o. male admitted 7/23/2019 with altered mentation.    Hospital Course    7/24:  This 55 yo male with stage IV colon cancer status post resection 18 inch colectomy, but no chemotherapy or radiation therapy who has been suffering from left groin intractable pain had intrathecal pain pump placed by Dr. Reina 9 days ago.  Patient was seen in [facility] with altered mental status and transferred via care flight to [facility] for higher level of care.  The patient was started on vancomycin and Unasyn IV blood cultures obtained.  Sepsis protocol initiated.  Dr. Reina have been consulted by the admitting physician.  I have ordered a CT head without contrast due to the altered mental status.  His confusion did appear to be improving he does not recall any reason why he was transferred to [facility] but now understands it was due to a possible infection in his intrathecal pain pump.  I have ordered wound culture of the pain pump drainage which is a dark thick hematogenous discharge as well as a CT abdomen pelvis.  Patient does have elevated white count 14,000-13,000 today.  7/25: wound culture no growth x 24 hours, mentation back to normal.  Less drainage noted from RLQ abdominal incision.  Staples intact.  CT head negative. CT abdomen/pelvis negative for abscess.  Wbc improved from 13 to 9.8.  BCs negative x2, CSF culture no growth.  dc'd vancomycin since likely not MRSA.  Added doxycycline, continue unasyn IV.  Patient asking to increase IV dilaudid from q 6 to q 4 hours because that is what he was getting in [place], however Dr. Reina increased basal and bolus dilaudid pump amounts yesterday.  7/26: patient appears more comfortable today, but states he is in excruciating pain and cannot go home.  I left a message with Dr. Reina about increasing his pain pump further, no call back yet.  Changed unasyn IV to augmentin po with  doxycycline.  Wound culture with yeast growth, likely skin contaminant.  Culture done after start of vanco, unasyn antibiotics.  Called wife per patient request.   7/27:  Seen with PT, telling PT wants hospice care.  States intractable pain despite increased dose of pain pump yesterday.  Palliative care consult placed.  Persistent nausea.  dc'd augmentin, continue doxycycline x 5 days total.  No further drainage from pain pump site.   7/28:  Patient and wife are not comfortable with patient returning home at his level of pain despite increases in his pain pump by Dr. Reina.  Palliative care saw patient today who is wanting hospice care.  Hospice consult placed. *        Consultants/Specialty  Dr. Reina  Palliative care    Code Status  full    Disposition  Lives in [place] with his wife.  careflighted to [facility].  Home once medically cleared.  Anticipate home in a.m.m 7/27. PT no needs.  Hospice ordered per patient request.    Review of Systems  Review of Systems   Constitutional: Negative for chills, diaphoresis, fever and malaise/fatigue.   HENT: Negative for congestion and sore throat.    Eyes: Negative for pain and discharge.   Respiratory: Negative for cough, hemoptysis, sputum production, shortness of breath and wheezing.    Cardiovascular: Negative for chest pain, palpitations, claudication and leg swelling.   Gastrointestinal: Negative for abdominal pain, constipation, diarrhea, melena, nausea and vomiting.   Genitourinary: Negative for dysuria, frequency and urgency.   Musculoskeletal: Negative for back pain, joint pain (testicular pain bilateral), myalgias and neck pain.   Skin: Negative for itching and rash.   Neurological: Negative for dizziness, sensory change, speech change, focal weakness, loss of consciousness, weakness and headaches.   Endo/Heme/Allergies: Does not bruise/bleed easily.   Psychiatric/Behavioral: Negative for depression, memory loss, substance abuse and suicidal ideas.         Physical Exam  Temp:  [36.2 °C (97.2 °F)-36.5 °C (97.7 °F)] 36.5 °C (97.7 °F)  Pulse:  [64-72] 64  Resp:  [16-20] 20  BP: (124-147)/() 124/88  SpO2:  [95 %-100 %] 100 %    Physical Exam   Constitutional: He is oriented to person, place, and time. He appears well-developed and well-nourished. No distress.   HENT:   Head: Normocephalic and atraumatic.   Mouth/Throat: Oropharynx is clear and moist. No oropharyngeal exudate.   Eyes: Pupils are equal, round, and reactive to light. Conjunctivae and EOM are normal. Right eye exhibits no discharge. Left eye exhibits no discharge. No scleral icterus.   Neck: Normal range of motion. Neck supple. No JVD present. No tracheal deviation present. No thyromegaly present.   Cardiovascular: Normal rate, regular rhythm and normal heart sounds.  Exam reveals no gallop and no friction rub.    No murmur heard.  Pulmonary/Chest: Effort normal and breath sounds normal. No respiratory distress. He has no wheezes. He has no rales. He exhibits no tenderness.   Abdominal: Soft. Bowel sounds are normal. He exhibits no distension and no mass. There is no tenderness. There is no rebound and no guarding.   Area of pain pump insertion with staples intact.    RLQ abdominal incision now CD&I staples intact.  Extensive ecchymosis noted to abdomen from pain pump insertion.    Lumbar incision CD&I with running suture in place, no drainage or erythema noted.   Musculoskeletal: Normal range of motion. He exhibits no edema or tenderness.   Lymphadenopathy:     He has no cervical adenopathy.   Neurological: He is alert and oriented to person, place, and time. No cranial nerve deficit. He exhibits normal muscle tone.   Skin: Skin is warm and dry. No rash noted. He is not diaphoretic. No erythema.   Psychiatric: He has a normal mood and affect. His behavior is normal. Judgment and thought content normal.   Nursing note and vitals reviewed.      Fluids    Intake/Output Summary (Last 24 hours) at  07/28/19 1753  Last data filed at 07/28/19 0400   Gross per 24 hour   Intake              100 ml   Output              600 ml   Net             -500 ml       Laboratory  Recent Labs      07/26/19   0331  07/27/19   0450   WBC  9.1  8.8   RBC  4.13*  4.34*   HEMOGLOBIN  11.9*  12.5*   HEMATOCRIT  35.2*  37.2*   MCV  85.2  85.7   MCH  28.8  28.8   MCHC  33.8  33.6*   RDW  56.5*  56.8*   PLATELETCT  325  330   MPV  8.6*  8.5*     Recent Labs      07/26/19   0331  07/27/19   0450   SODIUM  138  134*   POTASSIUM  3.9  3.7   CHLORIDE  105  103   CO2  23  22   GLUCOSE  106*  105*   BUN  12  12   CREATININE  1.14  1.22   CALCIUM  9.3  9.1                   Imaging  CT-ABDOMEN-PELVIS WITH   Final Result      1.  Noncalcified pulmonary nodules are unchanged in each lung which are suspicious for metastases.      2.  Left hydronephrosis and proximal hydroureter caused by soft tissue density obstruction in the left retroperitoneum. There is possible thickening of the adjacent sigmoid colon. Neoplastic obstruction is a possibility. Inflammation or infection in this    area is also possible.      3.  Postoperative changes and mechanical pump are noted in the lower abdominal wall as described above. Soft tissue emphysema is present likely related to surgery. No localized fluid collection is appreciated. Soft tissues of the abdominal wall in this    region are not well visualized due to beam hardening artifact arising from the pump. Small fluid collection hematoma or abscess immediately adjacent to the pump is possible.         CT-HEAD W/O   Final Result      Prominent subdural spaces bilaterally likely related to underlying atrophy versus subdural hygromas.      DX-CHEST-PORTABLE (1 VIEW)   Final Result         1.  No acute cardiopulmonary disease.           Assessment/Plan  Sepsis (HCC)- (present on admission)   Assessment & Plan    -This is severe sepsis with the following associated acute organ dysfunction(s): metabolic/septic  encephalopathy.   -Post recent pain pump insertion with associated hematoma  -ERP did perform a lumbar puncture, clear fluid was removed, unlikely to be meningitis  CSF culture negative.   blood cultures x2 no growth.  -Sepsis order set has been initiated, patient will receive significant IV fluids  -Trend lactic acid  -Patient is currently hemodynamically stable  wound culture since persistent drainage for 9 days at abdominal insertion site no growth x 24 hous..  Appears to be a dark hematogenous drainage.  Extensive ecchymosis.  Hold on any anticoagulation for DVT prophylaxis.     CT abdomen pelvis negative for abscess pocket.  Changed vancomycin to doxycycline with unasyn IV to augmentin.  Wound culture was obtained after start of antibiotics, therefore, yeast growth likely contaminant.  Treat for at lease 7 days po abx.  Per Dr. Reina, would like to keep pain pump if possible.     Acute metabolic encephalopathy- (present on admission)   Assessment & Plan    -Due to sepsis  Improvement the following day on IV antibiotics.  Resolved  Head CT negative.       Metastatic Colon cancer (HCC)- (present on admission)   Assessment & Plan    -Chronic, continue outpatient follow-up  Patient appears to have stage IV metastatic colon cancer.  He has had previous colectomy 18 inches removed.  He is followed by Dr. Jeff, oncology.  Discussed code status:  Wants full code, wants to be given a chance to recover per patient.  He is under pain management with Dr. Reina for bilateral groin and bilateral testicular pain.  Palliative care consult, patient requesting.  Hospice consult placed.     Inadequate pain control   Assessment & Plan    Patient states he cannot go home as he is in excruciating pain.  Dr. Reina had increased pain pump dilaudid dose on 7/25.  Call to Dr. Reina regarding ongoing severe pain.  Left message, no call back.  Hospice consult placed.     High anion gap metabolic acidosis- (present on admission)    Assessment & Plan    -Likely due to sepsis yet lactic acid is normal  -Repeat BMP in the morning     Benign prostate hyperplasia- (present on admission)   Assessment & Plan    -Continue Flomax     Essential hypertension- (present on admission)   Assessment & Plan    -Continue home losartan  -Place PRN enalapril  -Adjust as needed     COPD (chronic obstructive pulmonary disease) (HCC)- (present on admission)   Assessment & Plan    -No acute exacerbation          VTE prophylaxis: scds

## 2019-07-29 NOTE — PROGRESS NOTES
VSS. A+O x 4. Pain managed with pain pump and PRN dilaudid. No episodes vomiting this shift. Reporting decreased nausea. Nausea managed with scopolamine patch and PRN Zofran and compazine. Regular diet: ate 50% of dinner. Dressing to [facility] C/D/I. Comfort needs adressed. Bed alarm on, call bell within reach.

## 2019-07-29 NOTE — CARE PLAN
Problem: Safety  Goal: Will remain free from falls  Outcome: PROGRESSING AS EXPECTED  Interventions: bed alarm, call bell within reach, non skid footwear, frequent nursing rounds, education r/t fall precautions.     Problem: Infection  Goal: Will remain free from infection  Outcome: PROGRESSING AS EXPECTED  No signs/symptoms of infx. VSS. Dressing to RLQ C/D/I. Education r/t infection prevention provided to pt. Standard precautions implemented.

## 2019-07-29 NOTE — PROGRESS NOTES
MD cleared pt for discharge, aware no hospice services available in [place]. Pt also aware, asking to leave. Wife updated via phone. Discussed discharge instructions, all questions answered. Tele and IV D/C. Belongings gathered and given to pt upon leaving the unit. Pt plans to go to Dr. Reina's office at this time. Pt escorted to lobby in stable condition via wheelchair by CNA.

## 2019-07-29 NOTE — CARE PLAN
Problem: Communication  Goal: The ability to communicate needs accurately and effectively will improve  Outcome: PROGRESSING AS EXPECTED  Able to make needs known. Updated on POC, all questions answered. Call bell within reach    Problem: Safety  Goal: Will remain free from falls  Outcome: PROGRESSING AS EXPECTED  Bed in lowest position, wheels locked. Call bell within reach, calls appropriately for assistance. Fall prevention education provided, verbalized understanding. Hourly rounding. Fall/safety precautions in place. Pt remains free from fall/injury at this time.      Problem: Pain Management  Goal: Pain level will decrease to patient's comfort goal  Outcome: PROGRESSING AS EXPECTED  Pain well controlled with pain pump and PRN dilaudid for breakthrough pain. Will continue to monitor.

## 2019-07-29 NOTE — DISCHARGE PLANNING
Agency/Facility Name: [facility]   Spoke To: Geno   Outcome: Patient declined; non-covered service area. ANGELICA Wyman notified.

## 2019-07-29 NOTE — PROGRESS NOTES
Dr. Reina's office called. Pt to report to office on day of discharge to have staples removed. Pt updated, understands/agreeable to plan.

## 2019-07-29 NOTE — DISCHARGE SUMMARY
Discharge Summary    CHIEF COMPLAINT ON ADMISSION  Chief Complaint   Patient presents with   • ALOC       Reason for Admission  EMS     Admission Date  7/23/2019    CODE STATUS  Full Code Code status was addressed this admission with myself as well as palliative care.  The patient repeats he wants to be given a chance to survive and he is seeking active chemotherapy at this time, he wishes to remain FULL code.      HPI & HOSPITAL COURSE  This is a 54 y.o. male here with altered mentation/confusion.    7/24:  This 55 yo male with stage IV colon cancer status post resection 18 inch colectomy, but no chemotherapy or radiation therapy who has been suffering from left groin intractable pain had intrathecal pain pump placed by Dr. Reina 9 days ago.  Patient was seen in [place] [facility] with altered mental status and transferred via care flight to [facility] for higher level of care.  The patient was started on vancomycin and Unasyn IV blood cultures obtained.  Sepsis protocol initiated.  Dr. Reina have been consulted by the admitting physician.  I have ordered a CT head without contrast due to the altered mental status.  His confusion did appear to be improving he does not recall any reason why he was transferred to [facility] but now understands it was due to a possible infection in his intrathecal pain pump.  I have ordered wound culture of the pain pump drainage which is a dark thick hematogenous discharge as well as a CT abdomen pelvis.  Patient does have elevated white count 14,000-13,000 today.  7/25: wound culture no growth x 24 hours, mentation back to normal.  Less drainage noted from RLQ abdominal incision.  Staples intact.  CT head negative. CT abdomen/pelvis negative for abscess.  Wbc improved from 13 to 9.8.  BCs negative x2, CSF culture no growth.  dc'd vancomycin since likely not MRSA.  Added doxycycline, continue unasyn IV.  Patient asking to increase IV dilaudid from q 6 to q 4 hours because that is what he was  getting in [place], however Dr. Reina increased basal and bolus dilaudid pump amounts yesterday.  7/26: patient appears more comfortable today, but states he is in excruciating pain and cannot go home.  I left a message with Dr. Reina about increasing his pain pump further, no call back yet.  Changed unasyn IV to augmentin po with doxycycline.  Wound culture with yeast growth, likely skin contaminant.  Culture done after start of vanco, unasyn antibiotics.  Called wife per patient request.   7/27:  Seen with PT, telling PT wants hospice care.  States intractable pain despite increased dose of pain pump yesterday.  Palliative care consult placed.  Persistent nausea.  dc'd augmentin, continue doxycycline x 5 days total.  No further drainage from pain pump site.   7/28:  Patient and wife are not comfortable with patient returning home at his level of pain despite increases in his pain pump by Dr. Reina.  Palliative care saw patient today who is wanting hospice care.  Hospice consult placed. *       [facility] declined patient.  It is unclear if this was due to patient lives in [place] with his wife.  He prefers to go home.   No needs per occupational and physical therapy.   The patient's pain pump site remained CD&I, dark blood only on bandage.  Angelian intact, he also has a lumbar incision that looks CD&I.  The patient did well with the scopolamine patch for nausea.  He was able to eat 50% of his meals.    He finished 5 days of antibiotics, wound culture with yeast contaminant only.  Blood cultures negative x2.  CSF cultures negative.  It is unclear what caused his acute confusion.  Sepsis had resolved at time of discharge.     Therefore, he is discharged in good and stable condition to home with close outpatient follow-up.    The patient met 2-midnight criteria for an inpatient stay at the time of discharge.    Discharge Date  7/29/2019    FOLLOW UP ITEMS POST DISCHARGE  Follow up with Dr. Reina, pain  management  Same day for staple removal, recheck.  Follow up with oncologist, Dr. Urbina in 1-2 weeks for ongoing PICC line management and colon cancer management.    DISCHARGE DIAGNOSES  Active Problems:    Metastatic Colon cancer (HCC) POA: Yes      Overview: diagnosed 2016 treated with resection and chemo      had recurrence 08/2018, pulmonary nodules biopsied at that time showed       adenocarcinoma    COPD (chronic obstructive pulmonary disease) (HCC) POA: Yes    Hepatitis C POA: Yes    Essential hypertension POA: Yes    Nausea & vomiting POA: Yes    Benign prostate hyperplasia POA: Yes    Infection of superficial incisional surgical site after procedure POA: Yes  Resolved Problems:    Sepsis (HCC) POA: Yes    Acute metabolic encephalopathy POA: Yes    High anion gap metabolic acidosis POA: Yes    Inadequate pain control POA: Yes      FOLLOW UP  No future appointments.  No follow-up provider specified.    MEDICATIONS ON DISCHARGE     Medication List      START taking these medications      Instructions   hydrocortisone rectal 2.5 % Crea  Commonly known as:  PROCTOZONE HC   Insert 1 Application in rectum 3 times a day as needed.  Dose:  1 Application     lactobacillus rhamnosus Caps capsule  Start taking on:  7/30/2019   Take 1 Cap by mouth every morning with breakfast.  Dose:  1 Cap     scopolamine 1 mg/72hr Pt72  Start taking on:  7/30/2019  Commonly known as:  TRANSDERM-SCOP   Apply 1 Patch to skin as directed every 72 hours.  Dose:  1 Patch        CONTINUE taking these medications      Instructions   HYDROmorphone 4 MG Tabs  Commonly known as:  DILAUDID   Take 4 mg by mouth every 6 hours as needed (Breakthru Pain).  Dose:  4 mg     lansoprazole 30 MG Cpdr  Commonly known as:  PREVACID   Take 30 mg by mouth every day.  Dose:  30 mg     losartan 50 MG Tabs  Commonly known as:  COZAAR   Take 50 mg by mouth every day.  Dose:  50 mg     ondansetron 4 MG Tbdp  Commonly known as:  ZOFRAN ODT   Take 8 mg by mouth  every 6 hours as needed for Nausea.  Dose:  8 mg     Pain Pump Breana  Commonly known as:  patient supplied   1.0001-2.1839 mg by Injection route Continuous. Patient's Pain Pump (placed and maintained as an outpatient) Medications/concentrations:  DILAUDID 5.0 mg/ml Date of Placement: 7/10/19 Last changed: 7/10/19 Continuous infusion rates (Drug/Rate): DILAUDID 5.0 mg/ml - 0.0417 mg/hr Patient activation dose: DILAUDID 5.0 mg/m l- 0.0500 mg Patient activation lockout interval: 1/hr  Maximum activations per day: 24/day  Dose:  1.0001-2.1839 mg     polyethylene glycol/lytes Pack  Commonly known as:  MIRALAX   Take 1 Packet by mouth every day. Can use up to 2 times daily if no bowel movement in 24 hours.  Dose:  17 g     predniSONE 5 MG Tabs  Commonly known as:  DELTASONE   Take 5 mg by mouth every day.  Dose:  5 mg     promethazine 50 MG Tabs  Commonly known as:  PHENERGAN   Take 50 mg by mouth every 6 hours as needed for Nausea/Vomiting.  Dose:  50 mg     tamsulosin 0.4 MG capsule  Commonly known as:  FLOMAX   Take 0.4 mg by mouth ONE-HALF HOUR AFTER BREAKFAST.  Dose:  0.4 mg            Allergies  Allergies   Allergen Reactions   • Levaquin    • Nubain [Nalbuphine]        DIET  Orders Placed This Encounter   Procedures   • Diet Order Regular     Standing Status:   Standing     Number of Occurrences:   1     Order Specific Question:   Diet:     Answer:   Regular [1]       ACTIVITY  As tolerated.  Weight bearing as tolerated    CONSULTATIONS  Dr Reina  Palliative care    PROCEDURES  Lumbar puncture by ERP 7/23/2019    CT abdomen/pelvis:    1.  Noncalcified pulmonary nodules are unchanged in each lung which are suspicious for metastases.    2.  Left hydronephrosis and proximal hydroureter caused by soft tissue density obstruction in the left retroperitoneum. There is possible thickening of the adjacent sigmoid colon. Neoplastic obstruction is a possibility. Inflammation or infection in this   area is also possible.    3.   Postoperative changes and mechanical pump are noted in the lower abdominal wall as described above. Soft tissue emphysema is present likely related to surgery. No localized fluid collection is appreciated. Soft tissues of the abdominal wall in this   region are not well visualized due to beam hardening artifact arising from the pump. Small fluid collection hematoma or abscess immediately adjacent to the pump is possible.   Reading Provider Reading Date   Pito Mac M.D. Jul 24, 2019     CT head w/o:    Prominent subdural spaces bilaterally likely related to underlying atrophy versus subdural hygromas.   Reading Provider Reading Date   Bernardo Chen M.D. Jul 24, 2019       LABORATORY  Lab Results   Component Value Date    SODIUM 134 (L) 07/27/2019    POTASSIUM 3.7 07/27/2019    CHLORIDE 103 07/27/2019    CO2 22 07/27/2019    GLUCOSE 105 (H) 07/27/2019    BUN 12 07/27/2019    CREATININE 1.22 07/27/2019        Lab Results   Component Value Date    WBC 8.8 07/27/2019    HEMOGLOBIN 12.5 (L) 07/27/2019    HEMATOCRIT 37.2 (L) 07/27/2019    PLATELETCT 330 07/27/2019        Total time of the discharge process exceeds 45 minutes.

## 2019-07-29 NOTE — DISCHARGE PLANNING
Anticipated Discharge Disposition: Home with Hospice    Action: LSW informed by [facility] that [facility] does not provide hospice services out in [place].   LSW called [facility] stated they do not offer services in [place].     Barriers to Discharge: None    Plan: LSW to f/u with Dr. Dueñas during rounds.        Addendum 1300  LSW informed Dr. Dueñas about [facility] denial and no hospice services in [place]. Dr. Dueñas stated that the pt stated he is okay to go home without hospice.

## 2019-07-29 NOTE — DISCHARGE INSTRUCTIONS
Discharge Instructions    Discharged to home by car with relative. Discharged via wheelchair, hospital escort: Yes.  Special equipment needed: Not Applicable    Be sure to schedule a follow-up appointment with your primary care doctor or any specialists as instructed.     Discharge Plan:        I understand that a diet low in cholesterol, fat, and sodium is recommended for good health. Unless I have been given specific instructions below for another diet, I accept this instruction as my diet prescription.   Other diet: regular    Special Instructions: None    · Is patient discharged on Warfarin / Coumadin?   No     Depression / Suicide Risk    As you are discharged from this [facility] facility, it is important to learn how to keep safe from harming yourself.    Recognize the warning signs:  · Abrupt changes in personality, positive or negative- including increase in energy   · Giving away possessions  · Change in eating patterns- significant weight changes-  positive or negative  · Change in sleeping patterns- unable to sleep or sleeping all the time   · Unwillingness or inability to communicate  · Depression  · Unusual sadness, discouragement and loneliness  · Talk of wanting to die  · Neglect of personal appearance   · Rebelliousness- reckless behavior  · Withdrawal from people/activities they love  · Confusion- inability to concentrate     If you or a loved one observes any of these behaviors or has concerns about self-harm, here's what you can do:  · Talk about it- your feelings and reasons for harming yourself  · Remove any means that you might use to hurt yourself (examples: pills, rope, extension cords, firearm)  · Get professional help from the community (Mental Health, Substance Abuse, psychological counseling)  · Do not be alone:Call your Safe Contact- someone whom you trust who will be there for you.  · Call your local CRISIS HOTLINE 529-5130 or 825-078-4681  · Call your local Children's Mobile Crisis  Response Team [facility] (579) 960-9898 or www.[employer]  · Call the toll free National Suicide Prevention Hotlines   · National Suicide Prevention Lifeline 830-612-KLRS (1426)  · National Hope Line Network 800-SUICIDE (603-5270)        Scopolamine skin patches  What is this medicine?  SCOPOLAMINE (skoe MARCIAL a meen) is used to prevent nausea and vomiting caused by motion sickness, anesthesia and surgery.  This medicine may be used for other purposes; ask your health care provider or pharmacist if you have questions.  COMMON BRAND NAME(S): Transderm Scop  What should I tell my health care provider before I take this medicine?  They need to know if you have any of these conditions:  -glaucoma  -kidney or liver disease  -an unusual or allergic reaction (especially skin allergy) to scopolamine, atropine, other medicines, foods, dyes, or preservatives  -pregnant or trying to get pregnant  -breast-feeding  How should I use this medicine?  This medicine is for external use only. Follow the directions on the prescription label. One patch contains enough medicine to prevent motion sickness for up to 3 days. Apply the patch at least 4 hours before you need it and only wear one disc at a time. Choose an area behind the ear, that is clean, dry, hairless and free from any cuts or irritation. Wipe the area with a clean dry tissue. Peel off the plastic backing of the skin patch, trying not to touch the adhesive side with your hands. Do not cut the patches. Firmly apply to the area you have chosen, with the metallic side of the patch to the skin and the tan-colored side showing. Once firmly in place, wash your hands well with soap and water. Remove the disc after 3 days, or sooner if you no longer need it. After removing the patch, wash your hands and the area behind your ear thoroughly with soap and water. The patch will still contain some medicine after use. To avoid accidental contact or ingestion by children or pets,  fold the used patch in half with the sticky side together and throw away in the trash out of the reach of children and pets. If you need to use a second patch after you remove the first, place it behind the other ear.  Talk to your pediatrician regarding the use of this medicine in children. Special care may be needed.  Overdosage: If you think you have taken too much of this medicine contact a poison control center or emergency room at once.  NOTE: This medicine is only for you. Do not share this medicine with others.  What if I miss a dose?  Make sure you apply the patch at least 4 hours before you need it. You can apply it the night before traveling.  What may interact with this medicine?  -benztropine  -bethanechol  -medicines for anxiety or sleeping problems like diazepam or temazepam  -medicines for hay fever and other allergies  -medicines for mental depression  -muscle relaxants  This list may not describe all possible interactions. Give your health care provider a list of all the medicines, herbs, non-prescription drugs, or dietary supplements you use. Also tell them if you smoke, drink alcohol, or use illegal drugs. Some items may interact with your medicine.  What should I watch for while using this medicine?  Keep the patch dry, if possible, to prevent it from falling off. Limited contact with water, however, as in bathing or swimming, will not affect the system. If the patch falls off, throw it away and put a new one behind the other ear.  You may get drowsy or dizzy. Do not drive, use machinery, or do anything that needs mental alertness until you know how this medicine affects you. Do not stand or sit up quickly, especially if you are an older patient. This reduces the risk of dizzy or fainting spells. Alcohol may interfere with the effect of this medicine. Avoid alcoholic drinks.  Your mouth may get dry. Chewing sugarless gum or sucking hard candy, and drinking plenty of water may help. Contact your  doctor if the problem does not go away or is severe.  This medicine may cause dry eyes and blurred vision. If you wear contact lenses you may feel some discomfort. Lubricating drops may help. See your eye doctor if the problem does not go away or is severe.  If you are going to have a magnetic resonance imaging (MRI) procedure, tell your MRI technician if you have this patch on your body. It must be removed before a MRI.  What side effects may I notice from receiving this medicine?  Side effects that you should report to your doctor or health care professional as soon as possible:  -agitation, nervousness, confusion  -blurred vision and other eye problems  -dizziness, drowsiness  -eye pain or redness in the whites of the eye  -hallucinations  -pain or difficulty passing urine  -skin rash, itching  -vomiting  Side effects that usually do not require medical attention (report to your doctor or health care professional if they continue or are bothersome):  -headache  -nausea  This list may not describe all possible side effects. Call your doctor for medical advice about side effects. You may report side effects to FDA at 0-570-FDA-6605.  Where should I keep my medicine?  Keep out of the reach of children.  Store at room temperature between 20 and 25 degrees C (68 and 77 degrees F). Throw away any unused medicine after the expiration date. When you remove a patch, fold it and throw it in the trash as described above.  NOTE: This sheet is a summary. It may not cover all possible information. If you have questions about this medicine, talk to your doctor, pharmacist, or health care provider.  © 2018 [employer] (2013-05-16 13:31:48)        PICC Line Instructions    How to Care for your PICC  • Do not take a bath, swim, or use hot tubs when you have a PICC. Cover PICC line with clear plastic wrap and tape to keep it dry while showering  • Check the PICC insertion site daily for leakage, redness, swelling, or  "pain.  • Flush the PICC as directed by your health care provider. Let your health care provider know right away if the PICC is difficult to flush or does not flush. Do not use force to flush the PICC.  • Do not use a syringe that is less than 10 mL to flush the PICC  • Avoid blood pressure checks on the arm with the PICC.  • Do not take the PICC out yourself. Only a trained clinical professional should remove the PICC  • Make sure you or anyone who access your PICC Line washes their hands before using the line  • Make sure the hub of the line is \"scrubbed\" prior to using the line  Dressing Changes  • Change the PICC dressing as instructed by your health care provider.  • Change your PICC dressing if it becomes loose or wet.  When to seek medical attention  • PICC is accidentally pulled all the way out  • There is any type of drainage, redness, or swelling where the PICC enters the skin.  • You cannot flush the PICC, it is difficult to flush, or the PICC leaks around the insertion site when it is flushed.  • You hear a \"flushing\" sound when the PICC is flushed.  • You notice a hole or tear in the PICC.  • You develop chills or a fever        "

## 2019-07-29 NOTE — DISCHARGE PLANNING
Received Choice form at 0840.  Agency/Facility Name: [facility]   Referral sent per Choice form at 0845.

## 2019-07-29 NOTE — PROGRESS NOTES
Assumed care of pt w/ bedside report from AQUILES Nguyen. Pt resting comfortably in bed, no complaints offered. Fall precautions/safety maintained. Hourly rounding. Will continue to monitor.

## 2019-10-19 NOTE — PROGRESS NOTES
Received report and assumed pt care at 1900 change of shift.  Pt A&Ox4, on RA and self up.  IV access re-established before next due dose of PCN.  Pt wearing non skid treaded socks, environment uncluttered, bed in lowest and locked position, bilat upper bed rails up and call light and personal items within reach.   Yes

## 2019-12-30 ENCOUNTER — APPOINTMENT (OUTPATIENT)
Dept: RADIOLOGY | Facility: MEDICAL CENTER | Age: 54
End: 2019-12-30
Attending: INTERNAL MEDICINE
Payer: COMMERCIAL

## 2020-01-24 RX ORDER — CEPHALEXIN 500 MG/1
TABLET ORAL 2 TIMES DAILY
Status: ON HOLD | COMMUNITY
Start: 2020-01-23 | End: 2020-01-28

## 2020-01-24 NOTE — OR NURSING
PreAdmit Appointment Tele:  Patient instructed to continue regularly prescribed medications through day before surgery. Instructed to take the following medications the day of surgery with a sip of water per Anesthesia protocol: Dilaudid if needed, Prevacid, Zofran if needed, Deltasone, Phenergan if needed and Flomax. Spouse states patient denies issues with Anesthesia. Patient vomiting due to pain pump issues per spouse. Spouse instructed to call Dr. Reina's office and let them know. Spouse asking if patient can be admitted prior to surgery 1/28. Patient is bedridden 95% of the time. Spouse will either take the patient Monday morning for labs or labs will be drawn in hospital if pre admitted.

## 2020-01-27 NOTE — OR NURSING
Talked to spouse. Labs to be done today in [place] at [facility]. Requested lab results to be faxed to [phone].

## 2020-01-28 ENCOUNTER — ANESTHESIA EVENT (OUTPATIENT)
Dept: SURGERY | Facility: MEDICAL CENTER | Age: 55
DRG: 908 | End: 2020-01-28
Payer: COMMERCIAL

## 2020-01-28 ENCOUNTER — ANESTHESIA (OUTPATIENT)
Dept: SURGERY | Facility: MEDICAL CENTER | Age: 55
DRG: 908 | End: 2020-01-28
Payer: COMMERCIAL

## 2020-01-28 ENCOUNTER — APPOINTMENT (OUTPATIENT)
Dept: RADIOLOGY | Facility: MEDICAL CENTER | Age: 55
DRG: 908 | End: 2020-01-28
Attending: PAIN MEDICINE
Payer: COMMERCIAL

## 2020-01-28 ENCOUNTER — HOSPITAL ENCOUNTER (INPATIENT)
Facility: MEDICAL CENTER | Age: 55
LOS: 9 days | DRG: 908 | End: 2020-02-14
Attending: PAIN MEDICINE | Admitting: PAIN MEDICINE
Payer: COMMERCIAL

## 2020-01-28 DIAGNOSIS — C18.9 MALIGNANT NEOPLASM OF COLON, UNSPECIFIED PART OF COLON (HCC): ICD-10-CM

## 2020-01-28 DIAGNOSIS — T81.41XA INFECTION OF SUPERFICIAL INCISIONAL SURGICAL SITE AFTER PROCEDURE, INITIAL ENCOUNTER: ICD-10-CM

## 2020-01-28 LAB
ALBUMIN SERPL BCP-MCNC: 4 G/DL (ref 3.2–4.9)
BUN SERPL-MCNC: 7 MG/DL (ref 8–22)
CALCIUM SERPL-MCNC: 9.4 MG/DL (ref 8.4–10.2)
CHLORIDE SERPL-SCNC: 97 MMOL/L (ref 96–112)
CO2 SERPL-SCNC: 25 MMOL/L (ref 20–33)
CREAT SERPL-MCNC: 0.82 MG/DL (ref 0.5–1.4)
EKG IMPRESSION: NORMAL
GLUCOSE SERPL-MCNC: 147 MG/DL (ref 65–99)
MAGNESIUM SERPL-MCNC: 1.9 MG/DL (ref 1.5–2.5)
PHOSPHATE SERPL-MCNC: 3 MG/DL (ref 2.5–4.5)
POTASSIUM SERPL-SCNC: 4.1 MMOL/L (ref 3.6–5.5)
SODIUM SERPL-SCNC: 135 MMOL/L (ref 135–145)

## 2020-01-28 PROCEDURE — 83735 ASSAY OF MAGNESIUM: CPT

## 2020-01-28 PROCEDURE — 00PU03Z REMOVAL OF INFUSION DEVICE FROM SPINAL CANAL, OPEN APPROACH: ICD-10-PCS | Performed by: PAIN MEDICINE

## 2020-01-28 PROCEDURE — 36415 COLL VENOUS BLD VENIPUNCTURE: CPT

## 2020-01-28 PROCEDURE — 700102 HCHG RX REV CODE 250 W/ 637 OVERRIDE(OP): Performed by: INTERNAL MEDICINE

## 2020-01-28 PROCEDURE — 700102 HCHG RX REV CODE 250 W/ 637 OVERRIDE(OP): Performed by: ANESTHESIOLOGY

## 2020-01-28 PROCEDURE — 0JPT0VZ REMOVAL OF INFUSION PUMP FROM TRUNK SUBCUTANEOUS TISSUE AND FASCIA, OPEN APPROACH: ICD-10-PCS | Performed by: PAIN MEDICINE

## 2020-01-28 PROCEDURE — G0378 HOSPITAL OBSERVATION PER HR: HCPCS

## 2020-01-28 PROCEDURE — C1772 INFUSION PUMP, PROGRAMMABLE: HCPCS | Performed by: PAIN MEDICINE

## 2020-01-28 PROCEDURE — 160002 HCHG RECOVERY MINUTES (STAT): Performed by: PAIN MEDICINE

## 2020-01-28 PROCEDURE — 501838 HCHG SUTURE GENERAL: Performed by: PAIN MEDICINE

## 2020-01-28 PROCEDURE — 700111 HCHG RX REV CODE 636 W/ 250 OVERRIDE (IP)

## 2020-01-28 PROCEDURE — A6402 STERILE GAUZE <= 16 SQ IN: HCPCS | Performed by: PAIN MEDICINE

## 2020-01-28 PROCEDURE — 93005 ELECTROCARDIOGRAM TRACING: CPT | Performed by: PAIN MEDICINE

## 2020-01-28 PROCEDURE — 700101 HCHG RX REV CODE 250: Performed by: ANESTHESIOLOGY

## 2020-01-28 PROCEDURE — 160048 HCHG OR STATISTICAL LEVEL 1-5: Performed by: PAIN MEDICINE

## 2020-01-28 PROCEDURE — 302135 SEQUENTIAL COMPRESSION MACHINE: Performed by: PAIN MEDICINE

## 2020-01-28 PROCEDURE — 700105 HCHG RX REV CODE 258: Performed by: INTERNAL MEDICINE

## 2020-01-28 PROCEDURE — 99220 PR INITIAL OBSERVATION CARE,LEVL III: CPT | Performed by: INTERNAL MEDICINE

## 2020-01-28 PROCEDURE — 502000 HCHG MISC OR IMPLANTS RC 0278: Performed by: PAIN MEDICINE

## 2020-01-28 PROCEDURE — 306481 GARMENT,FOOT IMPAD VASO: Performed by: ORTHOPAEDIC SURGERY

## 2020-01-28 PROCEDURE — A6222 GAUZE <=16 IN NO W/SAL W/O B: HCPCS | Performed by: PAIN MEDICINE

## 2020-01-28 PROCEDURE — 502240 HCHG MISC OR SUPPLY RC 0272: Performed by: PAIN MEDICINE

## 2020-01-28 PROCEDURE — 00HU03Z INSERTION OF INFUSION DEVICE INTO SPINAL CANAL, OPEN APPROACH: ICD-10-PCS | Performed by: PAIN MEDICINE

## 2020-01-28 PROCEDURE — 80069 RENAL FUNCTION PANEL: CPT

## 2020-01-28 PROCEDURE — 93010 ELECTROCARDIOGRAM REPORT: CPT | Performed by: INTERNAL MEDICINE

## 2020-01-28 PROCEDURE — 700101 HCHG RX REV CODE 250: Performed by: PAIN MEDICINE

## 2020-01-28 PROCEDURE — A9270 NON-COVERED ITEM OR SERVICE: HCPCS | Performed by: ANESTHESIOLOGY

## 2020-01-28 PROCEDURE — 700111 HCHG RX REV CODE 636 W/ 250 OVERRIDE (IP): Performed by: ANESTHESIOLOGY

## 2020-01-28 PROCEDURE — A6223 GAUZE >16<=48 NO W/SAL W/O B: HCPCS | Performed by: PAIN MEDICINE

## 2020-01-28 PROCEDURE — 160009 HCHG ANES TIME/MIN: Performed by: PAIN MEDICINE

## 2020-01-28 PROCEDURE — 0JH80VZ INSERTION OF INFUSION PUMP INTO ABDOMEN SUBCUTANEOUS TISSUE AND FASCIA, OPEN APPROACH: ICD-10-PCS | Performed by: PAIN MEDICINE

## 2020-01-28 PROCEDURE — C1755 CATHETER, INTRASPINAL: HCPCS | Performed by: PAIN MEDICINE

## 2020-01-28 PROCEDURE — 501445 HCHG STAPLER, SKIN DISP: Performed by: PAIN MEDICINE

## 2020-01-28 PROCEDURE — 160035 HCHG PACU - 1ST 60 MINS PHASE I: Performed by: PAIN MEDICINE

## 2020-01-28 PROCEDURE — 700105 HCHG RX REV CODE 258: Performed by: PAIN MEDICINE

## 2020-01-28 PROCEDURE — 160041 HCHG SURGERY MINUTES - EA ADDL 1 MIN LEVEL 4: Performed by: PAIN MEDICINE

## 2020-01-28 PROCEDURE — 700111 HCHG RX REV CODE 636 W/ 250 OVERRIDE (IP): Performed by: INTERNAL MEDICINE

## 2020-01-28 PROCEDURE — 3E0R3NZ INTRODUCTION OF ANALGESICS, HYPNOTICS, SEDATIVES INTO SPINAL CANAL, PERCUTANEOUS APPROACH: ICD-10-PCS | Performed by: PAIN MEDICINE

## 2020-01-28 PROCEDURE — 160029 HCHG SURGERY MINUTES - 1ST 30 MINS LEVEL 4: Performed by: PAIN MEDICINE

## 2020-01-28 PROCEDURE — A9270 NON-COVERED ITEM OR SERVICE: HCPCS | Performed by: INTERNAL MEDICINE

## 2020-01-28 PROCEDURE — 160036 HCHG PACU - EA ADDL 30 MINS PHASE I: Performed by: PAIN MEDICINE

## 2020-01-28 DEVICE — IMPLANTABLE DEVICE: Type: IMPLANTABLE DEVICE | Status: FUNCTIONAL

## 2020-01-28 RX ORDER — OXYCODONE HCL 5 MG/5 ML
5 SOLUTION, ORAL ORAL
Status: COMPLETED | OUTPATIENT
Start: 2020-01-28 | End: 2020-01-28

## 2020-01-28 RX ORDER — MEPERIDINE HYDROCHLORIDE 25 MG/ML
12.5 INJECTION INTRAMUSCULAR; INTRAVENOUS; SUBCUTANEOUS
Status: COMPLETED | OUTPATIENT
Start: 2020-01-28 | End: 2020-01-28

## 2020-01-28 RX ORDER — CEFAZOLIN SODIUM 1 G/3ML
INJECTION, POWDER, FOR SOLUTION INTRAMUSCULAR; INTRAVENOUS PRN
Status: DISCONTINUED | OUTPATIENT
Start: 2020-01-28 | End: 2020-01-28 | Stop reason: SURG

## 2020-01-28 RX ORDER — HALOPERIDOL 5 MG/ML
1 INJECTION INTRAMUSCULAR
Status: DISCONTINUED | OUTPATIENT
Start: 2020-01-28 | End: 2020-01-28 | Stop reason: HOSPADM

## 2020-01-28 RX ORDER — AMOXICILLIN 250 MG
2 CAPSULE ORAL 2 TIMES DAILY
Status: DISCONTINUED | OUTPATIENT
Start: 2020-01-28 | End: 2020-02-10

## 2020-01-28 RX ORDER — HYDROMORPHONE HYDROCHLORIDE 1 MG/ML
INJECTION, SOLUTION INTRAMUSCULAR; INTRAVENOUS; SUBCUTANEOUS
Status: COMPLETED
Start: 2020-01-28 | End: 2020-01-28

## 2020-01-28 RX ORDER — SODIUM CHLORIDE, SODIUM LACTATE, POTASSIUM CHLORIDE, CALCIUM CHLORIDE 600; 310; 30; 20 MG/100ML; MG/100ML; MG/100ML; MG/100ML
INJECTION, SOLUTION INTRAVENOUS CONTINUOUS
Status: ACTIVE | OUTPATIENT
Start: 2020-01-28 | End: 2020-01-28

## 2020-01-28 RX ORDER — POLYETHYLENE GLYCOL 3350 17 G/17G
1 POWDER, FOR SOLUTION ORAL DAILY
Status: DISCONTINUED | OUTPATIENT
Start: 2020-01-28 | End: 2020-02-06

## 2020-01-28 RX ORDER — OMEPRAZOLE 20 MG/1
20 CAPSULE, DELAYED RELEASE ORAL DAILY
Status: DISCONTINUED | OUTPATIENT
Start: 2020-01-28 | End: 2020-02-10

## 2020-01-28 RX ORDER — ONDANSETRON 2 MG/ML
INJECTION INTRAMUSCULAR; INTRAVENOUS PRN
Status: DISCONTINUED | OUTPATIENT
Start: 2020-01-28 | End: 2020-01-28 | Stop reason: SURG

## 2020-01-28 RX ORDER — BACITRACIN 50000 [IU]/1
INJECTION, POWDER, FOR SOLUTION INTRAMUSCULAR
Status: DISCONTINUED | OUTPATIENT
Start: 2020-01-28 | End: 2020-01-28 | Stop reason: HOSPADM

## 2020-01-28 RX ORDER — LOSARTAN POTASSIUM 25 MG/1
50 TABLET ORAL DAILY
Status: DISCONTINUED | OUTPATIENT
Start: 2020-01-28 | End: 2020-02-10

## 2020-01-28 RX ORDER — HYDRALAZINE HYDROCHLORIDE 20 MG/ML
INJECTION INTRAMUSCULAR; INTRAVENOUS
Status: COMPLETED
Start: 2020-01-28 | End: 2020-01-28

## 2020-01-28 RX ORDER — LIDOCAINE HYDROCHLORIDE 20 MG/ML
INJECTION, SOLUTION EPIDURAL; INFILTRATION; INTRACAUDAL; PERINEURAL PRN
Status: DISCONTINUED | OUTPATIENT
Start: 2020-01-28 | End: 2020-01-28 | Stop reason: SURG

## 2020-01-28 RX ORDER — HYDROMORPHONE HYDROCHLORIDE 1 MG/ML
0.1 INJECTION, SOLUTION INTRAMUSCULAR; INTRAVENOUS; SUBCUTANEOUS
Status: DISCONTINUED | OUTPATIENT
Start: 2020-01-28 | End: 2020-01-28 | Stop reason: HOSPADM

## 2020-01-28 RX ORDER — ONDANSETRON 4 MG/1
8 TABLET, ORALLY DISINTEGRATING ORAL EVERY 6 HOURS PRN
Status: DISCONTINUED | OUTPATIENT
Start: 2020-01-28 | End: 2020-02-14 | Stop reason: HOSPADM

## 2020-01-28 RX ORDER — SODIUM CHLORIDE, SODIUM LACTATE, POTASSIUM CHLORIDE, CALCIUM CHLORIDE 600; 310; 30; 20 MG/100ML; MG/100ML; MG/100ML; MG/100ML
INJECTION, SOLUTION INTRAVENOUS CONTINUOUS
Status: DISCONTINUED | OUTPATIENT
Start: 2020-01-28 | End: 2020-01-28 | Stop reason: HOSPADM

## 2020-01-28 RX ORDER — HYDROMORPHONE HYDROCHLORIDE 1 MG/ML
0.2 INJECTION, SOLUTION INTRAMUSCULAR; INTRAVENOUS; SUBCUTANEOUS
Status: DISCONTINUED | OUTPATIENT
Start: 2020-01-28 | End: 2020-01-28 | Stop reason: HOSPADM

## 2020-01-28 RX ORDER — MIDAZOLAM HYDROCHLORIDE 1 MG/ML
INJECTION INTRAMUSCULAR; INTRAVENOUS PRN
Status: DISCONTINUED | OUTPATIENT
Start: 2020-01-28 | End: 2020-01-28 | Stop reason: SURG

## 2020-01-28 RX ORDER — HYDROMORPHONE HYDROCHLORIDE 2 MG/1
4 TABLET ORAL EVERY 4 HOURS PRN
Status: DISCONTINUED | OUTPATIENT
Start: 2020-01-28 | End: 2020-02-13

## 2020-01-28 RX ORDER — LANSOPRAZOLE 30 MG/1
30 CAPSULE, DELAYED RELEASE ORAL DAILY
Status: DISCONTINUED | OUTPATIENT
Start: 2020-01-28 | End: 2020-01-28

## 2020-01-28 RX ORDER — TAMSULOSIN HYDROCHLORIDE 0.4 MG/1
0.4 CAPSULE ORAL
Status: DISCONTINUED | OUTPATIENT
Start: 2020-01-28 | End: 2020-02-08

## 2020-01-28 RX ORDER — PROMETHAZINE HYDROCHLORIDE 25 MG/1
50 TABLET ORAL EVERY 6 HOURS PRN
Status: DISCONTINUED | OUTPATIENT
Start: 2020-01-28 | End: 2020-02-14 | Stop reason: HOSPADM

## 2020-01-28 RX ORDER — ONDANSETRON 2 MG/ML
4 INJECTION INTRAMUSCULAR; INTRAVENOUS
Status: DISCONTINUED | OUTPATIENT
Start: 2020-01-28 | End: 2020-01-28 | Stop reason: HOSPADM

## 2020-01-28 RX ORDER — LABETALOL HYDROCHLORIDE 5 MG/ML
5 INJECTION, SOLUTION INTRAVENOUS
Status: DISCONTINUED | OUTPATIENT
Start: 2020-01-28 | End: 2020-01-28 | Stop reason: HOSPADM

## 2020-01-28 RX ORDER — HYDROMORPHONE HYDROCHLORIDE 2 MG/ML
INJECTION, SOLUTION INTRAMUSCULAR; INTRAVENOUS; SUBCUTANEOUS PRN
Status: DISCONTINUED | OUTPATIENT
Start: 2020-01-28 | End: 2020-01-28 | Stop reason: SURG

## 2020-01-28 RX ORDER — CEPHALEXIN 250 MG/1
250 CAPSULE ORAL EVERY 6 HOURS
Status: COMPLETED | OUTPATIENT
Start: 2020-01-31 | End: 2020-02-06

## 2020-01-28 RX ORDER — POLYETHYLENE GLYCOL 3350 17 G/17G
1 POWDER, FOR SOLUTION ORAL
Status: DISCONTINUED | OUTPATIENT
Start: 2020-01-28 | End: 2020-02-10

## 2020-01-28 RX ORDER — HYDRALAZINE HYDROCHLORIDE 20 MG/ML
5 INJECTION INTRAMUSCULAR; INTRAVENOUS
Status: DISCONTINUED | OUTPATIENT
Start: 2020-01-28 | End: 2020-01-28 | Stop reason: HOSPADM

## 2020-01-28 RX ORDER — DEXAMETHASONE SODIUM PHOSPHATE 4 MG/ML
INJECTION, SOLUTION INTRA-ARTICULAR; INTRALESIONAL; INTRAMUSCULAR; INTRAVENOUS; SOFT TISSUE PRN
Status: DISCONTINUED | OUTPATIENT
Start: 2020-01-28 | End: 2020-01-28 | Stop reason: SURG

## 2020-01-28 RX ORDER — HYDROMORPHONE HYDROCHLORIDE 2 MG/1
4 TABLET ORAL EVERY 6 HOURS PRN
Status: DISCONTINUED | OUTPATIENT
Start: 2020-01-28 | End: 2020-01-28

## 2020-01-28 RX ORDER — BISACODYL 10 MG
10 SUPPOSITORY, RECTAL RECTAL
Status: DISCONTINUED | OUTPATIENT
Start: 2020-01-28 | End: 2020-02-10

## 2020-01-28 RX ORDER — OXYCODONE HCL 5 MG/5 ML
10 SOLUTION, ORAL ORAL
Status: COMPLETED | OUTPATIENT
Start: 2020-01-28 | End: 2020-01-28

## 2020-01-28 RX ORDER — PREDNISONE 5 MG/1
5 TABLET ORAL DAILY
Status: DISCONTINUED | OUTPATIENT
Start: 2020-01-28 | End: 2020-02-05

## 2020-01-28 RX ORDER — BUPIVACAINE HYDROCHLORIDE AND EPINEPHRINE 5; 5 MG/ML; UG/ML
INJECTION, SOLUTION EPIDURAL; INTRACAUDAL; PERINEURAL
Status: DISCONTINUED | OUTPATIENT
Start: 2020-01-28 | End: 2020-01-28 | Stop reason: HOSPADM

## 2020-01-28 RX ORDER — DIPHENHYDRAMINE HYDROCHLORIDE 50 MG/ML
12.5 INJECTION INTRAMUSCULAR; INTRAVENOUS
Status: COMPLETED | OUTPATIENT
Start: 2020-01-28 | End: 2020-01-28

## 2020-01-28 RX ORDER — HYDROMORPHONE HYDROCHLORIDE 1 MG/ML
0.4 INJECTION, SOLUTION INTRAMUSCULAR; INTRAVENOUS; SUBCUTANEOUS
Status: DISCONTINUED | OUTPATIENT
Start: 2020-01-28 | End: 2020-01-28 | Stop reason: HOSPADM

## 2020-01-28 RX ADMIN — FENTANYL CITRATE 50 MCG: 50 INJECTION, SOLUTION INTRAMUSCULAR; INTRAVENOUS at 09:08

## 2020-01-28 RX ADMIN — HYDROMORPHONE HYDROCHLORIDE 1 MG: 2 INJECTION, SOLUTION INTRAMUSCULAR; INTRAVENOUS; SUBCUTANEOUS at 08:24

## 2020-01-28 RX ADMIN — FENTANYL CITRATE 100 MCG: 50 INJECTION, SOLUTION INTRAMUSCULAR; INTRAVENOUS at 09:05

## 2020-01-28 RX ADMIN — DIPHENHYDRAMINE HYDROCHLORIDE 12.5 MG: 50 INJECTION, SOLUTION INTRAMUSCULAR; INTRAVENOUS at 11:00

## 2020-01-28 RX ADMIN — HYDROMORPHONE HYDROCHLORIDE 0.4 MG: 1 INJECTION, SOLUTION INTRAMUSCULAR; INTRAVENOUS; SUBCUTANEOUS at 10:22

## 2020-01-28 RX ADMIN — OMEPRAZOLE 20 MG: 20 CAPSULE, DELAYED RELEASE ORAL at 14:28

## 2020-01-28 RX ADMIN — DIPHENHYDRAMINE HYDROCHLORIDE 12.5 MG: 50 INJECTION, SOLUTION INTRAMUSCULAR; INTRAVENOUS at 10:15

## 2020-01-28 RX ADMIN — DIPHENHYDRAMINE HYDROCHLORIDE 12.5 MG: 50 INJECTION, SOLUTION INTRAMUSCULAR; INTRAVENOUS at 10:45

## 2020-01-28 RX ADMIN — PROPOFOL 150 MG: 10 INJECTION, EMULSION INTRAVENOUS at 07:32

## 2020-01-28 RX ADMIN — SODIUM CHLORIDE, POTASSIUM CHLORIDE, SODIUM LACTATE AND CALCIUM CHLORIDE: 600; 310; 30; 20 INJECTION, SOLUTION INTRAVENOUS at 06:33

## 2020-01-28 RX ADMIN — MEPERIDINE HYDROCHLORIDE 12.5 MG: 25 INJECTION INTRAMUSCULAR; INTRAVENOUS; SUBCUTANEOUS at 10:30

## 2020-01-28 RX ADMIN — LABETALOL HYDROCHLORIDE 5 MG: 5 INJECTION, SOLUTION INTRAVENOUS at 11:01

## 2020-01-28 RX ADMIN — HYDRALAZINE HYDROCHLORIDE 10 MG: 20 INJECTION INTRAMUSCULAR; INTRAVENOUS at 10:25

## 2020-01-28 RX ADMIN — SENNOSIDES AND DOCUSATE SODIUM 2 TABLET: 8.6; 5 TABLET ORAL at 17:07

## 2020-01-28 RX ADMIN — FENTANYL CITRATE 50 MCG: 50 INJECTION, SOLUTION INTRAMUSCULAR; INTRAVENOUS at 07:32

## 2020-01-28 RX ADMIN — POLYETHYLENE GLYCOL 3350 1 PACKET: 17 POWDER, FOR SOLUTION ORAL at 14:28

## 2020-01-28 RX ADMIN — FENTANYL CITRATE 50 MCG: 50 INJECTION INTRAMUSCULAR; INTRAVENOUS at 10:35

## 2020-01-28 RX ADMIN — MEPERIDINE HYDROCHLORIDE 12.5 MG: 25 INJECTION INTRAMUSCULAR; INTRAVENOUS; SUBCUTANEOUS at 10:35

## 2020-01-28 RX ADMIN — HYDROMORPHONE HYDROCHLORIDE 0.4 MG: 1 INJECTION, SOLUTION INTRAMUSCULAR; INTRAVENOUS; SUBCUTANEOUS at 10:00

## 2020-01-28 RX ADMIN — CEFAZOLIN 2 G: 1 INJECTION, POWDER, FOR SOLUTION INTRAVENOUS at 07:35

## 2020-01-28 RX ADMIN — HYDROMORPHONE HYDROCHLORIDE 0.4 MG: 1 INJECTION, SOLUTION INTRAMUSCULAR; INTRAVENOUS; SUBCUTANEOUS at 10:10

## 2020-01-28 RX ADMIN — DEXAMETHASONE SODIUM PHOSPHATE 8 MG: 4 INJECTION, SOLUTION INTRAMUSCULAR; INTRAVENOUS at 07:40

## 2020-01-28 RX ADMIN — MEPERIDINE HYDROCHLORIDE 12.5 MG: 25 INJECTION INTRAMUSCULAR; INTRAVENOUS; SUBCUTANEOUS at 10:15

## 2020-01-28 RX ADMIN — HYDROMORPHONE HYDROCHLORIDE 4 MG: 2 TABLET ORAL at 23:25

## 2020-01-28 RX ADMIN — FENTANYL CITRATE 50 MCG: 50 INJECTION INTRAMUSCULAR; INTRAVENOUS at 10:00

## 2020-01-28 RX ADMIN — HYDROMORPHONE HYDROCHLORIDE 1 MG: 2 INJECTION, SOLUTION INTRAMUSCULAR; INTRAVENOUS; SUBCUTANEOUS at 07:58

## 2020-01-28 RX ADMIN — OXYCODONE HYDROCHLORIDE 10 MG: 5 SOLUTION ORAL at 10:05

## 2020-01-28 RX ADMIN — HYDROMORPHONE HYDROCHLORIDE 4 MG: 2 TABLET ORAL at 18:47

## 2020-01-28 RX ADMIN — FENTANYL CITRATE 50 MCG: 50 INJECTION, SOLUTION INTRAMUSCULAR; INTRAVENOUS at 07:53

## 2020-01-28 RX ADMIN — HYDROMORPHONE HYDROCHLORIDE 4 MG: 2 TABLET ORAL at 14:31

## 2020-01-28 RX ADMIN — SODIUM CHLORIDE, POTASSIUM CHLORIDE, SODIUM LACTATE AND CALCIUM CHLORIDE: 600; 310; 30; 20 INJECTION, SOLUTION INTRAVENOUS at 09:10

## 2020-01-28 RX ADMIN — FENTANYL CITRATE 50 MCG: 50 INJECTION, SOLUTION INTRAMUSCULAR; INTRAVENOUS at 07:48

## 2020-01-28 RX ADMIN — MIDAZOLAM HYDROCHLORIDE 2 MG: 1 INJECTION, SOLUTION INTRAMUSCULAR; INTRAVENOUS at 07:27

## 2020-01-28 RX ADMIN — TAMSULOSIN HYDROCHLORIDE 0.4 MG: 0.4 CAPSULE ORAL at 14:29

## 2020-01-28 RX ADMIN — HYDROMORPHONE HYDROCHLORIDE 1 MG: 2 INJECTION, SOLUTION INTRAMUSCULAR; INTRAVENOUS; SUBCUTANEOUS at 09:10

## 2020-01-28 RX ADMIN — FENTANYL CITRATE 50 MCG: 50 INJECTION INTRAMUSCULAR; INTRAVENOUS at 10:22

## 2020-01-28 RX ADMIN — HYDROMORPHONE HYDROCHLORIDE 1 MG: 2 INJECTION, SOLUTION INTRAMUSCULAR; INTRAVENOUS; SUBCUTANEOUS at 09:21

## 2020-01-28 RX ADMIN — LABETALOL HYDROCHLORIDE 5 MG: 5 INJECTION, SOLUTION INTRAVENOUS at 11:10

## 2020-01-28 RX ADMIN — HYDRALAZINE HYDROCHLORIDE 10 MG: 20 INJECTION INTRAMUSCULAR; INTRAVENOUS at 09:50

## 2020-01-28 RX ADMIN — FENTANYL CITRATE 50 MCG: 50 INJECTION, SOLUTION INTRAMUSCULAR; INTRAVENOUS at 07:41

## 2020-01-28 RX ADMIN — LIDOCAINE HYDROCHLORIDE 0.5 ML: 10 INJECTION, SOLUTION INFILTRATION; PERINEURAL at 06:33

## 2020-01-28 RX ADMIN — CEFTRIAXONE SODIUM 1 G: 1 INJECTION, POWDER, FOR SOLUTION INTRAMUSCULAR; INTRAVENOUS at 14:28

## 2020-01-28 RX ADMIN — FENTANYL CITRATE 100 MCG: 50 INJECTION, SOLUTION INTRAMUSCULAR; INTRAVENOUS at 09:03

## 2020-01-28 RX ADMIN — DIPHENHYDRAMINE HYDROCHLORIDE 12.5 MG: 50 INJECTION, SOLUTION INTRAMUSCULAR; INTRAVENOUS at 10:30

## 2020-01-28 RX ADMIN — FENTANYL CITRATE 50 MCG: 50 INJECTION, SOLUTION INTRAMUSCULAR; INTRAVENOUS at 07:44

## 2020-01-28 RX ADMIN — FENTANYL CITRATE 50 MCG: 50 INJECTION INTRAMUSCULAR; INTRAVENOUS at 10:10

## 2020-01-28 RX ADMIN — MEPERIDINE HYDROCHLORIDE 12.5 MG: 25 INJECTION INTRAMUSCULAR; INTRAVENOUS; SUBCUTANEOUS at 10:20

## 2020-01-28 RX ADMIN — ONDANSETRON 4 MG: 2 INJECTION INTRAMUSCULAR; INTRAVENOUS at 09:25

## 2020-01-28 RX ADMIN — SODIUM CHLORIDE, POTASSIUM CHLORIDE, SODIUM LACTATE AND CALCIUM CHLORIDE: 600; 310; 30; 20 INJECTION, SOLUTION INTRAVENOUS at 10:25

## 2020-01-28 RX ADMIN — PREDNISONE 5 MG: 5 TABLET ORAL at 14:29

## 2020-01-28 RX ADMIN — HYDROMORPHONE HYDROCHLORIDE 0.4 MG: 1 INJECTION, SOLUTION INTRAMUSCULAR; INTRAVENOUS; SUBCUTANEOUS at 10:35

## 2020-01-28 RX ADMIN — LIDOCAINE HYDROCHLORIDE 40 MG: 20 INJECTION, SOLUTION EPIDURAL; INFILTRATION; INTRACAUDAL; PERINEURAL at 07:32

## 2020-01-28 ASSESSMENT — ENCOUNTER SYMPTOMS
DEPRESSION: 0
MYALGIAS: 1
ABDOMINAL PAIN: 0
FEVER: 0
PALPITATIONS: 0
VOMITING: 0
WEAKNESS: 0
DIARRHEA: 0
DIZZINESS: 0
NAUSEA: 0
HEARTBURN: 0
COUGH: 0
FOCAL WEAKNESS: 0
CHILLS: 0
HEADACHES: 0
HALLUCINATIONS: 0
BACK PAIN: 1
SHORTNESS OF BREATH: 0
BLOOD IN STOOL: 0
SORE THROAT: 0

## 2020-01-28 ASSESSMENT — COGNITIVE AND FUNCTIONAL STATUS - GENERAL
MOVING TO AND FROM BED TO CHAIR: A LITTLE
DRESSING REGULAR LOWER BODY CLOTHING: A LITTLE
TOILETING: A LITTLE
EATING MEALS: A LITTLE
STANDING UP FROM CHAIR USING ARMS: A LITTLE
DRESSING REGULAR UPPER BODY CLOTHING: A LITTLE
DAILY ACTIVITIY SCORE: 18
SUGGESTED CMS G CODE MODIFIER DAILY ACTIVITY: CK
MOVING FROM LYING ON BACK TO SITTING ON SIDE OF FLAT BED: A LITTLE
HELP NEEDED FOR BATHING: A LITTLE
CLIMB 3 TO 5 STEPS WITH RAILING: A LITTLE
TURNING FROM BACK TO SIDE WHILE IN FLAT BAD: A LITTLE
SUGGESTED CMS G CODE MODIFIER MOBILITY: CK
WALKING IN HOSPITAL ROOM: A LITTLE
PERSONAL GROOMING: A LITTLE
MOBILITY SCORE: 18

## 2020-01-28 ASSESSMENT — LIFESTYLE VARIABLES
HAVE YOU EVER FELT YOU SHOULD CUT DOWN ON YOUR DRINKING: NO
DOES PATIENT WANT TO STOP DRINKING: NO
TOTAL SCORE: 0
EVER_SMOKED: NEVER
CONSUMPTION TOTAL: NEGATIVE
TOTAL SCORE: 0
HOW MANY TIMES IN THE PAST YEAR HAVE YOU HAD 5 OR MORE DRINKS IN A DAY: 0
ON A TYPICAL DAY WHEN YOU DRINK ALCOHOL HOW MANY DRINKS DO YOU HAVE: 0
TOTAL SCORE: 0
AVERAGE NUMBER OF DAYS PER WEEK YOU HAVE A DRINK CONTAINING ALCOHOL: 0
ALCOHOL_USE: NO
HAVE PEOPLE ANNOYED YOU BY CRITICIZING YOUR DRINKING: NO
EVER HAD A DRINK FIRST THING IN THE MORNING TO STEADY YOUR NERVES TO GET RID OF A HANGOVER: NO
EVER FELT BAD OR GUILTY ABOUT YOUR DRINKING: NO

## 2020-01-28 ASSESSMENT — COPD QUESTIONNAIRES
IN THE PAST 12 MONTHS DO YOU DO LESS THAN YOU USED TO BECAUSE OF YOUR BREATHING PROBLEMS: DISAGREE/UNSURE
HAVE YOU SMOKED AT LEAST 100 CIGARETTES IN YOUR ENTIRE LIFE: NO/DON'T KNOW
COPD SCREENING SCORE: 1
DO YOU EVER COUGH UP ANY MUCUS OR PHLEGM?: NO/ONLY WITH OCCASIONAL COLDS OR INFECTIONS
DURING THE PAST 4 WEEKS HOW MUCH DID YOU FEEL SHORT OF BREATH: NONE/LITTLE OF THE TIME

## 2020-01-28 ASSESSMENT — PAIN SCALES - GENERAL: PAIN_LEVEL: 10

## 2020-01-28 NOTE — CARE PLAN
Problem: Pain Management  Goal: Pain level will decrease to patient's comfort goal  Outcome: PROGRESSING AS EXPECTED     Problem: Communication  Goal: The ability to communicate needs accurately and effectively will improve  Outcome: PROGRESSING AS EXPECTED     Problem: Safety  Goal: Will remain free from injury  Outcome: PROGRESSING AS EXPECTED

## 2020-01-28 NOTE — OR NURSING
1100 Pt appears comfortable after being able to void. Dozing. BP slowly improving. 1110 /94, pt continues to doze and appears comfortable. Easily arousable to voice or light touch. 1120 Report given to Sarah KWAN.

## 2020-01-28 NOTE — OP REPORT
DATE OF SERVICE:  01/28/2020    NAME OF PROCEDURE:  Revision of Medtronic intrathecal infusion pump and   catheter.    PREPROCEDURAL DIAGNOSES:  Patient with metastatic colon cancer with placement   of intrathecal Medtronic infusion pump for pain management, now with wound   dehiscence in the right lower quadrant requiring explantation of pump on the   right side and implantation of pump and catheter revision on the left side.    POSTPROCEDURAL DIAGNOSES:  Patient with metastatic colon cancer with placement   of intrathecal Medtronic infusion pump for pain management, now with wound   dehiscence in the right lower quadrant requiring explantation of pump on the   right side and implantation of pump and catheter revision on the left side.    PROCEDURE PERFORMED BY:  Candelario Reina MD    ANESTHESIA:  General.    ANESTHESIOLOGIST:  Wale Lutz MD    PROCEDURE NOTE:  Patient was brought into the preop holding area.  At this   time, the risks of the procedure were thoroughly explained.  They understood   and accepted the risks.  He was then brought to the operating room.  General   anesthesia was induced.  He was first placed in a right lateral decubitus   position.  The patient was sterilely prepped and sterilely draped.  Physician   gowned, gloved, and masked.  Total of 20 mL of 0.5% bupivacaine 1:200,000   epinephrine mixed 50% with 1% lidocaine was injected over the left lower   quadrant and in the midline at the L2-L3 and L3-L4 level.  A 5-cm incision was   then made in the lumbar region.  Dissection down to the catheter was   performed.  Electrocautery was used to staunch the bleeding.    Next, a 10-cm incision was made in the left lower quadrant.  Blunt dissection   was used to make a pocket for the new pump.  Again, electrocautery was used to   staunch the bleeding.  Tunneling binta was passed from the abdominal wound site   to the lumbar wound site.  The catheter was then passed through the tunneling   binta.   The tunneling binta was then removed.  A sutureless connector was used to   connect the old catheter and the new catheter.  After the old catheter was   ligated, the connection was performed.  Clear CSF was noted to be freely   flowing from the tip of the catheter in the abdominal wound site.  The   sutureless connector then was used to connect to the pump itself.  The pump   was then anchored using four 0 Ethibond sutures to the abdominal wound site.    Irrigation of both the anterior and posterior wound sites was performed using   bacitracin solution.  Wounds were closed using 3-0 Vicryl, staples on the skin   for the abdominal wound site and mattress stitch 3-0 nylon for the lumbar   wound site.    After this, scrub was broken, the patient was returned to supine position.    The wound site was then sterilely prepped and sterilely draped of the   left-sided exposed pump.  The pump was then evacuated from the pocket.    Irrigation with bacitracin solution was performed.  The wound VAC was then   placed over the wound site and suction was applied showing that the system was   intact.  The old pump had 17 mL of drug that was aspirated and using a   template and sterile prep over the new pump site, the 22-gauge Viramontes was   advanced into the pump.  A total of 15 mL of drug was injected into the pump.    Pump was reprogrammed to run its original rate of 8.9 mg per day with a 0.1   mg bolus with 12 boluses in 24 hours.  Patient will be kept overnight for   observation.  We will follow up with him in the morning in our office.             ____________________________________     MD JOSE GRANT / MAURO    DD:  01/28/2020 09:38:27  DT:  01/28/2020 10:00:26    D#:  [id number]  Job#:  [id number]

## 2020-01-28 NOTE — ASSESSMENT & PLAN NOTE
No need for supplemental oxygen,   Continue prednisone 5mg daily, patient is on chronically and failed a taper

## 2020-01-28 NOTE — ANESTHESIA PROCEDURE NOTES
Airway  Date/Time: 1/28/2020 7:34 AM  Performed by: Wale Lutz M.D.  Authorized by: Wale Lutz M.D.     Location:  OR  Urgency:  Elective  Difficult Airway: No    Indications for Airway Management:  Anesthesia  Spontaneous Ventilation: absent    Sedation Level:  Deep  Preoxygenated: Yes    Mask Difficulty Assessment:  0 - not attempted  Final Airway Type:  Supraglottic airway  Final Supraglottic Airway:  Standard LMA  SGA Size:  5  Number of Attempts at Approach:  1

## 2020-01-28 NOTE — ASSESSMENT & PLAN NOTE
He was diagnosed in 2016, treated with resection and chemotherapy.  He had recurrence in August 2018, found to have metastases to the lung.  He has been on chemotherapy however, this was held in preparation for his pain pump replacement procedure  Continue to hold chemo for now due to open wound per Dr. Urbina and follow up after discharge  Anemia stable

## 2020-01-28 NOTE — OR SURGEON
Immediate Post OP Note    PreOp Diagnosis: colon cancer with exposed medtronic intrathecal pump    PostOp Diagnosis: as above    Procedure(s):  REVISION, INSERTION, OR REPLACEMENT, INTRATHECAL ANALGESIC PUMP - Wound Class: Dirty or Infected    Surgeon(s):  Candelario Reina M.D.    Anesthesiologist/Type of Anesthesia:  Anesthesiologist: Wale Lutz M.D./General    Surgical Staff:  Circulator: Sg Chavis R.N.  Relief Circulator: Ambar Trevino R.N.  Scrub Person: Donnell Bai    Specimens removed if any:  * No specimens in log *    Estimated Blood Loss: <50cc    Findings: none    Complications: none        1/28/2020 9:40 AM Candelario Reina M.D.

## 2020-01-28 NOTE — WOUND TEAM
"Order received for wound care. Pt had Sx today with Dr Reina. Per note \" The wound VAC was then placed over the wound site and suction was applied showing that the system was intact.\" Since surgery was today, wound team will see pt Fri (after TC with Dr Reina 1/29) for drsg change.   "

## 2020-01-28 NOTE — H&P
Hospital Medicine History & Physical Note    Date of Service  1/28/2020    Primary Care Physician  YOU Sterling.    Consultants  Dr. Reina    Code Status  Full    Chief Complaint  Surgical wound dehiscence    History of Presenting Illness  54 y.o. male with a history of colon cancer status post treatment, now on chemotherapy for remission, complicated by metastases to the lung and chronic pain who uses an intrathecal pain pump, who presented 1/28/2020 with surgical wound dehiscence.    I was called for consultation by Dr. Reina.  Dr. Reina admitted the patient in order to remove his right-sided pain pump and replace it on the left.  He also performed a washout of the right sided wound and placed a wound VAC.  Culture was taken.  The patient was taking oral Keflex therapy for 1 month prior to his surgery.  He will be continued on perioperative ceftriaxone and then transition to Keflex pending the cultures taken from the operating room.  When I evaluated the patient he complains of abdominal discomfort however, no other physical complaints.  He states he is not short of breath.  He has not had fever or cough.    He lives in [place] where there is no home health therefore pending the duration of his wound VAC treatment, he may need skilled nursing facility.    Review of Systems  Review of Systems   Constitutional: Positive for malaise/fatigue. Negative for chills and fever.   HENT: Negative for sore throat.    Respiratory: Negative for cough and shortness of breath.    Cardiovascular: Negative for chest pain and palpitations.   Gastrointestinal: Negative for abdominal pain (Surgical sites), blood in stool, diarrhea, heartburn, nausea and vomiting.   Genitourinary: Negative for dysuria and frequency.   Musculoskeletal: Positive for back pain and myalgias.   Neurological: Negative for dizziness, focal weakness, weakness and headaches.   Psychiatric/Behavioral: Negative for depression and hallucinations.  "  All other systems reviewed and are negative.      Past Medical History   has a past medical history of Arthritis (01/24/2020), Bowel habit changes (01/24/2020), Breath shortness, Cancer (HCC), Cancer (HCC) (08/2018), COPD (chronic obstructive pulmonary disease) ([facility]) (2018), Dental disorder (01/24/2020), Heart burn, Hepatitis C (2005), Hypertension, Indigestion, PICC (peripherally inserted central catheter) in place (05/2019), Psychiatric problem, and Renal disorder.    Surgical History   has a past surgical history that includes low anterior resection laparoscopic (1/12/2016); cath placement (3/16/2016); bronchoscopy-endo (N/A, 3/1/2019); other orthopedic surgery (Right); other orthopedic surgery (Right); other orthopedic surgery (Left); pump revision (7/9/2019); urology surgery (Left, 2018); and colonoscopy (N/A, 2016).     Family History  family history includes No Known Problems in his brother, father, maternal grandfather, maternal grandmother, mother, paternal grandfather, paternal grandmother, and sister. He was adopted.     Social History   reports that he quit smoking about 4 years ago. He started smoking about 19 years ago. He has a 15.00 pack-year smoking history. He has never used smokeless tobacco. He reports previous alcohol use. He reports current drug use. Drugs: Inhaled and Oral.    Allergies  Allergies   Allergen Reactions   • Levaquin Vomiting   • Nubain [Nalbuphine] Anxiety     \"made him feel paranoid\"       Medications  Prior to Admission Medications   Prescriptions Last Dose Informant Patient Reported? Taking?   HYDROmorphone (DILAUDID) 4 MG Tab 1/28/2020 at 0400 Family Member Yes No   Sig: Take 4 mg by mouth every 6 hours as needed ([facility] [facility]).   NON SPECIFIED   Yes Yes   Sig: Pain pump has Dilaudid and Ketamine   hydrocortisone rectal (PROCTOZONE HC) 2.5 % Cream 1/27/2020 at 0200  No No   Sig: Insert 1 Application in rectum 3 times a day as needed.   lansoprazole (PREVACID) 30 MG " CAPSULE DELAYED RELEASE 1/7/2020 Family Member Yes No   Sig: Take 30 mg by mouth every day.   losartan (COZAAR) 50 MG Tab  Family Member Yes No   Sig: Take 50 mg by mouth every day.   ondansetron (ZOFRAN ODT) 4 MG TABLET DISPERSIBLE 1/28/2020 at 0200 Family Member Yes No   Sig: Take 8 mg by mouth every 6 hours as needed for Nausea.   polyethylene glycol/lytes (MIRALAX) Pack 0200 Family Member No No   Sig: Take 1 Packet by mouth every day. Can use up to 2 times daily if no bowel movement in 24 hours.   predniSONE (DELTASONE) 5 MG Tab 1/27/2020 at 1400 Family Member Yes No   Sig: Take 5 mg by mouth every day.   promethazine (PHENERGAN) 50 MG Tab 1/13/2020 Family Member Yes No   Sig: Take 50 mg by mouth every 6 hours as needed for Nausea/Vomiting.   tamsulosin (FLOMAX) 0.4 MG capsule 1/27/2020 at 1400 Family Member Yes No   Sig: Take 0.4 mg by mouth ONE-HALF HOUR AFTER BREAKFAST.      Facility-Administered Medications: None       Physical Exam  Temp:  [36.2 °C (97.2 °F)-37.2 °C (99 °F)] 36.3 °C (97.3 °F)  Pulse:  [65-99] 99  Resp:  [16-26] 20  BP: (110-190)/() 110/37  SpO2:  [92 %-100 %] 97 %    Physical Exam  Constitutional:       Appearance: Normal appearance.      Comments: Thin, chronically ill-appearing   HENT:      Head: Normocephalic and atraumatic.      Nose: Nose normal.      Mouth/Throat:      Mouth: Mucous membranes are moist.   Eyes:      Extraocular Movements: Extraocular movements intact.      Pupils: Pupils are equal, round, and reactive to light.   Neck:      Musculoskeletal: Normal range of motion and neck supple.   Cardiovascular:      Rate and Rhythm: Normal rate and regular rhythm.      Heart sounds: No murmur.   Pulmonary:      Effort: Pulmonary effort is normal. No respiratory distress.      Breath sounds: Normal breath sounds. No stridor.   Abdominal:      General: Abdomen is flat. There is no distension.      Tenderness: There is tenderness. There is guarding.      Comments: Abdominal  binder in place.  Wound VAC in place with serosanguineous drainage.  Left-sided pain pump now in place.   Musculoskeletal:         General: No swelling, tenderness or deformity.   Skin:     General: Skin is warm and dry.      Coloration: Skin is pale.   Neurological:      General: No focal deficit present.      Mental Status: He is alert and oriented to person, place, and time. Mental status is at baseline.   Psychiatric:         Mood and Affect: Mood normal.         Behavior: Behavior normal.         Thought Content: Thought content normal.         Laboratory:          No results for input(s): ALTSGPT, ASTSGOT, ALKPHOSPHAT, TBILIRUBIN, DBILIRUBIN, GAMMAGT, AMYLASE, LIPASE, ALB, PREALBUMIN, GLUCOSE in the last 72 hours.      No results for input(s): NTPROBNP in the last 72 hours.      No results for input(s): TROPONINT in the last 72 hours.    Urinalysis:    No results found     Imaging:  No orders to display         Assessment/Plan:  I anticipate this patient is appropriate for observation status at this time.    * Infection of superficial incisional surgical site after procedure- (present on admission)  Assessment & Plan  He had infection of his surgical site where his right-sided pain pump was and had dehiscence of his wound.  On 1/28 he had debridement of this location and his pain pump removed and changed to the other side.  He has a right-sided wound VAC in place  This will be a barrier to discharge as he lives in [place] where there is no home health available  Continue IV ceftriaxone for now, changed to Keflex  Cultures were taken at the time of surgery and are pending    Metastatic Colon cancer (HCC)- (present on admission)  Assessment & Plan  He was diagnosed in 2016, treated with resection and chemotherapy.  He had recurrence in August 2018, found to have metastases to the lung.  He has been on chemotherapy however, this was held in preparation for his pain pump replacement procedure  Continue to hold  chemo for now  Continue prednisone    Chronic, continuous use of opioids- (present on admission)  Assessment & Plan  I discussed the case with his pain management physician, Dr. Reina.  Continue p.o. Dilaudid 4 mg every 4 hours  He has a pain pump with Dilaudid 8 mg/day and ketamine, he has additional boluses to be used as needed    Essential hypertension- (present on admission)  Assessment & Plan  Continue Cozaar    COPD (chronic obstructive pulmonary disease) (HCC)- (present on admission)  Assessment & Plan  He is not on baseline oxygen  Wean O2        VTE prophylaxis: SCDs

## 2020-01-28 NOTE — PROGRESS NOTES
Received report from PACU RN. Pt arrive to unit va bed. Abdominal dressing is CDI with abdominal binder in place. Wound vac in place  right abdomin and draining serosang and running 125mmhg. Pt appears drowsy and sleepy. Family members at bedside. No s/s of distress. Call light light within reach. Will continue to monitor.

## 2020-01-28 NOTE — ANESTHESIA PREPROCEDURE EVALUATION
Relevant Problems   PULMONARY   (+) COPD (chronic obstructive pulmonary disease) (HCC)      CARDIAC   (+) Essential hypertension   (+) Hypertension         (+) Hepatitis C       Physical Exam    Airway   Mallampati: II  TM distance: >3 FB  Neck ROM: full       Cardiovascular - normal exam  Rhythm: regular  Rate: normal  (-) murmur     Dental - normal exam  (+) upper dentures         Pulmonary - normal exam  Breath sounds clear to auscultation     Abdominal    Neurological - normal exam                 Anesthesia Plan    ASA 3   ASA physical status 3 criteria: hypertension - poorly controlled and COPD    Plan - general       Airway plan will be LMA        Induction: intravenous    Postoperative Plan: Postoperative administration of opioids is intended.    Pertinent diagnostic labs and testing reviewed    Informed Consent:    Anesthetic plan and risks discussed with patient.    Use of blood products discussed with: patient whom consented to blood products.

## 2020-01-28 NOTE — ANESTHESIA POSTPROCEDURE EVALUATION
Patient: Erich Ingram    Procedure Summary     Date:  01/28/20 Room / Location:   OR 02 / SURGERY [facility]    Anesthesia Start:  0727 Anesthesia Stop:  0944    Procedure:  REVISION, INSERTION, OR REPLACEMENT, INTRATHECAL ANALGESIC PUMP (Left Abdomen) Diagnosis:  (COLON CANCER)    Surgeon:  Candelario Reina M.D. Responsible Provider:  Wale Lutz M.D.    Anesthesia Type:  general ASA Status:  3          Final Anesthesia Type: general  Last vitals  BP   Blood Pressure: (!) 190/114    Temp   36.2 °C (97.2 °F)    Pulse   Pulse: 65   Resp   16    SpO2   99 %      Anesthesia Post Evaluation    Patient location during evaluation: PACU  Patient participation: complete - patient participated  Level of consciousness: anxious and awake and alert  Pain score: 10  Chronic pain patient/ significant opioid use   Airway patency: patent  Anesthetic complications: no  Cardiovascular status: hemodynamically stable  Respiratory status: acceptable  Hydration status: euvolemic    PONV: none           Nurse Pain Score: 8 (NPRS)

## 2020-01-28 NOTE — ASSESSMENT & PLAN NOTE
He had infection of his surgical site where his right-sided pain pump was and had dehiscence of his wound.  On 1/28 he had debridement of this location and his pain pump removed and changed to the other side.  Right side wound is healing well, needs continued wound vac care, reviewed with wound care today

## 2020-01-28 NOTE — ANESTHESIA TIME REPORT
Anesthesia Start and Stop Event Times     Date Time Event    1/28/2020 0712 Ready for Procedure     0727 Anesthesia Start     0944 Anesthesia Stop        Responsible Staff  01/28/20    Name Role Begin End    Wale Lutz M.D. Anesth 0727 0944        Preop Diagnosis (Free Text):  Pre-op Diagnosis     COLON CANCER        Preop Diagnosis (Codes):    Post op Diagnosis  Colon cancer (HCC)  Chronic Pain/complications with pain pump     Premium Reason  Non-Premium    Comments:

## 2020-01-28 NOTE — ANESTHESIA QCDR
2019 Qualified Clinical Data Registry (for Quality Improvement)     Postoperative nausea/vomiting risk protocol (Adult = 18 yrs and Pediatric 3-17 yrs)- (430 and 463)  General inhalation anesthetic (NOT TIVA) with PONV risk factors: Yes  Provision of anti-emetic therapy with at least 2 different classes of agents: Yes   Patient DID NOT receive anti-emetic therapy and reason is documented in Medical Record:  N/A    Multimodal Pain Management- (477)  Non-emergent surgery AND patient age >= 18: Yes  Use of Multimodal Pain Management, two or more drugs and/or interventions, NOT including systemic opioids: Yes  Exception: Documented allergy to multiple classes of analgesics: N/A    Smoking Abstinence (404)  Patient is current smoker (cigarette, pipe, e-cig, marijuanna): Yes  Elective Surgery: Yes  Abstinence instructions provided prior to day of surgery: Yes  Patient abstained from smoking on day of surgery: Yes    Pre-Op Beta-Blocker in Isolated CABG (44)  Isolated CABG AND patient age >= 18: No  Beta-blocker admin within 24 hours of surgical incision:   Exception:of medical reason(s) for not administering beta blocker within 24 hours prior to surgical incision (e.g., not  indicated,other medical reason):     PACU assessment of acute postoperative pain prior to Anesthesia Care End- Applies to Patients Age = 18- (ABG7)  Initial PACU pain score is which of the following: < 7/10  Patient unable to report pain score: N/A    Post-anesthetic transfer of care checklist/protocol to PACU/ICU- (426 and 427)  Upon conclusion of case, patient transferred to which of the following locations: PACU/Non-ICU  Use of transfer checklist/protocol: Yes  Exclusion: Service Performed in Patient Hospital Room (and thus did not require transfer): N/A  Unplanned admission to ICU related to anesthesia service up through end of PACU care- (MD51)  Unplanned admission to ICU (not initially anticipated at anesthesia start time): Yes

## 2020-01-28 NOTE — ASSESSMENT & PLAN NOTE
Pain is not currently controlled  his pain management physician - Dr. Reina.  Increase dose of p.o. Dilaudid from 4 to 6 mg every 4 hours  He has a pain pump with Dilaudid 8 mg/day and ketamine, he has additional boluses  patient ambulates steadily  independently

## 2020-01-29 LAB
ALBUMIN SERPL BCP-MCNC: 3.8 G/DL (ref 3.2–4.9)
BASOPHILS # BLD AUTO: 0.2 % (ref 0–1.8)
BASOPHILS # BLD: 0.02 K/UL (ref 0–0.12)
BUN SERPL-MCNC: 10 MG/DL (ref 8–22)
CALCIUM SERPL-MCNC: 9.3 MG/DL (ref 8.4–10.2)
CHLORIDE SERPL-SCNC: 98 MMOL/L (ref 96–112)
CO2 SERPL-SCNC: 22 MMOL/L (ref 20–33)
CREAT SERPL-MCNC: 0.85 MG/DL (ref 0.5–1.4)
EOSINOPHIL # BLD AUTO: 0.01 K/UL (ref 0–0.51)
EOSINOPHIL NFR BLD: 0.1 % (ref 0–6.9)
ERYTHROCYTE [DISTWIDTH] IN BLOOD BY AUTOMATED COUNT: 39.6 FL (ref 35.9–50)
GLUCOSE SERPL-MCNC: 117 MG/DL (ref 65–99)
HCT VFR BLD AUTO: 39.3 % (ref 42–52)
HGB BLD-MCNC: 13 G/DL (ref 14–18)
IMM GRANULOCYTES # BLD AUTO: 0.04 K/UL (ref 0–0.11)
IMM GRANULOCYTES NFR BLD AUTO: 0.3 % (ref 0–0.9)
LYMPHOCYTES # BLD AUTO: 1.61 K/UL (ref 1–4.8)
LYMPHOCYTES NFR BLD: 12.2 % (ref 22–41)
MAGNESIUM SERPL-MCNC: 2 MG/DL (ref 1.5–2.5)
MCH RBC QN AUTO: 28 PG (ref 27–33)
MCHC RBC AUTO-ENTMCNC: 33.1 G/DL (ref 33.7–35.3)
MCV RBC AUTO: 84.7 FL (ref 81.4–97.8)
MONOCYTES # BLD AUTO: 1.09 K/UL (ref 0–0.85)
MONOCYTES NFR BLD AUTO: 8.3 % (ref 0–13.4)
NEUTROPHILS # BLD AUTO: 10.39 K/UL (ref 1.82–7.42)
NEUTROPHILS NFR BLD: 78.9 % (ref 44–72)
NRBC # BLD AUTO: 0 K/UL
NRBC BLD-RTO: 0 /100 WBC
PHOSPHATE SERPL-MCNC: 3.9 MG/DL (ref 2.5–4.5)
PLATELET # BLD AUTO: 323 K/UL (ref 164–446)
PMV BLD AUTO: 9.4 FL (ref 9–12.9)
POTASSIUM SERPL-SCNC: 4.2 MMOL/L (ref 3.6–5.5)
RBC # BLD AUTO: 4.64 M/UL (ref 4.7–6.1)
SODIUM SERPL-SCNC: 134 MMOL/L (ref 135–145)
WBC # BLD AUTO: 13.2 K/UL (ref 4.8–10.8)

## 2020-01-29 PROCEDURE — 700111 HCHG RX REV CODE 636 W/ 250 OVERRIDE (IP): Performed by: INTERNAL MEDICINE

## 2020-01-29 PROCEDURE — A9270 NON-COVERED ITEM OR SERVICE: HCPCS | Performed by: INTERNAL MEDICINE

## 2020-01-29 PROCEDURE — 83735 ASSAY OF MAGNESIUM: CPT

## 2020-01-29 PROCEDURE — 97165 OT EVAL LOW COMPLEX 30 MIN: CPT

## 2020-01-29 PROCEDURE — 36415 COLL VENOUS BLD VENIPUNCTURE: CPT

## 2020-01-29 PROCEDURE — 97161 PT EVAL LOW COMPLEX 20 MIN: CPT

## 2020-01-29 PROCEDURE — G0378 HOSPITAL OBSERVATION PER HR: HCPCS

## 2020-01-29 PROCEDURE — 700105 HCHG RX REV CODE 258: Performed by: INTERNAL MEDICINE

## 2020-01-29 PROCEDURE — 99226 PR SUBSEQUENT OBSERVATION CARE,LEVEL III: CPT | Performed by: INTERNAL MEDICINE

## 2020-01-29 PROCEDURE — 80069 RENAL FUNCTION PANEL: CPT

## 2020-01-29 PROCEDURE — 85025 COMPLETE CBC W/AUTO DIFF WBC: CPT

## 2020-01-29 PROCEDURE — 700102 HCHG RX REV CODE 250 W/ 637 OVERRIDE(OP): Performed by: INTERNAL MEDICINE

## 2020-01-29 RX ORDER — PROCHLORPERAZINE EDISYLATE 5 MG/ML
10 INJECTION INTRAMUSCULAR; INTRAVENOUS EVERY 6 HOURS PRN
Status: DISCONTINUED | OUTPATIENT
Start: 2020-01-29 | End: 2020-02-14 | Stop reason: HOSPADM

## 2020-01-29 RX ADMIN — ONDANSETRON 8 MG: 4 TABLET, ORALLY DISINTEGRATING ORAL at 02:29

## 2020-01-29 RX ADMIN — HYDROMORPHONE HYDROCHLORIDE 4 MG: 2 TABLET ORAL at 20:41

## 2020-01-29 RX ADMIN — HYDROMORPHONE HYDROCHLORIDE 4 MG: 2 TABLET ORAL at 15:09

## 2020-01-29 RX ADMIN — ONDANSETRON 8 MG: 4 TABLET, ORALLY DISINTEGRATING ORAL at 20:41

## 2020-01-29 RX ADMIN — OMEPRAZOLE 20 MG: 20 CAPSULE, DELAYED RELEASE ORAL at 04:35

## 2020-01-29 RX ADMIN — SENNOSIDES AND DOCUSATE SODIUM 2 TABLET: 8.6; 5 TABLET ORAL at 17:12

## 2020-01-29 RX ADMIN — PROMETHAZINE HYDROCHLORIDE 50 MG: 25 TABLET ORAL at 02:34

## 2020-01-29 RX ADMIN — PROCHLORPERAZINE EDISYLATE 10 MG: 5 INJECTION INTRAMUSCULAR; INTRAVENOUS at 07:37

## 2020-01-29 RX ADMIN — HYDROMORPHONE HYDROCHLORIDE 4 MG: 2 TABLET ORAL at 04:33

## 2020-01-29 RX ADMIN — HYDROMORPHONE HYDROCHLORIDE 4 MG: 2 TABLET ORAL at 09:50

## 2020-01-29 RX ADMIN — CEFTRIAXONE SODIUM 1 G: 1 INJECTION, POWDER, FOR SOLUTION INTRAMUSCULAR; INTRAVENOUS at 05:00

## 2020-01-29 RX ADMIN — PREDNISONE 5 MG: 5 TABLET ORAL at 04:33

## 2020-01-29 RX ADMIN — TAMSULOSIN HYDROCHLORIDE 0.4 MG: 0.4 CAPSULE ORAL at 07:37

## 2020-01-29 RX ADMIN — LOSARTAN POTASSIUM 50 MG: 25 TABLET ORAL at 04:33

## 2020-01-29 ASSESSMENT — ENCOUNTER SYMPTOMS
HEADACHES: 0
BLOOD IN STOOL: 0
CHILLS: 0
PALPITATIONS: 0
BACK PAIN: 0
HALLUCINATIONS: 0
SORE THROAT: 0
ROS GI COMMENTS: LOW APPETITE
HEARTBURN: 0
COUGH: 0
VOMITING: 0
FOCAL WEAKNESS: 0
FEVER: 0
INSOMNIA: 1
DIZZINESS: 0
WEAKNESS: 0
DIARRHEA: 0
SHORTNESS OF BREATH: 0
ABDOMINAL PAIN: 1
DEPRESSION: 0
NAUSEA: 1
MYALGIAS: 1

## 2020-01-29 ASSESSMENT — COGNITIVE AND FUNCTIONAL STATUS - GENERAL
MOVING FROM LYING ON BACK TO SITTING ON SIDE OF FLAT BED: A LITTLE
DRESSING REGULAR UPPER BODY CLOTHING: A LITTLE
CLIMB 3 TO 5 STEPS WITH RAILING: A LOT
PERSONAL GROOMING: A LITTLE
TOILETING: A LITTLE
HELP NEEDED FOR BATHING: A LITTLE
DRESSING REGULAR LOWER BODY CLOTHING: A LITTLE
MOBILITY SCORE: 18
SUGGESTED CMS G CODE MODIFIER DAILY ACTIVITY: CK
WALKING IN HOSPITAL ROOM: A LITTLE
SUGGESTED CMS G CODE MODIFIER MOBILITY: CK
STANDING UP FROM CHAIR USING ARMS: A LITTLE
EATING MEALS: A LITTLE
DAILY ACTIVITIY SCORE: 18
MOVING TO AND FROM BED TO CHAIR: A LITTLE

## 2020-01-29 ASSESSMENT — GAIT ASSESSMENTS
DISTANCE (FEET): 100
DEVIATION: INCREASED BASE OF SUPPORT
GAIT LEVEL OF ASSIST: CONTACT GUARD ASSIST

## 2020-01-29 ASSESSMENT — ACTIVITIES OF DAILY LIVING (ADL): TOILETING: INDEPENDENT

## 2020-01-29 NOTE — PROGRESS NOTES
Hospital Medicine Daily Progress Note    Date of Service  1/29/2020    Chief Complaint  54 y.o. male admitted 1/28/2020 with pain pump surgical site dehiscence    Hospital Course    54 y.o. male with a history of colon cancer status post treatment, now on chemotherapy for remission, complicated by metastases to the lung and chronic pain who uses an intrathecal pain pump, who presented 1/28/2020 with surgical wound dehiscence.         Interval Problem Update  1/29: Drowsy however states pain is not well controlled.  He is receiving every 4 Dilaudid.  WBC slightly up 13.2.  Complains of low appetite, dietitian consulted, supplements ordered    Consultants/Specialty  Dr. Reina, pain management    Code Status  Full    Disposition  Home if wound VAC is discontinued, SNF if he still requires wound VAC    Review of Systems  Review of Systems   Constitutional: Positive for malaise/fatigue. Negative for chills and fever.   HENT: Negative for sore throat.    Respiratory: Negative for cough and shortness of breath.    Cardiovascular: Negative for chest pain and palpitations.   Gastrointestinal: Positive for abdominal pain and nausea. Negative for blood in stool, diarrhea, heartburn and vomiting.        Low appetite   Genitourinary: Negative for dysuria and frequency.   Musculoskeletal: Positive for myalgias. Negative for back pain.   Neurological: Negative for dizziness, focal weakness, weakness and headaches.   Psychiatric/Behavioral: Negative for depression and hallucinations. The patient has insomnia.    All other systems reviewed and are negative.       Physical Exam  Temp:  [36.5 °C (97.7 °F)-36.7 °C (98 °F)] 36.5 °C (97.7 °F)  Pulse:  [58-89] 66  Resp:  [16-22] 16  BP: (115-134)/(63-98) 134/75  SpO2:  [95 %-98 %] 96 %    Physical Exam  Constitutional:       Comments: Drowsy, pale, thin   HENT:      Head: Normocephalic and atraumatic.      Nose: Nose normal.      Mouth/Throat:      Mouth: Mucous membranes are moist.    Eyes:      Extraocular Movements: Extraocular movements intact.      Pupils: Pupils are equal, round, and reactive to light.   Neck:      Musculoskeletal: Normal range of motion and neck supple.   Cardiovascular:      Rate and Rhythm: Normal rate and regular rhythm.      Heart sounds: No murmur.   Pulmonary:      Effort: Pulmonary effort is normal. No respiratory distress.      Breath sounds: Normal breath sounds. No stridor. No wheezing.   Abdominal:      General: Abdomen is flat. There is no distension.      Palpations: Abdomen is soft.      Tenderness: There is tenderness. There is guarding.      Comments: Abdominal binder in place, wound vac in place   Musculoskeletal:         General: No swelling, tenderness or deformity.   Skin:     General: Skin is warm and dry.      Coloration: Skin is not pale.   Neurological:      General: No focal deficit present.      Mental Status: He is oriented to person, place, and time. Mental status is at baseline.   Psychiatric:         Behavior: Behavior normal.      Comments: Anxious mood         Fluids    Intake/Output Summary (Last 24 hours) at 1/29/2020 1347  Last data filed at 1/29/2020 1300  Gross per 24 hour   Intake 756.5 ml   Output 3600 ml   Net -2843.5 ml       Laboratory  Recent Labs     01/29/20  0411   WBC 13.2*   RBC 4.64*   HEMOGLOBIN 13.0*   HEMATOCRIT 39.3*   MCV 84.7   MCH 28.0   MCHC 33.1*   RDW 39.6   PLATELETCT 323   MPV 9.4     Recent Labs     01/28/20  1436 01/29/20  0411   SODIUM 135 134*   POTASSIUM 4.1 4.2   CHLORIDE 97 98   CO2 25 22   GLUCOSE 147* 117*   BUN 7* 10   CREATININE 0.82 0.85   CALCIUM 9.4 9.3                   Imaging  No orders to display        Assessment/Plan  * Infection of superficial incisional surgical site after procedure- (present on admission)  Assessment & Plan  He had infection of his surgical site where his right-sided pain pump was and had dehiscence of his wound.  On 1/28 he had debridement of this location and his pain  pump removed and changed to the other side.  He has a right-sided wound VAC in place, duration TBD at this point  This will be a barrier to discharge as he lives in [place] where there is no home health available  Continue IV ceftriaxone --> Keflex in AM  F/U Cultures were taken at the time of surgery (1/28)    Metastatic Colon cancer (HCC)- (present on admission)  Assessment & Plan  He was diagnosed in 2016, treated with resection and chemotherapy.  He had recurrence in August 2018, found to have metastases to the lung.  He has been on chemotherapy however, this was held in preparation for his pain pump replacement procedure  Continue to hold chemo for now  Continue prednisone    Chronic, continuous use of opioids- (present on admission)  Assessment & Plan  I discussed the case with his pain management physician, Dr. Reina.  Continue p.o. Dilaudid 4 mg every 4 hours  He has a pain pump with Dilaudid 8 mg/day and ketamine, he has additional boluses to be used as needed  At this time no increase in IV Dilaudid is indicated    Essential hypertension- (present on admission)  Assessment & Plan  Continue Cozaar    COPD (chronic obstructive pulmonary disease) (HCC)- (present on admission)  Assessment & Plan  He is not on baseline oxygen  Wean O2         VTE prophylaxis: Lovenox

## 2020-01-29 NOTE — RESPIRATORY CARE
COPD EDUCATION by COPD CLINICAL EDUCATOR  1/29/2020 at 7:59 AM by Meg Bryan, RRT     Patient reviewed by COPD education team. Patient does not have a history or diagnosis of COPD and is a former smoker quit 2015, therefore does not qualify for the COPD program.

## 2020-01-29 NOTE — PROGRESS NOTES
Updated Dr. Gage on pt's pain remaining uncontrolled, pt requesting IV dilaudid. She will contact Dr. Reina.

## 2020-01-29 NOTE — DIETARY
"Nutrition services: Day 0 of admit.  Erich Ingram is a 54 y.o. male with admitting DX of colon cancer.     Consult received for failure to thrive      Assessment:  Height: 190.5 cm (6' 3\")  Weight: 74.8 kg (165 lb)  Body mass index is 20.62 kg/m²., BMI classification: normal  Diet/Intake: regular with Boost Plus TID/% of lunch yesterday.     Evaluation:   1. RD attempted to visit pt 2 x. On second visit, pt awoke to RD calling his name. He said that it was not a good time for RD to visit. Pt with order for Boost Plus with meals TID. PO intake was adequate at lunch yesterday. RD will monitor adequacy of PO intake.   2. Pt with dx of colon cancer with mets to his lungs and is receiving chemo treatments. Chemo has been held due to current surgery. Pt with infected surgical site where his pain pump is on his right side. Wound dehiscence noted. Pain pump was changed to the other side. Pt's wound was debrided and wound vac has been place. Wound team is following.   3. Meds: prednisone    Malnutrition Risk: No report of wt loss or poor PO PTA based on nutrition screen. Pt is at risk due to current dx of colon cancer with mets.      Recommendations/Plan:  1. Provided diet and Boost Plus as ordered.   2. Encourage intake of >50%  3. Document intake of all meals and supplements as % taken in ADL's to provide interdisciplinary communication across all shifts.   4. Monitor weight.  5. Nutrition rep will continue to see patient for ongoing meal and snack preferences.     RD will monitor.       "

## 2020-01-29 NOTE — PROGRESS NOTES
2 RN skin assessment complete. Wound to R lower abdomen where previous infected pain pump was located, grey foam and wound vac present, moderate sanguineous drainage to wound vac. New pain pump incision site to L abdomen, covered by dry gauze and tegaderm, scant serosanguineous drainage - no change since beginning of this shift. Abdominal binder in place. Pt reports cut to R distal 4th digit, covered by bandaid - not witnessed at this time.     Otherwise, skin WDL.

## 2020-01-29 NOTE — CARE PLAN
Problem: Pain Management  Goal: Pain level will decrease to patient's comfort goal  Outcome: PROGRESSING AS EXPECTED  Note:   Pain managed with current prn options and pain pump     Problem: Communication  Goal: The ability to communicate needs accurately and effectively will improve  Outcome: PROGRESSING AS EXPECTED  Note:   Pt calls appropriately, makes needs known      Problem: Safety  Goal: Will remain free from injury  Outcome: PROGRESSING AS EXPECTED  Intervention: Provide assistance with mobility  Flowsheets (Taken 1/29/2020 0629)  Assistance: Assistance of One  Ambulation Tolerance: Tires Quickly  Goal: Will remain free from falls  Outcome: PROGRESSING AS EXPECTED     Problem: Infection  Goal: Will remain free from infection  Outcome: PROGRESSING AS EXPECTED     Problem: Venous Thromboembolism (VTW)/Deep Vein Thrombosis (DVT) Prevention:  Goal: Patient will participate in Venous Thrombosis (VTE)/Deep Vein Thrombosis (DVT)Prevention Measures  Outcome: PROGRESSING AS EXPECTED     Problem: Bowel/Gastric:  Goal: Normal bowel function is maintained or improved  Outcome: PROGRESSING AS EXPECTED     Problem: Knowledge Deficit  Goal: Knowledge of disease process/condition, treatment plan, diagnostic tests, and medications will improve  Outcome: PROGRESSING AS EXPECTED

## 2020-01-29 NOTE — PROGRESS NOTES
Pt alert and oriented, c/o 7/10 abdominal pain, not due for pain medication at this time  Medicated per mar for nausea, new medication added by Dr. Gage, ok to give now  IVF infusing  Notified MD of family's request for nutrition consult, order placed  SCDs on, safety precautions in place  Wound vac to R abdomen at 125mmhg, serosanguinous output, dressing to L lower abdomen and back, CDI, old scant drainage

## 2020-01-29 NOTE — CARE PLAN
Problem: Pain Management  Goal: Pain level will decrease to patient's comfort goal  Outcome: PROGRESSING SLOWER THAN EXPECTED     Problem: Communication  Goal: The ability to communicate needs accurately and effectively will improve  Outcome: PROGRESSING AS EXPECTED  Note:   Plan of care discussed, patient verbalizes understanding      Problem: Safety  Goal: Will remain free from injury  Outcome: PROGRESSING AS EXPECTED  Note:   Treaded socks in place, call light within reach, bed locked in low position, room near nursing station, hourly rounding      Problem: Venous Thromboembolism (VTW)/Deep Vein Thrombosis (DVT) Prevention:  Goal: Patient will participate in Venous Thrombosis (VTE)/Deep Vein Thrombosis (DVT)Prevention Measures  Outcome: PROGRESSING AS EXPECTED  Note:   SCDs applied

## 2020-01-29 NOTE — PROGRESS NOTES
"Pt's wife called and notified this RN, that she would like a dietitian consult for her . She states, \"he is a picky eater and it is important for him to eat and heal.\" Reassure wife that this RN will pass message on to NOC RN to endorse to day RN tomorrow.   "

## 2020-01-29 NOTE — PROGRESS NOTES
Report received from NED Mayer RN. Pt awake, alert, appropriate and pleasant. Pt reports continued pain control needs, will give PRN options when available. Denies needs at this time. Call light in reach. Wound vac maintained to 125mmHg suction, 100mL sanguineous drainage in cannister at this time. Scant drainage to dressing on L abdomen, sanguineous. Abdominal binder remains in place. Will continue to monitor.

## 2020-01-30 PROBLEM — D72.829 LEUKOCYTOSIS: Status: ACTIVE | Noted: 2020-01-30

## 2020-01-30 LAB
ALBUMIN SERPL BCP-MCNC: 4.1 G/DL (ref 3.2–4.9)
BASOPHILS # BLD AUTO: 0.5 % (ref 0–1.8)
BASOPHILS # BLD: 0.09 K/UL (ref 0–0.12)
BUN SERPL-MCNC: 13 MG/DL (ref 8–22)
CALCIUM SERPL-MCNC: 9.5 MG/DL (ref 8.4–10.2)
CHLORIDE SERPL-SCNC: 99 MMOL/L (ref 96–112)
CO2 SERPL-SCNC: 23 MMOL/L (ref 20–33)
CREAT SERPL-MCNC: 0.83 MG/DL (ref 0.5–1.4)
EOSINOPHIL # BLD AUTO: 0.06 K/UL (ref 0–0.51)
EOSINOPHIL NFR BLD: 0.3 % (ref 0–6.9)
ERYTHROCYTE [DISTWIDTH] IN BLOOD BY AUTOMATED COUNT: 40.3 FL (ref 35.9–50)
GLUCOSE SERPL-MCNC: 134 MG/DL (ref 65–99)
HCT VFR BLD AUTO: 44.6 % (ref 42–52)
HGB BLD-MCNC: 14.9 G/DL (ref 14–18)
IMM GRANULOCYTES # BLD AUTO: 0.1 K/UL (ref 0–0.11)
IMM GRANULOCYTES NFR BLD AUTO: 0.6 % (ref 0–0.9)
LYMPHOCYTES # BLD AUTO: 1.89 K/UL (ref 1–4.8)
LYMPHOCYTES NFR BLD: 10.5 % (ref 22–41)
MAGNESIUM SERPL-MCNC: 1.9 MG/DL (ref 1.5–2.5)
MCH RBC QN AUTO: 28.3 PG (ref 27–33)
MCHC RBC AUTO-ENTMCNC: 33.4 G/DL (ref 33.7–35.3)
MCV RBC AUTO: 84.8 FL (ref 81.4–97.8)
MONOCYTES # BLD AUTO: 1.72 K/UL (ref 0–0.85)
MONOCYTES NFR BLD AUTO: 9.6 % (ref 0–13.4)
NEUTROPHILS # BLD AUTO: 14.09 K/UL (ref 1.82–7.42)
NEUTROPHILS NFR BLD: 78.5 % (ref 44–72)
NRBC # BLD AUTO: 0 K/UL
NRBC BLD-RTO: 0 /100 WBC
PHOSPHATE SERPL-MCNC: 3.3 MG/DL (ref 2.5–4.5)
PLATELET # BLD AUTO: 345 K/UL (ref 164–446)
PMV BLD AUTO: 9.2 FL (ref 9–12.9)
POTASSIUM SERPL-SCNC: 4 MMOL/L (ref 3.6–5.5)
RBC # BLD AUTO: 5.26 M/UL (ref 4.7–6.1)
SODIUM SERPL-SCNC: 137 MMOL/L (ref 135–145)
WBC # BLD AUTO: 18 K/UL (ref 4.8–10.8)

## 2020-01-30 PROCEDURE — 700102 HCHG RX REV CODE 250 W/ 637 OVERRIDE(OP): Performed by: INTERNAL MEDICINE

## 2020-01-30 PROCEDURE — 700111 HCHG RX REV CODE 636 W/ 250 OVERRIDE (IP): Performed by: INTERNAL MEDICINE

## 2020-01-30 PROCEDURE — G0378 HOSPITAL OBSERVATION PER HR: HCPCS

## 2020-01-30 PROCEDURE — 99226 PR SUBSEQUENT OBSERVATION CARE,LEVEL III: CPT | Performed by: INTERNAL MEDICINE

## 2020-01-30 PROCEDURE — 80069 RENAL FUNCTION PANEL: CPT

## 2020-01-30 PROCEDURE — A9270 NON-COVERED ITEM OR SERVICE: HCPCS | Performed by: INTERNAL MEDICINE

## 2020-01-30 PROCEDURE — 36415 COLL VENOUS BLD VENIPUNCTURE: CPT

## 2020-01-30 PROCEDURE — 83735 ASSAY OF MAGNESIUM: CPT

## 2020-01-30 PROCEDURE — 85025 COMPLETE CBC W/AUTO DIFF WBC: CPT

## 2020-01-30 PROCEDURE — 700105 HCHG RX REV CODE 258: Performed by: INTERNAL MEDICINE

## 2020-01-30 RX ADMIN — SENNOSIDES AND DOCUSATE SODIUM 2 TABLET: 8.6; 5 TABLET ORAL at 06:29

## 2020-01-30 RX ADMIN — HYDROMORPHONE HYDROCHLORIDE 4 MG: 2 TABLET ORAL at 10:48

## 2020-01-30 RX ADMIN — TAMSULOSIN HYDROCHLORIDE 0.4 MG: 0.4 CAPSULE ORAL at 08:56

## 2020-01-30 RX ADMIN — PREDNISONE 5 MG: 5 TABLET ORAL at 06:31

## 2020-01-30 RX ADMIN — HYDROMORPHONE HYDROCHLORIDE 4 MG: 2 TABLET ORAL at 22:56

## 2020-01-30 RX ADMIN — SENNOSIDES AND DOCUSATE SODIUM 2 TABLET: 8.6; 5 TABLET ORAL at 18:57

## 2020-01-30 RX ADMIN — HYDROMORPHONE HYDROCHLORIDE 4 MG: 2 TABLET ORAL at 01:41

## 2020-01-30 RX ADMIN — CEFTRIAXONE SODIUM 1 G: 1 INJECTION, POWDER, FOR SOLUTION INTRAMUSCULAR; INTRAVENOUS at 06:32

## 2020-01-30 RX ADMIN — LOSARTAN POTASSIUM 50 MG: 25 TABLET ORAL at 06:31

## 2020-01-30 RX ADMIN — POLYETHYLENE GLYCOL (3350) 1 PACKET: 17 POWDER, FOR SOLUTION ORAL at 10:48

## 2020-01-30 RX ADMIN — HYDROMORPHONE HYDROCHLORIDE 4 MG: 2 TABLET ORAL at 06:31

## 2020-01-30 RX ADMIN — HYDROMORPHONE HYDROCHLORIDE 4 MG: 2 TABLET ORAL at 18:57

## 2020-01-30 RX ADMIN — ONDANSETRON 8 MG: 4 TABLET, ORALLY DISINTEGRATING ORAL at 15:03

## 2020-01-30 RX ADMIN — PROCHLORPERAZINE EDISYLATE 10 MG: 5 INJECTION INTRAMUSCULAR; INTRAVENOUS at 18:57

## 2020-01-30 RX ADMIN — POLYETHYLENE GLYCOL 3350 1 PACKET: 17 POWDER, FOR SOLUTION ORAL at 06:30

## 2020-01-30 RX ADMIN — HYDROMORPHONE HYDROCHLORIDE 4 MG: 2 TABLET ORAL at 14:52

## 2020-01-30 RX ADMIN — OMEPRAZOLE 20 MG: 20 CAPSULE, DELAYED RELEASE ORAL at 06:30

## 2020-01-30 ASSESSMENT — ENCOUNTER SYMPTOMS
DIZZINESS: 0
NAUSEA: 0
WEAKNESS: 1
BLOOD IN STOOL: 0
DEPRESSION: 0
CHILLS: 0
FEVER: 0
ORTHOPNEA: 0
HALLUCINATIONS: 0
HEADACHES: 0
INSOMNIA: 0
FOCAL WEAKNESS: 0
DIARRHEA: 0
VOMITING: 0
ROS GI COMMENTS: LOW APPETITE
SHORTNESS OF BREATH: 0
SPUTUM PRODUCTION: 0
PALPITATIONS: 0
BACK PAIN: 1
SORE THROAT: 0
ABDOMINAL PAIN: 1
MYALGIAS: 1

## 2020-01-30 NOTE — CARE PLAN
Problem: Pain Management  Goal: Pain level will decrease to patient's comfort goal  Outcome: PROGRESSING SLOWER THAN EXPECTED     Problem: Communication  Goal: The ability to communicate needs accurately and effectively will improve  Outcome: PROGRESSING AS EXPECTED     Problem: Safety  Goal: Will remain free from injury  Outcome: PROGRESSING AS EXPECTED

## 2020-01-30 NOTE — PROGRESS NOTES
Another RN let this RN know that pt disconnected his wound vac tubing to go to the bathroom then got back in bed and reconnected. Educated pt thoroughly to call for assistance and take wound vac on IV pump with him as disconnecting increases risk of infection. CN notified, pt verbalizes udnerstanding

## 2020-01-30 NOTE — DISCHARGE PLANNING
"Care Transition Team Assessment    Information Source  Orientation : Oriented x 4  Information Given By: Significant Other    Readmission Evaluation  Is this a readmission?: No    Elopement Risk  Legal Hold: No  Ambulatory or Self Mobile in Wheelchair: No-Not an Elopement Risk  Elopement Risk: Not at Risk for Elopement    Interdisciplinary Discharge Planning  Does Admitting Nurse Feel This Could be a Complex Discharge?: No  Lives with - Patient's Self Care Capacity: Spouse  Patient or legal guardian wants to designate a caregiver (see row info): No  Support Systems: Spouse / Significant Other  Housing / Facility: 1 Story House  Do You Take your Prescribed Medications Regularly: Yes  Able to Return to Previous ADL's: Yes  Mobility Issues: No  Prior Services: Housekeeping / Homemaker Services(2x/wk)  Assistance Needed: Unknown at this Time    Discharge Preparedness  What is your plan after discharge?: Skilled nursing facility    Vision / Hearing Impairment  Vision Impairment : Yes  Right Eye Vision: Impaired, Wears Glasses(\"For distance\" per pt)  Left Eye Vision: Impaired, Wears Glasses(\"For distance\" per pt)  Hearing Impairment : No    Values / Beliefs / Concerns  Values / Beliefs Concerns : No    Advance Directive  Advance Directive?: None    Domestic Abuse  Have you ever been the victim of abuse or violence?: No  Physical Abuse or Sexual Abuse: No  Verbal Abuse or Emotional Abuse: No  Possible Abuse Reported to:: Not Applicable    Psychological Assessment  History of Substance Abuse: None  History of Psychiatric Problems: No  Non-compliant with Treatment: No  Newly Diagnosed Illness: No    Discharge Risks or Barriers  Discharge risks or barriers?: Other (comment)(Geographical location)    Anticipated Discharge Information  Anticipated discharge disposition: SNF        "

## 2020-01-30 NOTE — PROGRESS NOTES
MD asked this RN to call wound care to check wound vac. Wound care called, they stated that it was agreed upon by Dr. Reina and wound care that this could be done tomorrow 1/31.

## 2020-01-30 NOTE — PROGRESS NOTES
Hospital Medicine Daily Progress Note    Date of Service  1/30/2020    Chief Complaint  54 y.o. male admitted 1/28/2020 with pain pump surgical site dehiscence    Hospital Course    54 y.o. male with a history of colon cancer status post treatment, now on chemotherapy for remission, complicated by metastases to the lung and chronic pain who uses an intrathecal pain pump, who presented 1/28/2020 with surgical wound dehiscence.         Interval Problem Update  1/29: Drowsy however states pain is not well controlled.  He is receiving every 4 Dilaudid.  WBC slightly up 13.2.  Complains of low appetite, dietitian consulted, supplements ordered  1/30: WBC up to 18, heart rate low 100s.  Clinically he feels better, cultures negative so far.  Wound VAC in place, pending wound care recommendations.  Appetite remains low but tolerating boost    Consultants/Specialty  Dr. Reina, pain management  Wound care    Code Status  Full    Disposition  SNF due to wound VAC    Review of Systems  Review of Systems   Constitutional: Positive for malaise/fatigue. Negative for chills and fever.   HENT: Negative for sore throat.    Respiratory: Negative for sputum production and shortness of breath.    Cardiovascular: Negative for palpitations and orthopnea.   Gastrointestinal: Positive for abdominal pain (Controlled). Negative for blood in stool, diarrhea, nausea and vomiting.        Low appetite   Genitourinary: Negative for dysuria and frequency.   Musculoskeletal: Positive for back pain (Controlled) and myalgias.   Neurological: Positive for weakness. Negative for dizziness, focal weakness and headaches.   Psychiatric/Behavioral: Negative for depression and hallucinations. The patient does not have insomnia.    All other systems reviewed and are negative.       Physical Exam  Temp:  [36.2 °C (97.2 °F)-36.8 °C (98.2 °F)] 36.3 °C (97.3 °F)  Pulse:  [] 113  Resp:  [16-18] 18  BP: (106-151)/() 106/66  SpO2:  [95 %-96 %] 96  %    Physical Exam  Constitutional:       Comments: Listless   HENT:      Head: Normocephalic and atraumatic.      Nose: Nose normal.      Mouth/Throat:      Mouth: Mucous membranes are moist.   Eyes:      Extraocular Movements: Extraocular movements intact.      Pupils: Pupils are equal, round, and reactive to light.   Neck:      Musculoskeletal: Normal range of motion and neck supple.   Cardiovascular:      Rate and Rhythm: Normal rate and regular rhythm.      Heart sounds: No murmur.   Pulmonary:      Effort: Pulmonary effort is normal. No respiratory distress.      Breath sounds: Normal breath sounds. No stridor. No wheezing.   Abdominal:      General: Abdomen is flat. There is no distension.      Palpations: Abdomen is soft.      Tenderness: There is tenderness.      Comments: Left surgical dressing slight amount of saturation.  Right wound VAC, pooling of blood under Tegaderm.  No purulence noted   Musculoskeletal:         General: No swelling, tenderness or deformity.   Skin:     General: Skin is warm and dry.      Coloration: Skin is not pale.   Neurological:      General: No focal deficit present.      Mental Status: He is oriented to person, place, and time. Mental status is at baseline.   Psychiatric:         Behavior: Behavior normal.         Fluids    Intake/Output Summary (Last 24 hours) at 1/30/2020 1320  Last data filed at 1/30/2020 0639  Gross per 24 hour   Intake --   Output 2440 ml   Net -2440 ml       Laboratory  Recent Labs     01/29/20  0411 01/30/20  0513   WBC 13.2* 18.0*   RBC 4.64* 5.26   HEMOGLOBIN 13.0* 14.9   HEMATOCRIT 39.3* 44.6   MCV 84.7 84.8   MCH 28.0 28.3   MCHC 33.1* 33.4*   RDW 39.6 40.3   PLATELETCT 323 345   MPV 9.4 9.2     Recent Labs     01/28/20  1436 01/29/20  0411 01/30/20  0513   SODIUM 135 134* 137   POTASSIUM 4.1 4.2 4.0   CHLORIDE 97 98 99   CO2 25 22 23   GLUCOSE 147* 117* 134*   BUN 7* 10 13   CREATININE 0.82 0.85 0.83   CALCIUM 9.4 9.3 9.5                    Imaging  No orders to display        Assessment/Plan  * Infection of superficial incisional surgical site after procedure- (present on admission)  Assessment & Plan  He had infection of his surgical site where his right-sided pain pump was and had dehiscence of his wound.  On 1/28 he had debridement of this location and his pain pump removed and changed to the other side.  He has a right-sided wound VAC in place--> will need SNF  Continue Keflex  F/U Cultures were taken at the time of surgery (1/28)    Metastatic Colon cancer (HCC)- (present on admission)  Assessment & Plan  He was diagnosed in 2016, treated with resection and chemotherapy.  He had recurrence in August 2018, found to have metastases to the lung.  He has been on chemotherapy however, this was held in preparation for his pain pump replacement procedure  Continue to hold chemo for now  Continue prednisone    Leukocytosis  Assessment & Plan  Rising, but symptomatically feeling better  Surgical wounds without e/o infection  Check procalcitonin  Continue keflex  F/U cultures from OR    Chronic, continuous use of opioids- (present on admission)  Assessment & Plan  I discussed the case with his pain management physician, Dr. Reina.  Continue p.o. Dilaudid 4 mg every 4 hours  He has a pain pump with Dilaudid 8 mg/day and ketamine, he has additional boluses to be used as needed  At this time no increase in IV Dilaudid is indicated    Essential hypertension- (present on admission)  Assessment & Plan  Continue Cozaar    COPD (chronic obstructive pulmonary disease) (HCC)- (present on admission)  Assessment & Plan  He is not on baseline oxygen  Wean O2       VTE prophylaxis: Lovenox    Total time:  40 minutes.  I spent greater than 50% of the time for patient care, counseling, and coordination on this date, including unit/floor time, and face-to-face time with the patient as per interval events and assessment and plan above

## 2020-01-30 NOTE — DISCHARGE PLANNING
Spoke with patients wife about Dc planning. Wife is agreeable to SNF placement. SNF choice form completed and [facility] selected. Faxed to [facility].

## 2020-01-30 NOTE — PROGRESS NOTES
Assumed pt care. Pt in bed, resting, awake, in no apparent distress but is anxious due to pain per pt. Safety precautions in place. Call light and personal belongings in place and within reach. C/o 8/10 pain. Pt declines heat pack. Updated on POC. Will continue to monitor.

## 2020-01-30 NOTE — THERAPY
"Occupational Therapy Evaluation completed.   Functional Status:  55 yo male admit for pain pump replacement, wound vac to previous site.  Pt has chronic pain from Colon Cancer, does not tolerate standing for long periods of time due to pain.  Pt was able to get OOB supervised, needed Maria Eugenia to don socks 2/2 pain limiting forward flexion.  Pt able to walk in robles with CGA/close SBA short distance.  He normally does not use a device at home but has a walker if needed. Pt able to get back into bed supervised.  Pt lives with spouse but she works and he is home alone during the day.  Currently pt appears at or slightly below baseline with mobility and ADL's, but should improve as pain improves.  Biggest limitation for pt going home safely now is wound vac and need [place]  At this time, no further OT services needed in the acute care setting.  Plan of Care: Patient with no further skilled OT needs in the acute care setting at this time  Discharge Recommendations:  Equipment: No Equipment Needed. Post-acute therapy Discharge to a transitional care facility for continued skilled therapy services VS Discharge to home with outpatient or home health for additional skilled therapy/wound care services.    See \"Rehab Therapy-Acute\" Patient Summary Report for complete documentation.    Patient will not be actively followed for occupational therapy services at this time, however may be seen if requested by physician for 1 more visit within 30 days to address any discharge or equipment needs.     "

## 2020-01-30 NOTE — DISCHARGE PLANNING
Received Choice form at 0800  Agency/Facility Name: [facility]   Referral sent per Choice form @ [id number]

## 2020-01-30 NOTE — CARE PLAN
Problem: Pain Management  Goal: Pain level will decrease to patient's comfort goal  Outcome: PROGRESSING AS EXPECTED  Note:   Pt continues to have unresolved pain     Problem: Safety  Goal: Will remain free from injury  Outcome: PROGRESSING AS EXPECTED  Goal: Will remain free from falls  Outcome: PROGRESSING AS EXPECTED     Problem: Venous Thromboembolism (VTW)/Deep Vein Thrombosis (DVT) Prevention:  Goal: Patient will participate in Venous Thrombosis (VTE)/Deep Vein Thrombosis (DVT)Prevention Measures  Outcome: PROGRESSING AS EXPECTED  Flowsheets (Taken 1/30/2020 0856)  Risk Assessment Score: 2  VTE RISK: Moderate  Mechanical Prophylaxis : SCDs, Sequential Compression Device  SCDs, Sequential Compression Device: On

## 2020-01-30 NOTE — THERAPY
"Physical Therapy Evaluation completed.   Bed Mobility:  Supine to Sit: Supervised  Transfers: Sit to Stand: Supervised  Gait: Level Of Assist: Contact Guard Assist with No Equipment Needed       Plan of Care: Patient with no further skilled PT needs in the acute care setting at this time  Discharge Recommendations: Equipment: No Equipment Needed. Post-acute therapy Currently anticipate no further skilled therapy needs once patient is discharged from the inpatient setting.    Pt is a 53 yo male with diagnosis of Colon CA, has been having issues with pain pump and wound dehisence, admitted for pain pump revision to other side with wound vac placement on original site.  Pt appears to be close to baseline, reports does not ambulate much at home or get out of the house much, limited by pain from metastatic colon CA.  Encouraged pt to ambulate with RN staff as tolerated. Currently, there are no skilled PT needs, will DC PT.    See \"Rehab Therapy-Acute\" Patient Summary Report for complete documentation.     "

## 2020-01-31 LAB
ALBUMIN SERPL BCP-MCNC: 3.9 G/DL (ref 3.2–4.9)
ALBUMIN/GLOB SERPL: 1.2 G/DL
ALP SERPL-CCNC: 105 U/L (ref 30–99)
ALT SERPL-CCNC: 7 U/L (ref 2–50)
ANION GAP SERPL CALC-SCNC: 14 MMOL/L (ref 0–11.9)
AST SERPL-CCNC: 14 U/L (ref 12–45)
BASOPHILS # BLD AUTO: 0.7 % (ref 0–1.8)
BASOPHILS # BLD: 0.09 K/UL (ref 0–0.12)
BILIRUB SERPL-MCNC: 0.3 MG/DL (ref 0.1–1.5)
BUN SERPL-MCNC: 15 MG/DL (ref 8–22)
CALCIUM SERPL-MCNC: 9.3 MG/DL (ref 8.4–10.2)
CHLORIDE SERPL-SCNC: 99 MMOL/L (ref 96–112)
CO2 SERPL-SCNC: 23 MMOL/L (ref 20–33)
CREAT SERPL-MCNC: 0.86 MG/DL (ref 0.5–1.4)
EOSINOPHIL # BLD AUTO: 0.18 K/UL (ref 0–0.51)
EOSINOPHIL NFR BLD: 1.5 % (ref 0–6.9)
ERYTHROCYTE [DISTWIDTH] IN BLOOD BY AUTOMATED COUNT: 40.8 FL (ref 35.9–50)
GLOBULIN SER CALC-MCNC: 3.3 G/DL (ref 1.9–3.5)
GLUCOSE SERPL-MCNC: 120 MG/DL (ref 65–99)
HCT VFR BLD AUTO: 42.1 % (ref 42–52)
HGB BLD-MCNC: 13.7 G/DL (ref 14–18)
IMM GRANULOCYTES # BLD AUTO: 0.04 K/UL (ref 0–0.11)
IMM GRANULOCYTES NFR BLD AUTO: 0.3 % (ref 0–0.9)
LYMPHOCYTES # BLD AUTO: 1.83 K/UL (ref 1–4.8)
LYMPHOCYTES NFR BLD: 14.9 % (ref 22–41)
MAGNESIUM SERPL-MCNC: 2 MG/DL (ref 1.5–2.5)
MCH RBC QN AUTO: 28.2 PG (ref 27–33)
MCHC RBC AUTO-ENTMCNC: 32.5 G/DL (ref 33.7–35.3)
MCV RBC AUTO: 86.8 FL (ref 81.4–97.8)
MONOCYTES # BLD AUTO: 1.53 K/UL (ref 0–0.85)
MONOCYTES NFR BLD AUTO: 12.5 % (ref 0–13.4)
NEUTROPHILS # BLD AUTO: 8.61 K/UL (ref 1.82–7.42)
NEUTROPHILS NFR BLD: 70.1 % (ref 44–72)
NRBC # BLD AUTO: 0 K/UL
NRBC BLD-RTO: 0 /100 WBC
PHOSPHATE SERPL-MCNC: 3.6 MG/DL (ref 2.5–4.5)
PLATELET # BLD AUTO: 314 K/UL (ref 164–446)
PMV BLD AUTO: 9.6 FL (ref 9–12.9)
POTASSIUM SERPL-SCNC: 3.8 MMOL/L (ref 3.6–5.5)
PROT SERPL-MCNC: 7.2 G/DL (ref 6–8.2)
RBC # BLD AUTO: 4.85 M/UL (ref 4.7–6.1)
SODIUM SERPL-SCNC: 136 MMOL/L (ref 135–145)
WBC # BLD AUTO: 12.3 K/UL (ref 4.8–10.8)

## 2020-01-31 PROCEDURE — A9270 NON-COVERED ITEM OR SERVICE: HCPCS | Performed by: INTERNAL MEDICINE

## 2020-01-31 PROCEDURE — 97605 NEG PRS WND THER DME<=50SQCM: CPT

## 2020-01-31 PROCEDURE — 80053 COMPREHEN METABOLIC PANEL: CPT

## 2020-01-31 PROCEDURE — 83735 ASSAY OF MAGNESIUM: CPT

## 2020-01-31 PROCEDURE — G0378 HOSPITAL OBSERVATION PER HR: HCPCS

## 2020-01-31 PROCEDURE — 99225 PR SUBSEQUENT OBSERVATION CARE,LEVEL II: CPT | Performed by: INTERNAL MEDICINE

## 2020-01-31 PROCEDURE — 700111 HCHG RX REV CODE 636 W/ 250 OVERRIDE (IP): Performed by: INTERNAL MEDICINE

## 2020-01-31 PROCEDURE — 36415 COLL VENOUS BLD VENIPUNCTURE: CPT

## 2020-01-31 PROCEDURE — 306263 VAC CANNISTER W/GEL 500ML

## 2020-01-31 PROCEDURE — 85025 COMPLETE CBC W/AUTO DIFF WBC: CPT

## 2020-01-31 PROCEDURE — 700102 HCHG RX REV CODE 250 W/ 637 OVERRIDE(OP): Performed by: INTERNAL MEDICINE

## 2020-01-31 PROCEDURE — 306372 DRESSING,VAC SIMPLACE MED

## 2020-01-31 PROCEDURE — 84100 ASSAY OF PHOSPHORUS: CPT

## 2020-01-31 RX ADMIN — HYDROMORPHONE HYDROCHLORIDE 4 MG: 2 TABLET ORAL at 07:05

## 2020-01-31 RX ADMIN — PREDNISONE 5 MG: 5 TABLET ORAL at 05:59

## 2020-01-31 RX ADMIN — HYDROMORPHONE HYDROCHLORIDE 4 MG: 2 TABLET ORAL at 11:22

## 2020-01-31 RX ADMIN — CEPHALEXIN 250 MG: 250 CAPSULE ORAL at 05:59

## 2020-01-31 RX ADMIN — CEPHALEXIN 250 MG: 250 CAPSULE ORAL at 17:38

## 2020-01-31 RX ADMIN — OMEPRAZOLE 20 MG: 20 CAPSULE, DELAYED RELEASE ORAL at 05:59

## 2020-01-31 RX ADMIN — HYDROMORPHONE HYDROCHLORIDE 4 MG: 2 TABLET ORAL at 20:21

## 2020-01-31 RX ADMIN — LOSARTAN POTASSIUM 50 MG: 25 TABLET ORAL at 05:59

## 2020-01-31 RX ADMIN — TAMSULOSIN HYDROCHLORIDE 0.4 MG: 0.4 CAPSULE ORAL at 08:06

## 2020-01-31 RX ADMIN — HYDROMORPHONE HYDROCHLORIDE 4 MG: 2 TABLET ORAL at 03:03

## 2020-01-31 RX ADMIN — HYDROMORPHONE HYDROCHLORIDE 4 MG: 2 TABLET ORAL at 16:09

## 2020-01-31 RX ADMIN — SENNOSIDES AND DOCUSATE SODIUM 2 TABLET: 8.6; 5 TABLET ORAL at 17:38

## 2020-01-31 RX ADMIN — CEPHALEXIN 250 MG: 250 CAPSULE ORAL at 11:22

## 2020-01-31 ASSESSMENT — ENCOUNTER SYMPTOMS
SPUTUM PRODUCTION: 0
WEAKNESS: 1
DEPRESSION: 0
NAUSEA: 0
PALPITATIONS: 0
SORE THROAT: 0
CHILLS: 0
BACK PAIN: 1
ROS GI COMMENTS: LOW APPETITE
FEVER: 0
ORTHOPNEA: 0
ABDOMINAL PAIN: 1
DIARRHEA: 0
INSOMNIA: 0
BLOOD IN STOOL: 0
HALLUCINATIONS: 0
CONSTIPATION: 0
FOCAL WEAKNESS: 0
DIZZINESS: 0
HEADACHES: 0
MYALGIAS: 1
SHORTNESS OF BREATH: 0

## 2020-01-31 NOTE — DISCHARGE PLANNING
Received Choice form at 1043  [facility]  Referral sent per Choice form @ 1111

## 2020-01-31 NOTE — CARE PLAN
Problem: Pain Management  Goal: Pain level will decrease to patient's comfort goal  Outcome: PROGRESSING AS EXPECTED  Intervention: Follow pain managment plan developed in collaboration with patient and Interdisciplinary Team  Note:   Pt with 9/10 abdominal pain, pt being given dilaudid Q4H per MAR. Will continue to monitor.     Problem: Venous Thromboembolism (VTW)/Deep Vein Thrombosis (DVT) Prevention:  Goal: Patient will participate in Venous Thrombosis (VTE)/Deep Vein Thrombosis (DVT)Prevention Measures  Outcome: PROGRESSING AS EXPECTED  Intervention: Ensure patient wears graduated elastic stockings (ALIA hose) and/or SCDs, if ordered, when in bed or chair (Remove at least once per shift for skin check)  Note:   Pt SCD's on. Skin checked. Pt instructed on how to properly use SCD's. Will continue to monitor.

## 2020-01-31 NOTE — PROGRESS NOTES
Hospital Medicine Daily Progress Note    Date of Service  1/31/2020    Chief Complaint  54 y.o. male admitted 1/28/2020 with pain pump surgical site dehiscence    Hospital Course    54 y.o. male with a history of colon cancer status post treatment, now on chemotherapy for remission, complicated by metastases to the lung and chronic pain who uses an intrathecal pain pump, who presented 1/28/2020 with surgical wound dehiscence.         Interval Problem Update  1/29: Drowsy however states pain is not well controlled.  He is receiving every 4 Dilaudid.  WBC slightly up 13.2.  Complains of low appetite, dietitian consulted, supplements ordered  1/30: WBC up to 18, heart rate low 100s.  Clinically he feels better, cultures negative so far.  Wound VAC in place, pending wound care recommendations.  Appetite remains low but tolerating boost  1/31: WBC improved, pain improved.  Tolerating Keflex, pending placement    Consultants/Specialty  Dr. Reina, pain management  Wound care    Code Status  Full    Disposition  SNF versus LTAC due to wound VAC    Review of Systems  Review of Systems   Constitutional: Positive for malaise/fatigue. Negative for chills and fever.   HENT: Negative for sore throat.    Respiratory: Negative for sputum production and shortness of breath.    Cardiovascular: Negative for palpitations and orthopnea.   Gastrointestinal: Positive for abdominal pain (Improving). Negative for blood in stool, constipation, diarrhea and nausea.        Low appetite   Genitourinary: Negative for dysuria and frequency.   Musculoskeletal: Positive for back pain (Controlled) and myalgias.   Neurological: Positive for weakness. Negative for dizziness, focal weakness and headaches.   Psychiatric/Behavioral: Negative for depression and hallucinations. The patient does not have insomnia.    All other systems reviewed and are negative.       Physical Exam  Temp:  [36.6 °C (97.9 °F)-36.9 °C (98.4 °F)] 36.6 °C (97.9 °F)  Pulse:   [] 104  Resp:  [18] 18  BP: (123-132)/(87-95) 125/87  SpO2:  [92 %-96 %] 94 %    Physical Exam  Constitutional:       General: He is not in acute distress.     Comments: Sitting upright in bed, comfortable   HENT:      Head: Normocephalic and atraumatic.      Nose: Nose normal.      Mouth/Throat:      Mouth: Mucous membranes are moist.   Eyes:      Extraocular Movements: Extraocular movements intact.      Pupils: Pupils are equal, round, and reactive to light.   Neck:      Musculoskeletal: Normal range of motion and neck supple.   Cardiovascular:      Rate and Rhythm: Normal rate and regular rhythm.      Heart sounds: No murmur.   Pulmonary:      Effort: Pulmonary effort is normal. No respiratory distress.      Breath sounds: Normal breath sounds. No stridor. No wheezing.   Abdominal:      General: Abdomen is flat. There is no distension.      Palpations: Abdomen is soft.      Tenderness: There is tenderness (Improving).      Comments: L dressing in place, R wound vac in place, serosanguineous drainage   Musculoskeletal:         General: No swelling, tenderness or deformity.   Skin:     General: Skin is warm and dry.      Coloration: Skin is pale.   Neurological:      General: No focal deficit present.      Mental Status: He is oriented to person, place, and time. Mental status is at baseline.   Psychiatric:         Behavior: Behavior normal.         Fluids    Intake/Output Summary (Last 24 hours) at 1/31/2020 1216  Last data filed at 1/31/2020 1200  Gross per 24 hour   Intake 1520 ml   Output 1365 ml   Net 155 ml       Laboratory  Recent Labs     01/29/20 0411 01/30/20 0513 01/31/20  0437   WBC 13.2* 18.0* 12.3*   RBC 4.64* 5.26 4.85   HEMOGLOBIN 13.0* 14.9 13.7*   HEMATOCRIT 39.3* 44.6 42.1   MCV 84.7 84.8 86.8   MCH 28.0 28.3 28.2   MCHC 33.1* 33.4* 32.5*   RDW 39.6 40.3 40.8   PLATELETCT 323 345 314   MPV 9.4 9.2 9.6     Recent Labs     01/29/20  0411 01/30/20  0513 01/31/20  0437   SODIUM 134* 137 136    POTASSIUM 4.2 4.0 3.8   CHLORIDE 98 99 99   CO2 22 23 23   GLUCOSE 117* 134* 120*   BUN 10 13 15   CREATININE 0.85 0.83 0.86   CALCIUM 9.3 9.5 9.3                   Imaging  No orders to display        Assessment/Plan  * Infection of superficial incisional surgical site after procedure- (present on admission)  Assessment & Plan  He had infection of his surgical site where his right-sided pain pump was and had dehiscence of his wound.  On 1/28 he had debridement of this location and his pain pump removed and changed to the other side.  He has a right-sided wound VAC in place--> will need SNF  Continue Keflex    Metastatic Colon cancer (HCC)- (present on admission)  Assessment & Plan  He was diagnosed in 2016, treated with resection and chemotherapy.  He had recurrence in August 2018, found to have metastases to the lung.  He has been on chemotherapy however, this was held in preparation for his pain pump replacement procedure  Continue to hold chemo for now  Continue prednisone    Leukocytosis  Assessment & Plan  Rising, but symptomatically feeling better  Surgical wounds without e/o infection  Check procalcitonin  Continue keflex    Chronic, continuous use of opioids- (present on admission)  Assessment & Plan  I discussed the case with his pain management physician, Dr. Reina.  Continue p.o. Dilaudid 4 mg every 4 hours  He has a pain pump with Dilaudid 8 mg/day and ketamine, he has additional boluses to be used as needed  At this time no increase in IV Dilaudid is indicated    Essential hypertension- (present on admission)  Assessment & Plan  Continue Cozaar    COPD (chronic obstructive pulmonary disease) (HCC)- (present on admission)  Assessment & Plan  He is not on baseline oxygen  Wean O2       VTE prophylaxis: Lovenox

## 2020-01-31 NOTE — WOUND TEAM
[facility]  Inpatient Services  Initial Wound and Skin Care Evaluation    Admission Date: 1/28/2020     Consult Date: 1/31/2020  HPI, PMH, SH: Reviewed    Unit where seen by Wound Team: 2210/02     WOUND CONSULT RELATED TO:  Vac change post op.     Self Report / Pain Level:  Premedicated by primary RN. Site pain still very painful    OBJECTIVE:  Vac on, blood present under drape.    WOUND TYPE, LOCATION, CHARACTERISTICS (Pressure Injuries: location, stage, POA or date identified)    Negative Pressure Wound Therapy Abdomen Right;Lower;Quadrant (Active)   NPWT Pump Mode / Pressure Setting 125 mmHg;Continuous 1/31/2020   Dressing Type Medium;Black foam    Number of Foam Pieces Used 3    Canister Changed Yes      Wound 01/28/20 Incision Abdomen wound vac, previous pain pump site - infected (Active)       1/31/2020   Site Assessment Pink;Red;Painful    Dana-wound Assessment Pink;Painful    Margins Defined edges;Unattached edges    Wound Length (cm) 2 cm    Wound Width (cm) 6 cm    Wound Depth (cm) 1.4 cm    Wound Surface Area (cm^2) 12 cm^2    Tunneling 4.1 cm    Closure Secondary intention    Drainage Amount Small    Drainage Description Sanguineous    Non-staged Wound Description Full thickness    Treatments Cleansed;Site care    Cleansing Normal Saline Irrigation    Periwound Protectant Benzoin;Drape;Skin Protectant wipes to Periwound    Dressing Options Wound Vac    Dressing Cleansing/Solutions Not Applicable    Dressing Changed Changed    Dressing Status Clean;Dry;Intact    Dressing Change Frequency Monday, Wednesday, Friday    NEXT Dressing Change  02/03/20    NEXT Weekly Photo (Inpatient Only) 02/07/20    WOUND NURSE ONLY - Odor None    WOUND NURSE ONLY - Exposed Structures Adipose    WOUND NURSE ONLY - Tissue Type and Percentage 100% Pink / red       Vascular: Not related to vascular     Lab Values:    Lab Results   Component Value Date/Time    WBC 12.3 (H) 01/31/2020 04:37 AM    RBC 4.85  01/31/2020 04:37 AM    HEMOGLOBIN 13.7 (L) 01/31/2020 04:37 AM    HEMATOCRIT 42.1 01/31/2020 04:37 AM      No results found for: HBA1C        Culture: None obtained     INTERVENTIONS BY WOUND TEAM: Removed dressing, soaked foam with NS prior to removal. Cleansed wound with NS. No retained foam noted. Dana wound prepped with skin prep and drape.  Black foam placed in to wound bed. Sealed with drape and TRAC pad placed. NPWT resumed at 125 mmHg continous.    Interdisciplinary consultation: Patient, Bedside RN    EVALUATION: Vac for increased granulation tissue. Consider Abbey for increased granulation.     Goals: Steady decrease in wound area and depth weekly.    NURSING PLAN OF CARE ORDERS (X):  Dressing changes: See Dressing Care orders: X  Skin care: See Skin Care orders: X  Rectal tube care: See Rectal Tube Care orders:        Other orders:                           RSKIN:   CURRENTLY IN PLACE (X), APPLIED THIS VISIT (A), ORDERED (O):   Q shift Kameron:  X  Q shift pressure point assessments:  X  Pressure redistribution mattress                             Low Airloss                        Bariatric OCTAVIA                     Bariatric foam                        Heel float boots                     Heel Silicone dressing                         Float Heels off Bed with Pillows               Barrier wipes         Barrier Cream         Barrier paste          Sacral silicone dressing         Silicone O2 tubing         Anchorfast         Cannula fixation Device (Tender )          Gray Foam Ear protectors           Trach with Optifoam split foam                 Waffle cushion        Waffle Overlay         Rectal tube or BMS    Purwick/Condom Cath          Antifungal tx      Interdry          Reposition q 2 hours        Up to chair        Ambulate      PT/OT        Dietician        Diabetes Education      PO     TF     TPN     NPO   # days   Other        WOUND TEAM PLAN OF CARE (X):   Dressing changes by wound team:           Follow up 1-2 times weekly:               Follow up 3 times weekly:                NPWT change 3 times weekly:   X  Follow up as needed:       Other (explain):     Anticipated discharge plans (X):   LTACH:        SNF/Rehab:   X               Home Care:           Outpatient Wound Center:            Self Care:            Other:

## 2020-01-31 NOTE — CARE PLAN
Problem: Nutritional:  Goal: Achieve adequate nutritional intake  Description  Patient will consume >50% of meals   Outcome: PROGRESSING AS EXPECTED    PO intake is variable with range of % and avg PO of 60% of meals. Based on one recorded intake of Boost supplement, pt drank %. MD also noted that pt is tolerating Boost supplements in her progress note. Current nutrition plan is appropriate. RD will continue to monitor.

## 2020-01-31 NOTE — DISCHARGE PLANNING
Received Choice form at 1522  Agency/Facility Name: [facility]  Referral sent per Choice form @ 0819

## 2020-01-31 NOTE — DISCHARGE PLANNING
[facility]  Spoke To: Omar   Outcome: pt denied. Non contracted insurance provider.

## 2020-02-01 LAB
ALBUMIN SERPL BCP-MCNC: 3.9 G/DL (ref 3.2–4.9)
BASOPHILS # BLD AUTO: 0.7 % (ref 0–1.8)
BASOPHILS # BLD: 0.07 K/UL (ref 0–0.12)
BUN SERPL-MCNC: 16 MG/DL (ref 8–22)
CALCIUM SERPL-MCNC: 9.5 MG/DL (ref 8.4–10.2)
CHLORIDE SERPL-SCNC: 98 MMOL/L (ref 96–112)
CO2 SERPL-SCNC: 23 MMOL/L (ref 20–33)
CREAT SERPL-MCNC: 0.94 MG/DL (ref 0.5–1.4)
EOSINOPHIL # BLD AUTO: 0.29 K/UL (ref 0–0.51)
EOSINOPHIL NFR BLD: 3 % (ref 0–6.9)
ERYTHROCYTE [DISTWIDTH] IN BLOOD BY AUTOMATED COUNT: 38.8 FL (ref 35.9–50)
GLUCOSE SERPL-MCNC: 119 MG/DL (ref 65–99)
HCT VFR BLD AUTO: 40.6 % (ref 42–52)
HGB BLD-MCNC: 13.3 G/DL (ref 14–18)
IMM GRANULOCYTES # BLD AUTO: 0.02 K/UL (ref 0–0.11)
IMM GRANULOCYTES NFR BLD AUTO: 0.2 % (ref 0–0.9)
LYMPHOCYTES # BLD AUTO: 2.32 K/UL (ref 1–4.8)
LYMPHOCYTES NFR BLD: 24.1 % (ref 22–41)
MAGNESIUM SERPL-MCNC: 2 MG/DL (ref 1.5–2.5)
MCH RBC QN AUTO: 27.5 PG (ref 27–33)
MCHC RBC AUTO-ENTMCNC: 32.8 G/DL (ref 33.7–35.3)
MCV RBC AUTO: 84.1 FL (ref 81.4–97.8)
MONOCYTES # BLD AUTO: 0.95 K/UL (ref 0–0.85)
MONOCYTES NFR BLD AUTO: 9.9 % (ref 0–13.4)
NEUTROPHILS # BLD AUTO: 5.99 K/UL (ref 1.82–7.42)
NEUTROPHILS NFR BLD: 62.1 % (ref 44–72)
NRBC # BLD AUTO: 0 K/UL
NRBC BLD-RTO: 0 /100 WBC
PHOSPHATE SERPL-MCNC: 3.8 MG/DL (ref 2.5–4.5)
PLATELET # BLD AUTO: 331 K/UL (ref 164–446)
PMV BLD AUTO: 9.4 FL (ref 9–12.9)
POTASSIUM SERPL-SCNC: 4.3 MMOL/L (ref 3.6–5.5)
RBC # BLD AUTO: 4.83 M/UL (ref 4.7–6.1)
SODIUM SERPL-SCNC: 136 MMOL/L (ref 135–145)
WBC # BLD AUTO: 9.6 K/UL (ref 4.8–10.8)

## 2020-02-01 PROCEDURE — 80069 RENAL FUNCTION PANEL: CPT

## 2020-02-01 PROCEDURE — G0378 HOSPITAL OBSERVATION PER HR: HCPCS

## 2020-02-01 PROCEDURE — 99225 PR SUBSEQUENT OBSERVATION CARE,LEVEL II: CPT | Performed by: INTERNAL MEDICINE

## 2020-02-01 PROCEDURE — 700102 HCHG RX REV CODE 250 W/ 637 OVERRIDE(OP): Performed by: INTERNAL MEDICINE

## 2020-02-01 PROCEDURE — 700111 HCHG RX REV CODE 636 W/ 250 OVERRIDE (IP): Performed by: INTERNAL MEDICINE

## 2020-02-01 PROCEDURE — 85025 COMPLETE CBC W/AUTO DIFF WBC: CPT

## 2020-02-01 PROCEDURE — 36415 COLL VENOUS BLD VENIPUNCTURE: CPT

## 2020-02-01 PROCEDURE — 83735 ASSAY OF MAGNESIUM: CPT

## 2020-02-01 PROCEDURE — A9270 NON-COVERED ITEM OR SERVICE: HCPCS | Performed by: INTERNAL MEDICINE

## 2020-02-01 RX ADMIN — CEPHALEXIN 250 MG: 250 CAPSULE ORAL at 00:28

## 2020-02-01 RX ADMIN — OMEPRAZOLE 20 MG: 20 CAPSULE, DELAYED RELEASE ORAL at 04:36

## 2020-02-01 RX ADMIN — CEPHALEXIN 250 MG: 250 CAPSULE ORAL at 13:07

## 2020-02-01 RX ADMIN — HYDROMORPHONE HYDROCHLORIDE 4 MG: 2 TABLET ORAL at 04:37

## 2020-02-01 RX ADMIN — HYDROMORPHONE HYDROCHLORIDE 4 MG: 2 TABLET ORAL at 13:02

## 2020-02-01 RX ADMIN — TAMSULOSIN HYDROCHLORIDE 0.4 MG: 0.4 CAPSULE ORAL at 08:48

## 2020-02-01 RX ADMIN — PREDNISONE 5 MG: 5 TABLET ORAL at 04:38

## 2020-02-01 RX ADMIN — CEPHALEXIN 250 MG: 250 CAPSULE ORAL at 17:07

## 2020-02-01 RX ADMIN — HYDROMORPHONE HYDROCHLORIDE 4 MG: 2 TABLET ORAL at 00:28

## 2020-02-01 RX ADMIN — HYDROMORPHONE HYDROCHLORIDE 4 MG: 2 TABLET ORAL at 21:06

## 2020-02-01 RX ADMIN — LOSARTAN POTASSIUM 50 MG: 25 TABLET ORAL at 04:36

## 2020-02-01 RX ADMIN — PROCHLORPERAZINE EDISYLATE 10 MG: 5 INJECTION INTRAMUSCULAR; INTRAVENOUS at 13:02

## 2020-02-01 RX ADMIN — HYDROMORPHONE HYDROCHLORIDE 4 MG: 2 TABLET ORAL at 08:48

## 2020-02-01 RX ADMIN — CEPHALEXIN 250 MG: 250 CAPSULE ORAL at 04:37

## 2020-02-01 RX ADMIN — HYDROMORPHONE HYDROCHLORIDE 4 MG: 2 TABLET ORAL at 17:07

## 2020-02-01 ASSESSMENT — ENCOUNTER SYMPTOMS
WEAKNESS: 1
BACK PAIN: 0
ORTHOPNEA: 0
DEPRESSION: 0
FOCAL WEAKNESS: 0
BLOOD IN STOOL: 0
DIARRHEA: 0
MYALGIAS: 1
CHILLS: 0
SPUTUM PRODUCTION: 0
CONSTIPATION: 0
SORE THROAT: 0
DIZZINESS: 0
SHORTNESS OF BREATH: 0
ABDOMINAL PAIN: 1
INSOMNIA: 0
FEVER: 0
HALLUCINATIONS: 0
NAUSEA: 0
HEADACHES: 0
PALPITATIONS: 0

## 2020-02-01 NOTE — DISCHARGE PLANNING
Received Choice form at [phone]  Agency/Facility Name: 1. [facility] 2. [facility]   Referral sent per Choice form @ [phone]

## 2020-02-01 NOTE — PROGRESS NOTES
Hospital Medicine Daily Progress Note    Date of Service  2/1/2020    Chief Complaint  54 y.o. male admitted 1/28/2020 with pain pump surgical site dehiscence    Hospital Course    54 y.o. male with a history of colon cancer status post treatment, now on chemotherapy for remission, complicated by metastases to the lung and chronic pain who uses an intrathecal pain pump, who presented 1/28/2020 with surgical wound dehiscence.         Interval Problem Update  1/29: Drowsy however states pain is not well controlled.  He is receiving every 4 Dilaudid.  WBC slightly up 13.2.  Complains of low appetite, dietitian consulted, supplements ordered  1/30: WBC up to 18, heart rate low 100s.  Clinically he feels better, cultures negative so far.  Wound VAC in place, pending wound care recommendations.  Appetite remains low but tolerating boost  1/31: WBC improved, pain improved.  Tolerating Keflex, pending placement  2/1: Wound vac was changed, feeling better.  Eating a bit more.  Pending placement    Consultants/Specialty  Dr. Reina, pain management  Wound care    Code Status  Full    Disposition  SNF versus LTAC due to wound VAC    Review of Systems  Review of Systems   Constitutional: Positive for malaise/fatigue. Negative for chills and fever.   HENT: Negative for sore throat.    Respiratory: Negative for sputum production and shortness of breath.    Cardiovascular: Negative for palpitations and orthopnea.   Gastrointestinal: Positive for abdominal pain (Improving). Negative for blood in stool, constipation, diarrhea and nausea.   Genitourinary: Negative for dysuria and frequency.   Musculoskeletal: Positive for myalgias. Negative for back pain.   Neurological: Positive for weakness. Negative for dizziness, focal weakness and headaches.   Psychiatric/Behavioral: Negative for depression and hallucinations. The patient does not have insomnia.    All other systems reviewed and are negative.       Physical Exam  Temp:  [36.7 °C  (98 °F)-37.1 °C (98.8 °F)] 37.1 °C (98.7 °F)  Pulse:  [] 85  Resp:  [18] 18  BP: (109-130)/(75-85) 124/77  SpO2:  [93 %-95 %] 95 %    Physical Exam  Constitutional:       General: He is not in acute distress.  HENT:      Head: Normocephalic and atraumatic.      Nose: Nose normal.      Mouth/Throat:      Mouth: Mucous membranes are moist.   Eyes:      Extraocular Movements: Extraocular movements intact.      Pupils: Pupils are equal, round, and reactive to light.   Neck:      Musculoskeletal: Normal range of motion and neck supple.   Cardiovascular:      Rate and Rhythm: Normal rate and regular rhythm.      Heart sounds: No murmur.   Pulmonary:      Effort: Pulmonary effort is normal. No respiratory distress.      Breath sounds: Normal breath sounds. No stridor. No wheezing.   Abdominal:      General: Abdomen is flat. There is no distension.      Palpations: Abdomen is soft.      Tenderness: There is tenderness (Improving).      Comments: L dressing w scant drainage.  R wound vac in place   Musculoskeletal:         General: No swelling, tenderness or deformity.   Skin:     General: Skin is warm and dry.      Coloration: Skin is pale.   Neurological:      General: No focal deficit present.      Mental Status: He is oriented to person, place, and time. Mental status is at baseline.   Psychiatric:         Behavior: Behavior normal.         Fluids    Intake/Output Summary (Last 24 hours) at 2/1/2020 1156  Last data filed at 2/1/2020 0730  Gross per 24 hour   Intake 1080 ml   Output 970 ml   Net 110 ml       Laboratory  Recent Labs     01/30/20 0513 01/31/20  0437 02/01/20  0422   WBC 18.0* 12.3* 9.6   RBC 5.26 4.85 4.83   HEMOGLOBIN 14.9 13.7* 13.3*   HEMATOCRIT 44.6 42.1 40.6*   MCV 84.8 86.8 84.1   MCH 28.3 28.2 27.5   MCHC 33.4* 32.5* 32.8*   RDW 40.3 40.8 38.8   PLATELETCT 345 314 331   MPV 9.2 9.6 9.4     Recent Labs     01/30/20  0513 01/31/20  0437 02/01/20  0422   SODIUM 137 136 136   POTASSIUM 4.0 3.8 4.3    CHLORIDE 99 99 98   CO2 23 23 23   GLUCOSE 134* 120* 119*   BUN 13 15 16   CREATININE 0.83 0.86 0.94   CALCIUM 9.5 9.3 9.5                   Imaging  No orders to display        Assessment/Plan  * Infection of superficial incisional surgical site after procedure- (present on admission)  Assessment & Plan  He had infection of his surgical site where his right-sided pain pump was and had dehiscence of his wound.  On 1/28 he had debridement of this location and his pain pump removed and changed to the other side.  He has a right-sided wound VAC in place--> will need SNF vs LTAC  Continue Keflex    Metastatic Colon cancer (HCC)- (present on admission)  Assessment & Plan  He was diagnosed in 2016, treated with resection and chemotherapy.  He had recurrence in August 2018, found to have metastases to the lung.  He has been on chemotherapy however, this was held in preparation for his pain pump replacement procedure  Continue to hold chemo for now  Continue prednisone    Leukocytosis  Assessment & Plan  Rising, but symptomatically feeling better  Surgical wounds without e/o infection  Check procalcitonin  Continue keflex    Chronic, continuous use of opioids- (present on admission)  Assessment & Plan  I discussed the case with his pain management physician, Dr. Reina.  Continue p.o. Dilaudid 4 mg every 4 hours  He has a pain pump with Dilaudid 8 mg/day and ketamine, he has additional boluses to be used as needed  At this time no increase in IV Dilaudid is indicated    Essential hypertension- (present on admission)  Assessment & Plan  Continue Cozaar    COPD (chronic obstructive pulmonary disease) (HCC)- (present on admission)  Assessment & Plan  He is not on baseline oxygen  Wean O2       VTE prophylaxis: Lovenox

## 2020-02-01 NOTE — DISCHARGE PLANNING
Anticipated Discharge Disposition: SNF    Action: Received a call from Miryam ([phone]), RN CM with [facility]. She provided a list of the following contracted facilities for SNF: [facility], [facility], [facility], [facility], [facility], and [facility].   Met with pt at bedside to discuss, however pt requested SW call his wife instead.   Called Janine, she expressed frustration stating she was told [facility] was contracted with her insurance. She requested SW call to verify. Called and spoke with Kieran in admissions who states they're currently only contracted with Medicare and [facility].   Notified Janine who then requested referrals to [facility] and [facility]. Her first choice is [facility].   Choice form completed and faxed to [facility]    Barriers to Discharge: SNF acceptance    Plan: F/U with SNF referrals

## 2020-02-01 NOTE — PROGRESS NOTES
Received report, assumed pt care. Discussed POC. Pt taught to inform nurse if experiencing pain above comfort goal. Assessment done. VSS. Dressings changed. Will cont to monitor.

## 2020-02-02 PROCEDURE — 700102 HCHG RX REV CODE 250 W/ 637 OVERRIDE(OP): Performed by: INTERNAL MEDICINE

## 2020-02-02 PROCEDURE — 99225 PR SUBSEQUENT OBSERVATION CARE,LEVEL II: CPT | Performed by: INTERNAL MEDICINE

## 2020-02-02 PROCEDURE — 700111 HCHG RX REV CODE 636 W/ 250 OVERRIDE (IP): Performed by: INTERNAL MEDICINE

## 2020-02-02 PROCEDURE — A9270 NON-COVERED ITEM OR SERVICE: HCPCS | Performed by: INTERNAL MEDICINE

## 2020-02-02 PROCEDURE — G0378 HOSPITAL OBSERVATION PER HR: HCPCS

## 2020-02-02 RX ADMIN — OMEPRAZOLE 20 MG: 20 CAPSULE, DELAYED RELEASE ORAL at 05:04

## 2020-02-02 RX ADMIN — CEPHALEXIN 250 MG: 250 CAPSULE ORAL at 17:05

## 2020-02-02 RX ADMIN — HYDROMORPHONE HYDROCHLORIDE 4 MG: 2 TABLET ORAL at 01:06

## 2020-02-02 RX ADMIN — LOSARTAN POTASSIUM 50 MG: 25 TABLET ORAL at 05:03

## 2020-02-02 RX ADMIN — SENNOSIDES AND DOCUSATE SODIUM 2 TABLET: 8.6; 5 TABLET ORAL at 17:05

## 2020-02-02 RX ADMIN — POLYETHYLENE GLYCOL 3350 1 PACKET: 17 POWDER, FOR SOLUTION ORAL at 05:05

## 2020-02-02 RX ADMIN — HYDROMORPHONE HYDROCHLORIDE 4 MG: 2 TABLET ORAL at 13:05

## 2020-02-02 RX ADMIN — CEPHALEXIN 250 MG: 250 CAPSULE ORAL at 11:46

## 2020-02-02 RX ADMIN — CEPHALEXIN 250 MG: 250 CAPSULE ORAL at 05:03

## 2020-02-02 RX ADMIN — TAMSULOSIN HYDROCHLORIDE 0.4 MG: 0.4 CAPSULE ORAL at 08:23

## 2020-02-02 RX ADMIN — HYDROMORPHONE HYDROCHLORIDE 4 MG: 2 TABLET ORAL at 09:02

## 2020-02-02 RX ADMIN — HYDROMORPHONE HYDROCHLORIDE 4 MG: 2 TABLET ORAL at 17:05

## 2020-02-02 RX ADMIN — PREDNISONE 5 MG: 5 TABLET ORAL at 05:03

## 2020-02-02 RX ADMIN — HYDROMORPHONE HYDROCHLORIDE 4 MG: 2 TABLET ORAL at 21:04

## 2020-02-02 RX ADMIN — SENNOSIDES AND DOCUSATE SODIUM 2 TABLET: 8.6; 5 TABLET ORAL at 05:03

## 2020-02-02 RX ADMIN — POLYETHYLENE GLYCOL (3350) 1 PACKET: 17 POWDER, FOR SOLUTION ORAL at 08:33

## 2020-02-02 RX ADMIN — CEPHALEXIN 250 MG: 250 CAPSULE ORAL at 00:32

## 2020-02-02 RX ADMIN — HYDROMORPHONE HYDROCHLORIDE 4 MG: 2 TABLET ORAL at 05:03

## 2020-02-02 ASSESSMENT — ENCOUNTER SYMPTOMS
DEPRESSION: 0
BACK PAIN: 0
FOCAL WEAKNESS: 0
BLOOD IN STOOL: 0
HALLUCINATIONS: 0
ORTHOPNEA: 0
CHILLS: 0
DIZZINESS: 0
SORE THROAT: 0
CONSTIPATION: 0
DIARRHEA: 0
FEVER: 0
NAUSEA: 0
SPUTUM PRODUCTION: 0
SHORTNESS OF BREATH: 0
INSOMNIA: 0
HEADACHES: 0
ABDOMINAL PAIN: 1
MYALGIAS: 1
PALPITATIONS: 0
WEAKNESS: 1

## 2020-02-02 NOTE — PROGRESS NOTES
Pt voiding w/o diff.using urinal.  Taking po dilauidid q 4 hours with good pain control.  Wound vac in place to rt abd.  drsg to lt abd.cdi.

## 2020-02-02 NOTE — CARE PLAN
Problem: Communication  Goal: The ability to communicate needs accurately and effectively will improve  Outcome: PROGRESSING AS EXPECTED     Problem: Safety  Goal: Will remain free from injury  Outcome: PROGRESSING AS EXPECTED     Problem: Pain Management  Goal: Pain level will decrease to patient's comfort goal  Outcome: PROGRESSING SLOWER THAN EXPECTED

## 2020-02-03 LAB
ALBUMIN SERPL BCP-MCNC: 3.8 G/DL (ref 3.2–4.9)
BASOPHILS # BLD AUTO: 0.7 % (ref 0–1.8)
BASOPHILS # BLD: 0.08 K/UL (ref 0–0.12)
BUN SERPL-MCNC: 18 MG/DL (ref 8–22)
CALCIUM SERPL-MCNC: 9.4 MG/DL (ref 8.4–10.2)
CHLORIDE SERPL-SCNC: 98 MMOL/L (ref 96–112)
CO2 SERPL-SCNC: 24 MMOL/L (ref 20–33)
CREAT SERPL-MCNC: 0.88 MG/DL (ref 0.5–1.4)
EOSINOPHIL # BLD AUTO: 0.33 K/UL (ref 0–0.51)
EOSINOPHIL NFR BLD: 2.8 % (ref 0–6.9)
ERYTHROCYTE [DISTWIDTH] IN BLOOD BY AUTOMATED COUNT: 38.2 FL (ref 35.9–50)
GLUCOSE SERPL-MCNC: 120 MG/DL (ref 65–99)
HCT VFR BLD AUTO: 39.4 % (ref 42–52)
HGB BLD-MCNC: 13 G/DL (ref 14–18)
IMM GRANULOCYTES # BLD AUTO: 0.04 K/UL (ref 0–0.11)
IMM GRANULOCYTES NFR BLD AUTO: 0.3 % (ref 0–0.9)
LYMPHOCYTES # BLD AUTO: 1.97 K/UL (ref 1–4.8)
LYMPHOCYTES NFR BLD: 16.4 % (ref 22–41)
MCH RBC QN AUTO: 27.6 PG (ref 27–33)
MCHC RBC AUTO-ENTMCNC: 33 G/DL (ref 33.7–35.3)
MCV RBC AUTO: 83.7 FL (ref 81.4–97.8)
MONOCYTES # BLD AUTO: 1.37 K/UL (ref 0–0.85)
MONOCYTES NFR BLD AUTO: 11.4 % (ref 0–13.4)
NEUTROPHILS # BLD AUTO: 8.2 K/UL (ref 1.82–7.42)
NEUTROPHILS NFR BLD: 68.4 % (ref 44–72)
NRBC # BLD AUTO: 0 K/UL
NRBC BLD-RTO: 0 /100 WBC
PHOSPHATE SERPL-MCNC: 3.5 MG/DL (ref 2.5–4.5)
PLATELET # BLD AUTO: 373 K/UL (ref 164–446)
PMV BLD AUTO: 9.2 FL (ref 9–12.9)
POTASSIUM SERPL-SCNC: 4.3 MMOL/L (ref 3.6–5.5)
RBC # BLD AUTO: 4.71 M/UL (ref 4.7–6.1)
SODIUM SERPL-SCNC: 135 MMOL/L (ref 135–145)
WBC # BLD AUTO: 12 K/UL (ref 4.8–10.8)

## 2020-02-03 PROCEDURE — 85025 COMPLETE CBC W/AUTO DIFF WBC: CPT

## 2020-02-03 PROCEDURE — A9270 NON-COVERED ITEM OR SERVICE: HCPCS | Performed by: INTERNAL MEDICINE

## 2020-02-03 PROCEDURE — 36415 COLL VENOUS BLD VENIPUNCTURE: CPT

## 2020-02-03 PROCEDURE — 99225 PR SUBSEQUENT OBSERVATION CARE,LEVEL II: CPT | Performed by: INTERNAL MEDICINE

## 2020-02-03 PROCEDURE — 97605 NEG PRS WND THER DME<=50SQCM: CPT

## 2020-02-03 PROCEDURE — 700111 HCHG RX REV CODE 636 W/ 250 OVERRIDE (IP): Performed by: INTERNAL MEDICINE

## 2020-02-03 PROCEDURE — G0378 HOSPITAL OBSERVATION PER HR: HCPCS

## 2020-02-03 PROCEDURE — 306372 DRESSING,VAC SIMPLACE MED: Performed by: PAIN MEDICINE

## 2020-02-03 PROCEDURE — 700102 HCHG RX REV CODE 250 W/ 637 OVERRIDE(OP): Performed by: INTERNAL MEDICINE

## 2020-02-03 PROCEDURE — 80069 RENAL FUNCTION PANEL: CPT

## 2020-02-03 RX ORDER — CYCLOBENZAPRINE HCL 10 MG
10 TABLET ORAL 3 TIMES DAILY PRN
Status: DISCONTINUED | OUTPATIENT
Start: 2020-02-03 | End: 2020-02-14 | Stop reason: HOSPADM

## 2020-02-03 RX ADMIN — LOSARTAN POTASSIUM 50 MG: 25 TABLET ORAL at 05:18

## 2020-02-03 RX ADMIN — POLYETHYLENE GLYCOL 3350 1 PACKET: 17 POWDER, FOR SOLUTION ORAL at 05:18

## 2020-02-03 RX ADMIN — CYCLOBENZAPRINE HYDROCHLORIDE 10 MG: 10 TABLET, FILM COATED ORAL at 21:31

## 2020-02-03 RX ADMIN — CYCLOBENZAPRINE HYDROCHLORIDE 10 MG: 10 TABLET, FILM COATED ORAL at 17:42

## 2020-02-03 RX ADMIN — CEPHALEXIN 250 MG: 250 CAPSULE ORAL at 01:01

## 2020-02-03 RX ADMIN — CEPHALEXIN 250 MG: 250 CAPSULE ORAL at 22:33

## 2020-02-03 RX ADMIN — CEPHALEXIN 250 MG: 250 CAPSULE ORAL at 10:31

## 2020-02-03 RX ADMIN — HYDROMORPHONE HYDROCHLORIDE 4 MG: 2 TABLET ORAL at 14:37

## 2020-02-03 RX ADMIN — SENNOSIDES AND DOCUSATE SODIUM 2 TABLET: 8.6; 5 TABLET ORAL at 17:42

## 2020-02-03 RX ADMIN — PREDNISONE 5 MG: 5 TABLET ORAL at 05:18

## 2020-02-03 RX ADMIN — HYDROMORPHONE HYDROCHLORIDE 4 MG: 2 TABLET ORAL at 01:01

## 2020-02-03 RX ADMIN — TAMSULOSIN HYDROCHLORIDE 0.4 MG: 0.4 CAPSULE ORAL at 07:53

## 2020-02-03 RX ADMIN — SENNOSIDES AND DOCUSATE SODIUM 2 TABLET: 8.6; 5 TABLET ORAL at 05:18

## 2020-02-03 RX ADMIN — HYDROMORPHONE HYDROCHLORIDE 4 MG: 2 TABLET ORAL at 05:18

## 2020-02-03 RX ADMIN — CEPHALEXIN 250 MG: 250 CAPSULE ORAL at 05:18

## 2020-02-03 RX ADMIN — HYDROMORPHONE HYDROCHLORIDE 4 MG: 2 TABLET ORAL at 18:31

## 2020-02-03 RX ADMIN — HYDROMORPHONE HYDROCHLORIDE 4 MG: 2 TABLET ORAL at 10:32

## 2020-02-03 RX ADMIN — OMEPRAZOLE 20 MG: 20 CAPSULE, DELAYED RELEASE ORAL at 05:18

## 2020-02-03 RX ADMIN — POLYETHYLENE GLYCOL (3350) 1 PACKET: 17 POWDER, FOR SOLUTION ORAL at 21:30

## 2020-02-03 RX ADMIN — CEPHALEXIN 250 MG: 250 CAPSULE ORAL at 17:42

## 2020-02-03 RX ADMIN — HYDROMORPHONE HYDROCHLORIDE 4 MG: 2 TABLET ORAL at 22:33

## 2020-02-03 RX ADMIN — CYCLOBENZAPRINE HYDROCHLORIDE 10 MG: 10 TABLET, FILM COATED ORAL at 10:32

## 2020-02-03 ASSESSMENT — ENCOUNTER SYMPTOMS
CONSTIPATION: 0
WEAKNESS: 1
BLOOD IN STOOL: 0
DIZZINESS: 0
FEVER: 0
ORTHOPNEA: 0
DIARRHEA: 0
PALPITATIONS: 0
ABDOMINAL PAIN: 1
CHILLS: 0
SHORTNESS OF BREATH: 0
HALLUCINATIONS: 0
SPUTUM PRODUCTION: 0
INSOMNIA: 1
SORE THROAT: 0
HEADACHES: 0
BACK PAIN: 1
FOCAL WEAKNESS: 0
NAUSEA: 0
DEPRESSION: 0
MYALGIAS: 1

## 2020-02-03 NOTE — WOUND TEAM
"[facility]  Inpatient Services  Wound and Skin Care Progress Note    Admission Date:  1/28/2020   HPI, PMH, SH: Reviewed  Unit where seen by Wound Team: 2210/02    WOUND TEAM FOLLOW UP: NPWT Change  SUBJECTIVE:   \"You have to do it as gently as you can.\"    Self Report / Pain Level:premedicated by primary RN, c/o pain with drsg removal, placement of foam      OBJECTIVE: drsg intact  WOUND TYPE, LOCATION, CHARACTERISTICS (Pressure ulcers: location, stage, POA or date identified)  Negative Pressure Wound Therapy Abdomen Right;Lower;Quadrant (Active)   NPWT Pump Mode / Pressure Setting 125 mmHg;Continuous    Dressing Type Small;Black foam    Number of Foam Pieces Used 2    Canister Changed No      Wound 01/28/20 Incision Abdomen wound vac, previous pain pump site - infected (Active)       1/31/2020  4:00 PM   Site Assessment Red/pink    Dana-wound Assessment intact    Margins defined    Wound Length (cm) 2 cm 1/31/2020  4:00 PM   Wound Width (cm) 6 cm 1/31/2020  4:00 PM   Wound Depth (cm) 1.4 cm 1/31/2020  4:00 PM   Wound Surface Area (cm^2) 12 cm^2 1/31/2020  4:00 PM   Tunneling 4.1 cm 1/31/2020  4:00 PM   Closure Secondary intention    Drainage Amount Small    Drainage Description Sanguineous    Non-staged Wound Description Full thickness    Treatments Cleansed    Cleansing Normal Saline Irrigation    Periwound Protectant Benzoin;Drape;Skin Protectant wipes to Periwound    Dressing Options Wound Vac    Dressing Cleansing/Solutions Not Applicable    Dressing Changed Changed    Dressing Status Clean;Dry;Intact    Dressing Change Frequency Monday, Wednesday, Friday    NEXT Dressing Change  02/05/20    NEXT Weekly Photo (Inpatient Only) 02/07/20    Odor None     Exposed Structures Adipose     Tissue Type and Percentage 100% red/pink      Vascular:  Wounds not r/t vascular problem  Lab Values:    Lab Values:    Lab Results   Component Value Date/Time    WBC 12.0 (H) 02/03/2020 04:38 AM    RBC 4.71 " 02/03/2020 04:38 AM    HEMOGLOBIN 13.0 (L) 02/03/2020 04:38 AM    HEMATOCRIT 39.4 (L) 02/03/2020 04:38 AM              No results found for: HBA1C      Culture:  na      INTERVENTIONS BY WOUND TEAM: soaked drsg with NS, removed tape then soaked drsg with NS as foam being removed. Cleaned wound with NS, dried. No Sting skin prep and drape to whitney-wound. Black foam x 1 piece into wound bed, sealed and button and TRAC pad placed. NPWT resumed @ 125 mmHg continuous. Pt discussing options for DC. Perhaps he can travel to a clinic for drsg changes. Explained that care coordination arranging DC plan.     AbdomenDressing Options: Wound Vac    Interdisciplinary consultation:   With Nursing;With Patient     EVALUATION:pt's wound appears clean. Decreased yellow tissue present.     Goals:  Steady decrease in wound area and depth weekly     NURSING PLAN OF CARE:    Dressing changes: Continue previous Dressing Maintenance orders:   x     See new Dressing Maintenance orders:       Skin care: See Skin Care orders:        Rectal tube care: See Rectal Tube Care orders:      Other orders:           WOUND TEAM PLAN OF CARE (X):   NPWT change 3 x week: x       Dressing changes:       Follow up as needed:       Other:    Anticipated discharge plans (X):  SNF:           Home Care:           Outpatient Wound Center:            Self Care:            Other:  VAC & drsg changes

## 2020-02-03 NOTE — CARE PLAN
Problem: Pain Management  Goal: Pain level will decrease to patient's comfort goal  Outcome: PROGRESSING AS EXPECTED     Problem: Communication  Goal: The ability to communicate needs accurately and effectively will improve  Outcome: PROGRESSING AS EXPECTED     Problem: Safety  Goal: Will remain free from injury  Outcome: PROGRESSING AS EXPECTED     Problem: Knowledge Deficit  Goal: Knowledge of disease process/condition, treatment plan, diagnostic tests, and medications will improve  Outcome: PROGRESSING AS EXPECTED     Problem: Respiratory:  Goal: Respiratory status will improve  Outcome: PROGRESSING AS EXPECTED     Problem: Psychosocial Needs:  Goal: Level of anxiety will decrease  Outcome: PROGRESSING AS EXPECTED     Problem: Skin Integrity  Goal: Risk for impaired skin integrity will decrease  Outcome: PROGRESSING AS EXPECTED

## 2020-02-03 NOTE — PROGRESS NOTES
Report received from night shift RN. Assume care. Pt. AAOx4 pt is bed,  Assessment completed. VSS. pain under controlled at this time, able to wiggle toes and dorsi/plantar flex feet, good CMS and pulses to nelson LE, denies numbness and tingling. Dressings to abd changed today by wound RN, dresing to back peeling  Off-changed stitches  in place DCI, Pt was update for the care for the day. White board updated, All question answered. Pt has call light within reach,  bed is in the lowest position. Pt has no other needs at this time.   1030 Dr Reina called r/t Pt calling and requesting increase rate of pump meds. No new order received. Dr Reina wants to see how flexil works first. He will be here tomorrow. Pt updated with POC.

## 2020-02-03 NOTE — DISCHARGE PLANNING
[facility] spoke to [facility] and did not receive referral. [facility] re faxed referral @ 1414

## 2020-02-03 NOTE — PROGRESS NOTES
Hospital Medicine Daily Progress Note    Date of Service  2/3/2020    Chief Complaint  54 y.o. male admitted 1/28/2020 with pain pump surgical site dehiscence    Hospital Course    54 y.o. male with a history of colon cancer status post treatment, now on chemotherapy for remission, complicated by metastases to the lung and chronic pain who uses an intrathecal pain pump, who presented 1/28/2020 with surgical wound dehiscence.         Interval Problem Update  1/29: Drowsy however states pain is not well controlled.  He is receiving every 4 Dilaudid.  WBC slightly up 13.2.  Complains of low appetite, dietitian consulted, supplements ordered  1/30: WBC up to 18, heart rate low 100s.  Clinically he feels better, cultures negative so far.  Wound VAC in place, pending wound care recommendations.  Appetite remains low but tolerating boost  1/31: WBC improved, pain improved.  Tolerating Keflex, pending placement  2/1: Wound vac was changed, feeling better.  Eating a bit more.  Pending placement  2/2: Pending placement, pain is controlled w PO dilaudid  2/3: Muscle spasms overnight, did not sleep well.  Wound VAC was changed this morning.  Pending placement     Consultants/Specialty  Dr. Reina, pain management  Wound care    Code Status  Full    Disposition  SNF with wound VAC    Review of Systems  Review of Systems   Constitutional: Positive for malaise/fatigue. Negative for chills and fever.   HENT: Negative for sore throat.    Respiratory: Negative for sputum production and shortness of breath.    Cardiovascular: Negative for palpitations and orthopnea.   Gastrointestinal: Positive for abdominal pain. Negative for blood in stool, constipation, diarrhea and nausea.   Genitourinary: Negative for dysuria and frequency.   Musculoskeletal: Positive for back pain and myalgias.        Groin pain   Neurological: Positive for weakness. Negative for dizziness, focal weakness and headaches.   Psychiatric/Behavioral: Negative for  depression and hallucinations. The patient has insomnia.    All other systems reviewed and are negative.       Physical Exam  Temp:  [36.5 °C (97.7 °F)-36.6 °C (97.9 °F)] 36.5 °C (97.7 °F)  Pulse:  [80-93] 82  Resp:  [18] 18  BP: (115-127)/(84-95) 127/95  SpO2:  [92 %-96 %] 92 %    Physical Exam  Constitutional:       General: He is not in acute distress.  HENT:      Head: Normocephalic and atraumatic.      Nose: Nose normal.      Mouth/Throat:      Mouth: Mucous membranes are moist.   Eyes:      Extraocular Movements: Extraocular movements intact.      Pupils: Pupils are equal, round, and reactive to light.   Neck:      Musculoskeletal: Normal range of motion and neck supple.   Cardiovascular:      Rate and Rhythm: Normal rate and regular rhythm.      Heart sounds: No murmur.   Pulmonary:      Effort: Pulmonary effort is normal. No respiratory distress.      Breath sounds: Normal breath sounds. No stridor. No wheezing.   Abdominal:      General: Abdomen is flat. There is no distension.      Palpations: Abdomen is soft.      Tenderness: There is tenderness (Improving).      Comments: L dressing c/d/i.  R wound vac in place   Musculoskeletal:         General: No swelling, tenderness or deformity.   Skin:     General: Skin is warm and dry.      Coloration: Skin is pale.   Neurological:      General: No focal deficit present.      Mental Status: He is oriented to person, place, and time. Mental status is at baseline.   Psychiatric:         Behavior: Behavior normal.         Fluids    Intake/Output Summary (Last 24 hours) at 2/3/2020 1115  Last data filed at 2/3/2020 0759  Gross per 24 hour   Intake 1170 ml   Output 2400 ml   Net -1230 ml       Laboratory  Recent Labs     02/01/20  0422 02/03/20  0438   WBC 9.6 12.0*   RBC 4.83 4.71   HEMOGLOBIN 13.3* 13.0*   HEMATOCRIT 40.6* 39.4*   MCV 84.1 83.7   MCH 27.5 27.6   MCHC 32.8* 33.0*   RDW 38.8 38.2   PLATELETCT 331 373   MPV 9.4 9.2     Recent Labs     02/01/20  7192  02/03/20  0438   SODIUM 136 135   POTASSIUM 4.3 4.3   CHLORIDE 98 98   CO2 23 24   GLUCOSE 119* 120*   BUN 16 18   CREATININE 0.94 0.88   CALCIUM 9.5 9.4                   Imaging  No orders to display        Assessment/Plan  Metastatic Colon cancer (HCC)  He was diagnosed in 2016, treated with resection and chemotherapy.  He had recurrence in August 2018, found to have metastases to the lung.  He has been on chemotherapy however, this was held in preparation for his pain pump replacement procedure  Continue to hold chemo for now  Continue prednisone    Essential hypertension  Continue Cozaar    Chronic, continuous use of opioids  I discussed the case with his pain management physician, Dr. Reina.  Continue p.o. Dilaudid 4 mg every 4 hours  He has a pain pump with Dilaudid 8 mg/day and ketamine, he has additional boluses to be used as needed  At this time no increase in IV Dilaudid is indicated  Add flexeril for muscle spasms    Infection of superficial incisional surgical site after procedure  He had infection of his surgical site where his right-sided pain pump was and had dehiscence of his wound.  On 1/28 he had debridement of this location and his pain pump removed and changed to the other side.  He has a right-sided wound VAC in place--> will need SNF  Continue Keflex    COPD (chronic obstructive pulmonary disease) (HCC)  He is not on baseline oxygen  Wean O2    Leukocytosis  Fluctuating slightly, no fever or tachycardia  Surgical wounds without e/o infection  Continue keflex      VTE prophylaxis: Lovenox

## 2020-02-03 NOTE — CARE PLAN
Problem: Pain Management  Goal: Pain level will decrease to patient's comfort goal  Outcome: PROGRESSING AS EXPECTED     Problem: Communication  Goal: The ability to communicate needs accurately and effectively will improve  Outcome: PROGRESSING AS EXPECTED     Problem: Safety  Goal: Will remain free from injury  Outcome: PROGRESSING AS EXPECTED     Problem: Venous Thromboembolism (VTW)/Deep Vein Thrombosis (DVT) Prevention:  Goal: Patient will participate in Venous Thrombosis (VTE)/Deep Vein Thrombosis (DVT)Prevention Measures  Outcome: PROGRESSING AS EXPECTED     Problem: Bowel/Gastric:  Goal: Normal bowel function is maintained or improved  Outcome: PROGRESSING AS EXPECTED

## 2020-02-03 NOTE — DISCHARGE PLANNING
Called pts wife and updated her on DC plan. [facility] has declined and [facility] is still reviewing referral.

## 2020-02-04 ENCOUNTER — APPOINTMENT (OUTPATIENT)
Dept: RADIOLOGY | Facility: MEDICAL CENTER | Age: 55
DRG: 908 | End: 2020-02-04
Attending: HOSPITALIST
Payer: COMMERCIAL

## 2020-02-04 LAB
ALBUMIN SERPL BCP-MCNC: 3.9 G/DL (ref 3.2–4.9)
BASOPHILS # BLD AUTO: 0.6 % (ref 0–1.8)
BASOPHILS # BLD: 0.07 K/UL (ref 0–0.12)
BUN SERPL-MCNC: 17 MG/DL (ref 8–22)
CALCIUM SERPL-MCNC: 9.5 MG/DL (ref 8.4–10.2)
CHLORIDE SERPL-SCNC: 98 MMOL/L (ref 96–112)
CO2 SERPL-SCNC: 23 MMOL/L (ref 20–33)
CREAT SERPL-MCNC: 0.9 MG/DL (ref 0.5–1.4)
EKG IMPRESSION: NORMAL
EOSINOPHIL # BLD AUTO: 0.34 K/UL (ref 0–0.51)
EOSINOPHIL NFR BLD: 2.8 % (ref 0–6.9)
ERYTHROCYTE [DISTWIDTH] IN BLOOD BY AUTOMATED COUNT: 38.7 FL (ref 35.9–50)
GLUCOSE SERPL-MCNC: 119 MG/DL (ref 65–99)
HCT VFR BLD AUTO: 39.1 % (ref 42–52)
HGB BLD-MCNC: 13.3 G/DL (ref 14–18)
IMM GRANULOCYTES # BLD AUTO: 0.03 K/UL (ref 0–0.11)
IMM GRANULOCYTES NFR BLD AUTO: 0.2 % (ref 0–0.9)
LYMPHOCYTES # BLD AUTO: 2.57 K/UL (ref 1–4.8)
LYMPHOCYTES NFR BLD: 21.3 % (ref 22–41)
MCH RBC QN AUTO: 28.7 PG (ref 27–33)
MCHC RBC AUTO-ENTMCNC: 34 G/DL (ref 33.7–35.3)
MCV RBC AUTO: 84.3 FL (ref 81.4–97.8)
MONOCYTES # BLD AUTO: 1.32 K/UL (ref 0–0.85)
MONOCYTES NFR BLD AUTO: 11 % (ref 0–13.4)
NEUTROPHILS # BLD AUTO: 7.72 K/UL (ref 1.82–7.42)
NEUTROPHILS NFR BLD: 64.1 % (ref 44–72)
NRBC # BLD AUTO: 0 K/UL
NRBC BLD-RTO: 0 /100 WBC
PHOSPHATE SERPL-MCNC: 3.7 MG/DL (ref 2.5–4.5)
PLATELET # BLD AUTO: 373 K/UL (ref 164–446)
PMV BLD AUTO: 9.2 FL (ref 9–12.9)
POTASSIUM SERPL-SCNC: 4.5 MMOL/L (ref 3.6–5.5)
RBC # BLD AUTO: 4.64 M/UL (ref 4.7–6.1)
SODIUM SERPL-SCNC: 135 MMOL/L (ref 135–145)
TROPONIN T SERPL-MCNC: 11 NG/L (ref 6–19)
WBC # BLD AUTO: 12.1 K/UL (ref 4.8–10.8)

## 2020-02-04 PROCEDURE — 700111 HCHG RX REV CODE 636 W/ 250 OVERRIDE (IP): Performed by: INTERNAL MEDICINE

## 2020-02-04 PROCEDURE — 700102 HCHG RX REV CODE 250 W/ 637 OVERRIDE(OP): Performed by: HOSPITALIST

## 2020-02-04 PROCEDURE — G0378 HOSPITAL OBSERVATION PER HR: HCPCS

## 2020-02-04 PROCEDURE — 700111 HCHG RX REV CODE 636 W/ 250 OVERRIDE (IP): Performed by: HOSPITALIST

## 2020-02-04 PROCEDURE — 80069 RENAL FUNCTION PANEL: CPT

## 2020-02-04 PROCEDURE — 93010 ELECTROCARDIOGRAM REPORT: CPT | Performed by: INTERNAL MEDICINE

## 2020-02-04 PROCEDURE — A9270 NON-COVERED ITEM OR SERVICE: HCPCS | Performed by: INTERNAL MEDICINE

## 2020-02-04 PROCEDURE — 700102 HCHG RX REV CODE 250 W/ 637 OVERRIDE(OP): Performed by: INTERNAL MEDICINE

## 2020-02-04 PROCEDURE — 71045 X-RAY EXAM CHEST 1 VIEW: CPT

## 2020-02-04 PROCEDURE — 93005 ELECTROCARDIOGRAM TRACING: CPT | Performed by: HOSPITALIST

## 2020-02-04 PROCEDURE — 99225 PR SUBSEQUENT OBSERVATION CARE,LEVEL II: CPT | Performed by: INTERNAL MEDICINE

## 2020-02-04 PROCEDURE — 36415 COLL VENOUS BLD VENIPUNCTURE: CPT

## 2020-02-04 PROCEDURE — 84484 ASSAY OF TROPONIN QUANT: CPT

## 2020-02-04 PROCEDURE — 85025 COMPLETE CBC W/AUTO DIFF WBC: CPT

## 2020-02-04 PROCEDURE — A9270 NON-COVERED ITEM OR SERVICE: HCPCS | Performed by: HOSPITALIST

## 2020-02-04 RX ORDER — HYDROMORPHONE HYDROCHLORIDE 2 MG/ML
2 INJECTION, SOLUTION INTRAMUSCULAR; INTRAVENOUS; SUBCUTANEOUS ONCE
Status: COMPLETED | OUTPATIENT
Start: 2020-02-04 | End: 2020-02-04

## 2020-02-04 RX ORDER — TRAZODONE HYDROCHLORIDE 50 MG/1
50 TABLET ORAL
Status: DISCONTINUED | OUTPATIENT
Start: 2020-02-04 | End: 2020-02-14 | Stop reason: HOSPADM

## 2020-02-04 RX ORDER — DIAZEPAM 5 MG/1
5 TABLET ORAL ONCE
Status: COMPLETED | OUTPATIENT
Start: 2020-02-04 | End: 2020-02-04

## 2020-02-04 RX ADMIN — PREDNISONE 5 MG: 5 TABLET ORAL at 06:35

## 2020-02-04 RX ADMIN — CEPHALEXIN 250 MG: 250 CAPSULE ORAL at 23:57

## 2020-02-04 RX ADMIN — SENNOSIDES AND DOCUSATE SODIUM 2 TABLET: 8.6; 5 TABLET ORAL at 16:45

## 2020-02-04 RX ADMIN — SENNOSIDES AND DOCUSATE SODIUM 2 TABLET: 8.6; 5 TABLET ORAL at 06:34

## 2020-02-04 RX ADMIN — OMEPRAZOLE 20 MG: 20 CAPSULE, DELAYED RELEASE ORAL at 06:35

## 2020-02-04 RX ADMIN — LOSARTAN POTASSIUM 50 MG: 25 TABLET ORAL at 06:35

## 2020-02-04 RX ADMIN — CYCLOBENZAPRINE HYDROCHLORIDE 10 MG: 10 TABLET, FILM COATED ORAL at 07:06

## 2020-02-04 RX ADMIN — DIAZEPAM 5 MG: 5 TABLET ORAL at 02:58

## 2020-02-04 RX ADMIN — POLYETHYLENE GLYCOL 3350 1 PACKET: 17 POWDER, FOR SOLUTION ORAL at 06:35

## 2020-02-04 RX ADMIN — HYDROMORPHONE HYDROCHLORIDE 4 MG: 2 TABLET ORAL at 07:06

## 2020-02-04 RX ADMIN — CYCLOBENZAPRINE HYDROCHLORIDE 10 MG: 10 TABLET, FILM COATED ORAL at 20:40

## 2020-02-04 RX ADMIN — HYDROMORPHONE HYDROCHLORIDE 4 MG: 2 TABLET ORAL at 19:45

## 2020-02-04 RX ADMIN — HYDROMORPHONE HYDROCHLORIDE 2 MG: 2 INJECTION INTRAMUSCULAR; INTRAVENOUS; SUBCUTANEOUS at 02:58

## 2020-02-04 RX ADMIN — CEPHALEXIN 250 MG: 250 CAPSULE ORAL at 11:31

## 2020-02-04 RX ADMIN — HYDROMORPHONE HYDROCHLORIDE 4 MG: 2 TABLET ORAL at 23:57

## 2020-02-04 RX ADMIN — HYDROMORPHONE HYDROCHLORIDE 4 MG: 2 TABLET ORAL at 11:31

## 2020-02-04 RX ADMIN — TAMSULOSIN HYDROCHLORIDE 0.4 MG: 0.4 CAPSULE ORAL at 07:48

## 2020-02-04 RX ADMIN — HYDROMORPHONE HYDROCHLORIDE 4 MG: 2 TABLET ORAL at 15:23

## 2020-02-04 RX ADMIN — CEPHALEXIN 250 MG: 250 CAPSULE ORAL at 06:35

## 2020-02-04 RX ADMIN — CEPHALEXIN 250 MG: 250 CAPSULE ORAL at 16:46

## 2020-02-04 ASSESSMENT — PATIENT HEALTH QUESTIONNAIRE - PHQ9
SUM OF ALL RESPONSES TO PHQ9 QUESTIONS 1 AND 2: 0
2. FEELING DOWN, DEPRESSED, IRRITABLE, OR HOPELESS: NOT AT ALL

## 2020-02-04 ASSESSMENT — ENCOUNTER SYMPTOMS
HEADACHES: 0
MYALGIAS: 1
COUGH: 0
STRIDOR: 0
BACK PAIN: 1
HALLUCINATIONS: 0
FEVER: 0
ABDOMINAL PAIN: 1
SORE THROAT: 0
DIARRHEA: 0
NAUSEA: 0
INSOMNIA: 1
SHORTNESS OF BREATH: 0
TINGLING: 0
DIAPHORESIS: 0
WEAKNESS: 1
ORTHOPNEA: 0
PALPITATIONS: 0
DEPRESSION: 0
BLOOD IN STOOL: 0

## 2020-02-04 NOTE — CARE PLAN
Problem: Pain Management  Goal: Pain level will decrease to patient's comfort goal  Outcome: PROGRESSING SLOWER THAN EXPECTED   Patient pain has been rated between a 5-8/10 this evening. See MAR for medication given. Distraction and cold packs offered but refused at this time. Snacks provided. Will continue to monitor pain management.     Problem: Bowel/Gastric:  Goal: Normal bowel function is maintained or improved  Outcome: PROGRESSING SLOWER THAN EXPECTED   Patients last bowel movement was 2/3 but was hard and small according to patient. See MAR for interventions given. Hydration encouraged. Will continue to monitor patient bowel habits. Bowel sounds normoactive x4 quadrants.

## 2020-02-04 NOTE — PROGRESS NOTES
Report received from night shift RN. Assume care. Pt. AAOx4 pt is bed,  Assessment completed. VSS. Pain still there, Dressing to abd and back DCI and in place DCI, Pt was update for the care for the day. White board updated, All question answered. Pt has call light within reach,  bed is in the lowest position. Pt has no other needs at this time.   1230-Wound vac beeping blockage alert on. Called wound RN- got instructions to order new tubing and replace it. awaiting for tubing at this time

## 2020-02-04 NOTE — DIETARY
Nutrition Services: Update   Day 7 of admit.  Erich Ingram is a 54 y.o. male with admitting DX of Colon Cancer.    Pt is currently on Regular diet. Providing Boost Plus with meals.  Recorded PO intake has improved and pt is consistently eating 50-75% and % of meals.  Pt states he is also drinking Boost, but prefers the chocolate flavor.      Malnutrition Risk: Criteria not met    Recommendations/Plan:  1. Continue Regular diet with Boost Plus TID, chocolate flavor per pt request.   2. Encourage continued good intake of meals and supplements.  3. Document intake of all meals and supplements as % taken in ADL's to provide interdisciplinary communication across all shifts.   4. Monitor weight.  5. Nutrition rep will continue to see patient for ongoing meal and snack preferences.  6. RD to monitor per department guidelines.

## 2020-02-04 NOTE — DISCHARGE PLANNING
Agency/Facility Name: [facility]   Spoke To: Chris  Outcome: Unable to accepted patient while actively doing chemo treatments.     RN MAGNOLIA Mcpherson notified.

## 2020-02-04 NOTE — RESPIRATORY CARE
Respiratory Rapid Response Note    Symptoms Chest Pain     Breath Sounds  RUL Breath Sounds: Clear (02/03/20 2000)  RML Breath Sounds: Diminished (02/03/20 2000)  RLL Breath Sounds: Diminished;Inspiratory Wheezes (02/03/20 2000)  ISRAEL Breath Sounds: Clear (02/03/20 2000)  LLL Breath Sounds: Diminished (02/03/20 2000)                O2 (LPM): 0 (02/03/20 1700) O2 Sat  95%

## 2020-02-04 NOTE — CARE PLAN
Problem: Safety  Goal: Will remain free from injury  Outcome: PROGRESSING AS EXPECTED     Problem: Knowledge Deficit  Goal: Knowledge of disease process/condition, treatment plan, diagnostic tests, and medications will improve  Outcome: PROGRESSING AS EXPECTED     Problem: Respiratory:  Goal: Respiratory status will improve  Outcome: PROGRESSING AS EXPECTED

## 2020-02-04 NOTE — PROGRESS NOTES
Received report from AQUILES Botello. Assumed care of patient. Patient is currently sitting up in bed with complaints of 5/10 pain. Patient A&Ox4 with VSS at this time. No N/V stated at this time. Dressings to abdomen observed. Dressings CDI and CMS intact. Wound vac in place and working. Patient standby assist. IV patent and saline locked at this time. Plan of care reviewed. No additional needs requested by patient at this time. Bed locked and in lowest position with call light within reach.

## 2020-02-04 NOTE — CARE PLAN
Problem: Nutritional:  Goal: Achieve adequate nutritional intake  Description  Patient will consume >50% of meals   Outcome: MET

## 2020-02-04 NOTE — PROGRESS NOTES
0235: rapid response called for left sided sharp chest pain as reported by the patient.     0240: ICU nurse, EKG tech, Respiratory therapy, lab and Dr. Velasquez arrived to patient room. Vital signs taken.    - EKG ordered and chest xray ordered.  - 5 mg PO of valium once ordered and 2 mg of IV dilaudid once ordered. Troponin ordered.

## 2020-02-04 NOTE — PROGRESS NOTES
Hospital Medicine Daily Progress Note    Date of Service  2/4/2020    Chief Complaint  54 y.o. male admitted 1/28/2020 with pain pump surgical site dehiscence    Hospital Course    54 y.o. male with a history of colon cancer status post treatment, now on chemotherapy for remission, complicated by metastases to the lung and chronic pain who uses an intrathecal pain pump, who presented 1/28/2020 with surgical wound dehiscence where his pain pump was placed. The device was removed and placed at a different site. He was treated with keflex for a total of 10 days.         Interval Problem Update       Consultants/Specialty  Dr. Reina, pain management  Wound care    Code Status  Full    Disposition  SNF with wound VAC    Review of Systems  Review of Systems   Constitutional: Positive for malaise/fatigue. Negative for diaphoresis and fever.   HENT: Negative for congestion and sore throat.    Respiratory: Negative for cough, shortness of breath and stridor.    Cardiovascular: Negative for palpitations and orthopnea.   Gastrointestinal: Positive for abdominal pain. Negative for blood in stool, diarrhea and nausea.   Genitourinary: Negative for dysuria and frequency.   Musculoskeletal: Positive for back pain and myalgias.        Chronic pain   Neurological: Positive for weakness. Negative for tingling and headaches.   Psychiatric/Behavioral: Negative for depression and hallucinations. The patient has insomnia.         Physical Exam  Temp:  [36.2 °C (97.2 °F)-36.8 °C (98.2 °F)] 36.3 °C (97.4 °F)  Pulse:  [] 99  Resp:  [16-20] 18  BP: (115-135)/(78-96) 120/88  SpO2:  [93 %-95 %] 95 %    Physical Exam  Constitutional:       General: He is not in acute distress.  HENT:      Nose: Nose normal. No congestion.      Mouth/Throat:      Mouth: Mucous membranes are moist.      Pharynx: No oropharyngeal exudate or posterior oropharyngeal erythema.   Eyes:      General: No scleral icterus.     Conjunctiva/sclera: Conjunctivae  normal.   Neck:      Musculoskeletal: Normal range of motion and neck supple.   Cardiovascular:      Rate and Rhythm: Normal rate and regular rhythm.      Heart sounds: No murmur.   Pulmonary:      Effort: Pulmonary effort is normal. No respiratory distress.      Breath sounds: Normal breath sounds. No stridor. No wheezing.   Abdominal:      General: Abdomen is flat. There is no distension.      Palpations: Abdomen is soft.      Tenderness: There is no tenderness.      Comments: Wound vac in place   Musculoskeletal:         General: No swelling, tenderness or deformity.   Skin:     General: Skin is warm and dry.      Coloration: Skin is pale.      Findings: No erythema.   Neurological:      General: No focal deficit present.      Mental Status: He is oriented to person, place, and time. Mental status is at baseline.   Psychiatric:         Behavior: Behavior normal.         Fluids    Intake/Output Summary (Last 24 hours) at 2/4/2020 1456  Last data filed at 2/4/2020 1142  Gross per 24 hour   Intake 1080 ml   Output 1920 ml   Net -840 ml       Laboratory  Recent Labs     02/03/20  0438 02/04/20  0250   WBC 12.0* 12.1*   RBC 4.71 4.64*   HEMOGLOBIN 13.0* 13.3*   HEMATOCRIT 39.4* 39.1*   MCV 83.7 84.3   MCH 27.6 28.7   MCHC 33.0* 34.0   RDW 38.2 38.7   PLATELETCT 373 373   MPV 9.2 9.2     Recent Labs     02/03/20  0438 02/04/20  0250   SODIUM 135 135   POTASSIUM 4.3 4.5   CHLORIDE 98 98   CO2 24 23   GLUCOSE 120* 119*   BUN 18 17   CREATININE 0.88 0.90   CALCIUM 9.4 9.5                   Imaging  DX-CHEST-PORTABLE (1 VIEW)   Final Result      No focal consolidation or pleural effusions.           Assessment/Plan  Metastatic Colon cancer (HCC)  He was diagnosed in 2016, treated with resection and chemotherapy.  He had recurrence in August 2018, found to have metastases to the lung.  He has been on chemotherapy however, this was held in preparation for his pain pump replacement procedure  Continue to hold chemo for now  due to open wound  Continue prednisone    Essential hypertension  Continue Cozaar    Chronic, continuous use of opioids  Pain is controlled with assistance from his pain management physician, Dr. Reina.  Continue p.o. Dilaudid 4 mg every 4 hours  He has a pain pump with Dilaudid 8 mg/day and ketamine, he has additional boluses to be used as needed  At this time no increase in IV Dilaudid is indicated      Infection of superficial incisional surgical site after procedure  He had infection of his surgical site where his right-sided pain pump was and had dehiscence of his wound.  On 1/28 he had debridement of this location and his pain pump removed and changed to the other side.  He has a right-sided wound VAC in place  pending SNF placement  Continue Keflex for total of 10 days, to complete 2/6    COPD (chronic obstructive pulmonary disease) (HCC)  No need for supplemental oxygen, continue prednisone low dose    Leukocytosis  Infection clearing clinically, will monitor labs      VTE prophylaxis: Lovenox

## 2020-02-04 NOTE — DISCHARGE PLANNING
Received call from patients wife. Wife would like a referral sent out to [facility] in [place]. Snf choice form completed and faxed to [facility].

## 2020-02-04 NOTE — PROGRESS NOTES
Change in patient condition due to: Cardiac (c/o CP)  Rapid Response called at: 0235  Physician Dr. Ramos notified at: 0240    See Code Blue timeline for rapid response events. Patient Remained on Unit.

## 2020-02-05 ENCOUNTER — APPOINTMENT (OUTPATIENT)
Dept: RADIOLOGY | Facility: MEDICAL CENTER | Age: 55
DRG: 908 | End: 2020-02-05
Attending: INTERNAL MEDICINE
Payer: COMMERCIAL

## 2020-02-05 PROCEDURE — 700117 HCHG RX CONTRAST REV CODE 255: Performed by: INTERNAL MEDICINE

## 2020-02-05 PROCEDURE — 99232 SBSQ HOSP IP/OBS MODERATE 35: CPT | Performed by: INTERNAL MEDICINE

## 2020-02-05 PROCEDURE — A9270 NON-COVERED ITEM OR SERVICE: HCPCS | Performed by: INTERNAL MEDICINE

## 2020-02-05 PROCEDURE — 700105 HCHG RX REV CODE 258: Performed by: INTERNAL MEDICINE

## 2020-02-05 PROCEDURE — 700111 HCHG RX REV CODE 636 W/ 250 OVERRIDE (IP): Performed by: INTERNAL MEDICINE

## 2020-02-05 PROCEDURE — 74177 CT ABD & PELVIS W/CONTRAST: CPT

## 2020-02-05 PROCEDURE — 96374 THER/PROPH/DIAG INJ IV PUSH: CPT

## 2020-02-05 PROCEDURE — 700102 HCHG RX REV CODE 250 W/ 637 OVERRIDE(OP): Performed by: INTERNAL MEDICINE

## 2020-02-05 PROCEDURE — 770006 HCHG ROOM/CARE - MED/SURG/GYN SEMI*

## 2020-02-05 PROCEDURE — 97605 NEG PRS WND THER DME<=50SQCM: CPT

## 2020-02-05 RX ORDER — SODIUM CHLORIDE 9 MG/ML
INJECTION, SOLUTION INTRAVENOUS CONTINUOUS
Status: DISCONTINUED | OUTPATIENT
Start: 2020-02-05 | End: 2020-02-06

## 2020-02-05 RX ORDER — PREDNISONE 5 MG/1
5 TABLET ORAL
Status: DISCONTINUED | OUTPATIENT
Start: 2020-02-07 | End: 2020-02-09

## 2020-02-05 RX ADMIN — SODIUM CHLORIDE: 9 INJECTION, SOLUTION INTRAVENOUS at 20:14

## 2020-02-05 RX ADMIN — HYDROMORPHONE HYDROCHLORIDE 4 MG: 2 TABLET ORAL at 09:29

## 2020-02-05 RX ADMIN — PROCHLORPERAZINE EDISYLATE 10 MG: 5 INJECTION INTRAMUSCULAR; INTRAVENOUS at 06:54

## 2020-02-05 RX ADMIN — PROMETHAZINE HYDROCHLORIDE 50 MG: 25 TABLET ORAL at 23:01

## 2020-02-05 RX ADMIN — CYCLOBENZAPRINE HYDROCHLORIDE 10 MG: 10 TABLET, FILM COATED ORAL at 23:01

## 2020-02-05 RX ADMIN — HYDROMORPHONE HYDROCHLORIDE 4 MG: 2 TABLET ORAL at 05:06

## 2020-02-05 RX ADMIN — SODIUM CHLORIDE: 9 INJECTION, SOLUTION INTRAVENOUS at 15:25

## 2020-02-05 RX ADMIN — PREDNISONE 5 MG: 5 TABLET ORAL at 05:05

## 2020-02-05 RX ADMIN — PROMETHAZINE HYDROCHLORIDE 50 MG: 25 TABLET ORAL at 08:15

## 2020-02-05 RX ADMIN — POLYETHYLENE GLYCOL 3350 1 PACKET: 17 POWDER, FOR SOLUTION ORAL at 05:05

## 2020-02-05 RX ADMIN — TAMSULOSIN HYDROCHLORIDE 0.4 MG: 0.4 CAPSULE ORAL at 08:16

## 2020-02-05 RX ADMIN — CEPHALEXIN 250 MG: 250 CAPSULE ORAL at 11:39

## 2020-02-05 RX ADMIN — OMEPRAZOLE 20 MG: 20 CAPSULE, DELAYED RELEASE ORAL at 05:06

## 2020-02-05 RX ADMIN — HYDROMORPHONE HYDROCHLORIDE 4 MG: 2 TABLET ORAL at 15:24

## 2020-02-05 RX ADMIN — ONDANSETRON 8 MG: 4 TABLET, ORALLY DISINTEGRATING ORAL at 17:05

## 2020-02-05 RX ADMIN — SENNOSIDES AND DOCUSATE SODIUM 2 TABLET: 8.6; 5 TABLET ORAL at 17:05

## 2020-02-05 RX ADMIN — SENNOSIDES AND DOCUSATE SODIUM 2 TABLET: 8.6; 5 TABLET ORAL at 05:05

## 2020-02-05 RX ADMIN — HYDROMORPHONE HYDROCHLORIDE 4 MG: 2 TABLET ORAL at 20:14

## 2020-02-05 RX ADMIN — CEPHALEXIN 250 MG: 250 CAPSULE ORAL at 23:01

## 2020-02-05 RX ADMIN — IOHEXOL 75 ML: 350 INJECTION, SOLUTION INTRAVENOUS at 19:50

## 2020-02-05 RX ADMIN — CEPHALEXIN 250 MG: 250 CAPSULE ORAL at 05:05

## 2020-02-05 RX ADMIN — LOSARTAN POTASSIUM 50 MG: 25 TABLET ORAL at 05:05

## 2020-02-05 RX ADMIN — CEPHALEXIN 250 MG: 250 CAPSULE ORAL at 17:05

## 2020-02-05 ASSESSMENT — ENCOUNTER SYMPTOMS
PALPITATIONS: 0
DEPRESSION: 0
HEADACHES: 0
SHORTNESS OF BREATH: 0
FEVER: 0
INSOMNIA: 0
DIARRHEA: 0
VOMITING: 0
WEAKNESS: 1
BACK PAIN: 1
TINGLING: 0
HALLUCINATIONS: 0
CHILLS: 0
BLOOD IN STOOL: 0
ORTHOPNEA: 0
MYALGIAS: 0
ABDOMINAL PAIN: 1
STRIDOR: 0
SORE THROAT: 0
WHEEZING: 0
NAUSEA: 0

## 2020-02-05 NOTE — PROGRESS NOTES
Received report from AQUILES Botello. Assumed care of patient. Patient is currently laying in bed stating 7/10 pain to the abdomen. No N/V/D reported today. Pain medication given to help with pain. VSS and patient A&ox4. Abdomen viewed. Dressings CDI and CMS intact. Wound vac working correctly at this time. Plan of care reviewed. No additional needs requested at this time. Bed locked and in lowest position with call light within reach.

## 2020-02-05 NOTE — PROCEDURES
DATE OF SERVICE:  02/04/2020    NAME OF PROCEDURE:  [employer] intrathecal pump refill and reprogramming.      PREPROCEDURAL DIAGNOSES:  Patient with metastatic colon cancer, intractable   abdominal and back pain secondary to metastatic disease with recent pump   revision requiring pump refill.    POSTPROCEDURAL DIAGNOSES:  Patient with metastatic colon cancer, intractable   abdominal and back pain secondary to metastatic disease with recent pump   revision requiring pump refill.    PROCEDURE PERFORMED BY:  Candelario Reina MD    ANESTHESIA:  None.    PROCEDURE NOTE:  Patient was seen in the hospital bed.  At this time, he   appeared to be doing quite well, on checking his recent wound site from week   ago identified no significant evidence of infection and actually the wounds   appear to be healing quite well.  The abdominal area was sterilely prepped   using alcohol prep.  A 22-gauge Viramontes needle was advanced into the pump itself   using a template to identify the central port.  A total of 11 mL of drug was   aspirated.  This was followed by an injection 39 mL of hydromorphone 20 mg/mL   in addition to ketamine 1500 mcg/mL.  Pump was then reprogrammed at its usual   rate with 2 additional boluses at 12 boluses in 24 hours.  Patient reservoir   alarm date was noted to be an 83 days.  At this point, our plan will be to   schedule followup in 1 week's time for suture and staple removal.       ____________________________________     MD JOSE GRANT / NTS    DD:  02/04/2020 15:27:17  DT:  02/04/2020 17:01:30    D#:  [id number]  Job#:  [id number]

## 2020-02-05 NOTE — CARE PLAN
Problem: Pain Management  Goal: Pain level will decrease to patient's comfort goal  Outcome: PROGRESSING SLOWER THAN EXPECTED  Patient pain rated as an 7-8/10. See MAR for medication given. Snacks provided and environmental stimuli decreased as well. Will continue to monitor pain management at this time and promote comfort with interventions.     Problem: Venous Thromboembolism (VTW)/Deep Vein Thrombosis (DVT) Prevention:  Goal: Patient will participate in Venous Thrombosis (VTE)/Deep Vein Thrombosis (DVT)Prevention Measures  Flowsheets (Taken 2/5/2020 0028)  SCDs, Sequential Compression Device: On

## 2020-02-05 NOTE — DISCHARGE PLANNING
Called [facility]. Staff is in a meeting right now. Will call back around 1000.    Called [facility].  Radha stated she would have to look over his referral more and call RN CM back.      Update: [facility] can not take pt because he exceeds their needs, Radha could not provide a more specific reason.

## 2020-02-05 NOTE — WOUND TEAM
[facility]  Inpatient Services  Wound and Skin Care Progress Note    Admission Date:  1/28/2020   HPI, PMH, SH: Reviewed  Unit where seen by Wound Team: 2210/02    WOUND TEAM FOLLOW UP:  VAC change     SUBJECTIVE:  Lives outside of [place], waiting to find out where he will go to get dressings done.      Self Report / Pain Level:  Premedicated by RN. C/o pain in groin if head of bed lowered. Winced when wound bed touched.       OBJECTIVE:      WOUND TYPE, LOCATION, CHARACTERISTICS (Pressure ulcers: location, stage, POA or date identified)    Negative Pressure Wound Therapy Abdomen Right;Lower;Quadrant (Active)   NPWT Pump Mode / Pressure Setting 125 mmHg;Continuous    Dressing Type Black foam    Number of Foam Pieces Used 2    Canister Changed No      Wound 01/28/20 Incision Abdomen wound vac, previous pain pump site - infected (Active)   Wound Image    1/31/2020   Site Assessment Red;Painful    Dana-wound Assessment Intact    Margins Attached edges    Wound Length (cm) 2 cm Measured 1/31/2020   Wound Width (cm) 6 cm    Wound Depth (cm) 1.4 cm    Wound Surface Area (cm^2) 12 cm^2    Tunneling 4.1 cm None this change   Drainage Amount Small    Drainage Description Serosanguineous    Non-staged Wound Description Full thickness    Treatments Cleansed    Cleansing Normal Saline Irrigation    Periwound Protectant Skin Protectant wipes to Periwound;Drape    Dressing Options Wound Vac;Collagen Dressing    Dressing Changed Changed    Dressing Change Frequency Monday, Wednesday, Friday    NEXT Dressing Change  02/07/20    NEXT Weekly Photo (Inpatient Only) 02/07/20    WOUND NURSE ONLY - Odor None    WOUND NURSE ONLY - Exposed Structures None    WOUND NURSE ONLY - Tissue Type and Percentage red 100      Lab Values:    WBC:     @LABLAST(WBC)@  AIC:    No results found for: HBA1C      Culture:   Completed n/a    INTERVENTIONS BY WOUND TEAM:  Soaked foam with NS prior to removal. Cleaned wound with NS. Using  cotton applicator, assessed for any tract or undermining. Skin prep and drape to periwound skin. Collagen (renata) to wound bed, then one piece of black foam to wound bed. Second piece of black foam for TRAC pad. Resumed VAC at 125 mmHg continuous.         Interdisciplinary consultation:  RN, patient, wife on foam     EVALUATION:  Wound bed red and granular, contraction noted to wound edges. No tract noted. Discussed possiblity of wound care changes at [facility],  and instructed patient and wife to check with discharge planners about this.      Factors affecting wound healing:  Chemo, cancer, chronic pain with pain pump, hx smoking  Goals:  Steady decrease in wound area and depth weekly     NURSING PLAN OF CARE ORDERS (X):    Dressing changes: See Dressing Care orders:     Skin care: See Skin Care orders:   Rectal tube care: See Rectal Tube Care orders:   Other orders:      WOUND TEAM PLAN OF CARE (X):   NPWT change 3 x week:    X    Dressing changes by wound team:       Follow up as needed:       Other (explain):    Anticipated discharge plans (X):  SNF:           Home Care:           Outpatient Wound Center:     X       Self Care:            Other:

## 2020-02-05 NOTE — PROGRESS NOTES
Hospital Medicine Daily Progress Note    Date of Service  2/5/2020    Chief Complaint  54 y.o. male admitted 1/28/2020 with pain pump surgical site dehiscence    Hospital Course    54 y.o. male with a history of colon cancer status post treatment, now on chemotherapy for remission, complicated by metastases to the lung and chronic pain who uses an intrathecal pain pump, who presented 1/28/2020 with surgical wound dehiscence where his pain pump was placed. The device was removed and placed at a different site. He was treated with keflex for a total of 10 days.         Interval Problem Update  The patient slept well last night     Consultants/Specialty  Dr. Reina, pain management  Wound care    Code Status  Full    Disposition  SNF with wound VAC    Review of Systems  Review of Systems   Constitutional: Positive for malaise/fatigue. Negative for chills and fever.   HENT: Negative for congestion and sore throat.    Respiratory: Negative for shortness of breath, wheezing and stridor.    Cardiovascular: Negative for chest pain, palpitations and orthopnea.   Gastrointestinal: Positive for abdominal pain. Negative for blood in stool, diarrhea, nausea and vomiting.   Genitourinary: Negative for dysuria and frequency.   Musculoskeletal: Positive for back pain. Negative for myalgias.        Chronic pain   Skin: Negative for itching and rash.   Neurological: Positive for weakness. Negative for tingling and headaches.   Psychiatric/Behavioral: Negative for depression and hallucinations. The patient does not have insomnia.         Physical Exam  Temp:  [36.5 °C (97.7 °F)-36.7 °C (98 °F)] 36.7 °C (98 °F)  Pulse:  [] 104  Resp:  [18] 18  BP: ()/(53-90) 99/78  SpO2:  [95 %-97 %] 95 %    Physical Exam  Vitals signs and nursing note reviewed.   Constitutional:       General: He is not in acute distress.     Appearance: He is not ill-appearing or diaphoretic.   HENT:      Nose: Nose normal. No congestion.       Mouth/Throat:      Mouth: Mucous membranes are moist.      Pharynx: No oropharyngeal exudate or posterior oropharyngeal erythema.   Eyes:      General: No scleral icterus.     Conjunctiva/sclera: Conjunctivae normal.   Neck:      Musculoskeletal: Normal range of motion.   Cardiovascular:      Rate and Rhythm: Normal rate and regular rhythm.      Heart sounds: Normal heart sounds. No murmur.   Pulmonary:      Effort: Pulmonary effort is normal. No respiratory distress.      Breath sounds: No stridor. No wheezing or rhonchi.   Abdominal:      General: Abdomen is flat. There is no distension.      Palpations: Abdomen is soft.      Tenderness: There is no tenderness.      Comments: Wound vac in place   Musculoskeletal:         General: No swelling, tenderness or deformity.   Lymphadenopathy:      Cervical: No cervical adenopathy.   Skin:     General: Skin is warm and dry.      Coloration: Skin is pale.      Findings: No erythema.   Neurological:      General: No focal deficit present.      Mental Status: He is alert and oriented to person, place, and time.      Motor: No weakness.   Psychiatric:         Behavior: Behavior normal.         Fluids    Intake/Output Summary (Last 24 hours) at 2/5/2020 1522  Last data filed at 2/5/2020 0456  Gross per 24 hour   Intake 760 ml   Output 2000 ml   Net -1240 ml       Laboratory  Recent Labs     02/03/20  0438 02/04/20  0250   WBC 12.0* 12.1*   RBC 4.71 4.64*   HEMOGLOBIN 13.0* 13.3*   HEMATOCRIT 39.4* 39.1*   MCV 83.7 84.3   MCH 27.6 28.7   MCHC 33.0* 34.0   RDW 38.2 38.7   PLATELETCT 373 373   MPV 9.2 9.2     Recent Labs     02/03/20  0438 02/04/20  0250   SODIUM 135 135   POTASSIUM 4.3 4.5   CHLORIDE 98 98   CO2 24 23   GLUCOSE 120* 119*   BUN 18 17   CREATININE 0.88 0.90   CALCIUM 9.4 9.5                   Imaging  DX-CHEST-PORTABLE (1 VIEW)   Final Result      No focal consolidation or pleural effusions.      CT-CHEST,ABDOMEN,PELVIS WITH    (Results Pending)         Assessment/Plan  Metastatic Colon cancer (HCC)  He was diagnosed in 2016, treated with resection and chemotherapy.  He had recurrence in August 2018, found to have metastases to the lung.  He has been on chemotherapy however, this was held in preparation for his pain pump replacement procedure  Continue to hold chemo for now due to open wound  Continue prednisone    Essential hypertension  Continue Cozaar    Chronic, continuous use of opioids  Pain is controlled with assistance from his pain management physician, Dr. Reina.  Continue p.o. Dilaudid 4 mg every 4 hours  He has a pain pump with Dilaudid 8 mg/day and ketamine, he has additional boluses      Infection of superficial incisional surgical site after procedure  He had infection of his surgical site where his right-sided pain pump was and had dehiscence of his wound.  On 1/28 he had debridement of this location and his pain pump removed and changed to the other side.  He has a right-sided wound VAC in place  pending SNF placement  Continue Keflex for total of 10 days, to complete 2/6    COPD (chronic obstructive pulmonary disease) (HCC)  No need for supplemental oxygen, continue prednisone low dose to every 48 hours    Leukocytosis  Infection clearing clinically, will monitor labs    Colon neoplasm  I discussed the plan with his oncologist Dr. Urbina today  Will order a CT scan with iv contrast and normal saline for renal protection   No plan for chemotherapy until the wound is more healed      VTE prophylaxis: Lovenox

## 2020-02-06 LAB
ANION GAP SERPL CALC-SCNC: 12 MMOL/L (ref 0–11.9)
BUN SERPL-MCNC: 15 MG/DL (ref 8–22)
CALCIUM SERPL-MCNC: 9.3 MG/DL (ref 8.4–10.2)
CHLORIDE SERPL-SCNC: 96 MMOL/L (ref 96–112)
CO2 SERPL-SCNC: 27 MMOL/L (ref 20–33)
CREAT SERPL-MCNC: 0.91 MG/DL (ref 0.5–1.4)
GLUCOSE SERPL-MCNC: 98 MG/DL (ref 65–99)
POTASSIUM SERPL-SCNC: 4.2 MMOL/L (ref 3.6–5.5)
SODIUM SERPL-SCNC: 135 MMOL/L (ref 135–145)

## 2020-02-06 PROCEDURE — 700111 HCHG RX REV CODE 636 W/ 250 OVERRIDE (IP): Performed by: INTERNAL MEDICINE

## 2020-02-06 PROCEDURE — A9270 NON-COVERED ITEM OR SERVICE: HCPCS | Performed by: INTERNAL MEDICINE

## 2020-02-06 PROCEDURE — A9270 NON-COVERED ITEM OR SERVICE: HCPCS

## 2020-02-06 PROCEDURE — 700102 HCHG RX REV CODE 250 W/ 637 OVERRIDE(OP)

## 2020-02-06 PROCEDURE — 99232 SBSQ HOSP IP/OBS MODERATE 35: CPT | Performed by: INTERNAL MEDICINE

## 2020-02-06 PROCEDURE — 700102 HCHG RX REV CODE 250 W/ 637 OVERRIDE(OP): Performed by: INTERNAL MEDICINE

## 2020-02-06 PROCEDURE — 770006 HCHG ROOM/CARE - MED/SURG/GYN SEMI*

## 2020-02-06 PROCEDURE — 36415 COLL VENOUS BLD VENIPUNCTURE: CPT

## 2020-02-06 PROCEDURE — 700105 HCHG RX REV CODE 258: Performed by: INTERNAL MEDICINE

## 2020-02-06 PROCEDURE — 80048 BASIC METABOLIC PNL TOTAL CA: CPT

## 2020-02-06 RX ORDER — POLYETHYLENE GLYCOL 3350 17 G/17G
1 POWDER, FOR SOLUTION ORAL DAILY
Status: DISCONTINUED | OUTPATIENT
Start: 2020-02-06 | End: 2020-02-10

## 2020-02-06 RX ORDER — BENZOCAINE/MENTHOL 6 MG-10 MG
LOZENGE MUCOUS MEMBRANE
Status: COMPLETED
Start: 2020-02-06 | End: 2020-02-06

## 2020-02-06 RX ADMIN — HYDROMORPHONE HYDROCHLORIDE 4 MG: 2 TABLET ORAL at 05:48

## 2020-02-06 RX ADMIN — HYDROMORPHONE HYDROCHLORIDE 4 MG: 2 TABLET ORAL at 18:02

## 2020-02-06 RX ADMIN — HYDROMORPHONE HYDROCHLORIDE 4 MG: 2 TABLET ORAL at 14:00

## 2020-02-06 RX ADMIN — ONDANSETRON 8 MG: 4 TABLET, ORALLY DISINTEGRATING ORAL at 13:26

## 2020-02-06 RX ADMIN — HYDROMORPHONE HYDROCHLORIDE 4 MG: 2 TABLET ORAL at 01:23

## 2020-02-06 RX ADMIN — CEPHALEXIN 250 MG: 250 CAPSULE ORAL at 05:47

## 2020-02-06 RX ADMIN — CEPHALEXIN 250 MG: 250 CAPSULE ORAL at 17:15

## 2020-02-06 RX ADMIN — CYCLOBENZAPRINE HYDROCHLORIDE 10 MG: 10 TABLET, FILM COATED ORAL at 08:31

## 2020-02-06 RX ADMIN — SODIUM CHLORIDE: 9 INJECTION, SOLUTION INTRAVENOUS at 05:53

## 2020-02-06 RX ADMIN — CYCLOBENZAPRINE HYDROCHLORIDE 10 MG: 10 TABLET, FILM COATED ORAL at 13:26

## 2020-02-06 RX ADMIN — HYDROMORPHONE HYDROCHLORIDE 4 MG: 2 TABLET ORAL at 22:13

## 2020-02-06 RX ADMIN — SENNOSIDES AND DOCUSATE SODIUM 2 TABLET: 8.6; 5 TABLET ORAL at 05:48

## 2020-02-06 RX ADMIN — HYDROMORPHONE HYDROCHLORIDE 4 MG: 2 TABLET ORAL at 09:48

## 2020-02-06 RX ADMIN — LOSARTAN POTASSIUM 50 MG: 25 TABLET ORAL at 05:48

## 2020-02-06 RX ADMIN — HYDROCORTISONE: 1 CREAM TOPICAL at 22:12

## 2020-02-06 RX ADMIN — POLYETHYLENE GLYCOL 3350 1 PACKET: 17 POWDER, FOR SOLUTION ORAL at 05:46

## 2020-02-06 RX ADMIN — TAMSULOSIN HYDROCHLORIDE 0.4 MG: 0.4 CAPSULE ORAL at 08:28

## 2020-02-06 RX ADMIN — CEPHALEXIN 250 MG: 250 CAPSULE ORAL at 11:38

## 2020-02-06 RX ADMIN — OMEPRAZOLE 20 MG: 20 CAPSULE, DELAYED RELEASE ORAL at 05:48

## 2020-02-06 ASSESSMENT — ENCOUNTER SYMPTOMS
TINGLING: 0
WEAKNESS: 1
INSOMNIA: 0
HALLUCINATIONS: 0
TREMORS: 0
ORTHOPNEA: 0
BACK PAIN: 1
CONSTIPATION: 1
FEVER: 0
BLOOD IN STOOL: 0
COUGH: 0
SORE THROAT: 0
SHORTNESS OF BREATH: 0
NAUSEA: 0
DIARRHEA: 0
DIZZINESS: 0
MYALGIAS: 0
STRIDOR: 0
ABDOMINAL PAIN: 1
DEPRESSION: 0

## 2020-02-06 NOTE — DISCHARGE PLANNING
Received Choice form at 0658  Agency/Facility Name: [facility]  Referral sent per Choice form @ [phone]

## 2020-02-06 NOTE — PROGRESS NOTES
Received report from AQUILES Rojas. Assumed care of patient. Patient is currently sitting up in bed. Patient A&Ox4 and VSS. Patient pain controlled at this time. Dressings to abdomen viewed. Dressings CDI and CMS intact. Wound vac securely on patient and working. IV patent and running NS at 125. Plan of care reviewed in regards to symptom control, wound care and CT to be performed this evening. No additional needs requested by the patient at this time. Bed locked and in lowest position with call light within reach.

## 2020-02-06 NOTE — PROGRESS NOTES
Pt states that he needs his IV out at this time, that it is freaking him out and it needs to come out now. IV removed.

## 2020-02-06 NOTE — PROGRESS NOTES
[facility] Medicine Daily Progress Note    Date of Service  2/6/2020    Chief Complaint  54 y.o. male admitted 1/28/2020 with pain pump surgical site dehiscence    Hospital Course    54 y.o. male with a history of colon cancer status post treatment, now on chemotherapy for remission, complicated by metastases to the lung and chronic pain who uses an intrathecal pain pump, who presented 1/28/2020 with surgical wound dehiscence where his pain pump was placed. The device was removed and placed at a different site. He was treated with keflex for a total of 10 days.         Interval Problem Update  The patient is agreeable to going to a skilled nursing facility in the [facility]     Consultants/Specialty  Dr. Reina, pain management  Wound care    Code Status  Full    Disposition  SNF with wound VAC    Review of Systems  Review of Systems   Constitutional: Positive for malaise/fatigue. Negative for fever.   HENT: Negative for congestion and sore throat.    Respiratory: Negative for cough, shortness of breath and stridor.    Cardiovascular: Negative for chest pain and orthopnea.   Gastrointestinal: Positive for abdominal pain and constipation. Negative for blood in stool, diarrhea and nausea.   Genitourinary: Negative for dysuria, frequency and urgency.   Musculoskeletal: Positive for back pain. Negative for myalgias.        Chronic pain   Skin: Negative for itching and rash.   Neurological: Positive for weakness. Negative for dizziness, tingling and tremors.   Psychiatric/Behavioral: Negative for depression and hallucinations. The patient does not have insomnia.         Physical Exam  Temp:  [36.2 °C (97.1 °F)-36.4 °C (97.6 °F)] 36.4 °C (97.6 °F)  Pulse:  [] 110  Resp:  [18-20] 20  BP: (111-124)/(62-84) 118/82  SpO2:  [95 %-97 %] 95 %    Physical Exam  Vitals signs and nursing note reviewed.   Constitutional:       Appearance: He is cachectic. He is not ill-appearing or diaphoretic.   HENT:      Nose: No congestion or  rhinorrhea.      Mouth/Throat:      Pharynx: No oropharyngeal exudate or posterior oropharyngeal erythema.   Eyes:      General: No scleral icterus.     Conjunctiva/sclera: Conjunctivae normal.   Neck:      Musculoskeletal: Normal range of motion.   Cardiovascular:      Rate and Rhythm: Normal rate and regular rhythm.      Heart sounds: Normal heart sounds. No murmur.   Pulmonary:      Effort: Pulmonary effort is normal. No respiratory distress.      Breath sounds: No stridor. No wheezing or rhonchi.   Abdominal:      General: Abdomen is flat. There is no distension.      Tenderness: There is no tenderness.      Comments: Wound vac in place   Musculoskeletal:         General: No swelling, tenderness or deformity.   Lymphadenopathy:      Cervical: No cervical adenopathy.   Skin:     General: Skin is warm and dry.      Coloration: Skin is pale. Skin is not jaundiced.      Findings: No bruising or erythema.   Neurological:      General: No focal deficit present.      Mental Status: He is alert and oriented to person, place, and time.      Motor: No weakness.   Psychiatric:         Mood and Affect: Mood normal.         Behavior: Behavior normal.         Thought Content: Thought content normal.         Fluids    Intake/Output Summary (Last 24 hours) at 2/6/2020 1351  Last data filed at 2/6/2020 1136  Gross per 24 hour   Intake 3905 ml   Output 3470 ml   Net 435 ml       Laboratory  Recent Labs     02/04/20  0250   WBC 12.1*   RBC 4.64*   HEMOGLOBIN 13.3*   HEMATOCRIT 39.1*   MCV 84.3   MCH 28.7   MCHC 34.0   RDW 38.7   PLATELETCT 373   MPV 9.2     Recent Labs     02/04/20  0250 02/06/20  0312   SODIUM 135 135   POTASSIUM 4.5 4.2   CHLORIDE 98 96   CO2 23 27   GLUCOSE 119* 98   BUN 17 15   CREATININE 0.90 0.91   CALCIUM 9.5 9.3                   Imaging  CT-CHEST,ABDOMEN,PELVIS WITH   Final Result      1.  Multiple pulmonary metastases, increased since outside CT chest 7/23/2019      2.  Left retroperitoneal mass is  probably unchanged and produces severe obstruction of the left proximal ureter with associated cortical thinning of the left kidney      3.  No other metastatic disease identified      4.  Marked hepatomegaly      5.  Increase in expected amount of stool within the colon      DX-CHEST-PORTABLE (1 VIEW)   Final Result      No focal consolidation or pleural effusions.           Assessment/Plan  Metastatic Colon cancer (HCC)  He was diagnosed in 2016, treated with resection and chemotherapy.  He had recurrence in August 2018, found to have metastases to the lung.  He has been on chemotherapy however, this was held in preparation for his pain pump replacement procedure  Continue to hold chemo for now due to open wound per Dr. Urbina  Continue prednisone    Essential hypertension  Continue Cozaar    Chronic, continuous use of opioids  Pain is controlled with assistance from his pain management physician, Dr. Reina.  Continue p.o. Dilaudid 4 mg every 4 hours  He has a pain pump with Dilaudid 8 mg/day and ketamine, he has additional boluses  Will add daily miralax for stool on CT scan      Infection of superficial incisional surgical site after procedure  He had infection of his surgical site where his right-sided pain pump was and had dehiscence of his wound.  On 1/28 he had debridement of this location and his pain pump removed and changed to the other side.  He has a right-sided wound VAC in place  pending SNF placement, discussed with wife over the phone and as she works she cannot take him home  Continue Keflex for total of 10 days, to stop today    COPD (chronic obstructive pulmonary disease) (HCC)  No need for supplemental oxygen, continue prednisone low dose every 48 hours    Leukocytosis  Infection clearing clinically  Likely due to chronic steroids    Colon neoplasm  I discussed the plan with his oncologist Dr. Urbina  Increase in lung mets seen on CT scan  Stop iv fluids as creatinine is stable after  contrast      VTE prophylaxis: Lovenox

## 2020-02-06 NOTE — PROGRESS NOTES
Report received from NOC RN, assumed care of pt.   POC and medications reviewed with pt. Pt verbalized understanding.   AOx4  Wound vac in place.  Denies SOB, or dizziness at this time.   Safety measures in place.  Hourly rounding in place.

## 2020-02-06 NOTE — CARE PLAN
Plan Ct of ABD today, slight nausea no emesis relieved with Phenerghan, pain managed with po Dilaudid

## 2020-02-06 NOTE — DISCHARGE PLANNING
Agency/Facility Name: [facility]   Outcome: no answer left message to call [facility] back.    Agency/Facility Name: [facility]   Spoke To: Tawanna  Outcome: re-faxed referral. Did not receive.

## 2020-02-06 NOTE — CARE PLAN
Problem: Pain Management  Goal: Pain level will decrease to patient's comfort goal  Outcome: PROGRESSING AS EXPECTED     Problem: Safety  Goal: Will remain free from injury  Outcome: PROGRESSING AS EXPECTED  Goal: Will remain free from falls  Outcome: PROGRESSING AS EXPECTED     Problem: Infection  Goal: Will remain free from infection  Outcome: PROGRESSING AS EXPECTED

## 2020-02-06 NOTE — CARE PLAN
Problem: Pain Management  Goal: Pain level will decrease to patient's comfort goal  Outcome: PROGRESSING SLOWER THAN EXPECTED  Patient pain rated as 7-10/10 in severity. See MAR for medications given. Repositioning, food and distraction also provided to the patient. Will continue to monitor pain management at this time.     Problem: Skin Integrity  Goal: Risk for impaired skin integrity will decrease  Outcome: PROGRESSING AS EXPECTED   Patient island dressing to back peeling but dry at this time. Dressing replaced. Wound vac intact and draining small sanguinous drainage. Patient skin intact and all dressings CDI. CMS intact. Patient encouraged to remain hydrated and use urinal and to walk to bathroom in order to protect skin for excess moisture.

## 2020-02-06 NOTE — DISCHARGE PLANNING
Received [facility] form at 1705  Agency/Facility Name: [facility]  Referral sent per Choice form @ 6026

## 2020-02-07 PROCEDURE — A9270 NON-COVERED ITEM OR SERVICE: HCPCS | Performed by: INTERNAL MEDICINE

## 2020-02-07 PROCEDURE — 97605 NEG PRS WND THER DME<=50SQCM: CPT

## 2020-02-07 PROCEDURE — 700111 HCHG RX REV CODE 636 W/ 250 OVERRIDE (IP): Performed by: INTERNAL MEDICINE

## 2020-02-07 PROCEDURE — 700102 HCHG RX REV CODE 250 W/ 637 OVERRIDE(OP): Performed by: INTERNAL MEDICINE

## 2020-02-07 PROCEDURE — 306372 DRESSING,VAC SIMPLACE MED: Performed by: INTERNAL MEDICINE

## 2020-02-07 PROCEDURE — 770006 HCHG ROOM/CARE - MED/SURG/GYN SEMI*

## 2020-02-07 PROCEDURE — 99232 SBSQ HOSP IP/OBS MODERATE 35: CPT | Performed by: INTERNAL MEDICINE

## 2020-02-07 PROCEDURE — 700101 HCHG RX REV CODE 250: Performed by: INTERNAL MEDICINE

## 2020-02-07 RX ORDER — LIDOCAINE HYDROCHLORIDE 20 MG/ML
20 INJECTION, SOLUTION INFILTRATION; PERINEURAL
Status: DISCONTINUED | OUTPATIENT
Start: 2020-02-07 | End: 2020-02-14 | Stop reason: HOSPADM

## 2020-02-07 RX ADMIN — HYDROMORPHONE HYDROCHLORIDE 4 MG: 2 TABLET ORAL at 04:00

## 2020-02-07 RX ADMIN — CYCLOBENZAPRINE HYDROCHLORIDE 10 MG: 10 TABLET, FILM COATED ORAL at 18:44

## 2020-02-07 RX ADMIN — CYCLOBENZAPRINE HYDROCHLORIDE 10 MG: 10 TABLET, FILM COATED ORAL at 10:02

## 2020-02-07 RX ADMIN — TAMSULOSIN HYDROCHLORIDE 0.4 MG: 0.4 CAPSULE ORAL at 07:57

## 2020-02-07 RX ADMIN — CYCLOBENZAPRINE HYDROCHLORIDE 10 MG: 10 TABLET, FILM COATED ORAL at 06:24

## 2020-02-07 RX ADMIN — LOSARTAN POTASSIUM 50 MG: 25 TABLET ORAL at 05:59

## 2020-02-07 RX ADMIN — HYDROMORPHONE HYDROCHLORIDE 4 MG: 2 TABLET ORAL at 13:48

## 2020-02-07 RX ADMIN — HYDROMORPHONE HYDROCHLORIDE 4 MG: 2 TABLET ORAL at 02:36

## 2020-02-07 RX ADMIN — HYDROMORPHONE HYDROCHLORIDE 4 MG: 2 TABLET ORAL at 10:02

## 2020-02-07 RX ADMIN — LIDOCAINE HYDROCHLORIDE 20 ML: 20 INJECTION, SOLUTION INFILTRATION; PERINEURAL at 11:29

## 2020-02-07 RX ADMIN — HYDROMORPHONE HYDROCHLORIDE 4 MG: 2 TABLET ORAL at 17:51

## 2020-02-07 RX ADMIN — PREDNISONE 5 MG: 5 TABLET ORAL at 05:59

## 2020-02-07 RX ADMIN — OMEPRAZOLE 20 MG: 20 CAPSULE, DELAYED RELEASE ORAL at 05:59

## 2020-02-07 ASSESSMENT — ENCOUNTER SYMPTOMS
STRIDOR: 0
MYALGIAS: 0
COUGH: 0
DEPRESSION: 0
BLOOD IN STOOL: 0
HEADACHES: 0
WHEEZING: 0
NAUSEA: 0
WEAKNESS: 1
CHILLS: 0
DIARRHEA: 0
SHORTNESS OF BREATH: 0
FEVER: 0
PALPITATIONS: 0
ABDOMINAL PAIN: 0
CONSTIPATION: 1
BACK PAIN: 1
DIAPHORESIS: 0
SORE THROAT: 0
TREMORS: 0
INSOMNIA: 0

## 2020-02-07 NOTE — DISCHARGE PLANNING
Agency/Facility Name:[facility]   Outcome: Stated they did not receive referral. Asked [facility] to resend referral.

## 2020-02-07 NOTE — DISCHARGE PLANNING
Agency/Facility Name: [facility]   Outcome: no answer at both facilities. Left VM for admissions to call [facility] back

## 2020-02-07 NOTE — DISCHARGE PLANNING
Agency/Facility Name: [facility]  Outcome: Tawanna left a message to re fax referral. [facility] re-faxed @ [phone].

## 2020-02-07 NOTE — WOUND TEAM
[facility]  Inpatient Services  Wound and Skin Care Progress Note    Admission Date: 1/28/2020     HPI, PMH, SH: Reviewed    Unit where seen by Wound Team: 2210/02     WOUND FOLLOW UP RELATED TO:  Vac change post op.     Self Report / Pain Level:  Premedicated by primary RN, sponge presoaked with 2% lidocaine.    OBJECTIVE:  Pt in bed, vac in place to abdomen.    WOUND TYPE, LOCATION, CHARACTERISTICS (Pressure Injuries: location, stage, POA or date identified)    Negative Pressure Wound Therapy Abdomen Right;Lower;Quadrant (Active)   NPWT Pump Mode / Pressure Setting 125 mmHg;Continuous 2/7/2020  2:00 PM   Dressing Type Black foam 2/7/2020  2:00 PM   Number of Foam Pieces Used 2 2/7/2020  2:00 PM   Canister Changed No 2/7/2020  2:00 PM   Output (mL) 20 mL 2/6/2020  5:00 AM     Wound 01/28/20 Incision Abdomen wound vac, previous pain pump site - infected (Active)   Wound Image   2/7/2020   Site Assessment Pink;Red;Granulation tissue    Dana-wound Assessment Pink;Painful;Scar tissue    Margins Attached edges;Defined edges    Wound Length (cm) 1 cm    Wound Width (cm) 6 cm    Wound Depth (cm) 1 cm    Wound Surface Area (cm^2) 6 cm^2    Tunneling 0 cm    Undermining 0 cm    Closure Secondary intention    Drainage Amount Scant    Drainage Description Serosanguineous    Non-staged Wound Description Full thickness    Treatments Cleansed;Site care    Cleansing Normal Saline Irrigation    Periwound Protectant Drape;Skin Protectant wipes to Periwound    Dressing Options Collagen Dressing;Wound Vac    Dressing Cleansing/Solutions Not Applicable    Dressing Changed Changed    Dressing Status Clean;Dry;Intact    Dressing Change Frequency Monday, Wednesday, Friday    NEXT Dressing Change  02/10/20    NEXT Weekly Photo (Inpatient Only) 02/14/20    WOUND NURSE ONLY - Odor None    WOUND NURSE ONLY - Exposed Structures None    WOUND NURSE ONLY - Tissue Type and Percentage 100% Red         Lab Values:    Lab Results    Component Value Date/Time    WBC 12.1 (H) 02/04/2020 02:50 AM    RBC 4.64 (L) 02/04/2020 02:50 AM    HEMOGLOBIN 13.3 (L) 02/04/2020 02:50 AM    HEMATOCRIT 39.1 (L) 02/04/2020 02:50 AM      No results found for: HBA1C       INTERVENTIONS BY WOUND TEAM: Removed dressing, soaked foam with NS and lidocaine, let sit for 20 minutes. Cleansed wound with NS. No retained foam noted. Dana wound prepped with skin prep and drape.  Abbey to wound bed. Black foam placed in to wound bed. Sealed with drape and TRAC pad placed. NPWT resumed at 125 mmHg continous.     Interdisciplinary consultation: Patient, Bedside RN    EVALUATION: Abbey collagen to increase granulation tissue formation. Vac for increased granulation tissue. Consider possibly d/c vac soon and switch to hydroferablue.     Goals: Steady decrease in wound area and depth weekly.    NURSING PLAN OF CARE ORDERS (X):  Dressing changes: See Dressing Care orders: X  Skin care: See Skin Care orders: X  Rectal tube care: See Rectal Tube Care orders:        Other orders:                           WOUND TEAM PLAN OF CARE (X):   Dressing changes by wound team:          Follow up 1-2 times weekly:               Follow up 3 times weekly:                NPWT change 3 times weekly:   X  Follow up as needed:       Other (explain):     Anticipated discharge plans (X):   LTACH:        SNF/Rehab:   X               Home Care:           Outpatient Wound Center:            Self Care:            Other:

## 2020-02-07 NOTE — PROGRESS NOTES
Received report from day shift nurse.   Assumed pt care  Pt is A&Ox4, resting comfortably in bed.   Pt on r.a.. No signs of SOB/respiratory distress.  Labs noted, VSS.   Pt c/o groin and generalized pain; prn po Dilaudid 4mg given per MAR  Assessment completed; wound vac to RLQ abdomen suction at 125mmHg - no leaks found; a dressing to LLQ abd - CDI   No IV access  Fall precautions in place.   Bed at lowest position.   Call light and personal belongings within reach.   Continue to monitor

## 2020-02-07 NOTE — PROGRESS NOTES
[facility] Medicine Daily Progress Note    Date of Service  2/7/2020    Chief Complaint  54 y.o. male admitted 1/28/2020 with pain pump surgical site dehiscence    Hospital Course    54 y.o. male with a history of colon cancer status post treatment, now on chemotherapy for remission, complicated by metastases to the lung and chronic pain who uses an intrathecal pain pump, who presented 1/28/2020 with surgical wound dehiscence where his pain pump was placed. The device was removed and placed at a different site. He was treated with keflex for a total of 10 days.         Interval Problem Update  The patient continues to require wound vac care     Consultants/Specialty  Dr. Reina, pain management  Wound care    Code Status  Full    Disposition  SNF with wound VAC    Review of Systems  Review of Systems   Constitutional: Positive for malaise/fatigue. Negative for chills, diaphoresis and fever.   HENT: Negative for congestion and sore throat.    Respiratory: Negative for cough, shortness of breath, wheezing and stridor.    Cardiovascular: Negative for chest pain, palpitations and leg swelling.   Gastrointestinal: Positive for constipation. Negative for abdominal pain, blood in stool, diarrhea and nausea.        Abdominal pain minimal with vac change today   Genitourinary: Negative for dysuria and frequency.   Musculoskeletal: Positive for back pain. Negative for myalgias.        Chronic pain   Skin: Negative for itching.   Neurological: Positive for weakness. Negative for tremors and headaches.   Psychiatric/Behavioral: Negative for depression. The patient does not have insomnia.         Physical Exam  Temp:  [36.3 °C (97.3 °F)-36.9 °C (98.5 °F)] 36.6 °C (97.9 °F)  Pulse:  [] 100  Resp:  [18] 18  BP: (114-121)/(74-82) 115/77  SpO2:  [93 %-95 %] 94 %    Physical Exam  Vitals signs and nursing note reviewed.   Constitutional:       Appearance: He is cachectic. He is not ill-appearing or diaphoretic.   HENT:      Nose:  No rhinorrhea.      Mouth/Throat:      Mouth: Mucous membranes are moist.      Pharynx: No oropharyngeal exudate.   Eyes:      General: No scleral icterus.     Conjunctiva/sclera: Conjunctivae normal.   Neck:      Musculoskeletal: Neck supple. No neck rigidity.   Cardiovascular:      Rate and Rhythm: Regular rhythm.      Heart sounds: Normal heart sounds. No murmur.   Pulmonary:      Effort: Pulmonary effort is normal. No respiratory distress.      Breath sounds: No wheezing or rhonchi.   Abdominal:      General: Abdomen is flat.      Tenderness: There is no tenderness.      Comments: Wound vac in place   Musculoskeletal:         General: No swelling or tenderness.   Skin:     General: Skin is warm and dry.      Coloration: Skin is pale. Skin is not jaundiced.      Findings: No bruising or erythema.   Neurological:      General: No focal deficit present.      Mental Status: He is alert and oriented to person, place, and time.      Motor: No weakness.   Psychiatric:         Mood and Affect: Mood normal.         Behavior: Behavior normal.         Thought Content: Thought content normal.         Fluids    Intake/Output Summary (Last 24 hours) at 2/7/2020 1337  Last data filed at 2/7/2020 1022  Gross per 24 hour   Intake 600 ml   Output 4100 ml   Net -3500 ml       Laboratory      Recent Labs     02/06/20  0312   SODIUM 135   POTASSIUM 4.2   CHLORIDE 96   CO2 27   GLUCOSE 98   BUN 15   CREATININE 0.91   CALCIUM 9.3                   Imaging  CT-CHEST,ABDOMEN,PELVIS WITH   Final Result      1.  Multiple pulmonary metastases, increased since outside CT chest 7/23/2019      2.  Left retroperitoneal mass is probably unchanged and produces severe obstruction of the left proximal ureter with associated cortical thinning of the left kidney      3.  No other metastatic disease identified      4.  Marked hepatomegaly      5.  Increase in expected amount of stool within the colon      DX-CHEST-PORTABLE (1 VIEW)   Final Result       No focal consolidation or pleural effusions.           Assessment/Plan  Metastatic Colon cancer (HCC)  He was diagnosed in 2016, treated with resection and chemotherapy.  He had recurrence in August 2018, found to have metastases to the lung.  He has been on chemotherapy however, this was held in preparation for his pain pump replacement procedure  Continue to hold chemo for now due to open wound per Dr. Urbina  Continue prednisone    Essential hypertension  Continue Cozaar    Chronic, continuous use of opioids  Pain is controlled with assistance from his pain management physician, Dr. Reina.  Continue p.o. Dilaudid 4 mg every 4 hours  He has a pain pump with Dilaudid 8 mg/day and ketamine, he has additional boluses  Continue miralax for constipation      Infection of superficial incisional surgical site after procedure  He had infection of his surgical site where his right-sided pain pump was and had dehiscence of his wound.  On 1/28 he had debridement of this location and his pain pump removed and changed to the other side.  He has a right-sided wound VAC in place, wound healing well, continue care  pending SNF placement, discussed with wife over the phone  yahir completed 2/6    COPD (chronic obstructive pulmonary disease) (HCC)  No need for supplemental oxygen, continue prednisone low dose every 48 hours    Leukocytosis  Infection clearing clinically  Likely due to chronic steroids    Colon neoplasm  I discussed the plan with his oncologist Dr. Urbina  Increase in lung mets seen on CT scan, patient to follow up after discharge        VTE prophylaxis: Lovenox

## 2020-02-08 PROCEDURE — A9270 NON-COVERED ITEM OR SERVICE: HCPCS | Performed by: INTERNAL MEDICINE

## 2020-02-08 PROCEDURE — 99232 SBSQ HOSP IP/OBS MODERATE 35: CPT | Performed by: INTERNAL MEDICINE

## 2020-02-08 PROCEDURE — 770006 HCHG ROOM/CARE - MED/SURG/GYN SEMI*

## 2020-02-08 PROCEDURE — 700102 HCHG RX REV CODE 250 W/ 637 OVERRIDE(OP): Performed by: INTERNAL MEDICINE

## 2020-02-08 RX ORDER — TAMSULOSIN HYDROCHLORIDE 0.4 MG/1
0.8 CAPSULE ORAL
Status: DISCONTINUED | OUTPATIENT
Start: 2020-02-09 | End: 2020-02-10

## 2020-02-08 RX ORDER — TAMSULOSIN HYDROCHLORIDE 0.4 MG/1
0.4 CAPSULE ORAL ONCE
Status: COMPLETED | OUTPATIENT
Start: 2020-02-08 | End: 2020-02-08

## 2020-02-08 RX ADMIN — HYDROMORPHONE HYDROCHLORIDE 4 MG: 2 TABLET ORAL at 16:20

## 2020-02-08 RX ADMIN — CYCLOBENZAPRINE HYDROCHLORIDE 10 MG: 10 TABLET, FILM COATED ORAL at 22:44

## 2020-02-08 RX ADMIN — OMEPRAZOLE 20 MG: 20 CAPSULE, DELAYED RELEASE ORAL at 05:48

## 2020-02-08 RX ADMIN — TAMSULOSIN HYDROCHLORIDE 0.4 MG: 0.4 CAPSULE ORAL at 08:12

## 2020-02-08 RX ADMIN — HYDROMORPHONE HYDROCHLORIDE 4 MG: 2 TABLET ORAL at 11:59

## 2020-02-08 RX ADMIN — HYDROMORPHONE HYDROCHLORIDE 4 MG: 2 TABLET ORAL at 00:02

## 2020-02-08 RX ADMIN — CYCLOBENZAPRINE HYDROCHLORIDE 10 MG: 10 TABLET, FILM COATED ORAL at 00:40

## 2020-02-08 RX ADMIN — CYCLOBENZAPRINE HYDROCHLORIDE 10 MG: 10 TABLET, FILM COATED ORAL at 07:03

## 2020-02-08 RX ADMIN — POLYETHYLENE GLYCOL (3350) 1 PACKET: 17 POWDER, FOR SOLUTION ORAL at 05:49

## 2020-02-08 RX ADMIN — HYDROMORPHONE HYDROCHLORIDE 4 MG: 2 TABLET ORAL at 20:27

## 2020-02-08 RX ADMIN — TAMSULOSIN HYDROCHLORIDE 0.4 MG: 0.4 CAPSULE ORAL at 17:47

## 2020-02-08 RX ADMIN — HYDROMORPHONE HYDROCHLORIDE 4 MG: 2 TABLET ORAL at 08:13

## 2020-02-08 RX ADMIN — PROMETHAZINE HYDROCHLORIDE 50 MG: 25 TABLET ORAL at 00:06

## 2020-02-08 RX ADMIN — HYDROMORPHONE HYDROCHLORIDE 4 MG: 2 TABLET ORAL at 04:04

## 2020-02-08 RX ADMIN — SENNOSIDES AND DOCUSATE SODIUM 2 TABLET: 8.6; 5 TABLET ORAL at 18:00

## 2020-02-08 RX ADMIN — LOSARTAN POTASSIUM 50 MG: 25 TABLET ORAL at 05:49

## 2020-02-08 ASSESSMENT — ENCOUNTER SYMPTOMS
FEVER: 0
BACK PAIN: 1
DIAPHORESIS: 0
INSOMNIA: 0
WEAKNESS: 0
NAUSEA: 0
HEADACHES: 0
CONSTIPATION: 0
COUGH: 0
DEPRESSION: 0
ABDOMINAL PAIN: 0
TINGLING: 0
ORTHOPNEA: 0
BLOOD IN STOOL: 0
STRIDOR: 0
MYALGIAS: 0
SHORTNESS OF BREATH: 0
TREMORS: 0
SORE THROAT: 0

## 2020-02-08 NOTE — CARE PLAN
Problem: Pain Management  Goal: Pain level will decrease to patient's comfort goal  Outcome: PROGRESSING SLOWER THAN EXPECTED  Intervention: Follow pain managment plan developed in collaboration with patient and Interdisciplinary Team  Note:   Pt medicated with Dilaudid and Flexeril for pain with improvement in comfort.     Problem: Skin Integrity  Goal: Risk for impaired skin integrity will decrease  Outcome: PROGRESSING AS EXPECTED  Intervention: Assess risk factors for impaired skin integrity and/or pressure ulcers  Note:   Wound vac in place to the RLQ. Dressing to LLQ clean, dry, and in tack. Dressing to the back came off in bed - sutures in place and no surrounding erythema or edema, dressing replaced.

## 2020-02-08 NOTE — PROGRESS NOTES
Hospital Medicine Daily Progress Note    Date of Service  2/8/2020    Chief Complaint  54 y.o. male admitted 1/28/2020 with pain pump surgical site dehiscence    Hospital Course    54 y.o. male with a history of colon cancer status post treatment, now on chemotherapy for remission, complicated by metastases to the lung and chronic pain who uses an intrathecal pain pump, who presented 1/28/2020 with surgical wound dehiscence where his pain pump was placed. The device was removed and placed at a different site. He was treated with keflex for a total of 10 days.         Interval Problem Update  The patient's wound is healing well, his pain is well controlled     Consultants/Specialty  Dr. Reina, pain management  Wound care    Code Status  Full    Disposition  Home with wound care    Review of Systems  Review of Systems   Constitutional: Negative for diaphoresis, fever and malaise/fatigue.   HENT: Negative for congestion and sore throat.    Respiratory: Negative for cough, shortness of breath and stridor.    Cardiovascular: Negative for chest pain, orthopnea and leg swelling.   Gastrointestinal: Negative for abdominal pain, blood in stool, constipation and nausea.        Abdominal pain minimal with vac changes   Genitourinary: Negative for dysuria, frequency and urgency.   Musculoskeletal: Positive for back pain. Negative for myalgias.        Chronic pain   Skin: Negative for itching and rash.   Neurological: Negative for tingling, tremors, weakness and headaches.   Psychiatric/Behavioral: Negative for depression. The patient does not have insomnia.         Physical Exam  Temp:  [36.5 °C (97.7 °F)-36.7 °C (98.1 °F)] 36.6 °C (97.9 °F)  Pulse:  [] 87  Resp:  [16-20] 16  BP: (103-123)/(74-80) 121/77  SpO2:  [92 %-94 %] 92 %    Physical Exam  Vitals signs and nursing note reviewed.   Constitutional:       Appearance: He is cachectic. He is not diaphoretic.   HENT:      Nose: No congestion or rhinorrhea.       Mouth/Throat:      Pharynx: No oropharyngeal exudate or posterior oropharyngeal erythema.   Eyes:      General: No scleral icterus.     Conjunctiva/sclera: Conjunctivae normal.   Neck:      Musculoskeletal: Neck supple. No neck rigidity.   Cardiovascular:      Rate and Rhythm: Regular rhythm.      Heart sounds: Normal heart sounds. No murmur.   Pulmonary:      Effort: Pulmonary effort is normal. No respiratory distress.      Breath sounds: No wheezing or rhonchi.   Abdominal:      General: Abdomen is flat. There is no distension.      Tenderness: There is no tenderness.      Comments: Wound vac in place   Musculoskeletal:         General: No swelling or tenderness.   Skin:     General: Skin is warm and dry.      Coloration: Skin is pale.      Findings: No bruising.   Neurological:      General: No focal deficit present.      Mental Status: He is alert and oriented to person, place, and time.      Motor: No weakness.      Coordination: Coordination normal.   Psychiatric:         Mood and Affect: Mood normal.         Behavior: Behavior normal.         Thought Content: Thought content normal.         Fluids    Intake/Output Summary (Last 24 hours) at 2/8/2020 1402  Last data filed at 2/8/2020 1300  Gross per 24 hour   Intake 1430 ml   Output 2200 ml   Net -770 ml       Laboratory      Recent Labs     02/06/20  0312   SODIUM 135   POTASSIUM 4.2   CHLORIDE 96   CO2 27   GLUCOSE 98   BUN 15   CREATININE 0.91   CALCIUM 9.3                   Imaging  CT-CHEST,ABDOMEN,PELVIS WITH   Final Result      1.  Multiple pulmonary metastases, increased since outside CT chest 7/23/2019      2.  Left retroperitoneal mass is probably unchanged and produces severe obstruction of the left proximal ureter with associated cortical thinning of the left kidney      3.  No other metastatic disease identified      4.  Marked hepatomegaly      5.  Increase in expected amount of stool within the colon      DX-CHEST-PORTABLE (1 VIEW)   Final  Result      No focal consolidation or pleural effusions.           Assessment/Plan  Metastatic Colon cancer (HCC)  He was diagnosed in 2016, treated with resection and chemotherapy.  He had recurrence in August 2018, found to have metastases to the lung.  He has been on chemotherapy however, this was held in preparation for his pain pump replacement procedure  Continue to hold chemo for now due to open wound per Dr. Urbina and follow up after discharge  Check am labs to monitor renal function after contrast and anemia    Essential hypertension  Continue Cozaar    Chronic, continuous use of opioids  Pain is controlled with assistance from his pain management physician, Dr. Reina.  Continue p.o. Dilaudid 4 mg every 4 hours  He has a pain pump with Dilaudid 8 mg/day and ketamine, he has additional boluses  Pain is controlled so that patient is able to ambulate independently      Infection of superficial incisional surgical site after procedure  He had infection of his surgical site where his right-sided pain pump was and had dehiscence of his wound.  On 1/28 he had debridement of this location and his pain pump removed and changed to the other side.  Right side wound is healing well, now mostly superficial, possible discontinuation of wound vac Monday, if so will discharge home  kelfex completed 2/6    COPD (chronic obstructive pulmonary disease) (HCC)  No need for supplemental oxygen, continue prednisone low dose every 48 hours    Leukocytosis  Infection clearing clinically  due to chronic steroids    Colon neoplasm  I discussed the plan with his oncologist Dr. Urbina, he did review the CT scan as well  Increase in lung mets seen on CT scan, patient to follow up after discharge        VTE prophylaxis: Lovenox

## 2020-02-09 LAB
ANION GAP SERPL CALC-SCNC: 12 MMOL/L (ref 0–11.9)
BASOPHILS # BLD AUTO: 0.8 % (ref 0–1.8)
BASOPHILS # BLD: 0.08 K/UL (ref 0–0.12)
BUN SERPL-MCNC: 15 MG/DL (ref 8–22)
CALCIUM SERPL-MCNC: 9.2 MG/DL (ref 8.4–10.2)
CHLORIDE SERPL-SCNC: 98 MMOL/L (ref 96–112)
CO2 SERPL-SCNC: 25 MMOL/L (ref 20–33)
CREAT SERPL-MCNC: 0.88 MG/DL (ref 0.5–1.4)
EOSINOPHIL # BLD AUTO: 0.4 K/UL (ref 0–0.51)
EOSINOPHIL NFR BLD: 4 % (ref 0–6.9)
ERYTHROCYTE [DISTWIDTH] IN BLOOD BY AUTOMATED COUNT: 39.9 FL (ref 35.9–50)
GLUCOSE SERPL-MCNC: 122 MG/DL (ref 65–99)
HCT VFR BLD AUTO: 35.6 % (ref 42–52)
HGB BLD-MCNC: 11.3 G/DL (ref 14–18)
IMM GRANULOCYTES # BLD AUTO: 0.03 K/UL (ref 0–0.11)
IMM GRANULOCYTES NFR BLD AUTO: 0.3 % (ref 0–0.9)
LYMPHOCYTES # BLD AUTO: 1.52 K/UL (ref 1–4.8)
LYMPHOCYTES NFR BLD: 15.2 % (ref 22–41)
MCH RBC QN AUTO: 27.4 PG (ref 27–33)
MCHC RBC AUTO-ENTMCNC: 31.7 G/DL (ref 33.7–35.3)
MCV RBC AUTO: 86.2 FL (ref 81.4–97.8)
MONOCYTES # BLD AUTO: 0.96 K/UL (ref 0–0.85)
MONOCYTES NFR BLD AUTO: 9.6 % (ref 0–13.4)
NEUTROPHILS # BLD AUTO: 6.99 K/UL (ref 1.82–7.42)
NEUTROPHILS NFR BLD: 70.1 % (ref 44–72)
NRBC # BLD AUTO: 0 K/UL
NRBC BLD-RTO: 0 /100 WBC
PLATELET # BLD AUTO: 333 K/UL (ref 164–446)
PMV BLD AUTO: 9 FL (ref 9–12.9)
POTASSIUM SERPL-SCNC: 4.3 MMOL/L (ref 3.6–5.5)
RBC # BLD AUTO: 4.13 M/UL (ref 4.7–6.1)
SODIUM SERPL-SCNC: 135 MMOL/L (ref 135–145)
WBC # BLD AUTO: 10 K/UL (ref 4.8–10.8)

## 2020-02-09 PROCEDURE — A9270 NON-COVERED ITEM OR SERVICE: HCPCS | Performed by: INTERNAL MEDICINE

## 2020-02-09 PROCEDURE — 99232 SBSQ HOSP IP/OBS MODERATE 35: CPT | Performed by: INTERNAL MEDICINE

## 2020-02-09 PROCEDURE — 85025 COMPLETE CBC W/AUTO DIFF WBC: CPT

## 2020-02-09 PROCEDURE — 700102 HCHG RX REV CODE 250 W/ 637 OVERRIDE(OP): Performed by: INTERNAL MEDICINE

## 2020-02-09 PROCEDURE — 770006 HCHG ROOM/CARE - MED/SURG/GYN SEMI*

## 2020-02-09 PROCEDURE — 80048 BASIC METABOLIC PNL TOTAL CA: CPT

## 2020-02-09 PROCEDURE — 700111 HCHG RX REV CODE 636 W/ 250 OVERRIDE (IP): Performed by: INTERNAL MEDICINE

## 2020-02-09 PROCEDURE — 36415 COLL VENOUS BLD VENIPUNCTURE: CPT

## 2020-02-09 RX ORDER — PREDNISONE 5 MG/1
5 TABLET ORAL DAILY
Status: DISCONTINUED | OUTPATIENT
Start: 2020-02-09 | End: 2020-02-10

## 2020-02-09 RX ADMIN — PREDNISONE 5 MG: 5 TABLET ORAL at 05:24

## 2020-02-09 RX ADMIN — OMEPRAZOLE 20 MG: 20 CAPSULE, DELAYED RELEASE ORAL at 05:23

## 2020-02-09 RX ADMIN — PREDNISONE 5 MG: 5 TABLET ORAL at 12:43

## 2020-02-09 RX ADMIN — LOSARTAN POTASSIUM 50 MG: 25 TABLET ORAL at 05:24

## 2020-02-09 RX ADMIN — HYDROMORPHONE HYDROCHLORIDE 4 MG: 2 TABLET ORAL at 17:59

## 2020-02-09 RX ADMIN — CYCLOBENZAPRINE HYDROCHLORIDE 10 MG: 10 TABLET, FILM COATED ORAL at 17:01

## 2020-02-09 RX ADMIN — SENNOSIDES AND DOCUSATE SODIUM 2 TABLET: 8.6; 5 TABLET ORAL at 17:00

## 2020-02-09 RX ADMIN — TRAZODONE HYDROCHLORIDE 50 MG: 50 TABLET ORAL at 22:17

## 2020-02-09 RX ADMIN — POLYETHYLENE GLYCOL (3350) 1 PACKET: 17 POWDER, FOR SOLUTION ORAL at 05:26

## 2020-02-09 RX ADMIN — TAMSULOSIN HYDROCHLORIDE 0.8 MG: 0.4 CAPSULE ORAL at 08:30

## 2020-02-09 RX ADMIN — HYDROMORPHONE HYDROCHLORIDE 4 MG: 2 TABLET ORAL at 00:26

## 2020-02-09 RX ADMIN — CYCLOBENZAPRINE HYDROCHLORIDE 10 MG: 10 TABLET, FILM COATED ORAL at 05:24

## 2020-02-09 RX ADMIN — HYDROMORPHONE HYDROCHLORIDE 4 MG: 2 TABLET ORAL at 05:24

## 2020-02-09 RX ADMIN — HYDROMORPHONE HYDROCHLORIDE 4 MG: 2 TABLET ORAL at 09:44

## 2020-02-09 RX ADMIN — HYDROMORPHONE HYDROCHLORIDE 4 MG: 2 TABLET ORAL at 22:07

## 2020-02-09 RX ADMIN — HYDROMORPHONE HYDROCHLORIDE 4 MG: 2 TABLET ORAL at 13:50

## 2020-02-09 RX ADMIN — PROMETHAZINE HYDROCHLORIDE 50 MG: 25 TABLET ORAL at 22:17

## 2020-02-09 ASSESSMENT — ENCOUNTER SYMPTOMS
TREMORS: 0
CHILLS: 0
CONSTIPATION: 0
MYALGIAS: 0
VOMITING: 0
STRIDOR: 0
BACK PAIN: 1
ABDOMINAL PAIN: 0
SHORTNESS OF BREATH: 1
BLOOD IN STOOL: 0
WHEEZING: 0
NAUSEA: 0
PALPITATIONS: 0
HEADACHES: 0
FEVER: 0
DEPRESSION: 0
WEAKNESS: 0
INSOMNIA: 0
SORE THROAT: 0
COUGH: 0

## 2020-02-09 NOTE — CARE PLAN
Problem: Pain Management  Goal: Pain level will decrease to patient's comfort goal  Outcome: PROGRESSING AS EXPECTED  Note:   Prn po Dilaudid 4mg and Flexeril given around the clock for c/o chronic pain   Will continue to medicate per MAR     Problem: Discharge Barriers/Planning  Goal: Patient's continuum of care needs will be met  Outcome: PROGRESSING AS EXPECTED  Note:   Plan to remove wound vac on Monday and pt might be discharged home afterwards

## 2020-02-09 NOTE — PROGRESS NOTES
Pt.medicated t/o the day with dilaudid po.  Wound vac in place to rlg..back camron anayaq camron montyoa.

## 2020-02-09 NOTE — PROGRESS NOTES
Received report from day shift nurse.   Assumed pt care  Pt is A&Ox4, resting comfortably in bed.   Pt on r.a.. No signs of SOB/respiratory distress.  Labs noted, VSS.   Pt c/o groin and generalized pain; prn po Dilaudid 4mg given per MAR  Assessment completed; wound vac to RLQ abdomen suction at 125mmHg - no leaks found; a dressing to LLQ abd & right-sided lower back - CDI   No IV access  Fall precautions in place.   Bed at lowest position.   Call light and personal belongings within reach.   Continue to monitor

## 2020-02-10 PROCEDURE — A9270 NON-COVERED ITEM OR SERVICE: HCPCS | Performed by: INTERNAL MEDICINE

## 2020-02-10 PROCEDURE — 700111 HCHG RX REV CODE 636 W/ 250 OVERRIDE (IP): Performed by: INTERNAL MEDICINE

## 2020-02-10 PROCEDURE — 97597 DBRDMT OPN WND 1ST 20 CM/<: CPT

## 2020-02-10 PROCEDURE — 99232 SBSQ HOSP IP/OBS MODERATE 35: CPT | Performed by: INTERNAL MEDICINE

## 2020-02-10 PROCEDURE — 700102 HCHG RX REV CODE 250 W/ 637 OVERRIDE(OP): Performed by: INTERNAL MEDICINE

## 2020-02-10 PROCEDURE — 770006 HCHG ROOM/CARE - MED/SURG/GYN SEMI*

## 2020-02-10 PROCEDURE — 700101 HCHG RX REV CODE 250: Performed by: INTERNAL MEDICINE

## 2020-02-10 RX ORDER — PREDNISONE 5 MG/1
5 TABLET ORAL DAILY
Status: DISCONTINUED | OUTPATIENT
Start: 2020-02-10 | End: 2020-02-14 | Stop reason: HOSPADM

## 2020-02-10 RX ORDER — POLYETHYLENE GLYCOL 3350 17 G/17G
1 POWDER, FOR SOLUTION ORAL
Status: DISCONTINUED | OUTPATIENT
Start: 2020-02-10 | End: 2020-02-14 | Stop reason: HOSPADM

## 2020-02-10 RX ORDER — BISACODYL 10 MG
10 SUPPOSITORY, RECTAL RECTAL
Status: DISCONTINUED | OUTPATIENT
Start: 2020-02-10 | End: 2020-02-14 | Stop reason: HOSPADM

## 2020-02-10 RX ORDER — OMEPRAZOLE 20 MG/1
20 CAPSULE, DELAYED RELEASE ORAL DAILY
Status: DISCONTINUED | OUTPATIENT
Start: 2020-02-10 | End: 2020-02-14 | Stop reason: HOSPADM

## 2020-02-10 RX ORDER — AMOXICILLIN 250 MG
2 CAPSULE ORAL 2 TIMES DAILY
Status: DISCONTINUED | OUTPATIENT
Start: 2020-02-10 | End: 2020-02-14 | Stop reason: HOSPADM

## 2020-02-10 RX ORDER — LOSARTAN POTASSIUM 25 MG/1
50 TABLET ORAL DAILY
Status: DISCONTINUED | OUTPATIENT
Start: 2020-02-10 | End: 2020-02-14 | Stop reason: HOSPADM

## 2020-02-10 RX ORDER — POLYETHYLENE GLYCOL 3350 17 G/17G
1 POWDER, FOR SOLUTION ORAL DAILY
Status: DISCONTINUED | OUTPATIENT
Start: 2020-02-10 | End: 2020-02-14 | Stop reason: HOSPADM

## 2020-02-10 RX ORDER — TAMSULOSIN HYDROCHLORIDE 0.4 MG/1
0.8 CAPSULE ORAL
Status: DISCONTINUED | OUTPATIENT
Start: 2020-02-10 | End: 2020-02-11

## 2020-02-10 RX ADMIN — PREDNISONE 5 MG: 5 TABLET ORAL at 09:56

## 2020-02-10 RX ADMIN — SENNOSIDES AND DOCUSATE SODIUM 2 TABLET: 8.6; 5 TABLET ORAL at 09:55

## 2020-02-10 RX ADMIN — LIDOCAINE HYDROCHLORIDE 20 ML: 20 INJECTION, SOLUTION INFILTRATION; PERINEURAL at 07:49

## 2020-02-10 RX ADMIN — CYCLOBENZAPRINE HYDROCHLORIDE 10 MG: 10 TABLET, FILM COATED ORAL at 20:50

## 2020-02-10 RX ADMIN — SENNOSIDES AND DOCUSATE SODIUM 2 TABLET: 8.6; 5 TABLET ORAL at 18:14

## 2020-02-10 RX ADMIN — POLYETHYLENE GLYCOL (3350) 1 PACKET: 17 POWDER, FOR SOLUTION ORAL at 14:21

## 2020-02-10 RX ADMIN — HYDROMORPHONE HYDROCHLORIDE 4 MG: 2 TABLET ORAL at 06:09

## 2020-02-10 RX ADMIN — ONDANSETRON 8 MG: 4 TABLET, ORALLY DISINTEGRATING ORAL at 07:04

## 2020-02-10 RX ADMIN — OMEPRAZOLE 20 MG: 20 CAPSULE, DELAYED RELEASE ORAL at 09:56

## 2020-02-10 RX ADMIN — POLYETHYLENE GLYCOL (3350) 1 PACKET: 17 POWDER, FOR SOLUTION ORAL at 09:51

## 2020-02-10 RX ADMIN — TRAZODONE HYDROCHLORIDE 50 MG: 50 TABLET ORAL at 20:50

## 2020-02-10 RX ADMIN — TAMSULOSIN HYDROCHLORIDE 0.8 MG: 0.4 CAPSULE ORAL at 09:52

## 2020-02-10 RX ADMIN — CYCLOBENZAPRINE HYDROCHLORIDE 10 MG: 10 TABLET, FILM COATED ORAL at 01:42

## 2020-02-10 RX ADMIN — HYDROMORPHONE HYDROCHLORIDE 4 MG: 2 TABLET ORAL at 22:31

## 2020-02-10 RX ADMIN — HYDROMORPHONE HYDROCHLORIDE 4 MG: 2 TABLET ORAL at 18:14

## 2020-02-10 RX ADMIN — HYDROMORPHONE HYDROCHLORIDE 4 MG: 2 TABLET ORAL at 14:19

## 2020-02-10 RX ADMIN — HYDROMORPHONE HYDROCHLORIDE 4 MG: 2 TABLET ORAL at 09:55

## 2020-02-10 RX ADMIN — PROMETHAZINE HYDROCHLORIDE 50 MG: 25 TABLET ORAL at 06:10

## 2020-02-10 RX ADMIN — HYDROMORPHONE HYDROCHLORIDE 4 MG: 2 TABLET ORAL at 02:09

## 2020-02-10 RX ADMIN — LOSARTAN POTASSIUM 50 MG: 25 TABLET ORAL at 09:53

## 2020-02-10 RX ADMIN — ONDANSETRON 8 MG: 4 TABLET, ORALLY DISINTEGRATING ORAL at 20:50

## 2020-02-10 ASSESSMENT — ENCOUNTER SYMPTOMS
SHORTNESS OF BREATH: 1
TINGLING: 0
MYALGIAS: 0
SORE THROAT: 0
WHEEZING: 0
FEVER: 0
WEAKNESS: 0
CONSTIPATION: 0
ORTHOPNEA: 0
HEADACHES: 0
NAUSEA: 0
VOMITING: 0
COUGH: 0
BACK PAIN: 1
ABDOMINAL PAIN: 0
STRIDOR: 0
DEPRESSION: 0
DIAPHORESIS: 0
SPUTUM PRODUCTION: 0
INSOMNIA: 0
TREMORS: 0
PALPITATIONS: 0

## 2020-02-10 NOTE — PROGRESS NOTES
"Received report from AQUILES Chandler. Assumed care of patient. Patient is currently walking in patient room. Patient steady on his feet with no N/V or dizziness reported. Patient \"comfortable\" at this time. Patient has baseline tachycardia but all other VSS. patient A&Ox4. Dressings to abdomen viewed. Dressings CDI and CMS intact. Wound vac intact with no new drainage. Plan of care reviewed. No additional needs requested. Call light within reach and rounding in place.   "

## 2020-02-10 NOTE — CARE PLAN
Problem: Pain Management  Goal: Pain level will decrease to patient's comfort goal  Outcome: PROGRESSING SLOWER THAN EXPECTED   Patient pain rated as a 10/10. See Mar for medications given. Educated patient on how combinations of medications may be dangerous if given in combination. Patient verbalized understanding. Medications given and staggered. Will continue to monitor pain management and will promote comfort at this time.    Problem: Respiratory:  Goal: Respiratory status will improve  Outcome: PROGRESSING AS EXPECTED   VSS but respirations shallow and diminished in the lower lobes. No SOB or chest pain noted at this time. Will continue to monitor respiratory effort at this time.

## 2020-02-10 NOTE — PROGRESS NOTES
[facility] Medicine Daily Progress Note    Date of Service  2/9/2020    Chief Complaint  54 y.o. male admitted 1/28/2020 with pain pump surgical site dehiscence    Hospital Course    54 y.o. male with a history of colon cancer status post treatment, now on chemotherapy for remission, complicated by metastases to the lung and chronic pain who uses an intrathecal pain pump, who presented 1/28/2020 with surgical wound dehiscence where his pain pump was placed. The device was removed and placed at a different site. He was treated with keflex for a total of 10 days.         Interval Problem Update  The patient reports some increased shortness of breath on prednisone every other day     Consultants/Specialty  Dr. Reina, pain management  Wound care    Code Status  Full    Disposition  Home with wound care    Review of Systems  Review of Systems   Constitutional: Negative for chills and fever.   HENT: Negative for congestion and sore throat.    Respiratory: Positive for shortness of breath. Negative for cough, wheezing and stridor.         Short of breath with movement, slightly worse than baseline   Cardiovascular: Negative for chest pain, palpitations and leg swelling.   Gastrointestinal: Negative for abdominal pain, blood in stool, constipation, nausea and vomiting.        Abdominal pain minimal with vac changes   Genitourinary: Negative for dysuria and frequency.   Musculoskeletal: Positive for back pain. Negative for myalgias.        Chronic pain   Skin: Negative for itching.   Neurological: Negative for tremors, weakness and headaches.   Psychiatric/Behavioral: Negative for depression. The patient does not have insomnia.         Physical Exam  Temp:  [36.5 °C (97.7 °F)-36.8 °C (98.2 °F)] 36.5 °C (97.7 °F)  Pulse:  [87-98] 96  Resp:  [16-20] 18  BP: (107-120)/(74-81) 119/79  SpO2:  [93 %-97 %] 93 %    Physical Exam  Vitals signs and nursing note reviewed.   Constitutional:       Appearance: He is cachectic. He is not  diaphoretic.   HENT:      Nose: No rhinorrhea.      Mouth/Throat:      Mouth: Mucous membranes are moist.      Pharynx: No oropharyngeal exudate or posterior oropharyngeal erythema.   Eyes:      General: No scleral icterus.        Right eye: No discharge.         Left eye: No discharge.   Neck:      Musculoskeletal: Neck supple. No neck rigidity.   Cardiovascular:      Rate and Rhythm: Regular rhythm. Tachycardia present.      Heart sounds: Normal heart sounds.   Pulmonary:      Effort: Pulmonary effort is normal. No respiratory distress.      Breath sounds: No wheezing or rhonchi.   Abdominal:      General: There is no distension.      Palpations: Abdomen is soft.      Tenderness: There is no tenderness.      Comments: Wound vac in place   Musculoskeletal:         General: No swelling or tenderness.   Skin:     General: Skin is dry.      Coloration: Skin is pale. Skin is not jaundiced.      Findings: No bruising.   Neurological:      Mental Status: He is alert and oriented to person, place, and time.      Motor: No weakness.      Coordination: Coordination normal.   Psychiatric:         Mood and Affect: Mood normal.         Behavior: Behavior normal.         Thought Content: Thought content normal.         Fluids    Intake/Output Summary (Last 24 hours) at 2/9/2020 1646  Last data filed at 2/9/2020 1026  Gross per 24 hour   Intake 930 ml   Output 1800 ml   Net -870 ml       Laboratory  Recent Labs     02/09/20  0326   WBC 10.0   RBC 4.13*   HEMOGLOBIN 11.3*   HEMATOCRIT 35.6*   MCV 86.2   MCH 27.4   MCHC 31.7*   RDW 39.9   PLATELETCT 333   MPV 9.0     Recent Labs     02/09/20  0326   SODIUM 135   POTASSIUM 4.3   CHLORIDE 98   CO2 25   GLUCOSE 122*   BUN 15   CREATININE 0.88   CALCIUM 9.2                   Imaging  CT-CHEST,ABDOMEN,PELVIS WITH   Final Result      1.  Multiple pulmonary metastases, increased since outside CT chest 7/23/2019      2.  Left retroperitoneal mass is probably unchanged and produces severe  obstruction of the left proximal ureter with associated cortical thinning of the left kidney      3.  No other metastatic disease identified      4.  Marked hepatomegaly      5.  Increase in expected amount of stool within the colon      DX-CHEST-PORTABLE (1 VIEW)   Final Result      No focal consolidation or pleural effusions.           Assessment/Plan  Metastatic Colon cancer (HCC)  He was diagnosed in 2016, treated with resection and chemotherapy.  He had recurrence in August 2018, found to have metastases to the lung.  He has been on chemotherapy however, this was held in preparation for his pain pump replacement procedure  Continue to hold chemo for now due to open wound per Dr. Urbina and follow up after discharge  Anemia stable on labs, creatinine remains at his baseline    Essential hypertension  Continue Cozaar    Chronic, continuous use of opioids  Pain is controlled with assistance from his pain management physician, Dr. Reina.  Continue p.o. Dilaudid 4 mg every 4 hours  He has a pain pump with Dilaudid 8 mg/day and ketamine, he has additional boluses  patient is able to ambulate independently      Infection of superficial incisional surgical site after procedure  He had infection of his surgical site where his right-sided pain pump was and had dehiscence of his wound.  On 1/28 he had debridement of this location and his pain pump removed and changed to the other side.  Right side wound is healing well, now mostly superficial, possible discontinuation of wound vac Monday, if so will discharge home once vac is removed  keflex completed 2/6    COPD (chronic obstructive pulmonary disease) (HCC)  No need for supplemental oxygen,   The patient noticed increased work of breathing with movement, will increase prednisone to 5mg daily    Leukocytosis  Infection clearing clinically  due to chronic steroids    Colon neoplasm  I discussed the plan with his oncologist Dr. Urbina, he did review the CT scan as  well  Increase in lung mets seen on CT scan, patient to follow up after discharge        VTE prophylaxis: Lovenox

## 2020-02-10 NOTE — WOUND TEAM
"[facility]  Inpatient Services  Wound and Skin Care Progress Note    Admission Date:  1/28/2020   HPI, PMH, SH: Reviewed  Unit where seen by Wound Team: 2210/02    WOUND TEAM FOLLOW UP: NPWT Change    SUBJECTIVE:  \"Okay.\"    Self Report / Pain Level: c/o pain with drsg change      OBJECTIVE: drsg intact, pt already medicated dozing  WOUND TYPE, LOCATION, CHARACTERISTICS (Pressure ulcers: location, stage, POA or date identified)  Negative Pressure Wound Therapy Abdomen Right;Lower;Quadrant (Active)   NPWT Pump Mode / Pressure Setting 125 mmHg    Dressing Type Black foam    Number of Foam Pieces Used 1    Canister Changed No    Output (mL) 0 mL      Wound 01/28/20 Incision Abdomen wound vac, previous pain pump site - infected (Active)   Wound Image   2/7/2020  2:00 PM   Site Assessment Red/yellow    Dana-wound Assessment Pink    Margins Defined    Wound Length (cm) 1 cm 2/7/2020  2:00 PM   Wound Width (cm) 6 cm 2/7/2020  2:00 PM   Wound Depth (cm) 1 cm 2/7/2020  2:00 PM   Wound Surface Area (cm^2) 6 cm^2 2/7/2020  2:00 PM   Tunneling 0 cm 2/7/2020  2:00 PM   Undermining 0 cm 2/7/2020  2:00 PM   Closure Secondary intention    Drainage Amount Small    Drainage Description Sanguineous    Non-staged Wound Description Full thickness    Treatments Cleansed;Site care    Cleansing Normal Saline Irrigation    Periwound Protectant Drape;Skin Protectant wipes to Periwound    Dressing Options Wound Vac    Dressing Cleansing/Solutions Not Applicable    Dressing Changed Other (Comment)    Dressing Status Clean;Dry;Intact    Dressing Change Frequency Monday, Wednesday, Friday    NEXT Dressing Change  02/12/20    NEXT Weekly Photo (Inpatient Only) 02/14/20    Odor None     Exposed Structures None     Tissue Type and Percentage 98% red 2% yellow        Vascular:  Wounds not r/t vascular problem    Lab Values:    Lab Results   Component Value Date/Time    WBC 10.0 02/09/2020 03:26 AM    RBC 4.13 (L) 02/09/2020 03:26 AM "    HEMOGLOBIN 11.3 (L) 02/09/2020 03:26 AM    HEMATOCRIT 35.6 (L) 02/09/2020 03:26 AM                No results found for: HBA1C        Culture:  na    INTERVENTIONS BY WOUND TEAM: Instilled lidocaine into drsg let soak. Removed drsg,1 piece of foam. Cleaned wound with NS, dried. Using forceps and scissors removed small areas of yellow tissue revealing undermined distal edges.  No bleeding or additional pain. Cleaned wound again. No Sting and drape to whitney-wound. Moistened Abbey into wound bed, placed one piece of black foam into wound with a button. Trac pad placed. NPWT resumed @ 125 mmHg continuous.   Abdomen-Dressing Options: Wound Vac      Interdisciplinary consultation:   With Nursing;With Patient;With Physician    EVALUATION: Wound has improved since last week. Discussion about possibility of removing VAC drsg, but not today since there is 0.5 cm undermining distal edge.    Goals:  Steady decrease in wound area and depth weekly    NURSING PLAN OF CARE:    Dressing changes: Continue previous Dressing Maintenance orders:    x    See new Dressing Maintenance orders:       Skin care: See Skin Care orders:        Rectal tube care: See Rectal Tube Care orders:      Other orders:           WOUND TEAM PLAN OF CARE (X):   NPWT change 3 x week: x       Dressing changes:       Follow up as needed:       Other:    Anticipated discharge plans (X):  SNF:           Home Care:           Outpatient Wound Center:            Self Care:            Other:  Needs wound care

## 2020-02-10 NOTE — PROGRESS NOTES
Received report from Candie KWAN. Assumed care. This pt is AOx4,   reports pain and nausea, will medicate per MAR. Patient and RN discussed plan of care including pain management, nausea management, possible SNF placement today, wound care: questions answered. Chart reviewed. Call light in place, fall precautions in place, patient educated on importance of calling for assistance. No additional needs at this time.

## 2020-02-10 NOTE — CARE PLAN
Problem: Pain Management  Goal: Pain level will decrease to patient's comfort goal  Outcome: PROGRESSING AS EXPECTED     Problem: Safety  Goal: Will remain free from injury  Outcome: PROGRESSING AS EXPECTED     Problem: Bowel/Gastric:  Goal: Normal bowel function is maintained or improved  Outcome: PROGRESSING AS EXPECTED

## 2020-02-10 NOTE — DISCHARGE PLANNING
Called [facility] in [place]. [facility] is able to care for wound vacs and is contracted with [facility]. SNF choice form with 1. [facility] and 2. [facility] selected faxed to [facility].

## 2020-02-10 NOTE — DISCHARGE PLANNING
Received Choice form at [phone]  Agency/Facility Name: #1 [facility] and #2 [facility]  Referral sent per Choice form @ [phone]

## 2020-02-11 PROBLEM — K59.03 DRUG-INDUCED CONSTIPATION: Status: ACTIVE | Noted: 2020-02-11

## 2020-02-11 PROCEDURE — 700102 HCHG RX REV CODE 250 W/ 637 OVERRIDE(OP): Performed by: INTERNAL MEDICINE

## 2020-02-11 PROCEDURE — A9270 NON-COVERED ITEM OR SERVICE: HCPCS | Performed by: INTERNAL MEDICINE

## 2020-02-11 PROCEDURE — 700111 HCHG RX REV CODE 636 W/ 250 OVERRIDE (IP): Performed by: INTERNAL MEDICINE

## 2020-02-11 PROCEDURE — 99233 SBSQ HOSP IP/OBS HIGH 50: CPT | Performed by: INTERNAL MEDICINE

## 2020-02-11 PROCEDURE — 770006 HCHG ROOM/CARE - MED/SURG/GYN SEMI*

## 2020-02-11 RX ORDER — TAMSULOSIN HYDROCHLORIDE 0.4 MG/1
0.4 CAPSULE ORAL 2 TIMES DAILY
Status: DISCONTINUED | OUTPATIENT
Start: 2020-02-11 | End: 2020-02-14 | Stop reason: HOSPADM

## 2020-02-11 RX ADMIN — HYDROCORTISONE: 25 CREAM TOPICAL at 23:25

## 2020-02-11 RX ADMIN — PREDNISONE 5 MG: 5 TABLET ORAL at 05:14

## 2020-02-11 RX ADMIN — OMEPRAZOLE 20 MG: 20 CAPSULE, DELAYED RELEASE ORAL at 05:14

## 2020-02-11 RX ADMIN — TAMSULOSIN HYDROCHLORIDE 0.4 MG: 0.4 CAPSULE ORAL at 19:38

## 2020-02-11 RX ADMIN — HYDROMORPHONE HYDROCHLORIDE 4 MG: 2 TABLET ORAL at 23:24

## 2020-02-11 RX ADMIN — METHYLNALTREXONE BROMIDE 12 MG: 12 INJECTION, SOLUTION SUBCUTANEOUS at 11:03

## 2020-02-11 RX ADMIN — TAMSULOSIN HYDROCHLORIDE 0.4 MG: 0.4 CAPSULE ORAL at 08:25

## 2020-02-11 RX ADMIN — HYDROMORPHONE HYDROCHLORIDE 4 MG: 2 TABLET ORAL at 19:20

## 2020-02-11 RX ADMIN — HYDROMORPHONE HYDROCHLORIDE 4 MG: 2 TABLET ORAL at 15:11

## 2020-02-11 RX ADMIN — HYDROMORPHONE HYDROCHLORIDE 4 MG: 2 TABLET ORAL at 06:35

## 2020-02-11 RX ADMIN — HYDROMORPHONE HYDROCHLORIDE 4 MG: 2 TABLET ORAL at 10:58

## 2020-02-11 RX ADMIN — HYDROMORPHONE HYDROCHLORIDE 4 MG: 2 TABLET ORAL at 02:32

## 2020-02-11 RX ADMIN — LOSARTAN POTASSIUM 50 MG: 25 TABLET ORAL at 05:14

## 2020-02-11 ASSESSMENT — ENCOUNTER SYMPTOMS
CONSTIPATION: 1
ROS GI COMMENTS: HEMORRHOID
DIARRHEA: 0
NERVOUS/ANXIOUS: 0
ABDOMINAL PAIN: 0
SENSORY CHANGE: 0
SHORTNESS OF BREATH: 0
WEAKNESS: 0
BLURRED VISION: 0
DIZZINESS: 0
HEADACHES: 0
COUGH: 0
DEPRESSION: 0
VOMITING: 0
HEARTBURN: 0
PHOTOPHOBIA: 0
SPEECH CHANGE: 0
CHILLS: 0
INSOMNIA: 0
FEVER: 0
CLAUDICATION: 0
MYALGIAS: 0

## 2020-02-11 NOTE — DISCHARGE PLANNING
Spoke to Justine at [facility]. Justine stating they should be able to accept pt and they are going to submit for insurance authorization.

## 2020-02-11 NOTE — PROGRESS NOTES
[facility] Medicine Daily Progress Note    Date of Service  2/10/2020    Chief Complaint  54 y.o. male admitted 1/28/2020 with pain pump surgical site dehiscence    Hospital Course    54 y.o. male with a history of colon cancer status post treatment, now on chemotherapy for remission, complicated by metastases to the lung and chronic pain who uses an intrathecal pain pump, who presented 1/28/2020 with surgical wound dehiscence where his pain pump was placed. The device was removed and placed at a different site. He was treated with keflex for a total of 10 days.         Interval Problem Update  The patient is ambulating steadily, wound has some undermining and requires renata and wound vac care     Consultants/Specialty  Dr. Reina, pain management  Wound care    Code Status  Full    Disposition  SNF    Review of Systems  Review of Systems   Constitutional: Negative for diaphoresis, fever and malaise/fatigue.   HENT: Negative for congestion and sore throat.    Respiratory: Positive for shortness of breath. Negative for cough, sputum production, wheezing and stridor.         Chronic shortness of breath is at his baseline   Cardiovascular: Negative for chest pain, palpitations, orthopnea and leg swelling.   Gastrointestinal: Negative for abdominal pain, constipation, nausea and vomiting.        Abdominal pain minimal with vac changes   Genitourinary: Negative for dysuria, frequency and urgency.   Musculoskeletal: Positive for back pain. Negative for myalgias.        Chronic pain   Neurological: Negative for tingling, tremors, weakness and headaches.   Psychiatric/Behavioral: Negative for depression. The patient does not have insomnia.         Physical Exam  Temp:  [36.3 °C (97.4 °F)-36.6 °C (97.8 °F)] 36.4 °C (97.5 °F)  Pulse:  [] 105  Resp:  [18-20] 18  BP: (111-128)/(60-93) 128/91  SpO2:  [93 %-95 %] 95 %    Physical Exam  Vitals signs and nursing note reviewed.   Constitutional:       Appearance: He is  cachectic. He is not diaphoretic.   HENT:      Nose: No congestion or rhinorrhea.      Mouth/Throat:      Pharynx: No oropharyngeal exudate or posterior oropharyngeal erythema.   Eyes:      General: No scleral icterus.     Conjunctiva/sclera: Conjunctivae normal.   Neck:      Musculoskeletal: Neck supple. No neck rigidity.   Cardiovascular:      Rate and Rhythm: Regular rhythm. Tachycardia present.      Heart sounds: Normal heart sounds.   Pulmonary:      Effort: Pulmonary effort is normal. No respiratory distress.      Breath sounds: No wheezing or rhonchi.   Abdominal:      General: There is no distension.      Palpations: Abdomen is soft.      Tenderness: There is no tenderness.      Comments: Wound vac in place   Musculoskeletal:         General: No swelling or tenderness.   Skin:     General: Skin is dry.      Coloration: Skin is pale. Skin is not jaundiced.      Findings: No bruising or erythema.   Neurological:      Mental Status: He is alert and oriented to person, place, and time.      Motor: No weakness.      Coordination: Coordination normal.      Gait: Gait normal.   Psychiatric:         Mood and Affect: Mood normal.         Thought Content: Thought content normal.         Fluids    Intake/Output Summary (Last 24 hours) at 2/10/2020 1613  Last data filed at 2/10/2020 1200  Gross per 24 hour   Intake 760 ml   Output 500 ml   Net 260 ml       Laboratory  Recent Labs     02/09/20  0326   WBC 10.0   RBC 4.13*   HEMOGLOBIN 11.3*   HEMATOCRIT 35.6*   MCV 86.2   MCH 27.4   MCHC 31.7*   RDW 39.9   PLATELETCT 333   MPV 9.0     Recent Labs     02/09/20  0326   SODIUM 135   POTASSIUM 4.3   CHLORIDE 98   CO2 25   GLUCOSE 122*   BUN 15   CREATININE 0.88   CALCIUM 9.2                   Imaging  CT-CHEST,ABDOMEN,PELVIS WITH   Final Result      1.  Multiple pulmonary metastases, increased since outside CT chest 7/23/2019      2.  Left retroperitoneal mass is probably unchanged and produces severe obstruction of the left  proximal ureter with associated cortical thinning of the left kidney      3.  No other metastatic disease identified      4.  Marked hepatomegaly      5.  Increase in expected amount of stool within the colon      DX-CHEST-PORTABLE (1 VIEW)   Final Result      No focal consolidation or pleural effusions.           Assessment/Plan  Metastatic Colon cancer (HCC)  He was diagnosed in 2016, treated with resection and chemotherapy.  He had recurrence in August 2018, found to have metastases to the lung.  He has been on chemotherapy however, this was held in preparation for his pain pump replacement procedure  Continue to hold chemo for now due to open wound per Dr. Urbina and follow up after discharge  Anemia stable    Essential hypertension  Continue Cozaar    Chronic, continuous use of opioids  Pain is controlled with assistance from his pain management physician, Dr. Reina.  Continue p.o. Dilaudid 4 mg every 4 hours  He has a pain pump with Dilaudid 8 mg/day and ketamine, he has additional boluses  patient ambulates steadily  independently      Infection of superficial incisional surgical site after procedure  He had infection of his surgical site where his right-sided pain pump was and had dehiscence of his wound.  On 1/28 he had debridement of this location and his pain pump removed and changed to the other side.  Right side wound is healing well, needs continued wound vac care, reviewed with wound care today    COPD (chronic obstructive pulmonary disease) (HCC)  No need for supplemental oxygen,   Continue prednisone 5mg daily, patient is on chronically and failed a taper    Leukocytosis  Infection clearing clinically  due to chronic steroids    Colon neoplasm  I discussed the plan with his oncologist Dr. Urbina, he did review the CT scan as well  Increase in lung mets seen on CT scan, patient to follow up after discharge        VTE prophylaxis: Lovenox

## 2020-02-11 NOTE — DISCHARGE PLANNING
Agency/Facility Name: [facility]  Outcome: no answer. Left VM to call CCA back.     Agency/Facility Name: [facility]  Outcome: no answer. CCA verified correct number and still no answer.

## 2020-02-11 NOTE — CARE PLAN
Problem: Pain Management  Goal: Pain level will decrease to patient's comfort goal  Outcome: PROGRESSING SLOWER THAN EXPECTED   Patient medicated per MD orders; has been taking Dilaudid tablet for breakthrough pain. Encouraged to inform RN if pain is greater than comfort level.        Problem: Skin Integrity  Goal: Risk for impaired skin integrity will decrease  Outcome: PROGRESSING AS EXPECTED   Pt seen by wound care RN and dressing changes has been done.

## 2020-02-11 NOTE — PROGRESS NOTES
Report received from NOC RN, assumed care of pt.   POC and medications reviewed with pt. Pt verbalized understanding.   AOx4  C/o soreness at this time since his pain medication.   Denies SOB, or dizziness at this time.   Safety measures in place.  Hourly rounding in place.

## 2020-02-11 NOTE — PROGRESS NOTES
Pt refusing full dose of Flomax at this time. Stating he wants one half in the morning and one half at night. Will update MD and NOC RN.

## 2020-02-11 NOTE — CARE PLAN
Problem: Pain Management  Goal: Pain level will decrease to patient's comfort goal  Outcome: PROGRESSING AS EXPECTED  Intervention: Follow pain managment plan developed in collaboration with patient and Interdisciplinary Team  Note:   PO dilaudid Q4H PRN per MAR.     Problem: Safety  Goal: Will remain free from injury  Outcome: PROGRESSING AS EXPECTED  Goal: Will remain free from falls  Outcome: PROGRESSING AS EXPECTED  Intervention: Assess risk factors for falls  Flowsheets (Taken 2/11/2020 5344)  Pt Calls for Assistance: Yes  Fall Risk: Risk to Fall -  0 - 1 point  History of fall: 0  Mobility Status Assessment: 0-Ambulates & Transfers Independently. No Assistance Required  Risk for Injury-Any positive answers results in the pt being at high risk for fall related injury: Not Applicable

## 2020-02-11 NOTE — PROGRESS NOTES
Pt AAOx4. C/o 6/10 pain in the groin, and BLE. PRN meds given per pt request. POC discussed w/ pt. Questions and concerns answered. Dressing to lower back changed, new island dressing applied. Large bruising to left trunk observed. Wound vac to right lower abd and dry gauze dressing to L lower abd present c/d/i. Safety and comfort measures in place.  No additional needs at this time.

## 2020-02-11 NOTE — DISCHARGE PLANNING
Agency/Facility Name: [facility]  Spoke To: Elizabeth  Outcome: referral not received. CCA faxed manually to [phone].

## 2020-02-12 LAB
ANION GAP SERPL CALC-SCNC: 14 MMOL/L (ref 0–11.9)
BUN SERPL-MCNC: 15 MG/DL (ref 8–22)
CALCIUM SERPL-MCNC: 9.3 MG/DL (ref 8.4–10.2)
CHLORIDE SERPL-SCNC: 99 MMOL/L (ref 96–112)
CO2 SERPL-SCNC: 25 MMOL/L (ref 20–33)
CREAT SERPL-MCNC: 0.88 MG/DL (ref 0.5–1.4)
ERYTHROCYTE [DISTWIDTH] IN BLOOD BY AUTOMATED COUNT: 40.5 FL (ref 35.9–50)
GLUCOSE SERPL-MCNC: 106 MG/DL (ref 65–99)
HCT VFR BLD AUTO: 35.6 % (ref 42–52)
HGB BLD-MCNC: 11.4 G/DL (ref 14–18)
MCH RBC QN AUTO: 27.6 PG (ref 27–33)
MCHC RBC AUTO-ENTMCNC: 32 G/DL (ref 33.7–35.3)
MCV RBC AUTO: 86.2 FL (ref 81.4–97.8)
PLATELET # BLD AUTO: 354 K/UL (ref 164–446)
PMV BLD AUTO: 8.8 FL (ref 9–12.9)
POTASSIUM SERPL-SCNC: 4.2 MMOL/L (ref 3.6–5.5)
RBC # BLD AUTO: 4.13 M/UL (ref 4.7–6.1)
SODIUM SERPL-SCNC: 138 MMOL/L (ref 135–145)
WBC # BLD AUTO: 7.4 K/UL (ref 4.8–10.8)

## 2020-02-12 PROCEDURE — 700102 HCHG RX REV CODE 250 W/ 637 OVERRIDE(OP): Performed by: INTERNAL MEDICINE

## 2020-02-12 PROCEDURE — 700111 HCHG RX REV CODE 636 W/ 250 OVERRIDE (IP): Performed by: INTERNAL MEDICINE

## 2020-02-12 PROCEDURE — A9270 NON-COVERED ITEM OR SERVICE: HCPCS | Performed by: INTERNAL MEDICINE

## 2020-02-12 PROCEDURE — 36415 COLL VENOUS BLD VENIPUNCTURE: CPT

## 2020-02-12 PROCEDURE — 97605 NEG PRS WND THER DME<=50SQCM: CPT

## 2020-02-12 PROCEDURE — 80048 BASIC METABOLIC PNL TOTAL CA: CPT

## 2020-02-12 PROCEDURE — 85027 COMPLETE CBC AUTOMATED: CPT

## 2020-02-12 PROCEDURE — 99232 SBSQ HOSP IP/OBS MODERATE 35: CPT | Performed by: INTERNAL MEDICINE

## 2020-02-12 PROCEDURE — 770006 HCHG ROOM/CARE - MED/SURG/GYN SEMI*

## 2020-02-12 RX ADMIN — HYDROMORPHONE HYDROCHLORIDE 4 MG: 2 TABLET ORAL at 16:01

## 2020-02-12 RX ADMIN — OMEPRAZOLE 20 MG: 20 CAPSULE, DELAYED RELEASE ORAL at 07:55

## 2020-02-12 RX ADMIN — POLYETHYLENE GLYCOL (3350) 1 PACKET: 17 POWDER, FOR SOLUTION ORAL at 22:14

## 2020-02-12 RX ADMIN — PREDNISONE 5 MG: 5 TABLET ORAL at 07:55

## 2020-02-12 RX ADMIN — HYDROMORPHONE HYDROCHLORIDE 4 MG: 2 TABLET ORAL at 03:38

## 2020-02-12 RX ADMIN — TRAZODONE HYDROCHLORIDE 50 MG: 50 TABLET ORAL at 22:17

## 2020-02-12 RX ADMIN — SENNOSIDES AND DOCUSATE SODIUM 2 TABLET: 8.6; 5 TABLET ORAL at 07:55

## 2020-02-12 RX ADMIN — HYDROMORPHONE HYDROCHLORIDE 4 MG: 2 TABLET ORAL at 11:57

## 2020-02-12 RX ADMIN — LOSARTAN POTASSIUM 50 MG: 25 TABLET ORAL at 07:55

## 2020-02-12 RX ADMIN — TAMSULOSIN HYDROCHLORIDE 0.4 MG: 0.4 CAPSULE ORAL at 07:54

## 2020-02-12 RX ADMIN — HYDROMORPHONE HYDROCHLORIDE 4 MG: 2 TABLET ORAL at 07:47

## 2020-02-12 RX ADMIN — HYDROMORPHONE HYDROCHLORIDE 4 MG: 2 TABLET ORAL at 20:03

## 2020-02-12 RX ADMIN — POLYETHYLENE GLYCOL (3350) 1 PACKET: 17 POWDER, FOR SOLUTION ORAL at 07:54

## 2020-02-12 RX ADMIN — PROMETHAZINE HYDROCHLORIDE 50 MG: 25 TABLET ORAL at 22:17

## 2020-02-12 RX ADMIN — SENNOSIDES AND DOCUSATE SODIUM 2 TABLET: 8.6; 5 TABLET ORAL at 17:19

## 2020-02-12 RX ADMIN — ONDANSETRON 8 MG: 4 TABLET, ORALLY DISINTEGRATING ORAL at 19:05

## 2020-02-12 RX ADMIN — TAMSULOSIN HYDROCHLORIDE 0.4 MG: 0.4 CAPSULE ORAL at 17:19

## 2020-02-12 ASSESSMENT — ENCOUNTER SYMPTOMS
ABDOMINAL PAIN: 0
DEPRESSION: 0
INSOMNIA: 0
CHILLS: 0
WEAKNESS: 0
NERVOUS/ANXIOUS: 0
CONSTIPATION: 0
COUGH: 0
SPEECH CHANGE: 0
PHOTOPHOBIA: 0
HEARTBURN: 0
SENSORY CHANGE: 0
NAUSEA: 0
ROS GI COMMENTS: HEMORRHOID
FEVER: 0
BLURRED VISION: 0
HEADACHES: 0
VOMITING: 0
CLAUDICATION: 0
MYALGIAS: 0
DIARRHEA: 0
DIZZINESS: 0
SHORTNESS OF BREATH: 0

## 2020-02-12 NOTE — PROGRESS NOTES
[facility] Medicine Daily Progress Note    Date of Service  2/12/2020    Chief Complaint  54 y.o. male admitted 1/28/2020 with open abdominal wound.    Hospital Course    Patient with history of pain pump implanted into the RLQ by Dr Reina, he presented to Dr Reina who admitted the patient for I&D and removal of pain pump on the right and implantation of device on the left.  Wound vac was placed to help facilitate wound healing from surgical dehiscence site.         Interval Problem Update  2/11 Patient states he feels okay but is having constipation and difficulty with hemorrhoid because of this.  He doesn't think our Miralax works as well as that which he has at home.  Given his large opiate requirement, he likely would respond favorably to Relistor - ordered.    2/12 Patient seen with wound care at bedside during dressing change, his wound is showing good improvement but wound vac should be continuously used to help expedite healing.  Patient agreeable with placement, awaiting Cole authorization from insurance for transfer of care.    Consultants/Specialty  Latosha  Wound care for vac management.    Code Status  full    Disposition  [facility].    Review of Systems  Review of Systems   Constitutional: Negative for chills and fever.   HENT: Negative for congestion.    Eyes: Negative for blurred vision and photophobia.   Respiratory: Negative for cough and shortness of breath.    Cardiovascular: Negative for chest pain, claudication and leg swelling.   Gastrointestinal: Negative for abdominal pain, constipation, diarrhea, heartburn, nausea and vomiting.        Hemorrhoid     Genitourinary: Negative for dysuria and hematuria.   Musculoskeletal: Negative for joint pain and myalgias.   Skin: Negative for itching and rash.   Neurological: Negative for dizziness, sensory change, speech change, weakness and headaches.   Psychiatric/Behavioral: Negative for depression. The patient is not nervous/anxious and does not  have insomnia.         Physical Exam  Temp:  [36.3 °C (97.3 °F)-36.8 °C (98.2 °F)] 36.7 °C (98 °F)  Pulse:  [84-95] 95  Resp:  [18] 18  BP: (113-123)/(78-91) 113/78  SpO2:  [90 %-93 %] 90 %    Physical Exam  Vitals signs and nursing note reviewed.   Constitutional:       General: He is not in acute distress.     Appearance: Normal appearance.   HENT:      Head: Normocephalic and atraumatic.   Eyes:      General: No scleral icterus.     Extraocular Movements: Extraocular movements intact.   Neck:      Musculoskeletal: Normal range of motion and neck supple.   Cardiovascular:      Rate and Rhythm: Normal rate and regular rhythm.      Pulses: Normal pulses.      Heart sounds: Normal heart sounds. No murmur.   Pulmonary:      Effort: Pulmonary effort is normal. No respiratory distress.      Breath sounds: Normal breath sounds. No wheezing, rhonchi or rales.   Abdominal:      General: Abdomen is flat. Bowel sounds are normal. There is no distension.      Palpations: Abdomen is soft.      Tenderness: There is no rebound.   Musculoskeletal:         General: No swelling or tenderness.   Lymphadenopathy:      Cervical: No cervical adenopathy.   Skin:     Coloration: Skin is not jaundiced.      Findings: No erythema.   Neurological:      General: No focal deficit present.      Mental Status: He is alert and oriented to person, place, and time. Mental status is at baseline.      Cranial Nerves: No cranial nerve deficit.   Psychiatric:         Mood and Affect: Mood normal.         Behavior: Behavior normal.         Fluids    Intake/Output Summary (Last 24 hours) at 2/12/2020 1257  Last data filed at 2/12/2020 0800  Gross per 24 hour   Intake 580 ml   Output 2700 ml   Net -2120 ml       Laboratory  Recent Labs     02/12/20  0421   WBC 7.4   RBC 4.13*   HEMOGLOBIN 11.4*   HEMATOCRIT 35.6*   MCV 86.2   MCH 27.6   MCHC 32.0*   RDW 40.5   PLATELETCT 354   MPV 8.8*     Recent Labs     02/12/20  0421   SODIUM 138   POTASSIUM 4.2    CHLORIDE 99   CO2 25   GLUCOSE 106*   BUN 15   CREATININE 0.88   CALCIUM 9.3                   Imaging  CT-CHEST,ABDOMEN,PELVIS WITH   Final Result      1.  Multiple pulmonary metastases, increased since outside CT chest 7/23/2019      2.  Left retroperitoneal mass is probably unchanged and produces severe obstruction of the left proximal ureter with associated cortical thinning of the left kidney      3.  No other metastatic disease identified      4.  Marked hepatomegaly      5.  Increase in expected amount of stool within the colon      DX-CHEST-PORTABLE (1 VIEW)   Final Result      No focal consolidation or pleural effusions.           Assessment/Plan  * Infection of superficial incisional surgical site after procedure- (present on admission)  Assessment & Plan  He had infection of his surgical site where his right-sided pain pump was and had dehiscence of his wound.  On 1/28 he had debridement of this location and his pain pump removed and changed to the other side.  Right side wound is healing well, needs continued wound vac care, reviewed with wound care today    Metastatic Colon cancer (HCC)- (present on admission)  Assessment & Plan  He was diagnosed in 2016, treated with resection and chemotherapy.  He had recurrence in August 2018, found to have metastases to the lung.  He has been on chemotherapy however, this was held in preparation for his pain pump replacement procedure  Continue to hold chemo for now due to open wound per Dr. Urbina and follow up after discharge  Anemia stable    Colon neoplasm- (present on admission)  Assessment & Plan  Follow up after discharge with Dr. Urbina,  Increase in lung mets seen on CT scan      Drug-induced constipation  Assessment & Plan  Likely secondary to opiates  Relistor ordered     Leukocytosis- (present on admission)  Assessment & Plan  Infection clearing clinically      Chronic, continuous use of opioids- (present on admission)  Assessment & Plan  Pain is  controlled with assistance from his pain management physician, Dr. Reina.  Continue p.o. Dilaudid 4 mg every 4 hours  He has a pain pump with Dilaudid 8 mg/day and ketamine, he has additional boluses  patient ambulates steadily  independently      Essential hypertension- (present on admission)  Assessment & Plan  Continue Cozaar    COPD (chronic obstructive pulmonary disease) (HCC)- (present on admission)  Assessment & Plan  No need for supplemental oxygen,   Continue prednisone 5mg daily, patient is on chronically and failed a taper       VTE prophylaxis: ambulatory

## 2020-02-12 NOTE — PROGRESS NOTES
Report received from AQUILES Fine.     Pt AAOx4. C/o generalized pain 9/10. Due and PRN  meds given. Flomax was given tonight per pt request. Reports nausea has been better so far, and he has had a good BM today. POC discussed w/ pt and pt requesting to rescheduled her early morning med to be taken after breakfast. Dressing (wound vac) to right abd, left abd (dry gauze w/ transparent tape), and lower back (island dressing) are clean, dry, intact. Safety and comfort measures in place. No additional needs at this time.

## 2020-02-12 NOTE — WOUND TEAM
[facility]  Inpatient Services  Wound and Skin Care Preogress note    Admission Date: 1/28/2020     HPI, PMH, SH: Reviewed    Unit where seen by Wound Team: 2210/02     WOUND CONSULT RELATED TO:  NPWT change    Self Report / Pain Level: pre-medicated by primary RN      OBJECTIVE:  drsg intact    WOUND TYPE, LOCATION, CHARACTERISTICS (Pressure Injuries: location, stage, POA or date identified)    Negative Pressure Wound Therapy Abdomen Right;Lower;Quadrant (Active)   NPWT Pump Mode / Pressure Setting Continuous;125 mmHg    Dressing Type Small;Black Foam (Regular)    Number of Foam Pieces Used 2    Canister Changed No    Output (mL) 0 mL      Wound 01/28/20 Incision Abdomen wound vac, previous pain pump site - infected (Active)      2/12/2020  9:50 AM   Site Assessment Pink;Red;Granulation tissue    Periwound Assessment Pink;Painful;Scar tissue    Margins Attached edges;Defined edges    Wound Length (cm) 1 cm 2/12/2020  9:50 AM   Wound Width (cm) 6 cm 2/12/2020  9:50 AM   Wound Depth (cm) 0.9 cm 2/12/2020  9:50 AM   Wound Surface Area (cm^2) 6 cm^2 2/12/2020  9:50 AM   Wound Volume (cm^3) 5.4 cm^3 2/12/2020  9:50 AM   Tunneling (cm) 0 cm 2/12/2020  9:50 AM   Undermining (cm) 0 cm 2/12/2020  9:50 AM   Closure Secondary intention    Drainage Amount Scant    Drainage Description Serosanguineous    Non-staged Wound Description Full thickness    Treatments Cleansed    Wound Cleansing Normal Saline Irrigation    Periwound Protectant Skin Protectant Wipes to Periwound;Drape    Dressing Options Collagen Dressing;Wound Vac    Dressing Cleansing/Solutions Not Applicable    Dressing Changed Changed    Dressing Status Intact    Dressing Change/Treatment Frequency Monday, Wednesday, Friday, and As Needed    NEXT Dressing Change/Treatment Date 02/14/20    NEXT Weekly Photo (Inpatient Only) 02/19/20    Wound Odor None    Exposed Structures None     Tissue Type and Percentage 99% red, 1% yellow        Vascular:  Wound  not r/t vascular problem    Lab Values:    Lab Results   Component Value Date/Time    WBC 7.4 02/12/2020 04:21 AM    RBC 4.13 (L) 02/12/2020 04:21 AM    HEMOGLOBIN 11.4 (L) 02/12/2020 04:21 AM    HEMATOCRIT 35.6 (L) 02/12/2020 04:21 AM      No results found for: HBA1C        Culture:  na     INTERVENTIONS BY WOUND TEAM:  Instilled NS into drsg let soak. Removed drsg,1 piece of foam. Cleaned wound with NS, dried. Using forceps and scissors removed small areas of yellow tissue revealing deeper distal edges.  No bleeding or additional pain. Cleaned wound again. No Sting and drape to whitney-wound. Moistened Abbey into wound bed, placed one piece of black foam into wound. Button and Trac pad placed. NPWT resumed @ 125 mmHg continuous.     Interdisciplinary consultation: Patient, Bedside RN, Dr Vogel     EVALUATION: pt's wound is doing well, it still needs time to reach skin level. Collagen in use to try to decrease time needed to resolve wound.     Goals: Steady decrease in wound area and depth weekly.    NURSING PLAN OF CARE ORDERS (X):  Dressing changes: See Dressing Care orders:x  Skin care: See Skin Care orders:   Rectal tube care: See Rectal Tube Care orders:        Other orders:                             WOUND TEAM PLAN OF CARE (X):   Dressing changes by wound team:          Follow up 1-2 times weekly:               Follow up 3 times weekly:                NPWT change 3 times weekly:  x   Follow up as needed:       Other (explain):     Anticipated discharge plans (X):   LTACH:        SNF/Rehab:                  Home Care:           Outpatient Wound Center:            Self Care:            Other:  Needs wound VAC still

## 2020-02-12 NOTE — CARE PLAN
Problem: Pain Management  Goal: Pain level will decrease to patient's comfort goal  Outcome: PROGRESSING SLOWER THAN EXPECTED   Pt still taking dilaudid PO round the clock. Pt on pain pump.     Problem: Communication  Goal: The ability to communicate needs accurately and effectively will improve  Outcome: PROGRESSING AS EXPECTED   Pt able to communicate needs appropriately to staff. Plan of care discussed to pt. Questions and concerns answered. Pt. Verbalized understanding. Communication board updated.     Problem: Bowel/Gastric:  Goal: Normal bowel function is maintained or improved  Outcome: PROGRESSING AS EXPECTED   Pt reports nausea has been better but, reports abd tenderness. Request to have some time to rest.

## 2020-02-13 PROCEDURE — 700111 HCHG RX REV CODE 636 W/ 250 OVERRIDE (IP): Performed by: INTERNAL MEDICINE

## 2020-02-13 PROCEDURE — A9270 NON-COVERED ITEM OR SERVICE: HCPCS | Performed by: INTERNAL MEDICINE

## 2020-02-13 PROCEDURE — 99232 SBSQ HOSP IP/OBS MODERATE 35: CPT | Performed by: INTERNAL MEDICINE

## 2020-02-13 PROCEDURE — 700102 HCHG RX REV CODE 250 W/ 637 OVERRIDE(OP): Performed by: INTERNAL MEDICINE

## 2020-02-13 PROCEDURE — 770006 HCHG ROOM/CARE - MED/SURG/GYN SEMI*

## 2020-02-13 RX ORDER — HYDROMORPHONE HYDROCHLORIDE 2 MG/1
4 TABLET ORAL ONCE
Status: COMPLETED | OUTPATIENT
Start: 2020-02-13 | End: 2020-02-13

## 2020-02-13 RX ORDER — HYDROMORPHONE HYDROCHLORIDE 2 MG/1
6 TABLET ORAL EVERY 4 HOURS PRN
Status: DISCONTINUED | OUTPATIENT
Start: 2020-02-13 | End: 2020-02-14 | Stop reason: HOSPADM

## 2020-02-13 RX ORDER — HYDROMORPHONE HYDROCHLORIDE 1 MG/ML
1 INJECTION, SOLUTION INTRAMUSCULAR; INTRAVENOUS; SUBCUTANEOUS ONCE
Status: DISCONTINUED | OUTPATIENT
Start: 2020-02-13 | End: 2020-02-13

## 2020-02-13 RX ADMIN — TAMSULOSIN HYDROCHLORIDE 0.4 MG: 0.4 CAPSULE ORAL at 06:29

## 2020-02-13 RX ADMIN — POLYETHYLENE GLYCOL (3350) 1 PACKET: 17 POWDER, FOR SOLUTION ORAL at 06:29

## 2020-02-13 RX ADMIN — HYDROMORPHONE HYDROCHLORIDE 4 MG: 2 TABLET ORAL at 08:07

## 2020-02-13 RX ADMIN — HYDROMORPHONE HYDROCHLORIDE 4 MG: 2 TABLET ORAL at 04:02

## 2020-02-13 RX ADMIN — TAMSULOSIN HYDROCHLORIDE 0.4 MG: 0.4 CAPSULE ORAL at 12:54

## 2020-02-13 RX ADMIN — HYDROMORPHONE HYDROCHLORIDE 6 MG: 2 TABLET ORAL at 12:20

## 2020-02-13 RX ADMIN — HYDROMORPHONE HYDROCHLORIDE 6 MG: 2 TABLET ORAL at 20:08

## 2020-02-13 RX ADMIN — HYDROMORPHONE HYDROCHLORIDE 6 MG: 2 TABLET ORAL at 16:03

## 2020-02-13 RX ADMIN — OMEPRAZOLE 20 MG: 20 CAPSULE, DELAYED RELEASE ORAL at 06:29

## 2020-02-13 RX ADMIN — PREDNISONE 5 MG: 5 TABLET ORAL at 06:29

## 2020-02-13 RX ADMIN — POLYETHYLENE GLYCOL (3350) 1 PACKET: 17 POWDER, FOR SOLUTION ORAL at 12:40

## 2020-02-13 RX ADMIN — SENNOSIDES AND DOCUSATE SODIUM 2 TABLET: 8.6; 5 TABLET ORAL at 16:57

## 2020-02-13 RX ADMIN — LOSARTAN POTASSIUM 50 MG: 25 TABLET ORAL at 06:29

## 2020-02-13 RX ADMIN — HYDROMORPHONE HYDROCHLORIDE 4 MG: 2 TABLET ORAL at 00:05

## 2020-02-13 RX ADMIN — SENNOSIDES AND DOCUSATE SODIUM 2 TABLET: 8.6; 5 TABLET ORAL at 06:29

## 2020-02-13 ASSESSMENT — ENCOUNTER SYMPTOMS
BLURRED VISION: 0
SPEECH CHANGE: 0
INSOMNIA: 0
NERVOUS/ANXIOUS: 0
CONSTIPATION: 0
SHORTNESS OF BREATH: 0
DIZZINESS: 0
CHILLS: 0
ABDOMINAL PAIN: 0
HEADACHES: 0
MYALGIAS: 0
NAUSEA: 0
FEVER: 0
ROS GI COMMENTS: HEMORRHOID
DIARRHEA: 0
VOMITING: 0
COUGH: 0
SENSORY CHANGE: 0
WEAKNESS: 0
DEPRESSION: 0
HEARTBURN: 0
CLAUDICATION: 0
PHOTOPHOBIA: 0

## 2020-02-13 NOTE — PROCEDURES
DATE OF SERVICE:  02/12/2020    NAME OF THE PROCEDURE:  Staple and stitch removal of wound sites of lumbar and   left lower quadrant.      PREPROCEDURAL DIAGNOSIS:  Patient with recent implantation of [employer]   intrathecal infusion pump and catheter for revision.    POSTPROCEDURAL DIAGNOSIS:  Patient with recent implantation of [employer]   intrathecal infusion pump and catheter for revision.    PROCEDURE PERFORMED BY:  Candelario Reina MD     ANESTHESIA:  None.      PROCEDURE NOTE:  Patient was seen in the hospital bed.  Wound sites looked   quite healed well.  The staples were removed with no evidence of dehiscence   and the stitches were removed with no evidence of dehiscence.  Patient   tolerated the procedure quite well.  At this point, the plan will be to   continue managing his pump.  We will be bringing in [employer] rep for slight   increase in the boluses with reduction in number of boluses; therefore,   maintaining his normal current infusion rate.       ____________________________________     MD JOSE GRANT / NTS    DD:  02/12/2020 16:55:39  DT:  02/12/2020 17:00:45    D#:  [id number]  Job#:  [id number]

## 2020-02-13 NOTE — PROGRESS NOTES
[facility] Medicine Daily Progress Note    Date of Service  2/13/2020    Chief Complaint  54 y.o. male admitted 1/28/2020 with open abdominal wound.    Hospital Course    Patient with history of pain pump implanted into the RLQ by Dr Reina, he presented to Dr Reina who admitted the patient for I&D and removal of pain pump on the right and implantation of device on the left.  Wound vac was placed to help facilitate wound healing from surgical dehiscence site.         Interval Problem Update  2/11 Patient states he feels okay but is having constipation and difficulty with hemorrhoid because of this.  He doesn't think our Miralax works as well as that which he has at home.  Given his large opiate requirement, he likely would respond favorably to Relistor - ordered.    2/12 Patient seen with wound care at bedside during dressing change, his wound is showing good improvement but wound vac should be continuously used to help expedite healing.  Patient agreeable with placement, awaiting [place] authorization from insurance for transfer of care.  2/13 Patient had sutures/staples from pain pump site with Dr Reina yesterday without issue.  He states his pain is poorly controlled and had a very bad night.  His pump dosage was adjusted today and he hasn't noticed a difference yet.  I will order additional dose of po dilaudid now and increase his PRN dose from 4 to 6mg.    Consultants/Specialty  [facility]  Wound care for vac management.    Code Status  full    Disposition  [place] rehab.    Review of Systems  Review of Systems   Constitutional: Negative for chills and fever.   HENT: Negative for congestion.    Eyes: Negative for blurred vision and photophobia.   Respiratory: Negative for cough and shortness of breath.    Cardiovascular: Negative for chest pain, claudication and leg swelling.   Gastrointestinal: Negative for abdominal pain, constipation, diarrhea, heartburn, nausea and vomiting.        Hemorrhoid     Genitourinary:  Negative for dysuria and hematuria.   Musculoskeletal: Negative for joint pain and myalgias.   Skin: Negative for itching and rash.   Neurological: Negative for dizziness, sensory change, speech change, weakness and headaches.   Psychiatric/Behavioral: Negative for depression. The patient is not nervous/anxious and does not have insomnia.         Physical Exam  Temp:  [36.2 °C (97.2 °F)-37 °C (98.6 °F)] 36.2 °C (97.2 °F)  Pulse:  [] 103  Resp:  [18] 18  BP: (117-133)/(75-96) 133/96  SpO2:  [91 %-100 %] 93 %    Physical Exam  Vitals signs and nursing note reviewed.   Constitutional:       General: He is not in acute distress.     Appearance: Normal appearance.   HENT:      Head: Normocephalic and atraumatic.   Eyes:      General: No scleral icterus.     Extraocular Movements: Extraocular movements intact.   Neck:      Musculoskeletal: Normal range of motion and neck supple.   Cardiovascular:      Rate and Rhythm: Normal rate and regular rhythm.      Pulses: Normal pulses.      Heart sounds: Normal heart sounds. No murmur.   Pulmonary:      Effort: Pulmonary effort is normal. No respiratory distress.      Breath sounds: Normal breath sounds. No wheezing, rhonchi or rales.   Abdominal:      General: Abdomen is flat. Bowel sounds are normal. There is no distension.      Palpations: Abdomen is soft.      Tenderness: There is no rebound.   Musculoskeletal:         General: No swelling or tenderness.   Lymphadenopathy:      Cervical: No cervical adenopathy.   Skin:     Coloration: Skin is not jaundiced.      Findings: No erythema.   Neurological:      General: No focal deficit present.      Mental Status: He is alert and oriented to person, place, and time. Mental status is at baseline.      Cranial Nerves: No cranial nerve deficit.   Psychiatric:         Mood and Affect: Mood normal.         Behavior: Behavior normal.         Fluids    Intake/Output Summary (Last 24 hours) at 2/13/2020 1216  Last data filed at  2/13/2020 1100  Gross per 24 hour   Intake 1200 ml   Output 2600 ml   Net -1400 ml       Laboratory  Recent Labs     02/12/20  0421   WBC 7.4   RBC 4.13*   HEMOGLOBIN 11.4*   HEMATOCRIT 35.6*   MCV 86.2   MCH 27.6   MCHC 32.0*   RDW 40.5   PLATELETCT 354   MPV 8.8*     Recent Labs     02/12/20  0421   SODIUM 138   POTASSIUM 4.2   CHLORIDE 99   CO2 25   GLUCOSE 106*   BUN 15   CREATININE 0.88   CALCIUM 9.3                   Imaging  CT-CHEST,ABDOMEN,PELVIS WITH   Final Result      1.  Multiple pulmonary metastases, increased since outside CT chest 7/23/2019      2.  Left retroperitoneal mass is probably unchanged and produces severe obstruction of the left proximal ureter with associated cortical thinning of the left kidney      3.  No other metastatic disease identified      4.  Marked hepatomegaly      5.  Increase in expected amount of stool within the colon      DX-CHEST-PORTABLE (1 VIEW)   Final Result      No focal consolidation or pleural effusions.           Assessment/Plan  * Infection of superficial incisional surgical site after procedure- (present on admission)  Assessment & Plan  He had infection of his surgical site where his right-sided pain pump was and had dehiscence of his wound.  On 1/28 he had debridement of this location and his pain pump removed and changed to the other side.  Right side wound is healing well, needs continued wound vac care, reviewed with wound care today    Metastatic Colon cancer (HCC)- (present on admission)  Assessment & Plan  He was diagnosed in 2016, treated with resection and chemotherapy.  He had recurrence in August 2018, found to have metastases to the lung.  He has been on chemotherapy however, this was held in preparation for his pain pump replacement procedure  Continue to hold chemo for now due to open wound per Dr. Urbina and follow up after discharge  Anemia stable    Colon neoplasm- (present on admission)  Assessment & Plan  Follow up after discharge with   Carlitos,  Increase in lung mets seen on CT scan      Drug-induced constipation  Assessment & Plan  Likely secondary to opiates  Relistor ordered     Leukocytosis- (present on admission)  Assessment & Plan  Infection clearing clinically      Chronic, continuous use of opioids- (present on admission)  Assessment & Plan  Pain is not currently controlled  his pain management physician - Dr. Reina.  Increase dose of p.o. Dilaudid from 4 to 6 mg every 4 hours  He has a pain pump with Dilaudid 8 mg/day and ketamine, he has additional boluses  patient ambulates steadily  independently      Essential hypertension- (present on admission)  Assessment & Plan  Continue Cozaar    COPD (chronic obstructive pulmonary disease) (HCC)- (present on admission)  Assessment & Plan  No need for supplemental oxygen,   Continue prednisone 5mg daily, patient is on chronically and failed a taper       VTE prophylaxis: ambulatory

## 2020-02-13 NOTE — CARE PLAN
Problem: Bowel/Gastric:  Goal: Normal bowel function is maintained or improved  Outcome: PROGRESSING AS EXPECTED  Flowsheets (Taken 2/13/2020 0158)  Last BM: 02/12/20  Number of Times Stooled: 1  Goal: Will not experience complications related to bowel motility  Outcome: PROGRESSING AS EXPECTED     Problem: Skin Integrity  Goal: Risk for impaired skin integrity will decrease  Outcome: PROGRESSING AS EXPECTED  Note: Patient with wound vac in place, dressing to LLQ and back clean dry and intact and per patient changed by surgeon today.

## 2020-02-13 NOTE — PROGRESS NOTES
Pt pain pump update:  Settings changed by Dr. Reina. New settings as followed-  Dilaudid basal rate: 0.333mg/hr  Dilaudid bolus rate: 0.15mg pt controlled, with 1hr lockout and max 8 boluses/day.

## 2020-02-13 NOTE — PROGRESS NOTES
Assumed care of pt.  AAOx4.  Reports a 5/10 abd pain level.  Requests pain med at 1600.  R abd wound vac in place, draining properly.  L abd/low back dressings in place. Back dressing due to be changed today; however, pt requests that this be done during the evening.  All needs met at this time.   Rounding in place.

## 2020-02-14 VITALS
RESPIRATION RATE: 18 BRPM | BODY MASS INDEX: 22.04 KG/M2 | WEIGHT: 177.25 LBS | TEMPERATURE: 97.1 F | HEART RATE: 112 BPM | HEIGHT: 75 IN | OXYGEN SATURATION: 96 % | SYSTOLIC BLOOD PRESSURE: 148 MMHG | DIASTOLIC BLOOD PRESSURE: 99 MMHG

## 2020-02-14 PROCEDURE — 700111 HCHG RX REV CODE 636 W/ 250 OVERRIDE (IP): Performed by: INTERNAL MEDICINE

## 2020-02-14 PROCEDURE — A9270 NON-COVERED ITEM OR SERVICE: HCPCS | Performed by: INTERNAL MEDICINE

## 2020-02-14 PROCEDURE — 99239 HOSP IP/OBS DSCHRG MGMT >30: CPT | Performed by: INTERNAL MEDICINE

## 2020-02-14 PROCEDURE — 700102 HCHG RX REV CODE 250 W/ 637 OVERRIDE(OP): Performed by: INTERNAL MEDICINE

## 2020-02-14 PROCEDURE — A6209 FOAM DRSG <=16 SQ IN W/O BDR: HCPCS | Performed by: INTERNAL MEDICINE

## 2020-02-14 RX ADMIN — HYDROMORPHONE HYDROCHLORIDE 6 MG: 2 TABLET ORAL at 12:15

## 2020-02-14 RX ADMIN — POLYETHYLENE GLYCOL (3350) 1 PACKET: 17 POWDER, FOR SOLUTION ORAL at 05:18

## 2020-02-14 RX ADMIN — SENNOSIDES AND DOCUSATE SODIUM 2 TABLET: 8.6; 5 TABLET ORAL at 05:18

## 2020-02-14 RX ADMIN — LOSARTAN POTASSIUM 50 MG: 25 TABLET ORAL at 05:18

## 2020-02-14 RX ADMIN — HYDROMORPHONE HYDROCHLORIDE 6 MG: 2 TABLET ORAL at 08:18

## 2020-02-14 RX ADMIN — HYDROMORPHONE HYDROCHLORIDE 6 MG: 2 TABLET ORAL at 04:16

## 2020-02-14 RX ADMIN — POLYETHYLENE GLYCOL (3350) 1 PACKET: 17 POWDER, FOR SOLUTION ORAL at 08:28

## 2020-02-14 RX ADMIN — OMEPRAZOLE 20 MG: 20 CAPSULE, DELAYED RELEASE ORAL at 05:18

## 2020-02-14 RX ADMIN — PREDNISONE 5 MG: 5 TABLET ORAL at 05:18

## 2020-02-14 RX ADMIN — HYDROMORPHONE HYDROCHLORIDE 6 MG: 2 TABLET ORAL at 00:16

## 2020-02-14 RX ADMIN — TAMSULOSIN HYDROCHLORIDE 0.4 MG: 0.4 CAPSULE ORAL at 05:18

## 2020-02-14 NOTE — DISCHARGE PLANNING
ANGELICA spoke with admissions at [facility] and was informed that the pt would not be going there due to the pt being unable to pay for the daily co-pay.

## 2020-02-14 NOTE — WOUND TEAM
[facility]  Inpatient Services  Wound and Skin Care Preogress note    Admission Date: 1/28/2020     HPI, PMH, SH: Reviewed    Unit where seen by Wound Team: 2210/02     WOUND FOLLOW UP RELATED TO:  Vac Change    Self Report / Pain Level: pre-medicated by primary RN      OBJECTIVE:  Vac dressing CDI    WOUND TYPE, LOCATION, CHARACTERISTICS (Pressure Injuries: location, stage, POA or date identified)    Wound 01/28/20 Incision Abdomen wound vac, previous pain pump site - infected (Active)   Wound Image   2/12/2020    Site Assessment Red;Granulation tissue 2/14/2020    Periwound Assessment Pink;Scar tissue    Margins Defined edges    Wound Length (cm) 1 cm 2/12/2020     Wound Width (cm) 6 cm 2/12/2020    Wound Depth (cm) 0.9 cm 2/12/2020     Wound Surface Area (cm^2) 6 cm^2 2/12/2020    Wound Volume (cm^3) 5.4 cm^3 2/12/2020    Tunneling (cm) 0 cm 2/12/2020     Undermining (cm) 0 cm 2/12/2020    Closure Secondary intention 2/14/2020    Drainage Amount Scant    Drainage Description Serosanguineous    Non-staged Wound Description Full thickness    Treatments Cleansed;Site care    Wound Cleansing Normal Saline Irrigation    Periwound Protectant Not Applicable    Dressing Options Hydrofera Blue Ready;Silicone Adhesive Foam    Dressing Cleansing/Solutions Not Applicable    Dressing Changed New    Dressing Status Clean;Dry;Intact    Dressing Change/Treatment Frequency Every 48 hrs, and As Needed    NEXT Dressing Change/Treatment Date 02/16/20    NEXT Weekly Photo (Inpatient Only) 02/19/20    Wound Odor None    Pulses N/A    Exposed Structures None    WOUND NURSE ONLY - Tissue Type and Percentage 99% Red, 1% slough             Lab Values:    Lab Results   Component Value Date/Time    WBC 7.4 02/12/2020 04:21 AM    RBC 4.13 (L) 02/12/2020 04:21 AM    HEMOGLOBIN 11.4 (L) 02/12/2020 04:21 AM    HEMATOCRIT 35.6 (L) 02/12/2020 04:21 AM      No results found for: HBA1C      INTERVENTIONS BY WOUND TEAM:  Instilled NS  into vac sponge and let soak. Removed dressing, no retained foam. Cleansed wound with NS. Lower wound margin has healed in to tissue, upper margin has shelved off, no true depth r/t this but need for epithelization to cover granular tissue. Hydrofera blue (HFB) to wound bed, secured with adhesive foam.    Interdisciplinary consultation: Patient, Bedside RN, Dr Vogel     EVALUATION: RT wound closure at distal base and not at proximal margin, HFB appropriate dressing to allow epithelization to occur over remaining wound. Pt educated on dressing changes. Per hospitalist apollo to SHAHRAM.      Goals: Steady decrease in wound area and depth weekly.    NURSING PLAN OF CARE ORDERS (X):  Dressing changes: See Dressing Care orders:x  Skin care: See Skin Care orders:   Rectal tube care: See Rectal Tube Care orders:        Other orders:                             WOUND TEAM PLAN OF CARE (X):   Dressing changes by wound team:          Follow up 1-2 times weekly:     X          Follow up 3 times weekly:                NPWT change 3 times weekly:     Follow up as needed:       Other (explain):     Anticipated discharge plans (X):   LTACH:        SNF/Rehab:                  Home Care:           Outpatient Wound Center:    X        Self Care:          X  Other:

## 2020-02-14 NOTE — CARE PLAN
Problem: Pain Management  Goal: Pain level will decrease to patient's comfort goal  Outcome: PROGRESSING AS EXPECTED     Problem: Communication  Goal: The ability to communicate needs accurately and effectively will improve  Outcome: PROGRESSING AS EXPECTED     Problem: Safety  Goal: Will remain free from falls  Outcome: PROGRESSING AS EXPECTED     Problem: Infection  Goal: Will remain free from infection  Outcome: PROGRESSING AS EXPECTED     Problem: Venous Thromboembolism (VTW)/Deep Vein Thrombosis (DVT) Prevention:  Goal: Patient will participate in Venous Thrombosis (VTE)/Deep Vein Thrombosis (DVT)Prevention Measures  Outcome: PROGRESSING AS EXPECTED     Problem: Bowel/Gastric:  Goal: Will not experience complications related to bowel motility  Outcome: PROGRESSING AS EXPECTED     Problem: Knowledge Deficit  Goal: Knowledge of the prescribed therapeutic regimen will improve  Outcome: PROGRESSING AS EXPECTED     Problem: Respiratory:  Goal: Respiratory status will improve  Outcome: PROGRESSING AS EXPECTED

## 2020-02-14 NOTE — DISCHARGE INSTRUCTIONS
Discharge Instructions    Discharged to home by car with relative. Discharged via wheelchair, hospital escort: Yes.  Special equipment needed: Not Applicable    Be sure to schedule a follow-up appointment with your primary care doctor or any specialists as instructed.     Discharge Plan:   Diet Plan: Discussed  Activity Level: Discussed  Confirmed Follow up Appointment: Patient to Call and Schedule Appointment  Confirmed Symptoms Management: Discussed  Medication Reconciliation Updated: Yes  Influenza Vaccine Indication: Patient Refuses    I understand that a diet low in cholesterol, fat, and sodium is recommended for good health. Unless I have been given specific instructions below for another diet, I accept this instruction as my diet prescription.   Other diet: n/a    Special Instructions: Remove dressing from Right abdomen, cleanse with wound cleanser. Place small strip of hydrofera blue into wound bed sponge side down. Secure with adhesive foam. Change every 48 hours and as needed.    · Is patient discharged on Warfarin / Coumadin?   No     Depression / Suicide Risk    As you are discharged from this [facility] facility, it is important to learn how to keep safe from harming yourself.    Recognize the warning signs:  · Abrupt changes in personality, positive or negative- including increase in energy   · Giving away possessions  · Change in eating patterns- significant weight changes-  positive or negative  · Change in sleeping patterns- unable to sleep or sleeping all the time   · Unwillingness or inability to communicate  · Depression  · Unusual sadness, discouragement and loneliness  · Talk of wanting to die  · Neglect of personal appearance   · Rebelliousness- reckless behavior  · Withdrawal from people/activities they love  · Confusion- inability to concentrate     If you or a loved one observes any of these behaviors or has concerns about self-harm, here's what you can do:  · Talk about it- your  feelings and reasons for harming yourself  · Remove any means that you might use to hurt yourself (examples: pills, rope, extension cords, firearm)  · Get professional help from the community (Mental Health, Substance Abuse, psychological counseling)  · Do not be alone:Call your Safe Contact- someone whom you trust who will be there for you.  · Call your local CRISIS HOTLINE 075-6566 or 129-050-5221  · Call your local Children's Mobile Crisis Response Team [place] (359) 550-9848 or www.[employer] Boat  · Call the toll free National Suicide Prevention Hotlines   · National Suicide Prevention Lifeline 326-440-IHHO (9097)  · National Hope Line Network 800-SUICIDE (718-8254)

## 2020-02-14 NOTE — DISCHARGE SUMMARY
Discharge Summary    CHIEF COMPLAINT ON ADMISSION  Wound dehiscence    Reason for Admission  COLON CANCER     Admission Date  1/28/2020    CODE STATUS  Full Code    HPI & HOSPITAL COURSE  This is a 54 y.o. male here with history of pain pump implanted into the RLQ by Dr Reina, he presented to Dr Reina who admitted the patient for I&D and removal of pain pump on the right and implantation of device on the left.  Wound vac was placed to help facilitate wound healing from surgical dehiscence site.   He completed 8 days of antibiotics and there is no evidence of infection at this time.  He had benefited from the wound vac and no longer needs this now.  He will discharge home and follow up with his PCP and wound care clinic as outpatient.    Therefore, he is discharged in good and stable condition to home with close outpatient follow-up.    The patient met 2-midnight criteria for an inpatient stay at the time of discharge.    Discharge Date  2/14/2020    FOLLOW UP ITEMS POST DISCHARGE  PCP - GIOVANI Ochoa  Oncology - Senait Urbina    DISCHARGE DIAGNOSES  Principal Problem:    Infection of superficial incisional surgical site after procedure POA: Yes  Active Problems:    Colon neoplasm POA: Yes    Metastatic Colon cancer (HCC) POA: Yes      Overview: diagnosed 2016 treated with resection and chemo      had recurrence 08/2018, pulmonary nodules biopsied at that time showed       adenocarcinoma    Essential hypertension POA: Yes    Chronic, continuous use of opioids POA: Yes    Leukocytosis POA: Yes    Drug-induced constipation POA: Unknown    COPD (chronic obstructive pulmonary disease) (HCC) POA: Yes  Resolved Problems:    * No resolved hospital problems. *      FOLLOW UP  No future appointments.  Nina Hardy, P.A.  [address] [facility] [address]  [phone]    In 1 week      Senait Urbina M.D.  [address] [place] [id number] [phone]    Today      Candelario Reina M.D.  [address] [facility]  "Dr Burris [address] [place] [id number]  [phone]    In 1 week        MEDICATIONS ON DISCHARGE     Medication List      CONTINUE taking these medications      Instructions   hydrocortisone rectal 2.5 % Crea  Commonly known as:  PROCTOZONE HC   Insert 1 Application in rectum 3 times a day as needed.  Dose:  1 Application     HYDROmorphone 4 MG Tabs  Commonly known as:  DILAUDID   Take 4 mg by mouth every 6 hours as needed (Breakthru Pain).  Dose:  4 mg     lansoprazole 30 MG Cpdr  Commonly known as:  PREVACID   Take 30 mg by mouth every day.  Dose:  30 mg     losartan 50 MG Tabs  Commonly known as:  COZAAR   Take 50 mg by mouth every day.  Dose:  50 mg     NON SPECIFIED   Pain pump has Dilaudid and Ketamine     ondansetron 4 MG Tbdp  Commonly known as:  ZOFRAN ODT   Take 8 mg by mouth every 6 hours as needed for Nausea.  Dose:  8 mg     polyethylene glycol/lytes Pack  Commonly known as:  MIRALAX   Take 1 Packet by mouth every day. Can use up to 2 times daily if no bowel movement in 24 hours.  Dose:  17 g     predniSONE 5 MG Tabs  Commonly known as:  DELTASONE   Take 5 mg by mouth every day.  Dose:  5 mg     promethazine 50 MG Tabs  Commonly known as:  PHENERGAN   Take 50 mg by mouth every 6 hours as needed for Nausea/Vomiting.  Dose:  50 mg     tamsulosin 0.4 MG capsule  Commonly known as:  FLOMAX   Take 0.4 mg by mouth ONE-HALF HOUR AFTER BREAKFAST.  Dose:  0.4 mg            Allergies  Allergies   Allergen Reactions   • Levaquin Vomiting   • Nubain [Nalbuphine] Anxiety     \"made him feel paranoid\"       DIET  Orders Placed This Encounter   Procedures   • Diet Order Regular     Standing Status:   Standing     Number of Occurrences:   1     Order Specific Question:   Diet:     Answer:   Regular [1]       ACTIVITY  As tolerated.  Weight bearing as tolerated    CONSULTATIONS  Shemar Reina - anesthesia/pain managment    PROCEDURES  1/28/2020 - Latosha - Revision of Medtronic intrathecal infusion pump and   catheter.  2/4/2020 - " [facility]ha - Medtronic intrathecal pump refill and reprogramming.   2/12/2020 - [facility] -  Staple and stitch removal of wound sites of lumbar and left lower quadrant.      LABORATORY  Lab Results   Component Value Date    SODIUM 138 02/12/2020    POTASSIUM 4.2 02/12/2020    CHLORIDE 99 02/12/2020    CO2 25 02/12/2020    GLUCOSE 106 (H) 02/12/2020    BUN 15 02/12/2020    CREATININE 0.88 02/12/2020        Lab Results   Component Value Date    WBC 7.4 02/12/2020    HEMOGLOBIN 11.4 (L) 02/12/2020    HEMATOCRIT 35.6 (L) 02/12/2020    PLATELETCT 354 02/12/2020        Total time of the discharge process exceeds 35 minutes.

## 2020-02-14 NOTE — DISCHARGE PLANNING
Called wife to discuss updated DC plan. Wound vac was removed this morning and pt no longer requires SNF placement. Wife is agreeable to taking pt home and following up with OP wound or PCP once a week. Wife is on her way to [place] and is planning on picking the pt up around 3.    Carley

## 2020-02-14 NOTE — PROGRESS NOTES
A&Ox2, VSS, abdominal and back dressings c/d/i, R abd wound vac in place. POC discussed. Denies further needs. Safety measures and hourly rounding in place.

## 2020-02-14 NOTE — PROGRESS NOTES
Pt discharged to home via personal vehicle with spouse. Discharge paperwork reviewed and signed. Prescribed home medications reviewed with pt per discharge summary. VSS. Wound care instructions included with discharge paperwork, pt verbalized understanding. Pt escorted off unit via wheelchair with hospital staff.

## 2020-03-05 ENCOUNTER — HOSPITAL ENCOUNTER (OUTPATIENT)
Dept: LAB | Facility: MEDICAL CENTER | Age: 55
End: 2020-03-05
Attending: INTERNAL MEDICINE
Payer: COMMERCIAL

## 2020-03-05 LAB
ALBUMIN SERPL BCP-MCNC: 3.9 G/DL (ref 3.2–4.9)
ALBUMIN/GLOB SERPL: 1.4 G/DL
ALP SERPL-CCNC: 151 U/L (ref 30–99)
ALT SERPL-CCNC: 12 U/L (ref 2–50)
ANION GAP SERPL CALC-SCNC: 8 MMOL/L (ref 0–11.9)
AST SERPL-CCNC: 17 U/L (ref 12–45)
BILIRUB SERPL-MCNC: 0.3 MG/DL (ref 0.1–1.5)
BUN SERPL-MCNC: 9 MG/DL (ref 8–22)
CALCIUM SERPL-MCNC: 9.1 MG/DL (ref 8.5–10.5)
CHLORIDE SERPL-SCNC: 100 MMOL/L (ref 96–112)
CO2 SERPL-SCNC: 24 MMOL/L (ref 20–33)
CREAT SERPL-MCNC: 1.04 MG/DL (ref 0.5–1.4)
GLOBULIN SER CALC-MCNC: 2.7 G/DL (ref 1.9–3.5)
GLUCOSE SERPL-MCNC: 115 MG/DL (ref 65–99)
MAGNESIUM SERPL-MCNC: 1.7 MG/DL (ref 1.5–2.5)
POTASSIUM SERPL-SCNC: 4 MMOL/L (ref 3.6–5.5)
PROT SERPL-MCNC: 6.6 G/DL (ref 6–8.2)
SODIUM SERPL-SCNC: 132 MMOL/L (ref 135–145)

## 2020-03-05 PROCEDURE — 80053 COMPREHEN METABOLIC PANEL: CPT

## 2020-03-05 PROCEDURE — 83735 ASSAY OF MAGNESIUM: CPT

## 2020-04-09 ENCOUNTER — HOSPITAL ENCOUNTER (OUTPATIENT)
Dept: LAB | Facility: MEDICAL CENTER | Age: 55
End: 2020-04-09
Attending: INTERNAL MEDICINE
Payer: COMMERCIAL

## 2020-04-09 LAB
ALBUMIN SERPL BCP-MCNC: 4 G/DL (ref 3.2–4.9)
ALBUMIN/GLOB SERPL: 1.5 G/DL
ALP SERPL-CCNC: 127 U/L (ref 30–99)
ALT SERPL-CCNC: 14 U/L (ref 2–50)
ANION GAP SERPL CALC-SCNC: 14 MMOL/L (ref 7–16)
AST SERPL-CCNC: 13 U/L (ref 12–45)
BILIRUB SERPL-MCNC: 0.3 MG/DL (ref 0.1–1.5)
BUN SERPL-MCNC: 8 MG/DL (ref 8–22)
CALCIUM SERPL-MCNC: 8.9 MG/DL (ref 8.5–10.5)
CHLORIDE SERPL-SCNC: 99 MMOL/L (ref 96–112)
CO2 SERPL-SCNC: 23 MMOL/L (ref 20–33)
CREAT SERPL-MCNC: 0.95 MG/DL (ref 0.5–1.4)
GLOBULIN SER CALC-MCNC: 2.6 G/DL (ref 1.9–3.5)
GLUCOSE SERPL-MCNC: 115 MG/DL (ref 65–99)
MAGNESIUM SERPL-MCNC: 1.9 MG/DL (ref 1.5–2.5)
POTASSIUM SERPL-SCNC: 4.3 MMOL/L (ref 3.6–5.5)
PROT SERPL-MCNC: 6.6 G/DL (ref 6–8.2)
SODIUM SERPL-SCNC: 136 MMOL/L (ref 135–145)

## 2020-04-09 PROCEDURE — 83735 ASSAY OF MAGNESIUM: CPT

## 2020-04-09 PROCEDURE — 80053 COMPREHEN METABOLIC PANEL: CPT

## 2020-08-10 ENCOUNTER — HOSPITAL ENCOUNTER (OUTPATIENT)
Facility: MEDICAL CENTER | Age: 55
DRG: 389 | End: 2020-08-10
Payer: COMMERCIAL

## 2020-08-10 ENCOUNTER — HOSPITAL ENCOUNTER (OUTPATIENT)
Dept: RADIOLOGY | Facility: MEDICAL CENTER | Age: 55
End: 2020-08-10
Payer: COMMERCIAL

## 2020-08-10 ENCOUNTER — HOSPITAL ENCOUNTER (INPATIENT)
Facility: MEDICAL CENTER | Age: 55
LOS: 1 days | DRG: 389 | End: 2020-08-11
Attending: HOSPITALIST | Admitting: HOSPITALIST
Payer: COMMERCIAL

## 2020-08-10 PROBLEM — K56.7 ILEUS (HCC): Status: ACTIVE | Noted: 2020-08-10

## 2020-08-10 LAB
ALBUMIN SERPL BCP-MCNC: 3.9 G/DL (ref 3.2–4.9)
ALBUMIN/GLOB SERPL: 1.6 G/DL
ALP SERPL-CCNC: 114 U/L (ref 30–99)
ALT SERPL-CCNC: 15 U/L (ref 2–50)
ANION GAP SERPL CALC-SCNC: 14 MMOL/L (ref 7–16)
AST SERPL-CCNC: 14 U/L (ref 12–45)
BASOPHILS # BLD AUTO: 0.5 % (ref 0–1.8)
BASOPHILS # BLD: 0.05 K/UL (ref 0–0.12)
BILIRUB SERPL-MCNC: 0.5 MG/DL (ref 0.1–1.5)
BUN SERPL-MCNC: 7 MG/DL (ref 8–22)
CALCIUM SERPL-MCNC: 8.6 MG/DL (ref 8.5–10.5)
CHLORIDE SERPL-SCNC: 98 MMOL/L (ref 96–112)
CO2 SERPL-SCNC: 22 MMOL/L (ref 20–33)
CREAT SERPL-MCNC: 0.95 MG/DL (ref 0.5–1.4)
EOSINOPHIL # BLD AUTO: 0.18 K/UL (ref 0–0.51)
EOSINOPHIL NFR BLD: 1.8 % (ref 0–6.9)
ERYTHROCYTE [DISTWIDTH] IN BLOOD BY AUTOMATED COUNT: 43.6 FL (ref 35.9–50)
GLOBULIN SER CALC-MCNC: 2.5 G/DL (ref 1.9–3.5)
GLUCOSE SERPL-MCNC: 97 MG/DL (ref 65–99)
HCT VFR BLD AUTO: 37.9 % (ref 42–52)
HGB BLD-MCNC: 12.2 G/DL (ref 14–18)
IMM GRANULOCYTES # BLD AUTO: 0.03 K/UL (ref 0–0.11)
IMM GRANULOCYTES NFR BLD AUTO: 0.3 % (ref 0–0.9)
LACTATE BLD-SCNC: 1 MMOL/L (ref 0.5–2)
LYMPHOCYTES # BLD AUTO: 1.41 K/UL (ref 1–4.8)
LYMPHOCYTES NFR BLD: 13.7 % (ref 22–41)
MCH RBC QN AUTO: 24.2 PG (ref 27–33)
MCHC RBC AUTO-ENTMCNC: 32.2 G/DL (ref 33.7–35.3)
MCV RBC AUTO: 75.2 FL (ref 81.4–97.8)
MONOCYTES # BLD AUTO: 1.2 K/UL (ref 0–0.85)
MONOCYTES NFR BLD AUTO: 11.7 % (ref 0–13.4)
NEUTROPHILS # BLD AUTO: 7.39 K/UL (ref 1.82–7.42)
NEUTROPHILS NFR BLD: 72 % (ref 44–72)
NRBC # BLD AUTO: 0 K/UL
NRBC BLD-RTO: 0 /100 WBC
PLATELET # BLD AUTO: 400 K/UL (ref 164–446)
PMV BLD AUTO: 8.8 FL (ref 9–12.9)
POTASSIUM SERPL-SCNC: 3.9 MMOL/L (ref 3.6–5.5)
PROT SERPL-MCNC: 6.4 G/DL (ref 6–8.2)
RBC # BLD AUTO: 5.04 M/UL (ref 4.7–6.1)
SODIUM SERPL-SCNC: 134 MMOL/L (ref 135–145)
WBC # BLD AUTO: 10.3 K/UL (ref 4.8–10.8)

## 2020-08-10 PROCEDURE — 700111 HCHG RX REV CODE 636 W/ 250 OVERRIDE (IP): Performed by: SURGERY

## 2020-08-10 PROCEDURE — 83605 ASSAY OF LACTIC ACID: CPT

## 2020-08-10 PROCEDURE — 85025 COMPLETE CBC W/AUTO DIFF WBC: CPT

## 2020-08-10 PROCEDURE — 700102 HCHG RX REV CODE 250 W/ 637 OVERRIDE(OP): Performed by: HOSPITALIST

## 2020-08-10 PROCEDURE — 80053 COMPREHEN METABOLIC PANEL: CPT

## 2020-08-10 PROCEDURE — A9270 NON-COVERED ITEM OR SERVICE: HCPCS | Performed by: HOSPITALIST

## 2020-08-10 PROCEDURE — 700105 HCHG RX REV CODE 258: Performed by: HOSPITALIST

## 2020-08-10 PROCEDURE — 770009 HCHG ROOM/CARE - ONCOLOGY SEMI PRI*

## 2020-08-10 PROCEDURE — 36415 COLL VENOUS BLD VENIPUNCTURE: CPT

## 2020-08-10 PROCEDURE — 700111 HCHG RX REV CODE 636 W/ 250 OVERRIDE (IP): Performed by: HOSPITALIST

## 2020-08-10 PROCEDURE — 99223 1ST HOSP IP/OBS HIGH 75: CPT | Performed by: HOSPITALIST

## 2020-08-10 RX ORDER — PREDNISONE 10 MG/1
5 TABLET ORAL DAILY
Status: DISCONTINUED | OUTPATIENT
Start: 2020-08-11 | End: 2020-08-11 | Stop reason: HOSPADM

## 2020-08-10 RX ORDER — HYDROMORPHONE HYDROCHLORIDE 1 MG/ML
0.5 INJECTION, SOLUTION INTRAMUSCULAR; INTRAVENOUS; SUBCUTANEOUS
Status: DISCONTINUED | OUTPATIENT
Start: 2020-08-10 | End: 2020-08-11 | Stop reason: HOSPADM

## 2020-08-10 RX ORDER — ONDANSETRON 2 MG/ML
4 INJECTION INTRAMUSCULAR; INTRAVENOUS EVERY 4 HOURS PRN
Status: DISCONTINUED | OUTPATIENT
Start: 2020-08-10 | End: 2020-08-11 | Stop reason: HOSPADM

## 2020-08-10 RX ORDER — ONDANSETRON 4 MG/1
4 TABLET, ORALLY DISINTEGRATING ORAL EVERY 4 HOURS PRN
Status: DISCONTINUED | OUTPATIENT
Start: 2020-08-10 | End: 2020-08-11 | Stop reason: HOSPADM

## 2020-08-10 RX ORDER — OXYCODONE HYDROCHLORIDE 5 MG/1
5 TABLET ORAL
Status: DISCONTINUED | OUTPATIENT
Start: 2020-08-10 | End: 2020-08-10

## 2020-08-10 RX ORDER — POLYETHYLENE GLYCOL 3350 17 G/17G
1 POWDER, FOR SOLUTION ORAL
Status: DISCONTINUED | OUTPATIENT
Start: 2020-08-10 | End: 2020-08-10

## 2020-08-10 RX ORDER — PROMETHAZINE HYDROCHLORIDE 25 MG/1
12.5-25 SUPPOSITORY RECTAL EVERY 4 HOURS PRN
Status: DISCONTINUED | OUTPATIENT
Start: 2020-08-10 | End: 2020-08-11 | Stop reason: HOSPADM

## 2020-08-10 RX ORDER — PROMETHAZINE HYDROCHLORIDE 25 MG/1
12.5-25 TABLET ORAL EVERY 4 HOURS PRN
Status: DISCONTINUED | OUTPATIENT
Start: 2020-08-10 | End: 2020-08-10

## 2020-08-10 RX ORDER — HYDROMORPHONE HYDROCHLORIDE 4 MG/1
4 TABLET ORAL EVERY 6 HOURS PRN
Status: DISCONTINUED | OUTPATIENT
Start: 2020-08-10 | End: 2020-08-11 | Stop reason: HOSPADM

## 2020-08-10 RX ORDER — OXYCODONE HYDROCHLORIDE 10 MG/1
10 TABLET ORAL
Status: DISCONTINUED | OUTPATIENT
Start: 2020-08-10 | End: 2020-08-10

## 2020-08-10 RX ORDER — SODIUM CHLORIDE, SODIUM LACTATE, POTASSIUM CHLORIDE, CALCIUM CHLORIDE 600; 310; 30; 20 MG/100ML; MG/100ML; MG/100ML; MG/100ML
INJECTION, SOLUTION INTRAVENOUS CONTINUOUS
Status: DISCONTINUED | OUTPATIENT
Start: 2020-08-10 | End: 2020-08-11 | Stop reason: HOSPADM

## 2020-08-10 RX ORDER — LANSOPRAZOLE 30 MG/1
30 CAPSULE, DELAYED RELEASE ORAL DAILY
Status: DISCONTINUED | OUTPATIENT
Start: 2020-08-10 | End: 2020-08-10

## 2020-08-10 RX ORDER — ONDANSETRON 2 MG/ML
8 INJECTION INTRAMUSCULAR; INTRAVENOUS ONCE
Status: COMPLETED | OUTPATIENT
Start: 2020-08-10 | End: 2020-08-10

## 2020-08-10 RX ORDER — PROCHLORPERAZINE EDISYLATE 5 MG/ML
5-10 INJECTION INTRAMUSCULAR; INTRAVENOUS EVERY 4 HOURS PRN
Status: DISCONTINUED | OUTPATIENT
Start: 2020-08-10 | End: 2020-08-10

## 2020-08-10 RX ORDER — PROMETHAZINE HYDROCHLORIDE 25 MG/1
50 TABLET ORAL EVERY 6 HOURS PRN
Status: DISCONTINUED | OUTPATIENT
Start: 2020-08-10 | End: 2020-08-11 | Stop reason: HOSPADM

## 2020-08-10 RX ORDER — POLYETHYLENE GLYCOL 3350 17 G/17G
1 POWDER, FOR SOLUTION ORAL DAILY
Status: DISCONTINUED | OUTPATIENT
Start: 2020-08-11 | End: 2020-08-11 | Stop reason: HOSPADM

## 2020-08-10 RX ORDER — OMEPRAZOLE 20 MG/1
20 CAPSULE, DELAYED RELEASE ORAL DAILY
Status: DISCONTINUED | OUTPATIENT
Start: 2020-08-10 | End: 2020-08-11 | Stop reason: HOSPADM

## 2020-08-10 RX ORDER — ACETAMINOPHEN 325 MG/1
650 TABLET ORAL EVERY 6 HOURS PRN
Status: DISCONTINUED | OUTPATIENT
Start: 2020-08-10 | End: 2020-08-11 | Stop reason: HOSPADM

## 2020-08-10 RX ORDER — LOSARTAN POTASSIUM 50 MG/1
50 TABLET ORAL DAILY
Status: DISCONTINUED | OUTPATIENT
Start: 2020-08-10 | End: 2020-08-11 | Stop reason: HOSPADM

## 2020-08-10 RX ORDER — TAMSULOSIN HYDROCHLORIDE 0.4 MG/1
0.4 CAPSULE ORAL
Status: DISCONTINUED | OUTPATIENT
Start: 2020-08-11 | End: 2020-08-11 | Stop reason: HOSPADM

## 2020-08-10 RX ORDER — AMOXICILLIN 250 MG
2 CAPSULE ORAL 2 TIMES DAILY
Status: DISCONTINUED | OUTPATIENT
Start: 2020-08-10 | End: 2020-08-10

## 2020-08-10 RX ORDER — BISACODYL 10 MG
10 SUPPOSITORY, RECTAL RECTAL
Status: DISCONTINUED | OUTPATIENT
Start: 2020-08-10 | End: 2020-08-10

## 2020-08-10 RX ORDER — PROCHLORPERAZINE EDISYLATE 5 MG/ML
10 INJECTION INTRAMUSCULAR; INTRAVENOUS EVERY 6 HOURS PRN
Status: DISCONTINUED | OUTPATIENT
Start: 2020-08-10 | End: 2020-08-11 | Stop reason: HOSPADM

## 2020-08-10 RX ADMIN — SODIUM CHLORIDE, POTASSIUM CHLORIDE, SODIUM LACTATE AND CALCIUM CHLORIDE: 600; 310; 30; 20 INJECTION, SOLUTION INTRAVENOUS at 18:06

## 2020-08-10 RX ADMIN — ONDANSETRON 8 MG: 2 INJECTION INTRAMUSCULAR; INTRAVENOUS at 15:48

## 2020-08-10 RX ADMIN — OXYCODONE HYDROCHLORIDE 10 MG: 10 TABLET ORAL at 15:48

## 2020-08-10 RX ADMIN — ENOXAPARIN SODIUM 40 MG: 40 INJECTION SUBCUTANEOUS at 18:07

## 2020-08-10 RX ADMIN — OMEPRAZOLE 20 MG: 20 CAPSULE, DELAYED RELEASE ORAL at 18:07

## 2020-08-10 RX ADMIN — PROMETHAZINE HYDROCHLORIDE 50 MG: 25 TABLET ORAL at 18:07

## 2020-08-10 RX ADMIN — LOSARTAN POTASSIUM 50 MG: 50 TABLET, FILM COATED ORAL at 18:06

## 2020-08-10 RX ADMIN — PROCHLORPERAZINE EDISYLATE 10 MG: 5 INJECTION INTRAMUSCULAR; INTRAVENOUS at 22:21

## 2020-08-10 RX ADMIN — HYDROMORPHONE HYDROCHLORIDE 0.5 MG: 1 INJECTION, SOLUTION INTRAMUSCULAR; INTRAVENOUS; SUBCUTANEOUS at 22:21

## 2020-08-10 SDOH — HEALTH STABILITY: MENTAL HEALTH: HOW OFTEN DO YOU HAVE A DRINK CONTAINING ALCOHOL?: NEVER

## 2020-08-10 SDOH — HEALTH STABILITY: MENTAL HEALTH: HOW OFTEN DO YOU HAVE 6 OR MORE DRINKS ON ONE OCCASION?: NEVER

## 2020-08-10 SDOH — ECONOMIC STABILITY: FOOD INSECURITY: WITHIN THE PAST 12 MONTHS, THE FOOD YOU BOUGHT JUST DIDN'T LAST AND YOU DIDN'T HAVE MONEY TO GET MORE.: NEVER TRUE

## 2020-08-10 SDOH — ECONOMIC STABILITY: FOOD INSECURITY: WITHIN THE PAST 12 MONTHS, YOU WORRIED THAT YOUR FOOD WOULD RUN OUT BEFORE YOU GOT MONEY TO BUY MORE.: NEVER TRUE

## 2020-08-10 SDOH — ECONOMIC STABILITY: TRANSPORTATION INSECURITY
IN THE PAST 12 MONTHS, HAS THE LACK OF TRANSPORTATION KEPT YOU FROM MEDICAL APPOINTMENTS OR FROM GETTING MEDICATIONS?: NO

## 2020-08-10 SDOH — ECONOMIC STABILITY: TRANSPORTATION INSECURITY
IN THE PAST 12 MONTHS, HAS LACK OF TRANSPORTATION KEPT YOU FROM MEETINGS, WORK, OR FROM GETTING THINGS NEEDED FOR DAILY LIVING?: NO

## 2020-08-10 ASSESSMENT — ENCOUNTER SYMPTOMS
DIZZINESS: 0
NAUSEA: 1
PALPITATIONS: 0
ORTHOPNEA: 0
ABDOMINAL PAIN: 1
CHILLS: 0
VOMITING: 1
COUGH: 0
CONSTIPATION: 1
HEMOPTYSIS: 0
SPUTUM PRODUCTION: 0
BLOOD IN STOOL: 0

## 2020-08-10 ASSESSMENT — LIFESTYLE VARIABLES: EVER_SMOKED: YES

## 2020-08-10 ASSESSMENT — FIBROSIS 4 INDEX: FIB4 SCORE: 0.56

## 2020-08-10 NOTE — PROGRESS NOTES
Sent from [facility] in [place] for ileus on abdominal CT.  Sees Dr. Urbina for metastatic colon cancer, s/p resection 5 years ago, getting active immunotherapy, po and infusions weekly.  Has pain pump by Dr. Reina.  C/o N/V/abdominal pain.  Dr. Brown aware of transfer.  Dr. Wallace aware of admission.

## 2020-08-10 NOTE — PROGRESS NOTES
Transfer request from [facility]  Sending Physician:  GIOVANI Mccrary  Specialist Consulted: Ucelli - surgery  Diagnosis: Small bowel obstruction  Patient Accepted by Dr Dueñas  Patient is coming via : Ground  Nursing to notify the on call direct admit hospitalist when the patient arrives on the unit.

## 2020-08-10 NOTE — CONSULTS
"    Surgical History and Physical    Date of Service: 8/10/2020    Requesting Physician: Nick Wallace MD - Hospitalist    PCP: BEENA Sterling    Reason for Consultation: Nausea, vomiting and abdominal pain    HPI: This is a 55 y.o. male who is presenting from [facility] in [place] with 4 days of nausea and emesis.  The patient has a lengthy and complicated oncologic history stemming from metastatic colon cancer.  He is still receiving chemotherapy, but has not gotten it in 2-3 weeks for one reason or another.  He presented to the above care facility where his labs and vitals were not of concern, but a CT abd/pel showed mild small bowel dilation suggesting an ileus versus a bowel obstruction.  The patient was not having a lot of abdominal pain that is worse than his baseline pain however.  He was transferred in this afternoon and I was at bedside evaluating the patient at 1530.      He was seen at bedside.  He denies worsening abdominal pain, fevers, and chills.  Having ongoing nausea.      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   • COPD (chronic obstructive pulmonary disease) (HCC) 2018   • Dental disorder 01/24/2020    Upper dentures, lower teeth \"in bad shape\"   • Fall 2019   • Heart burn    • Hepatitis C 2005    low viral load per spouse   • Hypertension    • Indigestion    • PICC (peripherally inserted central catheter) in place 05/2019   • Psychiatric problem     anxiety   • Renal disorder     rt kidney is only functioning kidney due to cancer          PAST SURGICAL HISTORY:   Past Surgical History:   Procedure Laterality Date   • PUMP REVISION Left 1/28/2020    Procedure: REVISION, INSERTION, OR REPLACEMENT, INTRATHECAL ANALGESIC PUMP;  Surgeon: Candelario Reina M.D.; " " Location: SURGERY [facility];  Service: Pain Management   • PUMP REVISION  7/9/2019    Procedure: REVISION, INSERTION, OR REPLACEMENT, INTRATHECAL ANALGESIC PUMP;  Surgeon: Candelario Reina M.D.;  Location: SURGERY [facility];  Service: Pain Management   • BRONCHOSCOPY-ENDO N/A 3/1/2019    Procedure: BRONCHOSCOPY-ENDO;  Surgeon: Katelin Garner M.D.;  Location: ENDOSCOPY [facility];  Service: Pulmonary   • UROLOGY SURGERY Left 2018    \"severed testicle\" per spouse   • CATH PLACEMENT  3/16/2016    Procedure: CATH PLACEMENT POWER PORT ;  Surgeon: Luke Lima M.D.;  Location: SURGERY [facility];  Service:    • LOW ANTERIOR RESECTION LAPAROSCOPIC  1/12/2016    Procedure: LOW ANTERIOR RESECTION LAPAROSCOPIC;  Surgeon: Luke Lima M.D.;  Location: SURGERY [facility];  Service:    • COLONOSCOPY N/A 2016   • OTHER ORTHOPEDIC SURGERY Right     Rt knee ACL   • OTHER ORTHOPEDIC SURGERY Right     Rt knee meniscus repair x2   • OTHER ORTHOPEDIC SURGERY Left     Lt knee meniscus repair x2          ALLERGIES: Levaquin and Nubain [nalbuphine]       CURRENT MEDICATIONS:   Please refer to the medication reconciliation in [facility].    FAMILY HISTORY: family history includes No Known Problems in his brother, father, maternal grandfather, maternal grandmother, mother, paternal grandfather, paternal grandmother, and sister. He was adopted.      SOCIAL HISTORY:  reports that he quit smoking about 5 years ago. He started smoking about 19 years ago. He has a 15.00 pack-year smoking history. He has never used smokeless tobacco. He reports previous alcohol use. He reports current drug use. Drugs: Inhaled and Oral.      Review of Systems:  Constitutional: Negative for fever, chills, weight loss, malaise/fatigue and diaphoresis.   HENT: Negative for hearing loss, ear pain, nosebleeds, congestion, sore throat, neck pain, tinnitus and ear discharge.    Eyes: Negative for blurred vision, double vision, " "photophobia, pain, discharge and redness.   Respiratory: Negative for cough, hemoptysis, sputum production, shortness of breath, wheezing and stridor.    Cardiovascular: Negative for chest pain, palpitations, orthopnea, claudication, leg swelling and PND.   Gastrointestinal: Negative for heartburn, nausea, vomiting, abdominal pain, diarrhea, constipation, blood in stool and melena.   Genitourinary: Negative for dysuria, urgency, frequency, hematuria and flank pain.   Musculoskeletal: Negative for myalgias, back pain, joint pain and falls.   Skin: Negative for itching and rash.  Neurological: Negative for dizziness, tingling, tremors, sensory change, speech change, focal weakness, seizures, loss of consciousness, weakness and headaches.   Endo/Heme/Allergies: Negative for environmental allergies and polydipsia. Does not bruise/bleed easily.   Psychiatric/Behavioral: Negative for depression, suicidal ideas, hallucinations, memory loss and substance abuse. The patient is not nervous/anxious and does not have insomnia.    Physical Exam:  /102   Pulse 92   Temp 36.6 °C (97.8 °F) (Temporal)   Resp 18   Ht 1.905 m (6' 3\")   Wt 78.5 kg (173 lb 1 oz)   SpO2 94%   Vitals:    08/10/20 1404   BP: 135/102   Pulse: 92   Resp: 18   Temp: 36.6 °C (97.8 °F)   SpO2: 94%     GENERAL:  Otherwise healthy-appearing and in no acute distress  HEENT:  Atraumatic, normocephalic.  Normal pinna bilaterally.  External auditory canals are without discharge.  Conjunctivae and sclerae are clear. Extraocular movements are full. Pupils are equal, round, and reactive to light.  Oral mucosa is moist.  NECK:  Soft and supple without lymphadenopathy. No masses are noted.  Thyroid is of normal size and texture.  Trachea is midline.  CHEST:  Lungs are clear to auscultation bilaterally.  No masses, lesions, or signs of trauma were noted.     CARDIOVASCULAR:  Regular rate and rhythm.  No murmurs appreciated.  No JVD.  Palpable pulses present in " all four extremities.    ABDOMEN:  Soft, focally tender with moderate palpation over the LLQ, non-distended.  Non-tympanitic.  No hepatomegaly or splenomegaly.  No incisional, umbilical, or inguinal hernias were appreciated.  Pain pump present in the subcutaneous tissues of the RLQ.    MUSCULOSKELETAL: Normal range of motion x4 extremities.    SKIN:  Warm and well perfused. No rashes.  NEUROLOGIC:  Alert and oriented. Cranial nerves II through XII are grossly intact. Motor and sensory exams are normal in all four extremities. Motor and sensory reflexes are 2+ and symmetric with bilateral plantar responses.  PSYCHIATRIC: Affect and mood is appropriate for age and condition.    Labs:  Recent Labs     08/10/20  1546   WBC 10.3   RBC 5.04   HEMOGLOBIN 12.2*   HEMATOCRIT 37.9*   MCV 75.2*   MCH 24.2*   MCHC 32.2*   RDW 43.6   PLATELETCT 400   MPV 8.8*     Recent Labs     08/10/20  1546   SODIUM 134*   POTASSIUM 3.9   CHLORIDE 98   CO2 22   GLUCOSE 97   BUN 7*   CREATININE 0.95   CALCIUM 8.6         Recent Labs     08/10/20  1546   ASTSGOT 14   ALTSGPT 15   TBILIRUBIN 0.5   ALKPHOSPHAT 114*   GLOBULIN 2.5       Radiology:  OUTSIDE IMAGES-DX CHEST   Final Result      OUTSIDE IMAGES-CT ABDOMEN /PELVIS   Final Result        Available images and imaging reports were reviewed personally.    Assessment/Plan:   Small Bowel Ileus versus Obstruction:  His colon is full of stool and gas from the cecum to the splenic flexure.  The descending and sigmoid colon appear decompressed.  There is some fecalization of the terminal ileum contents with mild dilation of the small intestine proximal to this.  There is not a clear transition point that I can see.     His labs and vitals are reassuring.  Lactate is normal.  According to the patient, he has not received chemotherapy for 2-3 weeks.  His abdominal exam is only notable for mild tenderness over the left lower quadrant.      The patient has a dilaudid pain pump and takes oral opiates  at regular intervals daily so there may very likely be a component of narcotic induced intestinal dysmotility/constipation.  However, he has had abdominal surgery in the recent past as well as metastatic disease, so adhesive and/or malignant bowel obstruction is a possibility.      Will embark on conservative non-operative management for now.  We would ideally cut back and minimize on the narcotics he takes, but this will likely not be possible given his baseline pain issues.    ____________________________________   Henry AGUILAR / MAURO     DD: 8/10/2020   DT: 4:57 PM

## 2020-08-11 VITALS
BODY MASS INDEX: 21.52 KG/M2 | DIASTOLIC BLOOD PRESSURE: 92 MMHG | TEMPERATURE: 98.9 F | HEIGHT: 75 IN | SYSTOLIC BLOOD PRESSURE: 136 MMHG | OXYGEN SATURATION: 95 % | WEIGHT: 173.06 LBS | RESPIRATION RATE: 18 BRPM | HEART RATE: 92 BPM

## 2020-08-11 PROBLEM — K56.7 ILEUS (HCC): Status: RESOLVED | Noted: 2020-08-10 | Resolved: 2020-08-11

## 2020-08-11 LAB
ANION GAP SERPL CALC-SCNC: 13 MMOL/L (ref 7–16)
BASOPHILS # BLD AUTO: 0.6 % (ref 0–1.8)
BASOPHILS # BLD: 0.06 K/UL (ref 0–0.12)
BUN SERPL-MCNC: 8 MG/DL (ref 8–22)
CALCIUM SERPL-MCNC: 8.4 MG/DL (ref 8.5–10.5)
CHLORIDE SERPL-SCNC: 101 MMOL/L (ref 96–112)
CO2 SERPL-SCNC: 23 MMOL/L (ref 20–33)
CREAT SERPL-MCNC: 0.85 MG/DL (ref 0.5–1.4)
EOSINOPHIL # BLD AUTO: 0.16 K/UL (ref 0–0.51)
EOSINOPHIL NFR BLD: 1.5 % (ref 0–6.9)
ERYTHROCYTE [DISTWIDTH] IN BLOOD BY AUTOMATED COUNT: 43.4 FL (ref 35.9–50)
GLUCOSE SERPL-MCNC: 98 MG/DL (ref 65–99)
HCT VFR BLD AUTO: 35.5 % (ref 42–52)
HGB BLD-MCNC: 11.3 G/DL (ref 14–18)
IMM GRANULOCYTES # BLD AUTO: 0.02 K/UL (ref 0–0.11)
IMM GRANULOCYTES NFR BLD AUTO: 0.2 % (ref 0–0.9)
LACTATE BLD-SCNC: 1.1 MMOL/L (ref 0.5–2)
LYMPHOCYTES # BLD AUTO: 1.66 K/UL (ref 1–4.8)
LYMPHOCYTES NFR BLD: 15.5 % (ref 22–41)
MCH RBC QN AUTO: 23.9 PG (ref 27–33)
MCHC RBC AUTO-ENTMCNC: 31.8 G/DL (ref 33.7–35.3)
MCV RBC AUTO: 75.2 FL (ref 81.4–97.8)
MONOCYTES # BLD AUTO: 1.2 K/UL (ref 0–0.85)
MONOCYTES NFR BLD AUTO: 11.2 % (ref 0–13.4)
NEUTROPHILS # BLD AUTO: 7.6 K/UL (ref 1.82–7.42)
NEUTROPHILS NFR BLD: 71 % (ref 44–72)
NRBC # BLD AUTO: 0 K/UL
NRBC BLD-RTO: 0 /100 WBC
PLATELET # BLD AUTO: 354 K/UL (ref 164–446)
PMV BLD AUTO: 8.7 FL (ref 9–12.9)
POTASSIUM SERPL-SCNC: 3.9 MMOL/L (ref 3.6–5.5)
RBC # BLD AUTO: 4.72 M/UL (ref 4.7–6.1)
SODIUM SERPL-SCNC: 137 MMOL/L (ref 135–145)
WBC # BLD AUTO: 10.7 K/UL (ref 4.8–10.8)

## 2020-08-11 PROCEDURE — 99239 HOSP IP/OBS DSCHRG MGMT >30: CPT | Performed by: HOSPITALIST

## 2020-08-11 PROCEDURE — 36415 COLL VENOUS BLD VENIPUNCTURE: CPT

## 2020-08-11 PROCEDURE — 700102 HCHG RX REV CODE 250 W/ 637 OVERRIDE(OP): Performed by: HOSPITALIST

## 2020-08-11 PROCEDURE — 80048 BASIC METABOLIC PNL TOTAL CA: CPT

## 2020-08-11 PROCEDURE — 700111 HCHG RX REV CODE 636 W/ 250 OVERRIDE (IP): Performed by: HOSPITALIST

## 2020-08-11 PROCEDURE — 83605 ASSAY OF LACTIC ACID: CPT

## 2020-08-11 PROCEDURE — A9270 NON-COVERED ITEM OR SERVICE: HCPCS | Performed by: HOSPITALIST

## 2020-08-11 PROCEDURE — 85025 COMPLETE CBC W/AUTO DIFF WBC: CPT

## 2020-08-11 PROCEDURE — 700105 HCHG RX REV CODE 258: Performed by: HOSPITALIST

## 2020-08-11 RX ORDER — SENNA AND DOCUSATE SODIUM 50; 8.6 MG/1; MG/1
1 TABLET, FILM COATED ORAL DAILY
Qty: 30 TAB | Refills: 11 | Status: SHIPPED | OUTPATIENT
Start: 2020-08-11 | End: 2021-10-11

## 2020-08-11 RX ADMIN — HYDROMORPHONE HYDROCHLORIDE 4 MG: 4 TABLET ORAL at 14:38

## 2020-08-11 RX ADMIN — ONDANSETRON 4 MG: 4 TABLET, ORALLY DISINTEGRATING ORAL at 14:38

## 2020-08-11 RX ADMIN — PREDNISONE 5 MG: 10 TABLET ORAL at 06:15

## 2020-08-11 RX ADMIN — TAMSULOSIN HYDROCHLORIDE 0.4 MG: 0.4 CAPSULE ORAL at 08:04

## 2020-08-11 RX ADMIN — LOSARTAN POTASSIUM 50 MG: 50 TABLET, FILM COATED ORAL at 16:22

## 2020-08-11 RX ADMIN — OMEPRAZOLE 20 MG: 20 CAPSULE, DELAYED RELEASE ORAL at 16:22

## 2020-08-11 RX ADMIN — SODIUM CHLORIDE, POTASSIUM CHLORIDE, SODIUM LACTATE AND CALCIUM CHLORIDE: 600; 310; 30; 20 INJECTION, SOLUTION INTRAVENOUS at 06:14

## 2020-08-11 RX ADMIN — HYDROMORPHONE HYDROCHLORIDE 0.5 MG: 1 INJECTION, SOLUTION INTRAMUSCULAR; INTRAVENOUS; SUBCUTANEOUS at 02:24

## 2020-08-11 RX ADMIN — ONDANSETRON 4 MG: 2 INJECTION INTRAMUSCULAR; INTRAVENOUS at 02:24

## 2020-08-11 RX ADMIN — HYDROMORPHONE HYDROCHLORIDE 0.5 MG: 1 INJECTION, SOLUTION INTRAMUSCULAR; INTRAVENOUS; SUBCUTANEOUS at 07:59

## 2020-08-11 ASSESSMENT — PATIENT HEALTH QUESTIONNAIRE - PHQ9
SUM OF ALL RESPONSES TO PHQ QUESTIONS 1-9: 9
6. FEELING BAD ABOUT YOURSELF - OR THAT YOU ARE A FAILURE OR HAVE LET YOURSELF OR YOUR FAMILY DOWN: SEVERAL DAYS
1. LITTLE INTEREST OR PLEASURE IN DOING THINGS: SEVERAL DAYS
4. FEELING TIRED OR HAVING LITTLE ENERGY: MORE THAN HALF THE DAYS
7. TROUBLE CONCENTRATING ON THINGS, SUCH AS READING THE NEWSPAPER OR WATCHING TELEVISION: SEVERAL DAYS
SUM OF ALL RESPONSES TO PHQ9 QUESTIONS 1 AND 2: 3
8. MOVING OR SPEAKING SO SLOWLY THAT OTHER PEOPLE COULD HAVE NOTICED. OR THE OPPOSITE, BEING SO FIGETY OR RESTLESS THAT YOU HAVE BEEN MOVING AROUND A LOT MORE THAN USUAL: NOT AT ALL
2. FEELING DOWN, DEPRESSED, IRRITABLE, OR HOPELESS: MORE THAN HALF THE DAYS
3. TROUBLE FALLING OR STAYING ASLEEP OR SLEEPING TOO MUCH: NOT AT ALL
5. POOR APPETITE OR OVEREATING: MORE THAN HALF THE DAYS
9. THOUGHTS THAT YOU WOULD BE BETTER OFF DEAD, OR OF HURTING YOURSELF: NOT AT ALL

## 2020-08-11 ASSESSMENT — LIFESTYLE VARIABLES
EVER HAD A DRINK FIRST THING IN THE MORNING TO STEADY YOUR NERVES TO GET RID OF A HANGOVER: NO
HOW MANY TIMES IN THE PAST YEAR HAVE YOU HAD 5 OR MORE DRINKS IN A DAY: 0
ALCOHOL_USE: NO
AVERAGE NUMBER OF DAYS PER WEEK YOU HAVE A DRINK CONTAINING ALCOHOL: 0
CONSUMPTION TOTAL: NEGATIVE
TOTAL SCORE: 0
TOTAL SCORE: 0
HAVE YOU EVER FELT YOU SHOULD CUT DOWN ON YOUR DRINKING: NO
HAVE PEOPLE ANNOYED YOU BY CRITICIZING YOUR DRINKING: NO
EVER FELT BAD OR GUILTY ABOUT YOUR DRINKING: NO
ON A TYPICAL DAY WHEN YOU DRINK ALCOHOL HOW MANY DRINKS DO YOU HAVE: 0
TOTAL SCORE: 0

## 2020-08-11 NOTE — DISCHARGE INSTRUCTIONS
Discharge Instructions per Pau Teresa M.D.    Please follow up with your PCP. Ensure that you stay hydrated and continue to take senna-docusate and Miralax.    ACTIVITY: as tolerated    DIAGNOSIS: ileus, self resolved    Return to ER if you develop new or worsening symptoms.     Discharge Instructions    Discharged to home by car with relative. Discharged via wheelchair, hospital escort: Yes.  Special equipment needed: Not Applicable    Be sure to schedule a follow-up appointment with your primary care doctor or any specialists as instructed.     Discharge Plan:   Diet Plan: Discussed  Activity Level: Discussed  Confirmed Follow up Appointment: Patient to Call and Schedule Appointment  Confirmed Symptoms Management: Discussed  Medication Reconciliation Updated: Yes    I understand that a diet low in cholesterol, fat, and sodium is recommended for good health. Unless I have been given specific instructions below for another diet, I accept this instruction as my diet prescription.   Other diet: Regular as tolerated    Special Instructions: None    · Is patient discharged on Warfarin / Coumadin?   No     Depression / Suicide Risk    As you are discharged from this [facility], it is important to learn how to keep safe from harming yourself.    Recognize the warning signs:  · Abrupt changes in personality, positive or negative- including increase in energy   · Giving away possessions  · Change in eating patterns- significant weight changes-  positive or negative  · Change in sleeping patterns- unable to sleep or sleeping all the time   · Unwillingness or inability to communicate  · Depression  · Unusual sadness, discouragement and loneliness  · Talk of wanting to die  · Neglect of personal appearance   · Rebelliousness- reckless behavior  · Withdrawal from people/activities they love  · Confusion- inability to concentrate     If you or a loved one observes any of these behaviors or has concerns  about self-harm, here's what you can do:  · Talk about it- your feelings and reasons for harming yourself  · Remove any means that you might use to hurt yourself (examples: pills, rope, extension cords, firearm)  · Get professional help from the community (Mental Health, Substance Abuse, psychological counseling)  · Do not be alone:Call your Safe Contact- someone whom you trust who will be there for you.  · Call your local CRISIS HOTLINE 237-5696 or 190-215-2219  · Call your local Children's Mobile Crisis Response Team [place] (131) 538-9422 or www.[employer]  · Call the toll free National Suicide Prevention Hotlines   · National Suicide Prevention Lifeline 633-189-HFOD (9368)  · National Hope Line Network 800-SUICIDE (613-3296)

## 2020-08-11 NOTE — CARE PLAN
Problem: Safety  Goal: Will remain free from injury  Outcome: PROGRESSING AS EXPECTED  Note: Call light within reach, bed in the low and locked position, patient wearing non-slip socks, and rounded on hourly.       Problem: Knowledge Deficit  Goal: Knowledge of the prescribed therapeutic regimen will improve  Outcome: PROGRESSING AS EXPECTED  Note: Patient educated on medications, nursing interventions, and their diagnosis.  Patient demonstrates desire to be involved in their care.

## 2020-08-11 NOTE — PROGRESS NOTES
"    DATE: 8/11/2020    Hospital Day 2 Ileus.    Interval Events:  Reports multiple bowel movements overnight  No nausea  No abdominal pain  Discussed regular bowel regimen with patient    -OK for discharge and diet as tolerated from my perspective.  -No need for follow up with me or surgery    PHYSICAL EXAMINATION:  Constitutional:     Vital Signs: /95   Pulse 99   Temp 36.9 °C (98.4 °F) (Temporal)   Resp 17   Ht 1.905 m (6' 3\")   Wt 78.5 kg (173 lb 1 oz)   SpO2 93%   GENERAL:  No acute distress  HEENT: Pupils equal, round and reactive to light bilaterally.  Sclera are clear bilaterally.  No otorrhea.  No rhinorrhea.  Mucus membranes are moist.  CHEST:  Lungs are clear to auscultation bilaterally  CARDIOVASCULAR:  Regular rate and rhythm  ABDOMEN: Soft, non-tender, non-distended. Pain pump in the subcutaneous tissues of the RLQ  GENITOURINARY:  No oro in place, voiding without issues  EXTREMITIES:  Good range of motion through out  NEUROLOGIC:  Alert and oriented.  Cranial Nerves II through XII are grossly intact, no focal deficits appreciated  PSYCHIATRIC:  Normal affect and mood appropriate for age and condition  SKIN:  Warm and well perfused, no rashes      Laboratory Values:   Recent Labs     08/10/20  1546 08/11/20  0047   WBC 10.3 10.7   RBC 5.04 4.72   HEMOGLOBIN 12.2* 11.3*   HEMATOCRIT 37.9* 35.5*   MCV 75.2* 75.2*   MCH 24.2* 23.9*   MCHC 32.2* 31.8*   RDW 43.6 43.4   PLATELETCT 400 354   MPV 8.8* 8.7*     Recent Labs     08/10/20  1546 08/11/20  0047   SODIUM 134* 137   POTASSIUM 3.9 3.9   CHLORIDE 98 101   CO2 22 23   GLUCOSE 97 98   BUN 7* 8   CREATININE 0.95 0.85   CALCIUM 8.6 8.4*     Recent Labs     08/10/20  1546   ASTSGOT 14   ALTSGPT 15   TBILIRUBIN 0.5   ALKPHOSPHAT 114*   GLOBULIN 2.5            Imaging:   OUTSIDE IMAGES-DX CHEST   Final Result      OUTSIDE IMAGES-CT ABDOMEN /PELVIS   Final Result          ASSESSMENT AND PLAN:   Ileus, likely narcotic induced    -Plan as above     " ____________________________________     Dominguez Summers M.D.    DD: 8/11/2020  10:11 AM

## 2020-08-11 NOTE — PROGRESS NOTES
2 RN skin check complete.   Devices in place: PIV.  Skin assessed under devices: intact.  Confirmed pressure ulcers found on: N/A.  New potential pressure ulcers noted on: N/A.   Wound consult placed: N/A.  The following interventions in place: pillows in use for support/positioning and patient able to ambulate/turn self in bed.     Patient has pain pump in left lower quadrant of abdomen. Patient has a scar on lower back with slight bumped up area of skin.

## 2020-08-11 NOTE — PROGRESS NOTES
Pt arrived to unit via [facility]. Pt direct admit from [facility]. Pt complaining of nausea and 6-7/10 pain in abdomen. Paged admitting doctor for orders. Pt has a PIV in R hand. Bed locked and in lowest position, bed alarm on, hourly rounding in place.

## 2020-08-11 NOTE — ASSESSMENT & PLAN NOTE
Patient's CT scan results from the outside hospital reviewed, he has ileus versus small bowel obstruction  Patient uses MiraLAX at home without result  Admit, IV fluids, NPO, IV antiemetics  I discussed the case with Dr. Summers of surgery

## 2020-08-11 NOTE — CARE PLAN
Problem: Communication  Goal: The ability to communicate needs accurately and effectively will improve  Outcome: PROGRESSING AS EXPECTED  Note: Pt able to communicate needs effectively.      Problem: Safety  Goal: Will remain free from falls  Outcome: PROGRESSING AS EXPECTED  Note: Bed locked and in lowest position, bed alarm on, hourly rounding in place, pt educated to call for assistance.

## 2020-08-11 NOTE — H&P
Hospital Medicine History & Physical Note    Date of Service  8/10/2020    Primary Care Physician  BEENA Sterling    Consultants  Surgery, Dr. Summers    Code Status  Full Code    Chief Complaint  Abdominal pain and constipation    History of Presenting Illness  55 y.o. male who presented 8/10/2020 with abdominal pain and constipation.    Mr. Ingram has a history of colon cancer and chronic pain.  He continues to undergo treatment for colon cancer, he is followed by Dr. Urbina.  Patient has a pain pump and takes regular doses of p.o. Dilaudid.    Over the past for 5 days patient has developed progressive symptoms of abdominal pain, his abdomen has become more firm, it is generally painful in all quadrants, pain relieved with Dilaudid, patient has had multiple episodes of emesis, he counts 4 episodes since this morning, emesis been nonbloody, no bowel movement x 4 day.  Tried miralax without improvement    Review of Systems  Review of Systems   Constitutional: Negative for chills and malaise/fatigue.   Respiratory: Negative for cough, hemoptysis and sputum production.    Cardiovascular: Negative for chest pain, palpitations and orthopnea.   Gastrointestinal: Positive for abdominal pain, constipation, nausea and vomiting. Negative for blood in stool.   Skin: Negative for itching and rash.   Neurological: Negative for dizziness.   All other systems reviewed and are negative.      Past Medical History   has a past medical history of Arthritis (01/24/2020), Bowel habit changes (01/24/2020), Breath shortness, Cancer (HCC), Cancer (HCC) (08/2018), COPD (chronic obstructive pulmonary disease) (HCC) (2018), Dental disorder (01/24/2020), Fall, Heart burn, Hepatitis C (2005), Hypertension, Indigestion, PICC (peripherally inserted central catheter) in place (05/2019), Psychiatric problem, and Renal disorder.    Surgical History   has a past surgical history that includes low anterior resection laparoscopic (1/12/2016);  "cath placement (3/16/2016); bronchoscopy-endo (N/A, 3/1/2019); other orthopedic surgery (Right); other orthopedic surgery (Right); other orthopedic surgery (Left); pump revision (7/9/2019); urology surgery (Left, 2018); colonoscopy (N/A, 2016); and pump revision (Left, 1/28/2020).     Family History  family history includes No Known Problems in his brother, father, maternal grandfather, maternal grandmother, mother, paternal grandfather, paternal grandmother, and sister. He was adopted.     Social History   reports that he quit smoking about 5 years ago. He started smoking about 19 years ago. He has a 15.00 pack-year smoking history. He has never used smokeless tobacco. He reports previous alcohol use. He reports current drug use. Drugs: Inhaled and Oral.    Allergies  Allergies   Allergen Reactions   • Levaquin Vomiting   • Nubain [Nalbuphine] Anxiety     \"made him feel paranoid\"       Medications  Prior to Admission Medications   Prescriptions Last Dose Informant Patient Reported? Taking?   HYDROmorphone (DILAUDID) 4 MG Tab 8/10/2020 at morning Family Member Yes No   Sig: Take 4 mg by mouth every 6 hours as needed ([employer] [facility]).   NON SPECIFIED   Yes No   Sig: Pain pump has Dilaudid and Ketamine   hydrocortisone rectal (PROCTOZONE HC) 2.5 % Cream   No No   Sig: Insert 1 Application in rectum 3 times a day as needed.   lansoprazole (PREVACID) 30 MG CAPSULE DELAYED RELEASE 8/9/2020 at Unknown time Family Member Yes No   Sig: Take 30 mg by mouth every day.   losartan (COZAAR) 50 MG Tab 8/9/2020 at Unknown time Family Member Yes No   Sig: Take 50 mg by mouth every day.   ondansetron (ZOFRAN ODT) 4 MG TABLET DISPERSIBLE 8/10/2020 at morning Family Member Yes No   Sig: Take 8 mg by mouth every 6 hours as needed for Nausea.   polyethylene glycol/lytes (MIRALAX) Pack 8/9/2020 at Unknown time Family Member No No   Sig: Take 1 Packet by mouth every day. Can use up to 2 times daily if no bowel movement in 24 hours. "   predniSONE (DELTASONE) 5 MG Tab 8/9/2020 at Unknown time Family Member Yes No   Sig: Take 5 mg by mouth every day.   promethazine (PHENERGAN) 50 MG Tab  Family Member Yes No   Sig: Take 50 mg by mouth every 6 hours as needed for Nausea/Vomiting.   tamsulosin (FLOMAX) 0.4 MG capsule 8/9/2020 at Unknown time Family Member Yes No   Sig: Take 0.4 mg by mouth ONE-HALF HOUR AFTER BREAKFAST.      Facility-Administered Medications: None       Physical Exam  Temp:  [36.6 °C (97.8 °F)] 36.6 °C (97.8 °F)  Pulse:  [92] 92  Resp:  [18] 18  BP: (135)/(102) 135/102  SpO2:  [94 %] 94 %    Physical Exam  Constitutional:       General: He is not in acute distress.     Appearance: Normal appearance. He is normal weight.   HENT:      Head: Normocephalic and atraumatic.      Right Ear: External ear normal.      Left Ear: External ear normal.      Nose: Nose normal.      Mouth/Throat:      Mouth: Mucous membranes are moist.      Pharynx: Oropharynx is clear.   Eyes:      Extraocular Movements: Extraocular movements intact.      Conjunctiva/sclera: Conjunctivae normal.      Pupils: Pupils are equal, round, and reactive to light.   Neck:      Musculoskeletal: Normal range of motion and neck supple.   Cardiovascular:      Rate and Rhythm: Normal rate and regular rhythm.      Pulses: Normal pulses.   Pulmonary:      Effort: Pulmonary effort is normal.      Breath sounds: Normal breath sounds.   Abdominal:      General: Abdomen is flat. Bowel sounds are normal.      Palpations: Abdomen is soft.      Comments: Postsurgical, pain pump in place  Hypoactive bowel sounds  Abdomen is firm, no rebound or guarding, minimal distention   Musculoskeletal: Normal range of motion.   Skin:     General: Skin is warm and dry.      Capillary Refill: Capillary refill takes less than 2 seconds.      Coloration: Skin is not jaundiced.   Neurological:      General: No focal deficit present.      Mental Status: He is alert and oriented to person, place, and  time.      Cranial Nerves: No cranial nerve deficit.      Gait: Gait normal.   Psychiatric:         Mood and Affect: Mood normal.         Behavior: Behavior normal.         Laboratory:  Recent Labs     08/10/20  1546   WBC 10.3   RBC 5.04   HEMOGLOBIN 12.2*   HEMATOCRIT 37.9*   MCV 75.2*   MCH 24.2*   MCHC 32.2*   RDW 43.6   PLATELETCT 400   MPV 8.8*     Recent Labs     08/10/20  1546   SODIUM 134*   POTASSIUM 3.9   CHLORIDE 98   CO2 22   GLUCOSE 97   BUN 7*   CREATININE 0.95   CALCIUM 8.6     Recent Labs     08/10/20  1546   ALTSGPT 15   ASTSGOT 14   ALKPHOSPHAT 114*   TBILIRUBIN 0.5   GLUCOSE 97         No results for input(s): NTPROBNP in the last 72 hours.      No results for input(s): TROPONINT in the last 72 hours.    Imaging:  OUTSIDE IMAGES-DX CHEST   Final Result      OUTSIDE IMAGES-CT ABDOMEN /PELVIS   Final Result            Assessment/Plan:  I anticipate this patient will require at least two midnights for appropriate medical management, necessitating inpatient admission.    * Ileus (HCC)- (present on admission)  Assessment & Plan  Patient's CT scan results from the outside hospital reviewed, he has ileus versus small bowel obstruction  Patient uses MiraLAX at home without result  Admit, IV fluids, NPO, IV antiemetics  I discussed the case with Dr. Summers of surgery    Metastatic Colon cancer (HCC)- (present on admission)  Assessment & Plan  Followed by Dr. Urbina    COPD (chronic obstructive pulmonary disease) (HCC)- (present on admission)  Assessment & Plan  No acute exacerbation  Continue home prednisone and inhalers

## 2020-08-11 NOTE — PROGRESS NOTES
"Assumed patient care at 1900. Pt is A&Ox4 and a standby assist. Bed alarm is on and in the low and locked position. Call light within reach, wearing non-slip socks, and rounded on hourly. Patient was able to have a bowel movement, stating \"it was hard\" when asked what it was like. Explained to patient the order to place and NG tube for decompression. Patient expressed concern of whether or not that was needed since he had the bowel movement. On call MD paged. Orders to still place NG tube but can hold off on enema for now.    Patient medicated for pain and nausea but has not vomited this shift.         "

## 2020-08-11 NOTE — PROGRESS NOTES
"Patient refusing placement of NG tube and enema. He states \"I finally went to the bathroom and now I feel like I could finally get some sleep. I haven't gotten any sleep\". I explained the reasoning behind placement and he stated \"I'll see how I feel in the morning\".   ALDO Joseph notified.     Patient has had 2 bowel movements this shift. The second was soft, formed and medium sized.   "

## 2020-08-11 NOTE — DISCHARGE SUMMARY
Discharge Summary    CHIEF COMPLAINT ON ADMISSION  No chief complaint on file.      Reason for Admission  Small bowel obstruction     Admission Date  8/10/2020    CODE STATUS  Full Code    HPI & HOSPITAL COURSE  This is a 55 y.o. male with pain pump followed by Dr. Reina and known metastatic colon cancer status post resection 5 years prior and on immunotherapy here with an ileus.  He was sent from [facility] in [place] for specialty consultation and higher level of care.  He was complaining of nausea, vomiting, abdominal pain he presented to the outside facility.  He was started on IV fluids and general surgery was consulted.  He had declined NG tube for decompression and had multiple bowel movements overnight. This small bowel ileus was likely multifactorial secondary to dehydration and chronic opioid use for chronic pain secondary to metastatic cancer.  His bowel regimen was increased and it was discussed with patient and his wife. His diet was advanced and he was eager for discharge home.        Therefore, he is discharged in good and stable condition to home with close outpatient follow-up.    The patient recovered much more quickly than anticipated on admission.    Discharge Date  8/11/2020    FOLLOW UP ITEMS POST DISCHARGE  Please follow up with your PCP and Oncology.     DISCHARGE DIAGNOSES  Principal Problem (Resolved):    Ileus (HCC) POA: Yes  Active Problems:    Metastatic Colon cancer (HCC) POA: Yes      Overview: diagnosed 2016 treated with resection and chemo      had recurrence 08/2018, pulmonary nodules biopsied at that time showed       adenocarcinoma    COPD (chronic obstructive pulmonary disease) (HCC) POA: Yes      FOLLOW UP  No future appointments.  Nina Hardy, P.A.  [address] [facility] [address]  [phone]            MEDICATIONS ON DISCHARGE     Medication List      START taking these medications      Instructions   sennosides-docusate sodium 8.6-50 MG  "tablet  Commonly known as: SENOKOT-S   Doctor's comments: Hold if BMs are loose  Take 1 Tab by mouth every day.  Dose: 1 Tab        CONTINUE taking these medications      Instructions   hydrocortisone rectal 2.5 % Crea  Commonly known as: PROCTOZONE HC   Insert 1 Application in rectum 3 times a day as needed.  Dose: 1 Application     HYDROmorphone 4 MG Tabs  Commonly known as: DILAUDID   Take 4 mg by mouth every 6 hours as needed (Breakthru Pain).  Dose: 4 mg     lansoprazole 30 MG Cpdr  Commonly known as: PREVACID   Take 30 mg by mouth every day.  Dose: 30 mg     losartan 50 MG Tabs  Commonly known as: COZAAR   Take 50 mg by mouth every day.  Dose: 50 mg     NON SPECIFIED   Pain pump has Dilaudid and Ketamine     ondansetron 4 MG Tbdp  Commonly known as: ZOFRAN ODT   Take 8 mg by mouth every 6 hours as needed for Nausea.  Dose: 8 mg     polyethylene glycol/lytes 17 g Pack  Commonly known as: MIRALAX   Take 1 Packet by mouth every day. Can use up to 2 times daily if no bowel movement in 24 hours.  Dose: 17 g     predniSONE 5 MG Tabs  Commonly known as: DELTASONE   Take 5 mg by mouth every day.  Dose: 5 mg     promethazine 50 MG Tabs  Commonly known as: PHENERGAN   Take 50 mg by mouth every 6 hours as needed for Nausea/Vomiting.  Dose: 50 mg     tamsulosin 0.4 MG capsule  Commonly known as: FLOMAX   Take 0.4 mg by mouth ONE-HALF HOUR AFTER BREAKFAST.  Dose: 0.4 mg            Allergies  Allergies   Allergen Reactions   • Levaquin Vomiting   • Nubain [Nalbuphine] Anxiety     \"made him feel paranoid\"       DIET  Orders Placed This Encounter   Procedures   • Diet Order Low Fiber(GI Soft)     Standing Status:   Standing     Number of Occurrences:   1     Order Specific Question:   Diet:     Answer:   Low Fiber(GI Soft) [2]       ACTIVITY  As tolerated.  Weight bearing as tolerated    CONSULTATIONS  General Surgery    PROCEDURES  OUTSIDE IMAGES-DX CHEST   Final Result      OUTSIDE IMAGES-CT ABDOMEN /PELVIS   Final Result "            LABORATORY  Lab Results   Component Value Date    SODIUM 137 08/11/2020    POTASSIUM 3.9 08/11/2020    CHLORIDE 101 08/11/2020    CO2 23 08/11/2020    GLUCOSE 98 08/11/2020    BUN 8 08/11/2020    CREATININE 0.85 08/11/2020        Lab Results   Component Value Date    WBC 10.7 08/11/2020    HEMOGLOBIN 11.3 (L) 08/11/2020    HEMATOCRIT 35.5 (L) 08/11/2020    PLATELETCT 354 08/11/2020        Total time of the discharge process exceeds 41 minutes.

## 2020-08-11 NOTE — CARE PLAN
Problem: Venous Thromboembolism (VTW)/Deep Vein Thrombosis (DVT) Prevention:  Goal: Patient will participate in Venous Thrombosis (VTE)/Deep Vein Thrombosis (DVT)Prevention Measures  Outcome: PROGRESSING AS EXPECTED  Note: Pt up ambulatory in room, anticoagulation already order.      Problem: Knowledge Deficit  Goal: Knowledge of disease process/condition, treatment plan, diagnostic tests, and medications will improve  Outcome: PROGRESSING AS EXPECTED  Note: Updated pt on POC, all questions answered at this time.

## 2020-08-12 NOTE — PROGRESS NOTES
Pt discharged home with friend via car. Pt escorted by RN via wheelchair. Discharge instructions given, all questions answered at this time.

## 2020-08-20 ENCOUNTER — HOSPITAL ENCOUNTER (OUTPATIENT)
Dept: LAB | Facility: MEDICAL CENTER | Age: 55
End: 2020-08-20
Attending: INTERNAL MEDICINE
Payer: COMMERCIAL

## 2020-08-20 LAB
ALBUMIN SERPL BCP-MCNC: 4.2 G/DL (ref 3.2–4.9)
ALBUMIN/GLOB SERPL: 1.9 G/DL
ALP SERPL-CCNC: 102 U/L (ref 30–99)
ALT SERPL-CCNC: 18 U/L (ref 2–50)
ANION GAP SERPL CALC-SCNC: 14 MMOL/L (ref 7–16)
AST SERPL-CCNC: 16 U/L (ref 12–45)
BILIRUB SERPL-MCNC: 0.3 MG/DL (ref 0.1–1.5)
BUN SERPL-MCNC: 5 MG/DL (ref 8–22)
CALCIUM SERPL-MCNC: 9.1 MG/DL (ref 8.5–10.5)
CHLORIDE SERPL-SCNC: 98 MMOL/L (ref 96–112)
CO2 SERPL-SCNC: 23 MMOL/L (ref 20–33)
CREAT SERPL-MCNC: 0.93 MG/DL (ref 0.5–1.4)
GLOBULIN SER CALC-MCNC: 2.2 G/DL (ref 1.9–3.5)
GLUCOSE SERPL-MCNC: 89 MG/DL (ref 65–99)
IRON SATN MFR SERPL: 8 % (ref 15–55)
IRON SERPL-MCNC: 29 UG/DL (ref 50–180)
MAGNESIUM SERPL-MCNC: 2 MG/DL (ref 1.5–2.5)
POTASSIUM SERPL-SCNC: 4.5 MMOL/L (ref 3.6–5.5)
PROT SERPL-MCNC: 6.4 G/DL (ref 6–8.2)
SODIUM SERPL-SCNC: 135 MMOL/L (ref 135–145)
TIBC SERPL-MCNC: 384 UG/DL (ref 250–450)
UIBC SERPL-MCNC: 355 UG/DL (ref 110–370)

## 2020-08-20 PROCEDURE — 80053 COMPREHEN METABOLIC PANEL: CPT

## 2020-08-20 PROCEDURE — 83735 ASSAY OF MAGNESIUM: CPT

## 2020-08-20 PROCEDURE — 83550 IRON BINDING TEST: CPT

## 2020-08-20 PROCEDURE — 83540 ASSAY OF IRON: CPT

## 2021-08-23 ENCOUNTER — HOSPITAL ENCOUNTER (INPATIENT)
Dept: HOSPITAL 8 - ED | Age: 56
LOS: 4 days | Discharge: HOME | DRG: 683 | End: 2021-08-27
Attending: HOSPITALIST | Admitting: HOSPITALIST
Payer: COMMERCIAL

## 2021-08-23 VITALS — DIASTOLIC BLOOD PRESSURE: 93 MMHG | SYSTOLIC BLOOD PRESSURE: 174 MMHG

## 2021-08-23 VITALS — SYSTOLIC BLOOD PRESSURE: 170 MMHG | DIASTOLIC BLOOD PRESSURE: 88 MMHG

## 2021-08-23 VITALS — BODY MASS INDEX: 21.38 KG/M2 | HEIGHT: 75 IN | WEIGHT: 171.96 LBS

## 2021-08-23 DIAGNOSIS — N17.9: Primary | ICD-10-CM

## 2021-08-23 DIAGNOSIS — Z87.891: ICD-10-CM

## 2021-08-23 DIAGNOSIS — N13.1: ICD-10-CM

## 2021-08-23 DIAGNOSIS — Z85.038: ICD-10-CM

## 2021-08-23 DIAGNOSIS — E87.2: ICD-10-CM

## 2021-08-23 DIAGNOSIS — N13.4: ICD-10-CM

## 2021-08-23 DIAGNOSIS — Z90.49: ICD-10-CM

## 2021-08-23 DIAGNOSIS — J44.9: ICD-10-CM

## 2021-08-23 DIAGNOSIS — F12.90: ICD-10-CM

## 2021-08-23 DIAGNOSIS — D64.9: ICD-10-CM

## 2021-08-23 DIAGNOSIS — C18.9: ICD-10-CM

## 2021-08-23 DIAGNOSIS — K82.8: ICD-10-CM

## 2021-08-23 DIAGNOSIS — J98.11: ICD-10-CM

## 2021-08-23 DIAGNOSIS — G89.29: ICD-10-CM

## 2021-08-23 DIAGNOSIS — I10: ICD-10-CM

## 2021-08-23 DIAGNOSIS — N40.0: ICD-10-CM

## 2021-08-23 DIAGNOSIS — N26.1: ICD-10-CM

## 2021-08-23 LAB
ALBUMIN SERPL-MCNC: 2.5 G/DL (ref 3.4–5)
ALP SERPL-CCNC: 105 U/L (ref 45–117)
ALT SERPL-CCNC: 14 U/L (ref 12–78)
ANION GAP SERPL CALC-SCNC: 14 MMOL/L (ref 5–15)
BASOPHILS # BLD AUTO: 0 X10^3/UL (ref 0–0.1)
BASOPHILS NFR BLD AUTO: 0 % (ref 0–1)
BILIRUB SERPL-MCNC: 0.6 MG/DL (ref 0.2–1)
CALCIUM SERPL-MCNC: 8.1 MG/DL (ref 8.5–10.1)
CHLORIDE SERPL-SCNC: 101 MMOL/L (ref 98–107)
CREAT SERPL-MCNC: 7.82 MG/DL (ref 0.7–1.3)
EOSINOPHIL # BLD AUTO: 0.1 X10^3/UL (ref 0–0.4)
EOSINOPHIL NFR BLD AUTO: 0 % (ref 1–7)
ERYTHROCYTE [DISTWIDTH] IN BLOOD BY AUTOMATED COUNT: 19.2 % (ref 9.4–14.8)
LYMPHOCYTES # BLD AUTO: 0.7 X10^3/UL (ref 1–3.4)
LYMPHOCYTES NFR BLD AUTO: 5 % (ref 22–44)
MCH RBC QN AUTO: 24.2 PG (ref 27.5–34.5)
MCHC RBC AUTO-ENTMCNC: 32.7 G/DL (ref 33.2–36.2)
MICROSCOPIC: (no result)
MONOCYTES # BLD AUTO: 1.3 X10^3/UL (ref 0.2–0.8)
MONOCYTES NFR BLD AUTO: 8 % (ref 2–9)
NEUTROPHILS # BLD AUTO: 13.5 X10^3/UL (ref 1.8–6.8)
NEUTROPHILS NFR BLD AUTO: 86 % (ref 42–75)
PLATELET # BLD AUTO: 281 X10^3/UL (ref 130–400)
PMV BLD AUTO: 7.4 FL (ref 7.4–10.4)
PROT SERPL-MCNC: 6.9 G/DL (ref 6.4–8.2)
RBC # BLD AUTO: 4.32 X10^6/UL (ref 4.38–5.82)
TROPONIN I SERPL-MCNC: < 0.015 NG/ML (ref 0–0.04)

## 2021-08-23 PROCEDURE — 83605 ASSAY OF LACTIC ACID: CPT

## 2021-08-23 PROCEDURE — 84484 ASSAY OF TROPONIN QUANT: CPT

## 2021-08-23 PROCEDURE — C1751 CATH, INF, PER/CENT/MIDLINE: HCPCS

## 2021-08-23 PROCEDURE — 99156 MOD SED OTH PHYS/QHP 5/>YRS: CPT

## 2021-08-23 PROCEDURE — 80053 COMPREHEN METABOLIC PANEL: CPT

## 2021-08-23 PROCEDURE — 80061 LIPID PANEL: CPT

## 2021-08-23 PROCEDURE — 96376 TX/PRO/DX INJ SAME DRUG ADON: CPT

## 2021-08-23 PROCEDURE — 81001 URINALYSIS AUTO W/SCOPE: CPT

## 2021-08-23 PROCEDURE — C2625 STENT, NON-COR, TEM W/DEL SY: HCPCS

## 2021-08-23 PROCEDURE — 80048 BASIC METABOLIC PNL TOTAL CA: CPT

## 2021-08-23 PROCEDURE — 85025 COMPLETE CBC W/AUTO DIFF WBC: CPT

## 2021-08-23 PROCEDURE — 96374 THER/PROPH/DIAG INJ IV PUSH: CPT

## 2021-08-23 PROCEDURE — 84100 ASSAY OF PHOSPHORUS: CPT

## 2021-08-23 PROCEDURE — 0T903ZZ DRAINAGE OF RIGHT KIDNEY, PERCUTANEOUS APPROACH: ICD-10-PCS | Performed by: RADIOLOGY

## 2021-08-23 PROCEDURE — 87106 FUNGI IDENTIFICATION YEAST: CPT

## 2021-08-23 PROCEDURE — 87040 BLOOD CULTURE FOR BACTERIA: CPT

## 2021-08-23 PROCEDURE — 50432 PLMT NEPHROSTOMY CATHETER: CPT

## 2021-08-23 PROCEDURE — C1729 CATH, DRAINAGE: HCPCS

## 2021-08-23 PROCEDURE — 99157 MOD SED OTHER PHYS/QHP EA: CPT

## 2021-08-23 PROCEDURE — 74176 CT ABD & PELVIS W/O CONTRAST: CPT

## 2021-08-23 PROCEDURE — 93005 ELECTROCARDIOGRAM TRACING: CPT

## 2021-08-23 PROCEDURE — 87086 URINE CULTURE/COLONY COUNT: CPT

## 2021-08-23 PROCEDURE — 83735 ASSAY OF MAGNESIUM: CPT

## 2021-08-23 PROCEDURE — 84443 ASSAY THYROID STIM HORMONE: CPT

## 2021-08-23 PROCEDURE — 85610 PROTHROMBIN TIME: CPT

## 2021-08-23 PROCEDURE — 36415 COLL VENOUS BLD VENIPUNCTURE: CPT

## 2021-08-23 PROCEDURE — 99285 EMERGENCY DEPT VISIT HI MDM: CPT

## 2021-08-23 PROCEDURE — C1769 GUIDE WIRE: HCPCS

## 2021-08-23 PROCEDURE — 83036 HEMOGLOBIN GLYCOSYLATED A1C: CPT

## 2021-08-23 PROCEDURE — 76770 US EXAM ABDO BACK WALL COMP: CPT

## 2021-08-23 PROCEDURE — 96375 TX/PRO/DX INJ NEW DRUG ADDON: CPT

## 2021-08-23 PROCEDURE — 83880 ASSAY OF NATRIURETIC PEPTIDE: CPT

## 2021-08-23 PROCEDURE — 50434 CONVERT NEPHROSTOMY CATHETER: CPT

## 2021-08-23 PROCEDURE — 87635 SARS-COV-2 COVID-19 AMP PRB: CPT

## 2021-08-23 PROCEDURE — 94640 AIRWAY INHALATION TREATMENT: CPT

## 2021-08-23 PROCEDURE — C1894 INTRO/SHEATH, NON-LASER: HCPCS

## 2021-08-23 RX ADMIN — DIPHENHYDRAMINE HYDROCHLORIDE PRN MG: 50 INJECTION, SOLUTION INTRAMUSCULAR; INTRAVENOUS at 21:52

## 2021-08-23 RX ADMIN — HYDROMORPHONE HYDROCHLORIDE PRN MG: 2 INJECTION INTRAMUSCULAR; INTRAVENOUS; SUBCUTANEOUS at 12:13

## 2021-08-23 RX ADMIN — HYDRALAZINE HYDROCHLORIDE PRN MG: 20 INJECTION INTRAMUSCULAR; INTRAVENOUS at 13:49

## 2021-08-23 RX ADMIN — IPRATROPIUM BROMIDE AND ALBUTEROL SULFATE SCH ML: 2.5; .5 SOLUTION RESPIRATORY (INHALATION) at 20:15

## 2021-08-23 RX ADMIN — ONDANSETRON PRN MG: 2 INJECTION, SOLUTION INTRAMUSCULAR; INTRAVENOUS at 20:14

## 2021-08-23 RX ADMIN — HYDROMORPHONE HYDROCHLORIDE PRN MG: 2 TABLET ORAL at 18:31

## 2021-08-23 RX ADMIN — HYDROMORPHONE HYDROCHLORIDE PRN MG: 2 INJECTION INTRAMUSCULAR; INTRAVENOUS; SUBCUTANEOUS at 13:21

## 2021-08-23 RX ADMIN — HYDROMORPHONE HYDROCHLORIDE PRN MG: 2 INJECTION INTRAMUSCULAR; INTRAVENOUS; SUBCUTANEOUS at 11:42

## 2021-08-23 RX ADMIN — HYDROMORPHONE HYDROCHLORIDE PRN MG: 2 TABLET ORAL at 22:30

## 2021-08-23 RX ADMIN — IPRATROPIUM BROMIDE AND ALBUTEROL SULFATE SCH ML: 2.5; .5 SOLUTION RESPIRATORY (INHALATION) at 14:18

## 2021-08-23 RX ADMIN — BUDESONIDE SCH MG: 0.5 INHALANT RESPIRATORY (INHALATION) at 20:15

## 2021-08-23 NOTE — NUR
PATIENT FROM [facility]. SPOUSE AND PATIENT ARE HISTORIANS. PATIENT REPORTS HE 
HAS A HISTORY OF COLON CANCER AND HAS BEEN GETTING "INFUSIONS" EVERY TWO WEEKS 
HERE. PATIENT UNABLE TO STATE WHAT THE INFUSIONS ARE. WIFE STATES THE PATIENT 
HAS BEEN DOING WELL WITH THE INFUSIONS UNTIL RECENTLY. TODAY HE HAS NOT BEEN 
ABLE TO URINATE. HE RECIEVED 2L OF FLUIDS AT [facility] WITH NO URINE PRODUCED 
AND NONE VISIBLE ON A BLADDER SCAN. [facility] HAD THE PATIENT COME HERE D/T 
UNABLE TO URINATE.

## 2021-08-23 NOTE — NUR
PT IN BED IN GOWN WITH CONT CARDIAC MONITOR, SPO2, BP Q 30 MIN, SIDE RAILS UP 
X2, CALL LIGHT IN REACH. WENT OVER PLAN OF CARE FROM ORDER LIST, AGREES TO 
PLAN. RECONCILE MEDS DONE. PT REQUESTING PAIN MED/NAUSEA MEDS FOR PAIN 8/10 
RIGHT SIDE. MS NOTIFIED

## 2021-08-24 VITALS — DIASTOLIC BLOOD PRESSURE: 98 MMHG | SYSTOLIC BLOOD PRESSURE: 146 MMHG

## 2021-08-24 VITALS — SYSTOLIC BLOOD PRESSURE: 166 MMHG | DIASTOLIC BLOOD PRESSURE: 100 MMHG

## 2021-08-24 VITALS — SYSTOLIC BLOOD PRESSURE: 177 MMHG | DIASTOLIC BLOOD PRESSURE: 98 MMHG

## 2021-08-24 VITALS — DIASTOLIC BLOOD PRESSURE: 96 MMHG | SYSTOLIC BLOOD PRESSURE: 165 MMHG

## 2021-08-24 VITALS — DIASTOLIC BLOOD PRESSURE: 100 MMHG | SYSTOLIC BLOOD PRESSURE: 173 MMHG

## 2021-08-24 VITALS — DIASTOLIC BLOOD PRESSURE: 101 MMHG | SYSTOLIC BLOOD PRESSURE: 169 MMHG

## 2021-08-24 LAB
ALBUMIN SERPL-MCNC: 3 G/DL (ref 3.4–5)
ALP SERPL-CCNC: 166 U/L (ref 45–117)
ALT SERPL-CCNC: 16 U/L (ref 12–78)
ANION GAP SERPL CALC-SCNC: 12 MMOL/L (ref 5–15)
BASOPHILS # BLD AUTO: 0.1 X10^3/UL (ref 0–0.1)
BASOPHILS NFR BLD AUTO: 1 % (ref 0–1)
BILIRUB SERPL-MCNC: 0.6 MG/DL (ref 0.2–1)
CALCIUM SERPL-MCNC: 8.5 MG/DL (ref 8.5–10.1)
CHLORIDE SERPL-SCNC: 107 MMOL/L (ref 98–107)
CHOL/HDL RATIO: 5.2
CREAT SERPL-MCNC: 6.56 MG/DL (ref 0.7–1.3)
EOSINOPHIL # BLD AUTO: 0 X10^3/UL (ref 0–0.4)
EOSINOPHIL NFR BLD AUTO: 0 % (ref 1–7)
ERYTHROCYTE [DISTWIDTH] IN BLOOD BY AUTOMATED COUNT: 19.6 % (ref 9.4–14.8)
EST. AVERAGE GLUCOSE BLD GHB EST-MCNC: 111 MG/DL (ref 0–126)
HDL CHOL %: 19 % (ref 26–37)
HDL CHOLESTEROL (DIRECT): 33 MG/DL (ref 40–60)
LDL CHOLESTEROL,CALCULATED: 119 MG/DL (ref 54–169)
LDLC/HDLC SERPL: 3.6 {RATIO} (ref 0.5–3)
LYMPHOCYTES # BLD AUTO: 0.7 X10^3/UL (ref 1–3.4)
LYMPHOCYTES NFR BLD AUTO: 3 % (ref 22–44)
MCH RBC QN AUTO: 24.2 PG (ref 27.5–34.5)
MCHC RBC AUTO-ENTMCNC: 33 G/DL (ref 33.2–36.2)
MONOCYTES # BLD AUTO: 1.2 X10^3/UL (ref 0.2–0.8)
MONOCYTES NFR BLD AUTO: 6 % (ref 2–9)
NEUTROPHILS # BLD AUTO: 17.6 X10^3/UL (ref 1.8–6.8)
NEUTROPHILS NFR BLD AUTO: 90 % (ref 42–75)
PLATELET # BLD AUTO: 407 X10^3/UL (ref 130–400)
PMV BLD AUTO: 7.5 FL (ref 7.4–10.4)
PROT SERPL-MCNC: 7.9 G/DL (ref 6.4–8.2)
RBC # BLD AUTO: 4.97 X10^6/UL (ref 4.38–5.82)
TRIGL SERPL-MCNC: 99 MG/DL (ref 50–200)
VLDLC SERPL CALC-MCNC: 20 MG/DL (ref 0–25)

## 2021-08-24 RX ADMIN — HEPARIN SODIUM SCH UNITS: 5000 INJECTION, SOLUTION INTRAVENOUS; SUBCUTANEOUS at 20:33

## 2021-08-24 RX ADMIN — IPRATROPIUM BROMIDE AND ALBUTEROL SULFATE SCH ML: 2.5; .5 SOLUTION RESPIRATORY (INHALATION) at 10:06

## 2021-08-24 RX ADMIN — OLANZAPINE SCH MG: 2.5 TABLET, FILM COATED ORAL at 09:03

## 2021-08-24 RX ADMIN — BUDESONIDE SCH MG: 0.5 INHALANT RESPIRATORY (INHALATION) at 19:50

## 2021-08-24 RX ADMIN — HYDRALAZINE HYDROCHLORIDE PRN MG: 20 INJECTION INTRAMUSCULAR; INTRAVENOUS at 00:19

## 2021-08-24 RX ADMIN — HYDROMORPHONE HYDROCHLORIDE PRN MG: 2 TABLET ORAL at 09:02

## 2021-08-24 RX ADMIN — ONDANSETRON PRN MG: 2 INJECTION, SOLUTION INTRAMUSCULAR; INTRAVENOUS at 19:44

## 2021-08-24 RX ADMIN — PANTOPRAZOLE SODIUM SCH MG: 20 TABLET, DELAYED RELEASE ORAL at 09:03

## 2021-08-24 RX ADMIN — IPRATROPIUM BROMIDE AND ALBUTEROL SULFATE SCH ML: 2.5; .5 SOLUTION RESPIRATORY (INHALATION) at 01:38

## 2021-08-24 RX ADMIN — HEPARIN SODIUM SCH UNITS: 5000 INJECTION, SOLUTION INTRAVENOUS; SUBCUTANEOUS at 13:06

## 2021-08-24 RX ADMIN — IPRATROPIUM BROMIDE AND ALBUTEROL SULFATE SCH ML: 2.5; .5 SOLUTION RESPIRATORY (INHALATION) at 19:50

## 2021-08-24 RX ADMIN — IPRATROPIUM BROMIDE AND ALBUTEROL SULFATE SCH ML: 2.5; .5 SOLUTION RESPIRATORY (INHALATION) at 07:00

## 2021-08-24 RX ADMIN — HYDROMORPHONE HYDROCHLORIDE PRN MG: 2 TABLET ORAL at 19:44

## 2021-08-24 RX ADMIN — HYDROMORPHONE HYDROCHLORIDE PRN MG: 2 TABLET ORAL at 13:05

## 2021-08-24 RX ADMIN — BUDESONIDE SCH MG: 0.5 INHALANT RESPIRATORY (INHALATION) at 08:12

## 2021-08-24 RX ADMIN — TAMSULOSIN HYDROCHLORIDE SCH MG: 0.4 CAPSULE ORAL at 09:03

## 2021-08-24 RX ADMIN — IPRATROPIUM BROMIDE AND ALBUTEROL SULFATE SCH ML: 2.5; .5 SOLUTION RESPIRATORY (INHALATION) at 14:04

## 2021-08-25 VITALS — SYSTOLIC BLOOD PRESSURE: 160 MMHG | DIASTOLIC BLOOD PRESSURE: 107 MMHG

## 2021-08-25 VITALS — SYSTOLIC BLOOD PRESSURE: 152 MMHG | DIASTOLIC BLOOD PRESSURE: 102 MMHG

## 2021-08-25 VITALS — SYSTOLIC BLOOD PRESSURE: 146 MMHG | DIASTOLIC BLOOD PRESSURE: 90 MMHG

## 2021-08-25 VITALS — DIASTOLIC BLOOD PRESSURE: 92 MMHG | SYSTOLIC BLOOD PRESSURE: 160 MMHG

## 2021-08-25 LAB
ANION GAP SERPL CALC-SCNC: 6 MMOL/L (ref 5–15)
BASOPHILS # BLD AUTO: 0 X10^3/UL (ref 0–0.1)
BASOPHILS NFR BLD AUTO: 0 % (ref 0–1)
CALCIUM SERPL-MCNC: 8.8 MG/DL (ref 8.5–10.1)
CHLORIDE SERPL-SCNC: 108 MMOL/L (ref 98–107)
CREAT SERPL-MCNC: 3.41 MG/DL (ref 0.7–1.3)
EOSINOPHIL # BLD AUTO: 0.1 X10^3/UL (ref 0–0.4)
EOSINOPHIL NFR BLD AUTO: 1 % (ref 1–7)
ERYTHROCYTE [DISTWIDTH] IN BLOOD BY AUTOMATED COUNT: 20 % (ref 9.4–14.8)
LYMPHOCYTES # BLD AUTO: 1 X10^3/UL (ref 1–3.4)
LYMPHOCYTES NFR BLD AUTO: 7 % (ref 22–44)
MCH RBC QN AUTO: 24.1 PG (ref 27.5–34.5)
MCHC RBC AUTO-ENTMCNC: 32.9 G/DL (ref 33.2–36.2)
MONOCYTES # BLD AUTO: 1.3 X10^3/UL (ref 0.2–0.8)
MONOCYTES NFR BLD AUTO: 10 % (ref 2–9)
NEUTROPHILS # BLD AUTO: 11 X10^3/UL (ref 1.8–6.8)
NEUTROPHILS NFR BLD AUTO: 82 % (ref 42–75)
PLATELET # BLD AUTO: 307 X10^3/UL (ref 130–400)
PMV BLD AUTO: 7.4 FL (ref 7.4–10.4)
RBC # BLD AUTO: 4.36 X10^6/UL (ref 4.38–5.82)

## 2021-08-25 RX ADMIN — PANTOPRAZOLE SODIUM SCH MG: 20 TABLET, DELAYED RELEASE ORAL at 09:11

## 2021-08-25 RX ADMIN — DIPHENHYDRAMINE HYDROCHLORIDE PRN MG: 50 INJECTION, SOLUTION INTRAMUSCULAR; INTRAVENOUS at 22:10

## 2021-08-25 RX ADMIN — HYDROMORPHONE HYDROCHLORIDE PRN MG: 2 TABLET ORAL at 09:44

## 2021-08-25 RX ADMIN — DIPHENHYDRAMINE HYDROCHLORIDE PRN MG: 50 INJECTION, SOLUTION INTRAMUSCULAR; INTRAVENOUS at 16:20

## 2021-08-25 RX ADMIN — HYDROMORPHONE HYDROCHLORIDE PRN MG: 2 TABLET ORAL at 22:11

## 2021-08-25 RX ADMIN — ONDANSETRON PRN MG: 2 INJECTION, SOLUTION INTRAMUSCULAR; INTRAVENOUS at 04:02

## 2021-08-25 RX ADMIN — BUDESONIDE SCH MG: 0.5 INHALANT RESPIRATORY (INHALATION) at 05:50

## 2021-08-25 RX ADMIN — ONDANSETRON PRN MG: 2 INJECTION, SOLUTION INTRAMUSCULAR; INTRAVENOUS at 12:30

## 2021-08-25 RX ADMIN — IPRATROPIUM BROMIDE AND ALBUTEROL SULFATE SCH ML: 2.5; .5 SOLUTION RESPIRATORY (INHALATION) at 17:20

## 2021-08-25 RX ADMIN — HYDROMORPHONE HYDROCHLORIDE PRN MG: 2 TABLET ORAL at 00:49

## 2021-08-25 RX ADMIN — ONDANSETRON PRN MG: 2 INJECTION, SOLUTION INTRAMUSCULAR; INTRAVENOUS at 18:39

## 2021-08-25 RX ADMIN — TAMSULOSIN HYDROCHLORIDE SCH MG: 0.4 CAPSULE ORAL at 09:11

## 2021-08-25 RX ADMIN — HYDRALAZINE HYDROCHLORIDE PRN MG: 20 INJECTION INTRAMUSCULAR; INTRAVENOUS at 06:35

## 2021-08-25 RX ADMIN — IPRATROPIUM BROMIDE AND ALBUTEROL SULFATE SCH ML: 2.5; .5 SOLUTION RESPIRATORY (INHALATION) at 13:32

## 2021-08-25 RX ADMIN — CEFTRIAXONE SCH MLS/HR: 1 INJECTION, POWDER, FOR SOLUTION INTRAMUSCULAR; INTRAVENOUS at 23:39

## 2021-08-25 RX ADMIN — OLANZAPINE SCH MG: 2.5 TABLET, FILM COATED ORAL at 09:10

## 2021-08-25 RX ADMIN — HYDROMORPHONE HYDROCHLORIDE PRN MG: 2 TABLET ORAL at 05:20

## 2021-08-25 RX ADMIN — HYDROMORPHONE HYDROCHLORIDE PRN MG: 2 TABLET ORAL at 13:54

## 2021-08-25 RX ADMIN — DIPHENHYDRAMINE HYDROCHLORIDE PRN MG: 50 INJECTION, SOLUTION INTRAMUSCULAR; INTRAVENOUS at 00:48

## 2021-08-25 RX ADMIN — HEPARIN SODIUM SCH UNITS: 5000 INJECTION, SOLUTION INTRAVENOUS; SUBCUTANEOUS at 05:19

## 2021-08-25 RX ADMIN — HYDROMORPHONE HYDROCHLORIDE PRN MG: 2 TABLET ORAL at 18:13

## 2021-08-25 RX ADMIN — DIPHENHYDRAMINE HYDROCHLORIDE PRN MG: 50 INJECTION, SOLUTION INTRAMUSCULAR; INTRAVENOUS at 07:34

## 2021-08-25 RX ADMIN — IPRATROPIUM BROMIDE AND ALBUTEROL SULFATE SCH ML: 2.5; .5 SOLUTION RESPIRATORY (INHALATION) at 20:13

## 2021-08-25 RX ADMIN — IPRATROPIUM BROMIDE AND ALBUTEROL SULFATE SCH ML: 2.5; .5 SOLUTION RESPIRATORY (INHALATION) at 05:50

## 2021-08-25 RX ADMIN — HEPARIN SODIUM SCH UNITS: 5000 INJECTION, SOLUTION INTRAVENOUS; SUBCUTANEOUS at 20:30

## 2021-08-25 RX ADMIN — BUDESONIDE SCH MG: 0.5 INHALANT RESPIRATORY (INHALATION) at 20:13

## 2021-08-25 RX ADMIN — HEPARIN SODIUM SCH UNITS: 5000 INJECTION, SOLUTION INTRAVENOUS; SUBCUTANEOUS at 12:31

## 2021-08-25 RX ADMIN — IPRATROPIUM BROMIDE AND ALBUTEROL SULFATE SCH ML: 2.5; .5 SOLUTION RESPIRATORY (INHALATION) at 10:09

## 2021-08-26 VITALS — DIASTOLIC BLOOD PRESSURE: 84 MMHG | SYSTOLIC BLOOD PRESSURE: 131 MMHG

## 2021-08-26 VITALS — DIASTOLIC BLOOD PRESSURE: 80 MMHG | SYSTOLIC BLOOD PRESSURE: 139 MMHG

## 2021-08-26 VITALS — SYSTOLIC BLOOD PRESSURE: 107 MMHG | DIASTOLIC BLOOD PRESSURE: 71 MMHG

## 2021-08-26 VITALS — DIASTOLIC BLOOD PRESSURE: 68 MMHG | SYSTOLIC BLOOD PRESSURE: 103 MMHG

## 2021-08-26 LAB
ANION GAP SERPL CALC-SCNC: 8 MMOL/L (ref 5–15)
BASOPHILS # BLD AUTO: 0.2 X10^3/UL (ref 0–0.1)
BASOPHILS NFR BLD AUTO: 1 % (ref 0–1)
CALCIUM SERPL-MCNC: 8.5 MG/DL (ref 8.5–10.1)
CHLORIDE SERPL-SCNC: 103 MMOL/L (ref 98–107)
CREAT SERPL-MCNC: 2.77 MG/DL (ref 0.7–1.3)
EOSINOPHIL # BLD AUTO: 0.1 X10^3/UL (ref 0–0.4)
EOSINOPHIL NFR BLD AUTO: 1 % (ref 1–7)
ERYTHROCYTE [DISTWIDTH] IN BLOOD BY AUTOMATED COUNT: 19.4 % (ref 9.4–14.8)
LYMPHOCYTES # BLD AUTO: 0.9 X10^3/UL (ref 1–3.4)
LYMPHOCYTES NFR BLD AUTO: 5 % (ref 22–44)
MCH RBC QN AUTO: 24.1 PG (ref 27.5–34.5)
MCHC RBC AUTO-ENTMCNC: 32.8 G/DL (ref 33.2–36.2)
MONOCYTES # BLD AUTO: 1.9 X10^3/UL (ref 0.2–0.8)
MONOCYTES NFR BLD AUTO: 12 % (ref 2–9)
NEUTROPHILS # BLD AUTO: 13.4 X10^3/UL (ref 1.8–6.8)
NEUTROPHILS NFR BLD AUTO: 81 % (ref 42–75)
PLATELET # BLD AUTO: 316 X10^3/UL (ref 130–400)
PMV BLD AUTO: 7.6 FL (ref 7.4–10.4)
RBC # BLD AUTO: 4.28 X10^6/UL (ref 4.38–5.82)

## 2021-08-26 RX ADMIN — TAMSULOSIN HYDROCHLORIDE SCH MG: 0.4 CAPSULE ORAL at 09:32

## 2021-08-26 RX ADMIN — PANTOPRAZOLE SODIUM SCH MG: 20 TABLET, DELAYED RELEASE ORAL at 09:32

## 2021-08-26 RX ADMIN — DIPHENHYDRAMINE HYDROCHLORIDE PRN MG: 50 INJECTION, SOLUTION INTRAMUSCULAR; INTRAVENOUS at 12:59

## 2021-08-26 RX ADMIN — HYDROMORPHONE HYDROCHLORIDE PRN MG: 2 TABLET ORAL at 23:40

## 2021-08-26 RX ADMIN — IPRATROPIUM BROMIDE AND ALBUTEROL SULFATE SCH ML: 2.5; .5 SOLUTION RESPIRATORY (INHALATION) at 19:05

## 2021-08-26 RX ADMIN — HYDROMORPHONE HYDROCHLORIDE PRN MG: 2 TABLET ORAL at 12:28

## 2021-08-26 RX ADMIN — IPRATROPIUM BROMIDE AND ALBUTEROL SULFATE SCH ML: 2.5; .5 SOLUTION RESPIRATORY (INHALATION) at 15:15

## 2021-08-26 RX ADMIN — BUDESONIDE SCH MG: 0.5 INHALANT RESPIRATORY (INHALATION) at 06:50

## 2021-08-26 RX ADMIN — IPRATROPIUM BROMIDE AND ALBUTEROL SULFATE SCH ML: 2.5; .5 SOLUTION RESPIRATORY (INHALATION) at 04:32

## 2021-08-26 RX ADMIN — IPRATROPIUM BROMIDE AND ALBUTEROL SULFATE SCH ML: 2.5; .5 SOLUTION RESPIRATORY (INHALATION) at 11:30

## 2021-08-26 RX ADMIN — DIPHENHYDRAMINE HYDROCHLORIDE PRN MG: 50 INJECTION, SOLUTION INTRAMUSCULAR; INTRAVENOUS at 04:29

## 2021-08-26 RX ADMIN — HEPARIN SODIUM SCH UNITS: 5000 INJECTION, SOLUTION INTRAVENOUS; SUBCUTANEOUS at 04:30

## 2021-08-26 RX ADMIN — HEPARIN SODIUM SCH UNITS: 5000 INJECTION, SOLUTION INTRAVENOUS; SUBCUTANEOUS at 20:03

## 2021-08-26 RX ADMIN — HYDROMORPHONE HYDROCHLORIDE PRN MG: 2 TABLET ORAL at 19:24

## 2021-08-26 RX ADMIN — OLANZAPINE SCH MG: 2.5 TABLET, FILM COATED ORAL at 09:32

## 2021-08-26 RX ADMIN — HEPARIN SODIUM SCH UNITS: 5000 INJECTION, SOLUTION INTRAVENOUS; SUBCUTANEOUS at 12:59

## 2021-08-26 RX ADMIN — BUDESONIDE SCH MG: 0.5 INHALANT RESPIRATORY (INHALATION) at 19:05

## 2021-08-26 RX ADMIN — DIPHENHYDRAMINE HYDROCHLORIDE PRN MG: 50 INJECTION, SOLUTION INTRAMUSCULAR; INTRAVENOUS at 20:45

## 2021-08-26 RX ADMIN — ONDANSETRON PRN MG: 2 INJECTION, SOLUTION INTRAMUSCULAR; INTRAVENOUS at 17:39

## 2021-08-26 RX ADMIN — CEFTRIAXONE SCH MLS/HR: 1 INJECTION, POWDER, FOR SOLUTION INTRAMUSCULAR; INTRAVENOUS at 22:11

## 2021-08-26 RX ADMIN — HYDROMORPHONE HYDROCHLORIDE PRN MG: 2 TABLET ORAL at 06:41

## 2021-08-26 RX ADMIN — IPRATROPIUM BROMIDE AND ALBUTEROL SULFATE SCH ML: 2.5; .5 SOLUTION RESPIRATORY (INHALATION) at 06:50

## 2021-08-27 VITALS — SYSTOLIC BLOOD PRESSURE: 138 MMHG | DIASTOLIC BLOOD PRESSURE: 82 MMHG

## 2021-08-27 VITALS — SYSTOLIC BLOOD PRESSURE: 128 MMHG | DIASTOLIC BLOOD PRESSURE: 77 MMHG

## 2021-08-27 VITALS — DIASTOLIC BLOOD PRESSURE: 82 MMHG | SYSTOLIC BLOOD PRESSURE: 127 MMHG

## 2021-08-27 LAB
ANION GAP SERPL CALC-SCNC: 7 MMOL/L (ref 5–15)
BASOPHILS # BLD AUTO: 0.1 X10^3/UL (ref 0–0.1)
BASOPHILS NFR BLD AUTO: 0 % (ref 0–1)
CALCIUM SERPL-MCNC: 8.3 MG/DL (ref 8.5–10.1)
CHLORIDE SERPL-SCNC: 102 MMOL/L (ref 98–107)
CREAT SERPL-MCNC: 2.29 MG/DL (ref 0.7–1.3)
EOSINOPHIL # BLD AUTO: 0.1 X10^3/UL (ref 0–0.4)
EOSINOPHIL NFR BLD AUTO: 1 % (ref 1–7)
ERYTHROCYTE [DISTWIDTH] IN BLOOD BY AUTOMATED COUNT: 19.9 % (ref 9.4–14.8)
INR PPP: 1.31 (ref 0.93–1.1)
LYMPHOCYTES # BLD AUTO: 0.9 X10^3/UL (ref 1–3.4)
LYMPHOCYTES NFR BLD AUTO: 7 % (ref 22–44)
MCH RBC QN AUTO: 23.8 PG (ref 27.5–34.5)
MCHC RBC AUTO-ENTMCNC: 32.1 G/DL (ref 33.2–36.2)
MONOCYTES # BLD AUTO: 1.7 X10^3/UL (ref 0.2–0.8)
MONOCYTES NFR BLD AUTO: 12 % (ref 2–9)
NEUTROPHILS # BLD AUTO: 11.2 X10^3/UL (ref 1.8–6.8)
NEUTROPHILS NFR BLD AUTO: 80 % (ref 42–75)
PLATELET # BLD AUTO: 324 X10^3/UL (ref 130–400)
PMV BLD AUTO: 7.7 FL (ref 7.4–10.4)
PROTHROMBIN TIME: 13.8 SECONDS (ref 9.6–11.5)
RBC # BLD AUTO: 4.19 X10^6/UL (ref 4.38–5.82)

## 2021-08-27 PROCEDURE — 0T767DZ DILATION OF RIGHT URETER WITH INTRALUMINAL DEVICE, VIA NATURAL OR ARTIFICIAL OPENING: ICD-10-PCS | Performed by: RADIOLOGY

## 2021-08-27 RX ADMIN — IPRATROPIUM BROMIDE AND ALBUTEROL SULFATE SCH ML: 2.5; .5 SOLUTION RESPIRATORY (INHALATION) at 04:25

## 2021-08-27 RX ADMIN — IPRATROPIUM BROMIDE AND ALBUTEROL SULFATE SCH ML: 2.5; .5 SOLUTION RESPIRATORY (INHALATION) at 13:27

## 2021-08-27 RX ADMIN — IPRATROPIUM BROMIDE AND ALBUTEROL SULFATE SCH ML: 2.5; .5 SOLUTION RESPIRATORY (INHALATION) at 15:00

## 2021-08-27 RX ADMIN — DIPHENHYDRAMINE HYDROCHLORIDE PRN MG: 50 INJECTION, SOLUTION INTRAMUSCULAR; INTRAVENOUS at 12:48

## 2021-08-27 RX ADMIN — ONDANSETRON PRN MG: 2 INJECTION, SOLUTION INTRAMUSCULAR; INTRAVENOUS at 06:10

## 2021-08-27 RX ADMIN — DIPHENHYDRAMINE HYDROCHLORIDE PRN MG: 50 INJECTION, SOLUTION INTRAMUSCULAR; INTRAVENOUS at 03:28

## 2021-08-27 RX ADMIN — TAMSULOSIN HYDROCHLORIDE SCH MG: 0.4 CAPSULE ORAL at 10:04

## 2021-08-27 RX ADMIN — IPRATROPIUM BROMIDE AND ALBUTEROL SULFATE SCH ML: 2.5; .5 SOLUTION RESPIRATORY (INHALATION) at 07:00

## 2021-08-27 RX ADMIN — BUDESONIDE SCH MG: 0.5 INHALANT RESPIRATORY (INHALATION) at 09:00

## 2021-08-27 RX ADMIN — HYDROMORPHONE HYDROCHLORIDE PRN MG: 2 TABLET ORAL at 06:10

## 2021-08-27 RX ADMIN — HYDROMORPHONE HYDROCHLORIDE PRN MG: 2 TABLET ORAL at 16:34

## 2021-08-27 RX ADMIN — HEPARIN SODIUM SCH UNITS: 5000 INJECTION, SOLUTION INTRAVENOUS; SUBCUTANEOUS at 04:30

## 2021-08-27 RX ADMIN — HYDROMORPHONE HYDROCHLORIDE PRN MG: 2 TABLET ORAL at 12:49

## 2021-08-27 RX ADMIN — OLANZAPINE SCH MG: 2.5 TABLET, FILM COATED ORAL at 10:04

## 2021-08-27 RX ADMIN — PANTOPRAZOLE SODIUM SCH MG: 20 TABLET, DELAYED RELEASE ORAL at 10:04

## 2021-08-27 RX ADMIN — HEPARIN SODIUM SCH UNITS: 5000 INJECTION, SOLUTION INTRAVENOUS; SUBCUTANEOUS at 12:39

## 2021-08-27 RX ADMIN — ONDANSETRON PRN MG: 2 INJECTION, SOLUTION INTRAMUSCULAR; INTRAVENOUS at 00:00

## 2021-09-10 RX ORDER — SODIUM CHLORIDE, SODIUM LACTATE, POTASSIUM CHLORIDE, CALCIUM CHLORIDE 600; 310; 30; 20 MG/100ML; MG/100ML; MG/100ML; MG/100ML
INJECTION, SOLUTION INTRAVENOUS CONTINUOUS
Status: DISCONTINUED | OUTPATIENT
Start: 2021-09-10 | End: 2021-09-13

## 2021-09-11 ENCOUNTER — HOSPITAL ENCOUNTER (OUTPATIENT)
Dept: RADIOLOGY | Facility: MEDICAL CENTER | Age: 56
End: 2021-09-11

## 2021-09-11 ENCOUNTER — HOSPITAL ENCOUNTER (INPATIENT)
Facility: MEDICAL CENTER | Age: 56
LOS: 7 days | DRG: 871 | End: 2021-09-18
Attending: HOSPITALIST | Admitting: HOSPITALIST
Payer: COMMERCIAL

## 2021-09-11 ENCOUNTER — APPOINTMENT (OUTPATIENT)
Dept: RADIOLOGY | Facility: MEDICAL CENTER | Age: 56
DRG: 871 | End: 2021-09-11
Attending: STUDENT IN AN ORGANIZED HEALTH CARE EDUCATION/TRAINING PROGRAM
Payer: COMMERCIAL

## 2021-09-11 PROBLEM — N13.39 OTHER HYDRONEPHROSIS: Status: ACTIVE | Noted: 2021-09-11

## 2021-09-11 PROBLEM — N39.0 UTI (URINARY TRACT INFECTION): Status: ACTIVE | Noted: 2021-09-11

## 2021-09-11 PROBLEM — E87.29 HIGH ANION GAP METABOLIC ACIDOSIS: Status: ACTIVE | Noted: 2021-09-11

## 2021-09-11 PROBLEM — E46 PROTEIN CALORIE MALNUTRITION (HCC): Status: ACTIVE | Noted: 2021-09-11

## 2021-09-11 PROBLEM — J18.9 PNA (PNEUMONIA): Status: ACTIVE | Noted: 2021-09-11

## 2021-09-11 LAB
ALBUMIN SERPL BCP-MCNC: 2.6 G/DL (ref 3.2–4.9)
ALBUMIN/GLOB SERPL: 0.7 G/DL
ALP SERPL-CCNC: 200 U/L (ref 30–99)
ALT SERPL-CCNC: 8 U/L (ref 2–50)
ANION GAP SERPL CALC-SCNC: 17 MMOL/L (ref 7–16)
APPEARANCE FLD: NORMAL
AST SERPL-CCNC: 17 U/L (ref 12–45)
BASOPHILS # BLD AUTO: 0.2 % (ref 0–1.8)
BASOPHILS # BLD: 0.05 K/UL (ref 0–0.12)
BILIRUB SERPL-MCNC: 0.5 MG/DL (ref 0.1–1.5)
BODY FLD TYPE: NORMAL
BUN SERPL-MCNC: 44 MG/DL (ref 8–22)
CALCIUM SERPL-MCNC: 8 MG/DL (ref 8.5–10.5)
CHLORIDE SERPL-SCNC: 98 MMOL/L (ref 96–112)
CO2 SERPL-SCNC: 16 MMOL/L (ref 20–33)
COLOR FLD: NORMAL
CREAT SERPL-MCNC: 5.92 MG/DL (ref 0.5–1.4)
CRP SERPL HS-MCNC: 16.37 MG/DL (ref 0–0.75)
CSF COMMENTS 1658: NORMAL
EOSINOPHIL # BLD AUTO: 0.07 K/UL (ref 0–0.51)
EOSINOPHIL NFR BLD: 0.3 % (ref 0–6.9)
ERYTHROCYTE [DISTWIDTH] IN BLOOD BY AUTOMATED COUNT: 53.7 FL (ref 35.9–50)
ERYTHROCYTE [SEDIMENTATION RATE] IN BLOOD BY WESTERGREN METHOD: 91 MM/HOUR (ref 0–20)
GLOBULIN SER CALC-MCNC: 4 G/DL (ref 1.9–3.5)
GLUCOSE SERPL-MCNC: 80 MG/DL (ref 65–99)
GRAM STN SPEC: NORMAL
HCT VFR BLD AUTO: 26.9 % (ref 42–52)
HGB BLD-MCNC: 8.8 G/DL (ref 14–18)
IMM GRANULOCYTES # BLD AUTO: 0.26 K/UL (ref 0–0.11)
IMM GRANULOCYTES NFR BLD AUTO: 1.1 % (ref 0–0.9)
INR PPP: 1.36 (ref 0.87–1.13)
LACTATE BLD-SCNC: 1.1 MMOL/L (ref 0.5–2)
LACTATE BLD-SCNC: 1.4 MMOL/L (ref 0.5–2)
LACTATE BLD-SCNC: 1.7 MMOL/L (ref 0.5–2)
LYMPHOCYTES # BLD AUTO: 0.96 K/UL (ref 1–4.8)
LYMPHOCYTES NFR BLD: 4 % (ref 22–41)
LYMPHOCYTES NFR FLD: 6 %
MAGNESIUM SERPL-MCNC: 1.5 MG/DL (ref 1.5–2.5)
MCH RBC QN AUTO: 25.4 PG (ref 27–33)
MCHC RBC AUTO-ENTMCNC: 32.7 G/DL (ref 33.7–35.3)
MCV RBC AUTO: 77.5 FL (ref 81.4–97.8)
MONOCYTES # BLD AUTO: 1.59 K/UL (ref 0–0.85)
MONOCYTES NFR BLD AUTO: 6.6 % (ref 0–13.4)
MONONUC CELLS NFR FLD: 4 %
NEUTROPHILS # BLD AUTO: 21.03 K/UL (ref 1.82–7.42)
NEUTROPHILS NFR BLD: 87.8 % (ref 44–72)
NEUTROPHILS NFR FLD: 90 %
NRBC # BLD AUTO: 0 K/UL
NRBC BLD-RTO: 0 /100 WBC
OSMOLALITY SERPL: 285 MOSM/KG H2O (ref 278–298)
PHOSPHATE SERPL-MCNC: 5.6 MG/DL (ref 2.5–4.5)
PLATELET # BLD AUTO: 441 K/UL (ref 164–446)
PMV BLD AUTO: 9 FL (ref 9–12.9)
POTASSIUM SERPL-SCNC: 5 MMOL/L (ref 3.6–5.5)
PROCALCITONIN SERPL-MCNC: 0.76 NG/ML
PROT SERPL-MCNC: 6.6 G/DL (ref 6–8.2)
PROTHROMBIN TIME: 16.4 SEC (ref 12–14.6)
RBC # BLD AUTO: 3.47 M/UL (ref 4.7–6.1)
RBC # FLD: 2 CELLS/UL
SIGNIFICANT IND 70042: NORMAL
SITE SITE: NORMAL
SODIUM SERPL-SCNC: 131 MMOL/L (ref 135–145)
SOURCE SOURCE: NORMAL
URATE SERPL-MCNC: 7.5 MG/DL (ref 2.5–8.3)
WBC # BLD AUTO: 24 K/UL (ref 4.8–10.8)
WBC # FLD: NORMAL CELLS/UL

## 2021-09-11 PROCEDURE — 87086 URINE CULTURE/COLONY COUNT: CPT

## 2021-09-11 PROCEDURE — 700101 HCHG RX REV CODE 250: Performed by: STUDENT IN AN ORGANIZED HEALTH CARE EDUCATION/TRAINING PROGRAM

## 2021-09-11 PROCEDURE — 700102 HCHG RX REV CODE 250 W/ 637 OVERRIDE(OP): Performed by: STUDENT IN AN ORGANIZED HEALTH CARE EDUCATION/TRAINING PROGRAM

## 2021-09-11 PROCEDURE — 89051 BODY FLUID CELL COUNT: CPT

## 2021-09-11 PROCEDURE — 87150 DNA/RNA AMPLIFIED PROBE: CPT

## 2021-09-11 PROCEDURE — 700105 HCHG RX REV CODE 258: Performed by: HOSPITALIST

## 2021-09-11 PROCEDURE — 770006 HCHG ROOM/CARE - MED/SURG/GYN SEMI*

## 2021-09-11 PROCEDURE — 83930 ASSAY OF BLOOD OSMOLALITY: CPT

## 2021-09-11 PROCEDURE — 87186 SC STD MICRODIL/AGAR DIL: CPT

## 2021-09-11 PROCEDURE — 700111 HCHG RX REV CODE 636 W/ 250 OVERRIDE (IP): Performed by: RADIOLOGY

## 2021-09-11 PROCEDURE — 76775 US EXAM ABDO BACK WALL LIM: CPT

## 2021-09-11 PROCEDURE — A9270 NON-COVERED ITEM OR SERVICE: HCPCS | Performed by: STUDENT IN AN ORGANIZED HEALTH CARE EDUCATION/TRAINING PROGRAM

## 2021-09-11 PROCEDURE — 700117 HCHG RX CONTRAST REV CODE 255: Performed by: STUDENT IN AN ORGANIZED HEALTH CARE EDUCATION/TRAINING PROGRAM

## 2021-09-11 PROCEDURE — 83605 ASSAY OF LACTIC ACID: CPT | Mod: 91

## 2021-09-11 PROCEDURE — 87205 SMEAR GRAM STAIN: CPT

## 2021-09-11 PROCEDURE — 86140 C-REACTIVE PROTEIN: CPT

## 2021-09-11 PROCEDURE — 700105 HCHG RX REV CODE 258: Performed by: STUDENT IN AN ORGANIZED HEALTH CARE EDUCATION/TRAINING PROGRAM

## 2021-09-11 PROCEDURE — 83735 ASSAY OF MAGNESIUM: CPT

## 2021-09-11 PROCEDURE — 0T9330Z DRAINAGE OF RIGHT KIDNEY PELVIS WITH DRAINAGE DEVICE, PERCUTANEOUS APPROACH: ICD-10-PCS | Performed by: RADIOLOGY

## 2021-09-11 PROCEDURE — 99223 1ST HOSP IP/OBS HIGH 75: CPT | Performed by: STUDENT IN AN ORGANIZED HEALTH CARE EDUCATION/TRAINING PROGRAM

## 2021-09-11 PROCEDURE — C1729 CATH, DRAINAGE: HCPCS

## 2021-09-11 PROCEDURE — 700111 HCHG RX REV CODE 636 W/ 250 OVERRIDE (IP): Performed by: STUDENT IN AN ORGANIZED HEALTH CARE EDUCATION/TRAINING PROGRAM

## 2021-09-11 PROCEDURE — 84550 ASSAY OF BLOOD/URIC ACID: CPT

## 2021-09-11 PROCEDURE — 87040 BLOOD CULTURE FOR BACTERIA: CPT

## 2021-09-11 PROCEDURE — 85652 RBC SED RATE AUTOMATED: CPT

## 2021-09-11 PROCEDURE — 85610 PROTHROMBIN TIME: CPT

## 2021-09-11 PROCEDURE — 80053 COMPREHEN METABOLIC PANEL: CPT

## 2021-09-11 PROCEDURE — 700111 HCHG RX REV CODE 636 W/ 250 OVERRIDE (IP)

## 2021-09-11 PROCEDURE — BT1D1ZZ FLUOROSCOPY OF RIGHT KIDNEY, URETER AND BLADDER USING LOW OSMOLAR CONTRAST: ICD-10-PCS | Performed by: RADIOLOGY

## 2021-09-11 PROCEDURE — 36415 COLL VENOUS BLD VENIPUNCTURE: CPT

## 2021-09-11 PROCEDURE — 84145 PROCALCITONIN (PCT): CPT

## 2021-09-11 PROCEDURE — 84100 ASSAY OF PHOSPHORUS: CPT

## 2021-09-11 PROCEDURE — 94640 AIRWAY INHALATION TREATMENT: CPT

## 2021-09-11 PROCEDURE — 85025 COMPLETE CBC W/AUTO DIFF WBC: CPT

## 2021-09-11 RX ORDER — MIDAZOLAM HYDROCHLORIDE 1 MG/ML
.5-2 INJECTION INTRAMUSCULAR; INTRAVENOUS PRN
Status: ACTIVE | OUTPATIENT
Start: 2021-09-11 | End: 2021-09-11

## 2021-09-11 RX ORDER — POLYETHYLENE GLYCOL 3350 17 G/17G
1 POWDER, FOR SOLUTION ORAL DAILY
Status: DISCONTINUED | OUTPATIENT
Start: 2021-09-11 | End: 2021-09-18 | Stop reason: HOSPADM

## 2021-09-11 RX ORDER — MORPHINE SULFATE 4 MG/ML
2 INJECTION, SOLUTION INTRAMUSCULAR; INTRAVENOUS
Status: DISCONTINUED | OUTPATIENT
Start: 2021-09-11 | End: 2021-09-11

## 2021-09-11 RX ORDER — HYDROMORPHONE HYDROCHLORIDE 2 MG/1
2 TABLET ORAL EVERY 4 HOURS PRN
Status: DISCONTINUED | OUTPATIENT
Start: 2021-09-11 | End: 2021-09-11

## 2021-09-11 RX ORDER — IPRATROPIUM BROMIDE AND ALBUTEROL SULFATE 2.5; .5 MG/3ML; MG/3ML
3 SOLUTION RESPIRATORY (INHALATION) 4 TIMES DAILY
Status: DISCONTINUED | OUTPATIENT
Start: 2021-09-11 | End: 2021-09-11

## 2021-09-11 RX ORDER — PROCHLORPERAZINE EDISYLATE 5 MG/ML
5-10 INJECTION INTRAMUSCULAR; INTRAVENOUS EVERY 4 HOURS PRN
Status: DISCONTINUED | OUTPATIENT
Start: 2021-09-11 | End: 2021-09-18 | Stop reason: HOSPADM

## 2021-09-11 RX ORDER — PREDNISONE 5 MG/1
5 TABLET ORAL DAILY
Status: DISCONTINUED | OUTPATIENT
Start: 2021-09-11 | End: 2021-09-18 | Stop reason: HOSPADM

## 2021-09-11 RX ORDER — AMOXICILLIN 250 MG
2 CAPSULE ORAL 2 TIMES DAILY
Status: DISCONTINUED | OUTPATIENT
Start: 2021-09-11 | End: 2021-09-18 | Stop reason: HOSPADM

## 2021-09-11 RX ORDER — PROMETHAZINE HYDROCHLORIDE 25 MG/1
12.5-25 TABLET ORAL EVERY 4 HOURS PRN
Status: DISCONTINUED | OUTPATIENT
Start: 2021-09-11 | End: 2021-09-18 | Stop reason: HOSPADM

## 2021-09-11 RX ORDER — SODIUM CHLORIDE 9 MG/ML
INJECTION, SOLUTION INTRAVENOUS CONTINUOUS
Status: DISCONTINUED | OUTPATIENT
Start: 2021-09-11 | End: 2021-09-11

## 2021-09-11 RX ORDER — ONDANSETRON 2 MG/ML
4 INJECTION INTRAMUSCULAR; INTRAVENOUS PRN
Status: ACTIVE | OUTPATIENT
Start: 2021-09-11 | End: 2021-09-11

## 2021-09-11 RX ORDER — OXYCODONE HYDROCHLORIDE 5 MG/1
2.5 TABLET ORAL
Status: DISCONTINUED | OUTPATIENT
Start: 2021-09-11 | End: 2021-09-11

## 2021-09-11 RX ORDER — ONDANSETRON 2 MG/ML
4 INJECTION INTRAMUSCULAR; INTRAVENOUS EVERY 4 HOURS PRN
Status: DISCONTINUED | OUTPATIENT
Start: 2021-09-11 | End: 2021-09-18 | Stop reason: HOSPADM

## 2021-09-11 RX ORDER — OMEPRAZOLE 20 MG/1
20 CAPSULE, DELAYED RELEASE ORAL DAILY
Status: DISCONTINUED | OUTPATIENT
Start: 2021-09-11 | End: 2021-09-18 | Stop reason: HOSPADM

## 2021-09-11 RX ORDER — SODIUM BICARBONATE 650 MG/1
650 TABLET ORAL
Status: DISCONTINUED | OUTPATIENT
Start: 2021-09-11 | End: 2021-09-18 | Stop reason: HOSPADM

## 2021-09-11 RX ORDER — TAMSULOSIN HYDROCHLORIDE 0.4 MG/1
0.4 CAPSULE ORAL
Status: DISCONTINUED | OUTPATIENT
Start: 2021-09-11 | End: 2021-09-18 | Stop reason: HOSPADM

## 2021-09-11 RX ORDER — LOSARTAN POTASSIUM 50 MG/1
50 TABLET ORAL DAILY
Status: DISCONTINUED | OUTPATIENT
Start: 2021-09-11 | End: 2021-09-18 | Stop reason: HOSPADM

## 2021-09-11 RX ORDER — BISACODYL 10 MG
10 SUPPOSITORY, RECTAL RECTAL
Status: DISCONTINUED | OUTPATIENT
Start: 2021-09-11 | End: 2021-09-18 | Stop reason: HOSPADM

## 2021-09-11 RX ORDER — ONDANSETRON 4 MG/1
4 TABLET, ORALLY DISINTEGRATING ORAL EVERY 4 HOURS PRN
Status: DISCONTINUED | OUTPATIENT
Start: 2021-09-11 | End: 2021-09-18 | Stop reason: HOSPADM

## 2021-09-11 RX ORDER — OXYCODONE HYDROCHLORIDE 5 MG/1
5 TABLET ORAL
Status: DISCONTINUED | OUTPATIENT
Start: 2021-09-11 | End: 2021-09-11

## 2021-09-11 RX ORDER — BUDESONIDE AND FORMOTEROL FUMARATE DIHYDRATE 80; 4.5 UG/1; UG/1
2 AEROSOL RESPIRATORY (INHALATION)
Status: DISCONTINUED | OUTPATIENT
Start: 2021-09-11 | End: 2021-09-18 | Stop reason: HOSPADM

## 2021-09-11 RX ORDER — POLYETHYLENE GLYCOL 3350 17 G/17G
1 POWDER, FOR SOLUTION ORAL
Status: DISCONTINUED | OUTPATIENT
Start: 2021-09-11 | End: 2021-09-18 | Stop reason: HOSPADM

## 2021-09-11 RX ORDER — MIDAZOLAM HYDROCHLORIDE 1 MG/ML
INJECTION INTRAMUSCULAR; INTRAVENOUS
Status: COMPLETED
Start: 2021-09-11 | End: 2021-09-11

## 2021-09-11 RX ORDER — HYDROMORPHONE HYDROCHLORIDE 4 MG/1
4 TABLET ORAL EVERY 4 HOURS PRN
Status: DISCONTINUED | OUTPATIENT
Start: 2021-09-11 | End: 2021-09-18 | Stop reason: HOSPADM

## 2021-09-11 RX ORDER — ACETAMINOPHEN 500 MG
1000 TABLET ORAL EVERY 6 HOURS PRN
Status: DISCONTINUED | OUTPATIENT
Start: 2021-09-16 | End: 2021-09-18 | Stop reason: HOSPADM

## 2021-09-11 RX ORDER — SODIUM CHLORIDE 9 MG/ML
500 INJECTION, SOLUTION INTRAVENOUS
Status: ACTIVE | OUTPATIENT
Start: 2021-09-11 | End: 2021-09-11

## 2021-09-11 RX ORDER — IPRATROPIUM BROMIDE AND ALBUTEROL SULFATE 2.5; .5 MG/3ML; MG/3ML
3 SOLUTION RESPIRATORY (INHALATION)
Status: DISCONTINUED | OUTPATIENT
Start: 2021-09-11 | End: 2021-09-18 | Stop reason: HOSPADM

## 2021-09-11 RX ORDER — LINEZOLID 2 MG/ML
600 INJECTION, SOLUTION INTRAVENOUS EVERY 12 HOURS
Status: DISCONTINUED | OUTPATIENT
Start: 2021-09-11 | End: 2021-09-12

## 2021-09-11 RX ORDER — ACETAMINOPHEN 500 MG
1000 TABLET ORAL EVERY 6 HOURS
Status: DISPENSED | OUTPATIENT
Start: 2021-09-11 | End: 2021-09-16

## 2021-09-11 RX ORDER — PROMETHAZINE HYDROCHLORIDE 25 MG/1
12.5-25 SUPPOSITORY RECTAL EVERY 4 HOURS PRN
Status: DISCONTINUED | OUTPATIENT
Start: 2021-09-11 | End: 2021-09-18 | Stop reason: HOSPADM

## 2021-09-11 RX ADMIN — POLYETHYLENE GLYCOL 3350 1 PACKET: 17 POWDER, FOR SOLUTION ORAL at 22:19

## 2021-09-11 RX ADMIN — IOHEXOL 15 ML: 300 INJECTION, SOLUTION INTRAVENOUS at 12:00

## 2021-09-11 RX ADMIN — ACETAMINOPHEN 1000 MG: 500 TABLET ORAL at 12:59

## 2021-09-11 RX ADMIN — LOSARTAN POTASSIUM 50 MG: 50 TABLET, FILM COATED ORAL at 06:18

## 2021-09-11 RX ADMIN — ACETAMINOPHEN 1000 MG: 500 TABLET ORAL at 22:16

## 2021-09-11 RX ADMIN — TAMSULOSIN HYDROCHLORIDE 0.4 MG: 0.4 CAPSULE ORAL at 07:43

## 2021-09-11 RX ADMIN — LINEZOLID 600 MG: 600 INJECTION, SOLUTION INTRAVENOUS at 07:41

## 2021-09-11 RX ADMIN — ACETAMINOPHEN 1000 MG: 500 TABLET ORAL at 17:33

## 2021-09-11 RX ADMIN — FENTANYL CITRATE 50 MCG: 50 INJECTION, SOLUTION INTRAMUSCULAR; INTRAVENOUS at 11:28

## 2021-09-11 RX ADMIN — SODIUM BICARBONATE 650 MG: 650 TABLET ORAL at 12:59

## 2021-09-11 RX ADMIN — IPRATROPIUM BROMIDE AND ALBUTEROL SULFATE 3 ML: .5; 2.5 SOLUTION RESPIRATORY (INHALATION) at 07:46

## 2021-09-11 RX ADMIN — PIPERACILLIN AND TAZOBACTAM 4.5 G: 4; .5 INJECTION, POWDER, LYOPHILIZED, FOR SOLUTION INTRAVENOUS; PARENTERAL at 17:33

## 2021-09-11 RX ADMIN — MIDAZOLAM HYDROCHLORIDE 1 MG: 1 INJECTION, SOLUTION INTRAMUSCULAR; INTRAVENOUS at 11:31

## 2021-09-11 RX ADMIN — LINEZOLID 600 MG: 600 INJECTION, SOLUTION INTRAVENOUS at 16:22

## 2021-09-11 RX ADMIN — SODIUM CHLORIDE, POTASSIUM CHLORIDE, SODIUM LACTATE AND CALCIUM CHLORIDE: 600; 310; 30; 20 INJECTION, SOLUTION INTRAVENOUS at 02:54

## 2021-09-11 RX ADMIN — SODIUM BICARBONATE 650 MG: 650 TABLET ORAL at 17:33

## 2021-09-11 RX ADMIN — FENTANYL CITRATE 25 MCG: 50 INJECTION, SOLUTION INTRAMUSCULAR; INTRAVENOUS at 11:31

## 2021-09-11 RX ADMIN — SODIUM BICARBONATE 650 MG: 650 TABLET ORAL at 07:44

## 2021-09-11 RX ADMIN — BUDESONIDE AND FORMOTEROL FUMARATE DIHYDRATE 2 PUFF: 80; 4.5 AEROSOL RESPIRATORY (INHALATION) at 21:55

## 2021-09-11 RX ADMIN — DOCUSATE SODIUM 50 MG AND SENNOSIDES 8.6 MG 2 TABLET: 8.6; 5 TABLET, FILM COATED ORAL at 06:18

## 2021-09-11 RX ADMIN — MIDAZOLAM HYDROCHLORIDE 1 MG: 1 INJECTION, SOLUTION INTRAMUSCULAR; INTRAVENOUS at 11:28

## 2021-09-11 RX ADMIN — PIPERACILLIN AND TAZOBACTAM 4.5 G: 4; .5 INJECTION, POWDER, LYOPHILIZED, FOR SOLUTION INTRAVENOUS; PARENTERAL at 06:10

## 2021-09-11 RX ADMIN — OXYCODONE 5 MG: 5 TABLET ORAL at 08:15

## 2021-09-11 RX ADMIN — HYDROMORPHONE HYDROCHLORIDE 2 MG: 2 TABLET ORAL at 14:35

## 2021-09-11 RX ADMIN — THERA TABS 1 TABLET: TAB at 06:18

## 2021-09-11 RX ADMIN — PREDNISONE 5 MG: 5 TABLET ORAL at 06:18

## 2021-09-11 RX ADMIN — OXYCODONE 5 MG: 5 TABLET ORAL at 02:54

## 2021-09-11 RX ADMIN — HYDROMORPHONE HYDROCHLORIDE 4 MG: 4 TABLET ORAL at 22:16

## 2021-09-11 RX ADMIN — IPRATROPIUM BROMIDE AND ALBUTEROL SULFATE 3 ML: .5; 2.5 SOLUTION RESPIRATORY (INHALATION) at 15:59

## 2021-09-11 RX ADMIN — PROCHLORPERAZINE EDISYLATE 10 MG: 5 INJECTION INTRAMUSCULAR; INTRAVENOUS at 08:15

## 2021-09-11 RX ADMIN — OMEPRAZOLE 20 MG: 20 CAPSULE, DELAYED RELEASE ORAL at 06:18

## 2021-09-11 RX ADMIN — SODIUM CHLORIDE: 9 INJECTION, SOLUTION INTRAVENOUS at 06:10

## 2021-09-11 RX ADMIN — ONDANSETRON 4 MG: 2 INJECTION INTRAMUSCULAR; INTRAVENOUS at 06:08

## 2021-09-11 RX ADMIN — DOCUSATE SODIUM 50 MG AND SENNOSIDES 8.6 MG 2 TABLET: 8.6; 5 TABLET, FILM COATED ORAL at 17:33

## 2021-09-11 ASSESSMENT — COGNITIVE AND FUNCTIONAL STATUS - GENERAL
CLIMB 3 TO 5 STEPS WITH RAILING: A LITTLE
DRESSING REGULAR UPPER BODY CLOTHING: A LITTLE
SUGGESTED CMS G CODE MODIFIER DAILY ACTIVITY: CI
SUGGESTED CMS G CODE MODIFIER MOBILITY: CJ
MOBILITY SCORE: 22
STANDING UP FROM CHAIR USING ARMS: A LITTLE
DAILY ACTIVITIY SCORE: 23

## 2021-09-11 ASSESSMENT — ENCOUNTER SYMPTOMS
HEADACHES: 0
NAUSEA: 0
COUGH: 0
DEPRESSION: 0
FEVER: 0
VOMITING: 0
DIZZINESS: 0
MYALGIAS: 0
FLANK PAIN: 1

## 2021-09-11 ASSESSMENT — COPD QUESTIONNAIRES
DO YOU EVER COUGH UP ANY MUCUS OR PHLEGM?: NO/ONLY WITH OCCASIONAL COLDS OR INFECTIONS
HAVE YOU SMOKED AT LEAST 100 CIGARETTES IN YOUR ENTIRE LIFE: YES
DURING THE PAST 4 WEEKS HOW MUCH DID YOU FEEL SHORT OF BREATH: SOME OF THE TIME
COPD SCREENING SCORE: 5

## 2021-09-11 ASSESSMENT — PAIN DESCRIPTION - PAIN TYPE
TYPE: ACUTE PAIN;CHRONIC PAIN
TYPE: ACUTE PAIN

## 2021-09-11 ASSESSMENT — FIBROSIS 4 INDEX: FIB4 SCORE: 0.62

## 2021-09-11 NOTE — ASSESSMENT & PLAN NOTE
This is Sepsis Present on admission  SIRS criteria identified on my evaluation include: Tachycardia, with heart rate greater than 90 BPM, Tachypnea, with respirations greater than 20 per minute and Leukocytosis, with WBC greater than 12,000  Source is Urosepsis  Sepsis protocol initiated  Fluid resuscitation ordered per protocol  IV antibiotics as appropriate for source of sepsis  While organ dysfunction may be noted elsewhere in this problem list or in the chart, degree of organ dysfunction does not meet CMS criteria for severe sepsis    resolved

## 2021-09-11 NOTE — RESPIRATORY CARE
" COPD EDUCATION by COPD CLINICAL EDUCATOR  9/11/2021 at 6:26 AM by Portia Rios RRT     Patient reviewed by COPD education team. Patient does not qualify for the COPD program.      COPD Screen       COPD Assessment  COPD Clinical Specialists ONLY  COPD Education Initiated: No--Quick Screen (No formal Dx of COPD, Pt has lung CA, no respiratory meds, quit smoking in 2015.)    Meds to Beds        MY COPD ACTION PLAN - No Respiratory Medications      It is recommended that patients and physicians /healthcare providers complete this action plan together. This plan should be discussed at each physician visit and updated as needed.    The green, yellow and red zones show groups of symptoms of COPD. This list of symptoms is not comprehensive, and you may experience other symptoms. In the \"Actions\" column, your healthcare provider has recommended actions for you to take based on your symptoms.    Patient Name: Erich Ingram   YOB: 1965   Last Updated on:     Green Zone:  I am doing well today Actions   •  Usual activitiy and exercise level •  Take daily medications   •  Usual amounts of cough and phlegm/mucus •  Use oxygen as prescribed   •  Sleep well at night •  Continue regular exercise/diet plan   •  Appetite is good •  At all times avoid cigarette smoke, inhaled irritants     Daily Medications (these medications are taken every day):                Yellow Zone:  I am having a bad day or a COPD flare Actions   •  More breathless than usual •  Continue daily medications   •  I have less energy for my daily activities •  Use quick relief inhaler as ordered   •  Increased or thicker phlegm/mucus •  Use oxygen as prescribed   •  Using quick relief inhaler/nebulizer more often •  Get plenty of rest   •  Swelling of ankles more than usual •  Use pursed lip breathing   •  More coughing than usual •  At all times avoid cigarette smoke, inhaled irritants   •  I feel like I have a \"chest cold\"   •  Poor sleep " and my symptoms woke me up   •  My appetite is not good   •  My medicine is not helping    •  Call provider immediately if symptoms don’t improve     Continue daily medications, add rescue medications:               Medications to be used during a flare up, (as Discussed with Provider):              Red Zone:  I need urgent medical care Actions   •  Severe shortness of breath even at rest •  Call 911 or seek medical care immediately   •  Not able to do any activity because of breathing    •  Fever or shaking chills    •  Feeling confused or very drowsy     •  Chest pains    •  Coughing up blood

## 2021-09-11 NOTE — ASSESSMENT & PLAN NOTE
In setting of sepsis and AYANA  Continue to monitor closely  Continue IV antibiotics and continue antibiotics

## 2021-09-11 NOTE — PROGRESS NOTES
"Assumed care of patient from night shift RN.  Patient is alert and oriented times 4, states pain of 8/10, medicated per MAR.  VSS /91   Pulse (!) 106   Temp 37.1 °C (98.8 °F) (Temporal)   Resp (!) 26   Ht 1.905 m (6' 3\")   Wt 66.5 kg (146 lb 9.7 oz)   SpO2 96%   BMI 18.32 kg/m²   PIV in the LFA, patent and running NS at 75mL/hr.  On 2L oxygen with saturations in the mid 90s.  RT involved.  Tele monitored.  Last BM this AM  Soriano catheter in place, stat lock in use.  Urine noted to be cloudy with sediment.  NPO sips with meds, tolerating well.  Nausea noted this AM, medicated per MAR.  Pain pump to the LLQ of the abdomen, patient reports Dilaudid.  No bolus button with patient.  PTA wound to the sacrum, mepilex in place.  Photo uploaded to chart and wound consult placed.  Patient is a SBA, demonstrates steady gait, minimal assistance needed.  POC discussed for the day, bed is locked and in the lowest position, call light is within reach.  All needs are met at this time, hourly rounding is in place.  "

## 2021-09-11 NOTE — PROGRESS NOTES
TRIAGE OFFICER ADMISSION ACCEPTANCE NOTE:     - I spoke and discussed the case with the ER physician and [facility]  - This is a 56-year-old male with a past medical history of metastatic colon cancer, status post colon resection, currently on chemotherapy who presents to the hospital for fevers and decreased urine output.  Patient recently had right-sided hydronephrosis and was admitted [facility].  At the time he had a nephrostomy tube and a stent placed since he has significant hydronephrosis and renal failure.  In the ER he was found to have elevated WBC of 20, creatinine of 2.6.  He was started on sepsis bolus, given Zosyn and vancomycin.  Patient does not know his urologist name or what group it was.  - Hospitalist, will be admitting the patient.  He will place his admission orders himself.  - Please call admitting physician for full admission orders, and cross-coverage issues on patient's arrival to the unit.

## 2021-09-11 NOTE — ASSESSMENT & PLAN NOTE
CT scan from outside facility noted double-J right-sided ureteral stent. Moderate to severe right-sided hydronephrosis persists with severe left-sided hydronephrosis and renal atrophy, unchanged.     S/p right nephrostomy tube  Repeat US fairly unchanged- IR Right nephrostogram showing adequate placement of tube

## 2021-09-11 NOTE — PROGRESS NOTES
IR RN note    Reviewed sedation orders with MD  Patient underwent a Right Nephrostomy Tube Placement by Dr. Mcbride.  Procedure site was verified by MD using imaging guidance. Consent was signed.  IR tech Kimo and Kimberlee assisted. Patient was placed in a prone position.  Vitals were taken every 5 minutes and remained stable during procedure (see doc flow sheet for results).  CO2 waveform capnography was monitored throughout procedure, see chart.  Right flank back access site.   A sutures, gauze and medical tape dressing was placed over surgical site. Report called to Rubi KWAN. Pt transported by bed with RN to T436-2.       UYA100 Flexima Regular Nephrostomy Catheter system 8 Fr - Right  REF # [id number]  LOT # [id number]  Exp. 8/9/24

## 2021-09-11 NOTE — PROGRESS NOTES
Hospital Medicine Daily Progress Note    Date of Service  9/11/2021    Chief Complaint  Erich Ingram is a 56 y.o. male admitted 9/11/2021 with sepsis     Hospital Course    48 male with past medical history of colon cancer with metastasis status post resection on chemo, recent right renal stent 2 weeks ago at [facility] for hydronephrosis transferred from outside facility for sepsis.  CT scan from outside facility noted double-J right-sided ureteral stent. Moderate to severe right-sided hydronephrosis persists with severe left-sided hydronephrosis and renal atrophy, unchanged.     Labs remarkable for leukocytosis 20 4K, elevated BUN/creatinine. Admitted for sepsis due to UTI    Interval Problem Update  9/11/2021  Remained afebrile  Labs consistent with leukocytosis,  Bicarbonate 17, BUN/creatinine 44/5.92  Blood culture in process  Currently on Zosyn and linezolid  Discussed case with urology Dr. Frederick .  Plan for right nephrostomy tube  IR consulted     I have personally seen and examined the patient at bedside. I discussed the plan of care with patient.    Consultants/Specialty  urology    Code Status  Full Code    Disposition  Patient is not medically cleared.   Anticipate discharge to to court or custody of law enforcement.  I have placed the appropriate orders for post-discharge needs.    Review of Systems  Review of Systems   Constitutional: Negative for fever.   Respiratory: Negative for cough.    Gastrointestinal: Negative for nausea and vomiting.   Genitourinary: Positive for dysuria and flank pain.   Musculoskeletal: Negative for myalgias.   Neurological: Negative for dizziness and headaches.   Psychiatric/Behavioral: Negative for depression.        Physical Exam  Temp:  [37.1 °C (98.7 °F)-37.1 °C (98.8 °F)] 37.1 °C (98.8 °F)  Pulse:  [106-112] 106  Resp:  [18-26] 26  BP: (135-139)/(91) 139/91  SpO2:  [94 %-98 %] 96 %    Physical Exam  Constitutional:       Appearance: He is ill-appearing.    HENT:      Head: Normocephalic.      Left Ear: Tympanic membrane normal.      Nose: Nose normal.      Mouth/Throat:      Mouth: Mucous membranes are moist.   Eyes:      Pupils: Pupils are equal, round, and reactive to light.   Cardiovascular:      Rate and Rhythm: Normal rate and regular rhythm.      Pulses: Normal pulses.      Heart sounds: Normal heart sounds. No murmur heard.     Pulmonary:      Effort: Pulmonary effort is normal.      Breath sounds: Normal breath sounds.   Abdominal:      General: Abdomen is flat.      Tenderness: There is right CVA tenderness. There is no left CVA tenderness.   Genitourinary:     Comments: On oro   Musculoskeletal:      Right lower leg: No edema.      Left lower leg: No edema.   Neurological:      General: No focal deficit present.      Mental Status: He is alert and oriented to person, place, and time.         Fluids    Intake/Output Summary (Last 24 hours) at 9/11/2021 1026  Last data filed at 9/11/2021 0943  Gross per 24 hour   Intake 0 ml   Output 150 ml   Net -150 ml       Laboratory  Recent Labs     09/11/21  0557   WBC 24.0*   RBC 3.47*   HEMOGLOBIN 8.8*   HEMATOCRIT 26.9*   MCV 77.5*   MCH 25.4*   MCHC 32.7*   RDW 53.7*   PLATELETCT 441   MPV 9.0     Recent Labs     09/11/21  0557   SODIUM 131*   POTASSIUM 5.0   CHLORIDE 98   CO2 16*   GLUCOSE 80   BUN 44*   CREATININE 5.92*   CALCIUM 8.0*     Recent Labs     09/11/21  0557   INR 1.36*               Imaging  OUTSIDE IMAGES-DX CHEST   Final Result      OUTSIDE IMAGES-CT ABDOMEN /PELVIS   Final Result      OUTSIDE IMAGES-CT ABDOMEN /PELVIS   Final Result      OUTSIDE IMAGES-CT CHEST   Final Result      US-RENAL   Final Result         1.  Moderate right and severe left hydronephrosis.   2.  Thin echogenic appearance of the left renal cortex.      IR-CONSULT AND TREAT    (Results Pending)        Assessment/Plan  High anion gap metabolic acidosis  Assessment & Plan  In setting of sepsis and AYANA  Continue to monitor  closely  Continue IV antibiotics and continue antibiotics    Other hydronephrosis  Assessment & Plan  CT scan from outside facility noted double-J right-sided ureteral stent. Moderate to severe right-sided hydronephrosis persists with severe left-sided hydronephrosis and renal atrophy, unchanged.       Discussed case with urology Dr. Frederick .  Plan for right nephrostomy tube  IR consulted    PNA (pneumonia)- (present on admission)  Assessment & Plan  Cont abx  Sepsis protocol initiated  Cont duonebs  Oxygen supplementation prn  Pulse ox    UTI (urinary tract infection)- (present on admission)  Assessment & Plan  Pending cultures, f/u from outside facility  Repeat urine cultures ordered, but received 1 dose Zosyn and Vanco at outside facility prior to tsfer  Sepsis protocol initiated      Protein calorie malnutrition (HCC)- (present on admission)  Assessment & Plan  BMI 18  Nutrition consult  Boost, MV    Leukocytosis- (present on admission)  Assessment & Plan  2/2 UTI/sepsis  Treat underlying conditions    Chronic, continuous use of opioids- (present on admission)  Assessment & Plan  Analgesia with oxycodone + Morphine    Benign prostate hyperplasia- (present on admission)  Assessment & Plan  Cont tamsulosin    Nausea & vomiting- (present on admission)  Assessment & Plan  Antiemetics  Bowel regimen  Nutrition consult, consider boost.    Sepsis (HCC)- (present on admission)  Assessment & Plan  This is Sepsis Present on admission  SIRS criteria identified on my evaluation include: Tachycardia, with heart rate greater than 90 BPM, Tachypnea, with respirations greater than 20 per minute and Leukocytosis, with WBC greater than 12,000  Source is Urosepsis and possible PNA  Sepsis protocol initiated  Fluid resuscitation ordered per protocol  IV antibiotics as appropriate for source of sepsis  While organ dysfunction may be noted elsewhere in this problem list or in the chart, degree of organ dysfunction does not meet CMS  criteria for severe sepsis          Hypertension- (present on admission)  Assessment & Plan  Continue Home Medications  Labetalol ivp prn with parameters      Hyponatremia- (present on admission)  Assessment & Plan  - c/w NS, mild.  - IN AYANA  - F/u with Serum Osm, Urine Osm, Urine Na      COPD (chronic obstructive pulmonary disease) (HCC)- (present on admission)  Assessment & Plan  Cont duonebs  Oxygen support      Metastatic Colon cancer (HCC)- (present on admission)  Assessment & Plan  Diagnosed 2016 treated with resection and chemo. Recurred in 2018 with pulmonary nodules biopsied showing adenocarcinoma.  Antiemetics    Follows with oncology Dr. Urbina as outpatient         VTE prophylaxis: SCDs/TEDs and heparin ppx    I have performed a physical exam and reviewed and updated ROS and Plan today (9/11/2021). In review of yesterday's note (9/10/2021), there are no changes except as documented above.

## 2021-09-11 NOTE — PROGRESS NOTES
4 Eyes Skin Assessment Completed by AQUILES Hutchinson and AQUILES Valdivia.    Head WDL  Ears WDL  Nose WDL  Mouth WDL  Neck WDL  Breast/Chest WDL  Shoulder Blades WDL  Spine WDL  (R) Arm/Elbow/Hand Bruising  (L) Arm/Elbow/Hand Bruising  Abdomen healed Scar and Incision to RLQ (old abdominal surgery), LLQ pain pump, LUQ bruise  Groin WDL  Scrotum/Coccyx/Buttocks Redness and Blanching to sacrum, DIP to sacral wound  (R) Leg healed Scar  (L) Leg Bruising  (R) Heel/Foot/Toe Scab between second and third toe  (L) Heel/Foot/Toe Scab on third toe          Devices In Places Pulse Ox and Soriano      Interventions In Place Sacral Mepilex    Possible Skin Injury Yes    Pictures Uploaded Into Epic No, needs to be completed  Wound Consult Placed N/A; needs to be completed  RN Wound Prevention Protocol Ordered No

## 2021-09-11 NOTE — ASSESSMENT & PLAN NOTE
Diagnosed 2016 treated with resection and chemo. Recurred in 2018 with pulmonary nodules biopsied showing adenocarcinoma.  Antiemetics    Follows with oncology Dr. Urbina as outpatient

## 2021-09-11 NOTE — PROGRESS NOTES
Coy from Lab called with critical result of creatinine at 5.92. Critical lab result read back to Coy.   Dr. Pizarro notified of critical lab result at 0739.  Critical lab result read back by Dr. Pizarro.

## 2021-09-11 NOTE — OR SURGEON
Immediate Post- Operative Note        Findings: Pyonephrosis of Right Kidney      Procedure(s): Right Nephrostomy Tube Placement    Estimated Blood Loss: Less than 5 ml    Complications: None      9/11/2021     12:01 PM     Seth Mcbride M.D.

## 2021-09-11 NOTE — H&P
Hospital Medicine History & Physical Note    Date of Service  2021    Primary Care Physician  YOU Sterling.    Consultants  Consider Urology in AM    Code Status  Full Code    Chief Complaint  Fever, painful urination    History of Presenting Illness  56M with Hx colon cancer with mets s/p resection on chemo sent from [facility]. Patient was having fevers and decreased urine output, transferred for Sepsis, UTI, acute kidney failure with recent R renal stent 2 weeks ago at [facility] when he was found to have significant hydronephrosis and renal failure. At [facility], he had WBC of 20, Cr 2.6, given Zosyn and Vancomycin. Patient does not know his urologists name or what group it was. Patient also complains of ongoing nausea and dry heaving for several weeks associated with his chemo/cancer diagnosis for the past 6 weeks or so. He has poor oral intake, and drank 1 boost about 24 hours prior, but hasn't eaten since. Patient denies any past history of BPH or difficulties urinating in the past.     Per transfer papers, Home meds are: Advair Discus, Braftovi 75mg, cyclobenzaprine 10mg bid, dilaudid with ketamine pain pump, docusate 100mg TID, Erbitux (unsure of dose, infusion every other week), hydromorphone 4mg q4hrs prn, Ipratropium bromide 0.02% solution for inhalation, lactulose 15mL qd, Lansoprazole 30mg ad, levalbuterol, Losartan 50mg qd, Olanzapine 2.5mg qd, Ondansetron 8mg BID, Prednisone 5mg qd, spiriva, Laxative, tamsulosin 0.4mg, tramadol 50mg qd, trazadone 50mg qhs.    Per transfer papers:  -CXR showin.9cm left lung base suspicoius for neoplastic disease, new patchy infiltrate superior to this worrisome for developing pneumonia.   -CTAP showing gallbladder hydrops without gallstones or inflammation, no biliary ductal obstruction. Fatty liver, Interval placement of double-J right-sided ureteral stent. Moderate to severe right-sided hydronephrosis persists with severe  left-sided hydronephrosis and renal atrophy, unchanged.     Labs at outside facility sig for hyponatremia 127, hypochloremia 94, BUN 39.9, Cr 5.06, Albumin 2.31, , Anion Gap 17, Troponin normal. UA was cloudy with moderate blood, negative nitrite, large leukocytosis, and abnormal protein. WBC 20.3, Hrb 9.3, Plates 453 and ANC 18.    Patient being admitted for Sepsis, UTI, acute kidney failure with recent R renal stent    I discussed the plan of care with patient.    Review of Systems  ROS  All systems reviewed and negative except as noted in HPI.    Past Medical History   has a past medical history of Arthritis (01/24/2020), Bowel habit changes (01/24/2020), Breath shortness, Cancer (HCC), Cancer (HCC) (08/2018), COPD (chronic obstructive pulmonary disease) (HCC) (2018), Dental disorder (01/24/2020), Fall, Heart burn, Hepatitis C (2005), Hypertension, Indigestion, PICC (peripherally inserted central catheter) in place (05/2019), Psychiatric problem, and Renal disorder.    Surgical History   has a past surgical history that includes low anterior resection laparoscopic (1/12/2016); cath placement (3/16/2016); bronchoscopy-endo (N/A, 3/1/2019); other orthopedic surgery (Right); other orthopedic surgery (Right); other orthopedic surgery (Left); pump revision (7/9/2019); urology surgery (Left, 2018); colonoscopy (N/A, 2016); and pump revision (Left, 1/28/2020).     Family History  family history includes No Known Problems in his brother, father, maternal grandfather, maternal grandmother, mother, paternal grandfather, paternal grandmother, and sister. He was adopted.   Family history reviewed with patient. There is family history that is pertinent to the chief complaint.     Social History   reports that he quit smoking about 6 years ago. He started smoking about 20 years ago. He has a 15.00 pack-year smoking history. He has never used smokeless tobacco. He reports previous alcohol use. He reports current drug  "use. Drugs: Inhaled and Oral.    Allergies  Allergies   Allergen Reactions   • Levaquin Vomiting   • Nubain [Nalbuphine] Anxiety     \"made him feel paranoid\"       Medications  Prior to Admission Medications   Prescriptions Last Dose Informant Patient Reported? Taking?   HYDROmorphone (DILAUDID) 4 MG Tab 9/10/2021 at 1000 Family Member Yes No   Sig: Take 4 mg by mouth every 6 hours as needed ([facility]).   NON SPECIFIED 9/11/2021 at Unknown time  Yes No   Sig: Pain pump has Dilaudid and Ketamine   hydrocortisone rectal (PROCTOZONE HC) 2.5 % Cream 9/10/2021 at 1000  No No   Sig: Insert 1 Application in rectum 3 times a day as needed.   lansoprazole (PREVACID) 30 MG CAPSULE DELAYED RELEASE 9/10/2021 at 1800 Family Member Yes No   Sig: Take 30 mg by mouth every day.   losartan (COZAAR) 50 MG Tab  Family Member Yes No   Sig: Take 50 mg by mouth every day.   ondansetron (ZOFRAN ODT) 4 MG TABLET DISPERSIBLE 9/10/2021 at 1000 Family Member Yes No   Sig: Take 8 mg by mouth every 6 hours as needed for Nausea.   polyethylene glycol/lytes (MIRALAX) Pack 9/10/2021 at 1800 Family Member No No   Sig: Take 1 Packet by mouth every day. Can use up to 2 times daily if no bowel movement in 24 hours.   predniSONE (DELTASONE) 5 MG Tab 9/10/2021 at 1800 Family Member Yes No   Sig: Take 5 mg by mouth every day.   promethazine (PHENERGAN) 50 MG Tab  Family Member Yes No   Sig: Take 50 mg by mouth every 6 hours as needed for Nausea/Vomiting.   sennosides-docusate sodium (SENOKOT-S) 8.6-50 MG tablet   No No   Sig: Take 1 Tab by mouth every day.   tamsulosin (FLOMAX) 0.4 MG capsule 9/10/2021 at 1800 Family Member Yes No   Sig: Take 0.4 mg by mouth ONE-HALF HOUR AFTER BREAKFAST.      Facility-Administered Medications: None       Physical Exam  Temp:  [37.1 °C (98.7 °F)] 37.1 °C (98.7 °F)  Pulse:  [112] 112  Resp:  [19] 19  BP: (135)/(91) 135/91  SpO2:  [94 %] 94 %  Blood Pressure: 135/91   Temperature: 37.1 °C (98.7 °F)   Pulse: (!) 112 "   Respiration: 19   Pulse Oximetry: 94 %       Physical Exam    Constitutional: Resting comfortably in NAD   HENT: Normocephalic, no obvious evidence of acute trauma. Dry oral mucosa.  Eyes: No scleral icterus. Normal conjunctiva   Neck: Comfortable movement without any obvious restriction in the range of motion.  Cardiovascular: Upon ascultation I appreciate a regular heart rhythm and a normal rate with no murmurs, rubs or gallops  Thorax & Lungs: No respiratory distress. No wheezing, rales or rhonchi heard on ausculation.  there is no obvious chest wall tenderness. I appreciate normal air movement throughout.   Abdomen: The abdomen is not visibly distended. Upon palpation, I find suprapubic tenderness  No mass appreciated.  Skin: Pale. The exposed portions of skin reveal no obvious rash or other abnormalities. There is tenting.  Extremities/Musculoskeletal: no lower extremity edema with no asymmetry.  Neurologic: Alert & oriented. No focal deficits observed.   Psychiatric: Normal affect appropriate for the clinical situation.    Laboratory:          No results for input(s): ALTSGPT, ASTSGOT, ALKPHOSPHAT, TBILIRUBIN, DBILIRUBIN, GAMMAGT, AMYLASE, LIPASE, ALB, PREALBUMIN, GLUCOSE in the last 72 hours.      No results for input(s): NTPROBNP in the last 72 hours.      No results for input(s): TROPONINT in the last 72 hours.    Imaging:  US-RENAL    (Results Pending)       X-Ray:  I have personally reviewed the images and compared with prior images.  EKG:  I have personally reviewed the images and compared with prior images.    Assessment/Plan:  I anticipate this patient will require at least two midnights for appropriate medical management, necessitating inpatient admission.    UTI (urinary tract infection)- (present on admission)  Assessment & Plan  Pending cultures, f/u from outside facility  Repeat urine cultures ordered, but received 1 dose Zosyn and Vanco at outside facility prior to [facility]  Sepsis protocol  initiated      Sepsis (HCC)- (present on admission)  Assessment & Plan  This is Sepsis Present on admission  SIRS criteria identified on my evaluation include: Tachycardia, with heart rate greater than 90 BPM, Tachypnea, with respirations greater than 20 per minute and Leukocytosis, with WBC greater than 12,000  Source is Urosepsis and possible PNA  Sepsis protocol initiated  Fluid resuscitation ordered per protocol  IV antibiotics as appropriate for source of sepsis  While organ dysfunction may be noted elsewhere in this problem list or in the chart, degree of organ dysfunction does not meet CMS criteria for severe sepsis          Protein calorie malnutrition (HCC)- (present on admission)  Assessment & Plan  BMI 18  Nutrition consult  Boost, MV    Metastatic Colon cancer (HCC)- (present on admission)  Assessment & Plan  Diagnosed 2016 treated with resection and chemo. Recurred in 2018 with pulmonary nodules biopsied showing adenocarcinoma.  Antiemetics    PNA (pneumonia)- (present on admission)  Assessment & Plan  Cont abx  Sepsis protocol initiated  Cont duonebs  Oxygen supplementation prn  Pulse ox    Leukocytosis- (present on admission)  Assessment & Plan  2/2 UTI/sepsis  Treat underlying conditions    Chronic, continuous use of opioids- (present on admission)  Assessment & Plan  Analgesia with oxycodone + Morphine    Benign prostate hyperplasia- (present on admission)  Assessment & Plan  Cont tamsulosin    Nausea & vomiting- (present on admission)  Assessment & Plan  Antiemetics  Bowel regimen  Nutrition consult, consider boost.    Hypertension- (present on admission)  Assessment & Plan  Continue Home Medications  Labetalol ivp prn with parameters      Hyponatremia- (present on admission)  Assessment & Plan  - c/w NS, mild.  - IN [place]YANA  - F/u with Serum Osm, Urine Osm, Urine Na      COPD (chronic obstructive pulmonary disease) (HCC)- (present on admission)  Assessment & Plan  Cont duonebs  Oxygen  support        VTE prophylaxis: SCDs/TEDs and enoxaparin ppx

## 2021-09-11 NOTE — CARE PLAN
Problem: Knowledge Deficit - Standard  Goal: Patient and family/care givers will demonstrate understanding of plan of care, disease process/condition, diagnostic tests and medications  Outcome: Progressing     Problem: Pain - Standard  Goal: Alleviation of pain or a reduction in pain to the patient’s comfort goal  Outcome: Progressing     Problem: Care Map:  Optimal Outcome for the Pneumonia Patient  Goal: Collection and monitoring of appropriate tests and labs  Outcome: Progressing     Problem: Respiratory  Goal: Patient will achieve/maintain optimum respiratory ventilation and gas exchange  Outcome: Progressing     Problem: Risk for Aspiration  Goal: Patient's risk for aspiration will be absent or decrease  Outcome: Progressing     Problem: Hemodynamics - Pneumonia  Goal: Patient's hemodynamics, fluid balance and neurologic status will be stable or improve  Outcome: Progressing     Problem: Self Care  Goal: Patient will have the ability to perform ADLs independently or with assistance (bathe, groom, dress, toilet and feed)  Outcome: Progressing     Problem: Discharge Planning - Pneumonia  Goal: Patient will verbalize understanding of lifestyle changes and therapeutic needs post discharge  Outcome: Progressing     Problem: Hemodynamics  Goal: Patient's hemodynamics, fluid balance and neurologic status will be stable or improve  Outcome: Progressing     Problem: Fluid Volume  Goal: Fluid volume balance will be maintained  Outcome: Progressing     Problem: Urinary - Renal Perfusion  Goal: Ability to achieve and maintain adequate renal perfusion and functioning will improve  Outcome: Progressing   The patient is Stable - Low risk of patient condition declining or worsening    Shift Goals  Clinical Goals: Pain control  Patient Goals: pain control, rest  Family Goals: pain control    Progress made toward(s) clinical / shift goals:  pain control    Patient is not progressing towards the following goals:

## 2021-09-11 NOTE — ASSESSMENT & PLAN NOTE
Urine culture growing Candida albicans  On fluconazole- dose increased given improvement in renal function

## 2021-09-12 LAB
ALBUMIN SERPL BCP-MCNC: 2.6 G/DL (ref 3.2–4.9)
ALBUMIN/GLOB SERPL: 0.7 G/DL
ALP SERPL-CCNC: 186 U/L (ref 30–99)
ALT SERPL-CCNC: 10 U/L (ref 2–50)
ANION GAP SERPL CALC-SCNC: 14 MMOL/L (ref 7–16)
AST SERPL-CCNC: 15 U/L (ref 12–45)
BASOPHILS # BLD AUTO: 0.1 % (ref 0–1.8)
BASOPHILS # BLD: 0.02 K/UL (ref 0–0.12)
BILIRUB SERPL-MCNC: 0.2 MG/DL (ref 0.1–1.5)
BUN SERPL-MCNC: 48 MG/DL (ref 8–22)
CALCIUM SERPL-MCNC: 7.7 MG/DL (ref 8.5–10.5)
CHLORIDE SERPL-SCNC: 98 MMOL/L (ref 96–112)
CHOLEST SERPL-MCNC: 96 MG/DL (ref 100–199)
CO2 SERPL-SCNC: 18 MMOL/L (ref 20–33)
CREAT SERPL-MCNC: 5.43 MG/DL (ref 0.5–1.4)
EOSINOPHIL # BLD AUTO: 0.01 K/UL (ref 0–0.51)
EOSINOPHIL NFR BLD: 0.1 % (ref 0–6.9)
ERYTHROCYTE [DISTWIDTH] IN BLOOD BY AUTOMATED COUNT: 55.3 FL (ref 35.9–50)
GLOBULIN SER CALC-MCNC: 3.5 G/DL (ref 1.9–3.5)
GLUCOSE SERPL-MCNC: 121 MG/DL (ref 65–99)
HCT VFR BLD AUTO: 24.3 % (ref 42–52)
HDLC SERPL-MCNC: 22 MG/DL
HGB BLD-MCNC: 7.8 G/DL (ref 14–18)
IMM GRANULOCYTES # BLD AUTO: 0.09 K/UL (ref 0–0.11)
IMM GRANULOCYTES NFR BLD AUTO: 0.6 % (ref 0–0.9)
LDLC SERPL CALC-MCNC: 52 MG/DL
LYMPHOCYTES # BLD AUTO: 0.39 K/UL (ref 1–4.8)
LYMPHOCYTES NFR BLD: 2.7 % (ref 22–41)
MCH RBC QN AUTO: 25 PG (ref 27–33)
MCHC RBC AUTO-ENTMCNC: 32.1 G/DL (ref 33.7–35.3)
MCV RBC AUTO: 77.9 FL (ref 81.4–97.8)
MONOCYTES # BLD AUTO: 0.55 K/UL (ref 0–0.85)
MONOCYTES NFR BLD AUTO: 3.7 % (ref 0–13.4)
NEUTROPHILS # BLD AUTO: 13.61 K/UL (ref 1.82–7.42)
NEUTROPHILS NFR BLD: 92.8 % (ref 44–72)
NRBC # BLD AUTO: 0 K/UL
NRBC BLD-RTO: 0 /100 WBC
PLATELET # BLD AUTO: 398 K/UL (ref 164–446)
PMV BLD AUTO: 9.1 FL (ref 9–12.9)
POTASSIUM SERPL-SCNC: 4.9 MMOL/L (ref 3.6–5.5)
PROCALCITONIN SERPL-MCNC: 4.25 NG/ML
PROT SERPL-MCNC: 6.1 G/DL (ref 6–8.2)
RBC # BLD AUTO: 3.12 M/UL (ref 4.7–6.1)
SODIUM SERPL-SCNC: 130 MMOL/L (ref 135–145)
TRIGL SERPL-MCNC: 110 MG/DL (ref 0–149)
WBC # BLD AUTO: 14.7 K/UL (ref 4.8–10.8)

## 2021-09-12 PROCEDURE — 770020 HCHG ROOM/CARE - TELE (206)

## 2021-09-12 PROCEDURE — A9270 NON-COVERED ITEM OR SERVICE: HCPCS | Performed by: INTERNAL MEDICINE

## 2021-09-12 PROCEDURE — 99498 ADVNCD CARE PLAN ADDL 30 MIN: CPT | Performed by: NURSE PRACTITIONER

## 2021-09-12 PROCEDURE — 99497 ADVNCD CARE PLAN 30 MIN: CPT | Performed by: NURSE PRACTITIONER

## 2021-09-12 PROCEDURE — 99233 SBSQ HOSP IP/OBS HIGH 50: CPT | Performed by: STUDENT IN AN ORGANIZED HEALTH CARE EDUCATION/TRAINING PROGRAM

## 2021-09-12 PROCEDURE — A9270 NON-COVERED ITEM OR SERVICE: HCPCS | Performed by: STUDENT IN AN ORGANIZED HEALTH CARE EDUCATION/TRAINING PROGRAM

## 2021-09-12 PROCEDURE — 700111 HCHG RX REV CODE 636 W/ 250 OVERRIDE (IP): Performed by: STUDENT IN AN ORGANIZED HEALTH CARE EDUCATION/TRAINING PROGRAM

## 2021-09-12 PROCEDURE — 85025 COMPLETE CBC W/AUTO DIFF WBC: CPT

## 2021-09-12 PROCEDURE — 80053 COMPREHEN METABOLIC PANEL: CPT

## 2021-09-12 PROCEDURE — 700105 HCHG RX REV CODE 258: Performed by: STUDENT IN AN ORGANIZED HEALTH CARE EDUCATION/TRAINING PROGRAM

## 2021-09-12 PROCEDURE — 700102 HCHG RX REV CODE 250 W/ 637 OVERRIDE(OP): Performed by: STUDENT IN AN ORGANIZED HEALTH CARE EDUCATION/TRAINING PROGRAM

## 2021-09-12 PROCEDURE — 700101 HCHG RX REV CODE 250: Performed by: STUDENT IN AN ORGANIZED HEALTH CARE EDUCATION/TRAINING PROGRAM

## 2021-09-12 PROCEDURE — 700102 HCHG RX REV CODE 250 W/ 637 OVERRIDE(OP): Performed by: INTERNAL MEDICINE

## 2021-09-12 PROCEDURE — 94640 AIRWAY INHALATION TREATMENT: CPT

## 2021-09-12 PROCEDURE — 84145 PROCALCITONIN (PCT): CPT

## 2021-09-12 PROCEDURE — 80061 LIPID PANEL: CPT

## 2021-09-12 PROCEDURE — 36415 COLL VENOUS BLD VENIPUNCTURE: CPT

## 2021-09-12 RX ORDER — ALBUTEROL SULFATE 90 UG/1
2 AEROSOL, METERED RESPIRATORY (INHALATION) EVERY 6 HOURS PRN
Status: DISCONTINUED | OUTPATIENT
Start: 2021-09-12 | End: 2021-09-18 | Stop reason: HOSPADM

## 2021-09-12 RX ORDER — HEPARIN SODIUM 5000 [USP'U]/ML
5000 INJECTION, SOLUTION INTRAVENOUS; SUBCUTANEOUS EVERY 8 HOURS
Status: DISCONTINUED | OUTPATIENT
Start: 2021-09-13 | End: 2021-09-18 | Stop reason: HOSPADM

## 2021-09-12 RX ORDER — LINEZOLID 2 MG/ML
600 INJECTION, SOLUTION INTRAVENOUS EVERY 12 HOURS
Status: DISCONTINUED | OUTPATIENT
Start: 2021-09-12 | End: 2021-09-13

## 2021-09-12 RX ORDER — LINEZOLID 600 MG/1
600 TABLET, FILM COATED ORAL EVERY 12 HOURS
Status: DISCONTINUED | OUTPATIENT
Start: 2021-09-12 | End: 2021-09-12

## 2021-09-12 RX ADMIN — SODIUM BICARBONATE 650 MG: 650 TABLET ORAL at 10:36

## 2021-09-12 RX ADMIN — PIPERACILLIN AND TAZOBACTAM 4.5 G: 4; .5 INJECTION, POWDER, LYOPHILIZED, FOR SOLUTION INTRAVENOUS; PARENTERAL at 17:37

## 2021-09-12 RX ADMIN — BUDESONIDE AND FORMOTEROL FUMARATE DIHYDRATE 2 PUFF: 80; 4.5 AEROSOL RESPIRATORY (INHALATION) at 07:57

## 2021-09-12 RX ADMIN — HYDROMORPHONE HYDROCHLORIDE 4 MG: 4 TABLET ORAL at 05:43

## 2021-09-12 RX ADMIN — THERA TABS 1 TABLET: TAB at 05:43

## 2021-09-12 RX ADMIN — IPRATROPIUM BROMIDE AND ALBUTEROL SULFATE 3 ML: .5; 2.5 SOLUTION RESPIRATORY (INHALATION) at 08:28

## 2021-09-12 RX ADMIN — PREDNISONE 5 MG: 5 TABLET ORAL at 05:43

## 2021-09-12 RX ADMIN — OMEPRAZOLE 20 MG: 20 CAPSULE, DELAYED RELEASE ORAL at 05:43

## 2021-09-12 RX ADMIN — SODIUM BICARBONATE 650 MG: 650 TABLET ORAL at 17:37

## 2021-09-12 RX ADMIN — ACETAMINOPHEN 1000 MG: 500 TABLET ORAL at 05:43

## 2021-09-12 RX ADMIN — TIOTROPIUM BROMIDE INHALATION SPRAY 5 MCG: 3.12 SPRAY, METERED RESPIRATORY (INHALATION) at 08:00

## 2021-09-12 RX ADMIN — TAMSULOSIN HYDROCHLORIDE 0.4 MG: 0.4 CAPSULE ORAL at 07:57

## 2021-09-12 RX ADMIN — PROMETHAZINE HYDROCHLORIDE 25 MG: 25 TABLET ORAL at 20:24

## 2021-09-12 RX ADMIN — PIPERACILLIN AND TAZOBACTAM 4.5 G: 4; .5 INJECTION, POWDER, LYOPHILIZED, FOR SOLUTION INTRAVENOUS; PARENTERAL at 06:39

## 2021-09-12 RX ADMIN — ONDANSETRON 4 MG: 2 INJECTION INTRAMUSCULAR; INTRAVENOUS at 05:42

## 2021-09-12 RX ADMIN — LINEZOLID 600 MG: 600 INJECTION, SOLUTION INTRAVENOUS at 05:36

## 2021-09-12 RX ADMIN — LINEZOLID 600 MG: 600 INJECTION, SOLUTION INTRAVENOUS at 20:24

## 2021-09-12 RX ADMIN — HYDROMORPHONE HYDROCHLORIDE 4 MG: 4 TABLET ORAL at 18:21

## 2021-09-12 RX ADMIN — HYDROMORPHONE HYDROCHLORIDE 4 MG: 4 TABLET ORAL at 14:45

## 2021-09-12 RX ADMIN — HYDROMORPHONE HYDROCHLORIDE 4 MG: 4 TABLET ORAL at 10:35

## 2021-09-12 RX ADMIN — BUDESONIDE AND FORMOTEROL FUMARATE DIHYDRATE 2 PUFF: 80; 4.5 AEROSOL RESPIRATORY (INHALATION) at 21:19

## 2021-09-12 RX ADMIN — ONDANSETRON 4 MG: 2 INJECTION INTRAMUSCULAR; INTRAVENOUS at 17:37

## 2021-09-12 RX ADMIN — ENOXAPARIN SODIUM 30 MG: 40 INJECTION SUBCUTANEOUS at 05:47

## 2021-09-12 RX ADMIN — HYDROMORPHONE HYDROCHLORIDE 4 MG: 4 TABLET ORAL at 23:15

## 2021-09-12 RX ADMIN — SODIUM BICARBONATE 650 MG: 650 TABLET ORAL at 07:57

## 2021-09-12 RX ADMIN — ALBUTEROL SULFATE 2 PUFF: 90 AEROSOL, METERED RESPIRATORY (INHALATION) at 02:59

## 2021-09-12 RX ADMIN — IPRATROPIUM BROMIDE AND ALBUTEROL SULFATE 3 ML: .5; 2.5 SOLUTION RESPIRATORY (INHALATION) at 16:33

## 2021-09-12 ASSESSMENT — PAIN DESCRIPTION - PAIN TYPE
TYPE: ACUTE PAIN;CHRONIC PAIN
TYPE: ACUTE PAIN;CHRONIC PAIN
TYPE: ACUTE PAIN
TYPE: ACUTE PAIN
TYPE: ACUTE PAIN;CHRONIC PAIN
TYPE: ACUTE PAIN

## 2021-09-12 NOTE — CONSULTS
UROLOGY Consult Note:    Chase Stallings P.A.-C.  Date & Time note created:    9/12/2021   10:21 AM     Referring MD:  Dr. Shane    Patient ID:   Name:             Erich Ingram   YOB: 1965  Age:                 56 y.o.  male   MRN:               [id number]                                                             Reason for Consult:      Renal failure    History of Present Illness:    Mr. Ingram is  55 yo male with colon cancer who is followed by Dr. Urbina. He has been on chemotherapy but has not been able to follow through with treatments over the past 4 weeks. He lives remotely and has not felt up to traveling due to severe nausea, low appetite and weakness. He was at [facility] a couple of weeks ago with renal failure and hydronephrosis. It was determined that he has a non functioning left kidney and he underwent right ureteral stent placement with Dr. Cotter. His renal functions improved from the 7 range to the mid 2 range at that time. He continued to not do well with oral intake at home and over the past couple of days has noticed a decrease in his urine output. Yesterday he developed a fever. He presented to his local ER and was subsequently transferred here for higher level of care. He did receive IV antibiotics in the ER. It was recommended that he have replacement of a right nephrostomy tube after imaging revealed continued hydronephrosis. This has been completed and he notes a great improvement in his nausea today.     Review of Systems:      Constitutional: Denies fevers   Eyes: Denies changes in vision   Ears/Nose/Throat/Mouth: Denies nasal congestion   Cardiovascular: no chest pain   Respiratory: no shortness of breath   Gastrointestinal/Hepatic: abdominal pain   Genitourinary: Denies hematuria, dysuria  Musculoskeletal/Rheum: Denies joint pain   Skin: Denies rash  Neurological: Denies headache   Psychiatric: denies mood disorder   Endocrine: Sarah thyroid  "problems  Heme/Oncology/Lymph Nodes: Denies enlarged lymph nodes   All other systems were reviewed and are negative (AMA/CMS criteria)                Past Medical History:   Past Medical History:   Diagnosis Date   • Arthritis 01/24/2020    Shoulders   • Bowel habit changes 01/24/2020    Uses Miralax   • Breath shortness     COPD   • Cancer (HCC)     colon ca jan 2016   • 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   • COPD (chronic obstructive pulmonary disease) (HCC) 2018   • Dental disorder 01/24/2020    Upper dentures, lower teeth \"in bad shape\"   • Fall     2019   • Heart burn    • Hepatitis C 2005    low viral load per spouse   • Hypertension    • Indigestion    • PICC (peripherally inserted central catheter) in place 05/2019   • Psychiatric problem     anxiety   • Renal disorder     rt kidney is only functioning kidney due to cancer      Active Hospital Problems    Diagnosis    • Protein calorie malnutrition (HCC) [E46]    • UTI (urinary tract infection) [N39.0]    • PNA (pneumonia) [J18.9]    • Other hydronephrosis [N13.39]    • High anion gap metabolic acidosis [E87.2]    • Leukocytosis [D72.829]    • Chronic, continuous use of opioids [F11.90]    • Nausea & vomiting [R11.2]    • Benign prostate hyperplasia [N40.0]    • Sepsis (HCC) [A41.9]    • Hyponatremia [E87.1]    • Hypertension [I10]    • COPD (chronic obstructive pulmonary disease) (HCC) [J44.9]    • Metastatic Colon cancer (HCC) [C18.9]        Past Surgical History:  Past Surgical History:   Procedure Laterality Date   • PUMP REVISION Left 1/28/2020    Procedure: REVISION, INSERTION, OR REPLACEMENT, INTRATHECAL ANALGESIC PUMP;  Surgeon: Candelario Reina M.D.;  Location: [facility];  Service: Pain Management   • PUMP REVISION  7/9/2019    Procedure: REVISION, INSERTION, OR REPLACEMENT, INTRATHECAL ANALGESIC PUMP;  Surgeon: Candelario Reina M.D.;  Location: [facility];  Service: Pain Management   • BRONCHOSCOPY-ENDO " "N/A 3/1/2019    Procedure: BRONCHOSCOPY-ENDO;  Surgeon: Katelin Garner M.D.;  Location: ENDOSCOPY [facility];  Service: Pulmonary   • UROLOGY SURGERY Left 2018    \"severed testicle\" per spouse   • CATH PLACEMENT  3/16/2016    Procedure: CATH PLACEMENT POWER PORT ;  Surgeon: Luke Lima M.D.;  Location: SURGERY [facility];  Service:    • LOW ANTERIOR RESECTION LAPAROSCOPIC  1/12/2016    Procedure: LOW ANTERIOR RESECTION LAPAROSCOPIC;  Surgeon: Luke Lima M.D.;  Location: SURGERY [facility];  Service:    • COLONOSCOPY N/A 2016   • OTHER ORTHOPEDIC SURGERY Right     Rt knee ACL   • OTHER ORTHOPEDIC SURGERY Right     Rt knee meniscus repair x2   • OTHER ORTHOPEDIC SURGERY Left     Lt knee meniscus repair x2       Hospital Medications:    Current Facility-Administered Medications:   •  albuterol inhaler 2 Puff, 2 Puff, Inhalation, Q6HRS PRN, Kvng Dixon M.D., 2 Puff at 09/12/21 0259  •  Pharmacy Consult Request ...Pain Management Review 1 Each, 1 Each, Other, PHARMACY TO DOSE, Radames Shane M.D.  •  acetaminophen (TYLENOL) tablet 1,000 mg, 1,000 mg, Oral, Q6HRS, 1,000 mg at 09/12/21 0543 **FOLLOWED BY** [START ON 9/16/2021] acetaminophen (TYLENOL) tablet 1,000 mg, 1,000 mg, Oral, Q6HRS PRN, Radames Shane M.D.  •  hydrocortisone 2.5 % cream 1 Application, 1 Application, Topical, TID PRN, Radames Shane M.D.  •  [Held by provider] losartan (COZAAR) tablet 50 mg, 50 mg, Oral, DAILY, Radames Shane M.D., 50 mg at 09/11/21 0618  •  predniSONE (DELTASONE) tablet 5 mg, 5 mg, Oral, DAILY, Radames Shane M.D., 5 mg at 09/12/21 0543  •  tamsulosin (FLOMAX) capsule 0.4 mg, 0.4 mg, Oral, AFTER BREAKFAST, Radames Shane M.D., 0.4 mg at 09/12/21 0757  •  omeprazole (PRILOSEC) capsule 20 mg, 20 mg, Oral, DAILY, Radames Shane M.D., 20 mg at 09/12/21 0543  •  polyethylene glycol/lytes (MIRALAX) PACKET 1 Packet, 1 Packet, Oral, DAILY, Radames Shane M.D., 1 Packet at 09/11/21 " 9449  •  senna-docusate (PERICOLACE or SENOKOT S) 8.6-50 MG per tablet 2 Tablet, 2 Tablet, Oral, BID, 2 Tablet at 09/11/21 1733 **AND** polyethylene glycol/lytes (MIRALAX) PACKET 1 Packet, 1 Packet, Oral, QDAY PRN **AND** magnesium hydroxide (MILK OF MAGNESIA) suspension 30 mL, 30 mL, Oral, QDAY PRN **AND** bisacodyl (DULCOLAX) suppository 10 mg, 10 mg, Rectal, QDAY PRN, Radames Shane M.D.  •  Respiratory Therapy Consult, , Nebulization, Continuous RT, Radames Shane M.D.  •  Pharmacy Consult Request - to monitor for nephrotoxic agents, 1 Each, Other, PHARMACY TO DOSE, Radames Shane M.D.  •  ondansetron (ZOFRAN) syringe/vial injection 4 mg, 4 mg, Intravenous, Q4HRS PRN, Radames Shane M.D., 4 mg at 09/12/21 0542  •  ondansetron (ZOFRAN ODT) dispertab 4 mg, 4 mg, Oral, Q4HRS PRN, Radames Shane M.D.  •  promethazine (PHENERGAN) tablet 12.5-25 mg, 12.5-25 mg, Oral, Q4HRS PRN, Radames Shane M.D.  •  promethazine (PHENERGAN) suppository 12.5-25 mg, 12.5-25 mg, Rectal, Q4HRS PRN, Radames Shane M.D.  •  prochlorperazine (COMPAZINE) injection 5-10 mg, 5-10 mg, Intravenous, Q4HRS PRN, Radames Shane M.D., 10 mg at 09/11/21 0815  •  sodium bicarbonate tablet 650 mg, 650 mg, Oral, TID WITH MEALS, Radames Shane M.D., 650 mg at 09/12/21 0757  •  ipratropium-albuterol (DUONEB) nebulizer solution, 3 mL, Nebulization, Q4H PRN (RT), Radames Shane M.D., 3 mL at 09/12/21 0828  •  multivitamin (THERAGRAN) tablet 1 Tablet, 1 Tablet, Oral, DAILY, Radames Shane M.D., 1 Tablet at 09/12/21 0543  •  Linezolid (ZYVOX) premix 600 mg, 600 mg, Intravenous, Q12HRS, Radames Shane M.D., Stopped at 09/12/21 0636  •  tiotropium (Spiriva Respimat) 2.5 mcg/Act inhalation spray 5 mcg, 5 mcg, Inhalation, QDAILY (RT), Amor Pizarro M.D., 5 mcg at 09/12/21 0800  •  budesonide-formoterol (SYMBICORT) 80-4.5 MCG/ACT inhaler 2 Puff, 2 Puff, Inhalation, BID (RT), Amor Pizarro M.D., 2 Puff at 09/12/21 0757  •   [COMPLETED] piperacillin-tazobactam (ZOSYN) 4.5 g in  mL IVPB, 4.5 g, Intravenous, Once, Stopped at 09/11/21 0640 **AND** piperacillin-tazobactam (ZOSYN) 4.5 g in  mL IVPB, 4.5 g, Intravenous, Q12HRS, Amor Pizarro M.D., Stopped at 09/12/21 1039  •  enoxaparin (LOVENOX) inj 30 mg, 30 mg, Subcutaneous, DAILY, Amor Pizarro M.D., 30 mg at 09/12/21 0547  •  HYDROmorphone (DILAUDID) tablet 4 mg, 4 mg, Oral, Q4HRS PRN, Amor Pizarro M.D., 4 mg at 09/12/21 0543  •  lactated ringers infusion, , Intravenous, Continuous, James Price M.D., Stopped at 09/11/21 0500    Current Outpatient Medications:  Medications Prior to Admission   Medication Sig Dispense Refill Last Dose   • sennosides-docusate sodium (SENOKOT-S) 8.6-50 MG tablet Take 1 Tab by mouth every day. 30 Tab 11    • NON SPECIFIED Pain pump has Dilaudid and Ketamine   9/11/2021 at Unknown time   • hydrocortisone rectal (PROCTOZONE HC) 2.5 % Cream Insert 1 Application in rectum 3 times a day as needed. 1 Tube 2 9/10/2021 at 1000   • HYDROmorphone (DILAUDID) 4 MG Tab Take 4 mg by mouth every 6 hours as needed ([facility]).   9/10/2021 at 1000   • polyethylene glycol/lytes (MIRALAX) Pack Take 1 Packet by mouth every day. Can use up to 2 times daily if no bowel movement in 24 hours. 30 Each 3 9/10/2021 at 1800   • lansoprazole (PREVACID) 30 MG CAPSULE DELAYED RELEASE Take 30 mg by mouth every day.   9/10/2021 at 1800   • ondansetron (ZOFRAN ODT) 4 MG TABLET DISPERSIBLE Take 8 mg by mouth every 6 hours as needed for Nausea.   9/10/2021 at 1000   • promethazine (PHENERGAN) 50 MG Tab Take 50 mg by mouth every 6 hours as needed for Nausea/Vomiting.      • predniSONE (DELTASONE) 5 MG Tab Take 5 mg by mouth every day.   9/10/2021 at 1800   • tamsulosin (FLOMAX) 0.4 MG capsule Take 0.4 mg by mouth ONE-HALF HOUR AFTER BREAKFAST.   9/10/2021 at 1800   • losartan (COZAAR) 50 MG Tab Take 50 mg by mouth every day.          Medication Allergy:  Allergies   Allergen  "Reactions   • Levaquin Vomiting   • Nubain [Nalbuphine] Anxiety     \"made him feel paranoid\"       Family History:  Family History   Adopted: Yes   Problem Relation Age of Onset   • No Known Problems Mother    • No Known Problems Father    • No Known Problems Sister    • No Known Problems Brother    • No Known Problems Maternal Grandmother    • No Known Problems Maternal Grandfather    • No Known Problems Paternal Grandmother    • No Known Problems Paternal Grandfather        Social History:  Social History     Socioeconomic History   • Marital status:      Spouse name: Not on file   • Number of children: Not on file   • Years of education: Not on file   • Highest education level: Not on file   Occupational History   • Not on file   Tobacco Use   • Smoking status: Former Smoker     Packs/day: 1.00     Years: 15.00     Pack years: 15.00     Start date: 2001     Quit date: 2015     Years since quittin.2   • Smokeless tobacco: Never Used   Vaping Use   • Vaping Use: Former   Substance and Sexual Activity   • Alcohol use: Not Currently     Alcohol/week: 0.0 oz   • Drug use: Yes     Types: Inhaled, Oral     Comment: marijuana   • Sexual activity: Not on file   Other Topics Concern   • Primary/coprimary nurse & associates Not Asked   • Family contact information Not Asked   • OK to release patient information to the following Not Asked   • Patient preferred routine/Privacy concerns Not Asked   • Patient likes and dislikes Not Asked   • Participating In Research Study Not Asked   • Miscellaneous Not Asked   Social History Narrative   • Not on file     Social Determinants of Health     Financial Resource Strain:    • Difficulty of Paying Living Expenses:    Food Insecurity:    • Worried About Running Out of Food in the Last Year:    • Ran Out of Food in the Last Year:    Transportation Needs:    • Lack of Transportation (Medical):    • Lack of Transportation (Non-Medical):    Physical Activity:    • " "Days of Exercise per Week:    • Minutes of Exercise per Session:    Stress:    • Feeling of Stress :    Social Connections:    • Frequency of Communication with Friends and Family:    • Frequency of Social Gatherings with Friends and Family:    • Attends S[employer] Services:    • Active Member of Clubs or Organizations:    • Attends Club or Organization Meetings:    • Marital Status:    Intimate Partner Violence:    • Fear of Current or Ex-Partner:    • Emotionally Abused:    • Physically Abused:    • Sexually Abused:          Physical Exam:  Vitals/ General Appearance:   Weight/BMI: Body mass index is 18.32 kg/m².  /85   Pulse 84   Temp 36.4 °C (97.6 °F) (Temporal)   Resp 18   Ht 1.905 m (6' 3\")   Wt 66.5 kg (146 lb 9.7 oz)   SpO2 95%   Vitals:    09/12/21 0034 09/12/21 0537 09/12/21 0807 09/12/21 0824   BP: 105/71 136/85     Pulse: (!) 55 71 88 84   Resp: 16 16 18 18   Temp: 37.1 °C (98.8 °F) 36.4 °C (97.6 °F)     TempSrc: Temporal Temporal     SpO2: 100% 98% 95% 95%   Weight:       Height:         Oxygen Therapy:  Pulse Oximetry: 95 %, O2 (LPM): 2, O2 Delivery Device: Silicone Nasal Cannula    Constitutional:   Well developed, Well nourished, No acute distress  HENMT:  Normocephalic, Atraumatic, Oropharynx moist mucous membranes, No oral exudates, Nose normal.  No thyromegaly.  Eyes:  EOMI, Conjunctiva normal, No discharge.  Neck:  Normal range of motion, No cervical tenderness.  Cardiovascular:  Normal heart rate, Normal rhythm, No murmurs, No rubs, No gallops.   Extremitites with intact distal pulses, no cyanosis, or edema.  Lungs:  Normal breath sounds, breath sounds clear to auscultation bilaterally,  no crackles, no wheezing.   Abdomen: Bowel sounds normal, Soft, No tenderness, No guarding, No rebound, No masses, No hepatosplenomegaly.  : -CVAT. Right NPT in place draining cloudy urine, Soriano draining cloudy urine.   Skin: Warm, Dry, No erythema, No rash, no induration.  Neurologic: Alert & " oriented x 3, No focal deficits noted.  Psychiatric: Affect normal, Judgment normal, Mood normal.      MDM (Data Review):     Records reviewed and summarized in current documentation    Lab Data Review:  Recent Results (from the past 24 hour(s))   URINE CULTURE(NEW)    Collection Time: 09/11/21 11:32 AM    Specimen: Other   Result Value Ref Range    Significant Indicator NEG     Source URSP     Site Right Hydronephrosis     Culture Result -     Gram Stain Result Many WBCs.  Many Yeast.      Fluid Cell Count    Collection Time: 09/11/21 11:32 AM   Result Value Ref Range    Fluid Type Nephrostomy     Color-Body Fluid White     Character-Body Fluid Cloudy     Total RBC Count 2 cells/uL    Total WBC 68764 cells/uL    Polys 90 %    Lymphs 6 %    Mononuclear Cells - Fluid 4 %    Comments see below    GRAM STAIN    Collection Time: 09/11/21 11:32 AM    Specimen: Other   Result Value Ref Range    Significant Indicator .     Source URSP     Site Right Hydronephrosis     Gram Stain Result Many WBCs.  Many Yeast.      Lactic Acid Every four hours after STAT order    Collection Time: 09/11/21  1:27 PM   Result Value Ref Range    Lactic Acid 1.1 0.5 - 2.0 mmol/L       Imaging/Procedures Review:    Reviewed    MDM (Assessment and Plan):       Mr. Ingram is a 57 yo male who is on chemotherapy for colon cancer who has a non functioning left kidney and recently underwent right ureteral stent placement due to obstruction in the right kidney. He has been admitted with sepsis, source either UTI or pneumonia and AYANA with hydronephrosis despite ureteral stent placement. He is on Zyvox with cultures pending. A right NPT and oro have been placed with good UOP from each. We will keep both in place for now and monitor his renal functions for improvement. Thank you for this consult. Dr. Frederick has directed this plan of care.

## 2021-09-12 NOTE — PROGRESS NOTES
Pt AO x 4  Vitals signs stable  Pt denies chest pain or SOB  O2 sat >90% on 1-2L NC, has some inspiratory and expiratory wheezing. PRN Nebulizer administered by RT, inhalers administered.   Pt endorses 4/10  Pain, will continue to monitor and medicate per MAR.   Pt denies N/V, has had urgent diarrhea x4 this am.   + voiding  Tele monitor in place    R nephrectomy tube in place, dressing with old shadowing noted. ouput is cloudy yellow/tan  Indwelling oro catheter in place with cloudy yellow/tan output.   Pt tolerating diet        Updated plan of care discussed with pt. Safety education done. Falls precautions in place.     COVID 19 surge in effect

## 2021-09-12 NOTE — WOUND TEAM
Wound team consulted for right sacrum.  Scabbed area was removed to reveal intact blanching skin.  No advanced wound care needed at this time, sacral mepilex replace to help with protection of fragile healed skin.

## 2021-09-12 NOTE — PROGRESS NOTES
Hospital Medicine Daily Progress Note    Date of Service  9/12/2021    Chief Complaint  Erich Ingram is a 56 y.o. male admitted 9/11/2021 with sepsis      Hospital Course     48 male with past medical history of colon cancer with metastasis status post resection on chemo, recent right renal stent 2 weeks ago at [facility] for hydronephrosis transferred from outside facility for sepsis.  CT scan from outside facility noted double-J right-sided ureteral stent. Moderate to severe right-sided hydronephrosis persists with severe left-sided hydronephrosis and renal atrophy, unchanged.      Labs remarkable for leukocytosis 20 4K, elevated BUN/creatinine. Admitted for sepsis due to UTI     Interval Problem Update  9/11/2021  Remained afebrile  Labs consistent with leukocytosis,  Bicarbonate 17, BUN/creatinine 44/5.92  Blood culture in process  Currently on Zosyn and linezolid  Discussed case with urology Dr. Frederick .  Plan for right nephrostomy tube  IR consulted    9/12/2021  Vitals remained stable  Patient on 2 L oxygen which is his baseline  Status post right-sided nephrostomy tube  Labs reviewed .  Leukocytosis improving.  Renal function unchanged  Continue IV antibiotics for possible UTI.  Urine culture pending  Urology following closely  Oncology Dr. Urbina to evaluate patient today  We will consider nephrology evaluation tomorrow if patient kidney function continue to worsen  Patient and spouse at bedside we discussed  about current clinical condition and treatment plan  Consultants/Specialty  urology     Code Status  Full Code     Disposition  Patient is not medically cleared.   Anticipate discharge to to court or custody of law enforcement.  I have placed the appropriate orders for post-discharge needs.     Review of Systems  Review of Systems   Constitutional: Negative for fever.   Respiratory: Negative for cough.    Gastrointestinal: Negative for nausea and vomiting.   Genitourinary: Positive for  dysuria and flank pain.   Musculoskeletal: Negative for myalgias.   Neurological: Negative for dizziness and headaches.   Psychiatric/Behavioral: Negative for depression.         Physical Exam  Temp:  [37.1 °C (98.7 °F)-37.1 °C (98.8 °F)] 37.1 °C (98 °F)  Pulse:  [106-112] 106  Resp:  [18-26] 26  BP: (135-139)/(91) 139/91  SpO2:  [94 %-98 %] 96 %     Physical Exam  Constitutional:       Appearance: He is ill-appearing.   HENT:      Head: Normocephalic.      Left Ear: Tympanic membrane normal.      Nose: Nose normal.      Mouth/Throat:      Mouth: Mucous membranes are moist.   Eyes:      Pupils: Pupils are equal, round, and reactive to light.   Cardiovascular:      Rate and Rhythm: Normal rate and regular rhythm.      Pulses: Normal pulses.      Heart sounds: Normal heart sounds. No murmur heard.     Pulmonary:      Effort: Pulmonary effort is normal.      Breath sounds: Normal breath sounds.   Abdominal:      General: Abdomen is flat.      Tenderness: There is right CVA tenderness. There is no left CVA tenderness.   Genitourinary:     Comments: On oro , on rt nephrostomy tube with cloudy drainage  Musculoskeletal:      Right lower leg: No edema.      Left lower leg: No edema.   Neurological:      General: No focal deficit present.      Mental Status: He is alert and oriented to person, place, and time.   Fluids    Intake/Output Summary (Last 24 hours) at 9/12/2021 1530  Last data filed at 9/12/2021 0600  Gross per 24 hour   Intake 740 ml   Output 2870 ml   Net -2130 ml       Laboratory  Recent Labs     09/11/21  0557 09/12/21  1423   WBC 24.0* 14.7*   RBC 3.47* 3.12*   HEMOGLOBIN 8.8* 7.8*   HEMATOCRIT 26.9* 24.3*   MCV 77.5* 77.9*   MCH 25.4* 25.0*   MCHC 32.7* 32.1*   RDW 53.7* 55.3*   PLATELETCT 441 398   MPV 9.0 9.1     Recent Labs     09/11/21  0557 09/12/21  1423   SODIUM 131* 130*   POTASSIUM 5.0 4.9   CHLORIDE 98 98   CO2 16* 18*   GLUCOSE 80 121*   BUN 44* 48*   CREATININE 5.92* 5.43*   CALCIUM 8.0* 7.7*      Recent Labs     09/11/21  0557   INR 1.36*         Recent Labs     09/12/21  1423   TRIGLYCERIDE 110   HDL 22*   LDL 52       Imaging  IR-PERQ NEPH CATH NEW ACCESS (ALL RADIOLOGY) RIGHT   Final Result            1.  Successful percutaneous placement of right nephrostomy tube utilizing ultrasonic and fluoroscopic guidance.      2.  Aspiration of right renal collecting system yielded purulent urine which was sent to the lab for analysis.      3.  Right-sided nephrostogram demonstrated moderate hydronephrosis with a nonfunctioning right ureteral stent in place and debris noted in the right renal collecting system.      OUTSIDE IMAGES-DX CHEST   Final Result      OUTSIDE IMAGES-CT ABDOMEN /PELVIS   Final Result      OUTSIDE IMAGES-CT ABDOMEN /PELVIS   Final Result      OUTSIDE IMAGES-CT CHEST   Final Result      US-RENAL   Final Result         1.  Moderate right and severe left hydronephrosis.   2.  Thin echogenic appearance of the left renal cortex.           Assessment/Plan  High anion gap metabolic acidosis  Assessment & Plan  In setting of sepsis and AYANA  Continue to monitor closely  Continue IV antibiotics and continue antibiotics    Other hydronephrosis  Assessment & Plan  CT scan from outside facility noted double-J right-sided ureteral stent. Moderate to severe right-sided hydronephrosis persists with severe left-sided hydronephrosis and renal atrophy, unchanged.       Discussed case with urology Dr. Frederick .  S/p right nephrostomy tube  Urology following closely    PNA (pneumonia)- (present on admission)  Assessment & Plan  Cont abx  Sepsis protocol initiated  Cont duonebs  Oxygen supplementation prn  Pulse ox    UTI (urinary tract infection)- (present on admission)  Assessment & Plan  Pending cultures, f/u from outside facility  Repeat urine cultures ordered, but received 1 dose Zosyn and Vanco at outside facility prior to tsfer  Sepsis protocol initiated      Protein calorie malnutrition (HCC)- (present  on admission)  Assessment & Plan  BMI 18  Nutrition consult  Boost, MV    Leukocytosis- (present on admission)  Assessment & Plan  2/2 UTI/sepsis  Treat underlying conditions    Chronic, continuous use of opioids- (present on admission)  Assessment & Plan  Analgesia with oxycodone + Morphine    Benign prostate hyperplasia- (present on admission)  Assessment & Plan  Cont tamsulosin    Nausea & vomiting- (present on admission)  Assessment & Plan  Antiemetics  Bowel regimen  Nutrition consult, consider boost.    Sepsis (HCC)- (present on admission)  Assessment & Plan  This is Sepsis Present on admission  SIRS criteria identified on my evaluation include: Tachycardia, with heart rate greater than 90 BPM, Tachypnea, with respirations greater than 20 per minute and Leukocytosis, with WBC greater than 12,000  Source is Urosepsis and possible PNA  Sepsis protocol initiated  Fluid resuscitation ordered per protocol  IV antibiotics as appropriate for source of sepsis  While organ dysfunction may be noted elsewhere in this problem list or in the chart, degree of organ dysfunction does not meet CMS criteria for severe sepsis          Hypertension- (present on admission)  Assessment & Plan  Continue Home Medications  Labetalol ivp prn with parameters      Hyponatremia- (present on admission)  Assessment & Plan  - c/w NS, mild.  - IN YAANA  - F/u with Serum Osm, Urine Osm, Urine Na      COPD (chronic obstructive pulmonary disease) (HCC)- (present on admission)  Assessment & Plan  Not in acute exacerbation cont duonebs  Oxygen support      Metastatic Colon cancer (HCC)- (present on admission)  Assessment & Plan  Diagnosed 2016 treated with resection and chemo. Recurred in 2018 with pulmonary nodules biopsied showing adenocarcinoma.  Antiemetics    Follows with oncology Dr. Urbina as outpatient         VTE prophylaxis: SCDs/TEDs and heparin ppx    I have performed a physical exam and reviewed and updated ROS and Plan today  (9/12/2021). In review of yesterday's note (9/11/2021), there are no changes except as documented above.

## 2021-09-12 NOTE — CONSULTS
Reason for Palliative Care Consult: Advance Care Planning    Consulted by: Dr. Pizarro    HPI: Erich Ingram is a 56-year-old male referred from [facility] in [facility] 9/11/2021 for sepsis, UTI source.  Past medical history is significant for metastatic colon cancer followed outpatient by Dr. Urbina status post resection on oral chemotherapy.  Recently admitted to [facility] and found to have significant hydronephrosis and renal failure.  Patient seen by nephrology and it was determined he had a nonfunctioning left kidney and underwent right nephrostomy tube followed by right urethral stenting approximately two weeks ago and was sent home.  Imaging revealed right hydronephrosis and right nephrostomy tube was replaced.  Patient has had progressive nausea and vomiting over the last 6 weeks with a 20 pound weight loss.  His symptoms have improved significantly with hydration, IV antibiotics, and right nephrostomy tube placement.    Past medical/surgical history: Colon cancer originally diagnosed in 2016, COPD on home oxygen, hepatitis C, heartburn/indigestion, anxiety, renal disorder.    Additional consults:   Urology    Assessment:  Neuro: Awake, alert, and oriented x4.  Dyspnea: Yes; 87% on 2 L of oxygen.  Last BM: 09/12/21  Pain: Yes; controlled on current regimen.   Depression: No   Dementia: No      Living situation & psychosocial: Patient lives [place] miles outside of [facility].  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  His wife works in dispatch at the  base and is currently on FMLA.  They received assistance from a friend Bia Brothers provides rides to the patient 90% of the time  "to and from [place].  Patient quit smoking 6 years ago and has about 15-pack-year smoking history.  He reports previous alcohol use and current marijuana use.    Spiritual:  Is Restoration or spirituality important for coping with this illness? Yes; patient reports he is Gnosticist and his relationship is \"private.\"  Has a  or spiritual provider visit been requested? No; patient declined chaplaincy visit as he feels he would only need this for \"last rights.\"    Palliative Performance Scale: 40%    Advance Directive: None; completed but needs notorization.  Patient selected #2 and #5 for statement of desires meaning he would not want life-sustaining treatment in cases of irreversible coma.  He would want his HC POA to consider benefit overburden, and quality as well as possible extension of life.  Did not wish to complete declaration/directive to physicians.  DPOA: No; patient wishes for his wife Janine Abraham [phone] to act as HC POA and friend Bia Art [phone] to act as first alternate.  POLST: No; completed.  Patient wishes for CPR, full treatment, and trial of artificial nutrition no longer than 1 year.    Code Status: Full      Outcome:  Met with patient at patient's bedside. Introduced myself and explained the role of palliative care.  We discussed sources of josephine including living at [place] and spending time with patient's wife.  [place] is appreciative of the assistance his friend has been providing with transportation.  Patient feels significantly better and has gotten his appetite back.  He is hopeful to gain some weight and strength and continue cancer treatment.  Reviewed advance directive and POLST form with patient.  He would prefer that I assist him with completion of directives when his wife is present.  I provided palliative care contact information and requested that they reach out when she arrives.    Returned to " "room later in the afternoon and introduced myself to patient's wife and friend Bia.  Introdued self and discussed role of palliative care.  She expressed she was hopeful to meet Dr. Urbina.  They spoke briefly on the phone this morning and she thought that he told her to meet him at 10 AM.  Reached out to Dr. Urbina via [facility] and he confirmed that she was supposed to notify him when she arrived at the hospital.  Unfortunately he has gone home for the day.  Inquired if he was able to speak with patient's wife but did not receive a response.    Assisted patient with completion of advance directive and POLST form.  Patient confirmed wishes for full code and full treat.  He expressed he trusts his wife to make the right decision and she understands his wishes not to be sustained on machines long-term.  He is aware he can verbally revoked documents at any time and update POLST form if his goals of care change over time.  Patient's wife affirmed that patient has remained very positive through cancer diagnosis and she feels this is what has \"carried him through for so long.\"  They denied having questions or needs at this time.  Discussed that notary is unavailable at this time.  Patient will likely have AD completed once home with witnesses.  Requested the patient's wife email me a copy of completed document once done so I can scanned into patient's medical record.  Patient/family denied having other questions at this time.    Provided therapeutic communication including open ended questions, therapeutic presence/silence, reflective listening, and validation of thoughts/feelings throughout encounter. Provided palliative care contact information and encouraged patient/family to call with any questions or needs.     Outcome: Advance directive completed and pending notorization; POLST form completed and scanned into EMR.    Recommendations: I do not recommend an ethics or hospice consult at this time because patient wishes to " pursue full code/full treat and cancer modifying therapy.    Thank you for allowing Palliative Care to participate in this patient's care. Please call our team with questions and/or additional needs.    Total visit time was 60 minutes discussing advance care planning.     SHAHID Sanchez.  Palliative Care Nurse Practitioner  [phone]

## 2021-09-13 ENCOUNTER — APPOINTMENT (OUTPATIENT)
Dept: RADIOLOGY | Facility: MEDICAL CENTER | Age: 56
DRG: 871 | End: 2021-09-13
Attending: PHYSICIAN ASSISTANT
Payer: COMMERCIAL

## 2021-09-13 PROBLEM — N17.9 ACUTE RENAL FAILURE (ARF) (HCC): Status: ACTIVE | Noted: 2021-09-13

## 2021-09-13 LAB
ANION GAP SERPL CALC-SCNC: 12 MMOL/L (ref 7–16)
ANION GAP SERPL CALC-SCNC: 13 MMOL/L (ref 7–16)
ANION GAP SERPL CALC-SCNC: 18 MMOL/L (ref 7–16)
BASOPHILS # BLD AUTO: 0.2 % (ref 0–1.8)
BASOPHILS # BLD: 0.04 K/UL (ref 0–0.12)
BUN SERPL-MCNC: 50 MG/DL (ref 8–22)
BUN SERPL-MCNC: 53 MG/DL (ref 8–22)
BUN SERPL-MCNC: 55 MG/DL (ref 8–22)
CALCIUM SERPL-MCNC: 7.6 MG/DL (ref 8.5–10.5)
CALCIUM SERPL-MCNC: 7.7 MG/DL (ref 8.5–10.5)
CALCIUM SERPL-MCNC: 8.2 MG/DL (ref 8.5–10.5)
CHLORIDE SERPL-SCNC: 100 MMOL/L (ref 96–112)
CHLORIDE SERPL-SCNC: 97 MMOL/L (ref 96–112)
CHLORIDE SERPL-SCNC: 99 MMOL/L (ref 96–112)
CO2 SERPL-SCNC: 17 MMOL/L (ref 20–33)
CO2 SERPL-SCNC: 17 MMOL/L (ref 20–33)
CO2 SERPL-SCNC: 18 MMOL/L (ref 20–33)
CREAT SERPL-MCNC: 5.81 MG/DL (ref 0.5–1.4)
CREAT SERPL-MCNC: 6.16 MG/DL (ref 0.5–1.4)
CREAT SERPL-MCNC: 6.71 MG/DL (ref 0.5–1.4)
EKG IMPRESSION: NORMAL
EOSINOPHIL # BLD AUTO: 0.12 K/UL (ref 0–0.51)
EOSINOPHIL NFR BLD: 0.7 % (ref 0–6.9)
ERYTHROCYTE [DISTWIDTH] IN BLOOD BY AUTOMATED COUNT: 54.4 FL (ref 35.9–50)
GLUCOSE SERPL-MCNC: 102 MG/DL (ref 65–99)
GLUCOSE SERPL-MCNC: 113 MG/DL (ref 65–99)
GLUCOSE SERPL-MCNC: 98 MG/DL (ref 65–99)
HCT VFR BLD AUTO: 24.7 % (ref 42–52)
HGB BLD-MCNC: 8 G/DL (ref 14–18)
IMM GRANULOCYTES # BLD AUTO: 0.11 K/UL (ref 0–0.11)
IMM GRANULOCYTES NFR BLD AUTO: 0.6 % (ref 0–0.9)
LYMPHOCYTES # BLD AUTO: 0.74 K/UL (ref 1–4.8)
LYMPHOCYTES NFR BLD: 4.1 % (ref 22–41)
MCH RBC QN AUTO: 24.8 PG (ref 27–33)
MCHC RBC AUTO-ENTMCNC: 32.4 G/DL (ref 33.7–35.3)
MCV RBC AUTO: 76.5 FL (ref 81.4–97.8)
MONOCYTES # BLD AUTO: 1.32 K/UL (ref 0–0.85)
MONOCYTES NFR BLD AUTO: 7.4 % (ref 0–13.4)
NEUTROPHILS # BLD AUTO: 15.51 K/UL (ref 1.82–7.42)
NEUTROPHILS NFR BLD: 87 % (ref 44–72)
NRBC # BLD AUTO: 0 K/UL
NRBC BLD-RTO: 0 /100 WBC
PLATELET # BLD AUTO: 388 K/UL (ref 164–446)
PMV BLD AUTO: 9 FL (ref 9–12.9)
POTASSIUM SERPL-SCNC: 4.8 MMOL/L (ref 3.6–5.5)
POTASSIUM SERPL-SCNC: 4.8 MMOL/L (ref 3.6–5.5)
POTASSIUM SERPL-SCNC: 5 MMOL/L (ref 3.6–5.5)
PROCALCITONIN SERPL-MCNC: 4.71 NG/ML
RBC # BLD AUTO: 3.23 M/UL (ref 4.7–6.1)
SODIUM SERPL-SCNC: 129 MMOL/L (ref 135–145)
SODIUM SERPL-SCNC: 129 MMOL/L (ref 135–145)
SODIUM SERPL-SCNC: 133 MMOL/L (ref 135–145)
WBC # BLD AUTO: 17.8 K/UL (ref 4.8–10.8)

## 2021-09-13 PROCEDURE — 700101 HCHG RX REV CODE 250: Performed by: STUDENT IN AN ORGANIZED HEALTH CARE EDUCATION/TRAINING PROGRAM

## 2021-09-13 PROCEDURE — 84145 PROCALCITONIN (PCT): CPT

## 2021-09-13 PROCEDURE — 94640 AIRWAY INHALATION TREATMENT: CPT

## 2021-09-13 PROCEDURE — 36415 COLL VENOUS BLD VENIPUNCTURE: CPT

## 2021-09-13 PROCEDURE — 99497 ADVNCD CARE PLAN 30 MIN: CPT | Performed by: NURSE PRACTITIONER

## 2021-09-13 PROCEDURE — 700105 HCHG RX REV CODE 258: Performed by: HOSPITALIST

## 2021-09-13 PROCEDURE — 93010 ELECTROCARDIOGRAM REPORT: CPT | Performed by: INTERNAL MEDICINE

## 2021-09-13 PROCEDURE — 93005 ELECTROCARDIOGRAM TRACING: CPT | Performed by: STUDENT IN AN ORGANIZED HEALTH CARE EDUCATION/TRAINING PROGRAM

## 2021-09-13 PROCEDURE — 700111 HCHG RX REV CODE 636 W/ 250 OVERRIDE (IP): Performed by: STUDENT IN AN ORGANIZED HEALTH CARE EDUCATION/TRAINING PROGRAM

## 2021-09-13 PROCEDURE — 700111 HCHG RX REV CODE 636 W/ 250 OVERRIDE (IP): Performed by: INTERNAL MEDICINE

## 2021-09-13 PROCEDURE — A9270 NON-COVERED ITEM OR SERVICE: HCPCS | Performed by: STUDENT IN AN ORGANIZED HEALTH CARE EDUCATION/TRAINING PROGRAM

## 2021-09-13 PROCEDURE — 700105 HCHG RX REV CODE 258: Performed by: INTERNAL MEDICINE

## 2021-09-13 PROCEDURE — 700105 HCHG RX REV CODE 258: Performed by: STUDENT IN AN ORGANIZED HEALTH CARE EDUCATION/TRAINING PROGRAM

## 2021-09-13 PROCEDURE — 99223 1ST HOSP IP/OBS HIGH 75: CPT | Performed by: INTERNAL MEDICINE

## 2021-09-13 PROCEDURE — 94760 N-INVAS EAR/PLS OXIMETRY 1: CPT

## 2021-09-13 PROCEDURE — 99233 SBSQ HOSP IP/OBS HIGH 50: CPT | Performed by: STUDENT IN AN ORGANIZED HEALTH CARE EDUCATION/TRAINING PROGRAM

## 2021-09-13 PROCEDURE — 700102 HCHG RX REV CODE 250 W/ 637 OVERRIDE(OP): Performed by: STUDENT IN AN ORGANIZED HEALTH CARE EDUCATION/TRAINING PROGRAM

## 2021-09-13 PROCEDURE — BT14ZZZ FLUOROSCOPY OF KIDNEYS, URETERS AND BLADDER: ICD-10-PCS | Performed by: RADIOLOGY

## 2021-09-13 PROCEDURE — 85025 COMPLETE CBC W/AUTO DIFF WBC: CPT

## 2021-09-13 PROCEDURE — 80048 BASIC METABOLIC PNL TOTAL CA: CPT | Mod: 91

## 2021-09-13 PROCEDURE — 770020 HCHG ROOM/CARE - TELE (206)

## 2021-09-13 PROCEDURE — 76775 US EXAM ABDO BACK WALL LIM: CPT

## 2021-09-13 RX ORDER — FLUCONAZOLE 200 MG/1
200 TABLET ORAL DAILY
Status: DISCONTINUED | OUTPATIENT
Start: 2021-09-13 | End: 2021-09-17

## 2021-09-13 RX ORDER — IPRATROPIUM BROMIDE AND ALBUTEROL SULFATE 2.5; .5 MG/3ML; MG/3ML
3 SOLUTION RESPIRATORY (INHALATION)
Status: DISCONTINUED | OUTPATIENT
Start: 2021-09-13 | End: 2021-09-18 | Stop reason: HOSPADM

## 2021-09-13 RX ORDER — DIPHENHYDRAMINE HYDROCHLORIDE 50 MG/ML
25 INJECTION INTRAMUSCULAR; INTRAVENOUS EVERY 6 HOURS PRN
Status: DISCONTINUED | OUTPATIENT
Start: 2021-09-13 | End: 2021-09-18 | Stop reason: HOSPADM

## 2021-09-13 RX ORDER — SODIUM CHLORIDE 9 MG/ML
INJECTION, SOLUTION INTRAVENOUS CONTINUOUS
Status: DISCONTINUED | OUTPATIENT
Start: 2021-09-13 | End: 2021-09-13

## 2021-09-13 RX ORDER — LACTOBACILLUS RHAMNOSUS GG 10B CELL
1 CAPSULE ORAL
Status: DISCONTINUED | OUTPATIENT
Start: 2021-09-14 | End: 2021-09-18 | Stop reason: HOSPADM

## 2021-09-13 RX ADMIN — HEPARIN SODIUM 5000 UNITS: 5000 INJECTION, SOLUTION INTRAVENOUS; SUBCUTANEOUS at 21:34

## 2021-09-13 RX ADMIN — FLUCONAZOLE 200 MG: 200 TABLET ORAL at 08:30

## 2021-09-13 RX ADMIN — IPRATROPIUM BROMIDE AND ALBUTEROL SULFATE 3 ML: .5; 2.5 SOLUTION RESPIRATORY (INHALATION) at 15:39

## 2021-09-13 RX ADMIN — LINEZOLID 600 MG: 600 INJECTION, SOLUTION INTRAVENOUS at 05:28

## 2021-09-13 RX ADMIN — THERA TABS 1 TABLET: TAB at 05:28

## 2021-09-13 RX ADMIN — ALBUTEROL SULFATE 2 PUFF: 90 AEROSOL, METERED RESPIRATORY (INHALATION) at 22:41

## 2021-09-13 RX ADMIN — HEPARIN SODIUM 5000 UNITS: 5000 INJECTION, SOLUTION INTRAVENOUS; SUBCUTANEOUS at 05:27

## 2021-09-13 RX ADMIN — TIOTROPIUM BROMIDE INHALATION SPRAY 5 MCG: 3.12 SPRAY, METERED RESPIRATORY (INHALATION) at 07:06

## 2021-09-13 RX ADMIN — SODIUM BICARBONATE 650 MG: 650 TABLET ORAL at 11:53

## 2021-09-13 RX ADMIN — PROCHLORPERAZINE EDISYLATE 10 MG: 5 INJECTION INTRAMUSCULAR; INTRAVENOUS at 22:47

## 2021-09-13 RX ADMIN — ONDANSETRON 4 MG: 2 INJECTION INTRAMUSCULAR; INTRAVENOUS at 05:27

## 2021-09-13 RX ADMIN — SODIUM CHLORIDE, POTASSIUM CHLORIDE, SODIUM LACTATE AND CALCIUM CHLORIDE: 600; 310; 30; 20 INJECTION, SOLUTION INTRAVENOUS at 12:00

## 2021-09-13 RX ADMIN — SODIUM BICARBONATE 650 MG: 650 TABLET ORAL at 07:46

## 2021-09-13 RX ADMIN — PROCHLORPERAZINE EDISYLATE 10 MG: 5 INJECTION INTRAMUSCULAR; INTRAVENOUS at 07:45

## 2021-09-13 RX ADMIN — TAMSULOSIN HYDROCHLORIDE 0.4 MG: 0.4 CAPSULE ORAL at 07:46

## 2021-09-13 RX ADMIN — HEPARIN SODIUM 5000 UNITS: 5000 INJECTION, SOLUTION INTRAVENOUS; SUBCUTANEOUS at 15:16

## 2021-09-13 RX ADMIN — IPRATROPIUM BROMIDE AND ALBUTEROL SULFATE 3 ML: .5; 2.5 SOLUTION RESPIRATORY (INHALATION) at 07:06

## 2021-09-13 RX ADMIN — HYDROMORPHONE HYDROCHLORIDE 4 MG: 4 TABLET ORAL at 20:53

## 2021-09-13 RX ADMIN — BUDESONIDE AND FORMOTEROL FUMARATE DIHYDRATE 2 PUFF: 80; 4.5 AEROSOL RESPIRATORY (INHALATION) at 18:54

## 2021-09-13 RX ADMIN — SODIUM CHLORIDE: 9 INJECTION, SOLUTION INTRAVENOUS at 15:16

## 2021-09-13 RX ADMIN — ALBUTEROL SULFATE 2 PUFF: 90 AEROSOL, METERED RESPIRATORY (INHALATION) at 05:20

## 2021-09-13 RX ADMIN — ONDANSETRON 4 MG: 2 INJECTION INTRAMUSCULAR; INTRAVENOUS at 16:47

## 2021-09-13 RX ADMIN — IPRATROPIUM BROMIDE AND ALBUTEROL SULFATE 3 ML: .5; 2.5 SOLUTION RESPIRATORY (INHALATION) at 18:54

## 2021-09-13 RX ADMIN — SODIUM CHLORIDE 3 G: 900 INJECTION INTRAVENOUS at 17:03

## 2021-09-13 RX ADMIN — PREDNISONE 5 MG: 5 TABLET ORAL at 05:28

## 2021-09-13 RX ADMIN — SODIUM BICARBONATE 650 MG: 650 TABLET ORAL at 16:46

## 2021-09-13 RX ADMIN — HYDROMORPHONE HYDROCHLORIDE 4 MG: 4 TABLET ORAL at 16:15

## 2021-09-13 RX ADMIN — HYDROMORPHONE HYDROCHLORIDE 4 MG: 4 TABLET ORAL at 05:24

## 2021-09-13 RX ADMIN — PROMETHAZINE HYDROCHLORIDE 12.5 MG: 25 TABLET ORAL at 19:34

## 2021-09-13 RX ADMIN — DIPHENHYDRAMINE HYDROCHLORIDE 25 MG: 50 INJECTION INTRAMUSCULAR; INTRAVENOUS at 15:15

## 2021-09-13 RX ADMIN — HYDROMORPHONE HYDROCHLORIDE 4 MG: 4 TABLET ORAL at 11:53

## 2021-09-13 RX ADMIN — BUDESONIDE AND FORMOTEROL FUMARATE DIHYDRATE 2 PUFF: 80; 4.5 AEROSOL RESPIRATORY (INHALATION) at 07:07

## 2021-09-13 RX ADMIN — PIPERACILLIN AND TAZOBACTAM 4.5 G: 4; .5 INJECTION, POWDER, LYOPHILIZED, FOR SOLUTION INTRAVENOUS; PARENTERAL at 05:28

## 2021-09-13 ASSESSMENT — PAIN DESCRIPTION - PAIN TYPE
TYPE: ACUTE PAIN

## 2021-09-13 ASSESSMENT — ENCOUNTER SYMPTOMS
FEVER: 0
BLURRED VISION: 0
DIZZINESS: 0
HEADACHES: 0
NAUSEA: 1
VOMITING: 1
BRUISES/BLEEDS EASILY: 0
CHILLS: 0
MYALGIAS: 1
VOMITING: 0
PALPITATIONS: 0
HEMOPTYSIS: 0
COUGH: 0
ABDOMINAL PAIN: 1
SHORTNESS OF BREATH: 0
WEAKNESS: 0
FLANK PAIN: 1

## 2021-09-13 NOTE — PROGRESS NOTES
"Palliative Care Advance Care Planning Progress Note    General: Erich Ingram is a 56-year-old male transferred from [facility] in [facility] 9/11/2021 for sepsis.  Past medical history significant for metastatic colon cancer and recent acute hydronephrosis of right kidney and nonfunctioning left kidney status post nephrostomy tube followed by urethral stenting requiring replacement of right nephrostomy tube this admission.  She was originally seen by palliative care 9/12/2021 for advance care planning.  Additional consults include urology and infectious disease.    Discussion: Returned advance directive to patient's room. POLST completed and located above patient's bed. Please ensure patient is discharged with POLST form.  To locate the AD/POLST, please hover cursor over the patient's code status to find all linked ACP documents.  Patient sleeping.  No family at bedside.    See phone call from patient's wife.  She expressed she was not sure if it been updated on patient condition/plan of care.  She confirms she patient has a yeast infection which is being treated with medication by the name of \"flu something.\"  She then inquired about \"hospice care in [facility] to help get his nutrition improved.\"    Confirmed infectious disease is following patient for antimicrobial therapy to ensure infection is optimally treated.  Provided overview of hospice care and confirmed that it cannot be done during disease modifying treatment for cancer.  Discussed a day by day approach to see how patient progresses.  If she feels patient is unable to safely discharge home, other options can be explored.  Confirmed that PT/OT evaluation may be beneficial to recommend postacute care needs.  Discussed due to patient's prolonged malnutrition and cancer, recovery may be prolonged or complicated due to his lack of reserve.  Answered all questions.      Provided therapeutic communication including open ended questions, " reflective listening, clarification, and validation of thoughts/feelings throughout encounter. Encouraged to call with any questions or needs.     Outcome: Returned AD (awaiting notary) and POLST. Answered wife's questions.  Clarified philosophy of hospice care.     Plan: Continue to follow clinical course/GOC discussions and provide support to patient/wife.     Please call our team with questions and/or additional needs.    Total visit time was 20 minutes discussing advance care planning.    Shara Ospina, ELENA.P.R.N.  Palliative Care Nurse Practitioner  [phone]

## 2021-09-13 NOTE — CONSULTS
[facility] INFECTIOUS DISEASES INPATIENT CONSULT NOTE     Date of Service: 9/13/2021    Consult Requested By: Amor Pizarro M.D.    Reason for Consultation: Hydronephrosis, fevers    Chief Complaint: Low urine output, fevers    History of Present Illness:     Erich Ingram is a 56 y.o. male admitted 9/11/2021.  Patient with known colon cancer with metastases status post resection, on chemo but has been unable to keep his appointments recently, was transferred from [facility] after he presented there with fevers and worsening urine output, noted to be in AYANA.  About 2 weeks prior, patient was at [facility] for AYANA, was noted to have significant hydronephrosis, had a right ureteral stent placement there after it was deemed that left kidney was nonfunctional.  Patient was doing well till above presentation.  Patient also noted nausea and dry heaving for several weeks, poor p.o. intake.  CT scan at [facility] with moderate to severe right-sided hydronephrosis persisting, along with severe left-sided hydronephrosis and renal atrophy which is unchanged.  Chest x-ray showed 1.9 cm left lung base nodule suspicious for neoplastic disease.  On transfer to [facility], patient afebrile but with leukocytosis of 24.  Creatinine up to 5.  On 9/11, patient had a right nephrostomy tube placement by IR.  Cultures from this growing Yeast.  Patient was initially started on Zyvox and Zosyn.  ID consulted now for recommendations.    Patient feeling better overall but not back to baseline.  Noted good relief after placement of NPT and flushing.    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   • COPD (chronic obstructive pulmonary disease) (HCC)  "2018   • Dental disorder 01/24/2020    Upper dentures, lower teeth \"in bad shape\"   • Fall     2019   • Heart burn    • Hepatitis C 2005    low viral load per spouse   • Hypertension    • Indigestion    • PICC (peripherally inserted central catheter) in place 05/2019   • Psychiatric problem     anxiety   • Renal disorder     rt kidney is only functioning kidney due to cancer        Past Surgical History:   Procedure Laterality Date   • PUMP REVISION Left 1/28/2020    Procedure: REVISION, INSERTION, OR REPLACEMENT, INTRATHECAL ANALGESIC PUMP;  Surgeon: Candelario Reina M.D.;  Location: SURGERY [facility];  Service: Pain Management   • PUMP REVISION  7/9/2019    Procedure: REVISION, INSERTION, OR REPLACEMENT, INTRATHECAL ANALGESIC PUMP;  Surgeon: Candelario Reina M.D.;  Location: SURGERY [facility];  Service: Pain Management   • BRONCHOSCOPY-ENDO N/A 3/1/2019    Procedure: BRONCHOSCOPY-ENDO;  Surgeon: Katelin Garner M.D.;  Location: ENDOSCOPY [facility];  Service: Pulmonary   • UROLOGY SURGERY Left 2018    \"severed testicle\" per spouse   • CATH PLACEMENT  3/16/2016    Procedure: CATH PLACEMENT POWER PORT ;  Surgeon: Luke Lima M.D.;  Location: SURGERY [facility];  Service:    • LOW ANTERIOR RESECTION LAPAROSCOPIC  1/12/2016    Procedure: LOW ANTERIOR RESECTION LAPAROSCOPIC;  Surgeon: Luke Lima M.D.;  Location: SURGERY [facility];  Service:    • COLONOSCOPY N/A 2016   • OTHER ORTHOPEDIC SURGERY Right     Rt knee ACL   • OTHER ORTHOPEDIC SURGERY Right     Rt knee meniscus repair x2   • OTHER ORTHOPEDIC SURGERY Left     Lt knee meniscus repair x2       Family History   Adopted: Yes   Problem Relation Age of Onset   • No Known Problems Mother    • No Known Problems Father    • No Known Problems Sister    • No Known Problems Brother    • No Known Problems Maternal Grandmother    • No Known Problems Maternal Grandfather    • No Known Problems Paternal Grandmother    • No Known " Problems Paternal Grandfather        Social History     Socioeconomic History   • Marital status:      Spouse name: Not on file   • Number of children: Not on file   • Years of education: Not on file   • Highest education level: Not on file   Occupational History   • Not on file   Tobacco Use   • Smoking status: Former Smoker     Packs/day: 1.00     Years: 15.00     Pack years: 15.00     Start date: 2001     Quit date: 2015     Years since quittin.2   • Smokeless tobacco: Never Used   Vaping Use   • Vaping Use: Former   Substance and Sexual Activity   • Alcohol use: Not Currently     Alcohol/week: 0.0 oz   • Drug use: Yes     Types: Inhaled, Oral     Comment: marijuana   • Sexual activity: Not on file   Other Topics Concern   • Primary/coprimary nurse & associates Not Asked   • Family contact information Not Asked   • OK to release patient information to the following Not Asked   • Patient preferred routine/Privacy concerns Not Asked   • Patient likes and dislikes Not Asked   • Participating In Research Study Not Asked   • Miscellaneous Not Asked   Social History Narrative   • Not on file     Social Determinants of Health     Financial Resource Strain:    • Difficulty of Paying Living Expenses:    Food Insecurity:    • Worried About Running Out of Food in the Last Year:    • Ran Out of Food in the Last Year:    Transportation Needs:    • Lack of Transportation (Medical):    • Lack of Transportation (Non-Medical):    Physical Activity:    • Days of Exercise per Week:    • Minutes of Exercise per Session:    Stress:    • Feeling of Stress :    Social Connections:    • Frequency of Communication with Friends and Family:    • Frequency of Social Gatherings with Friends and Family:    • Attends Orthodoxy Services:    • Active Member of Clubs or Organizations:    • Attends Club or Organization Meetings:    • Marital Status:    Intimate Partner Violence:    • Fear of Current or Ex-Partner:    •  "Emotionally Abused:    • Physically Abused:    • Sexually Abused:        Allergies   Allergen Reactions   • Levaquin Vomiting   • Nubain [Nalbuphine] Anxiety     \"made him feel paranoid\"       Medications:    Current Facility-Administered Medications:   •  ipratropium-albuterol (DUONEB) nebulizer solution, 3 mL, Nebulization, TID (RT), Amor Pizarro M.D.  •  fluconazole (DIFLUCAN) tablet 200 mg, 200 mg, Oral, DAILY, Amor Pizarro M.D.  •  albuterol inhaler 2 Puff, 2 Puff, Inhalation, Q6HRS PRN, Kvng Dixon M.D., 2 Puff at 09/13/21 0520  •  heparin injection 5,000 Units, 5,000 Units, Subcutaneous, Q8HRS, Amor Pizarro M.D., 5,000 Units at 09/13/21 0527  •  Linezolid (ZYVOX) premix 600 mg, 600 mg, Intravenous, Q12HRS, Amor Pizarro M.D., Stopped at 09/13/21 0628  •  Pharmacy Consult Request ...Pain Management Review 1 Each, 1 Each, Other, PHARMACY TO DOSE, Radames Shane M.D.  •  acetaminophen (TYLENOL) tablet 1,000 mg, 1,000 mg, Oral, Q6HRS, 1,000 mg at 09/12/21 0543 **FOLLOWED BY** [START ON 9/16/2021] acetaminophen (TYLENOL) tablet 1,000 mg, 1,000 mg, Oral, Q6HRS PRN, Radames Shane M.D.  •  hydrocortisone 2.5 % cream 1 Application, 1 Application, Topical, TID PRN, Radames Shane M.D.  •  [Held by provider] losartan (COZAAR) tablet 50 mg, 50 mg, Oral, DAILY, Radames Shane M.D., 50 mg at 09/11/21 0618  •  predniSONE (DELTASONE) tablet 5 mg, 5 mg, Oral, DAILY, Radames Shane M.D., 5 mg at 09/13/21 0528  •  tamsulosin (FLOMAX) capsule 0.4 mg, 0.4 mg, Oral, AFTER BREAKFAST, Radames Shane M.D., 0.4 mg at 09/13/21 0746  •  omeprazole (PRILOSEC) capsule 20 mg, 20 mg, Oral, DAILY, Radames Shane M.D., 20 mg at 09/12/21 0543  •  polyethylene glycol/lytes (MIRALAX) PACKET 1 Packet, 1 Packet, Oral, DAILY, Radames Shane M.D., 1 Packet at 09/11/21 9567  •  senna-docusate (PERICOLACE or SENOKOT S) 8.6-50 MG per tablet 2 Tablet, 2 Tablet, Oral, BID, 2 Tablet at 09/11/21 7893 **AND** polyethylene " glycol/lytes (MIRALAX) PACKET 1 Packet, 1 Packet, Oral, QDAY PRN **AND** magnesium hydroxide (MILK OF MAGNESIA) suspension 30 mL, 30 mL, Oral, QDAY PRN **AND** bisacodyl (DULCOLAX) suppository 10 mg, 10 mg, Rectal, QDAY PRN, Radames Shane M.D.  •  Respiratory Therapy Consult, , Nebulization, Continuous RT, Radames Shane M.D.  •  Pharmacy Consult Request - to monitor for nephrotoxic agents, 1 Each, Other, PHARMACY TO DOSE, Radames Shane M.D.  •  ondansetron (ZOFRAN) syringe/vial injection 4 mg, 4 mg, Intravenous, Q4HRS PRN, Radames Shane M.D., 4 mg at 09/13/21 0527  •  ondansetron (ZOFRAN ODT) dispertab 4 mg, 4 mg, Oral, Q4HRS PRN, Radames Shane M.D.  •  promethazine (PHENERGAN) tablet 12.5-25 mg, 12.5-25 mg, Oral, Q4HRS PRN, Radames Shane M.D., 25 mg at 09/12/21 2024  •  promethazine (PHENERGAN) suppository 12.5-25 mg, 12.5-25 mg, Rectal, Q4HRS PRN, Radames Shane M.D.  •  prochlorperazine (COMPAZINE) injection 5-10 mg, 5-10 mg, Intravenous, Q4HRS PRN, Radames Shane M.D., 10 mg at 09/13/21 0745  •  sodium bicarbonate tablet 650 mg, 650 mg, Oral, TID WITH MEALS, Radames Shane M.D., 650 mg at 09/13/21 0746  •  ipratropium-albuterol (DUONEB) nebulizer solution, 3 mL, Nebulization, Q4H PRN (RT), Radames Shane M.D., 3 mL at 09/13/21 0706  •  multivitamin (THERAGRAN) tablet 1 Tablet, 1 Tablet, Oral, DAILY, Radames Shane M.D., 1 Tablet at 09/13/21 0528  •  tiotropium (Spiriva Respimat) 2.5 mcg/Act inhalation spray 5 mcg, 5 mcg, Inhalation, QDAILY (RT), Amor Pizarro M.D., 5 mcg at 09/13/21 0706  •  budesonide-formoterol (SYMBICORT) 80-4.5 MCG/ACT inhaler 2 Puff, 2 Puff, Inhalation, BID (RT), Amor Pizraro M.D., 2 Puff at 09/13/21 0707  •  [COMPLETED] piperacillin-tazobactam (ZOSYN) 4.5 g in  mL IVPB, 4.5 g, Intravenous, Once, Stopped at 09/11/21 0640 **AND** piperacillin-tazobactam (ZOSYN) 4.5 g in  mL IVPB, 4.5 g, Intravenous, Q12HRS, Amor Pizarro M.D., Last Rate: 25  "mL/hr at 21 0528, 4.5 g at 21 0528  •  HYDROmorphone (DILAUDID) tablet 4 mg, 4 mg, Oral, Q4HRS PRN, Amor Pizarro M.D., 4 mg at 21 0524  •  lactated ringers infusion, , Intravenous, Continuous, James Price M.D., Stopped at 21 0500    Physical Exam:   Vital Signs: /99   Pulse 94   Temp 36.2 °C (97.2 °F) (Temporal)   Resp 20   Ht 1.905 m (6' 3\")   Wt 66.5 kg (146 lb 9.7 oz)   SpO2 95%   BMI 18.32 kg/m²   Temp  Av.6 °C (97.8 °F)  Min: 36.1 °C (97 °F)  Max: 37.2 °C (98.9 °F)  Vital signs reviewed  Constitutional: Patient is oriented to person, place, and time. Appears ill and cachectic  Head: Atraumatic,  temporal wasting noted  Eyes: Conjunctivae normal, EOM intact  Mouth/Throat: Lips without lesions  Neck: Neck supple. No masses/lymphadenopathy  Cardiovascular: Normal rate, regular rhythm, normal S1S2 and intact distal pulses. No murmur, gallop, or friction rub. No pedal edema.  Pulmonary/Chest: No respiratory distress. Unlabored respiratory effort, lungs clear to auscultation. No wheezes or rales.   Abdominal: Right-sided nephrostomy tube.  Soriano catheter in place.  Tenderness  Musculoskeletal: No joint tenderness, swelling, erythema, or restriction of motion noted.  Neurological: Alert and oriented to person, place, and time. No gross cranial nerve deficit. No focal neural deficit noted  Skin: Skin is warm and dry. No rashes or embolic phenomena noted on exposed skin  Psychiatric: Appropriate mood and affect. Behavior is normal.     LABS:  Recent Labs     21  0557 21  1423 09/13/21  0000   WBC 24.0* 14.7* 17.8*      Recent Labs     21  0557 21  1423 21  0000   HEMOGLOBIN 8.8* 7.8* 8.0*   HEMATOCRIT 26.9* 24.3* 24.7*   MCV 77.5* 77.9* 76.5*   MCH 25.4* 25.0* 24.8*   PLATELETCT 441 398 388       Recent Labs     21  0557 21  1423 21  0000   SODIUM 131* 130* 129*   POTASSIUM 5.0 4.9 4.8   CHLORIDE 98 98 100   CO2 16* 18* 17* "   CREATININE 5.92* 5.43* 5.81*        Recent Labs     09/11/21  0557 09/12/21  1423   ALBUMIN 2.6* 2.6*        MICRO:  Blood Culture   Date Value Ref Range Status   04/03/2019 No growth after 5 days of incubation.  Final        Latest pertinent labs were reviewed    IMAGING STUDIES:  As above    Hospital Course/Assessment:   Erich Ingram is a 56 y.o. male with  with known colon cancer with metastases status post resection, on chemo but has been unable to keep his appointments recently, was transferred from [facility] after he presented there with fevers and worsening urine output, noted to be in AYANA.  About 2 weeks prior, patient was at [place] for AYANA, was noted to have significant hydronephrosis, had a right ureteral stent placement there after it was deemed that left kidney was nonfunctional.  Patient was doing well till above presentation.  CT scan at [facility] with moderate to severe right-sided hydronephrosis persisting, along with severe left-sided hydronephrosis and renal atrophy which is unchanged. On transfer to [facility], patient afebrile but with leukocytosis of 24. Creatinine up to 5.  On 9/11, patient had a right nephrostomy tube placement by IR. Cultures from this growing Yeast.     Pertinent Diagnoses:  Sepsis  Pyelonephritis, Yeast  Severe hydronephrosis  Obstructive uropathy  Metastatic colon cancer    Plan:   -Start renally dosed p.o. fluconazole.  Will follow pending EKG. Last EKG with normal QTC  -Stop Zyvox and Zosyn.  Start IV Unasyn, renally dosed  -Follow identification and sensitivities of the Yeast   -Follow blood cultures x2 from 9/11, no growth to date    Plan was discussed with the primary team, Dr. Pizarro    ID will follow. Please feel free to call with questions.    Baljinder Aguirre M.D.    Please note that this dictation was created using voice recognition software. I have worked with technical experts from [facility] to optimize the interface.  I have made every reasonable  attempt to correct obvious errors, but there may be errors of grammar and possibly content that I did not discover before finalizing the note.

## 2021-09-13 NOTE — ASSESSMENT & PLAN NOTE
Post obstructive  Avoid nephrotoxins, monitor inputs and outputs  R nephrostomy tube in place- I will d/w urology if plan to place new stent vs cont nephrostomy tube  Improved

## 2021-09-13 NOTE — HOSPITAL COURSE
57 y/o male with past medical history of colon cancer with metastasis status post resection on chemo, nonfunctioning left kidney, recent right renal stent 2 weeks ago at [place] for hydronephrosis transferred from outside facility for sepsis due to UTI and AYANA.  CT scan from outside facility noted double-J right-sided ureteral stent. Moderate to severe right-sided hydronephrosis persists with severe left-sided hydronephrosis and renal atrophy, unchanged.  He underwent right nephrostomy tube placement on 9/11.  Urine cultures ultimately grew Candida so ID was consulted and patient was started on fluconazole and antibiotics changed from Zyvox and Zosyn to IV Unasyn.  1 of 2 blood cultures from admission growing possible staph species.

## 2021-09-13 NOTE — PROGRESS NOTES
Monitor Strip Summary     SR-ST:   Rare PVC, PAC  .17/.07/.32      Per monitor strip summary report

## 2021-09-13 NOTE — PROGRESS NOTES
Dr Pizarro notified of low urine output, 300 cc total nephrostomy tube and oro catheter. Discussed creatinine and POC. Updated patient, will continue to monitor.

## 2021-09-13 NOTE — PROGRESS NOTES
Radiology Progress Note   Author: CHRISTY Grissom Date & Time created: 9/13/2021  11:58 AM   Date of admission  9/11/2021  Note to reader: this note follows the APSO format rather than the historical SOAP format. Assessment and plan located at the top of the note for ease of use.    Chief Complaint  56 y.o. male admitted 9/11/2021 with sepsis     HPI  48 male with past medical history of colon cancer with metastasis status post resection on chemo, recent right renal stent 2 weeks ago at [place] for hydronephrosis transferred from outside facility for sepsis.  CT scan from outside facility noted double-J right-sided ureteral stent. Moderate to severe right-sided hydronephrosis persists with severe left-sided hydronephrosis    Assessment/Plan  Interval History   Active Problems:    Metastatic Colon cancer (HCC)    COPD (chronic obstructive pulmonary disease) (HCC)    Hyponatremia    Hypertension    Sepsis (HCC)    Nausea & vomiting    Benign prostate hyperplasia    Chronic, continuous use of opioids    Leukocytosis    Protein calorie malnutrition (HCC)    UTI (urinary tract infection)    PNA (pneumonia)    Other hydronephrosis    High anion gap metabolic acidosis      Plan IR  - Right Hydronephrosis + Candida albicans  - Urology, ID following   - Irrigate right nephrostomy catheter with 10 ml sterile saline once per shift   -Thank you for allowing Interventional Radiology team to participate in the patients care, if any additonal care or requests are needed in the future please do not hesitate call or place IR order      [id number]  IR:   9/11- Right Nephrostomy Tube Placement  9/12- 50 ml   9/13- 500 ml output            Review of Systems  Physical Exam   Review of Systems   Constitutional: Positive for malaise/fatigue. Negative for chills and fever.   HENT: Negative for hearing loss.    Eyes: Negative for blurred vision.   Respiratory: Negative for cough, hemoptysis and shortness of breath.     Cardiovascular: Negative for chest pain and palpitations.   Gastrointestinal: Positive for abdominal pain. Negative for vomiting.   Genitourinary: Positive for flank pain. Negative for dysuria.   Musculoskeletal: Positive for myalgias.   Skin: Negative for rash.   Neurological: Negative for dizziness, weakness and headaches.   Endo/Heme/Allergies: Does not bruise/bleed easily.   Psychiatric/Behavioral: Negative for suicidal ideas.      Vitals:    09/13/21 0704   BP:    Pulse: 94   Resp: 20   Temp: 36.2 °C (97.2 °F)   SpO2: 95%        Physical Exam  Constitutional:       Appearance: He is ill-appearing.   HENT:      Head: Normocephalic.      Nose: Nose normal.      Mouth/Throat:      Mouth: Mucous membranes are moist.   Eyes:      Pupils: Pupils are equal, round, and reactive to light.   Cardiovascular:      Rate and Rhythm: Normal rate.   Pulmonary:      Effort: Pulmonary effort is normal. No respiratory distress.   Abdominal:      Palpations: Abdomen is soft.      Tenderness: There is abdominal tenderness.   Genitourinary:     Comments: Soriano  Musculoskeletal:         General: No tenderness or deformity.      Cervical back: Normal range of motion.      Comments: Right flank nephrostomy tube    Skin:     General: Skin is warm and dry.      Capillary Refill: Capillary refill takes less than 2 seconds.      Coloration: Skin is not jaundiced or pale.   Neurological:      General: No focal deficit present.      Mental Status: He is alert and oriented to person, place, and time.      Motor: No weakness.   Psychiatric:         Mood and Affect: Mood normal.         Behavior: Behavior normal.             Labs    Recent Labs     09/11/21  0557 09/12/21  1423 09/13/21  0000   WBC 24.0* 14.7* 17.8*   RBC 3.47* 3.12* 3.23*   HEMOGLOBIN 8.8* 7.8* 8.0*   HEMATOCRIT 26.9* 24.3* 24.7*   MCV 77.5* 77.9* 76.5*   MCH 25.4* 25.0* 24.8*   MCHC 32.7* 32.1* 32.4*   RDW 53.7* 55.3* 54.4*   PLATELETCT 441 398 388   MPV 9.0 9.1 9.0      Recent Labs     09/12/21  1423 09/13/21  0000 09/13/21  0813   SODIUM 130* 129* 129*   POTASSIUM 4.9 4.8 4.8   CHLORIDE 98 100 99   CO2 18* 17* 17*   GLUCOSE 121* 98 113*   BUN 48* 50* 53*   CREATININE 5.43* 5.81* 6.71*   CALCIUM 7.7* 7.6* 7.7*     Recent Labs     09/11/21  0557 09/11/21  0557 09/12/21  1423 09/13/21  0000 09/13/21  0813   ALBUMIN 2.6*  --  2.6*  --   --    TBILIRUBIN 0.5  --  0.2  --   --    ALKPHOSPHAT 200*  --  186*  --   --    TOTPROTEIN 6.6  --  6.1  --   --    ALTSGPT 8  --  10  --   --    ASTSGOT 17  --  15  --   --    CREATININE 5.92*   < > 5.43* 5.81* 6.71*    < > = values in this interval not displayed.     US-RENAL   Final Result      1.  No change in moderate right hydronephrosis.      2.  There is severe left-sided hydronephrosis which is slightly more prominent than on prior exam.      IR-PERQ NEPH CATH NEW ACCESS (ALL RADIOLOGY) RIGHT   Final Result            1.  Successful percutaneous placement of right nephrostomy tube utilizing ultrasonic and fluoroscopic guidance.      2.  Aspiration of right renal collecting system yielded purulent urine which was sent to the lab for analysis.      3.  Right-sided nephrostogram demonstrated moderate hydronephrosis with a nonfunctioning right ureteral stent in place and debris noted in the right renal collecting system.      OUTSIDE IMAGES-DX CHEST   Final Result      OUTSIDE IMAGES-CT ABDOMEN /PELVIS   Final Result      OUTSIDE IMAGES-CT ABDOMEN /PELVIS   Final Result      OUTSIDE IMAGES-CT CHEST   Final Result      US-RENAL   Final Result         1.  Moderate right and severe left hydronephrosis.   2.  Thin echogenic appearance of the left renal cortex.          INR   Date Value Ref Range Status   09/11/2021 1.36 (H) 0.87 - 1.13 Final     Comment:     INR - Non-therapeutic Reference Range: 0.87-1.13  INR - Therapeutic Reference Range: 2.0-4.0       No results found for: POCINR     Intake/Output Summary (Last 24 hours) at 9/13/2021  1158  Last data filed at 9/13/2021 1037  Gross per 24 hour   Intake --   Output 600 ml   Net -600 ml      Labs not explicitly included in this progress note were reviewed by the author. Radiology/imaging not explicitly included in this progress note was reviewed by the author.     I have performed a physical exam and reviewed and updated ROS and Plan today (9/13/2021).     25 minutes in directly providing and coordinating care and extensive data review.  No time overlap and excludes procedures.

## 2021-09-13 NOTE — CARE PLAN
Problem: Nutritional:  Goal: Achieve adequate nutritional intake  Description: Patient will consume 50% of meals  Outcome: Progressing   PO % for most recent meal.

## 2021-09-13 NOTE — PROGRESS NOTES
Pt AO x 4, lethargic  Vitals signs stable  Pt denies chest pain or SOB  O2 sat >90% on 1-2L NC, has some inspiratory and expiratory wheezing. PRN Nebulizer administered by RT, inhalers administered.   Pt endorses 4/10  Pain, will continue to monitor and medicate per MAR.   Pt endorses nausea, medicated per MAR  No UOP overnight in nephrostomy or oro, urologist at bedside, notified of low UOP. Nephrostomy flushed with 10 cc per urology. Will continue to monitor output.  Tele monitor in place     R nephrectomy tube in place, dressing with old shadowing noted, no output  Indwelling oro catheter in place with no output.   Pt tolerating diet        Updated plan of care discussed with pt. Safety education done. Falls precautions in place.      COVID 19 surge in effect

## 2021-09-13 NOTE — PROGRESS NOTES
Hospital Medicine Daily Progress Note    Date of Service  9/13/2021  Chief Complaint  Erich Ingram is a 56 y.o. male admitted 9/11/2021 with sepsis      Hospital Course     48 male with past medical history of colon cancer with metastasis status post resection on chemo, recent right renal stent 2 weeks ago at [place] for hydronephrosis transferred from outside facility for sepsis.  CT scan from outside facility noted double-J right-sided ureteral stent. Moderate to severe right-sided hydronephrosis persists with severe left-sided hydronephrosis and renal atrophy, unchanged.      Labs remarkable for leukocytosis 20 4K, elevated BUN/creatinine. Admitted for sepsis due to UTI     Interval Problem Update  9/11/2021  Remained afebrile  Labs consistent with leukocytosis,  Bicarbonate 17, BUN/creatinine 44/5.92  Blood culture in process  Currently on Zosyn and linezolid  Discussed case with urology Dr. Frederick .  Plan for right nephrostomy tube  IR consulted     9/12/2021  Vitals remained stable  Patient on 2 L oxygen which is his baseline  Status post right-sided nephrostomy tube  Labs reviewed .  Leukocytosis improving.  Renal function unchanged  Continue IV antibiotics for possible UTI.  Urine culture pending  Urology following closely  Oncology Dr. Urbina to evaluate patient today  We will consider nephrology evaluation tomorrow if patient kidney function continue to worsen  Patient and spouse at bedside we discussed  about current clinical condition and treatment plan    9/13/2021    Vitals remained stable  Labs reviewed  CBC - noted with worsening leukocytosis  BMP-hyponatremia, low bicarbonate 17, worsening renal function BUN/creatinine 53/6.71  Urine culture growing Candida albicans.  Nephrostomy noted       Evaluated by ID.  Started on fluconazole  Urology following cloudy output  Soriano in place.  Urine output less than 300 mL per past 24-hour  Nephrology Dr Oconnell consulted for worsening kidney  function/      Consultants/Specialty  urology     Code Status  Full Code     Disposition  Patient is not medically cleared.   Anticipate discharge to to court or custody of law enforcement.  I have placed the appropriate orders for post-discharge needs.     Review of Systems  Review of Systems   Constitutional: Negative for fever.   Respiratory: Negative for cough.    Gastrointestinal: Negative for nausea and vomiting.   Genitourinary: Positive for dysuria and flank pain.   Musculoskeletal: Negative for myalgias.   Neurological: Negative for dizziness and headaches.   Psychiatric/Behavioral: Negative for depression.         Physical Exam  Temp:  [37.1 °C (98.7 °F)-37.1 °C (98.8 °F)] 37.1 °C (98 °F)  Pulse:  [106-112] 106  Resp:  [18-26] 26  BP: (135-139)/(91) 139/91  SpO2:  [94 %-98 %] 96 %     Physical Exam  Constitutional:       Appearance: He is ill-appearing.   HENT:      Head: Normocephalic.      Left Ear: Tympanic membrane normal.      Nose: Nose normal.      Mouth/Throat:      Mouth: Mucous membranes are moist.   Eyes:      Pupils: Pupils are equal, round, and reactive to light.   Cardiovascular:      Rate and Rhythm: Normal rate and regular rhythm.      Pulses: Normal pulses.      Heart sounds: Normal heart sounds. No murmur heard.     Pulmonary:      Effort: Pulmonary effort is normal.      Breath sounds: Normal breath sounds.   Abdominal:      General: Abdomen is flat.      Tenderness: There is right CVA tenderness. There is no left CVA tenderness.   Genitourinary:     Comments: On oro , on rt nephrostomy tube with cloudy drainage  Musculoskeletal:      Right lower leg: No edema.      Left lower leg: No edema.   Neurological:      General: No focal deficit present.      Mental Status: He is alert and oriented to person, place, and time.   Fluids    Intake/Output Summary (Last 24 hours) at 9/13/2021 1459  Last data filed at 9/13/2021 1200  Gross per 24 hour   Intake 400 ml   Output 800 ml   Net -400 ml        Laboratory  Recent Labs     09/11/21  0557 09/12/21  1423 09/13/21  0000   WBC 24.0* 14.7* 17.8*   RBC 3.47* 3.12* 3.23*   HEMOGLOBIN 8.8* 7.8* 8.0*   HEMATOCRIT 26.9* 24.3* 24.7*   MCV 77.5* 77.9* 76.5*   MCH 25.4* 25.0* 24.8*   MCHC 32.7* 32.1* 32.4*   RDW 53.7* 55.3* 54.4*   PLATELETCT 441 398 388   MPV 9.0 9.1 9.0     Recent Labs     09/12/21  1423 09/13/21  0000 09/13/21  0813   SODIUM 130* 129* 129*   POTASSIUM 4.9 4.8 4.8   CHLORIDE 98 100 99   CO2 18* 17* 17*   GLUCOSE 121* 98 113*   BUN 48* 50* 53*   CREATININE 5.43* 5.81* 6.71*   CALCIUM 7.7* 7.6* 7.7*     Recent Labs     09/11/21  0557   INR 1.36*         Recent Labs     09/12/21  1423   TRIGLYCERIDE 110   HDL 22*   LDL 52       Imaging  US-RENAL   Final Result      1.  No change in moderate right hydronephrosis.      2.  There is severe left-sided hydronephrosis which is slightly more prominent than on prior exam.      IR-PERQ NEPH CATH NEW ACCESS (ALL RADIOLOGY) RIGHT   Final Result            1.  Successful percutaneous placement of right nephrostomy tube utilizing ultrasonic and fluoroscopic guidance.      2.  Aspiration of right renal collecting system yielded purulent urine which was sent to the lab for analysis.      3.  Right-sided nephrostogram demonstrated moderate hydronephrosis with a nonfunctioning right ureteral stent in place and debris noted in the right renal collecting system.      OUTSIDE IMAGES-DX CHEST   Final Result      OUTSIDE IMAGES-CT ABDOMEN /PELVIS   Final Result      OUTSIDE IMAGES-CT ABDOMEN /PELVIS   Final Result      OUTSIDE IMAGES-CT CHEST   Final Result      US-RENAL   Final Result         1.  Moderate right and severe left hydronephrosis.   2.  Thin echogenic appearance of the left renal cortex.      IR-NEPHROSTOGRAM THROUGH EXT CATH (ALL RADIOLOGY) RIGHT    (Results Pending)        Assessment/Plan  Acute renal failure (ARF) ([facility])  Assessment & Plan  Continue IV fluid   Avoid nephrotoxins, monitor inputs and  outputs  Soriano in place.  Urine output less than 300 mL per past 24-hour  Nephrology Dr Oconnell consulted for worsening kidney function/      High anion gap metabolic acidosis  Assessment & Plan  In setting of sepsis and AYANA  Continue to monitor closely  Continue IV antibiotics and continue antibiotics    Other hydronephrosis  Assessment & Plan  CT scan from outside facility noted double-J right-sided ureteral stent. Moderate to severe right-sided hydronephrosis persists with severe left-sided hydronephrosis and renal atrophy, unchanged.       Discussed case with urology Dr. Frederick .  S/p right nephrostomy tube  Urology following closely    PNA (pneumonia)- (present on admission)  Assessment & Plan  Cont abx  Sepsis protocol initiated  Cont duonebs  Oxygen supplementation prn  Pulse ox    UTI (urinary tract infection)- (present on admission)  Assessment & Plan  Urine culture growing Candida albicans  On fluconazole  ID following      Protein calorie malnutrition (HCC)- (present on admission)  Assessment & Plan  BMI 18  Nutrition consult  Boost, MV    Leukocytosis- (present on admission)  Assessment & Plan  2/2 UTI/sepsis  Treat underlying conditions    Chronic, continuous use of opioids- (present on admission)  Assessment & Plan  Analgesia with oxycodone + Morphine    Benign prostate hyperplasia- (present on admission)  Assessment & Plan  Cont tamsulosin    Nausea & vomiting- (present on admission)  Assessment & Plan  Antiemetics  Bowel regimen  Nutrition consult, consider boost.    Sepsis (HCC)- (present on admission)  Assessment & Plan  This is Sepsis Present on admission  SIRS criteria identified on my evaluation include: Tachycardia, with heart rate greater than 90 BPM, Tachypnea, with respirations greater than 20 per minute and Leukocytosis, with WBC greater than 12,000  Source is Urosepsis and possible PNA  Sepsis protocol initiated  Fluid resuscitation ordered per protocol  IV antibiotics as appropriate for  source of sepsis  While organ dysfunction may be noted elsewhere in this problem list or in the chart, degree of organ dysfunction does not meet CMS criteria for severe sepsis          Hypertension- (present on admission)  Assessment & Plan  Continue Home Medications  Labetalol ivp prn with parameters      Hyponatremia- (present on admission)  Assessment & Plan  - c/w NS, mild.  - IN AYANA  - F/u with Serum Osm, Urine Osm, Urine Na      COPD (chronic obstructive pulmonary disease) (HCC)- (present on admission)  Assessment & Plan  Not in acute exacerbation cont duonebs  Oxygen support      Metastatic Colon cancer (HCC)- (present on admission)  Assessment & Plan  Diagnosed 2016 treated with resection and chemo. Recurred in 2018 with pulmonary nodules biopsied showing adenocarcinoma.  Antiemetics    Follows with oncology Dr. Urbina as outpatient         VTE prophylaxis: SCDs/TEDs and heparin ppx    I have performed a physical exam and reviewed and updated ROS and Plan today (9/13/2021). In review of yesterday's note (9/12/2021), there are no changes except as documented above.

## 2021-09-13 NOTE — DIETARY
"Nutrition services: Day 2 of admit.  Erich Ingram is a 56 y.o. male with admitting DX of acute renal failure, urinary retention, sepsis.    Consult received for wt loss (2-13 lbs in 6 months), poor PO. Consult for calorie count/nutrition evaluation. Attempted visit with pt at bedside. Pt was sleeping and did not rouse to name. Pt appeared thin and older than stated age with moderate fat loss noted by slightly pronounced zygomatic arches. Outside food observed at bedside.     Assessment:  Height: 190.5 cm (6' 3\")  Weight: 66.5 kg (146 lb 9.7 oz)  Body mass index is 18.32 kg/m²., BMI classification: underweight  Diet/Intake: Renal; PO % for most recent meal    Evaluation:   1. Wt per chart review: 173 lbs 8/11/20, 165 lbs 1/29/20.  2. Wt loss of 11% >1 year is not significant.  3. Hx of colon cancer with mets, s/p resection, currently receiving chemotherapy.  4. Nephrostomy tube placement 9/11.  5. Per MD note, pt with severe nausea, low appetite and weakness prior to admit.  6. Protein calorie malnutrition in problems list.  7. Pt meets criteria for moderate malnutrition at this time, though suspect ongoing chronic malnutrition r/t hypermetabolic disease state.    Malnutrition Risk: Pt with non-severe (moderate) malnutrition in the context of acute injury, r/t recent stent placement (sepsis, UTI), as evidenced by physical markers for moderate fat loss as noted above and <75% of estimated energy needs >7 days d/t nausea, and dry heaving.    Recommendations/Plan:  1. Continue with oral nutrition supplements.  2. Encourage intake of meals and supplements.  3. Document intake of all meals and supplements as % taken in ADLs to provide interdisciplinary communication across all shifts.   4. Monitor weight.  5. Nutrition rep will continue to see patient for ongoing meal and snack preferences.     RD following.        "

## 2021-09-13 NOTE — PROGRESS NOTES
Note to reader: this note follows the APSO format rather than the historical SOAP format. Assessment and plan located at the top of the note for ease of use.    Chief Complaint  56 y.o. year old male here with AYANA, right hydronephrosis, urosepsis     Assessment/Plan  Interval History   AYANA  Right hydronephrosis  Yeast UTI  Sepsis     Plan:  - Nursing to flush NPT periodically. Please slowly push 10cc NS. Do not pull back.   - Ordered morning BMP  - Ordered RBUS to eval for continued hydro  - Urology will continue to follow  - Recommend fluconazole per hospitalist    Patient seen and examined    9/13. Minimal UOP in nephrostomy tube and oro overnight. Cr 5.81. Now with worsening flank pain and N/V.         Review of Systems  Physical Exam   Review of Systems   Constitutional: Negative for chills and fever.   Gastrointestinal: Positive for nausea and vomiting.   Genitourinary: Positive for flank pain. Negative for frequency, hematuria and urgency.   All other systems reviewed and are negative.    Vitals:    09/12/21 2140 09/12/21 2310 09/13/21 0520 09/13/21 0704   BP: 141/89 142/92 147/99    Pulse: 94 100 95 94   Resp: 16 18 18 20   Temp: 36.3 °C (97.4 °F) 36.6 °C (97.8 °F) 36.3 °C (97.3 °F) 36.2 °C (97.2 °F)   TempSrc: Temporal Temporal Temporal Temporal   SpO2: 97% 97% 96% 95%   Weight:       Height:         Physical Exam  Constitutional:       Appearance: Normal appearance.   HENT:      Head: Normocephalic and atraumatic.   Pulmonary:      Effort: Pulmonary effort is normal.   Genitourinary:     Comments: R NPT with minimal clear yellow output  Oro cath with minimal yellow output with copious debris  Skin:     General: Skin is warm and dry.   Neurological:      General: No focal deficit present.      Mental Status: He is alert and oriented to person, place, and time.   Psychiatric:         Mood and Affect: Mood normal.         Behavior: Behavior normal.          Hematology Chemistry   Lab Results   Component  Value Date/Time    WBC 17.8 (H) 09/13/2021 12:00 AM    HEMOGLOBIN 8.0 (L) 09/13/2021 12:00 AM    HEMATOCRIT 24.7 (L) 09/13/2021 12:00 AM    PLATELETCT 388 09/13/2021 12:00 AM     Lab Results   Component Value Date/Time    SODIUM 129 (L) 09/13/2021 12:00 AM    POTASSIUM 4.8 09/13/2021 12:00 AM    CHLORIDE 100 09/13/2021 12:00 AM    CO2 17 (L) 09/13/2021 12:00 AM    GLUCOSE 98 09/13/2021 12:00 AM    BUN 50 (H) 09/13/2021 12:00 AM    CREATININE 5.81 (HH) 09/13/2021 12:00 AM         Labs not explicitly included in this progress note were reviewed by the author.   Radiology/imaging not explicitly included in this progress note was reviewed by the author.     Core Measures

## 2021-09-14 ENCOUNTER — APPOINTMENT (OUTPATIENT)
Dept: RADIOLOGY | Facility: MEDICAL CENTER | Age: 56
DRG: 871 | End: 2021-09-14
Attending: PHYSICIAN ASSISTANT
Payer: COMMERCIAL

## 2021-09-14 PROBLEM — R78.81 BACTEREMIA: Status: ACTIVE | Noted: 2021-09-14

## 2021-09-14 LAB
ANION GAP SERPL CALC-SCNC: 13 MMOL/L (ref 7–16)
ANION GAP SERPL CALC-SCNC: 16 MMOL/L (ref 7–16)
BACTERIA UR CULT: ABNORMAL
BACTERIA UR CULT: ABNORMAL
BASOPHILS # BLD AUTO: 0.2 % (ref 0–1.8)
BASOPHILS # BLD: 0.04 K/UL (ref 0–0.12)
BUN SERPL-MCNC: 42 MG/DL (ref 8–22)
BUN SERPL-MCNC: 53 MG/DL (ref 8–22)
CALCIUM SERPL-MCNC: 8.4 MG/DL (ref 8.5–10.5)
CALCIUM SERPL-MCNC: 8.5 MG/DL (ref 8.5–10.5)
CHLORIDE SERPL-SCNC: 102 MMOL/L (ref 96–112)
CHLORIDE SERPL-SCNC: 103 MMOL/L (ref 96–112)
CO2 SERPL-SCNC: 19 MMOL/L (ref 20–33)
CO2 SERPL-SCNC: 19 MMOL/L (ref 20–33)
CREAT SERPL-MCNC: 4.19 MG/DL (ref 0.5–1.4)
CREAT SERPL-MCNC: 5.58 MG/DL (ref 0.5–1.4)
EOSINOPHIL # BLD AUTO: 0.04 K/UL (ref 0–0.51)
EOSINOPHIL NFR BLD: 0.2 % (ref 0–6.9)
ERYTHROCYTE [DISTWIDTH] IN BLOOD BY AUTOMATED COUNT: 54 FL (ref 35.9–50)
GLUCOSE SERPL-MCNC: 115 MG/DL (ref 65–99)
GLUCOSE SERPL-MCNC: 133 MG/DL (ref 65–99)
GRAM STN SPEC: ABNORMAL
HCT VFR BLD AUTO: 25.2 % (ref 42–52)
HGB BLD-MCNC: 8.2 G/DL (ref 14–18)
IMM GRANULOCYTES # BLD AUTO: 0.12 K/UL (ref 0–0.11)
IMM GRANULOCYTES NFR BLD AUTO: 0.7 % (ref 0–0.9)
LYMPHOCYTES # BLD AUTO: 0.65 K/UL (ref 1–4.8)
LYMPHOCYTES NFR BLD: 3.6 % (ref 22–41)
MCH RBC QN AUTO: 24.7 PG (ref 27–33)
MCHC RBC AUTO-ENTMCNC: 32.5 G/DL (ref 33.7–35.3)
MCV RBC AUTO: 75.9 FL (ref 81.4–97.8)
MONOCYTES # BLD AUTO: 1.04 K/UL (ref 0–0.85)
MONOCYTES NFR BLD AUTO: 5.7 % (ref 0–13.4)
NEUTROPHILS # BLD AUTO: 16.35 K/UL (ref 1.82–7.42)
NEUTROPHILS NFR BLD: 89.6 % (ref 44–72)
NRBC # BLD AUTO: 0 K/UL
NRBC BLD-RTO: 0 /100 WBC
PLATELET # BLD AUTO: 404 K/UL (ref 164–446)
PMV BLD AUTO: 9.2 FL (ref 9–12.9)
POTASSIUM SERPL-SCNC: 4.5 MMOL/L (ref 3.6–5.5)
POTASSIUM SERPL-SCNC: 5.2 MMOL/L (ref 3.6–5.5)
RBC # BLD AUTO: 3.32 M/UL (ref 4.7–6.1)
SARS-COV+SARS-COV-2 AG RESP QL IA.RAPID: NOTDETECTED
SIGNIFICANT IND 70042: ABNORMAL
SITE SITE: ABNORMAL
SODIUM SERPL-SCNC: 135 MMOL/L (ref 135–145)
SODIUM SERPL-SCNC: 137 MMOL/L (ref 135–145)
SOURCE SOURCE: ABNORMAL
SPECIMEN SOURCE: NORMAL
WBC # BLD AUTO: 18.2 K/UL (ref 4.8–10.8)

## 2021-09-14 PROCEDURE — 700102 HCHG RX REV CODE 250 W/ 637 OVERRIDE(OP): Performed by: STUDENT IN AN ORGANIZED HEALTH CARE EDUCATION/TRAINING PROGRAM

## 2021-09-14 PROCEDURE — 700111 HCHG RX REV CODE 636 W/ 250 OVERRIDE (IP): Performed by: STUDENT IN AN ORGANIZED HEALTH CARE EDUCATION/TRAINING PROGRAM

## 2021-09-14 PROCEDURE — 36415 COLL VENOUS BLD VENIPUNCTURE: CPT

## 2021-09-14 PROCEDURE — 770020 HCHG ROOM/CARE - TELE (206)

## 2021-09-14 PROCEDURE — 99233 SBSQ HOSP IP/OBS HIGH 50: CPT | Performed by: INTERNAL MEDICINE

## 2021-09-14 PROCEDURE — 700117 HCHG RX CONTRAST REV CODE 255: Performed by: PHYSICIAN ASSISTANT

## 2021-09-14 PROCEDURE — 50431 NJX PX NFROSGRM &/URTRGRM: CPT

## 2021-09-14 PROCEDURE — 80048 BASIC METABOLIC PNL TOTAL CA: CPT | Mod: 91

## 2021-09-14 PROCEDURE — 99233 SBSQ HOSP IP/OBS HIGH 50: CPT | Performed by: HOSPITALIST

## 2021-09-14 PROCEDURE — 94640 AIRWAY INHALATION TREATMENT: CPT

## 2021-09-14 PROCEDURE — 700111 HCHG RX REV CODE 636 W/ 250 OVERRIDE (IP): Performed by: INTERNAL MEDICINE

## 2021-09-14 PROCEDURE — A9270 NON-COVERED ITEM OR SERVICE: HCPCS | Performed by: STUDENT IN AN ORGANIZED HEALTH CARE EDUCATION/TRAINING PROGRAM

## 2021-09-14 PROCEDURE — 700101 HCHG RX REV CODE 250: Performed by: STUDENT IN AN ORGANIZED HEALTH CARE EDUCATION/TRAINING PROGRAM

## 2021-09-14 PROCEDURE — 87426 SARSCOV CORONAVIRUS AG IA: CPT

## 2021-09-14 PROCEDURE — 700105 HCHG RX REV CODE 258: Performed by: INTERNAL MEDICINE

## 2021-09-14 PROCEDURE — 94760 N-INVAS EAR/PLS OXIMETRY 1: CPT

## 2021-09-14 PROCEDURE — 85025 COMPLETE CBC W/AUTO DIFF WBC: CPT

## 2021-09-14 RX ADMIN — ONDANSETRON 4 MG: 2 INJECTION INTRAMUSCULAR; INTRAVENOUS at 17:29

## 2021-09-14 RX ADMIN — SODIUM BICARBONATE 650 MG: 650 TABLET ORAL at 11:44

## 2021-09-14 RX ADMIN — Medication 1 CAPSULE: at 08:01

## 2021-09-14 RX ADMIN — IOHEXOL 8 ML: 300 INJECTION, SOLUTION INTRAVENOUS at 17:15

## 2021-09-14 RX ADMIN — TAMSULOSIN HYDROCHLORIDE 0.4 MG: 0.4 CAPSULE ORAL at 08:01

## 2021-09-14 RX ADMIN — HYDROMORPHONE HYDROCHLORIDE 4 MG: 4 TABLET ORAL at 10:12

## 2021-09-14 RX ADMIN — HYDROMORPHONE HYDROCHLORIDE 4 MG: 4 TABLET ORAL at 06:08

## 2021-09-14 RX ADMIN — HEPARIN SODIUM 5000 UNITS: 5000 INJECTION, SOLUTION INTRAVENOUS; SUBCUTANEOUS at 21:11

## 2021-09-14 RX ADMIN — DIPHENHYDRAMINE HYDROCHLORIDE 25 MG: 50 INJECTION INTRAMUSCULAR; INTRAVENOUS at 21:11

## 2021-09-14 RX ADMIN — THERA TABS 1 TABLET: TAB at 04:22

## 2021-09-14 RX ADMIN — IPRATROPIUM BROMIDE AND ALBUTEROL SULFATE 3 ML: .5; 2.5 SOLUTION RESPIRATORY (INHALATION) at 06:54

## 2021-09-14 RX ADMIN — ONDANSETRON 4 MG: 2 INJECTION INTRAMUSCULAR; INTRAVENOUS at 02:47

## 2021-09-14 RX ADMIN — IPRATROPIUM BROMIDE AND ALBUTEROL SULFATE 3 ML: .5; 2.5 SOLUTION RESPIRATORY (INHALATION) at 19:49

## 2021-09-14 RX ADMIN — SODIUM BICARBONATE 650 MG: 650 TABLET ORAL at 08:01

## 2021-09-14 RX ADMIN — IPRATROPIUM BROMIDE AND ALBUTEROL SULFATE 3 ML: .5; 2.5 SOLUTION RESPIRATORY (INHALATION) at 15:53

## 2021-09-14 RX ADMIN — TIOTROPIUM BROMIDE INHALATION SPRAY 5 MCG: 3.12 SPRAY, METERED RESPIRATORY (INHALATION) at 06:54

## 2021-09-14 RX ADMIN — PREDNISONE 5 MG: 5 TABLET ORAL at 04:23

## 2021-09-14 RX ADMIN — HYDROMORPHONE HYDROCHLORIDE 4 MG: 4 TABLET ORAL at 17:02

## 2021-09-14 RX ADMIN — SODIUM CHLORIDE 3 G: 900 INJECTION INTRAVENOUS at 17:04

## 2021-09-14 RX ADMIN — PROMETHAZINE HYDROCHLORIDE 25 MG: 25 TABLET ORAL at 04:22

## 2021-09-14 RX ADMIN — OMEPRAZOLE 20 MG: 20 CAPSULE, DELAYED RELEASE ORAL at 04:22

## 2021-09-14 RX ADMIN — SODIUM BICARBONATE 650 MG: 650 TABLET ORAL at 17:02

## 2021-09-14 RX ADMIN — PROCHLORPERAZINE EDISYLATE 10 MG: 5 INJECTION INTRAMUSCULAR; INTRAVENOUS at 23:00

## 2021-09-14 RX ADMIN — HYDROMORPHONE HYDROCHLORIDE 4 MG: 4 TABLET ORAL at 21:16

## 2021-09-14 RX ADMIN — HYDROMORPHONE HYDROCHLORIDE 4 MG: 4 TABLET ORAL at 00:55

## 2021-09-14 RX ADMIN — FLUCONAZOLE 200 MG: 200 TABLET ORAL at 04:22

## 2021-09-14 RX ADMIN — PROCHLORPERAZINE EDISYLATE 10 MG: 5 INJECTION INTRAMUSCULAR; INTRAVENOUS at 06:08

## 2021-09-14 RX ADMIN — ALBUTEROL SULFATE 2 PUFF: 90 AEROSOL, METERED RESPIRATORY (INHALATION) at 06:08

## 2021-09-14 RX ADMIN — HEPARIN SODIUM 5000 UNITS: 5000 INJECTION, SOLUTION INTRAVENOUS; SUBCUTANEOUS at 04:22

## 2021-09-14 RX ADMIN — ALBUTEROL SULFATE 2 PUFF: 90 AEROSOL, METERED RESPIRATORY (INHALATION) at 13:52

## 2021-09-14 ASSESSMENT — ENCOUNTER SYMPTOMS
PALPITATIONS: 0
NAUSEA: 0
VOMITING: 0
HEADACHES: 0
SHORTNESS OF BREATH: 0
FEVER: 0
CHILLS: 0
NAUSEA: 1
COUGH: 0
VOMITING: 1
DIZZINESS: 0
ABDOMINAL PAIN: 0
FLANK PAIN: 1

## 2021-09-14 ASSESSMENT — PAIN DESCRIPTION - PAIN TYPE
TYPE: ACUTE PAIN
TYPE: SURGICAL PAIN
TYPE: SURGICAL PAIN
TYPE: ACUTE PAIN
TYPE: SURGICAL PAIN

## 2021-09-14 NOTE — PROGRESS NOTES
Hospital Medicine Daily Progress Note    Date of Service  9/14/2021    Chief Complaint  Erich Ingram is a 56 y.o. male admitted 9/11/2021 with sepsis    Hospital Course     57 y/o male with past medical history of colon cancer with metastasis status post resection on chemo, nonfunctioning left kidney, recent right renal stent 2 weeks ago at [place] for hydronephrosis transferred from outside facility for sepsis due to UTI and AYANA.  CT scan from outside facility noted double-J right-sided ureteral stent. Moderate to severe right-sided hydronephrosis persists with severe left-sided hydronephrosis and renal atrophy, unchanged.  He underwent right nephrostomy tube placement on 9/11.  Urine cultures ultimately grew Candida so ID was consulted and patient was started on fluconazole and antibiotics changed from Zyvox and Zosyn to IV Unasyn.  1 of 2 blood cultures from admission growing possible staph species.        Interval Problem Update  1 of 2 blood cultures growing staph species  Repeat renal ultrasound unchanged on right side with slight worsening of hydronephrosis on left side  Creatinine has been fairly unchanged since admission  Pt for IR nephrostogram of right side  Pt feeling worse today     I have personally seen and examined the patient at bedside. I discussed the plan of care with patient, bedside RN, charge RN,  and pharmacy.    Consultants/Specialty  infectious disease, nephrology, palliative care and urology    Code Status  Full Code    Disposition  Patient is not medically cleared.   Anticipate discharge to D.  I have placed the appropriate orders for post-discharge needs.    Review of Systems  Review of Systems   Constitutional: Negative for chills and fever.   Respiratory: Negative for cough and shortness of breath.    Cardiovascular: Negative for chest pain and palpitations.   Gastrointestinal: Negative for abdominal pain, nausea and vomiting.   Genitourinary: Positive for  flank pain. Negative for dysuria and urgency.   Neurological: Negative for dizziness and headaches.   All other systems reviewed and are negative.       Physical Exam  Temp:  [36.2 °C (97.2 °F)-37.3 °C (99.2 °F)] 37.1 °C (98.8 °F)  Pulse:  [] 97  Resp:  [16-20] 18  BP: (136-149)/() 136/93  SpO2:  [91 %-96 %] 96 %    Physical Exam  Vitals and nursing note reviewed.   Constitutional:       Appearance: Normal appearance.   Cardiovascular:      Rate and Rhythm: Normal rate and regular rhythm.      Heart sounds: No murmur heard.     Pulmonary:      Effort: Pulmonary effort is normal. No respiratory distress.      Breath sounds: Normal breath sounds.   Musculoskeletal:      Comments: R nephrostomy tube +   Neurological:      General: No focal deficit present.      Mental Status: He is alert and oriented to person, place, and time.         Fluids    Intake/Output Summary (Last 24 hours) at 9/14/2021 1421  Last data filed at 9/14/2021 1012  Gross per 24 hour   Intake --   Output 4250 ml   Net -4250 ml       Laboratory  Recent Labs     09/12/21  1423 09/13/21  0000 09/14/21  0226   WBC 14.7* 17.8* 18.2*   RBC 3.12* 3.23* 3.32*   HEMOGLOBIN 7.8* 8.0* 8.2*   HEMATOCRIT 24.3* 24.7* 25.2*   MCV 77.9* 76.5* 75.9*   MCH 25.0* 24.8* 24.7*   MCHC 32.1* 32.4* 32.5*   RDW 55.3* 54.4* 54.0*   PLATELETCT 398 388 404   MPV 9.1 9.0 9.2     Recent Labs     09/13/21  0813 09/13/21  2140 09/14/21  0917   SODIUM 129* 133* 135   POTASSIUM 4.8 5.0 5.2   CHLORIDE 99 97 103   CO2 17* 18* 19*   GLUCOSE 113* 102* 133*   BUN 53* 55* 53*   CREATININE 6.71* 6.16* 5.58*   CALCIUM 7.7* 8.2* 8.4*             Recent Labs     09/12/21  1423   TRIGLYCERIDE 110   HDL 22*   LDL 52       Imaging  US-RENAL   Final Result      1.  No change in moderate right hydronephrosis.      2.  There is severe left-sided hydronephrosis which is slightly more prominent than on prior exam.      IR-PERQ NEPH CATH NEW ACCESS (ALL RADIOLOGY) RIGHT   Final Result             1.  Successful percutaneous placement of right nephrostomy tube utilizing ultrasonic and fluoroscopic guidance.      2.  Aspiration of right renal collecting system yielded purulent urine which was sent to the lab for analysis.      3.  Right-sided nephrostogram demonstrated moderate hydronephrosis with a nonfunctioning right ureteral stent in place and debris noted in the right renal collecting system.      OUTSIDE IMAGES-DX CHEST   Final Result      OUTSIDE IMAGES-CT ABDOMEN /PELVIS   Final Result      OUTSIDE IMAGES-CT ABDOMEN /PELVIS   Final Result      OUTSIDE IMAGES-CT CHEST   Final Result      US-RENAL   Final Result         1.  Moderate right and severe left hydronephrosis.   2.  Thin echogenic appearance of the left renal cortex.      IR-NEPHROSTOGRAM THROUGH EXT CATH (ALL RADIOLOGY) RIGHT    (Results Pending)        Assessment/Plan  Bacteremia  Assessment & Plan  1 of 2 BCx growing staph, likely contaminant    Acute renal failure (ARF) ([facility])  Assessment & Plan  Continue IV fluid   Avoid nephrotoxins, monitor inputs and outputs  Soriano in place.  Urine output less than 300 mL per past 24-hour  Nephrology Dr Oconnell consulted for worsening kidney function/      High anion gap metabolic acidosis  Assessment & Plan  In setting of sepsis and AYANA  Continue to monitor closely  Continue IV antibiotics and continue antibiotics    Other hydronephrosis  Assessment & Plan  CT scan from outside facility noted double-J right-sided ureteral stent. Moderate to severe right-sided hydronephrosis persists with severe left-sided hydronephrosis and renal atrophy, unchanged.       Discussed case with urology Dr. Frederick .  S/p right nephrostomy tube  Urology following closely    Repeat US fairly unchanged- IR Right nephrostogram pending    PNA (pneumonia)- (present on admission)  Assessment & Plan  Cont abx  Sepsis protocol initiated  Cont duonebs  Oxygen supplementation prn  Pulse ox    UTI (urinary tract infection)-  (present on admission)  Assessment & Plan  Urine culture growing Candida albicans  On fluconazole  ID following      Protein calorie malnutrition (HCC)- (present on admission)  Assessment & Plan  BMI 18  Nutrition consult  Boost, MV    Leukocytosis- (present on admission)  Assessment & Plan  2/2 UTI/sepsis  Treat underlying conditions    Chronic, continuous use of opioids- (present on admission)  Assessment & Plan  Analgesia with oxycodone + Morphine    Benign prostate hyperplasia- (present on admission)  Assessment & Plan  Cont tamsulosin    Nausea & vomiting- (present on admission)  Assessment & Plan  Antiemetics  Bowel regimen  Nutrition consult, consider boost.    Sepsis (HCC)- (present on admission)  Assessment & Plan  This is Sepsis Present on admission  SIRS criteria identified on my evaluation include: Tachycardia, with heart rate greater than 90 BPM, Tachypnea, with respirations greater than 20 per minute and Leukocytosis, with WBC greater than 12,000  Source is Urosepsis and possible PNA  Sepsis protocol initiated  Fluid resuscitation ordered per protocol  IV antibiotics as appropriate for source of sepsis  While organ dysfunction may be noted elsewhere in this problem list or in the chart, degree of organ dysfunction does not meet CMS criteria for severe sepsis          Hypertension- (present on admission)  Assessment & Plan  Continue Home Medications  Labetalol ivp prn with parameters      Hyponatremia- (present on admission)  Assessment & Plan  - c/w NS, mild.  - IN AYANA  - F/u with Serum Osm, Urine Osm, Urine Na      COPD (chronic obstructive pulmonary disease) (HCC)- (present on admission)  Assessment & Plan  Not in acute exacerbation cont duonebs  Oxygen support      Metastatic Colon cancer (HCC)- (present on admission)  Assessment & Plan  Diagnosed 2016 treated with resection and chemo. Recurred in 2018 with pulmonary nodules biopsied showing adenocarcinoma.  Antiemetics    Follows with oncology   Carlitos as outpatient         VTE prophylaxis: heparin ppx    I have performed a physical exam and reviewed and updated ROS and Plan today (9/14/2021). In review of yesterday's note (9/13/2021), there are no changes except as documented above.

## 2021-09-14 NOTE — DISCHARGE PLANNING
Care Transition Team Assessment    LSW met with patient at bedside. Patient verified the demographic information in the Facesheet. Patient's admitting Dx is Sepsis.    The patient stated his physical address is [address] [place] [address] [place]. The patient reported he lives in a single story home with his wife Janine.    According to this patient prior to admission he was able to manage his own needs with minimum assistance from his wife.    The patient indicated it was difficult to get to his doctors appointment; however, patient claimed his vehicle is repaired and his neighbors will provide some assistance with transportation.     The patient stated he own a FWW, Wheelchair, and is on Nocturnal Oxygen, the DME provider is [employer].    Based on the information provided by patient, the anticipated discharge disposition is Home with HHC and IV ABX VS Home with Outpatient Services.        Information Source  Orientation Level: Oriented X4  Information Given By: Patient  Informant's Name:  (Erich)  Who is responsible for making decisions for patient? : Patient    Readmission Evaluation  Is this a readmission?: No    Elopement Risk  Legal Hold: No  Ambulatory or Self Mobile in Wheelchair: Yes  Disoriented: No  Psychiatric Symptoms: None  History of Wandering: No  Elopement this Admit: No  Vocalizing Wanting to Leave: No  Displays Behaviors, Body Language Wanting to Leave: No-Not at Risk for Elopement  Elopement Risk: Not at Risk for Elopement    Interdisciplinary Discharge Planning  Patient or legal guardian wants to designate a caregiver: No    Discharge Preparedness  What is your plan after discharge?: Uncertain - pending medical team collaboration, Home with help, Other (comment)  What are your discharge supports?: Spouse  Prior Functional Level: Needs Assist with Activities of Daily Living    Functional Assessment  Prior Functional Level: Needs Assist with Activities of Daily Living    Finances  Financial  Barriers to Discharge: Yes    Vision / Hearing Impairment  Right Eye Vision: Impaired, Wears Glasses  Left Eye Vision: Impaired, Wears Glasses         Advance Directive  Advance Directive?: None  Advance Directive offered?: AD Booklet refused    Domestic Abuse  Have you ever been the victim of abuse or violence?: No  Physical Abuse or Sexual Abuse: No  Verbal Abuse or Emotional Abuse: No  Possible Abuse/Neglect Reported to:: Not Applicable    Psychological Assessment  History of Substance Abuse: None    Discharge Risks or Barriers  Discharge risks or barriers?: Other (comment)  Patient risk factors: Complex medical needs, Lack of outside supports, Other (comment)    Anticipated Discharge Information  Discharge Disposition: Discharged to home/self care (01)  Discharge Address:  ([address] [place], [place])

## 2021-09-14 NOTE — DISCHARGE PLANNING
Anticipated Discharge Disposition: Home with IV ABX and HHC VS Home with Outpatient Infusion.    Action: This case was discussed today during IDT Rounds. Per MD, patient has received information regarding palliative care. Per MD, if patient chooses to pursuit medical treatment patient's anticipated discharge disposition is Home with IV ABX VS Home Outpatient Infusion.    Patient lives in [place], [place]. LSW spoke with Klarissa at the [facility] in [place] [phone]. Per Klarissa, [facility] is a contracted provider; however, it takes 5 -7 business days for insurance authorization.    Klarissa stated the MD Order set will be faxed to [facility] for the discharging MD and ID to complete. Per Klarissa, the patient's PCP or another provider will have to re-write the orders and agree to follow this patient for IV IBX after discharge.    LSW spoke with AQUILES Goel at [facility]. Per Manasa, [facility] provides services to [place] and accepts [facility]. Per Manasa, if [facility] does not cover [place], the patient will need to received the PICC care once a week at [facility] where Dr. Aguirre has privileges.    Barriers to Discharge: Medical Clearance. Post Acute Referrals.    Plan: As Above. Awaiting recommendations from the interdisciplinary team.    3:00pm - Infusion Center New Patient Referral and Order received.     PLAN: Awaiting recommendations from the interdisciplinary team.      - If patient discharges home with Outpatient Infusion in [place], MD will need to complete the Infusion Center Orders for [facility].      - If discharge home with HHC and IV ABX, CM will need to confirm HHC services in [place].

## 2021-09-14 NOTE — OR SURGEON
Immediate Post- Operative Note        PostOp Diagnosis: Urosepsis      Procedure(s): Right Nephrostogram, catheter in good position, obstructed ureter. No evidence of kink or leak.      Estimated Blood Loss: Less than 5 ml    Complications: None      9/14/2021     4:41 PM     Ana Rosa Ribera M.D.

## 2021-09-14 NOTE — PROGRESS NOTES
IR Nursing Note:      Patient underwent a right nephrostogram be Dr. Ribera.  Tube is appropriately placed.  Sutured in and re-dressed with gauze and meatapore tape.  Report called to Laquita KWAN.  Patient was transported with transport via bed to [facility].

## 2021-09-14 NOTE — PROGRESS NOTES
Assessment complete.  AA&Ox4.   SpO2 91% on 2L. Denies SOB.  Reporting 7/10 pain. Denied need for PRN pain medications at this time.  R nephrostomy tube, dry and intact.   Renal diet. Statements of nausea, medicated per MAR.  LBM 9/13/21 .   Pt up self.   All needs met at this time. Call light within reach. Pt calls appropriately.      COVID-19 surge in effect.

## 2021-09-14 NOTE — PROGRESS NOTES
Note to reader: this note follows the APSO format rather than the historical SOAP format. Assessment and plan located at the top of the note for ease of use.    Chief Complaint  56 y.o. year old male here with AYANA, right hydronephrosis, urosepsis     Assessment/Plan  Interval History   AYANA  Right hydronephrosis  Yeast UTI  Sepsis     Plan:  - Nursing to flush NPT periodically. Please slowly push 10cc NS. Do not pull back.   - nursing to flush catheter with noticeably decreased output.  RBUS showing continued/worsening right hydro. To go to IR today for antegrade right nephrostogram.   - Urology will continue to follow  - on fluconazole per hospitalist    Patient seen and examined    9/14 oro output improved. SANGITA 9/13 showing no change in right hydronephrosis despite NT.    9/13. Minimal UOP in nephrostomy tube and oro overnight. Cr 5.81. Now with worsening flank pain and N/V.         Review of Systems  Physical Exam   Review of Systems   Constitutional: Negative for chills and fever.   Gastrointestinal: Positive for nausea and vomiting.   Genitourinary: Negative for frequency, hematuria and urgency.   All other systems reviewed and are negative.    Vitals:    09/14/21 0035 09/14/21 0511 09/14/21 0657 09/14/21 0829   BP: 149/105 149/99  143/91   Pulse: (!) 114 (!) 104 98 (!) 109   Resp: 16 16 20 18   Temp: 37 °C (98.6 °F) 37.3 °C (99.2 °F)  36.7 °C (98 °F)   TempSrc: Temporal Temporal  Temporal   SpO2: 93% 94% 94% 95%   Weight:       Height:         Physical Exam  Constitutional:       Appearance: Normal appearance.   HENT:      Head: Normocephalic and atraumatic.   Pulmonary:      Effort: Pulmonary effort is normal.   Genitourinary:     Comments: R NPT with minimal clear yellow output  Oro cath with good UOP and large amount of debris  Skin:     General: Skin is warm and dry.   Neurological:      General: No focal deficit present.      Mental Status: He is alert and oriented to person, place, and time.    Psychiatric:         Mood and Affect: Mood normal.         Behavior: Behavior normal.          Hematology Chemistry   Lab Results   Component Value Date/Time    WBC 18.2 (H) 09/14/2021 02:26 AM    HEMOGLOBIN 8.2 (L) 09/14/2021 02:26 AM    HEMATOCRIT 25.2 (L) 09/14/2021 02:26 AM    PLATELETCT 404 09/14/2021 02:26 AM     Lab Results   Component Value Date/Time    SODIUM 133 (L) 09/13/2021 09:40 PM    POTASSIUM 5.0 09/13/2021 09:40 PM    CHLORIDE 97 09/13/2021 09:40 PM    CO2 18 (L) 09/13/2021 09:40 PM    GLUCOSE 102 (H) 09/13/2021 09:40 PM    BUN 55 (H) 09/13/2021 09:40 PM    CREATININE 6.16 (HH) 09/13/2021 09:40 PM         Labs not explicitly included in this progress note were reviewed by the author.   Radiology/imaging not explicitly included in this progress note was reviewed by the author.     Core Measures

## 2021-09-14 NOTE — PROGRESS NOTES
"Patient down to IR with transport. /93   Pulse 99   Temp 37.1 °C (98.8 °F) (Temporal)   Resp 18   Ht 1.905 m (6' 3\")   Wt 66.5 kg (146 lb 9.7 oz)   SpO2 96%   BMI 18.32 kg/m²     "

## 2021-09-14 NOTE — PROGRESS NOTES
Palliative Care   Received duplicate order.  Nephrology consulted for AYANA due to obstructive uropathy with urosepsis.  Plan for IR to adjust nephrostomy tube this afternoon.  Discussed patient wishes for full code/full treat and palliative care with .  Patient reports he felt very poorly yesterday but is feeling a little better today.  He is hopeful that nephrostomy tube can improve and quit kidney function.  He reports his wife has gone home to [place] but will be back Thursday.  He denies needs at this time.    SHAHID Sanchez.  Palliative Care Nurse Practitioner  [phone]

## 2021-09-14 NOTE — PROGRESS NOTES
"Patient having loose BM's, wearing brief for containment. Ambulated independently. Nephrostomy tube with yellow output, going for Nephrostogram today. Soriano in place draining clear yellow urine. Patient very cachetic, Meplix in place over bony prominences, mobilizing.  Tele remote monitor in use, verified with monitor room.  MD order reviewed, all questions answered. /93   Pulse 97   Temp 37.1 °C (98.8 °F) (Temporal)   Resp 18   Ht 1.905 m (6' 3\")   Wt 66.5 kg (146 lb 9.7 oz)   SpO2 96%   BMI 18.32 kg/m²     "

## 2021-09-14 NOTE — PROGRESS NOTES
Nephrology Daily Progress Note    Date of Service  9/14/2021    Chief Complaint  56 y.o. male admitted 9/11/2021 with urosepsis, AYANA, obstructive uropathy    Interval Problem Update  Patient is doing about the same, plan for IR to adjust nephrostomy tube this afternoon    Review of Systems  Review of Systems   Constitutional: Positive for malaise/fatigue. Negative for chills and fever.   Respiratory: Negative for cough and shortness of breath.    Cardiovascular: Negative for chest pain and leg swelling.   Gastrointestinal: Positive for nausea. Negative for vomiting.   Genitourinary: Negative for dysuria, frequency and urgency.   All other systems reviewed and are negative.       Physical Exam  Temp:  [36.2 °C (97.2 °F)-37.3 °C (99.2 °F)] 37.1 °C (98.8 °F)  Pulse:  [] 97  Resp:  [16-20] 18  BP: (136-149)/() 136/93  SpO2:  [91 %-96 %] 96 %    Physical Exam  Vitals and nursing note reviewed.   Constitutional:       General: He is awake.      Appearance: He is cachectic. He is ill-appearing.   HENT:      Head: Normocephalic and atraumatic.      Right Ear: External ear normal.      Left Ear: External ear normal.      Nose: Nose normal.      Mouth/Throat:      Pharynx: No oropharyngeal exudate or posterior oropharyngeal erythema.   Eyes:      General:         Right eye: No discharge.         Left eye: No discharge.      Conjunctiva/sclera: Conjunctivae normal.   Cardiovascular:      Rate and Rhythm: Normal rate and regular rhythm.   Pulmonary:      Effort: Pulmonary effort is normal. No respiratory distress.      Breath sounds: Normal breath sounds. No wheezing.   Abdominal:      General: Abdomen is flat. Bowel sounds are normal.   Musculoskeletal:         General: No tenderness.      Cervical back: No rigidity. No muscular tenderness.      Right lower leg: No edema.      Left lower leg: No edema.   Skin:     General: Skin is warm and dry.      Coloration: Skin is not jaundiced.      Findings: No lesion or  rash.   Neurological:      General: No focal deficit present.      Mental Status: He is alert and oriented to person, place, and time. Mental status is at baseline.   Psychiatric:         Mood and Affect: Mood normal.         Behavior: Behavior normal.         Thought Content: Thought content normal.         Fluids    Intake/Output Summary (Last 24 hours) at 9/14/2021 1359  Last data filed at 9/14/2021 1012  Gross per 24 hour   Intake --   Output 4250 ml   Net -4250 ml       Laboratory  Recent Labs     09/12/21  1423 09/13/21  0000 09/14/21  0226   WBC 14.7* 17.8* 18.2*   RBC 3.12* 3.23* 3.32*   HEMOGLOBIN 7.8* 8.0* 8.2*   HEMATOCRIT 24.3* 24.7* 25.2*   MCV 77.9* 76.5* 75.9*   MCH 25.0* 24.8* 24.7*   MCHC 32.1* 32.4* 32.5*   RDW 55.3* 54.4* 54.0*   PLATELETCT 398 388 404   MPV 9.1 9.0 9.2     Recent Labs     09/13/21  0813 09/13/21  2140 09/14/21  0917   SODIUM 129* 133* 135   POTASSIUM 4.8 5.0 5.2   CHLORIDE 99 97 103   CO2 17* 18* 19*   GLUCOSE 113* 102* 133*   BUN 53* 55* 53*   CREATININE 6.71* 6.16* 5.58*   CALCIUM 7.7* 8.2* 8.4*         No results for input(s): NTPROBNP in the last 72 hours.  Recent Labs     09/12/21  1423   TRIGLYCERIDE 110   HDL 22*   LDL 52       Imaging  US-RENAL   Final Result      1.  No change in moderate right hydronephrosis.      2.  There is severe left-sided hydronephrosis which is slightly more prominent than on prior exam.      IR-PERQ NEPH CATH NEW ACCESS (ALL RADIOLOGY) RIGHT   Final Result            1.  Successful percutaneous placement of right nephrostomy tube utilizing ultrasonic and fluoroscopic guidance.      2.  Aspiration of right renal collecting system yielded purulent urine which was sent to the lab for analysis.      3.  Right-sided nephrostogram demonstrated moderate hydronephrosis with a nonfunctioning right ureteral stent in place and debris noted in the right renal collecting system.      OUTSIDE IMAGES-DX CHEST   Final Result      OUTSIDE IMAGES-CT ABDOMEN  /PELVIS   Final Result      OUTSIDE IMAGES-CT ABDOMEN /PELVIS   Final Result      OUTSIDE IMAGES-CT CHEST   Final Result      US-RENAL   Final Result         1.  Moderate right and severe left hydronephrosis.   2.  Thin echogenic appearance of the left renal cortex.      IR-NEPHROSTOGRAM THROUGH EXT CATH (ALL RADIOLOGY) RIGHT    (Results Pending)         Assessment/Plan  1 AYANA: Secondary to obstructive uropathy, no significant improvement, mild uremic symptoms  2 obstructive uropathy  3 colon cancer  4 anemia  5 sepsis  6 hypoalbuminemia  no acute need for HD, evaluate daily if no improvement by tomorrow we will consider dialysis however we need to address level of care considering patient poor prognosis  Renal diet  Daily labs  Renal dose all meds  Avoid nephrotoxins  Prognosis guarded.

## 2021-09-14 NOTE — PROGRESS NOTES
Infectious Disease Progress Note    Author: Baljinder Aguirre M.D. Date & Time of service: 2021  12:34 PM    Chief Complaint:  Follow-up for pyelonephritis    Interval History:   T-max 99.2, white count 18.2, tolerating antimicrobials thus far.  Sleeping soundly this afternoon    Labs Reviewed and Medications Reviewed.    Review of Systems:  Review of Systems   Unable to perform ROS: Other       Hemodynamics:  Temp (24hrs), Av.7 °C (98 °F), Min:36.1 °C (97 °F), Max:37.3 °C (99.2 °F)  Temperature: 36.7 °C (98 °F)  Pulse  Av.4  Min: 55  Max: 114   Blood Pressure: 143/91       Physical Exam:  Physical Exam  Vitals and nursing note reviewed.   Constitutional:       Appearance: He is ill-appearing. He is not toxic-appearing.      Comments: Cachectic appearing   HENT:      Head:      Comments: Temporal wasting noted  Eyes:      General: No scleral icterus.        Right eye: No discharge.         Left eye: No discharge.      Conjunctiva/sclera: Conjunctivae normal.   Cardiovascular:      Rate and Rhythm: Normal rate.      Heart sounds: No murmur heard.     Pulmonary:      Effort: Pulmonary effort is normal. No respiratory distress.      Breath sounds: No stridor.   Abdominal:      General: There is no distension.      Tenderness: There is abdominal tenderness.      Comments: Right-sided nephrostomy tube in place   Musculoskeletal:         General: No swelling or tenderness.   Skin:     Findings: No erythema or rash.   Neurological:      General: No focal deficit present.      Mental Status: He is oriented to person, place, and time.   Psychiatric:         Behavior: Behavior normal.      Comments: Appropriate mood         Meds:    Current Facility-Administered Medications:   •  ipratropium-albuterol  •  fluconazole  •  ampicillin-sulbactam (UNASYN) IV  •  diphenhydrAMINE  •  lactobacillus rhamnosus  •  albuterol  •  heparin  •  Pharmacy Consult Request  •  acetaminophen **FOLLOWED BY** [START ON 2021]  acetaminophen  •  hydrocortisone  •  [Held by provider] losartan  •  predniSONE  •  tamsulosin  •  omeprazole  •  polyethylene glycol/lytes  •  senna-docusate **AND** polyethylene glycol/lytes **AND** magnesium hydroxide **AND** bisacodyl  •  Respiratory Therapy Consult  •  Pharmacy  •  ondansetron  •  ondansetron  •  promethazine  •  promethazine  •  prochlorperazine  •  sodium bicarbonate  •  ipratropium-albuterol  •  multivitamin  •  tiotropium  •  budesonide-formoterol  •  HYDROmorphone    Labs:  Recent Labs     09/12/21  1423 09/13/21  0000 09/14/21  0226   WBC 14.7* 17.8* 18.2*   RBC 3.12* 3.23* 3.32*   HEMOGLOBIN 7.8* 8.0* 8.2*   HEMATOCRIT 24.3* 24.7* 25.2*   MCV 77.9* 76.5* 75.9*   MCH 25.0* 24.8* 24.7*   RDW 55.3* 54.4* 54.0*   PLATELETCT 398 388 404   MPV 9.1 9.0 9.2   NEUTSPOLYS 92.80* 87.00* 89.60*   LYMPHOCYTES 2.70* 4.10* 3.60*   MONOCYTES 3.70 7.40 5.70   EOSINOPHILS 0.10 0.70 0.20   BASOPHILS 0.10 0.20 0.20     Recent Labs     09/13/21  0813 09/13/21 2140 09/14/21  0917   SODIUM 129* 133* 135   POTASSIUM 4.8 5.0 5.2   CHLORIDE 99 97 103   CO2 17* 18* 19*   GLUCOSE 113* 102* 133*   BUN 53* 55* 53*     Recent Labs     09/12/21  1423 09/13/21  0000 09/13/21  0813 09/13/21  2140 09/14/21  0917   ALBUMIN 2.6*  --   --   --   --    TBILIRUBIN 0.2  --   --   --   --    ALKPHOSPHAT 186*  --   --   --   --    TOTPROTEIN 6.1  --   --   --   --    ALTSGPT 10  --   --   --   --    ASTSGOT 15  --   --   --   --    CREATININE 5.43*   < > 6.71* 6.16* 5.58*    < > = values in this interval not displayed.       Imaging:  US-RENAL    Result Date: 9/13/2021 9/13/2021 11:01 AM HISTORY/REASON FOR EXAM:  decreased nephrostomy output. eval for continued hydronephrosis TECHNIQUE/EXAM DESCRIPTION: Renal ultrasound. COMPARISON:  09/11/2021 FINDINGS: The right kidney measures 14.53 cm. The left kidney measures 10.91 cm. There is moderate right hydronephrosis and severe left hydronephrosis. There are no abnormal  calcifications. Right-sided nephrostomy tube is noted. The bladder demonstrates no focal wall abnormality. Trace amount of ascites is noted in the right upper quadrant.     1.  No change in moderate right hydronephrosis. 2.  There is severe left-sided hydronephrosis which is slightly more prominent than on prior exam.    US-RENAL    Result Date: 9/11/2021 9/11/2021 5:31 AM HISTORY/REASON FOR EXAM:  Abnormal Labs TECHNIQUE/EXAM DESCRIPTION:  Renal ultrasound. COMPARISON:  June 12, 2016 FINDINGS: The right kidney measures 15.42 cm.  The left kidney measures 12.36 cm. There is moderate right hydronephrosis. Severe left hydronephrosis is seen. The left renal cortex appears thinned and echogenic. There are no abnormal calcifications. The bladder is decompressed around a Soriano catheter.     1.  Moderate right and severe left hydronephrosis. 2.  Thin echogenic appearance of the left renal cortex.    IR-PERQ NEPH CATH NEW ACCESS (ALL RADIOLOGY) RIGHT    Result Date: 9/11/2021 9/11/2021 11:00 AM HISTORY/REASON FOR EXAM: Right Renal obstruction and pyonephrosis. TECHNIQUE/EXAM DESCRIPTION: Following informed consent the patient was prepped and draped in the usual fashion 10 cc of 1% lidocaine was utilized for local and subcutaneous anesthesia. SEDATION: Moderate sedation was provided. Pulse oximetry and continuous cardiac monitoring by the nurse was performed throughout the exam. Intraservice time was 30 minutes. Fluoroscopy images: 2 fluoroscopic images obtained. Fluoroscopy time: 0.3 minutes CONTRAST: 15 mL of Omnipaque 300 were utilized for the procedure. The procedure was performed utilizing MAXIMUM STERILE BARRIER technique including sterile gown, mass, cap, and under sterile gloves following appropriate hand hygiene and/or sterile scrub. The patient's skin site was prepped with 2% chlorhexidine Utilizing fluoroscopic and ultrasonic guidance the right kidney was accessed utilizing an 18-gauge introducer needle. 10 mLs  "of purulent urine was aspirated and sent to the lab for analysis. Nonionic contrast was injected and digital imaging was obtained  over the kidney and ureters. Subsequently following sequential dilatation a 8 Turkmen nephrostomy tube was placed in the collecting system and the affixed in place.  The patient tolerated procedure well and was sent to the floor in good condition. FINDINGS: There is good positioning of the right nephrostomy tube in the renal collecting system. Right-sided nephrostogram demonstrates moderate right-sided hydronephrosis with debris noted in the renal collecting system. There is a nonfunctioning right-sided ureteral stent in place.     1.  Successful percutaneous placement of right nephrostomy tube utilizing ultrasonic and fluoroscopic guidance. 2.  Aspiration of right renal collecting system yielded purulent urine which was sent to the lab for analysis. 3.  Right-sided nephrostogram demonstrated moderate hydronephrosis with a nonfunctioning right ureteral stent in place and debris noted in the right renal collecting system.      Micro:  Results     Procedure Component Value Units Date/Time    URINE CULTURE(NEW) [[id number]]  (Abnormal) Collected: 09/11/21 1132    Order Status: Completed Specimen: Other Updated: 09/14/21 0836     Significant Indicator POS     Source URSP     Site Right Hydronephrosis     Culture Result -     Gram Stain Result Many WBCs.  Many Yeast.       Culture Result Candida albicans  >100,000 cfu/mL      BLOOD CULTURE [[id number]]  (Abnormal) Collected: 09/11/21 0557    Order Status: Completed Specimen: Blood from Peripheral Updated: 09/14/21 0229     Significant Indicator POS     Source BLD     Site PERIPHERAL     Culture Result Growth detected by Bactec instrument. 09/14/2021  02:28  Gram Stain: Gram positive cocci: Possible Staphylococcus sp.      Narrative:      Per Hospital Policy: Only change Specimen Src: to \"Line\" if  specified by physician order.  Right AC    BLOOD " "CULTURE [[id number]] Collected: 09/11/21 0557    Order Status: Completed Specimen: Blood from Peripheral Updated: 09/12/21 0847     Significant Indicator NEG     Source BLD     Site PERIPHERAL     Culture Result No Growth  Note: Blood cultures are incubated for 5 days and  are monitored continuously.Positive blood cultures  are called to the RN and reported as soon as  they are identified.      Narrative:      Per Hospital Policy: Only change Specimen Src: to \"Line\" if  specified by physician order.  Left AC    GRAM STAIN [[id number]] Collected: 09/11/21 1132    Order Status: Completed Specimen: Other Updated: 09/11/21 1927     Significant Indicator .     Source URSP     Site Right Hydronephrosis     Gram Stain Result Many WBCs.  Many Yeast.      FLUID CULTURE W/GRAM STAIN [[id number]]     Order Status: No result Specimen: Other Body Fluid     MRSA By PCR (Amp) [[id number]]     Order Status: No result Specimen: Respirate from Nares     URINE CULTURE(NEW) [[id number]]     Order Status: No result Specimen: Urine     Culture Respiratory W/ GRM STN [[id number]]     Order Status: Completed Specimen: Respirate from Sputum     Blood Culture [[id number]]     Order Status: No result Specimen: Blood from Peripheral     Blood Culture [[id number]]     Order Status: No result Specimen: Blood from Peripheral           Assessment:  Erich Ingram is a 56 y.o. male with  with known colon cancer with metastases status post resection, on chemo but has been unable to keep his appointments recently, was transferred from [facility] after he presented there with fevers and worsening urine output, noted to be in AYANA.  About 2 weeks prior, patient was at [facility] for AYANA, was noted to have significant hydronephrosis, had a right ureteral stent placement there after it was deemed that left kidney was nonfunctional.  Patient was doing well till above presentation.  CT scan at [facility] with moderate to severe right-sided hydronephrosis " persisting, along with severe left-sided hydronephrosis and renal atrophy which is unchanged. On transfer to [facility], patient afebrile but with leukocytosis of 24. Creatinine up to 5.  On 9/11, patient had a right nephrostomy tube placement by IR. Cultures from this growing Yeast.      Pertinent Diagnoses:  Sepsis  Pyelonephritis, Yeast  Severe hydronephrosis  Obstructive uropathy  Metastatic colon cancer  Positive blood culture    Plan:  -Continue renally dosed p.o. fluconazole  -EKG reviewed and QTC is acceptable  -Continue IV Unasyn, renally dosed  -Follow identification and sensitivities of the Yeast   -Follow blood cultures x2 from 9/11, no growth to date  -Trend white count and renal function, latter appears to be improving  -Noted 1 out of 2 blood cultures from 9/11+ for Staphylococcus species, likely contaminant.  Will follow     Plan was discussed with the primary team, Dr. Pizarro     ID will follow. Please feel free to call with questions.

## 2021-09-14 NOTE — CONSULTS
DATE OF SERVICE:  09/13/2021     REQUESTING PHYSICIAN:  Amor Pizraro MD     REASON FOR CONSULTATION:  Acute kidney injury.     The patient is seen and examined, medical records were reviewed.     HISTORY OF PRESENT ILLNESS:  The patient is an unfortunate 56-year-old   gentleman with a past medical history significant for colon cancer, history of   metastasis, status post surgical resection and chemotherapy, was transferred   from an outside hospital for fever and possible urosepsis.  The patient also   was found to be in acute kidney injury with a creatinine today up to 6.71.    His creatinine on admission was 5.9 and his baseline from August last year was   0.93.  The patient was diagnosed with hydronephrosis in the outside facility   and had right nephrostomy tube placed by IR on 09/2011; however, kidney   function did not improve.     The patient had no recent exposure to IV contrast.     The patient does have significant weakness, malaise and decreased p.o. intake   and occasional nausea; however, he said his symptoms have improved slightly   since he has been here.     PAST MEDICAL HISTORY:  Significant for:  1.  Colon cancer.  2.  Hepatitis C.  3.  Hypertension.     ALLERGIES:  The list was reviewed.     SOCIAL HISTORY:  The patient is .  He had a remote history of smoking,   quit in 2015.     FAMILY HISTORY:  No known renal disease.     MEDICATIONS:  Reviewed.     REVIEW OF SYSTEMS:  All systems were reviewed; they were negative except   outlined in the history of present illness.     PHYSICAL EXAMINATION:    GENERAL:  The patient appears ill, cachectic, but no apparent distress.  VITAL SIGNS:  Showed blood pressure of 140/90, heart rate was 84, respiratory   rate was 20.  HEENT:  Normocephalic, atraumatic.  Sclerae are anicteric.  Pupils are   reactive.  Nose normal.  Mucous membrane is moist.  NECK:  No lymphadenopathy, no JVD, no thyroid mass.  CHEST:  Normal.  LUNGS:  Clear to  auscultation.  HEART:  S1, S2.  ABDOMEN:  Soft, nontender.  No hepatosplenomegaly.  The patient had right   nephrostomy tube.  There is no inguinal lymphadenopathy.  EXTREMITIES:  There is no lower extremity edema.  SKIN:  No skin rash.  NEUROLOGIC:  The patient is alert and oriented x3.   MOOD:  Patient is anxious.     LABORATORY DATA:  His recent labs from today were reviewed.  The patient had a   renal ultrasound done earlier today.  I reviewed the image myself, which   showed bilateral hydronephrosis.     ASSESSMENT:    1.  Acute kidney injury.  The etiology probably secondary to obstructive   uropathy; however, there is also a possibility of acute tubular necrosis.  2.  Obstructive uropathy.  3.  History of colon cancer.  4.  Hyponatremia.  5.  Anemia.  6.  Hypoalbuminemia.     PLAN:    1.  There is no acute need for renal replacement therapy.  2.  Await urology input regarding his hydronephrosis.  3.  I will hold off IV fluid at the moment considering the patient's blood   pressure is good and he is tolerating oral intake.  Otherwise, we might be   running the risk of volume overload in case his kidney function does not   improve soon.  4.  Daily labs.  5.  Renal dose all medications.  6.  Prognosis is guarded because of colon cancer.  7.  We will evaluate daily for the need for dialysis.     Plan discussed in detail with Dr. Pizarro.        ______________________________  FADI NAJJAR, MD FN/SAM    DD:  09/13/2021 18:20  DT:  09/13/2021 19:02    Job#:  [id number]

## 2021-09-15 LAB
ANION GAP SERPL CALC-SCNC: 12 MMOL/L (ref 7–16)
BUN SERPL-MCNC: 35 MG/DL (ref 8–22)
CALCIUM SERPL-MCNC: 8.5 MG/DL (ref 8.5–10.5)
CHLORIDE SERPL-SCNC: 104 MMOL/L (ref 96–112)
CO2 SERPL-SCNC: 22 MMOL/L (ref 20–33)
CREAT SERPL-MCNC: 3.05 MG/DL (ref 0.5–1.4)
ERYTHROCYTE [DISTWIDTH] IN BLOOD BY AUTOMATED COUNT: 56.6 FL (ref 35.9–50)
GLUCOSE SERPL-MCNC: 135 MG/DL (ref 65–99)
HCT VFR BLD AUTO: 22.7 % (ref 42–52)
HGB BLD-MCNC: 7.4 G/DL (ref 14–18)
MCH RBC QN AUTO: 25.2 PG (ref 27–33)
MCHC RBC AUTO-ENTMCNC: 32.6 G/DL (ref 33.7–35.3)
MCV RBC AUTO: 77.2 FL (ref 81.4–97.8)
PLATELET # BLD AUTO: 386 K/UL (ref 164–446)
PMV BLD AUTO: 10 FL (ref 9–12.9)
POTASSIUM SERPL-SCNC: 4.3 MMOL/L (ref 3.6–5.5)
PROCALCITONIN SERPL-MCNC: 2.25 NG/ML
RBC # BLD AUTO: 2.94 M/UL (ref 4.7–6.1)
SODIUM SERPL-SCNC: 138 MMOL/L (ref 135–145)
WBC # BLD AUTO: 12.2 K/UL (ref 4.8–10.8)

## 2021-09-15 PROCEDURE — 700102 HCHG RX REV CODE 250 W/ 637 OVERRIDE(OP): Performed by: STUDENT IN AN ORGANIZED HEALTH CARE EDUCATION/TRAINING PROGRAM

## 2021-09-15 PROCEDURE — 94760 N-INVAS EAR/PLS OXIMETRY 1: CPT

## 2021-09-15 PROCEDURE — 99232 SBSQ HOSP IP/OBS MODERATE 35: CPT | Performed by: HOSPITALIST

## 2021-09-15 PROCEDURE — 770020 HCHG ROOM/CARE - TELE (206)

## 2021-09-15 PROCEDURE — 94640 AIRWAY INHALATION TREATMENT: CPT

## 2021-09-15 PROCEDURE — 700101 HCHG RX REV CODE 250: Performed by: STUDENT IN AN ORGANIZED HEALTH CARE EDUCATION/TRAINING PROGRAM

## 2021-09-15 PROCEDURE — 84145 PROCALCITONIN (PCT): CPT

## 2021-09-15 PROCEDURE — 700111 HCHG RX REV CODE 636 W/ 250 OVERRIDE (IP): Performed by: STUDENT IN AN ORGANIZED HEALTH CARE EDUCATION/TRAINING PROGRAM

## 2021-09-15 PROCEDURE — 36415 COLL VENOUS BLD VENIPUNCTURE: CPT

## 2021-09-15 PROCEDURE — 80048 BASIC METABOLIC PNL TOTAL CA: CPT

## 2021-09-15 PROCEDURE — A9270 NON-COVERED ITEM OR SERVICE: HCPCS | Performed by: STUDENT IN AN ORGANIZED HEALTH CARE EDUCATION/TRAINING PROGRAM

## 2021-09-15 PROCEDURE — 99233 SBSQ HOSP IP/OBS HIGH 50: CPT | Performed by: INTERNAL MEDICINE

## 2021-09-15 PROCEDURE — 85027 COMPLETE CBC AUTOMATED: CPT

## 2021-09-15 PROCEDURE — 99232 SBSQ HOSP IP/OBS MODERATE 35: CPT | Performed by: INTERNAL MEDICINE

## 2021-09-15 RX ADMIN — Medication 1 CAPSULE: at 08:41

## 2021-09-15 RX ADMIN — DIPHENHYDRAMINE HYDROCHLORIDE 25 MG: 50 INJECTION INTRAMUSCULAR; INTRAVENOUS at 05:19

## 2021-09-15 RX ADMIN — PREDNISONE 5 MG: 5 TABLET ORAL at 05:21

## 2021-09-15 RX ADMIN — DIPHENHYDRAMINE HYDROCHLORIDE 25 MG: 50 INJECTION INTRAMUSCULAR; INTRAVENOUS at 20:41

## 2021-09-15 RX ADMIN — IPRATROPIUM BROMIDE AND ALBUTEROL SULFATE 3 ML: .5; 2.5 SOLUTION RESPIRATORY (INHALATION) at 07:54

## 2021-09-15 RX ADMIN — OMEPRAZOLE 20 MG: 20 CAPSULE, DELAYED RELEASE ORAL at 05:21

## 2021-09-15 RX ADMIN — ACETAMINOPHEN 1000 MG: 500 TABLET ORAL at 17:33

## 2021-09-15 RX ADMIN — HYDROMORPHONE HYDROCHLORIDE 4 MG: 4 TABLET ORAL at 01:24

## 2021-09-15 RX ADMIN — SODIUM BICARBONATE 650 MG: 650 TABLET ORAL at 17:33

## 2021-09-15 RX ADMIN — HEPARIN SODIUM 5000 UNITS: 5000 INJECTION, SOLUTION INTRAVENOUS; SUBCUTANEOUS at 12:38

## 2021-09-15 RX ADMIN — HYDROMORPHONE HYDROCHLORIDE 4 MG: 4 TABLET ORAL at 05:24

## 2021-09-15 RX ADMIN — TAMSULOSIN HYDROCHLORIDE 0.4 MG: 0.4 CAPSULE ORAL at 08:41

## 2021-09-15 RX ADMIN — SODIUM BICARBONATE 650 MG: 650 TABLET ORAL at 12:34

## 2021-09-15 RX ADMIN — DIPHENHYDRAMINE HYDROCHLORIDE 25 MG: 50 INJECTION INTRAMUSCULAR; INTRAVENOUS at 12:35

## 2021-09-15 RX ADMIN — IPRATROPIUM BROMIDE AND ALBUTEROL SULFATE 3 ML: .5; 2.5 SOLUTION RESPIRATORY (INHALATION) at 19:26

## 2021-09-15 RX ADMIN — HEPARIN SODIUM 5000 UNITS: 5000 INJECTION, SOLUTION INTRAVENOUS; SUBCUTANEOUS at 20:41

## 2021-09-15 RX ADMIN — SODIUM BICARBONATE 650 MG: 650 TABLET ORAL at 08:41

## 2021-09-15 RX ADMIN — HEPARIN SODIUM 5000 UNITS: 5000 INJECTION, SOLUTION INTRAVENOUS; SUBCUTANEOUS at 05:21

## 2021-09-15 RX ADMIN — HYDROMORPHONE HYDROCHLORIDE 4 MG: 4 TABLET ORAL at 12:34

## 2021-09-15 RX ADMIN — THERA TABS 1 TABLET: TAB at 05:22

## 2021-09-15 RX ADMIN — HYDROMORPHONE HYDROCHLORIDE 4 MG: 4 TABLET ORAL at 21:34

## 2021-09-15 RX ADMIN — ONDANSETRON 4 MG: 2 INJECTION INTRAMUSCULAR; INTRAVENOUS at 01:24

## 2021-09-15 RX ADMIN — ONDANSETRON 4 MG: 2 INJECTION INTRAMUSCULAR; INTRAVENOUS at 22:59

## 2021-09-15 RX ADMIN — FLUCONAZOLE 200 MG: 200 TABLET ORAL at 05:22

## 2021-09-15 RX ADMIN — HYDROMORPHONE HYDROCHLORIDE 4 MG: 4 TABLET ORAL at 17:33

## 2021-09-15 RX ADMIN — IPRATROPIUM BROMIDE AND ALBUTEROL SULFATE 3 ML: .5; 2.5 SOLUTION RESPIRATORY (INHALATION) at 13:59

## 2021-09-15 RX ADMIN — ONDANSETRON 4 MG: 2 INJECTION INTRAMUSCULAR; INTRAVENOUS at 15:55

## 2021-09-15 RX ADMIN — TIOTROPIUM BROMIDE INHALATION SPRAY 5 MCG: 3.12 SPRAY, METERED RESPIRATORY (INHALATION) at 07:55

## 2021-09-15 ASSESSMENT — PAIN DESCRIPTION - PAIN TYPE
TYPE: CHRONIC PAIN
TYPE: CHRONIC PAIN
TYPE: SURGICAL PAIN
TYPE: CHRONIC PAIN
TYPE: SURGICAL PAIN

## 2021-09-15 ASSESSMENT — ENCOUNTER SYMPTOMS
VOMITING: 0
SHORTNESS OF BREATH: 0
PALPITATIONS: 0
COUGH: 0
NAUSEA: 0
DIZZINESS: 0
FLANK PAIN: 1
CHILLS: 0
FEVER: 0
HEADACHES: 0
ABDOMINAL PAIN: 0

## 2021-09-15 NOTE — PROGRESS NOTES
Infectious Disease Progress Note    Author: Baljinder Aguirre M.D. Date & Time of service: 9/15/2021  12:07 PM    Chief Complaint:  Follow-up for pyelonephritis    Interval History:   T-max 99.2, white count 18.2, tolerating antimicrobials thus far.  Sleeping soundly this afternoon  9/15 patient is afebrile, white count down to 12.2.  Procalcitonin down as well.  Patient feels tired.    Labs Reviewed and Medications Reviewed.    Review of Systems:  Review of Systems   Unable to perform ROS: Other       Hemodynamics:  Temp (24hrs), Av °C (98.6 °F), Min:36.8 °C (98.3 °F), Max:37.2 °C (98.9 °F)  Temperature: 36.9 °C (98.5 °F)  Pulse  Av.9  Min: 55  Max: 114   Blood Pressure: 150/100       Physical Exam:  Physical Exam  Vitals and nursing note reviewed.   Constitutional:       Appearance: He is ill-appearing. He is not toxic-appearing.      Comments: Cachectic appearing   HENT:      Head:      Comments: Temporal wasting noted  Eyes:      General: No scleral icterus.        Right eye: No discharge.         Left eye: No discharge.      Conjunctiva/sclera: Conjunctivae normal.   Cardiovascular:      Rate and Rhythm: Normal rate.      Heart sounds: No murmur heard.     Pulmonary:      Effort: Pulmonary effort is normal. No respiratory distress.      Breath sounds: No stridor.   Abdominal:      General: There is no distension.      Tenderness: There is abdominal tenderness.      Comments: Right-sided nephrostomy tube in place   Musculoskeletal:         General: No swelling or tenderness.   Skin:     Findings: No erythema or rash.   Neurological:      General: No focal deficit present.      Mental Status: He is oriented to person, place, and time.   Psychiatric:         Behavior: Behavior normal.      Comments: Appropriate mood         Meds:    Current Facility-Administered Medications:   •  ampicillin-sulbactam (UNASYN) IV  •  ipratropium-albuterol  •  fluconazole  •  diphenhydrAMINE  •  lactobacillus  rhamnosus  •  albuterol  •  heparin  •  Pharmacy Consult Request  •  acetaminophen **FOLLOWED BY** [START ON 9/16/2021] acetaminophen  •  hydrocortisone  •  [Held by provider] losartan  •  predniSONE  •  tamsulosin  •  omeprazole  •  polyethylene glycol/lytes  •  senna-docusate **AND** polyethylene glycol/lytes **AND** magnesium hydroxide **AND** bisacodyl  •  Respiratory Therapy Consult  •  Pharmacy  •  ondansetron  •  ondansetron  •  promethazine  •  promethazine  •  prochlorperazine  •  sodium bicarbonate  •  ipratropium-albuterol  •  multivitamin  •  tiotropium  •  budesonide-formoterol  •  HYDROmorphone    Labs:  Recent Labs     09/12/21  1423 09/12/21  1423 09/13/21  0000 09/14/21  0226 09/15/21  0343   WBC 14.7*   < > 17.8* 18.2* 12.2*   RBC 3.12*   < > 3.23* 3.32* 2.94*   HEMOGLOBIN 7.8*   < > 8.0* 8.2* 7.4*   HEMATOCRIT 24.3*   < > 24.7* 25.2* 22.7*   MCV 77.9*   < > 76.5* 75.9* 77.2*   MCH 25.0*   < > 24.8* 24.7* 25.2*   RDW 55.3*   < > 54.4* 54.0* 56.6*   PLATELETCT 398   < > 388 404 386   MPV 9.1   < > 9.0 9.2 10.0   NEUTSPOLYS 92.80*  --  87.00* 89.60*  --    LYMPHOCYTES 2.70*  --  4.10* 3.60*  --    MONOCYTES 3.70  --  7.40 5.70  --    EOSINOPHILS 0.10  --  0.70 0.20  --    BASOPHILS 0.10  --  0.20 0.20  --     < > = values in this interval not displayed.     Recent Labs     09/14/21  0917 09/14/21  2053 09/15/21  0829   SODIUM 135 137 138   POTASSIUM 5.2 4.5 4.3   CHLORIDE 103 102 104   CO2 19* 19* 22   GLUCOSE 133* 115* 135*   BUN 53* 42* 35*     Recent Labs     09/12/21  1423 09/13/21  0000 09/14/21  0917 09/14/21  2053 09/15/21  0829   ALBUMIN 2.6*  --   --   --   --    TBILIRUBIN 0.2  --   --   --   --    ALKPHOSPHAT 186*  --   --   --   --    TOTPROTEIN 6.1  --   --   --   --    ALTSGPT 10  --   --   --   --    ASTSGOT 15  --   --   --   --    CREATININE 5.43*   < > 5.58* 4.19* 3.05*    < > = values in this interval not displayed.       Imaging:  US-RENAL    Result Date: 9/13/2021 9/13/2021  11:01 AM HISTORY/REASON FOR EXAM:  decreased nephrostomy output. eval for continued hydronephrosis TECHNIQUE/EXAM DESCRIPTION: Renal ultrasound. COMPARISON:  09/11/2021 FINDINGS: The right kidney measures 14.53 cm. The left kidney measures 10.91 cm. There is moderate right hydronephrosis and severe left hydronephrosis. There are no abnormal calcifications. Right-sided nephrostomy tube is noted. The bladder demonstrates no focal wall abnormality. Trace amount of ascites is noted in the right upper quadrant.     1.  No change in moderate right hydronephrosis. 2.  There is severe left-sided hydronephrosis which is slightly more prominent than on prior exam.    US-RENAL    Result Date: 9/11/2021 9/11/2021 5:31 AM HISTORY/REASON FOR EXAM:  Abnormal Labs TECHNIQUE/EXAM DESCRIPTION:  Renal ultrasound. COMPARISON:  June 12, 2016 FINDINGS: The right kidney measures 15.42 cm.  The left kidney measures 12.36 cm. There is moderate right hydronephrosis. Severe left hydronephrosis is seen. The left renal cortex appears thinned and echogenic. There are no abnormal calcifications. The bladder is decompressed around a Soriano catheter.     1.  Moderate right and severe left hydronephrosis. 2.  Thin echogenic appearance of the left renal cortex.    IR-PERQ NEPH CATH NEW ACCESS (ALL RADIOLOGY) RIGHT    Result Date: 9/11/2021 9/11/2021 11:00 AM HISTORY/REASON FOR EXAM: Right Renal obstruction and pyonephrosis. TECHNIQUE/EXAM DESCRIPTION: Following informed consent the patient was prepped and draped in the usual fashion 10 cc of 1% lidocaine was utilized for local and subcutaneous anesthesia. SEDATION: Moderate sedation was provided. Pulse oximetry and continuous cardiac monitoring by the nurse was performed throughout the exam. Intraservice time was 30 minutes. Fluoroscopy images: 2 fluoroscopic images obtained. Fluoroscopy time: 0.3 minutes CONTRAST: 15 mL of Omnipaque 300 were utilized for the procedure. The procedure was performed  utilizing MAXIMUM STERILE BARRIER technique including sterile gown, mass, cap, and under sterile gloves following appropriate hand hygiene and/or sterile scrub. The patient's skin site was prepped with 2% chlorhexidine Utilizing fluoroscopic and ultrasonic guidance the right kidney was accessed utilizing an 18-gauge introducer needle. 10 mLs of purulent urine was aspirated and sent to the lab for analysis. Nonionic contrast was injected and digital imaging was obtained  over the kidney and ureters. Subsequently following sequential dilatation a 8 Yoruba nephrostomy tube was placed in the collecting system and the affixed in place.  The patient tolerated procedure well and was sent to the floor in good condition. FINDINGS: There is good positioning of the right nephrostomy tube in the renal collecting system. Right-sided nephrostogram demonstrates moderate right-sided hydronephrosis with debris noted in the renal collecting system. There is a nonfunctioning right-sided ureteral stent in place.     1.  Successful percutaneous placement of right nephrostomy tube utilizing ultrasonic and fluoroscopic guidance. 2.  Aspiration of right renal collecting system yielded purulent urine which was sent to the lab for analysis. 3.  Right-sided nephrostogram demonstrated moderate hydronephrosis with a nonfunctioning right ureteral stent in place and debris noted in the right renal collecting system.      Micro:  Results     Procedure Component Value Units Date/Time    BLOOD CULTURE [[id number]] Collected: 09/11/21 0557    Order Status: Completed Specimen: Blood from Peripheral Updated: 09/15/21 0418     Significant Indicator NEG     Source BLD     Site PERIPHERAL     Culture Result A significant status has been triggered by the BACTEC  instrument due to an increase in CO2 production.  Gram  stain of blood culture bottle shows NO ORGANISMS SEEN.  Further investigation is in progress.  Note: Blood cultures are incubated for 5 days  "and  are monitored continuously. Positive blood cultures  are called to the RN and reported as soon as  they are identified.      Narrative:      Per Hospital Policy: Only change Specimen Src: to \"Line\" if  specified by physician order.  Left AC    URINE CULTURE(NEW) [[id number]]  (Abnormal) Collected: 09/11/21 1132    Order Status: Completed Specimen: Other Updated: 09/14/21 0836     Significant Indicator POS     Source URSP     Site Right Hydronephrosis     Culture Result -     Gram Stain Result Many WBCs.  Many Yeast.       Culture Result Candida albicans  >100,000 cfu/mL      BLOOD CULTURE [[id number]]  (Abnormal) Collected: 09/11/21 0557    Order Status: Completed Specimen: Blood from Peripheral Updated: 09/14/21 0229     Significant Indicator POS     Source BLD     Site PERIPHERAL     Culture Result Growth detected by Bactec instrument. 09/14/2021  02:28  Gram Stain: Gram positive cocci: Possible Staphylococcus sp.      Narrative:      Per Hospital Policy: Only change Specimen Src: to \"Line\" if  specified by physician order.  Right AC    GRAM STAIN [[id number]] Collected: 09/11/21 1132    Order Status: Completed Specimen: Other Updated: 09/11/21 1927     Significant Indicator .     Source URSP     Site Right Hydronephrosis     Gram Stain Result Many WBCs.  Many Yeast.      FLUID CULTURE W/GRAM STAIN [[id number]]     Order Status: No result Specimen: Other Body Fluid     MRSA By PCR (Amp) [[id number]]     Order Status: No result Specimen: Respirate from Nares     URINE CULTURE(NEW) [[id number]]     Order Status: No result Specimen: Urine     Culture Respiratory W/ GRM STN [[id number]]     Order Status: Completed Specimen: Respirate from Sputum     Blood Culture [[id number]]     Order Status: No result Specimen: Blood from Peripheral     Blood Culture [[id number]]     Order Status: No result Specimen: Blood from Peripheral           Assessment:  Erich Ingram is a 56 y.o. male with  with known colon cancer with " metastases status post resection, on chemo but has been unable to keep his appointments recently, was transferred from [facility] after he presented there with fevers and worsening urine output, noted to be in AYANA.  About 2 weeks prior, patient was at [place] for AYANA, was noted to have significant hydronephrosis, had a right ureteral stent placement there after it was deemed that left kidney was nonfunctional.  Patient was doing well till above presentation.  CT scan at [facility] with moderate to severe right-sided hydronephrosis persisting, along with severe left-sided hydronephrosis and renal atrophy which is unchanged. On transfer to [facility], patient afebrile but with leukocytosis of 24. Creatinine up to 5.  On 9/11, patient had a right nephrostomy tube placement by IR. Cultures from this growing Yeast.      Pertinent Diagnoses:  Pyelonephritis, Candida albicans  Severe hydronephrosis  Obstructive uropathy  Metastatic colon cancer  Positive blood culture    Plan:  -Continue renally dosed p.o. fluconazole, monitor renal function - improving  -EKG reviewed and QTC is acceptable  -No other growth on cultures. Will discontinue Unasyn  -Follow blood cultures x2 from 9/11, 1 out of 2 coag neg Staph, likely contaminant  -Trend white count and renal function, improving     Plan was discussed with the primary team, Dr. Pizarro     ID will follow. Please feel free to call with questions.

## 2021-09-15 NOTE — PROGRESS NOTES
Hospital Medicine Daily Progress Note    Date of Service  9/15/2021    Chief Complaint  Erich Ingram is a 56 y.o. male admitted 9/11/2021 with sepsis    Hospital Course     55 y/o male with past medical history of colon cancer with metastasis status post resection on chemo, nonfunctioning left kidney, recent right renal stent 2 weeks ago at [place] for hydronephrosis transferred from outside facility for sepsis due to UTI and AYANA.  CT scan from outside facility noted double-J right-sided ureteral stent. Moderate to severe right-sided hydronephrosis persists with severe left-sided hydronephrosis and renal atrophy, unchanged.  He underwent right nephrostomy tube placement on 9/11.  Urine cultures ultimately grew Candida so ID was consulted and patient was started on fluconazole and antibiotics changed from Zyvox and Zosyn to IV Unasyn.  1 of 2 blood cultures from admission growing possible staph species.        Interval Problem Update  No acute events overnight  Patient's nausea and vomiting improving  Right nephrostogram showing nephrostomy tube in appropriate place without kink  Creatinine improving  Flank pain improved     I have personally seen and examined the patient at bedside. I discussed the plan of care with patient, bedside RN, charge RN,  and pharmacy.    Consultants/Specialty  infectious disease, nephrology, palliative care and urology    Code Status  Full Code    Disposition  Patient is not medically cleared.   Anticipate discharge to D.  I have placed the appropriate orders for post-discharge needs.    Review of Systems  Review of Systems   Constitutional: Negative for chills and fever.   Respiratory: Negative for cough and shortness of breath.    Cardiovascular: Negative for chest pain and palpitations.   Gastrointestinal: Negative for abdominal pain, nausea and vomiting.   Genitourinary: Positive for flank pain. Negative for dysuria and urgency.   Neurological: Negative for  dizziness and headaches.   All other systems reviewed and are negative.       Physical Exam  Temp:  [36.8 °C (98.3 °F)-37.2 °C (98.9 °F)] 36.9 °C (98.5 °F)  Pulse:  [] 100  Resp:  [16-20] 20  BP: (148-160)/() 150/100  SpO2:  [93 %-100 %] 95 %    Physical Exam  Vitals and nursing note reviewed.   Constitutional:       Appearance: Normal appearance.   Cardiovascular:      Rate and Rhythm: Normal rate and regular rhythm.      Heart sounds: No murmur heard.     Pulmonary:      Effort: Pulmonary effort is normal. No respiratory distress.      Breath sounds: Normal breath sounds.   Musculoskeletal:      Comments: R nephrostomy tube +   Neurological:      General: No focal deficit present.      Mental Status: He is alert and oriented to person, place, and time.         Fluids    Intake/Output Summary (Last 24 hours) at 9/15/2021 1253  Last data filed at 9/15/2021 1251  Gross per 24 hour   Intake 960 ml   Output 8940 ml   Net -7980 ml       Laboratory  Recent Labs     09/13/21  0000 09/14/21  0226 09/15/21  0343   WBC 17.8* 18.2* 12.2*   RBC 3.23* 3.32* 2.94*   HEMOGLOBIN 8.0* 8.2* 7.4*   HEMATOCRIT 24.7* 25.2* 22.7*   MCV 76.5* 75.9* 77.2*   MCH 24.8* 24.7* 25.2*   MCHC 32.4* 32.5* 32.6*   RDW 54.4* 54.0* 56.6*   PLATELETCT 388 404 386   MPV 9.0 9.2 10.0     Recent Labs     09/14/21  0917 09/14/21  2053 09/15/21  0829   SODIUM 135 137 138   POTASSIUM 5.2 4.5 4.3   CHLORIDE 103 102 104   CO2 19* 19* 22   GLUCOSE 133* 115* 135*   BUN 53* 42* 35*   CREATININE 5.58* 4.19* 3.05*   CALCIUM 8.4* 8.5 8.5             Recent Labs     09/12/21  1423   TRIGLYCERIDE 110   HDL 22*   LDL 52       Imaging  IR-NEPHROSTOGRAM THROUGH EXT CATH (ALL RADIOLOGY) RIGHT   Final Result         1. Nephrostogram showing satisfactory catheter position without extravasation or leak.      2. Double-J stent is in good position with some stagnation of contrast in the proximal ureter which may suggest obstruction of the stent.      US-RENAL    Final Result      1.  No change in moderate right hydronephrosis.      2.  There is severe left-sided hydronephrosis which is slightly more prominent than on prior exam.      IR-PERQ NEPH CATH NEW ACCESS (ALL RADIOLOGY) RIGHT   Final Result            1.  Successful percutaneous placement of right nephrostomy tube utilizing ultrasonic and fluoroscopic guidance.      2.  Aspiration of right renal collecting system yielded purulent urine which was sent to the lab for analysis.      3.  Right-sided nephrostogram demonstrated moderate hydronephrosis with a nonfunctioning right ureteral stent in place and debris noted in the right renal collecting system.      OUTSIDE IMAGES-DX CHEST   Final Result      OUTSIDE IMAGES-CT ABDOMEN /PELVIS   Final Result      OUTSIDE IMAGES-CT ABDOMEN /PELVIS   Final Result      OUTSIDE IMAGES-CT CHEST   Final Result      US-RENAL   Final Result         1.  Moderate right and severe left hydronephrosis.   2.  Thin echogenic appearance of the left renal cortex.           Assessment/Plan  Bacteremia  Assessment & Plan  1 of 2 BCx growing staph, likely contaminant    Acute renal failure (ARF) (HCC)  Assessment & Plan  off IV fluid   Avoid nephrotoxins, monitor inputs and outputs  Improving  R nephrostomy tube in place      High anion gap metabolic acidosis  Assessment & Plan  In setting of sepsis and AYANA  Continue to monitor closely  Continue IV antibiotics and continue antibiotics    Other hydronephrosis  Assessment & Plan  CT scan from outside facility noted double-J right-sided ureteral stent. Moderate to severe right-sided hydronephrosis persists with severe left-sided hydronephrosis and renal atrophy, unchanged.       Discussed case with urology Dr. Frederick .  S/p right nephrostomy tube  Urology following closely    Repeat US fairly unchanged- IR Right nephrostogram showing adequate placement of tube    PNA (pneumonia)- (present on admission)  Assessment & Plan  Cont abx  Sepsis  protocol initiated  Cont duonebs  Oxygen supplementation prn  Pulse ox    UTI (urinary tract infection)- (present on admission)  Assessment & Plan  Urine culture growing Candida albicans  On fluconazole  ID following      Protein calorie malnutrition (HCC)- (present on admission)  Assessment & Plan  BMI 18  Nutrition consult  Boost, MV    Leukocytosis- (present on admission)  Assessment & Plan  2/2 UTI/sepsis  Treat underlying conditions    Chronic, continuous use of opioids- (present on admission)  Assessment & Plan  Analgesia with oxycodone + Morphine    Benign prostate hyperplasia- (present on admission)  Assessment & Plan  Cont tamsulosin    Nausea & vomiting- (present on admission)  Assessment & Plan  Antiemetics  Bowel regimen  Nutrition consult, consider boost.  improved    Sepsis (HCC)- (present on admission)  Assessment & Plan  This is Sepsis Present on admission  SIRS criteria identified on my evaluation include: Tachycardia, with heart rate greater than 90 BPM, Tachypnea, with respirations greater than 20 per minute and Leukocytosis, with WBC greater than 12,000  Source is Urosepsis and possible PNA  Sepsis protocol initiated  Fluid resuscitation ordered per protocol  IV antibiotics as appropriate for source of sepsis  While organ dysfunction may be noted elsewhere in this problem list or in the chart, degree of organ dysfunction does not meet CMS criteria for severe sepsis          Hypertension- (present on admission)  Assessment & Plan  Continue Home Medications  Labetalol ivp prn with parameters      Hyponatremia- (present on admission)  Assessment & Plan  - c/w NS, mild.  - IN AYANA  - F/u with Serum Osm, Urine Osm, Urine Na      COPD (chronic obstructive pulmonary disease) (HCC)- (present on admission)  Assessment & Plan  Not in acute exacerbation cont duonebs  Oxygen support      Metastatic Colon cancer (HCC)- (present on admission)  Assessment & Plan  Diagnosed 2016 treated with resection and chemo.  Recurred in 2018 with pulmonary nodules biopsied showing adenocarcinoma.  Antiemetics    Follows with oncology Dr. Urbina as outpatient         VTE prophylaxis: heparin ppx    I have performed a physical exam and reviewed and updated ROS and Plan today (9/15/2021). In review of yesterday's note (9/14/2021), there are no changes except as documented above.

## 2021-09-15 NOTE — CARE PLAN
The patient is Stable - Low risk of patient condition declining or worsening    Shift Goals  Clinical Goals: Pain/Nausea Control  Patient Goals: Rest    Progress made toward(s) clinical / shift goals:  Pt a 6/10, medicated per MAR; pt resting in bed    Patient is not progressing towards the following goals:          Problem: Knowledge Deficit - Standard  Goal: Patient and family/care givers will demonstrate understanding of plan of care, disease process/condition, diagnostic tests and medications  Outcome: Progressing     Problem: Pain - Standard  Goal: Alleviation of pain or a reduction in pain to the patient’s comfort goal  Outcome: Progressing     Problem: Self Care  Goal: Patient will have the ability to perform ADLs independently or with assistance (bathe, groom, dress, toilet and feed)  Outcome: Progressing

## 2021-09-15 NOTE — CARE PLAN
The patient is Stable - Low risk of patient condition declining or worsening    Shift Goals  Clinical Goals: Pain/ OOB activity  Patient Goals: Rest  Family Goals: pain control    Progress made toward(s) clinical / shift goals:  Pt educated on fall risk and level of mobility, verbalized understanding.       Problem: Knowledge Deficit - Standard  Goal: Patient and family/care givers will demonstrate understanding of plan of care, disease process/condition, diagnostic tests and medications  Outcome: Progressing     Problem: Pain - Standard  Goal: Alleviation of pain or a reduction in pain to the patient’s comfort goal  Outcome: Progressing     Problem: Respiratory  Goal: Patient will achieve/maintain optimum respiratory ventilation and gas exchange  Outcome: Progressing

## 2021-09-15 NOTE — PROGRESS NOTES
Nephrology Daily Progress Note    Date of Service  9/15/2021    Chief Complaint  56 y.o. male admitted 9/11/2021 with urosepsis, AYANA, obstructive uropathy    Interval Problem Update  Patient is doing about the same, plan for IR to adjust nephrostomy tube this afternoon  9/15 patient feels better today  Better urine output after IR procedure yesterday  Review of Systems  Review of Systems   Constitutional: Positive for malaise/fatigue. Negative for chills and fever.   Respiratory: Negative for cough and shortness of breath.    Cardiovascular: Negative for chest pain and leg swelling.   Gastrointestinal: Negative for nausea and vomiting.   Genitourinary: Negative for dysuria, frequency and urgency.   All other systems reviewed and are negative.       Physical Exam  Temp:  [36.8 °C (98.3 °F)-37.2 °C (98.9 °F)] 36.9 °C (98.5 °F)  Pulse:  [] 100  Resp:  [16-20] 20  BP: (148-160)/() 150/100  SpO2:  [93 %-100 %] 95 %    Physical Exam  Vitals and nursing note reviewed.   Constitutional:       General: He is awake.      Appearance: He is cachectic. He is ill-appearing.   HENT:      Head: Normocephalic and atraumatic.      Right Ear: External ear normal.      Left Ear: External ear normal.      Nose: Nose normal.      Mouth/Throat:      Pharynx: No oropharyngeal exudate or posterior oropharyngeal erythema.   Eyes:      General:         Right eye: No discharge.         Left eye: No discharge.      Conjunctiva/sclera: Conjunctivae normal.   Cardiovascular:      Rate and Rhythm: Normal rate and regular rhythm.   Pulmonary:      Effort: Pulmonary effort is normal. No respiratory distress.      Breath sounds: Normal breath sounds. No wheezing.   Abdominal:      General: Abdomen is flat. Bowel sounds are normal.   Musculoskeletal:         General: No tenderness.      Cervical back: No rigidity. No muscular tenderness.      Right lower leg: No edema.      Left lower leg: No edema.   Skin:     General: Skin is warm and  dry.      Coloration: Skin is not jaundiced.      Findings: No lesion or rash.   Neurological:      General: No focal deficit present.      Mental Status: He is alert and oriented to person, place, and time. Mental status is at baseline.   Psychiatric:         Mood and Affect: Mood normal.         Behavior: Behavior normal.         Thought Content: Thought content normal.         Fluids    Intake/Output Summary (Last 24 hours) at 9/15/2021 1310  Last data filed at 9/15/2021 1251  Gross per 24 hour   Intake 960 ml   Output 8940 ml   Net -7980 ml       Laboratory  Recent Labs     09/13/21  0000 09/14/21  0226 09/15/21  0343   WBC 17.8* 18.2* 12.2*   RBC 3.23* 3.32* 2.94*   HEMOGLOBIN 8.0* 8.2* 7.4*   HEMATOCRIT 24.7* 25.2* 22.7*   MCV 76.5* 75.9* 77.2*   MCH 24.8* 24.7* 25.2*   MCHC 32.4* 32.5* 32.6*   RDW 54.4* 54.0* 56.6*   PLATELETCT 388 404 386   MPV 9.0 9.2 10.0     Recent Labs     09/14/21  0917 09/14/21  2053 09/15/21  0829   SODIUM 135 137 138   POTASSIUM 5.2 4.5 4.3   CHLORIDE 103 102 104   CO2 19* 19* 22   GLUCOSE 133* 115* 135*   BUN 53* 42* 35*   CREATININE 5.58* 4.19* 3.05*   CALCIUM 8.4* 8.5 8.5         No results for input(s): NTPROBNP in the last 72 hours.  Recent Labs     09/12/21  1423   TRIGLYCERIDE 110   HDL 22*   LDL 52       Imaging  IR-NEPHROSTOGRAM THROUGH EXT CATH (ALL RADIOLOGY) RIGHT   Final Result         1. Nephrostogram showing satisfactory catheter position without extravasation or leak.      2. Double-J stent is in good position with some stagnation of contrast in the proximal ureter which may suggest obstruction of the stent.      US-RENAL   Final Result      1.  No change in moderate right hydronephrosis.      2.  There is severe left-sided hydronephrosis which is slightly more prominent than on prior exam.      IR-PERQ NEPH CATH NEW ACCESS (ALL RADIOLOGY) RIGHT   Final Result            1.  Successful percutaneous placement of right nephrostomy tube utilizing ultrasonic and  fluoroscopic guidance.      2.  Aspiration of right renal collecting system yielded purulent urine which was sent to the lab for analysis.      3.  Right-sided nephrostogram demonstrated moderate hydronephrosis with a nonfunctioning right ureteral stent in place and debris noted in the right renal collecting system.      OUTSIDE IMAGES-DX CHEST   Final Result      OUTSIDE IMAGES-CT ABDOMEN /PELVIS   Final Result      OUTSIDE IMAGES-CT ABDOMEN /PELVIS   Final Result      OUTSIDE IMAGES-CT CHEST   Final Result      US-RENAL   Final Result         1.  Moderate right and severe left hydronephrosis.   2.  Thin echogenic appearance of the left renal cortex.            Assessment/Plan  1 AYANA: Creatinine is 3.0 mg/dL today, good urine output after repositioning of nephrostomy tube  2 obstructive uropathy  3 colon cancer  4 anemia: Hemoglobin is worse  5 sepsis  6 hypoalbuminemia  no acute need for HD, evaluate daily  Rule out blood loss  Renal diet  Daily labs  Renal dose all meds  Avoid nephrotoxins  Prognosis guarded.

## 2021-09-15 NOTE — PROGRESS NOTES
Pt is A&O x4, calls appropriately  Pain 6/10, medicated per MAR   + nausea, medicated per MAR  Tolerating a renal diet   R neph tube, CDI  + Voids, oro catheter  + flatus  Last BM 9/14  Up self  Bed alarm off, pt no fall risk per ai norton  Reviewed plan of care with patient, bed in lowest position and locked, pt resting comfortably now, call light within reach, all needs met at this time. Interventions will be executed per plan of care

## 2021-09-15 NOTE — PROGRESS NOTES
Note to reader: this note follows the APSO format rather than the historical SOAP format. Assessment and plan located at the top of the note for ease of use.    Chief Complaint  56 y.o. year old male here with AYANA, right hydronephrosis, urosepsis     Assessment/Plan  Interval History   AYANA  Right hydronephrosis  Yeast UTI  Sepsis     Plan:  - Nursing to flush NPT Q shift. Please slowly push 10cc NS. Do not pull back.   - Antifungals per hospitalist  - Urology will continue to follow    Patient seen and examined    9/15. Good UOP. Less N/V. R nephrostogram ensured right neph tube in correct position with occluded ureter. Cr trending down now 4.19 (5.58)    9/14 oro output improved. SANGITA 9/13 showing no change in right hydronephrosis despite NT.    9/13. Minimal UOP in nephrostomy tube and oro overnight. Cr 5.81. Now with worsening flank pain and N/V.         Review of Systems  Physical Exam   Review of Systems   Constitutional: Negative for chills and fever.   Gastrointestinal: Negative for abdominal pain, nausea and vomiting.   Genitourinary: Negative for frequency, hematuria and urgency.   All other systems reviewed and are negative.    Vitals:    09/14/21 1949 09/14/21 2310 09/15/21 0420 09/15/21 0757   BP:  151/90 153/98    Pulse: 100 95 99    Resp: 16 16 18 20   Temp:  36.8 °C (98.3 °F) 36.9 °C (98.4 °F)    TempSrc:  Temporal Temporal    SpO2: 96% 95% 93%    Weight:       Height:         Physical Exam  Vitals and nursing note reviewed.   Constitutional:       Appearance: Normal appearance.   HENT:      Head: Normocephalic and atraumatic.   Pulmonary:      Effort: Pulmonary effort is normal.   Abdominal:      General: Abdomen is flat.   Genitourinary:     Comments: R NPT with clear yellow output  Oro cath with yellow urine with debris  Skin:     General: Skin is warm and dry.   Neurological:      General: No focal deficit present.      Mental Status: He is alert and oriented to person, place, and time.    Psychiatric:         Mood and Affect: Mood normal.         Behavior: Behavior normal.         Thought Content: Thought content normal.         Judgment: Judgment normal.          Hematology Chemistry   Lab Results   Component Value Date/Time    WBC 18.2 (H) 09/14/2021 02:26 AM    HEMOGLOBIN 8.2 (L) 09/14/2021 02:26 AM    HEMATOCRIT 25.2 (L) 09/14/2021 02:26 AM    PLATELETCT 404 09/14/2021 02:26 AM     Lab Results   Component Value Date/Time    SODIUM 137 09/14/2021 08:53 PM    POTASSIUM 4.5 09/14/2021 08:53 PM    CHLORIDE 102 09/14/2021 08:53 PM    CO2 19 (L) 09/14/2021 08:53 PM    GLUCOSE 115 (H) 09/14/2021 08:53 PM    BUN 42 (H) 09/14/2021 08:53 PM    CREATININE 4.19 (H) 09/14/2021 08:53 PM         Labs not explicitly included in this progress note were reviewed by the author.   Radiology/imaging not explicitly included in this progress note was reviewed by the author.     Labs reviewed and Medications reviewed

## 2021-09-15 NOTE — CARE PLAN
Problem: Nutritional:  Goal: Achieve adequate nutritional intake  Description: Patient will consume 50% of meals  Outcome: Progressing   PO improved to >50% for most recent two meals.

## 2021-09-15 NOTE — PROGRESS NOTES
Bedside report received, assessment completed    A&O x  4, pt calls appropriately  Mobility: Up with x1, FWW  Fall Risk Assessment: low, bed alarm n/a  Pain Assessment: 6/10, medication provided per MAR  Diet: Renal  LDA:   IV Access: 20G RFA, CDI/ flushed/ Infusing SL  Nephrostomy Right  oro void, + flatus, + BM  DVT Prophylaxis: +, SCD refused    Procedures:    - 9/11 Right Nephrostomy tube Placement    - 9/14 Right Nephrostogram confirmed placement  D/C Plan: pending medical clearance     Reviewed plan of care with patient, bed in lowest position and locked, pt resting comfortably now, call light within reach, all needs met at this time. Interventions will be executed per plan of care

## 2021-09-16 LAB
ANION GAP SERPL CALC-SCNC: 15 MMOL/L (ref 7–16)
BASOPHILS # BLD AUTO: 0.3 % (ref 0–1.8)
BASOPHILS # BLD: 0.03 K/UL (ref 0–0.12)
BUN SERPL-MCNC: 25 MG/DL (ref 8–22)
CALCIUM SERPL-MCNC: 8.1 MG/DL (ref 8.5–10.5)
CHLORIDE SERPL-SCNC: 103 MMOL/L (ref 96–112)
CO2 SERPL-SCNC: 21 MMOL/L (ref 20–33)
CREAT SERPL-MCNC: 2.06 MG/DL (ref 0.5–1.4)
EOSINOPHIL # BLD AUTO: 0.19 K/UL (ref 0–0.51)
EOSINOPHIL NFR BLD: 1.9 % (ref 0–6.9)
ERYTHROCYTE [DISTWIDTH] IN BLOOD BY AUTOMATED COUNT: 55.1 FL (ref 35.9–50)
GLUCOSE SERPL-MCNC: 98 MG/DL (ref 65–99)
HCT VFR BLD AUTO: 22.2 % (ref 42–52)
HGB BLD-MCNC: 7.2 G/DL (ref 14–18)
IMM GRANULOCYTES # BLD AUTO: 0.04 K/UL (ref 0–0.11)
IMM GRANULOCYTES NFR BLD AUTO: 0.4 % (ref 0–0.9)
LYMPHOCYTES # BLD AUTO: 1.75 K/UL (ref 1–4.8)
LYMPHOCYTES NFR BLD: 17.6 % (ref 22–41)
MCH RBC QN AUTO: 24.8 PG (ref 27–33)
MCHC RBC AUTO-ENTMCNC: 32.4 G/DL (ref 33.7–35.3)
MCV RBC AUTO: 76.6 FL (ref 81.4–97.8)
MONOCYTES # BLD AUTO: 0.89 K/UL (ref 0–0.85)
MONOCYTES NFR BLD AUTO: 8.9 % (ref 0–13.4)
NEUTROPHILS # BLD AUTO: 7.05 K/UL (ref 1.82–7.42)
NEUTROPHILS NFR BLD: 70.9 % (ref 44–72)
NRBC # BLD AUTO: 0 K/UL
NRBC BLD-RTO: 0 /100 WBC
PLATELET # BLD AUTO: 352 K/UL (ref 164–446)
PMV BLD AUTO: 9.3 FL (ref 9–12.9)
POTASSIUM SERPL-SCNC: 4 MMOL/L (ref 3.6–5.5)
RBC # BLD AUTO: 2.9 M/UL (ref 4.7–6.1)
SODIUM SERPL-SCNC: 139 MMOL/L (ref 135–145)
WBC # BLD AUTO: 10 K/UL (ref 4.8–10.8)

## 2021-09-16 PROCEDURE — 700111 HCHG RX REV CODE 636 W/ 250 OVERRIDE (IP): Performed by: STUDENT IN AN ORGANIZED HEALTH CARE EDUCATION/TRAINING PROGRAM

## 2021-09-16 PROCEDURE — A9270 NON-COVERED ITEM OR SERVICE: HCPCS | Performed by: STUDENT IN AN ORGANIZED HEALTH CARE EDUCATION/TRAINING PROGRAM

## 2021-09-16 PROCEDURE — 80048 BASIC METABOLIC PNL TOTAL CA: CPT

## 2021-09-16 PROCEDURE — 700102 HCHG RX REV CODE 250 W/ 637 OVERRIDE(OP): Performed by: STUDENT IN AN ORGANIZED HEALTH CARE EDUCATION/TRAINING PROGRAM

## 2021-09-16 PROCEDURE — 85025 COMPLETE CBC W/AUTO DIFF WBC: CPT

## 2021-09-16 PROCEDURE — 36415 COLL VENOUS BLD VENIPUNCTURE: CPT

## 2021-09-16 PROCEDURE — 94760 N-INVAS EAR/PLS OXIMETRY 1: CPT

## 2021-09-16 PROCEDURE — 99232 SBSQ HOSP IP/OBS MODERATE 35: CPT | Performed by: INTERNAL MEDICINE

## 2021-09-16 PROCEDURE — 770020 HCHG ROOM/CARE - TELE (206)

## 2021-09-16 PROCEDURE — 700101 HCHG RX REV CODE 250: Performed by: STUDENT IN AN ORGANIZED HEALTH CARE EDUCATION/TRAINING PROGRAM

## 2021-09-16 PROCEDURE — 99232 SBSQ HOSP IP/OBS MODERATE 35: CPT | Performed by: HOSPITALIST

## 2021-09-16 PROCEDURE — 94640 AIRWAY INHALATION TREATMENT: CPT

## 2021-09-16 RX ADMIN — IPRATROPIUM BROMIDE AND ALBUTEROL SULFATE 3 ML: .5; 2.5 SOLUTION RESPIRATORY (INHALATION) at 09:27

## 2021-09-16 RX ADMIN — HYDROMORPHONE HYDROCHLORIDE 4 MG: 4 TABLET ORAL at 06:53

## 2021-09-16 RX ADMIN — HYDROMORPHONE HYDROCHLORIDE 4 MG: 4 TABLET ORAL at 12:18

## 2021-09-16 RX ADMIN — HEPARIN SODIUM 5000 UNITS: 5000 INJECTION, SOLUTION INTRAVENOUS; SUBCUTANEOUS at 06:16

## 2021-09-16 RX ADMIN — SODIUM BICARBONATE 650 MG: 650 TABLET ORAL at 12:18

## 2021-09-16 RX ADMIN — IPRATROPIUM BROMIDE AND ALBUTEROL SULFATE 3 ML: .5; 2.5 SOLUTION RESPIRATORY (INHALATION) at 14:47

## 2021-09-16 RX ADMIN — OMEPRAZOLE 20 MG: 20 CAPSULE, DELAYED RELEASE ORAL at 06:16

## 2021-09-16 RX ADMIN — THERA TABS 1 TABLET: TAB at 06:16

## 2021-09-16 RX ADMIN — IPRATROPIUM BROMIDE AND ALBUTEROL SULFATE 3 ML: .5; 2.5 SOLUTION RESPIRATORY (INHALATION) at 19:21

## 2021-09-16 RX ADMIN — IPRATROPIUM BROMIDE AND ALBUTEROL SULFATE 3 ML: .5; 2.5 SOLUTION RESPIRATORY (INHALATION) at 02:13

## 2021-09-16 RX ADMIN — FLUCONAZOLE 200 MG: 200 TABLET ORAL at 06:16

## 2021-09-16 RX ADMIN — SODIUM BICARBONATE 650 MG: 650 TABLET ORAL at 10:03

## 2021-09-16 RX ADMIN — HYDROMORPHONE HYDROCHLORIDE 4 MG: 4 TABLET ORAL at 22:34

## 2021-09-16 RX ADMIN — HYDROMORPHONE HYDROCHLORIDE 4 MG: 4 TABLET ORAL at 18:31

## 2021-09-16 RX ADMIN — TAMSULOSIN HYDROCHLORIDE 0.4 MG: 0.4 CAPSULE ORAL at 10:03

## 2021-09-16 RX ADMIN — ONDANSETRON 4 MG: 2 INJECTION INTRAMUSCULAR; INTRAVENOUS at 14:02

## 2021-09-16 RX ADMIN — Medication 1 CAPSULE: at 10:04

## 2021-09-16 RX ADMIN — ONDANSETRON 4 MG: 2 INJECTION INTRAMUSCULAR; INTRAVENOUS at 10:07

## 2021-09-16 RX ADMIN — SODIUM BICARBONATE 650 MG: 650 TABLET ORAL at 18:31

## 2021-09-16 RX ADMIN — ONDANSETRON 4 MG: 2 INJECTION INTRAMUSCULAR; INTRAVENOUS at 20:25

## 2021-09-16 RX ADMIN — BUDESONIDE AND FORMOTEROL FUMARATE DIHYDRATE 2 PUFF: 80; 4.5 AEROSOL RESPIRATORY (INHALATION) at 19:21

## 2021-09-16 RX ADMIN — PREDNISONE 5 MG: 5 TABLET ORAL at 06:16

## 2021-09-16 RX ADMIN — DIPHENHYDRAMINE HYDROCHLORIDE 25 MG: 50 INJECTION INTRAMUSCULAR; INTRAVENOUS at 02:47

## 2021-09-16 RX ADMIN — PROCHLORPERAZINE EDISYLATE 10 MG: 5 INJECTION INTRAMUSCULAR; INTRAVENOUS at 06:16

## 2021-09-16 RX ADMIN — HYDROMORPHONE HYDROCHLORIDE 4 MG: 4 TABLET ORAL at 02:47

## 2021-09-16 ASSESSMENT — ENCOUNTER SYMPTOMS
VOMITING: 0
HEADACHES: 0
PALPITATIONS: 0
NAUSEA: 0
SHORTNESS OF BREATH: 0
CHILLS: 0
ABDOMINAL PAIN: 0
FLANK PAIN: 1
COUGH: 0
DIZZINESS: 0
FEVER: 0
DIARRHEA: 0

## 2021-09-16 ASSESSMENT — PAIN DESCRIPTION - PAIN TYPE
TYPE: CHRONIC PAIN
TYPE: CHRONIC PAIN
TYPE: CHRONIC PAIN;SURGICAL PAIN
TYPE: CHRONIC PAIN

## 2021-09-16 NOTE — PROGRESS NOTES
Nephrology Daily Progress Note    Date of Service  9/16/2021    Chief Complaint  56 y.o. male admitted 9/11/2021 with urosepsis, AYANA, obstructive uropathy    Interval Problem Update  Patient is doing about the same, plan for IR to adjust nephrostomy tube this afternoon  9/15 patient feels better today  Better urine output after IR procedure yesterday  9/16 patient feels better, no chest pain no shortness of breath  Review of Systems  Review of Systems   Constitutional: Negative for chills, fever and malaise/fatigue.   Respiratory: Negative for cough and shortness of breath.    Cardiovascular: Negative for chest pain and leg swelling.   Gastrointestinal: Negative for nausea and vomiting.   Genitourinary: Negative for dysuria, frequency and urgency.        Physical Exam  Temp:  [36.4 °C (97.5 °F)-37 °C (98.6 °F)] 36.6 °C (97.8 °F)  Pulse:  [71-94] 87  Resp:  [15-20] 20  BP: (141-154)/(91-96) 146/91  SpO2:  [94 %-99 %] 98 %    Physical Exam  Vitals and nursing note reviewed.   Constitutional:       General: He is not in acute distress.     Appearance: He is ill-appearing.   HENT:      Head: Normocephalic and atraumatic.      Right Ear: External ear normal.      Left Ear: External ear normal.      Nose: Nose normal.   Eyes:      General:         Right eye: No discharge.         Left eye: No discharge.      Conjunctiva/sclera: Conjunctivae normal.   Cardiovascular:      Rate and Rhythm: Normal rate and regular rhythm.      Heart sounds: No murmur heard.     Pulmonary:      Effort: Pulmonary effort is normal. No respiratory distress.      Breath sounds: Normal breath sounds. No wheezing.   Musculoskeletal:         General: No tenderness or deformity.      Right lower leg: No edema.      Left lower leg: No edema.   Skin:     General: Skin is warm.   Neurological:      General: No focal deficit present.      Mental Status: He is alert and oriented to person, place, and time.   Psychiatric:         Mood and Affect: Mood  normal.         Behavior: Behavior normal.         Fluids    Intake/Output Summary (Last 24 hours) at 9/16/2021 1158  Last data filed at 9/16/2021 0805  Gross per 24 hour   Intake 690 ml   Output 3575 ml   Net -2885 ml       Laboratory  Recent Labs     09/14/21  0226 09/15/21  0343 09/16/21  0154   WBC 18.2* 12.2* 10.0   RBC 3.32* 2.94* 2.90*   HEMOGLOBIN 8.2* 7.4* 7.2*   HEMATOCRIT 25.2* 22.7* 22.2*   MCV 75.9* 77.2* 76.6*   MCH 24.7* 25.2* 24.8*   MCHC 32.5* 32.6* 32.4*   RDW 54.0* 56.6* 55.1*   PLATELETCT 404 386 352   MPV 9.2 10.0 9.3     Recent Labs     09/14/21  2053 09/15/21  0829 09/16/21  0154   SODIUM 137 138 139   POTASSIUM 4.5 4.3 4.0   CHLORIDE 102 104 103   CO2 19* 22 21   GLUCOSE 115* 135* 98   BUN 42* 35* 25*   CREATININE 4.19* 3.05* 2.06*   CALCIUM 8.5 8.5 8.1*         No results for input(s): NTPROBNP in the last 72 hours.        Imaging  IR-NEPHROSTOGRAM THROUGH EXT CATH (ALL RADIOLOGY) RIGHT   Final Result         1. Nephrostogram showing satisfactory catheter position without extravasation or leak.      2. Double-J stent is in good position with some stagnation of contrast in the proximal ureter which may suggest obstruction of the stent.      US-RENAL   Final Result      1.  No change in moderate right hydronephrosis.      2.  There is severe left-sided hydronephrosis which is slightly more prominent than on prior exam.      IR-PERQ NEPH CATH NEW ACCESS (ALL RADIOLOGY) RIGHT   Final Result            1.  Successful percutaneous placement of right nephrostomy tube utilizing ultrasonic and fluoroscopic guidance.      2.  Aspiration of right renal collecting system yielded purulent urine which was sent to the lab for analysis.      3.  Right-sided nephrostogram demonstrated moderate hydronephrosis with a nonfunctioning right ureteral stent in place and debris noted in the right renal collecting system.      OUTSIDE IMAGES-DX CHEST   Final Result      OUTSIDE IMAGES-CT ABDOMEN /PELVIS   Final  Result      OUTSIDE IMAGES-CT ABDOMEN /PELVIS   Final Result      OUTSIDE IMAGES-CT CHEST   Final Result      US-RENAL   Final Result         1.  Moderate right and severe left hydronephrosis.   2.  Thin echogenic appearance of the left renal cortex.            Assessment/Plan  1 AYANA: Improving   2 obstructive uropathy  3 colon cancer  4 anemia: Hemoglobin is worse  5 sepsis  6 hypoalbuminemia  no acute need for HD  Renal diet  Daily labs  Renal dose all meds  Avoid nephrotoxins  Prognosis guarded.  Okay to discharge from renal point  Discussed with

## 2021-09-16 NOTE — PROGRESS NOTES
Hospital Medicine Daily Progress Note    Date of Service  9/16/2021    Chief Complaint  Erich Ingram is a 56 y.o. male admitted 9/11/2021 with sepsis    Hospital Course     55 y/o male with past medical history of colon cancer with metastasis status post resection on chemo, nonfunctioning left kidney, recent right renal stent 2 weeks ago at [facility] for hydronephrosis transferred from outside facility for sepsis due to UTI and AYANA.  CT scan from outside facility noted double-J right-sided ureteral stent. Moderate to severe right-sided hydronephrosis persists with severe left-sided hydronephrosis and renal atrophy, unchanged.  He underwent right nephrostomy tube placement on 9/11.  Urine cultures ultimately grew Candida so ID was consulted and patient was started on fluconazole and antibiotics changed from Zyvox and Zosyn to IV Unasyn.  1 of 2 blood cultures from admission growing possible staph species.        Interval Problem Update  No acute events overnight  Patient's nausea and vomiting improving  Right nephrostogram showing nephrostomy tube in appropriate place without kink  Creatinine improving  Flank pain improved     I have personally seen and examined the patient at bedside. I discussed the plan of care with patient, bedside RN, charge RN,  and pharmacy.    Consultants/Specialty  infectious disease, nephrology, palliative care and urology    Code Status  Full Code    Disposition  Patient is not medically cleared.   Anticipate discharge to D.  I have placed the appropriate orders for post-discharge needs.    Review of Systems  Review of Systems   Constitutional: Negative for chills and fever.   Respiratory: Negative for cough and shortness of breath.    Cardiovascular: Negative for chest pain and palpitations.   Gastrointestinal: Negative for abdominal pain, nausea and vomiting.   Genitourinary: Positive for flank pain. Negative for dysuria and urgency.   Neurological: Negative for  dizziness and headaches.   All other systems reviewed and are negative.       Physical Exam  Temp:  [36.4 °C (97.5 °F)-37 °C (98.6 °F)] 36.6 °C (97.8 °F)  Pulse:  [71-94] 87  Resp:  [15-20] 20  BP: (141-154)/(91-96) 146/91  SpO2:  [94 %-99 %] 98 %    Physical Exam  Vitals and nursing note reviewed.   Constitutional:       Appearance: Normal appearance.   Cardiovascular:      Rate and Rhythm: Normal rate and regular rhythm.      Heart sounds: No murmur heard.     Pulmonary:      Effort: Pulmonary effort is normal. No respiratory distress.      Breath sounds: Normal breath sounds.   Musculoskeletal:      Comments: R nephrostomy tube +   Neurological:      General: No focal deficit present.      Mental Status: He is alert and oriented to person, place, and time.         Fluids    Intake/Output Summary (Last 24 hours) at 9/16/2021 1146  Last data filed at 9/16/2021 0805  Gross per 24 hour   Intake 690 ml   Output 3575 ml   Net -2885 ml       Laboratory  Recent Labs     09/14/21  0226 09/15/21  0343 09/16/21  0154   WBC 18.2* 12.2* 10.0   RBC 3.32* 2.94* 2.90*   HEMOGLOBIN 8.2* 7.4* 7.2*   HEMATOCRIT 25.2* 22.7* 22.2*   MCV 75.9* 77.2* 76.6*   MCH 24.7* 25.2* 24.8*   MCHC 32.5* 32.6* 32.4*   RDW 54.0* 56.6* 55.1*   PLATELETCT 404 386 352   MPV 9.2 10.0 9.3     Recent Labs     09/14/21  2053 09/15/21  0829 09/16/21  0154   SODIUM 137 138 139   POTASSIUM 4.5 4.3 4.0   CHLORIDE 102 104 103   CO2 19* 22 21   GLUCOSE 115* 135* 98   BUN 42* 35* 25*   CREATININE 4.19* 3.05* 2.06*   CALCIUM 8.5 8.5 8.1*                   Imaging  IR-NEPHROSTOGRAM THROUGH EXT CATH (ALL RADIOLOGY) RIGHT   Final Result         1. Nephrostogram showing satisfactory catheter position without extravasation or leak.      2. Double-J stent is in good position with some stagnation of contrast in the proximal ureter which may suggest obstruction of the stent.      US-RENAL   Final Result      1.  No change in moderate right hydronephrosis.      2.  There  is severe left-sided hydronephrosis which is slightly more prominent than on prior exam.      IR-PERQ NEPH CATH NEW ACCESS (ALL RADIOLOGY) RIGHT   Final Result            1.  Successful percutaneous placement of right nephrostomy tube utilizing ultrasonic and fluoroscopic guidance.      2.  Aspiration of right renal collecting system yielded purulent urine which was sent to the lab for analysis.      3.  Right-sided nephrostogram demonstrated moderate hydronephrosis with a nonfunctioning right ureteral stent in place and debris noted in the right renal collecting system.      OUTSIDE IMAGES-DX CHEST   Final Result      OUTSIDE IMAGES-CT ABDOMEN /PELVIS   Final Result      OUTSIDE IMAGES-CT ABDOMEN /PELVIS   Final Result      OUTSIDE IMAGES-CT CHEST   Final Result      US-RENAL   Final Result         1.  Moderate right and severe left hydronephrosis.   2.  Thin echogenic appearance of the left renal cortex.           Assessment/Plan  Bacteremia  Assessment & Plan  1 of 2 BCx growing staph, likely contaminant    Acute renal failure (ARF) ([facility])  Assessment & Plan  Post obstructive  Avoid nephrotoxins, monitor inputs and outputs  R nephrostomy tube in place- I will d/w urology if plan to place new stent vs cont nephrostomy tube  Improving  If Cr Cl >50 then will increase fluconazole dose      High anion gap metabolic acidosis  Assessment & Plan  In setting of sepsis and AYANA  Continue to monitor closely  Continue IV antibiotics and continue antibiotics    Other hydronephrosis  Assessment & Plan  CT scan from outside facility noted double-J right-sided ureteral stent. Moderate to severe right-sided hydronephrosis persists with severe left-sided hydronephrosis and renal atrophy, unchanged.     S/p right nephrostomy tube  Repeat US fairly unchanged- IR Right nephrostogram showing adequate placement of tube    PNA (pneumonia)- (present on admission)  Assessment & Plan  Dc abx per ID  procal trending down    UTI (urinary  tract infection)- (present on admission)  Assessment & Plan  Urine culture growing Candida albicans  On fluconazole- changed to PO. I d/w ID and as renal function continues to improve then will increase dose of fluconazole tomorrow      Protein calorie malnutrition (HCC)- (present on admission)  Assessment & Plan  BMI 18  Nutrition consult  Boost, MV    Leukocytosis- (present on admission)  Assessment & Plan  2/2 UTI/sepsis  Treat underlying conditions    Chronic, continuous use of opioids- (present on admission)  Assessment & Plan  Analgesia with oxycodone + Morphine    Benign prostate hyperplasia- (present on admission)  Assessment & Plan  Cont tamsulosin    Nausea & vomiting- (present on admission)  Assessment & Plan  Antiemetics  Bowel regimen  Nutrition consult, consider boost.  improved    Sepsis (HCC)- (present on admission)  Assessment & Plan  This is Sepsis Present on admission  SIRS criteria identified on my evaluation include: Tachycardia, with heart rate greater than 90 BPM, Tachypnea, with respirations greater than 20 per minute and Leukocytosis, with WBC greater than 12,000  Source is Urosepsis  Sepsis protocol initiated  Fluid resuscitation ordered per protocol  IV antibiotics as appropriate for source of sepsis  While organ dysfunction may be noted elsewhere in this problem list or in the chart, degree of organ dysfunction does not meet CMS criteria for severe sepsis    resolved          Hypertension- (present on admission)  Assessment & Plan  Continue Home Medications  Labetalol ivp prn with parameters      Hyponatremia- (present on admission)  Assessment & Plan  - c/w NS, mild.  - IN AYANA  - F/u with Serum Osm, Urine Osm, Urine Na      COPD (chronic obstructive pulmonary disease) (HCC)- (present on admission)  Assessment & Plan  Not in acute exacerbation cont duonebs  Oxygen support      Metastatic Colon cancer (HCC)- (present on admission)  Assessment & Plan  Diagnosed 2016 treated with resection  and chemo. Recurred in 2018 with pulmonary nodules biopsied showing adenocarcinoma.  Antiemetics    Follows with oncology Dr. Urbina as outpatient         VTE prophylaxis: heparin ppx    I have performed a physical exam and reviewed and updated ROS and Plan today (9/16/2021). In review of yesterday's note (9/15/2021), there are no changes except as documented above.

## 2021-09-16 NOTE — PROGRESS NOTES
Monitor Summary:    SR [id number]  Occasional PAC  Rare PVC  Rare Coup  .14/.10/.40    Per telemonitor strip report

## 2021-09-16 NOTE — PROGRESS NOTES
Pt is A&O x4, calls appropriately  Pain 6/10, medicated per MAR   + nausea, medicated per MAR  Tolerating a renal diet   R neph tube, CDI  + Voids, oro catheter  + flatus  Last BM 9/15  Up self  Bed alarm off, pt low fall risk per ai norton  Reviewed plan of care with patient, bed in lowest position and locked, pt resting comfortably now, call light within reach, all needs met at this time. Interventions will be executed per plan of care

## 2021-09-16 NOTE — PROGRESS NOTES
Infectious Disease Progress Note    Author: Baljinder Aguirre M.D. Date & Time of service: 2021  8:30 AM    Chief Complaint:  Follow-up for pyelonephritis    Interval History:   T-max 99.2, white count 18.2, tolerating antimicrobials thus far.  Sleeping soundly this afternoon  9/15 patient is afebrile, white count down to 12.2.  Procalcitonin down as well.  Patient feels tired.   patient remains afebrile, leukocytosis is now resolved, 10,000 today.  Tolerating fluconazole.  Feeling much better.    Labs Reviewed and Medications Reviewed.    Review of Systems:  Review of Systems   Unable to perform ROS: Other   Constitutional: Positive for malaise/fatigue. Negative for chills and fever.   Gastrointestinal: Negative for abdominal pain, diarrhea, nausea and vomiting.   Genitourinary: Negative for dysuria.   Skin: Negative for itching and rash.   All other systems reviewed and are negative.      Hemodynamics:  Temp (24hrs), Av.8 °C (98.2 °F), Min:36.4 °C (97.5 °F), Max:37 °C (98.6 °F)  Temperature: 37 °C (98.6 °F)  Pulse  Av.2  Min: 55  Max: 114   Blood Pressure: 141/91       Physical Exam:  Physical Exam  Vitals and nursing note reviewed.   Constitutional:       Appearance: He is ill-appearing. He is not toxic-appearing.      Comments: Cachectic appearing   HENT:      Head:      Comments: Temporal wasting noted  Eyes:      General: No scleral icterus.        Right eye: No discharge.         Left eye: No discharge.      Conjunctiva/sclera: Conjunctivae normal.   Cardiovascular:      Rate and Rhythm: Normal rate.      Heart sounds: No murmur heard.     Pulmonary:      Effort: Pulmonary effort is normal. No respiratory distress.      Breath sounds: No stridor.   Abdominal:      General: There is no distension.      Tenderness: There is abdominal tenderness.      Comments: Right-sided nephrostomy tube in place   Musculoskeletal:         General: No swelling or tenderness.   Skin:     Findings: No  erythema or rash.   Neurological:      General: No focal deficit present.      Mental Status: He is oriented to person, place, and time.   Psychiatric:         Behavior: Behavior normal.      Comments: Appropriate mood         Meds:    Current Facility-Administered Medications:   •  ipratropium-albuterol  •  fluconazole  •  diphenhydrAMINE  •  lactobacillus rhamnosus  •  albuterol  •  heparin  •  Pharmacy Consult Request  •  [] acetaminophen **FOLLOWED BY** acetaminophen  •  hydrocortisone  •  [Held by provider] losartan  •  predniSONE  •  tamsulosin  •  omeprazole  •  polyethylene glycol/lytes  •  senna-docusate **AND** polyethylene glycol/lytes **AND** magnesium hydroxide **AND** bisacodyl  •  Respiratory Therapy Consult  •  Pharmacy  •  ondansetron  •  ondansetron  •  promethazine  •  promethazine  •  prochlorperazine  •  sodium bicarbonate  •  ipratropium-albuterol  •  multivitamin  •  tiotropium  •  budesonide-formoterol  •  HYDROmorphone    Labs:  Recent Labs     09/14/21  0226 09/15/21  0343 09/16/21  0154   WBC 18.2* 12.2* 10.0   RBC 3.32* 2.94* 2.90*   HEMOGLOBIN 8.2* 7.4* 7.2*   HEMATOCRIT 25.2* 22.7* 22.2*   MCV 75.9* 77.2* 76.6*   MCH 24.7* 25.2* 24.8*   RDW 54.0* 56.6* 55.1*   PLATELETCT 404 386 352   MPV 9.2 10.0 9.3   NEUTSPOLYS 89.60*  --  70.90   LYMPHOCYTES 3.60*  --  17.60*   MONOCYTES 5.70  --  8.90   EOSINOPHILS 0.20  --  1.90   BASOPHILS 0.20  --  0.30     Recent Labs     09/14/21  2053 09/15/21  0821  0154   SODIUM 137 138 139   POTASSIUM 4.5 4.3 4.0   CHLORIDE 102 104 103   CO2 19* 22 21   GLUCOSE 115* 135* 98   BUN 42* 35* 25*     Recent Labs     09/14/21  2053 09/15/21  0829 21  0154   CREATININE 4.19* 3.05* 2.06*       Imaging:  US-RENAL    Result Date: 2021 11:01 AM HISTORY/REASON FOR EXAM:  decreased nephrostomy output. eval for continued hydronephrosis TECHNIQUE/EXAM DESCRIPTION: Renal ultrasound. COMPARISON:  2021 FINDINGS: The right kidney  measures 14.53 cm. The left kidney measures 10.91 cm. There is moderate right hydronephrosis and severe left hydronephrosis. There are no abnormal calcifications. Right-sided nephrostomy tube is noted. The bladder demonstrates no focal wall abnormality. Trace amount of ascites is noted in the right upper quadrant.     1.  No change in moderate right hydronephrosis. 2.  There is severe left-sided hydronephrosis which is slightly more prominent than on prior exam.    US-RENAL    Result Date: 9/11/2021 9/11/2021 5:31 AM HISTORY/REASON FOR EXAM:  Abnormal Labs TECHNIQUE/EXAM DESCRIPTION:  Renal ultrasound. COMPARISON:  June 12, 2016 FINDINGS: The right kidney measures 15.42 cm.  The left kidney measures 12.36 cm. There is moderate right hydronephrosis. Severe left hydronephrosis is seen. The left renal cortex appears thinned and echogenic. There are no abnormal calcifications. The bladder is decompressed around a Soriano catheter.     1.  Moderate right and severe left hydronephrosis. 2.  Thin echogenic appearance of the left renal cortex.    IR-PERQ NEPH CATH NEW ACCESS (ALL RADIOLOGY) RIGHT    Result Date: 9/11/2021 9/11/2021 11:00 AM HISTORY/REASON FOR EXAM: Right Renal obstruction and pyonephrosis. TECHNIQUE/EXAM DESCRIPTION: Following informed consent the patient was prepped and draped in the usual fashion 10 cc of 1% lidocaine was utilized for local and subcutaneous anesthesia. SEDATION: Moderate sedation was provided. Pulse oximetry and continuous cardiac monitoring by the nurse was performed throughout the exam. Intraservice time was 30 minutes. Fluoroscopy images: 2 fluoroscopic images obtained. Fluoroscopy time: 0.3 minutes CONTRAST: 15 mL of Omnipaque 300 were utilized for the procedure. The procedure was performed utilizing MAXIMUM STERILE BARRIER technique including sterile gown, mass, cap, and under sterile gloves following appropriate hand hygiene and/or sterile scrub. The patient's skin site was prepped  with 2% chlorhexidine Utilizing fluoroscopic and ultrasonic guidance the right kidney was accessed utilizing an 18-gauge introducer needle. 10 mLs of purulent urine was aspirated and sent to the lab for analysis. Nonionic contrast was injected and digital imaging was obtained  over the kidney and ureters. Subsequently following sequential dilatation a 8 Mauritian nephrostomy tube was placed in the collecting system and the affixed in place.  The patient tolerated procedure well and was sent to the floor in good condition. FINDINGS: There is good positioning of the right nephrostomy tube in the renal collecting system. Right-sided nephrostogram demonstrates moderate right-sided hydronephrosis with debris noted in the renal collecting system. There is a nonfunctioning right-sided ureteral stent in place.     1.  Successful percutaneous placement of right nephrostomy tube utilizing ultrasonic and fluoroscopic guidance. 2.  Aspiration of right renal collecting system yielded purulent urine which was sent to the lab for analysis. 3.  Right-sided nephrostogram demonstrated moderate hydronephrosis with a nonfunctioning right ureteral stent in place and debris noted in the right renal collecting system.      Micro:  Results     Procedure Component Value Units Date/Time    BLOOD CULTURE [[id number]] Collected: 09/11/21 0557    Order Status: Completed Specimen: Blood from Peripheral Updated: 09/15/21 0418     Significant Indicator NEG     Source BLD     Site PERIPHERAL     Culture Result A significant status has been triggered by the BACTEC  instrument due to an increase in CO2 production.  Gram  stain of blood culture bottle shows NO ORGANISMS SEEN.  Further investigation is in progress.  Note: Blood cultures are incubated for 5 days and  are monitored continuously. Positive blood cultures  are called to the RN and reported as soon as  they are identified.      Narrative:      Per Hospital Policy: Only change Specimen Src: to  "\"Line\" if  specified by physician order.  Left AC    URINE CULTURE(NEW) [[id number]]  (Abnormal) Collected: 09/11/21 1132    Order Status: Completed Specimen: Other Updated: 09/14/21 0836     Significant Indicator POS     Source URSP     Site Right Hydronephrosis     Culture Result -     Gram Stain Result Many WBCs.  Many Yeast.       Culture Result Candida albicans  >100,000 cfu/mL      BLOOD CULTURE [[id number]]  (Abnormal) Collected: 09/11/21 0557    Order Status: Completed Specimen: Blood from Peripheral Updated: 09/14/21 0229     Significant Indicator POS     Source BLD     Site PERIPHERAL     Culture Result Growth detected by Bactec instrument. 09/14/2021  02:28  Gram Stain: Gram positive cocci: Possible Staphylococcus sp.      Narrative:      Per Hospital Policy: Only change Specimen Src: to \"Line\" if  specified by physician order.  Right AC    GRAM STAIN [[id number]] Collected: 09/11/21 1132    Order Status: Completed Specimen: Other Updated: 09/11/21 1927     Significant Indicator .     Source URSP     Site Right Hydronephrosis     Gram Stain Result Many WBCs.  Many Yeast.      FLUID CULTURE W/GRAM STAIN [[id number]]     Order Status: No result Specimen: Other Body Fluid     MRSA By PCR (Amp) [[id number]]     Order Status: No result Specimen: Respirate from Nares     URINE CULTURE(NEW) [[id number]]     Order Status: No result Specimen: Urine     Culture Respiratory W/ GRM STN [[id number]]     Order Status: Completed Specimen: Respirate from Sputum     Blood Culture [[id number]]     Order Status: No result Specimen: Blood from Peripheral     Blood Culture [[id number]]     Order Status: No result Specimen: Blood from Peripheral           Assessment:  Erich Ingram is a 56 y.o. male with  with known colon cancer with metastases status post resection, on chemo but has been unable to keep his appointments recently, was transferred from [facility] after he presented there with fevers and worsening urine output, " noted to be in AYANA.  About 2 weeks prior, patient was at [facility] for AYANA, was noted to have significant hydronephrosis, had a right ureteral stent placement there after it was deemed that left kidney was nonfunctional.  Patient was doing well till above presentation.  CT scan at [facility] with moderate to severe right-sided hydronephrosis persisting, along with severe left-sided hydronephrosis and renal atrophy which is unchanged. On transfer to [facility], patient afebrile but with leukocytosis of 24. Creatinine up to 5.  On 9/11, patient had a right nephrostomy tube placement by IR. Cultures from this growing Yeast.      Pertinent Diagnoses:  Pyelonephritis, Candida albicans  Severe hydronephrosis  Obstructive uropathy, improving  Metastatic colon cancer  Positive blood culture    Plan:  -Continue renally dosed p.o. fluconazole, monitor renal function - improving.  If creatinine clearance improves to more than 50, will increase dose of fluconazole to 400 mg daily  -Follow blood cultures x2 from 9/11, 1 out of 2 coag neg Staph, likely contaminant  -Trend white count and renal function, improving   -Anticipate a 2-week course of fluconazole from day of relief of obstruction -stop date: 9/24/2021  -Further management per urology.  Prior to any invasive urological procedures, also recommend preprocedural antifungal therapy     Plan was discussed with the primary team, Dr. Ewelian POTTER will follow. Please feel free to call with questions.

## 2021-09-17 ENCOUNTER — APPOINTMENT (OUTPATIENT)
Dept: RADIOLOGY | Facility: MEDICAL CENTER | Age: 56
DRG: 871 | End: 2021-09-17
Attending: PHYSICIAN ASSISTANT
Payer: COMMERCIAL

## 2021-09-17 PROBLEM — E87.29 HIGH ANION GAP METABOLIC ACIDOSIS: Status: RESOLVED | Noted: 2021-09-11 | Resolved: 2021-09-17

## 2021-09-17 PROBLEM — A41.9 SEPSIS (HCC): Status: RESOLVED | Noted: 2019-02-28 | Resolved: 2021-09-17

## 2021-09-17 PROBLEM — J18.9 PNA (PNEUMONIA): Status: RESOLVED | Noted: 2021-09-11 | Resolved: 2021-09-17

## 2021-09-17 PROBLEM — R11.2 NAUSEA & VOMITING: Status: RESOLVED | Noted: 2019-04-03 | Resolved: 2021-09-17

## 2021-09-17 LAB
ANION GAP SERPL CALC-SCNC: 11 MMOL/L (ref 7–16)
BUN SERPL-MCNC: 13 MG/DL (ref 8–22)
CALCIUM SERPL-MCNC: 8.1 MG/DL (ref 8.5–10.5)
CHLORIDE SERPL-SCNC: 100 MMOL/L (ref 96–112)
CO2 SERPL-SCNC: 26 MMOL/L (ref 20–33)
CREAT SERPL-MCNC: 1.34 MG/DL (ref 0.5–1.4)
ERYTHROCYTE [DISTWIDTH] IN BLOOD BY AUTOMATED COUNT: 54.4 FL (ref 35.9–50)
GLUCOSE SERPL-MCNC: 113 MG/DL (ref 65–99)
HCT VFR BLD AUTO: 25 % (ref 42–52)
HGB BLD-MCNC: 8.1 G/DL (ref 14–18)
MCH RBC QN AUTO: 25.1 PG (ref 27–33)
MCHC RBC AUTO-ENTMCNC: 32.4 G/DL (ref 33.7–35.3)
MCV RBC AUTO: 77.4 FL (ref 81.4–97.8)
PLATELET # BLD AUTO: 413 K/UL (ref 164–446)
PMV BLD AUTO: 9 FL (ref 9–12.9)
POTASSIUM SERPL-SCNC: 3.7 MMOL/L (ref 3.6–5.5)
RBC # BLD AUTO: 3.23 M/UL (ref 4.7–6.1)
SODIUM SERPL-SCNC: 137 MMOL/L (ref 135–145)
WBC # BLD AUTO: 10.8 K/UL (ref 4.8–10.8)

## 2021-09-17 PROCEDURE — 87040 BLOOD CULTURE FOR BACTERIA: CPT

## 2021-09-17 PROCEDURE — 700102 HCHG RX REV CODE 250 W/ 637 OVERRIDE(OP): Performed by: STUDENT IN AN ORGANIZED HEALTH CARE EDUCATION/TRAINING PROGRAM

## 2021-09-17 PROCEDURE — 36415 COLL VENOUS BLD VENIPUNCTURE: CPT

## 2021-09-17 PROCEDURE — 700111 HCHG RX REV CODE 636 W/ 250 OVERRIDE (IP): Performed by: STUDENT IN AN ORGANIZED HEALTH CARE EDUCATION/TRAINING PROGRAM

## 2021-09-17 PROCEDURE — 700102 HCHG RX REV CODE 250 W/ 637 OVERRIDE(OP): Performed by: HOSPITALIST

## 2021-09-17 PROCEDURE — 94640 AIRWAY INHALATION TREATMENT: CPT

## 2021-09-17 PROCEDURE — 770020 HCHG ROOM/CARE - TELE (206)

## 2021-09-17 PROCEDURE — 51798 US URINE CAPACITY MEASURE: CPT

## 2021-09-17 PROCEDURE — 76775 US EXAM ABDO BACK WALL LIM: CPT

## 2021-09-17 PROCEDURE — A9270 NON-COVERED ITEM OR SERVICE: HCPCS | Performed by: STUDENT IN AN ORGANIZED HEALTH CARE EDUCATION/TRAINING PROGRAM

## 2021-09-17 PROCEDURE — 80048 BASIC METABOLIC PNL TOTAL CA: CPT

## 2021-09-17 PROCEDURE — 700101 HCHG RX REV CODE 250: Performed by: STUDENT IN AN ORGANIZED HEALTH CARE EDUCATION/TRAINING PROGRAM

## 2021-09-17 PROCEDURE — 85027 COMPLETE CBC AUTOMATED: CPT

## 2021-09-17 PROCEDURE — 94760 N-INVAS EAR/PLS OXIMETRY 1: CPT

## 2021-09-17 PROCEDURE — 99231 SBSQ HOSP IP/OBS SF/LOW 25: CPT | Performed by: HOSPITALIST

## 2021-09-17 PROCEDURE — 99233 SBSQ HOSP IP/OBS HIGH 50: CPT | Performed by: INTERNAL MEDICINE

## 2021-09-17 PROCEDURE — A9270 NON-COVERED ITEM OR SERVICE: HCPCS | Performed by: HOSPITALIST

## 2021-09-17 RX ORDER — FLUCONAZOLE 200 MG/1
200 TABLET ORAL ONCE
Status: COMPLETED | OUTPATIENT
Start: 2021-09-17 | End: 2021-09-17

## 2021-09-17 RX ORDER — FLUCONAZOLE 200 MG/1
400 TABLET ORAL DAILY
Qty: 12 TABLET | Refills: 0 | Status: SHIPPED | OUTPATIENT
Start: 2021-09-18 | End: 2021-09-18

## 2021-09-17 RX ORDER — FLUCONAZOLE 200 MG/1
400 TABLET ORAL DAILY
Status: DISCONTINUED | OUTPATIENT
Start: 2021-09-18 | End: 2021-09-18 | Stop reason: HOSPADM

## 2021-09-17 RX ADMIN — TIOTROPIUM BROMIDE INHALATION SPRAY 5 MCG: 3.12 SPRAY, METERED RESPIRATORY (INHALATION) at 07:30

## 2021-09-17 RX ADMIN — ONDANSETRON 4 MG: 2 INJECTION INTRAMUSCULAR; INTRAVENOUS at 05:10

## 2021-09-17 RX ADMIN — BUDESONIDE AND FORMOTEROL FUMARATE DIHYDRATE 2 PUFF: 80; 4.5 AEROSOL RESPIRATORY (INHALATION) at 07:30

## 2021-09-17 RX ADMIN — HYDROMORPHONE HYDROCHLORIDE 4 MG: 4 TABLET ORAL at 18:29

## 2021-09-17 RX ADMIN — TAMSULOSIN HYDROCHLORIDE 0.4 MG: 0.4 CAPSULE ORAL at 08:16

## 2021-09-17 RX ADMIN — IPRATROPIUM BROMIDE AND ALBUTEROL SULFATE 3 ML: .5; 2.5 SOLUTION RESPIRATORY (INHALATION) at 19:23

## 2021-09-17 RX ADMIN — PREDNISONE 5 MG: 5 TABLET ORAL at 08:16

## 2021-09-17 RX ADMIN — FLUCONAZOLE 200 MG: 200 TABLET ORAL at 14:10

## 2021-09-17 RX ADMIN — Medication 1 CAPSULE: at 08:15

## 2021-09-17 RX ADMIN — FLUCONAZOLE 200 MG: 200 TABLET ORAL at 08:16

## 2021-09-17 RX ADMIN — HYDROMORPHONE HYDROCHLORIDE 4 MG: 4 TABLET ORAL at 14:10

## 2021-09-17 RX ADMIN — IPRATROPIUM BROMIDE AND ALBUTEROL SULFATE 3 ML: .5; 2.5 SOLUTION RESPIRATORY (INHALATION) at 07:30

## 2021-09-17 RX ADMIN — THERA TABS 1 TABLET: TAB at 08:14

## 2021-09-17 RX ADMIN — SODIUM BICARBONATE 650 MG: 650 TABLET ORAL at 14:10

## 2021-09-17 RX ADMIN — HEPARIN SODIUM 5000 UNITS: 5000 INJECTION, SOLUTION INTRAVENOUS; SUBCUTANEOUS at 08:15

## 2021-09-17 RX ADMIN — ONDANSETRON 4 MG: 2 INJECTION INTRAMUSCULAR; INTRAVENOUS at 08:15

## 2021-09-17 RX ADMIN — HYDROMORPHONE HYDROCHLORIDE 4 MG: 4 TABLET ORAL at 02:38

## 2021-09-17 RX ADMIN — HYDROMORPHONE HYDROCHLORIDE 4 MG: 4 TABLET ORAL at 08:15

## 2021-09-17 RX ADMIN — SODIUM BICARBONATE 650 MG: 650 TABLET ORAL at 08:14

## 2021-09-17 RX ADMIN — OMEPRAZOLE 20 MG: 20 CAPSULE, DELAYED RELEASE ORAL at 08:16

## 2021-09-17 RX ADMIN — SODIUM BICARBONATE 650 MG: 650 TABLET ORAL at 18:28

## 2021-09-17 ASSESSMENT — ENCOUNTER SYMPTOMS
ABDOMINAL PAIN: 0
FEVER: 0
COUGH: 0
NAUSEA: 0
NAUSEA: 1
VOMITING: 0
CHILLS: 0
HEADACHES: 0
DIZZINESS: 0
SHORTNESS OF BREATH: 0
FLANK PAIN: 1
PALPITATIONS: 0
DIARRHEA: 0

## 2021-09-17 ASSESSMENT — PAIN DESCRIPTION - PAIN TYPE
TYPE: CHRONIC PAIN;SURGICAL PAIN
TYPE: CHRONIC PAIN
TYPE: SURGICAL PAIN

## 2021-09-17 NOTE — DISCHARGE PLANNING
Notified care team that [facility] had to cancel their transport today due to  availability, which was unexpected. It was recommended they try [facility] or [facility].

## 2021-09-17 NOTE — PROGRESS NOTES
- per  cancel d/c order for today, pending blood cultures & Echo results  Tomorrow  - Call [facility] (pt transportation home) if pt will not discharge tomorrow   [place] [phone]  - Transportation currently set up for 9/18 @ 1400 or 1430

## 2021-09-17 NOTE — CARE PLAN
The patient is Stable - Low risk of patient condition declining or worsening    Shift Goals  Clinical Goals: Pain Mgt  Patient Goals: Discharge  Family Goals: pain control    Progress made toward(s) clinical / shift goals:  Call light and personal items within reach, pt educated on fall risk and need to call for assistance. Verbalized understanding.       Problem: Knowledge Deficit - Standard  Goal: Patient and family/care givers will demonstrate understanding of plan of care, disease process/condition, diagnostic tests and medications  Outcome: Progressing     Problem: Pain - Standard  Goal: Alleviation of pain or a reduction in pain to the patient’s comfort goal  Outcome: Progressing     Problem: Respiratory  Goal: Patient will achieve/maintain optimum respiratory ventilation and gas exchange  Outcome: Progressing

## 2021-09-17 NOTE — PROGRESS NOTES
Hospital Medicine Daily Progress Note    Date of Service  9/17/2021    Chief Complaint  Erich Ingram is a 56 y.o. male admitted 9/11/2021 with sepsis    Hospital Course     57 y/o male with past medical history of colon cancer with metastasis status post resection on chemo, nonfunctioning left kidney, recent right renal stent 2 weeks ago at [place] for hydronephrosis transferred from outside facility for sepsis due to UTI and AYANA.  CT scan from outside facility noted double-J right-sided ureteral stent. Moderate to severe right-sided hydronephrosis persists with severe left-sided hydronephrosis and renal atrophy, unchanged.  He underwent right nephrostomy tube placement on 9/11.  Urine cultures ultimately grew Candida so ID was consulted and patient was started on fluconazole and antibiotics changed from Zyvox and Zosyn to IV Unasyn.  1 of 2 blood cultures from admission growing possible staph species.        Interval Problem Update  No acute events overnight  Tolerating PO fluconazole  Pt medically cleared for discharge however no ride    I have personally seen and examined the patient at bedside. I discussed the plan of care with patient, bedside RN, charge RN,  and pharmacy.    Consultants/Specialty  infectious disease, nephrology, palliative care and urology    Code Status  Full Code    Disposition  Patient is medically cleared.   Anticipate discharge to to home with close outpatient follow-up.  I have placed the appropriate orders for post-discharge needs.    Review of Systems  Review of Systems   Constitutional: Negative for chills and fever.   Respiratory: Negative for cough and shortness of breath.    Cardiovascular: Negative for chest pain and palpitations.   Gastrointestinal: Negative for abdominal pain, nausea and vomiting.   Genitourinary: Positive for flank pain. Negative for dysuria and urgency.   Neurological: Negative for dizziness and headaches.   All other systems reviewed  and are negative.       Physical Exam  Temp:  [36.9 °C (98.5 °F)-37.4 °C (99.3 °F)] 36.9 °C (98.5 °F)  Pulse:  [60-90] 62  Resp:  [16-20] 16  BP: (143-158)/(85-97) 158/92  SpO2:  [92 %-97 %] 92 %    Physical Exam  Vitals and nursing note reviewed.   Constitutional:       Appearance: Normal appearance.   Cardiovascular:      Rate and Rhythm: Normal rate and regular rhythm.      Heart sounds: No murmur heard.     Pulmonary:      Effort: Pulmonary effort is normal. No respiratory distress.      Breath sounds: Normal breath sounds.   Musculoskeletal:      Comments: R nephrostomy tube +   Neurological:      General: No focal deficit present.      Mental Status: He is alert and oriented to person, place, and time.         Fluids    Intake/Output Summary (Last 24 hours) at 9/17/2021 1359  Last data filed at 9/17/2021 0836  Gross per 24 hour   Intake --   Output 3455 ml   Net -3455 ml       Laboratory  Recent Labs     09/15/21  0343 09/16/21  0154 09/17/21  0941   WBC 12.2* 10.0 10.8   RBC 2.94* 2.90* 3.23*   HEMOGLOBIN 7.4* 7.2* 8.1*   HEMATOCRIT 22.7* 22.2* 25.0*   MCV 77.2* 76.6* 77.4*   MCH 25.2* 24.8* 25.1*   MCHC 32.6* 32.4* 32.4*   RDW 56.6* 55.1* 54.4*   PLATELETCT 386 352 413   MPV 10.0 9.3 9.0     Recent Labs     09/15/21  0829 09/16/21  0154 09/17/21  0941   SODIUM 138 139 137   POTASSIUM 4.3 4.0 3.7   CHLORIDE 104 103 100   CO2 22 21 26   GLUCOSE 135* 98 113*   BUN 35* 25* 13   CREATININE 3.05* 2.06* 1.34   CALCIUM 8.5 8.1* 8.1*                   Imaging  US-RENAL   Final Result      1.  Resolved RIGHT hydronephrosis   2.  Unchanged appearance of severe LEFT hydronephrosis      IR-NEPHROSTOGRAM THROUGH EXT CATH (ALL RADIOLOGY) RIGHT   Final Result         1. Nephrostogram showing satisfactory catheter position without extravasation or leak.      2. Double-J stent is in good position with some stagnation of contrast in the proximal ureter which may suggest obstruction of the stent.      US-RENAL   Final Result       1.  No change in moderate right hydronephrosis.      2.  There is severe left-sided hydronephrosis which is slightly more prominent than on prior exam.      IR-PERQ NEPH CATH NEW ACCESS (ALL RADIOLOGY) RIGHT   Final Result            1.  Successful percutaneous placement of right nephrostomy tube utilizing ultrasonic and fluoroscopic guidance.      2.  Aspiration of right renal collecting system yielded purulent urine which was sent to the lab for analysis.      3.  Right-sided nephrostogram demonstrated moderate hydronephrosis with a nonfunctioning right ureteral stent in place and debris noted in the right renal collecting system.      OUTSIDE IMAGES-DX CHEST   Final Result      OUTSIDE IMAGES-CT ABDOMEN /PELVIS   Final Result      OUTSIDE IMAGES-CT ABDOMEN /PELVIS   Final Result      OUTSIDE IMAGES-CT CHEST   Final Result      US-RENAL   Final Result         1.  Moderate right and severe left hydronephrosis.   2.  Thin echogenic appearance of the left renal cortex.           Assessment/Plan  Bacteremia  Assessment & Plan  1 of 2 BCx growing staph, likely contaminant    Acute renal failure (ARF) ([facility])  Assessment & Plan  Post obstructive  Avoid nephrotoxins, monitor inputs and outputs  R nephrostomy tube in place- I will d/w urology if plan to place new stent vs cont nephrostomy tube  Improved      Other hydronephrosis  Assessment & Plan  CT scan from outside facility noted double-J right-sided ureteral stent. Moderate to severe right-sided hydronephrosis persists with severe left-sided hydronephrosis and renal atrophy, unchanged.     S/p right nephrostomy tube  Repeat US fairly unchanged- IR Right nephrostogram showing adequate placement of tube    UTI (urinary tract infection)- (present on admission)  Assessment & Plan  Urine culture growing Candida albicans  On fluconazole- dose increased given improvement in renal function      Protein calorie malnutrition (HCC)- (present on admission)  Assessment &  Plan  BMI 18  Nutrition consult  Boost, MV    Leukocytosis- (present on admission)  Assessment & Plan  2/2 UTI/sepsis  Treat underlying conditions    Chronic, continuous use of opioids- (present on admission)  Assessment & Plan  Analgesia with oxycodone + Morphine    Benign prostate hyperplasia- (present on admission)  Assessment & Plan  Cont tamsulosin    Hypertension- (present on admission)  Assessment & Plan  Continue Home Medications  Labetalol ivp prn with parameters      Hyponatremia- (present on admission)  Assessment & Plan  - c/w NS, mild.  - IN AYANA  - F/u with Serum Osm, Urine Osm, Urine Na      COPD (chronic obstructive pulmonary disease) (HCC)- (present on admission)  Assessment & Plan  Not in acute exacerbation cont duonebs  Oxygen support      Metastatic Colon cancer (HCC)- (present on admission)  Assessment & Plan  Diagnosed 2016 treated with resection and chemo. Recurred in 2018 with pulmonary nodules biopsied showing adenocarcinoma.  Antiemetics    Follows with oncology Dr. Urbina as outpatient         VTE prophylaxis: heparin ppx    I have performed a physical exam and reviewed and updated ROS and Plan today (9/17/2021). In review of yesterday's note (9/16/2021), there are no changes except as documented above.

## 2021-09-17 NOTE — PROGRESS NOTES
Infectious Disease Progress Note    Author: Baljinder Aguirre M.D. Date & Time of service: 2021  3:48 PM    Chief Complaint:  Follow-up for pyelonephritis    Interval History:   T-max 99.2, white count 18.2, tolerating antimicrobials thus far.  Sleeping soundly this afternoon  9/15 patient is afebrile, white count down to 12.2.  Procalcitonin down as well.  Patient feels tired.   patient remains afebrile, leukocytosis is now resolved, 10,000 today.  Tolerating fluconazole.  Feeling much better.   patient remains afebrile, white count 10.8, creatinine continuing to improve, creatinine clearance is now greater than 50.  Microbiology lab corrected the blood culture results from  to now be Candida albicans positive from both sets.    Labs Reviewed and Medications Reviewed.    Review of Systems:  Review of Systems   Constitutional: Positive for malaise/fatigue. Negative for chills and fever.   Gastrointestinal: Negative for abdominal pain, diarrhea, nausea and vomiting.   Genitourinary: Negative for dysuria.   Skin: Negative for itching and rash.   All other systems reviewed and are negative.      Hemodynamics:  Temp (24hrs), Av.1 °C (98.8 °F), Min:36.9 °C (98.5 °F), Max:37.4 °C (99.3 °F)  Temperature: 36.9 °C (98.5 °F)  Pulse  Av.4  Min: 55  Max: 114   Blood Pressure: 158/92       Physical Exam:  Physical Exam  Vitals and nursing note reviewed.   Constitutional:       Appearance: He is ill-appearing. He is not toxic-appearing.      Comments: Cachectic appearing   HENT:      Head:      Comments: Temporal wasting noted  Eyes:      General: No scleral icterus.        Right eye: No discharge.         Left eye: No discharge.      Conjunctiva/sclera: Conjunctivae normal.   Cardiovascular:      Rate and Rhythm: Normal rate.      Heart sounds: No murmur heard.     Pulmonary:      Effort: Pulmonary effort is normal. No respiratory distress.      Breath sounds: No stridor.   Abdominal:      General:  There is no distension.      Tenderness: There is abdominal tenderness.      Comments: Right-sided nephrostomy tube in place   Musculoskeletal:         General: No swelling or tenderness.   Skin:     Findings: No erythema or rash.   Neurological:      General: No focal deficit present.      Mental Status: He is oriented to person, place, and time.   Psychiatric:         Behavior: Behavior normal.      Comments: Appropriate mood         Meds:    Current Facility-Administered Medications:   •  [START ON 2021] fluconazole  •  ipratropium-albuterol  •  diphenhydrAMINE  •  lactobacillus rhamnosus  •  albuterol  •  heparin  •  Pharmacy Consult Request  •  [] acetaminophen **FOLLOWED BY** acetaminophen  •  hydrocortisone  •  [Held by provider] losartan  •  predniSONE  •  tamsulosin  •  omeprazole  •  polyethylene glycol/lytes  •  senna-docusate **AND** polyethylene glycol/lytes **AND** magnesium hydroxide **AND** bisacodyl  •  Respiratory Therapy Consult  •  Pharmacy  •  ondansetron  •  ondansetron  •  promethazine  •  promethazine  •  prochlorperazine  •  sodium bicarbonate  •  ipratropium-albuterol  •  multivitamin  •  tiotropium  •  budesonide-formoterol  •  HYDROmorphone    Labs:  Recent Labs     09/15/21  0343 21  0941   WBC 12.2* 10.0 10.8   RBC 2.94* 2.90* 3.23*   HEMOGLOBIN 7.4* 7.2* 8.1*   HEMATOCRIT 22.7* 22.2* 25.0*   MCV 77.2* 76.6* 77.4*   MCH 25.2* 24.8* 25.1*   RDW 56.6* 55.1* 54.4*   PLATELETCT 386 352 413   MPV 10.0 9.3 9.0   NEUTSPOLYS  --  70.90  --    LYMPHOCYTES  --  17.60*  --    MONOCYTES  --  8.90  --    EOSINOPHILS  --  1.90  --    BASOPHILS  --  0.30  --      Recent Labs     09/15/21  0821  0941   SODIUM 138 139 137   POTASSIUM 4.3 4.0 3.7   CHLORIDE 104 103 100   CO2 22 21 26   GLUCOSE 135* 98 113*   BUN 35* 25* 13     Recent Labs     09/15/21  082121  0941   CREATININE 3.05* 2.06* 1.34        Imaging:  US-RENAL    Result Date: 9/13/2021 9/13/2021 11:01 AM HISTORY/REASON FOR EXAM:  decreased nephrostomy output. eval for continued hydronephrosis TECHNIQUE/EXAM DESCRIPTION: Renal ultrasound. COMPARISON:  09/11/2021 FINDINGS: The right kidney measures 14.53 cm. The left kidney measures 10.91 cm. There is moderate right hydronephrosis and severe left hydronephrosis. There are no abnormal calcifications. Right-sided nephrostomy tube is noted. The bladder demonstrates no focal wall abnormality. Trace amount of ascites is noted in the right upper quadrant.     1.  No change in moderate right hydronephrosis. 2.  There is severe left-sided hydronephrosis which is slightly more prominent than on prior exam.    US-RENAL    Result Date: 9/11/2021 9/11/2021 5:31 AM HISTORY/REASON FOR EXAM:  Abnormal Labs TECHNIQUE/EXAM DESCRIPTION:  Renal ultrasound. COMPARISON:  June 12, 2016 FINDINGS: The right kidney measures 15.42 cm.  The left kidney measures 12.36 cm. There is moderate right hydronephrosis. Severe left hydronephrosis is seen. The left renal cortex appears thinned and echogenic. There are no abnormal calcifications. The bladder is decompressed around a Soriano catheter.     1.  Moderate right and severe left hydronephrosis. 2.  Thin echogenic appearance of the left renal cortex.    IR-PERQ NEPH CATH NEW ACCESS (ALL RADIOLOGY) RIGHT    Result Date: 9/11/2021 9/11/2021 11:00 AM HISTORY/REASON FOR EXAM: Right Renal obstruction and pyonephrosis. TECHNIQUE/EXAM DESCRIPTION: Following informed consent the patient was prepped and draped in the usual fashion 10 cc of 1% lidocaine was utilized for local and subcutaneous anesthesia. SEDATION: Moderate sedation was provided. Pulse oximetry and continuous cardiac monitoring by the nurse was performed throughout the exam. Intraservice time was 30 minutes. Fluoroscopy images: 2 fluoroscopic images obtained. Fluoroscopy time: 0.3 minutes CONTRAST: 15 mL of Omnipaque 300  were utilized for the procedure. The procedure was performed utilizing MAXIMUM STERILE BARRIER technique including sterile gown, mass, cap, and under sterile gloves following appropriate hand hygiene and/or sterile scrub. The patient's skin site was prepped with 2% chlorhexidine Utilizing fluoroscopic and ultrasonic guidance the right kidney was accessed utilizing an 18-gauge introducer needle. 10 mLs of purulent urine was aspirated and sent to the lab for analysis. Nonionic contrast was injected and digital imaging was obtained  over the kidney and ureters. Subsequently following sequential dilatation a 8 [place] nephrostomy tube was placed in the collecting system and the affixed in place.  The patient tolerated procedure well and was sent to the floor in good condition. FINDINGS: There is good positioning of the right nephrostomy tube in the renal collecting system. Right-sided nephrostogram demonstrates moderate right-sided hydronephrosis with debris noted in the renal collecting system. There is a nonfunctioning right-sided ureteral stent in place.     1.  Successful percutaneous placement of right nephrostomy tube utilizing ultrasonic and fluoroscopic guidance. 2.  Aspiration of right renal collecting system yielded purulent urine which was sent to the lab for analysis. 3.  Right-sided nephrostogram demonstrated moderate hydronephrosis with a nonfunctioning right ureteral stent in place and debris noted in the right renal collecting system.      Micro:      Assessment:  Erich Ingram is a 56 y.o. male with  with known colon cancer with metastases status post resection, on chemo but has been unable to keep his appointments recently, was transferred from [facility] after he presented there with fevers and worsening urine output, noted to be in AYANA.  About 2 weeks prior, patient was at [place] for AYANA, was noted to have significant hydronephrosis, had a right ureteral stent placement there after it was  deemed that left kidney was nonfunctional.  Patient was doing well till above presentation.  CT scan at [facility] with moderate to severe right-sided hydronephrosis persisting, along with severe left-sided hydronephrosis and renal atrophy which is unchanged. On transfer to [facility], patient afebrile but with leukocytosis of 24. Creatinine up to 5.  On 9/11, patient had a right nephrostomy tube placement by IR. Cultures from this growing Yeast.      Pertinent Diagnoses:  Candidemia  Pyelonephritis, Candida albicans  Severe hydronephrosis  Obstructive uropathy, improving  Metastatic colon cancer  Positive blood culture    Plan:  -Continue renally dosed p.o. fluconazole, increase dose of fluconazole to 400 mg daily now with renal improvement  -Microbiology lab has now changed the blood culture results from 9/11 - 2 out of 2 Candida albicans  -Repeat blood cultures x2  -Check TTE  -Remove any indwelling lines  -Monitor for any new visual disturbances  -Trend white count and renal function, improving   -Anticipate a 2-week course of fluconazole from negative blood cultures, likely new stop date will be 9/30/2021 if repeat blood cultures are negative  -Further management per urology.  Prior to any invasive urological procedures, also recommend preprocedural antifungal therapy  -Repeat renal ultrasound today with resolved right hydronephrosis, unchanged left hydronephrosis     Plan was discussed with the primary team, Dr. Ewelina POTTER will follow. Please feel free to call with questions.

## 2021-09-17 NOTE — PROGRESS NOTES
Nephrology Daily Progress Note    Date of Service  9/17/2021    Chief Complaint  56 y.o. male admitted 9/11/2021 with urosepsis, AYANA, obstructive uropathy    Interval Problem Update  Patient is doing about the same, plan for IR to adjust nephrostomy tube this afternoon  9/15 patient feels better today  Better urine output after IR procedure yesterday  9/16 patient feels better, no chest pain no shortness of breath  9/17 patient is complaining of some back pain, possible related to his hemorrhoids  Also mild nausea  Review of Systems  Review of Systems   Constitutional: Negative for chills, fever and malaise/fatigue.   Respiratory: Negative for cough and shortness of breath.    Cardiovascular: Negative for chest pain and leg swelling.   Gastrointestinal: Positive for nausea. Negative for vomiting.   Genitourinary: Negative for dysuria, frequency and urgency.        Physical Exam  Temp:  [36.9 °C (98.5 °F)-37.4 °C (99.3 °F)] 36.9 °C (98.5 °F)  Pulse:  [60-90] 62  Resp:  [16-20] 16  BP: (143-158)/(85-97) 158/92  SpO2:  [92 %-97 %] 92 %    Physical Exam  Vitals and nursing note reviewed.   Constitutional:       General: He is not in acute distress.     Appearance: He is cachectic. He is ill-appearing.   HENT:      Head: Normocephalic and atraumatic.      Right Ear: External ear normal.      Left Ear: External ear normal.      Nose: Nose normal.   Eyes:      General:         Right eye: No discharge.         Left eye: No discharge.      Conjunctiva/sclera: Conjunctivae normal.   Cardiovascular:      Rate and Rhythm: Normal rate and regular rhythm.      Heart sounds: No murmur heard.     Pulmonary:      Effort: Pulmonary effort is normal. No respiratory distress.      Breath sounds: Normal breath sounds. No wheezing.   Musculoskeletal:         General: No tenderness or deformity.      Right lower leg: No edema.      Left lower leg: No edema.   Skin:     General: Skin is warm.   Neurological:      General: No focal deficit  present.      Mental Status: He is alert and oriented to person, place, and time.   Psychiatric:         Mood and Affect: Mood normal.         Behavior: Behavior normal.         Fluids    Intake/Output Summary (Last 24 hours) at 9/17/2021 1244  Last data filed at 9/17/2021 0836  Gross per 24 hour   Intake --   Output 3455 ml   Net -3455 ml       Laboratory  Recent Labs     09/15/21  0343 09/16/21  0154 09/17/21  0941   WBC 12.2* 10.0 10.8   RBC 2.94* 2.90* 3.23*   HEMOGLOBIN 7.4* 7.2* 8.1*   HEMATOCRIT 22.7* 22.2* 25.0*   MCV 77.2* 76.6* 77.4*   MCH 25.2* 24.8* 25.1*   MCHC 32.6* 32.4* 32.4*   RDW 56.6* 55.1* 54.4*   PLATELETCT 386 352 413   MPV 10.0 9.3 9.0     Recent Labs     09/15/21  0829 09/16/21  0154 09/17/21  0941   SODIUM 138 139 137   POTASSIUM 4.3 4.0 3.7   CHLORIDE 104 103 100   CO2 22 21 26   GLUCOSE 135* 98 113*   BUN 35* 25* 13   CREATININE 3.05* 2.06* 1.34   CALCIUM 8.5 8.1* 8.1*         No results for input(s): NTPROBNP in the last 72 hours.        Imaging  US-RENAL   Final Result      1.  Resolved RIGHT hydronephrosis   2.  Unchanged appearance of severe LEFT hydronephrosis      IR-NEPHROSTOGRAM THROUGH EXT CATH (ALL RADIOLOGY) RIGHT   Final Result         1. Nephrostogram showing satisfactory catheter position without extravasation or leak.      2. Double-J stent is in good position with some stagnation of contrast in the proximal ureter which may suggest obstruction of the stent.      US-RENAL   Final Result      1.  No change in moderate right hydronephrosis.      2.  There is severe left-sided hydronephrosis which is slightly more prominent than on prior exam.      IR-PERQ NEPH CATH NEW ACCESS (ALL RADIOLOGY) RIGHT   Final Result            1.  Successful percutaneous placement of right nephrostomy tube utilizing ultrasonic and fluoroscopic guidance.      2.  Aspiration of right renal collecting system yielded purulent urine which was sent to the lab for analysis.      3.  Right-sided  nephrostogram demonstrated moderate hydronephrosis with a nonfunctioning right ureteral stent in place and debris noted in the right renal collecting system.      OUTSIDE IMAGES-DX CHEST   Final Result      OUTSIDE IMAGES-CT ABDOMEN /PELVIS   Final Result      OUTSIDE IMAGES-CT ABDOMEN /PELVIS   Final Result      OUTSIDE IMAGES-CT CHEST   Final Result      US-RENAL   Final Result         1.  Moderate right and severe left hydronephrosis.   2.  Thin echogenic appearance of the left renal cortex.            Assessment/Plan  1 AYANA: Improved   2 obstructive uropathy  3 colon cancer  4 anemia: Hemoglobin is worse  5 sepsis  6 hypoalbuminemia  no need for HD  Renal diet  Renal dose all meds  Avoid nephrotoxins  Prognosis guarded.  We will sign off the case please call if needed

## 2021-09-17 NOTE — PROGRESS NOTES
Note to reader: this note follows the APSO format rather than the historical SOAP format. Assessment and plan located at the top of the note for ease of use.    Chief Complaint  56 y.o. year old male here with AYANA, right hydronephrosis, urosepsis     Assessment/Plan  Interval History   AYANA  Right hydronephrosis  Yeast UTI  Sepsis     Plan:  - RBUS today to ensure hydro has resolved  - Will plan for R NPT without internalization as his last ureteral stent placed in August failed.   - Antifungals per hospitalist  - Urology ok for DC when deemed clinically stable. We will arrange outpatient follow up.     Patient seen and examined    9/17. R NPT with clear yellow output. Some nausea this AM. Cr trending down, awaiting results from today but 9/16 was 2.06.     9/15. Good UOP. Less N/V. R nephrostogram ensured right neph tube in correct position with occluded ureter. Cr trending down now 4.19 (5.58)    9/14 oro output improved. SANGITA 9/13 showing no change in right hydronephrosis despite NT.    9/13. Minimal UOP in nephrostomy tube and oro overnight. Cr 5.81. Now with worsening flank pain and N/V.         Review of Systems  Physical Exam   Review of Systems   Constitutional: Negative for chills and fever.   Gastrointestinal: Positive for nausea. Negative for abdominal pain and vomiting.   Genitourinary: Negative for frequency, hematuria and urgency.   All other systems reviewed and are negative.    Vitals:    09/16/21 2002 09/17/21 0409 09/17/21 0718 09/17/21 0732   BP: 147/97 148/85 158/92    Pulse: 82 70 60 62   Resp: 18 18 16 16   Temp: 37 °C (98.6 °F) 37.4 °C (99.3 °F) 36.9 °C (98.5 °F)    TempSrc: Temporal Temporal Temporal    SpO2: 97% 94% 93% 92%   Weight:       Height:         Physical Exam  Vitals and nursing note reviewed.   Constitutional:       Appearance: Normal appearance.   HENT:      Head: Normocephalic and atraumatic.   Eyes:      Pupils: Pupils are equal, round, and reactive to light.   Pulmonary:       Effort: Pulmonary effort is normal.   Abdominal:      General: Abdomen is flat.   Genitourinary:     Comments: R NPT with clear yellow output  Soriano cath with clear yellow output  Skin:     General: Skin is warm and dry.   Neurological:      General: No focal deficit present.      Mental Status: He is alert and oriented to person, place, and time.   Psychiatric:         Mood and Affect: Mood normal.         Behavior: Behavior normal.         Thought Content: Thought content normal.         Judgment: Judgment normal.          Hematology Chemistry   Lab Results   Component Value Date/Time    WBC 10.0 09/16/2021 01:54 AM    HEMOGLOBIN 7.2 (L) 09/16/2021 01:54 AM    HEMATOCRIT 22.2 (L) 09/16/2021 01:54 AM    PLATELETCT 352 09/16/2021 01:54 AM     Lab Results   Component Value Date/Time    SODIUM 139 09/16/2021 01:54 AM    POTASSIUM 4.0 09/16/2021 01:54 AM    CHLORIDE 103 09/16/2021 01:54 AM    CO2 21 09/16/2021 01:54 AM    GLUCOSE 98 09/16/2021 01:54 AM    BUN 25 (H) 09/16/2021 01:54 AM    CREATININE 2.06 (H) 09/16/2021 01:54 AM         Labs not explicitly included in this progress note were reviewed by the author.   Radiology/imaging not explicitly included in this progress note was reviewed by the author.     Labs reviewed and Medications reviewed

## 2021-09-17 NOTE — PROGRESS NOTES
- Social work coordinating transportation for pt.  - Pt stated he may be able to coordinate a ride for tomorrow at 10:30am, pending confirmation

## 2021-09-17 NOTE — PROGRESS NOTES
Palliative Care   Patient sleeping; no family at bedside. Per EMR review patient feeling better since nephro tube fixed.     Shara Ospina A.P.R.N.  Palliative Care Nurse Practitioner  [phone]

## 2021-09-17 NOTE — PROGRESS NOTES
RBUS demonstrates right hydro has resolved. Cr now 1.34. Urology signing off. Will arrange outpatient follow up.

## 2021-09-17 NOTE — DISCHARGE PLANNING
Anticipated Discharge Disposition: Home.    Action: This case was discussed today during IDT Rounds. Per MD, this patient is medically clear to discharge home on PO ABX. Per MD, patient's wife was at bedside yesterday and received education on how to take care of the drains. Per RN, patient's wife is hesitant to take the patient home and is not able to pick him and take him home. MD stated she will talk to patient's wife after rounds and clarify any concerns regarding patient's medical condition. MD requested LSW arranges transportation via car from [facility] to patient's home in [place], [place] as his wife stated she is not able to pick him up.    LSW obtained authorization to cover the cost of [employer]. LSW consulted with MAGNOLIA H[facility] [place]. Per Flora, transportation to [place] requires that patient is ready to leave no later than 12:00pm, RN and MD aware.    At Approximately 12:40pm, AQUILES Rowan informed LSW the patient was still in the room. LSW explained, [employer] will not be able to transport this patient home today.    LSW faxed the Transportation Form and Facesheet to [facility].    Barriers to Discharge: Transportation.    Plan: Home, pending transportation arrangements.    2:00pm - Per Jessica Chandler Coordinator, [facility] transportation is scheduled for today at 1500, AQUILES Rowan notified via [employer].    2:45pm - Rosmery, Manager of Ride Line informed T is not able to transport patient today to [place] due to unforseen circumstances.  Per Yanet, [employer] for long distance trips have to be arranged by 1200. Yanet suggested the [facility]; however, Jessica Gordon reports the [facility] is not available for long distance trips.    3:20 - Per Crystal Holt, Manager of  and her advise to discharge patient to the shelter if unable to secure transportation home before Monday or to have the patient pay for [employer].    LSW spoke with Jessica Gonzales Coordinator. Per Rosmery Gonzales is  scheduling transportation via [employer] for tomorrow Saturday September 18, 2021 and will provide the information to LSW and RN via [employer] later today, AQUILES Rowan notified by LSW via [employer].    PLAN: Ride Line to provide information regarding the transfer for RN via [employer].

## 2021-09-17 NOTE — DISCHARGE PLANNING
Anticipated Discharge Disposition: Home.    Action: 9/16/21 - This case was discussed during IDT Rounds. Per MD, patient will discharge home on PO Antibiotics and Outpatient Follow Up.    Barriers to Discharge: Medical Clearance.    Plan: As Above.

## 2021-09-17 NOTE — CARE PLAN
Problem: Nutritional:  Goal: Achieve adequate nutritional intake  Description: Patient will consume >50% of meals  Outcome: Met    PO intake 50-75% x4 meals and % 1 meal since 9/15.

## 2021-09-17 NOTE — DISCHARGE SUMMARY
Discharge Summary    CHIEF COMPLAINT ON ADMISSION  No chief complaint on file.      Reason for Admission  Acute renal failure, urinary reten*     Admission Date  9/11/2021    CODE STATUS  Full Code    HPI & HOSPITAL COURSE       57 y/o male with past medical history of colon cancer with metastasis status post resection on chemo, nonfunctioning left kidney, recent right renal [facility] ago [facility] for hydr[facility]rosis transferred from outside facility for sepsis due to UTI and AYANA.  CT scan from outside facility noted double-J right-sided ureteral stent. Moderate to severe right-sided hydronephrosis persists with severe left-sided hydronephrosis and renal atrophy, unchanged.  He underwent right nephrostomy tube placement on 9/11.  Urine cultures ultimately grew Candida so ID was consulted and patient was started on fluconazole and antibiotics changed from Zyvox and Zosyn to IV Unasyn, antibiotics were ultimately discontinued by ID.  2 of 2 blood cultures grew Candida and antifungal course was extended until 9/30.  Echocardiogram completed prior to discharge with no evidence of echocarditis.  Repeat blood cultures done on 9/17 NGTD.  Repeat renal ultrasound prior to discharge showing resolution of right hydronephrosis.   His renal function has continued to improve. I had d/w urology about plans for nephrostomy tube however given failure of stents last month they feel patient may require nephrostomy tube life long. I d/w patient's wife and answered all of her questions.         Therefore, he is discharged in good and stable condition to home with close outpatient follow-up.    The patient met 2-midnight criteria for an inpatient stay at the time of discharge.    Discharge Date  9/18/21    FOLLOW UP ITEMS POST DISCHARGE  Prior to any invasive urological procedures- recommend preprocedural antifungal therapy ( I have voalted GIOVANI Starr and updated her about this)    DISCHARGE DIAGNOSES  Active Problems:     Metastatic Colon cancer (HCC) POA: Yes      Overview: diagnosed 2016 treated with resection and chemo      had recurrence 08/2018, pulmonary nodules biopsied at that time showed       adenocarcinoma    COPD (chronic obstructive pulmonary disease) (HCC) POA: Yes    Hyponatremia POA: Yes    Hypertension POA: Yes    Benign prostate hyperplasia POA: Yes    Chronic, continuous use of opioids POA: Yes    Leukocytosis POA: Yes    Protein calorie malnutrition (HCC) POA: Yes    UTI (urinary tract infection) POA: Yes    Other hydronephrosis POA: Unknown    Acute renal failure (ARF) (HCC) POA: Unknown    Bacteremia POA: Unknown  Resolved Problems:    Sepsis (HCC) POA: Yes    Nausea & vomiting POA: Yes    PNA (pneumonia) POA: Yes    High anion gap metabolic acidosis POA: Unknown      FOLLOW UP    Linette Starr, P.A.-C.  [address]  [place] [id number] [phone]    In 4 weeks        MEDICATIONS ON DISCHARGE     Medication List      START taking these medications      Instructions   fluconazole 200 MG Tabs  Commonly known as: DIFLUCAN   Take 2 Tablets by mouth every day for 12 days.  Dose: 400 mg     fluticasone-salmeterol 100-50 MCG/DOSE Aepb  Commonly known as: Advair   Inhale 1 Puff 2 times a day.  Dose: 1 Puff        CONTINUE taking these medications      Instructions   hydrocortisone rectal 2.5 % Crea  Commonly known as: PROCTOZONE HC   Insert 1 Application in rectum 3 times a day as needed.  Dose: 1 Application     HYDROmorphone 4 MG Tabs  Commonly known as: DILAUDID   Take 4 mg by mouth every 6 hours as needed ([employer] Pain).  Dose: 4 mg     lansoprazole 30 MG Cpdr  Commonly known as: PREVACID   Take 30 mg by mouth every day.  Dose: 30 mg     losartan 50 MG Tabs  Commonly known as: COZAAR   Take 50 mg by mouth every day.  Dose: 50 mg     NON SPECIFIED   Pain pump has Dilaudid and Ketamine     ondansetron 4 MG Tbdp  Commonly known as: ZOFRAN ODT   Take 8 mg by mouth every 6 hours as needed for  "Nausea.  Dose: 8 mg     polyethylene glycol/lytes 17 g Pack  Commonly known as: MIRALAX   Take 1 Packet by mouth every day. Can use up to 2 times daily if no bowel movement in 24 hours.  Dose: 17 g     predniSONE 5 MG Tabs  Commonly known as: DELTASONE   Take 5 mg by mouth every day.  Dose: 5 mg     promethazine 50 MG Tabs  Commonly known as: PHENERGAN   Take 50 mg by mouth every 6 hours as needed for Nausea/Vomiting.  Dose: 50 mg     sennosides-docusate sodium 8.6-50 MG tablet  Commonly known as: SENOKOT-S   Doctor's comments: Hold if BMs are loose  Take 1 Tab by mouth every day.  Dose: 1 Tablet     tamsulosin 0.4 MG capsule  Commonly known as: FLOMAX   Take 0.4 mg by mouth ONE-HALF HOUR AFTER BREAKFAST.  Dose: 0.4 mg            Allergies  Allergies   Allergen Reactions   • Levaquin Vomiting   • Nubain [Nalbuphine] Anxiety     \"made him feel paranoid\"       DIET  Orders Placed This Encounter   Procedures   • Diet Order Diet: Renal     Standing Status:   Standing     Number of Occurrences:   1     Order Specific Question:   Diet:     Answer:   Renal [8]       ACTIVITY  As tolerated.  Weight bearing as tolerated    CONSULTATIONS  Urology  Nephrology  ID    PROCEDURES  Nephrostomy tube placement    LABORATORY  Lab Results   Component Value Date    SODIUM 137 09/17/2021    POTASSIUM 3.7 09/17/2021    CHLORIDE 100 09/17/2021    CO2 26 09/17/2021    GLUCOSE 113 (H) 09/17/2021    BUN 13 09/17/2021    CREATININE 1.34 09/17/2021        Lab Results   Component Value Date    WBC 10.8 09/17/2021    HEMOGLOBIN 8.1 (L) 09/17/2021    HEMATOCRIT 25.0 (L) 09/17/2021    PLATELETCT 413 09/17/2021        Total time of the discharge process exceeds 41 minutes.  "

## 2021-09-17 NOTE — PROGRESS NOTES
Bedside report received, assessment completed    A&O x  4, pt calls appropriately  Mobility: Up self w/ SBA  Fall Risk Assessment: low, bed alarm n/a  Pain Assessment: 6/10, medication provided per MAR  Diet: Renal  LDA:   IV Access: 20G RFA, CDI/ flushed/ Infusing SL  Nephrostomy Right  oro void, + flatus, + BM  DVT Prophylaxis: +, SCD refused    Procedures:    - 9/11 Right Nephrostomy tube Placement    - 9/14 Right Nephrostogram confirmed placement  D/C Plan: Home with no needs, follow up out-pt with urology    Reviewed plan of care with patient, bed in lowest position and locked, pt resting comfortably now, call light within reach, all needs met at this time. Interventions will be executed per plan of care

## 2021-09-17 NOTE — PROGRESS NOTES
Pt is A&O 4  Pain medicated per MAR  Pt has a pain pump located on the LLQ under the skin    + nausea, medicated per MAR  Tolerating a renal diet   Incision -  + Drains, R nephrostomy tube   + Voids via oro and neph tube   + flatus   9/15 BM  Up SBA  SCD's on  Bed alarm off, pt low fall risk per ai [facility]  Reviewed plan of care with patient, bed in lowest position and locked, pt resting comfortably now, call light within reach, all needs met at this time. Interventions will be executed per plan of care

## 2021-09-18 ENCOUNTER — APPOINTMENT (OUTPATIENT)
Dept: CARDIOLOGY | Facility: MEDICAL CENTER | Age: 56
DRG: 871 | End: 2021-09-18
Attending: HOSPITALIST
Payer: COMMERCIAL

## 2021-09-18 VITALS
WEIGHT: 146.61 LBS | RESPIRATION RATE: 18 BRPM | TEMPERATURE: 98.2 F | HEART RATE: 85 BPM | BODY MASS INDEX: 18.23 KG/M2 | HEIGHT: 75 IN | DIASTOLIC BLOOD PRESSURE: 99 MMHG | OXYGEN SATURATION: 94 % | SYSTOLIC BLOOD PRESSURE: 141 MMHG

## 2021-09-18 LAB — LV EJECT FRACT  99904: 55

## 2021-09-18 PROCEDURE — 700102 HCHG RX REV CODE 250 W/ 637 OVERRIDE(OP): Performed by: STUDENT IN AN ORGANIZED HEALTH CARE EDUCATION/TRAINING PROGRAM

## 2021-09-18 PROCEDURE — A9270 NON-COVERED ITEM OR SERVICE: HCPCS | Performed by: STUDENT IN AN ORGANIZED HEALTH CARE EDUCATION/TRAINING PROGRAM

## 2021-09-18 PROCEDURE — 700102 HCHG RX REV CODE 250 W/ 637 OVERRIDE(OP): Performed by: HOSPITALIST

## 2021-09-18 PROCEDURE — 94640 AIRWAY INHALATION TREATMENT: CPT

## 2021-09-18 PROCEDURE — 700111 HCHG RX REV CODE 636 W/ 250 OVERRIDE (IP): Performed by: STUDENT IN AN ORGANIZED HEALTH CARE EDUCATION/TRAINING PROGRAM

## 2021-09-18 PROCEDURE — 99239 HOSP IP/OBS DSCHRG MGMT >30: CPT | Performed by: HOSPITALIST

## 2021-09-18 PROCEDURE — 94760 N-INVAS EAR/PLS OXIMETRY 1: CPT

## 2021-09-18 PROCEDURE — 93306 TTE W/DOPPLER COMPLETE: CPT

## 2021-09-18 PROCEDURE — 700101 HCHG RX REV CODE 250: Performed by: STUDENT IN AN ORGANIZED HEALTH CARE EDUCATION/TRAINING PROGRAM

## 2021-09-18 PROCEDURE — 93306 TTE W/DOPPLER COMPLETE: CPT | Mod: 26 | Performed by: INTERNAL MEDICINE

## 2021-09-18 PROCEDURE — A9270 NON-COVERED ITEM OR SERVICE: HCPCS | Performed by: HOSPITALIST

## 2021-09-18 RX ORDER — FLUCONAZOLE 200 MG/1
400 TABLET ORAL DAILY
Qty: 24 TABLET | Refills: 0 | Status: SHIPPED | OUTPATIENT
Start: 2021-09-18 | End: 2021-09-30

## 2021-09-18 RX ADMIN — SODIUM BICARBONATE 650 MG: 650 TABLET ORAL at 08:53

## 2021-09-18 RX ADMIN — HYDROMORPHONE HYDROCHLORIDE 4 MG: 4 TABLET ORAL at 05:30

## 2021-09-18 RX ADMIN — HEPARIN SODIUM 5000 UNITS: 5000 INJECTION, SOLUTION INTRAVENOUS; SUBCUTANEOUS at 05:31

## 2021-09-18 RX ADMIN — Medication 1 CAPSULE: at 08:50

## 2021-09-18 RX ADMIN — IPRATROPIUM BROMIDE AND ALBUTEROL SULFATE 3 ML: .5; 2.5 SOLUTION RESPIRATORY (INHALATION) at 07:25

## 2021-09-18 RX ADMIN — ONDANSETRON 4 MG: 4 TABLET, ORALLY DISINTEGRATING ORAL at 10:39

## 2021-09-18 RX ADMIN — THERA TABS 1 TABLET: TAB at 05:30

## 2021-09-18 RX ADMIN — TIOTROPIUM BROMIDE INHALATION SPRAY 5 MCG: 3.12 SPRAY, METERED RESPIRATORY (INHALATION) at 07:25

## 2021-09-18 RX ADMIN — ONDANSETRON 4 MG: 4 TABLET, ORALLY DISINTEGRATING ORAL at 00:42

## 2021-09-18 RX ADMIN — SODIUM BICARBONATE 650 MG: 650 TABLET ORAL at 13:23

## 2021-09-18 RX ADMIN — ONDANSETRON 4 MG: 4 TABLET, ORALLY DISINTEGRATING ORAL at 05:30

## 2021-09-18 RX ADMIN — TAMSULOSIN HYDROCHLORIDE 0.4 MG: 0.4 CAPSULE ORAL at 08:50

## 2021-09-18 RX ADMIN — PROMETHAZINE HYDROCHLORIDE 25 MG: 25 TABLET ORAL at 03:21

## 2021-09-18 RX ADMIN — HYDROMORPHONE HYDROCHLORIDE 4 MG: 4 TABLET ORAL at 00:42

## 2021-09-18 RX ADMIN — PREDNISONE 5 MG: 5 TABLET ORAL at 05:30

## 2021-09-18 RX ADMIN — OMEPRAZOLE 20 MG: 20 CAPSULE, DELAYED RELEASE ORAL at 05:30

## 2021-09-18 RX ADMIN — HYDROMORPHONE HYDROCHLORIDE 4 MG: 4 TABLET ORAL at 10:39

## 2021-09-18 RX ADMIN — FLUCONAZOLE 400 MG: 200 TABLET ORAL at 05:30

## 2021-09-18 ASSESSMENT — PAIN DESCRIPTION - PAIN TYPE
TYPE: CHRONIC PAIN
TYPE: CHRONIC PAIN

## 2021-09-18 NOTE — DISCHARGE PLANNING
Anticipated Discharge Disposition:   Home    Action:   Per RN Irina, Pt's Friend to come today and provide transport for Pt.    Barriers to Discharge:   None    Plan:   CM to continue to assist Pt with discharge as needed

## 2021-09-18 NOTE — PROGRESS NOTES
Bedside report received.  Assessment complete.  A&O x 4. Patient calls appropriately.  Patient ambulates with no assist.    Patient has 6/10 pain. Pain managed with prescribed medications.  Complains of nausea without vomiting. Tolerating renal diet.  + void via oro, + flatus, - BM.  Patient denies SOB.  SCDs off    Review plan with of care with patient. Call light and personal belongings within reach. Hourly rounding in place. All needs met at this time.

## 2021-09-18 NOTE — PROGRESS NOTES
Patient discharged to home with friend and staff escort. IV discontinued. Prescriptions sent to patient's preferred pharmacy. Discharge instructions given regarding nephrostomy tube care, oro catheter care, follow up appointments, infection, and when to seek medical attention.  Education provided, patient asked questions and verbalized understanding. Discharge paperwork signed and in chart. Patient is tolerating diet, stable when ambulating, and pain is well controlled. All belongings collected. No further questions or concerns at this time.    Patient sent home with extra oro care supplies and nephrostomy tube supplies

## 2021-09-18 NOTE — PROGRESS NOTES
- Per pt. He has scheduled his own ride home for tomorrow morning at 10:30.  - Ok to cancel [facility] transportation scheduled for 9/18 @ 1400

## 2021-09-18 NOTE — DISCHARGE INSTRUCTIONS
Prior to any invasive urological procedures-  recommend preprocedural antifungal therapy  Continue fluconazole until Sept 30th    Discharge Instructions    Discharged to home by medical transportation with escort. Discharged via wheelchair, hospital escort: Yes.  Special equipment needed: Not Applicable    Be sure to schedule a follow-up appointment with your primary care doctor or any specialists as instructed.     Discharge Plan:   Diet Plan: Discussed  Activity Level: Discussed  Confirmed Follow up Appointment: Patient to Call and Schedule Appointment  Confirmed Symptoms Management: Discussed  Medication Reconciliation Updated: Yes    I understand that a diet low in cholesterol, fat, and sodium is recommended for good health. Unless I have been given specific instructions below for another diet, I accept this instruction as my diet prescription.   Other diet: Renal    Special Instructions: None    · Is patient discharged on Warfarin / Coumadin?   No     Depression / Suicide Risk    As you are discharged from this [facility] facility, it is important to learn how to keep safe from harming yourself.    Recognize the warning signs:  · Abrupt changes in personality, positive or negative- including increase in energy   · Giving away possessions  · Change in eating patterns- significant weight changes-  positive or negative  · Change in sleeping patterns- unable to sleep or sleeping all the time   · Unwillingness or inability to communicate  · Depression  · Unusual sadness, discouragement and loneliness  · Talk of wanting to die  · Neglect of personal appearance   · Rebelliousness- reckless behavior  · Withdrawal from people/activities they love  · Confusion- inability to concentrate     If you or a loved one observes any of these behaviors or has concerns about self-harm, here's what you can do:  · Talk about it- your feelings and reasons for harming yourself  · Remove any means that you might use to hurt yourself  (examples: pills, rope, extension cords, firearm)  · Get professional help from the community (Mental Health, Substance Abuse, psychological counseling)  · Do not be alone:Call your Safe Contact- someone whom you trust who will be there for you.  · Call your local CRISIS HOTLINE 809-1498 or 792-340-2569  · Call your local Children's Mobile Crisis Response Team [place] (205) 013-0361 or www.Inventalator  · Call the toll free National Suicide Prevention Hotlines   · National Suicide Prevention Lifeline 368-503-[facility] (9135)  · National Hope Line Network 800-SUICIDE (193-5311)      Food Basics for Chronic Kidney Disease  When your kidneys are not working well, they cannot remove waste and excess substances from your blood as effectively as they did before. This can lead to a buildup and imbalance of these substances, which can worsen kidney damage and affect how your body functions. Certain foods lead to a buildup of these substances in the body. By changing your diet as recommended by your diet and nutrition specialist (dietitian) or health care provider, you could help prevent further kidney damage and delay or prevent the need for dialysis.  What are tips for following this plan?  General instructions    · Work with your health care provider and dietitian to develop a meal plan that is right for you. Foods you can eat, limit, or avoid will be different for each person depending on the stage of kidney disease and any other existing health conditions.  · Talk with your health care provider about whether you should take a vitamin and mineral supplement.  · Use standard measuring cups and spoons to measure servings of foods. Use a kitchen scale to measure portions of protein foods.  · If directed by your health care provider, avoid drinking too much fluid. Measure and count all liquids, including water, ice, soups, flavored gelatin, and frozen desserts such as popsicles or ice cream.  Reading food  labels  · Check the amount of sodium in foods. Choose foods that have less than 300 milligrams (mg) per serving.  · Check the ingredient list for phosphorus or potassium-based additives or preservatives.  · Check the amount of saturated and trans fat. Limit or avoid these fats as told by your dietitian.  Shopping  · Avoid buying foods that are:  ? Processed, frozen, or prepackaged.  ? Calcium-enriched or fortified.  · Do not buy foods that have salt or sodium listed among the first five ingredients.  · Do not buy canned vegetables.  Cooking  · Replace animal proteins, such as meat, fish, eggs, or dairy, with plant proteins from beans, nuts, and soy.  ? Use soy milk instead of cow's milk.  ? Add beans or tofu to soups, casseroles, or pasta dishes instead of meat.  · Soak vegetables, such as potatoes, before cooking to reduce potassium. To do this:  ? Peel and cut into small pieces.  ? Soak in warm water for at least 2 hours. For every 1 cup of vegetables, use 10 cups of water.  ? Drain and rinse with warm water.  ? Boil for at least 5 minutes.  Meal planning  · Limit the amount of protein from plant and animal sources you eat each day.  · Do not add salt to food when cooking or before eating.  · Eat meals and snacks at around the same time each day.  If you have diabetes:  · If you have diabetes (diabetes mellitus) and chronic kidney disease, it is important to keep your blood glucose in the target range recommended by your health care provider. Follow your diabetes management plan. This may include:  ? Checking your blood glucose regularly.  ? Taking oral medicines, insulin, or both.  ? Exercising for at least 30 minutes on 5 or more days each week, or as told by your health care provider.  ? Tracking how many servings of carbohydrates you eat at each meal.  · You may be given specific guidelines on how much of certain foods and nutrients you may eat, depending on your stage of kidney disease and whether you have  high blood pressure (hypertension). Follow your meal plan as told by your dietitian.  What nutrients should be limited?  The items listed are not a complete list. Talk with your dietitian about what dietary choices are best for you.  Potassium  Potassium affects how steadily your heart beats. If too much potassium builds up in your blood, it can cause an irregular heartbeat or even a heart attack.  You may need to eat less potassium, depending on your blood potassium levels and the stage of kidney disease. Talk to your dietitian about how much potassium you may have each day.  You may need to limit or avoid foods that are high in potassium, such as:  · Milk and soy milk.  · Fruits, such as bananas, papaya, apricots, nectarines, melon, prunes, raisins, kiwi, and oranges.  · Vegetables, such as potatoes, sweet potatoes, yams, tomatoes, leafy greens, beets, okra, avocado, pumpkin, and winter squash.  · White and lima beans.  Phosphorus  Phosphorus is a mineral found in your bones. A balance between calcium and phosphorous is needed to build and maintain healthy bones. Too much phosphorus pulls calcium from your bones. This can make your bones weak and more likely to break. Too much phosphorus can also make your skin itch.  You may need to eat less phosphorus depending on your blood phosphorus levels and the stage of kidney disease. Talk to your dietitian about how much potassium you may have each day. You may need to take medicine to lower your blood phosphorus levels if diet changes do not help.  You may need to limit or avoid foods that are high in phosphorus, such as:  · Milk and dairy products.  · Dried beans and peas.  · Tofu, soy milk, and other soy-based meat replacements.  · Tatyana.  · Nuts and peanut butter.  · Meat, poultry, and fish.  · Bran cereals and oatmeals.  Protein  Protein helps you to make and keep muscle. It also helps in the repair of your body’s cells and tissues. One of the natural breakdown  products of protein is a waste product called urea. When your kidneys are not working properly, they cannot remove wastes, such as urea, like they did before you developed chronic kidney disease. Reducing how much protein you eat can help prevent a buildup of urea in your blood.  Depending on your stage of kidney disease, you may need to limit foods that are high in protein. Sources of animal protein include:  · Meat (all types).  · Fish and seafood.  · Poultry.  · Eggs.  · Dairy.  Other protein foods include:  · Beans and legumes.  · Nuts and nut butter.  · Soy and tofu.  Sodium  Sodium, which is found in salt, helps maintain a healthy balance of fluids in your body. Too much sodium can increase your blood pressure and have a negative effect on the function of your heart and lungs. Too much sodium can also cause your body to retain too much fluid, making your kidneys work harder.  Most people should have less than 2,300 milligrams (mg) of sodium each day. If you have hypertension, you may need to limit your sodium to 1,500 mg each day. Talk to your dietitian about how much sodium you may have each day.  You may need to limit or avoid foods that are high in sodium, such as:  · Salt seasonings.  · Soy sauce.  · Cured and processed meats.  · Salted crackers and snack foods.  · Fast food.  · Canned soups and most canned foods.  · Pickled foods.  · Vegetable juice.  · Boxed mixes or ready-to-eat boxed meals and side dishes.  · Bottled dressings, sauces, and marinades.  Summary  · Chronic kidney disease can lead to a buildup and imbalance of waste and excess substances in the body. Certain foods lead to a buildup of these substances. By adjusting your intake of these foods, you could help prevent more kidney damage and delay or prevent the need for dialysis.  · Food adjustments are different for each person with chronic kidney disease. Work with a dietitian to set up nutrient goals and a meal plan that is right for  you.  · If you have diabetes and chronic kidney disease, it is important to keep your blood glucose in the target range recommended by your health care provider.  This information is not intended to replace advice given to you by your health care provider. Make sure you discuss any questions you have with your health care provider.  Document Released: 03/09/2004 Document Revised: 04/09/2020 Document Reviewed: 12/13/2017  Elsevier Patient Education © 2020 [employer]      Percutaneous Nephrostomy Home Guide  Percutaneous nephrostomy is a procedure to insert a flexible tube into your kidney so that urine can leave your body. This procedure may be done if a medical condition prevents urine from leaving your kidney in the usual way. During the procedure, the nephrostomy tube is inserted in the right or left side of your lower back and is connected to an external drainage bag.  After you have a nephrostomy tube placed, urine will collect in the drainage bag outside of your body. You will need to empty and change the drainage bag as needed. You will also need to take steps to care for the area where the nephrostomy tube was inserted (tube insertion site).  How do I care for my nephrostomy tube?  · Always keep your tubing, the leg bag, or the bedside drainage bag below the level of your kidney so that your urine drains freely.  · Avoid activities that would cause bending or pulling of your tubing. Ask your health care provider what activities are safe for you.  · When connecting your nephrostomy tube to a drainage bag, make sure that there are no kinks in the tubing and that your urine is draining freely. You may want to gently wrap an elastic bandage over the tubing. This will help keep the tubing in place and prevent it from kinking. Make sure there is no tension on the tubing so it does not become dislodged.  · At night, you may want to connect your nephrostomy tube or the leg bag to a larger bedside drainage  bag.  How do I empty the drainage bag?  Empty the leg bag or bedside drainage bag whenever it becomes ? full. Also empty it before you go to sleep. Most drainage bags have a drain at the bottom that allows urine to be emptied. Follow these basic steps:  2. Hold the drainage bag over a toilet or collection container. Use a measuring container if your health care provider told you to measure your urine.  3. Open the drain of the bag and allow the urine to drain out.  4. After all the urine has drained from the drainage bag, close the drain fully.  5. Flush the urine down the toilet. If a collection container was used, rinse the container.  How do I change the dressing around the nephrostomy tube?  Change your dressing and clean your tube exit site as told by your health care provider. You may need to change the dressing every day for the first 2 weeks after having a nephrostomy tube inserted. After the first 2 weeks, you may be told to change the dressing two times a week.  Supplies needed:  · Mild soap and water.  · Split gauze pads, 4 × 4 inches (10 x 10 cm).  · Gauze pads, 4 × 4 inches (10 x 10 cm).  · Paper tape.  How to change the dressing:  Because of the location of your nephrostomy tube, you may need help from another person to complete dressing changes. Follow these basic steps:  2. Wash hands with soap and water.  3. Gently remove the tape and dressing from around the nephrostomy tube. Be careful not to pull on the tube while removing the dressing. Avoid using scissors because they may damage the tube.  4. Wash the skin around the tube with mild soap and water, rinse well, and pat the skin dry with a clean cloth.  5. Check the skin around the drain for redness, swelling, pus, warmth, or a bad smell.  6. If the drain was sutured to the skin, check the suture to verify that it is still anchored in the skin.  7. Place two split gauze pads in and around the tube exit site. Do not apply ointments or alcohol to  the site.  8. Place a gauze pad on top of the split gauze pad.  9. Coil the tube on top of the gauze. The tubing should rest on the gauze, not on the skin.  10. Place tape around each edge of the gauze pad.  11. Secure the nephrostomy tubing. Make sure that the tube does not kink or become pinched. The tubing should rest on the gauze pad, not on the skin.  12. Dispose of used supplies properly.    How do I flush my nephrostomy tube?  Use a saline syringe to rinse out (flush) your nephrostomy tube as told by your health care provider. Flushing is easier if a three-way stopcock is placed between the tube and the drainage bag. One connection of the stopcock connects to your tube, the second connects to the drainage bag, and the third is usually covered with a cap. The lever on the stopcock points to the direction on the stopcock that is closed to flow. Normally, the lever points in the direction of the cap to allow urine to drain from the tube to the drainage bag.  Supplies needed:  · Rubbing alcohol wipe.  · 10 mL 0.9% saline syringe.  How to flush the tube:  1. Move the lever of the three-way stopcock so it points toward the drainage bag.  2. Clean the cap with a rubbing alcohol wipe.  3. Screw the tip of a 10 mL 0.9% saline syringe onto the cap.  4. Using the syringe plunger, slowly push the 10 mL 0.9% saline in the syringe over 5-10 seconds. If resistance is met or pain occurs while pushing, stop pushing the saline.  5. Remove the syringe from the cap.  6. Return the stopcock lever to the usual position, pointing in the direction of the cap.  7. Dispose of used supplies properly.  How do I replace the drainage bag?  Replace the drainage bag, three-way stopcock, and any extension tubing as told by your health care provider. Make sure you always have an extra drainage bag and connecting tubing available.  1. Empty urine from your drainage bag.  2. Gather a new drainage bag, three-way stopcock, and any extension  tubing.  3. Remove the drainage bag, three-way stopcock, and any extension tubing from the nephrostomy tube.  4. Attach the new leg bag or bedside drainage bag, three-way stopcock, and any extension tubing to the nephrostomy tube.  5. Dispose of the used drainage bag, three-way stopcock, and any extension.  Contact a health care provider if:  · You have problems with any of the valves or tubing.  · You have persistent pain or soreness in your back.  · You have more redness, swelling, or pain around your tube insertion site.  · You have more fluid or blood coming from your tube insertion site.  · Your tube insertion site feels warm to the touch.  · You have pus or a bad smell coming from your tube insertion site.  · You have increased urine output or you feel burning when urinating.  Get help right away if:  · You have pain in your abdomen during the first week.  · You have chest pain or have trouble breathing.  · You have a new appearance of blood in your urine.  · You have a fever or chills.  · You have back pain that is not relieved by your medicine.  · You have decreased urine output.  · Your nephrostomy tube comes out.  This information is not intended to replace advice given to you by your health care provider. Make sure you discuss any questions you have with your health care provider.  Document Released: 10/08/2014 Document Revised: 04/09/2020 Document Reviewed: 09/29/2017  Elsevier Patient Education © 2020 [employer]      Indwelling Urinary Catheter Care, Adult  An indwelling urinary catheter is a thin tube that is put into your bladder. The tube helps to drain pee (urine) out of your body. The tube goes in through your urethra. Your urethra is where pee comes out of your body. Your pee will come out through the catheter, then it will go into a bag (drainage bag).  Take good care of your catheter so it will work well.  How to wear your catheter and bag  Supplies needed  · Sticky tape (adhesive tape) or a  leg strap.  · Alcohol wipe or soap and water (if you use tape).  · A clean towel (if you use tape).  · Large overnight bag.  · Smaller bag (leg bag).  Wearing your catheter  Attach your catheter to your leg with tape or a leg strap.  · Make sure the catheter is not pulled tight.  · If a leg strap gets wet, take it off and put on a dry strap.  · If you use tape to hold the bag on your le. Use an alcohol wipe or soap and water to wash your skin where the tape made it sticky before.  2. Use a clean towel to pat-dry that skin.  3. Use new tape to make the bag stay on your leg.  Wearing your bags  You should have been given a large overnight bag.  · You may wear the overnight bag in the day or night.  · Always have the overnight bag lower than your bladder.  Do not let the bag touch the floor.  · Before you go to sleep, put a clean plastic bag in a wastebasket. Then hang the overnight bag inside the wastebasket.  You should also have a smaller leg bag that fits under your clothes.  · Always wear the leg bag below your knee.  · Do not wear your leg bag at night.  How to care for your skin and catheter  Supplies needed  · A clean washcloth.  · Water and mild soap.  · A clean towel.  Caring for your skin and catheter         · Clean the skin around your catheter every day:  ? Wash your hands with soap and water.  ? Wet a clean washcloth in warm water and mild soap.  ? Clean the skin around your urethra.  ? If you are female:  ? Gently spread the folds of skin around your vagina (labia).  ? With the washcloth in your other hand, wipe the inner side of your labia on each side. Wipe from front to back.  ? If you are male:  ? Pull back any skin that covers the end of your penis (foreskin).  ? With the washcloth in your other hand, wipe your penis in small circles. Start wiping at the tip of your penis, then move away from the catheter.  ? Move the foreskin back in place, if needed.  ? With your free hand, hold the catheter  close to where it goes into your body.  ? Keep holding the catheter during cleaning so it does not get pulled out.  ? With the washcloth in your other hand, clean the catheter.  ? Only wipe downward on the catheter.  ? Do not wipe upward toward your body. Doing this may push germs into your urethra and cause infection.  ? Use a clean towel to pat-dry the catheter and the skin around it. Make sure to wipe off all soap.  ? Wash your hands with soap and water.  · Shower every day. Do not take baths.  · Do not use cream, ointment, or lotion on the area where the catheter goes into your body, unless your doctor tells you to.  · Do not use powders, sprays, or lotions on your genital area.  · Check your skin around the catheter every day for signs of infection. Check for:  ? Redness, swelling, or pain.  ? Fluid or blood.  ? Warmth.  ? Pus or a bad smell.  How to empty the bag  Supplies needed  · Rubbing alcohol.  · Gauze pad or cotton ball.  · Tape or a leg strap.  Emptying the bag  Pour the pee out of your bag when it is ?-½ full, or at least 2-3 times a day. Do this for your overnight bag and your leg bag.  1. Wash your hands with soap and water.  2. Separate (detach) the bag from your leg.  3. Hold the bag over the toilet or a clean pail. Keep the bag lower than your hips and bladder. This is so the pee (urine) does not go back into the tube.  4. Open the pour spout. It is at the bottom of the bag.  5. Empty the pee into the toilet or pail. Do not let the pour spout touch any surface.  6. Put rubbing alcohol on a gauze pad or cotton ball.  7. Use the gauze pad or cotton ball to clean the pour spout.  8. Close the pour spout.  9. Attach the bag to your leg with tape or a leg strap.  10. Wash your hands with soap and water.  Follow instructions for cleaning the drainage bag:  · From the product maker.  · As told by your doctor.  How to change the bag  Supplies needed  · Alcohol wipes.  · A clean bag.  · Tape or a leg  strap.  Changing the bag  Replace your bag when it starts to leak, smell bad, or look dirty.  1. Wash your hands with soap and water.  2. Separate the dirty bag from your leg.  3. Pinch the catheter with your fingers so that pee does not spill out.  4. Separate the catheter tube from the bag tube where these tubes connect (at the connection valve). Do not let the tubes touch any surface.  5. Clean the end of the catheter tube with an alcohol wipe. Use a different alcohol wipe to clean the end of the bag tube.  6. Connect the catheter tube to the tube of the clean bag.  7. Attach the clean bag to your leg with tape or a leg strap. Do not make the bag tight on your leg.  8. Wash your hands with soap and water.  General rules    · Never pull on your catheter. Never try to take it out. Doing that can hurt you.  · Always wash your hands before and after you touch your catheter or bag. Use a mild, fragrance-free soap. If you do not have soap and water, use hand .  · Always make sure there are no twists or bends (kinks) in the catheter tube.  · Always make sure there are no leaks in the catheter or bag.  · Drink enough fluid to keep your pee pale yellow.  · Do not take baths, swim, or use a hot tub.  · If you are female, wipe from front to back after you poop (have a bowel movement).  Contact a doctor if:  · Your pee is cloudy.  · Your pee smells worse than usual.  · Your catheter gets clogged.  · Your catheter leaks.  · Your bladder feels full.  Get help right away if:  · You have redness, swelling, or pain where the catheter goes into your body.  · You have fluid, blood, pus, or a bad smell coming from the area where the catheter goes into your body.  · Your skin feels warm where the catheter goes into your body.  · You have a fever.  · You have pain in your:  ? Belly (abdomen).  ? Legs.  ? Lower back.  ? Bladder.  · You see blood in the catheter.  · Your pee is pink or red.  · You feel sick to your stomach  (nauseous).  · You throw up (vomit).  · You have chills.  · Your pee is not draining into the bag.  · Your catheter gets pulled out.  Summary  · An indwelling urinary catheter is a thin tube that is placed into the bladder to help drain pee (urine) out of the body.  · The catheter is placed into the part of the body that drains pee from the bladder (urethra).  · Taking good care of your catheter will keep it working properly and help prevent problems.  · Always wash your hands before and after touching your catheter or bag.  · Never pull on your catheter or try to take it out.  This information is not intended to replace advice given to you by your health care provider. Make sure you discuss any questions you have with your health care provider.  Document Released: 04/14/2014 Document Revised: 04/10/2020 Document Reviewed: 08/03/2018  Elsevier Patient Education © 2020 [employer]

## 2021-09-20 LAB
BACTERIA BLD CULT: ABNORMAL
FUNGAL MIC INTERP  7292: NORMAL
SIGNIFICANT IND 70042: ABNORMAL
SIGNIFICANT IND 70042: ABNORMAL
SIGNIFICANT IND 70042: NORMAL
SITE SITE: ABNORMAL
SITE SITE: ABNORMAL
SITE SITE: NORMAL
SOURCE SOURCE: ABNORMAL
SOURCE SOURCE: ABNORMAL
SOURCE SOURCE: NORMAL

## 2021-10-11 ENCOUNTER — APPOINTMENT (OUTPATIENT)
Dept: RADIOLOGY | Facility: MEDICAL CENTER | Age: 56
DRG: 699 | End: 2021-10-11
Attending: EMERGENCY MEDICINE
Payer: COMMERCIAL

## 2021-10-11 ENCOUNTER — HOSPITAL ENCOUNTER (INPATIENT)
Facility: MEDICAL CENTER | Age: 56
LOS: 2 days | DRG: 699 | End: 2021-10-13
Attending: EMERGENCY MEDICINE | Admitting: STUDENT IN AN ORGANIZED HEALTH CARE EDUCATION/TRAINING PROGRAM
Payer: COMMERCIAL

## 2021-10-11 LAB
ALBUMIN SERPL BCP-MCNC: 4.6 G/DL (ref 3.2–4.9)
ALBUMIN/GLOB SERPL: 1.4 G/DL
ALP SERPL-CCNC: 156 U/L (ref 30–99)
ALT SERPL-CCNC: 18 U/L (ref 2–50)
ANION GAP SERPL CALC-SCNC: 14 MMOL/L (ref 7–16)
APPEARANCE UR: CLEAR
APTT PPP: 29.5 SEC (ref 24.7–36)
AST SERPL-CCNC: 19 U/L (ref 12–45)
BACTERIA #/AREA URNS HPF: NEGATIVE /HPF
BASOPHILS # BLD AUTO: 0.4 % (ref 0–1.8)
BASOPHILS # BLD: 0.03 K/UL (ref 0–0.12)
BILIRUB SERPL-MCNC: 0.3 MG/DL (ref 0.1–1.5)
BILIRUB UR QL STRIP.AUTO: NEGATIVE
BUN SERPL-MCNC: 7 MG/DL (ref 8–22)
CALCIUM SERPL-MCNC: 9.7 MG/DL (ref 8.5–10.5)
CHLORIDE SERPL-SCNC: 96 MMOL/L (ref 96–112)
CO2 SERPL-SCNC: 23 MMOL/L (ref 20–33)
COLOR UR: YELLOW
CREAT SERPL-MCNC: 0.8 MG/DL (ref 0.5–1.4)
EOSINOPHIL # BLD AUTO: 0.04 K/UL (ref 0–0.51)
EOSINOPHIL NFR BLD: 0.5 % (ref 0–6.9)
EPI CELLS #/AREA URNS HPF: NEGATIVE /HPF
ERYTHROCYTE [DISTWIDTH] IN BLOOD BY AUTOMATED COUNT: 65 FL (ref 35.9–50)
GLOBULIN SER CALC-MCNC: 3.4 G/DL (ref 1.9–3.5)
GLUCOSE BLD-MCNC: 109 MG/DL (ref 65–99)
GLUCOSE SERPL-MCNC: 123 MG/DL (ref 65–99)
GLUCOSE UR STRIP.AUTO-MCNC: NEGATIVE MG/DL
HCT VFR BLD AUTO: 35.4 % (ref 42–52)
HGB BLD-MCNC: 11.2 G/DL (ref 14–18)
HYALINE CASTS #/AREA URNS LPF: ABNORMAL /LPF
IMM GRANULOCYTES # BLD AUTO: 0.03 K/UL (ref 0–0.11)
IMM GRANULOCYTES NFR BLD AUTO: 0.4 % (ref 0–0.9)
INR PPP: 1.09 (ref 0.87–1.13)
INR PPP: 1.11 (ref 0.87–1.13)
KETONES UR STRIP.AUTO-MCNC: NEGATIVE MG/DL
LACTATE BLD-SCNC: 1.2 MMOL/L (ref 0.5–2)
LEUKOCYTE ESTERASE UR QL STRIP.AUTO: ABNORMAL
LIPASE SERPL-CCNC: 21 U/L (ref 11–82)
LYMPHOCYTES # BLD AUTO: 1.47 K/UL (ref 1–4.8)
LYMPHOCYTES NFR BLD: 18.6 % (ref 22–41)
MAGNESIUM SERPL-MCNC: 1.8 MG/DL (ref 1.5–2.5)
MCH RBC QN AUTO: 26.6 PG (ref 27–33)
MCHC RBC AUTO-ENTMCNC: 31.6 G/DL (ref 33.7–35.3)
MCV RBC AUTO: 84.1 FL (ref 81.4–97.8)
MICRO URNS: ABNORMAL
MONOCYTES # BLD AUTO: 0.66 K/UL (ref 0–0.85)
MONOCYTES NFR BLD AUTO: 8.4 % (ref 0–13.4)
NEUTROPHILS # BLD AUTO: 5.66 K/UL (ref 1.82–7.42)
NEUTROPHILS NFR BLD: 71.7 % (ref 44–72)
NITRITE UR QL STRIP.AUTO: NEGATIVE
NRBC # BLD AUTO: 0 K/UL
NRBC BLD-RTO: 0 /100 WBC
PH UR STRIP.AUTO: 7 [PH] (ref 5–8)
PLATELET # BLD AUTO: 436 K/UL (ref 164–446)
PMV BLD AUTO: 8.9 FL (ref 9–12.9)
POTASSIUM SERPL-SCNC: 4.1 MMOL/L (ref 3.6–5.5)
PROT SERPL-MCNC: 8 G/DL (ref 6–8.2)
PROT UR QL STRIP: 30 MG/DL
PROTHROMBIN TIME: 13.8 SEC (ref 12–14.6)
PROTHROMBIN TIME: 14 SEC (ref 12–14.6)
RBC # BLD AUTO: 4.21 M/UL (ref 4.7–6.1)
RBC # URNS HPF: ABNORMAL /HPF
RBC UR QL AUTO: ABNORMAL
SARS-COV+SARS-COV-2 AG RESP QL IA.RAPID: NOTDETECTED
SODIUM SERPL-SCNC: 133 MMOL/L (ref 135–145)
SP GR UR STRIP.AUTO: 1.01
SPECIMEN SOURCE: NORMAL
UROBILINOGEN UR STRIP.AUTO-MCNC: 1 MG/DL
WBC # BLD AUTO: 7.9 K/UL (ref 4.8–10.8)
WBC #/AREA URNS HPF: ABNORMAL /HPF

## 2021-10-11 PROCEDURE — 0T9330Z DRAINAGE OF RIGHT KIDNEY PELVIS WITH DRAINAGE DEVICE, PERCUTANEOUS APPROACH: ICD-10-PCS | Performed by: RADIOLOGY

## 2021-10-11 PROCEDURE — A9270 NON-COVERED ITEM OR SERVICE: HCPCS | Performed by: STUDENT IN AN ORGANIZED HEALTH CARE EDUCATION/TRAINING PROGRAM

## 2021-10-11 PROCEDURE — 85610 PROTHROMBIN TIME: CPT

## 2021-10-11 PROCEDURE — 96375 TX/PRO/DX INJ NEW DRUG ADDON: CPT

## 2021-10-11 PROCEDURE — 700117 HCHG RX CONTRAST REV CODE 255: Performed by: EMERGENCY MEDICINE

## 2021-10-11 PROCEDURE — 82962 GLUCOSE BLOOD TEST: CPT

## 2021-10-11 PROCEDURE — 83605 ASSAY OF LACTIC ACID: CPT

## 2021-10-11 PROCEDURE — 87086 URINE CULTURE/COLONY COUNT: CPT

## 2021-10-11 PROCEDURE — 99291 CRITICAL CARE FIRST HOUR: CPT

## 2021-10-11 PROCEDURE — 85025 COMPLETE CBC W/AUTO DIFF WBC: CPT

## 2021-10-11 PROCEDURE — 83735 ASSAY OF MAGNESIUM: CPT

## 2021-10-11 PROCEDURE — 700111 HCHG RX REV CODE 636 W/ 250 OVERRIDE (IP): Performed by: RADIOLOGY

## 2021-10-11 PROCEDURE — 87040 BLOOD CULTURE FOR BACTERIA: CPT

## 2021-10-11 PROCEDURE — 99153 MOD SED SAME PHYS/QHP EA: CPT

## 2021-10-11 PROCEDURE — 80053 COMPREHEN METABOLIC PANEL: CPT

## 2021-10-11 PROCEDURE — 85730 THROMBOPLASTIN TIME PARTIAL: CPT

## 2021-10-11 PROCEDURE — 36415 COLL VENOUS BLD VENIPUNCTURE: CPT

## 2021-10-11 PROCEDURE — 99223 1ST HOSP IP/OBS HIGH 75: CPT | Performed by: STUDENT IN AN ORGANIZED HEALTH CARE EDUCATION/TRAINING PROGRAM

## 2021-10-11 PROCEDURE — 700111 HCHG RX REV CODE 636 W/ 250 OVERRIDE (IP)

## 2021-10-11 PROCEDURE — 87186 SC STD MICRODIL/AGAR DIL: CPT

## 2021-10-11 PROCEDURE — 81001 URINALYSIS AUTO W/SCOPE: CPT

## 2021-10-11 PROCEDURE — 83690 ASSAY OF LIPASE: CPT

## 2021-10-11 PROCEDURE — 0TJ53ZZ INSPECTION OF KIDNEY, PERCUTANEOUS APPROACH: ICD-10-PCS | Performed by: RADIOLOGY

## 2021-10-11 PROCEDURE — 96365 THER/PROPH/DIAG IV INF INIT: CPT

## 2021-10-11 PROCEDURE — 770001 HCHG ROOM/CARE - MED/SURG/GYN PRIV*

## 2021-10-11 PROCEDURE — 700111 HCHG RX REV CODE 636 W/ 250 OVERRIDE (IP): Performed by: EMERGENCY MEDICINE

## 2021-10-11 PROCEDURE — 700102 HCHG RX REV CODE 250 W/ 637 OVERRIDE(OP): Performed by: STUDENT IN AN ORGANIZED HEALTH CARE EDUCATION/TRAINING PROGRAM

## 2021-10-11 PROCEDURE — 160002 HCHG RECOVERY MINUTES (STAT)

## 2021-10-11 PROCEDURE — 87426 SARSCOV CORONAVIRUS AG IA: CPT

## 2021-10-11 PROCEDURE — 700111 HCHG RX REV CODE 636 W/ 250 OVERRIDE (IP): Performed by: STUDENT IN AN ORGANIZED HEALTH CARE EDUCATION/TRAINING PROGRAM

## 2021-10-11 RX ORDER — LOSARTAN POTASSIUM 50 MG/1
50 TABLET ORAL DAILY
Status: DISCONTINUED | OUTPATIENT
Start: 2021-10-12 | End: 2021-10-13 | Stop reason: HOSPADM

## 2021-10-11 RX ORDER — HYDROMORPHONE HYDROCHLORIDE 2 MG/ML
2 INJECTION, SOLUTION INTRAMUSCULAR; INTRAVENOUS; SUBCUTANEOUS ONCE
Status: COMPLETED | OUTPATIENT
Start: 2021-10-11 | End: 2021-10-11

## 2021-10-11 RX ORDER — PROCHLORPERAZINE EDISYLATE 5 MG/ML
5-10 INJECTION INTRAMUSCULAR; INTRAVENOUS EVERY 4 HOURS PRN
Status: DISCONTINUED | OUTPATIENT
Start: 2021-10-11 | End: 2021-10-13 | Stop reason: HOSPADM

## 2021-10-11 RX ORDER — PROMETHAZINE HYDROCHLORIDE 25 MG/1
12.5-25 TABLET ORAL EVERY 4 HOURS PRN
Status: DISCONTINUED | OUTPATIENT
Start: 2021-10-11 | End: 2021-10-13 | Stop reason: HOSPADM

## 2021-10-11 RX ORDER — MIDAZOLAM HYDROCHLORIDE 1 MG/ML
INJECTION INTRAMUSCULAR; INTRAVENOUS
Status: COMPLETED
Start: 2021-10-11 | End: 2021-10-11

## 2021-10-11 RX ORDER — ONDANSETRON 4 MG/1
4 TABLET, ORALLY DISINTEGRATING ORAL EVERY 4 HOURS PRN
Status: DISCONTINUED | OUTPATIENT
Start: 2021-10-11 | End: 2021-10-13 | Stop reason: HOSPADM

## 2021-10-11 RX ORDER — ONDANSETRON 2 MG/ML
4 INJECTION INTRAMUSCULAR; INTRAVENOUS PRN
Status: DISCONTINUED | OUTPATIENT
Start: 2021-10-11 | End: 2021-10-11

## 2021-10-11 RX ORDER — DOCUSATE SODIUM 100 MG/1
100 CAPSULE, LIQUID FILLED ORAL 2 TIMES DAILY
COMMUNITY

## 2021-10-11 RX ORDER — IPRATROPIUM BROMIDE AND ALBUTEROL SULFATE 2.5; .5 MG/3ML; MG/3ML
3 SOLUTION RESPIRATORY (INHALATION)
Status: DISCONTINUED | OUTPATIENT
Start: 2021-10-12 | End: 2021-10-13 | Stop reason: HOSPADM

## 2021-10-11 RX ORDER — HYDROMORPHONE HYDROCHLORIDE 4 MG/1
4 TABLET ORAL
Status: DISCONTINUED | OUTPATIENT
Start: 2021-10-11 | End: 2021-10-13 | Stop reason: HOSPADM

## 2021-10-11 RX ORDER — DOCUSATE SODIUM 100 MG/1
100 CAPSULE, LIQUID FILLED ORAL 2 TIMES DAILY
Status: DISCONTINUED | OUTPATIENT
Start: 2021-10-11 | End: 2021-10-13 | Stop reason: HOSPADM

## 2021-10-11 RX ORDER — PANTOPRAZOLE SODIUM 40 MG/1
40 TABLET, DELAYED RELEASE ORAL DAILY
COMMUNITY

## 2021-10-11 RX ORDER — CEFTRIAXONE 1 G/1
1 INJECTION, POWDER, FOR SOLUTION INTRAMUSCULAR; INTRAVENOUS ONCE
Status: COMPLETED | OUTPATIENT
Start: 2021-10-11 | End: 2021-10-11

## 2021-10-11 RX ORDER — POLYETHYLENE GLYCOL 3350 17 G/17G
1 POWDER, FOR SOLUTION ORAL
Status: DISCONTINUED | OUTPATIENT
Start: 2021-10-11 | End: 2021-10-13 | Stop reason: HOSPADM

## 2021-10-11 RX ORDER — MORPHINE SULFATE 10 MG/ML
6 INJECTION, SOLUTION INTRAMUSCULAR; INTRAVENOUS
Status: DISCONTINUED | OUTPATIENT
Start: 2021-10-11 | End: 2021-10-13 | Stop reason: HOSPADM

## 2021-10-11 RX ORDER — MORPHINE SULFATE 15 MG/1
15 TABLET ORAL
Status: DISCONTINUED | OUTPATIENT
Start: 2021-10-11 | End: 2021-10-13 | Stop reason: HOSPADM

## 2021-10-11 RX ORDER — IPRATROPIUM BROMIDE AND ALBUTEROL SULFATE 2.5; .5 MG/3ML; MG/3ML
3 SOLUTION RESPIRATORY (INHALATION) 4 TIMES DAILY
Status: DISCONTINUED | OUTPATIENT
Start: 2021-10-11 | End: 2021-10-11

## 2021-10-11 RX ORDER — TAMSULOSIN HYDROCHLORIDE 0.4 MG/1
0.4 CAPSULE ORAL DAILY
Status: DISCONTINUED | OUTPATIENT
Start: 2021-10-12 | End: 2021-10-13 | Stop reason: HOSPADM

## 2021-10-11 RX ORDER — LABETALOL HYDROCHLORIDE 5 MG/ML
10 INJECTION, SOLUTION INTRAVENOUS EVERY 4 HOURS PRN
Status: DISCONTINUED | OUTPATIENT
Start: 2021-10-11 | End: 2021-10-13 | Stop reason: HOSPADM

## 2021-10-11 RX ORDER — ONDANSETRON 2 MG/ML
4 INJECTION INTRAMUSCULAR; INTRAVENOUS EVERY 4 HOURS PRN
Status: DISCONTINUED | OUTPATIENT
Start: 2021-10-11 | End: 2021-10-13 | Stop reason: HOSPADM

## 2021-10-11 RX ORDER — PROMETHAZINE HYDROCHLORIDE 25 MG/1
12.5-25 SUPPOSITORY RECTAL EVERY 4 HOURS PRN
Status: DISCONTINUED | OUTPATIENT
Start: 2021-10-11 | End: 2021-10-13 | Stop reason: HOSPADM

## 2021-10-11 RX ORDER — BISACODYL 10 MG
10 SUPPOSITORY, RECTAL RECTAL
Status: DISCONTINUED | OUTPATIENT
Start: 2021-10-11 | End: 2021-10-13 | Stop reason: HOSPADM

## 2021-10-11 RX ORDER — MIDAZOLAM HYDROCHLORIDE 1 MG/ML
.5-2 INJECTION INTRAMUSCULAR; INTRAVENOUS PRN
Status: ACTIVE | OUTPATIENT
Start: 2021-10-11 | End: 2021-10-11

## 2021-10-11 RX ORDER — ACETAMINOPHEN 325 MG/1
650 TABLET ORAL EVERY 6 HOURS PRN
Status: DISCONTINUED | OUTPATIENT
Start: 2021-10-11 | End: 2021-10-13 | Stop reason: HOSPADM

## 2021-10-11 RX ORDER — OMEPRAZOLE 20 MG/1
20 CAPSULE, DELAYED RELEASE ORAL DAILY
Status: DISCONTINUED | OUTPATIENT
Start: 2021-10-12 | End: 2021-10-13 | Stop reason: HOSPADM

## 2021-10-11 RX ORDER — NALDEMEDINE 0.2 MG/1
0.2 TABLET ORAL DAILY
COMMUNITY

## 2021-10-11 RX ORDER — ENALAPRILAT 1.25 MG/ML
1.25 INJECTION INTRAVENOUS EVERY 6 HOURS PRN
Status: DISCONTINUED | OUTPATIENT
Start: 2021-10-11 | End: 2021-10-13 | Stop reason: HOSPADM

## 2021-10-11 RX ORDER — DEXTROSE MONOHYDRATE 25 G/50ML
50 INJECTION, SOLUTION INTRAVENOUS
Status: DISCONTINUED | OUTPATIENT
Start: 2021-10-11 | End: 2021-10-13 | Stop reason: HOSPADM

## 2021-10-11 RX ORDER — PREDNISONE 10 MG/1
5 TABLET ORAL DAILY
Status: DISCONTINUED | OUTPATIENT
Start: 2021-10-12 | End: 2021-10-13 | Stop reason: HOSPADM

## 2021-10-11 RX ORDER — SODIUM CHLORIDE 9 MG/ML
500 INJECTION, SOLUTION INTRAVENOUS
Status: ACTIVE | OUTPATIENT
Start: 2021-10-11 | End: 2021-10-11

## 2021-10-11 RX ORDER — MORPHINE SULFATE 15 MG/1
30 TABLET ORAL
Status: DISCONTINUED | OUTPATIENT
Start: 2021-10-11 | End: 2021-10-13 | Stop reason: HOSPADM

## 2021-10-11 RX ORDER — AMOXICILLIN 250 MG
2 CAPSULE ORAL 2 TIMES DAILY
Status: DISCONTINUED | OUTPATIENT
Start: 2021-10-11 | End: 2021-10-13 | Stop reason: HOSPADM

## 2021-10-11 RX ORDER — ONDANSETRON 2 MG/ML
4 INJECTION INTRAMUSCULAR; INTRAVENOUS ONCE
Status: COMPLETED | OUTPATIENT
Start: 2021-10-11 | End: 2021-10-11

## 2021-10-11 RX ORDER — IPRATROPIUM BROMIDE AND ALBUTEROL SULFATE 2.5; .5 MG/3ML; MG/3ML
3 SOLUTION RESPIRATORY (INHALATION) 4 TIMES DAILY
COMMUNITY
End: 2021-10-17

## 2021-10-11 RX ADMIN — FENTANYL CITRATE 50 MCG: 50 INJECTION INTRAMUSCULAR; INTRAVENOUS at 19:05

## 2021-10-11 RX ADMIN — MIDAZOLAM HYDROCHLORIDE 1 MG: 1 INJECTION, SOLUTION INTRAMUSCULAR; INTRAVENOUS at 19:05

## 2021-10-11 RX ADMIN — HYDROMORPHONE HYDROCHLORIDE 4 MG: 4 TABLET ORAL at 20:34

## 2021-10-11 RX ADMIN — MORPHINE SULFATE 6 MG: 10 INJECTION INTRAVENOUS at 23:49

## 2021-10-11 RX ADMIN — CEFTRIAXONE SODIUM 1 G: 1 INJECTION, POWDER, FOR SOLUTION INTRAMUSCULAR; INTRAVENOUS at 18:43

## 2021-10-11 RX ADMIN — HYDROMORPHONE HYDROCHLORIDE 2 MG: 2 INJECTION, SOLUTION INTRAMUSCULAR; INTRAVENOUS; SUBCUTANEOUS at 15:12

## 2021-10-11 RX ADMIN — IOHEXOL 22 ML: 300 INJECTION, SOLUTION INTRAVENOUS at 19:43

## 2021-10-11 RX ADMIN — FENTANYL CITRATE 25 MCG: 50 INJECTION, SOLUTION INTRAMUSCULAR; INTRAVENOUS at 19:15

## 2021-10-11 RX ADMIN — MIDAZOLAM HYDROCHLORIDE 1 MG: 1 INJECTION, SOLUTION INTRAMUSCULAR; INTRAVENOUS at 19:15

## 2021-10-11 RX ADMIN — ONDANSETRON 4 MG: 2 INJECTION INTRAMUSCULAR; INTRAVENOUS at 14:33

## 2021-10-11 RX ADMIN — FENTANYL CITRATE 50 MCG: 50 INJECTION, SOLUTION INTRAMUSCULAR; INTRAVENOUS at 19:05

## 2021-10-11 ASSESSMENT — PAIN DESCRIPTION - PAIN TYPE
TYPE: CHRONIC PAIN;SURGICAL PAIN
TYPE: ACUTE PAIN
TYPE: ACUTE PAIN
TYPE: CHRONIC PAIN;SURGICAL PAIN
TYPE: ACUTE PAIN
TYPE: CHRONIC PAIN;SURGICAL PAIN
TYPE: ACUTE PAIN

## 2021-10-11 ASSESSMENT — PATIENT HEALTH QUESTIONNAIRE - PHQ9
SUM OF ALL RESPONSES TO PHQ9 QUESTIONS 1 AND 2: 0
2. FEELING DOWN, DEPRESSED, IRRITABLE, OR HOPELESS: NOT AT ALL
1. LITTLE INTEREST OR PLEASURE IN DOING THINGS: NOT AT ALL

## 2021-10-11 ASSESSMENT — FIBROSIS 4 INDEX
FIB4 SCORE: 0.6
FIB4 SCORE: 0.67

## 2021-10-11 NOTE — ED TRIAGE NOTES
Vitals:    10/11/21 1121   BP: (!) 163/108   Pulse: 93   Resp: 18   Temp: 36.6 °C (97.8 °F)   SpO2: 99%     Chief Complaint   Patient presents with   • Sent by MD   • Flank Pain     Pt had a right nephrostomy tube placed about 3 weeks ago for hydronephrosis. Pt's nephrostomy tubed came out a couple nights ago (it remains inside but no longer properly positioned) when he was turning in bed. He felt a sharp pain at that time. He was evaluated at [facility] last night and the urologist (of [facility] of [place]) instructed pt to present to [facility] ED today. Per pt last night he was given an IM injection of ABX to cover him for 24 hours for a UTI.     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     Family room is currently occupied therefore pt is placed in hallway near yellow pod with curtain up.     Protocol ordered. Blue top also collected. Urine sent.

## 2021-10-11 NOTE — ED NOTES
Pt brought back to [address] from triage. Pt able to transfer self to bed, in gown, on monitor, call light in reach. Chart up for ERP.

## 2021-10-11 NOTE — ED NOTES
Blood drawn and sent to lab. Medicated per MAR. Updated on POC. Lights dimmed. Monitoring in place. Call light within reach.

## 2021-10-11 NOTE — ED PROVIDER NOTES
ED Provider Note    CHIEF COMPLAINT  Chief Complaint   Patient presents with   • Sent by MD   • Flank Pain       HPI  Erich Ingram is a 56 y.o. male who presents to the emergency department with a tortuous history.  The patient does have history of colon cancer with bilateral hydronephrosis.  He was seen at [facility] in August 2021 when he had ureteral stent placed in the right and found that he had an nonfunctioning left kidney.  The patient subsequently came here to the emergency department on 14 September and sepsis as well as acute kidney injury and a nephrostomy tube was placed in the right kidney.  The patient did have a yeast infection, he had a urinary tract infection with bacteria, he received IV antibiotics as well as anti-uncle medication.  Sepsis resolved, his renal function improved he was discharged.  The patient states that last night he was in bed, he rolled over and felt the nephrostomy tube being displaced from his kidney had a sharp pain.  Since that time, he has been known urine output only blood from urostomy.  In addition, his Soriano catheter has been draining fluid.  Denies fever, shakes, chills, sweats, nausea, vomiting.  The patient to go to the [facility] and was diagnosed with a UTI and Dr. Wilkins was consulted.  Dr. Wilkins asked the patient to come to the [facility] for nephrostomy evaluation with interventional radiology.  The patient came today and states that his pain is severe, denies fever, nausea, vomiting, lightness, dizziness.  Did receive intermittent muscular injection of an antibiotic last night prior to transfer to our facility.    REVIEW OF SYSTEMS  Positives as above. Pertinent negatives include fever, shakes, chills, sweats, nausea, all other 10 review of systems are negative    PAST MEDICAL HISTORY  Past Medical History:   Diagnosis Date   • Arthritis 01/24/2020    Shoulders   • Bowel habit changes 01/24/2020     "Uses Miralax   • Breath shortness     COPD   • Cancer (HCC)     colon ca 2016   • Cancer (HCC) 2018    L retroperitoneal cavity   • COPD (chronic obstructive pulmonary disease) (HCC)    • Dental disorder 2020    Upper dentures, lower teeth \"in bad shape\"   • 2019   • Heart burn    • Hepatitis C     low viral load per spouse   • Hypertension    • Indigestion    • PICC (peripherally inserted central catheter) in place 2019   • Psychiatric problem     anxiety   • Renal disorder     rt kidney is only functioning kidney due to cancer        FAMILY HISTORY  Noncontributory    SOCIAL HISTORY  Social History     Socioeconomic History   • Marital status:      Spouse name: Not on file   • Number of children: Not on file   • Years of education: Not on file   • Highest education level: Not on file   Occupational History   • Not on file   Tobacco Use   • Smoking status: Former Smoker     Packs/day: 1.00     Years: 15.00     Pack years: 15.00     Start date: 2001     Quit date: 2015     Years since quittin.3   • Smokeless tobacco: Never Used   Vaping Use   • Vaping Use: Former   Substance and Sexual Activity   • Alcohol use: Not Currently     Alcohol/week: 0.0 oz   • Drug use: Yes     Types: Inhaled, Oral     Comment: marijuana   • Sexual activity: Not on file   Other Topics Concern   • Primary/coprimary nurse & associates Not Asked   • Family contact information Not Asked   • OK to release patient information to the following Not Asked   • Patient preferred routine/Privacy concerns Not Asked   • Patient likes and dislikes Not Asked   • Participating In Research Study Not Asked   • Miscellaneous Not Asked   Social History Narrative   • Not on file     Social Determinants of Health     Financial Resource Strain:    • Difficulty of Paying Living Expenses:    Food Insecurity:    • Worried About Running Out of Food in the Last Year:    • Ran Out of Food in the Last Year:  " "  Transportation Needs:    • Lack of Transportation (Medical):    • Lack of Transportation (Non-Medical):    Physical Activity:    • Days of Exercise per Week:    • Minutes of Exercise per Session:    Stress:    • Feeling of Stress :    Social Connections:    • Frequency of Communication with Friends and Family:    • Frequency of Social Gatherings with Friends and Family:    • Attends [employer] Services:    • Active Member of Clubs or Organizations:    • Attends Club or Organization Meetings:    • Marital Status:    Intimate Partner Violence:    • Fear of Current or Ex-Partner:    • Emotionally Abused:    • Physically Abused:    • Sexually Abused:        SURGICAL HISTORY  Past Surgical History:   Procedure Laterality Date   • PUMP REVISION Left 1/28/2020    Procedure: REVISION, INSERTION, OR REPLACEMENT, INTRATHECAL ANALGESIC PUMP;  Surgeon: Candelario Reina M.D.;  Location: SURGERY [facility];  Service: Pain Management   • PUMP REVISION  7/9/2019    Procedure: REVISION, INSERTION, OR REPLACEMENT, INTRATHECAL ANALGESIC PUMP;  Surgeon: Candelario Reina M.D.;  Location: SURGERY [facility];  Service: Pain Management   • BRONCHOSCOPY-ENDO N/A 3/1/2019    Procedure: BRONCHOSCOPY-ENDO;  Surgeon: Katelin Garner M.D.;  Location: ENDOSCOPY [facility];  Service: Pulmonary   • UROLOGY SURGERY Left 2018    \"severed testicle\" per spouse   • CATH PLACEMENT  3/16/2016    Procedure: CATH PLACEMENT POWER PORT ;  Surgeon: Luke Lima M.D.;  Location: SURGERY [facility];  Service:    • LOW ANTERIOR RESECTION LAPAROSCOPIC  1/12/2016    Procedure: LOW ANTERIOR RESECTION LAPAROSCOPIC;  Surgeon: Luke Lima M.D.;  Location: SURGERY [facility];  Service:    • COLONOSCOPY N/A 2016   • OTHER ORTHOPEDIC SURGERY Right     Rt knee ACL   • OTHER ORTHOPEDIC SURGERY Right     Rt knee meniscus repair x2   • OTHER ORTHOPEDIC SURGERY Left     Lt knee meniscus repair x2       CURRENT MEDICATIONS  Home " "Medications    **Home medications have not yet been reviewed for this encounter**         ALLERGIES  Allergies   Allergen Reactions   • Levaquin Vomiting   • Nubain [Nalbuphine] Anxiety     \"made him feel paranoid\"       PHYSICAL EXAM  VITAL SIGNS: /93   Pulse 86   Temp 36.6 °C (97.8 °F) (Temporal)   Resp 18   Ht 1.905 m (6' 3\")   Wt 61.2 kg (135 lb)   SpO2 96%   BMI 16.87 kg/m²      Constitutional: Well developed, Well nourished, No acute distress, Non-toxic appearance.   Eyes: PERRLA, EOMI, Conjunctiva normal, No discharge.   Cardiovascular: Normal heart rate, Normal rhythm, No murmurs, No rubs, No gallops, and intact distal pulses.   Thorax & Lungs:  No respiratory distress, no rales, no rhonchi, No wheezing, No chest wall tenderness.   Abdomen: Bowel sounds normal, Soft, No tenderness, No guarding, No rebound, No pulsatile masses pain stimulator in the left lower quadrant.  Soriano catheter is in place  Skin: Warm, nephrostomy tube is in place in the right CVA region, is no surrounding erythema, edema or discharge dry, No erythema, No rash.   Extremities: Full range of motion, no deformity, no edema.  Neurologic: Alert & oriented x 3, No focal deficits noted, acting appropriately on exam.  Psychiatric: Affect normal for clinical presentation.      LABORATORY/ECG  Results for orders placed or performed during the hospital encounter of 10/11/21   CBC WITH DIFFERENTIAL   Result Value Ref Range    WBC 7.9 4.8 - 10.8 K/uL    RBC 4.21 (L) 4.70 - 6.10 M/uL    Hemoglobin 11.2 (L) 14.0 - 18.0 g/dL    Hematocrit 35.4 (L) 42.0 - 52.0 %    MCV 84.1 81.4 - 97.8 fL    MCH 26.6 (L) 27.0 - 33.0 pg    MCHC 31.6 (L) 33.7 - 35.3 g/dL    RDW 65.0 (H) 35.9 - 50.0 fL    Platelet Count 436 164 - 446 K/uL    MPV 8.9 (L) 9.0 - 12.9 fL    Neutrophils-Polys 71.70 44.00 - 72.00 %    Lymphocytes 18.60 (L) 22.00 - 41.00 %    Monocytes 8.40 0.00 - 13.40 %    Eosinophils 0.50 0.00 - 6.90 %    Basophils 0.40 0.00 - 1.80 %    Immature " Granulocytes 0.40 0.00 - 0.90 %    Nucleated RBC 0.00 /100 WBC    Neutrophils (Absolute) 5.66 1.82 - 7.42 K/uL    Lymphs (Absolute) 1.47 1.00 - 4.80 K/uL    Monos (Absolute) 0.66 0.00 - 0.85 K/uL    Eos (Absolute) 0.04 0.00 - 0.51 K/uL    Baso (Absolute) 0.03 0.00 - 0.12 K/uL    Immature Granulocytes (abs) 0.03 0.00 - 0.11 K/uL    NRBC (Absolute) 0.00 K/uL   COMP METABOLIC PANEL   Result Value Ref Range    Sodium 133 (L) 135 - 145 mmol/L    Potassium 4.1 3.6 - 5.5 mmol/L    Chloride 96 96 - 112 mmol/L    Co2 23 20 - 33 mmol/L    Anion Gap 14.0 7.0 - 16.0    Glucose 123 (H) 65 - 99 mg/dL    Bun 7 (L) 8 - 22 mg/dL    Creatinine 0.80 0.50 - 1.40 mg/dL    Calcium 9.7 8.5 - 10.5 mg/dL    AST(SGOT) 19 12 - 45 U/L    ALT(SGPT) 18 2 - 50 U/L    Alkaline Phosphatase 156 (H) 30 - 99 U/L    Total Bilirubin 0.3 0.1 - 1.5 mg/dL    Albumin 4.6 3.2 - 4.9 g/dL    Total Protein 8.0 6.0 - 8.2 g/dL    Globulin 3.4 1.9 - 3.5 g/dL    A-G Ratio 1.4 g/dL   LIPASE   Result Value Ref Range    Lipase 21 11 - 82 U/L   URINALYSIS    Specimen: Urine   Result Value Ref Range    Color Yellow     Character Clear     Specific Gravity 1.012 <1.035    Ph 7.0 5.0 - 8.0    Glucose Negative Negative mg/dL    Ketones Negative Negative mg/dL    Protein 30 (A) Negative mg/dL    Bilirubin Negative Negative    Urobilinogen, Urine 1.0 Negative    Nitrite Negative Negative    Leukocyte Esterase Moderate (A) Negative    Occult Blood Small (A) Negative    Micro Urine Req Microscopic    URINE MICROSCOPIC (W/UA)   Result Value Ref Range    WBC 10-20 (A) /hpf    RBC  (A) /hpf    Bacteria Negative None /hpf    Epithelial Cells Negative /hpf    Hyaline Cast 0-2 /lpf   ESTIMATED GFR   Result Value Ref Range    GFR If African American >60 >60 mL/min/1.73 m 2    GFR If Non African American >60 >60 mL/min/1.73 m 2   PT/INR   Result Value Ref Range    PT 14.0 12.0 - 14.6 sec    INR 1.11 0.87 - 1.13   PTT   Result Value Ref Range    APTT 29.5 24.7 - 36.0 sec    SARS-COV Antigen LESTER: Collect dry nasal swab   Result Value Ref Range    SARS-CoV-2 Source Nasal Swab     SARS-COV ANTIGEN LESTER NotDetected Not-Detected         COURSE & MEDICAL DECISION MAKING  Pertinent Labs & Imaging studies reviewed. (See chart for details)  This is a 48-year-old gentleman presents with possible dislodged nephrostomy tube as well as urinary tract infection.  I discussed the patient with Dr. Bee who initially states that the patient should go to anterograde nephrogram and if negative can go home on antibiotics.  Here in the emergency department, does evidence of minimal infections partially treated his urine, for this reason he received ceftriaxone.  I discussed the patient with Dr. Mejia with interventional radiology who states they will do the procedure.  Fortunately, the unable procedure and discharge the patient home therefore he may be hospitalized here.  Discussed the patient with Dr. Shane agrees to hospitalization.  The patient received pain medication in form of Dilaudid 2 mg IV, she is IV antibiotics and will be sent to interventional radiology for evaluation of the right percutaneous nephrostomy.  Fortunately, the patient does not have evidence of acute kidney injury here, he has no evidence of overwhelming infection or sepsis.    FINAL IMPRESSION  Nephrostomy dislodgment  Urinary tract infection       Electronically signed by: Mauricio Monroy D.O., 10/11/2021 2:42 PM

## 2021-10-12 PROBLEM — T83.022A NEPHROSTOMY TUBE DISPLACED (HCC): Status: ACTIVE | Noted: 2021-10-12

## 2021-10-12 LAB
ALBUMIN SERPL BCP-MCNC: 3.8 G/DL (ref 3.2–4.9)
ALBUMIN/GLOB SERPL: 1.2 G/DL
ALP SERPL-CCNC: 131 U/L (ref 30–99)
ALT SERPL-CCNC: 18 U/L (ref 2–50)
ANION GAP SERPL CALC-SCNC: 12 MMOL/L (ref 7–16)
AST SERPL-CCNC: 25 U/L (ref 12–45)
BILIRUB SERPL-MCNC: 0.3 MG/DL (ref 0.1–1.5)
BUN SERPL-MCNC: 6 MG/DL (ref 8–22)
CALCIUM SERPL-MCNC: 9.2 MG/DL (ref 8.5–10.5)
CHLORIDE SERPL-SCNC: 103 MMOL/L (ref 96–112)
CHOLEST SERPL-MCNC: 152 MG/DL (ref 100–199)
CO2 SERPL-SCNC: 22 MMOL/L (ref 20–33)
CREAT SERPL-MCNC: 0.72 MG/DL (ref 0.5–1.4)
ERYTHROCYTE [DISTWIDTH] IN BLOOD BY AUTOMATED COUNT: 63.9 FL (ref 35.9–50)
GLOBULIN SER CALC-MCNC: 3.1 G/DL (ref 1.9–3.5)
GLUCOSE BLD-MCNC: 107 MG/DL (ref 65–99)
GLUCOSE BLD-MCNC: 99 MG/DL (ref 65–99)
GLUCOSE SERPL-MCNC: 107 MG/DL (ref 65–99)
HCT VFR BLD AUTO: 31.7 % (ref 42–52)
HDLC SERPL-MCNC: 43 MG/DL
HGB BLD-MCNC: 10.5 G/DL (ref 14–18)
LACTATE BLD-SCNC: 0.5 MMOL/L (ref 0.5–2)
LACTATE BLD-SCNC: 0.9 MMOL/L (ref 0.5–2)
LDLC SERPL CALC-MCNC: 89 MG/DL
MCH RBC QN AUTO: 27.6 PG (ref 27–33)
MCHC RBC AUTO-ENTMCNC: 33.1 G/DL (ref 33.7–35.3)
MCV RBC AUTO: 83.2 FL (ref 81.4–97.8)
PLATELET # BLD AUTO: 373 K/UL (ref 164–446)
PMV BLD AUTO: 9.3 FL (ref 9–12.9)
POTASSIUM SERPL-SCNC: 3.9 MMOL/L (ref 3.6–5.5)
PROT SERPL-MCNC: 6.9 G/DL (ref 6–8.2)
RBC # BLD AUTO: 3.81 M/UL (ref 4.7–6.1)
SODIUM SERPL-SCNC: 137 MMOL/L (ref 135–145)
TRIGL SERPL-MCNC: 102 MG/DL (ref 0–149)
WBC # BLD AUTO: 8.7 K/UL (ref 4.8–10.8)

## 2021-10-12 PROCEDURE — 80061 LIPID PANEL: CPT

## 2021-10-12 PROCEDURE — 700111 HCHG RX REV CODE 636 W/ 250 OVERRIDE (IP): Performed by: STUDENT IN AN ORGANIZED HEALTH CARE EDUCATION/TRAINING PROGRAM

## 2021-10-12 PROCEDURE — 80053 COMPREHEN METABOLIC PANEL: CPT

## 2021-10-12 PROCEDURE — 700102 HCHG RX REV CODE 250 W/ 637 OVERRIDE(OP): Performed by: STUDENT IN AN ORGANIZED HEALTH CARE EDUCATION/TRAINING PROGRAM

## 2021-10-12 PROCEDURE — 99233 SBSQ HOSP IP/OBS HIGH 50: CPT | Performed by: STUDENT IN AN ORGANIZED HEALTH CARE EDUCATION/TRAINING PROGRAM

## 2021-10-12 PROCEDURE — A9270 NON-COVERED ITEM OR SERVICE: HCPCS | Performed by: STUDENT IN AN ORGANIZED HEALTH CARE EDUCATION/TRAINING PROGRAM

## 2021-10-12 PROCEDURE — 36415 COLL VENOUS BLD VENIPUNCTURE: CPT

## 2021-10-12 PROCEDURE — 700101 HCHG RX REV CODE 250: Performed by: STUDENT IN AN ORGANIZED HEALTH CARE EDUCATION/TRAINING PROGRAM

## 2021-10-12 PROCEDURE — 94640 AIRWAY INHALATION TREATMENT: CPT

## 2021-10-12 PROCEDURE — 82962 GLUCOSE BLOOD TEST: CPT

## 2021-10-12 PROCEDURE — 94760 N-INVAS EAR/PLS OXIMETRY 1: CPT

## 2021-10-12 PROCEDURE — 700105 HCHG RX REV CODE 258: Performed by: STUDENT IN AN ORGANIZED HEALTH CARE EDUCATION/TRAINING PROGRAM

## 2021-10-12 PROCEDURE — 97165 OT EVAL LOW COMPLEX 30 MIN: CPT

## 2021-10-12 PROCEDURE — 83605 ASSAY OF LACTIC ACID: CPT

## 2021-10-12 PROCEDURE — 770001 HCHG ROOM/CARE - MED/SURG/GYN PRIV*

## 2021-10-12 PROCEDURE — 85027 COMPLETE CBC AUTOMATED: CPT

## 2021-10-12 RX ADMIN — HYDROMORPHONE HYDROCHLORIDE 4 MG: 4 TABLET ORAL at 05:57

## 2021-10-12 RX ADMIN — ONDANSETRON 4 MG: 2 INJECTION INTRAMUSCULAR; INTRAVENOUS at 08:33

## 2021-10-12 RX ADMIN — MORPHINE SULFATE 30 MG: 15 TABLET ORAL at 17:42

## 2021-10-12 RX ADMIN — CEFTRIAXONE SODIUM 2 G: 2 INJECTION, POWDER, FOR SOLUTION INTRAMUSCULAR; INTRAVENOUS at 17:10

## 2021-10-12 RX ADMIN — DOCUSATE SODIUM 50 MG AND SENNOSIDES 8.6 MG 2 TABLET: 8.6; 5 TABLET, FILM COATED ORAL at 17:42

## 2021-10-12 RX ADMIN — LOSARTAN POTASSIUM 50 MG: 50 TABLET, FILM COATED ORAL at 05:56

## 2021-10-12 RX ADMIN — TAMSULOSIN HYDROCHLORIDE 0.4 MG: 0.4 CAPSULE ORAL at 05:59

## 2021-10-12 RX ADMIN — HYDROMORPHONE HYDROCHLORIDE 4 MG: 4 TABLET ORAL at 14:45

## 2021-10-12 RX ADMIN — MORPHINE SULFATE 30 MG: 15 TABLET ORAL at 04:17

## 2021-10-12 RX ADMIN — ONDANSETRON 4 MG: 2 INJECTION INTRAMUSCULAR; INTRAVENOUS at 23:00

## 2021-10-12 RX ADMIN — IPRATROPIUM BROMIDE AND ALBUTEROL SULFATE 3 ML: .5; 2.5 SOLUTION RESPIRATORY (INHALATION) at 10:25

## 2021-10-12 RX ADMIN — ONDANSETRON 4 MG: 2 INJECTION INTRAMUSCULAR; INTRAVENOUS at 17:09

## 2021-10-12 RX ADMIN — MORPHINE SULFATE 30 MG: 15 TABLET ORAL at 23:20

## 2021-10-12 RX ADMIN — OMEPRAZOLE 20 MG: 20 CAPSULE, DELAYED RELEASE ORAL at 05:57

## 2021-10-12 RX ADMIN — DOCUSATE SODIUM 100 MG: 100 CAPSULE ORAL at 19:27

## 2021-10-12 RX ADMIN — HYDROMORPHONE HYDROCHLORIDE 4 MG: 4 TABLET ORAL at 10:25

## 2021-10-12 RX ADMIN — PREDNISONE 5 MG: 10 TABLET ORAL at 05:57

## 2021-10-12 RX ADMIN — IPRATROPIUM BROMIDE AND ALBUTEROL SULFATE 3 ML: .5; 2.5 SOLUTION RESPIRATORY (INHALATION) at 07:24

## 2021-10-12 RX ADMIN — IPRATROPIUM BROMIDE AND ALBUTEROL SULFATE 3 ML: .5; 2.5 SOLUTION RESPIRATORY (INHALATION) at 14:40

## 2021-10-12 RX ADMIN — HYDROMORPHONE HYDROCHLORIDE 4 MG: 4 TABLET ORAL at 18:47

## 2021-10-12 RX ADMIN — MORPHINE SULFATE 30 MG: 15 TABLET ORAL at 08:41

## 2021-10-12 RX ADMIN — IPRATROPIUM BROMIDE AND ALBUTEROL SULFATE 3 ML: .5; 2.5 SOLUTION RESPIRATORY (INHALATION) at 19:55

## 2021-10-12 RX ADMIN — DOCUSATE SODIUM 50 MG AND SENNOSIDES 8.6 MG 2 TABLET: 8.6; 5 TABLET, FILM COATED ORAL at 05:56

## 2021-10-12 RX ADMIN — HYDROMORPHONE HYDROCHLORIDE 4 MG: 4 TABLET ORAL at 21:09

## 2021-10-12 ASSESSMENT — ENCOUNTER SYMPTOMS
COUGH: 0
WEAKNESS: 1
CHILLS: 0
BLURRED VISION: 0
PALPITATIONS: 0
FEVER: 0
SHORTNESS OF BREATH: 0
VOMITING: 0
DIZZINESS: 0
FLANK PAIN: 1
BRUISES/BLEEDS EASILY: 0
WEIGHT LOSS: 1
HEADACHES: 0
ABDOMINAL PAIN: 0
MYALGIAS: 0
BACK PAIN: 1
HEMOPTYSIS: 0

## 2021-10-12 ASSESSMENT — COGNITIVE AND FUNCTIONAL STATUS - GENERAL
DAILY ACTIVITIY SCORE: 24
STANDING UP FROM CHAIR USING ARMS: A LITTLE
SUGGESTED CMS G CODE MODIFIER DAILY ACTIVITY: CH
MOBILITY SCORE: 21
CLIMB 3 TO 5 STEPS WITH RAILING: A LITTLE
SUGGESTED CMS G CODE MODIFIER MOBILITY: CJ
SUGGESTED CMS G CODE MODIFIER DAILY ACTIVITY: CH
WALKING IN HOSPITAL ROOM: A LITTLE
DAILY ACTIVITIY SCORE: 24

## 2021-10-12 ASSESSMENT — PAIN DESCRIPTION - PAIN TYPE
TYPE: ACUTE PAIN
TYPE: ACUTE PAIN
TYPE: SURGICAL PAIN;ACUTE PAIN;CHRONIC PAIN
TYPE: ACUTE PAIN
TYPE: ACUTE PAIN

## 2021-10-12 ASSESSMENT — ACTIVITIES OF DAILY LIVING (ADL): TOILETING: INDEPENDENT

## 2021-10-12 NOTE — THERAPY
Physical Therapy Contact Note    PT consult received and acknowledged. OT reported patient at/near functional baseline and up self in room. Patient reported no acute PT needs and no concerns regarding mobility or returning home following medical DC. No acute PT indicated. Completing order.     Beatriz Mendoza, PT, DPT  [phone]

## 2021-10-12 NOTE — THERAPY
"Occupational Therapy   Initial Evaluation     Patient Name: Erich Ingram  Age:  56 y.o., Sex:  male  Medical Record #: [id number]  Today's Date: 10/12/2021          Assessment  Patient is 56 y.o. male who presents to acute w/ R flank pain. PMH includes colon cancer w/ mets, non functioning L kidney, recent stent and R nephrostomy tube placed on 9/11/21. Pt appears at baseline performing BADLS and functional mobility at SPV level. Recommend DC home. No further Acute OT needs noted.     Plan    Recommend Occupational Therapy Eval only    DC Equipment Recommendations: (P) None  Discharge Recommendations: (P) Anticipate that the patient will have no further occupational therapy needs after discharge from the hospital     Subjective    \"I  an incredible women, Tim Mehta for short\"     Objective       10/12/21 1437   Total Time Spent   Total Time Spent (Mins) 15   Charge Group   OT Evaluation OT Evaluation Low   Initial Contact Note    Initial Contact Note Order Received and Verified, Occupational Therapy Evaluation in Progress with Full Report to Follow.   Prior Living Situation   Prior Services None   Housing / Facility 1 Story House   Bathroom Set up Walk In Shower;Shower Chair   Equipment Owned Tub / Shower Seat   Lives with - Patient's Self Care Capacity Spouse   Comments Reports spouse has not been working and has been available to assist PRN   Prior Level of ADL Function   Self Feeding Independent   Grooming / Hygiene Independent   Bathing Independent   Dressing Independent   Toileting Independent   Prior Level of IADL Function   Medication Management Independent   Laundry Independent   Kitchen Mobility Independent   Finances Independent   Home Management Independent   Shopping Independent   Prior Level Of Mobility Independent Without Device in Community;Independent Without Device in Home   Driving / Transportation Relatives / Others Provide Transportation  (spouse drives)   Pain 0 - 10 Group "   Therapist Pain Assessment Post Activity Pain Same as Prior to Activity;Nurse Notified  (no c/o pain during session)   Cognition    Cognition / Consciousness WDL   Level of Consciousness Alert   Comments very pleasent and cooperative   Active ROM Upper Body   Active ROM Upper Body  WDL   Strength Upper Body   Upper Body Strength  WDL   Coordination Upper Body   Coordination WDL   Balance Assessment   Sitting Balance (Static) Good   Sitting Balance (Dynamic) Good   Standing Balance (Static) Good   Standing Balance (Dynamic) Good   Weight Shift Sitting Good   Weight Shift Standing Good   Comments no AD   Bed Mobility    Supine to Sit Supervised   Sit to Supine Supervised   Scooting Supervised   Rolling Supervised   ADL Assessment   Grooming Supervision;Standing   Lower Body Dressing Supervision   Toileting Supervision   How much help from another person does the patient currently need...   Putting on and taking off regular lower body clothing? 4   Bathing (including washing, rinsing, and drying)? 4   Toileting, which includes using a toilet, bedpan, or urinal? 4   Putting on and taking off regular upper body clothing? 4   Taking care of personal grooming such as brushing teeth? 4   Eating meals? 4   6 Clicks Daily Activity Score 24   Functional Mobility   Sit to Stand Supervised   Bed, Chair, Wheelchair Transfer Supervised   Toilet Transfers Supervised   Mobility within room and bathroom, no AD   Activity Tolerance   Sitting Edge of Bed 5 min   Standing 6 min   Comments no overt s/s of fatigue   Education Group   Role of Occupational Therapist Patient Response Patient;Acceptance;Explanation;Demonstration;Verbal Demonstration   Problem List   Problem List None   Anticipated Discharge Equipment and Recommendations   DC Equipment Recommendations None   Discharge Recommendations Anticipate that the patient will have no further occupational therapy needs after discharge from the hospital   Interdisciplinary Plan of Care  Collaboration   IDT Collaboration with  Nursing   Patient Position at End of Therapy In Bed;Call Light within Reach;Tray Table within Reach;Phone within Reach   Collaboration Comments report given   Session Information   Date / Session Number  10/12, 1x only

## 2021-10-12 NOTE — ED NOTES
Med rec is a partical .  Pt is unable at this time to provide information in regards to pain pump. Will attempt to follow up on 10/12/21   ( Dr Reina ) [phone].  Interviewed pt at bedside. Pt provided a list of medications. Pt denies   Antibiotic use in last 30 days at home.     Pt denies antibiotic use in last 30 days. Pt unable to have someone bring in the  SYMPROIC     Home pharmacy [facility] [phone]        • Pain Pump (PATIENT SUPPLIED) XX SHAWNA Inject  as directed continuous. Dr Reina [phone]   • Naldemedine Tosylate (SYMPROIC) 0.2 MG Tab Take 0.2 mg by mouth every day.   • ipratropium-albuterol (DUONEB) 0.5-2.5 (3) MG/3ML nebulizer solution Take 3 mL by nebulization 4 times a day.   • docusate sodium (COLACE) 100 MG Cap Take 100 mg by mouth 2 times a day.   • pantoprazole (PROTONIX) 40 MG Tablet Delayed Response Take 40 mg by mouth every day.   • [DISCONTINUED] fluticasone-salmeterol (ADVAIR) 100-50 MCG/DOSE AEROSOL POWDER, BREATH ACTIVATED Inhale 1 Puff 2 times a day.             • hydrocortisone rectal (PROCTOZONE HC) 2.5 % Cream Insert 1 Application in rectum 3 times a day as needed.   • HYDROmorphone (DILAUDID) 4 MG Tab Take 4 mg by mouth every 4 hours while awake. Scheduled medication                       • predniSONE (DELTASONE) 5 MG Tab Take 5 mg by mouth every day.   • tamsulosin (FLOMAX) 0.4 MG capsule Take 0.4 mg by mouth every day.   • losartan (COZAAR) 50 MG Tab Take 50 mg by mouth every day.

## 2021-10-12 NOTE — ASSESSMENT & PLAN NOTE
abx  Nephrostomy tube replaced.  Pt does not meet sepsis criteria or appear septic.  Urine culture in process

## 2021-10-12 NOTE — PROGRESS NOTES
[facility] Medicine Daily Progress Note    Date of Service  10/12/2021    Chief Complaint  Erich Ingram is a 56 y.o. male admitted 10/11/2021 with R flank pain    Hospital Course  56M Hx colon cancer with metastasis status post resection on chemo, nonfunctioning left kidney, recent renal stent and R nephrostomy tube placement 9/11/2021 for bilateral hydronephrosis. He was seen at [facility] yesterday and was told to come to [facility] ED today after being seen by Urology consult at [place]. Patient is having sharp pains in his right flank and has noted decreased urine output than usual. Patients' nephrostomy tube was placed at [facility] by IR. At [place] he was given one dose of Rocephin IM for UTI. In the ED,  the cbc/cmp were unremarkable.     Interval Problem Update  R nephrostomy tube was placed by IR yesterday, noted serosanguinous output. IR following.   Urine culture pending, on ceftriaxone  Plan dc am    I have personally seen and examined the patient at bedside. I discussed the plan of care with patient and bedside RN.    Consultants/Specialty  IR    Code Status  Full Code    Disposition  Patient is not medically cleared.   Anticipate discharge to to home with close outpatient follow-up.  I have placed the appropriate orders for post-discharge needs.    Review of Systems  Review of Systems   Constitutional: Positive for malaise/fatigue.   Genitourinary: Positive for flank pain and hematuria.   Musculoskeletal: Positive for back pain.   Neurological: Positive for weakness.   All other systems reviewed and are negative.       Physical Exam  Temp:  [36.2 °C (97.2 °F)-37 °C (98.6 °F)] 36.7 °C (98 °F)  Pulse:  [67-94] 82  Resp:  [11-24] 16  BP: (124-168)/(72-98) 133/89  SpO2:  [89 %-100 %] 97 %    Physical Exam  Vitals and nursing note reviewed.   Constitutional:       General: He is not in acute distress.     Appearance: Normal appearance. He is not ill-appearing.   HENT:      Head: Normocephalic  and atraumatic.      Mouth/Throat:      Mouth: Mucous membranes are dry.      Pharynx: Oropharynx is clear.   Eyes:      Pupils: Pupils are equal, round, and reactive to light.   Neck:      Vascular: No carotid bruit.   Cardiovascular:      Rate and Rhythm: Normal rate and regular rhythm.   Pulmonary:      Effort: Pulmonary effort is normal.      Breath sounds: Normal breath sounds.   Abdominal:      General: Abdomen is flat. Bowel sounds are normal.      Palpations: Abdomen is soft. There is no mass.      Comments: Right nephro tube visible without induration, edema or erythema, the site is clean   Musculoskeletal:         General: No swelling or tenderness. Normal range of motion.      Cervical back: Neck supple.   Skin:     General: Skin is warm and dry.   Neurological:      General: No focal deficit present.      Mental Status: He is alert and oriented to person, place, and time.   Psychiatric:         Mood and Affect: Mood normal.         Behavior: Behavior normal.         Fluids    Intake/Output Summary (Last 24 hours) at 10/12/2021 1300  Last data filed at 10/12/2021 1025  Gross per 24 hour   Intake 360 ml   Output 2000 ml   Net -1640 ml       Laboratory  Recent Labs     10/11/21  1147 10/12/21  0351   WBC 7.9 8.7   RBC 4.21* 3.81*   HEMOGLOBIN 11.2* 10.5*   HEMATOCRIT 35.4* 31.7*   MCV 84.1 83.2   MCH 26.6* 27.6   MCHC 31.6* 33.1*   RDW 65.0* 63.9*   PLATELETCT 436 373   MPV 8.9* 9.3     Recent Labs     10/11/21  1147 10/12/21  0351   SODIUM 133* 137   POTASSIUM 4.1 3.9   CHLORIDE 96 103   CO2 23 22   GLUCOSE 123* 107*   BUN 7* 6*   CREATININE 0.80 0.72   CALCIUM 9.7 9.2     Recent Labs     10/11/21  1513 10/11/21  2222   APTT 29.5  --    INR 1.11 1.09         Recent Labs     10/12/21  0351   TRIGLYCERIDE 102   HDL 43   LDL 89       Imaging  IR-NEPHROSTOGRAM THROUGH EXT CATH (ALL RADIOLOGY) RIGHT   Final Result      1. ULTRASOUND AND FLUOROSCOPIC GUIDED PLACEMENT OF A NEW  RIGHT 8 [place]  PIGTAIL LOCKING  LOOP PERCUTANEOUS NEPHROSTOMY CATHETER.      2. NEPHROSTOGRAM SHOWING SATISFACTORY CATHETER POSITION WITHOUT EXTRAVASATION. MODERATE HYDRONEPHROSIS AND PROBABLY A SMALL AMOUNT OF BLOOD CLOT IN THE UPPER COLLECTING SYSTEM.      3. THE PREVIOUS EXISTING CATHETER HAD COMPLETELY MIGRATED OUTSIDE OF THE KIDNEY AND UPPER COLLECTING SYSTEM AND WAS REMOVED.           Assessment/Plan  Nephrostomy tube displaced ([facility])  Assessment & Plan  S/p replacement by IR  F/u IR      Acute renal failure (ARF) ([facility])- (present on admission)  Assessment & Plan  S/p nephrostomy tube placement 10/11  Avoid nephrotoxins  Renal dosing meds    UTI (urinary tract infection)- (present on admission)  Assessment & Plan  abx  Nephrostomy tube replaced.  Pt does not meet sepsis criteria or appear septic.  Urine culture in process    Chronic, continuous use of opioids- (present on admission)  Assessment & Plan  On pain pump  Closely monitor signs of overdose    Anxiety- (present on admission)  Assessment & Plan  Cont home meds    COPD (chronic obstructive pulmonary disease) ([facility])- (present on admission)  Assessment & Plan  Chronic, stable, cont. Home meds    Colon neoplasm- (present on admission)  Assessment & Plan  Hx colon cancer with metastasis status post resection on chemo       VTE prophylaxis: SCDs/TEDs    I have performed a physical exam and reviewed and updated ROS and Plan today (10/12/2021). In review of yesterday's note (10/11/2021), there are no changes except as documented above.

## 2021-10-12 NOTE — H&P
Hospital Medicine History & Physical Note    Date of Service    Primary Care Physician  YOU Sterling.    Consultants  IR: Dr. Mejia placed nephro tube on 10/11/21    Code Status  Full Code    Chief Complaint  Chief Complaint   Patient presents with   • Sent by MD   • Flank Pain       History of Presenting Illness  56M Hx right nephrostomy tube placed 3 weeks prior for hydronephrosis and nonfunctioning left kidney was seen at [facility] yesterday and was told to come to [facility] ED today after being seen by Urology consult at [place]. Patient is having sharp pains in his right flank and has noted decreased urine output than usual. Patients' nephrostomy tube was placed at [facility] by IR. At [place] he was given one dose of Rocephin IM for UTI. In the ed the cbc/cmp were unremarkable.    On admission, lab work noncontributory besides wbc and rbc in urine. IR Dr. Mejia was consulted by ED and patient underwent right percutaneous nephrostomy.    Will observe patient overnight and treat the UTI.    I discussed the plan of care with patient.    Review of Systems  ROS  All systems reviewed and negative except as noted in HPI.    Past Medical History   has a past medical history of Arthritis (01/24/2020), Bowel habit changes (01/24/2020), Breath shortness, Cancer ([facility]), Cancer ([facility]) (08/2018), COPD (chronic obstructive pulmonary disease) ([facility]) (2018), Dental disorder (01/24/2020), Fall, Heart burn, Hepatitis C (2005), Hypertension, Indigestion, PICC (peripherally inserted central catheter) in place (05/2019), Psychiatric problem, and Renal disorder.    Surgical History   has a past surgical history that includes low anterior resection laparoscopic (1/12/2016); cath placement (3/16/2016); bronchoscopy-endo (N/A, 3/1/2019); other orthopedic surgery (Right); other orthopedic surgery (Right); other orthopedic surgery (Left); pump revision (7/9/2019); urology surgery (Left, 2018); colonoscopy (N/A, 2016); and pump  "revision (Left, 1/28/2020).     Family History  family history includes No Known Problems in his brother, father, maternal grandfather, maternal grandmother, mother, paternal grandfather, paternal grandmother, and sister. He was adopted.   Family history reviewed with patient. There is family history that is pertinent to the chief complaint.     Social History   reports that he quit smoking about 6 years ago. He started smoking about 20 years ago. He has a 15.00 pack-year smoking history. He has never used smokeless tobacco. He reports previous alcohol use. He reports current drug use. Drugs: Inhaled and Oral.    Allergies  Allergies   Allergen Reactions   • Levaquin Vomiting   • Nubain [Nalbuphine] Anxiety     \"made him feel paranoid\"       Medications  Prior to Admission Medications   Prescriptions Last Dose Informant Patient Reported? Taking?   HYDROmorphone (DILAUDID) 4 MG Tab  Family Member Yes No   Sig: Take 4 mg by mouth every 6 hours as needed ([facility]).   NON SPECIFIED   Yes No   Sig: Pain pump has Dilaudid and Ketamine   fluticasone-salmeterol (ADVAIR) 100-50 MCG/DOSE AEROSOL POWDER, BREATH ACTIVATED   No No   Sig: Inhale 1 Puff 2 times a day.   hydrocortisone rectal (PROCTOZONE HC) 2.5 % Cream   No No   Sig: Insert 1 Application in rectum 3 times a day as needed.   lansoprazole (PREVACID) 30 MG CAPSULE DELAYED RELEASE  Family Member Yes No   Sig: Take 30 mg by mouth every day.   losartan (COZAAR) 50 MG Tab  Family Member Yes No   Sig: Take 50 mg by mouth every day.   ondansetron (ZOFRAN ODT) 4 MG TABLET DISPERSIBLE  Family Member Yes No   Sig: Take 8 mg by mouth every 6 hours as needed for Nausea.   polyethylene glycol/lytes (MIRALAX) Pack  Family Member No No   Sig: Take 1 Packet by mouth every day. Can use up to 2 times daily if no bowel movement in 24 hours.   predniSONE (DELTASONE) 5 MG Tab  Family Member Yes No   Sig: Take 5 mg by mouth every day.   promethazine (PHENERGAN) 50 MG Tab  Family " Member Yes No   Sig: Take 50 mg by mouth every 6 hours as needed for Nausea/Vomiting.   sennosides-docusate sodium (SENOKOT-S) 8.6-50 MG tablet   No No   Sig: Take 1 Tab by mouth every day.   tamsulosin (FLOMAX) 0.4 MG capsule  Family Member Yes No   Sig: Take 0.4 mg by mouth ONE-HALF HOUR AFTER BREAKFAST.      Facility-Administered Medications: None       Physical Exam  Temp:  [36.2 °C (97.2 °F)-37 °C (98.6 °F)] 37 °C (98.6 °F)  Pulse:  [67-94] 68  Resp:  [11-24] 17  BP: (124-168)/() 134/93  SpO2:  [89 %-100 %] 94 %  Blood Pressure: 124/80   Temperature: 36.6 °C (97.8 °F)   Pulse: 85   Respiration: 16   Pulse Oximetry: 89 %       Physical Exam    Constitutional: Resting comfortably in NAD   HENT: Normocephalic, no obvious evidence of acute trauma.  Eyes: No scleral icterus. Normal conjunctiva. PERRLA, EOMI  Neck: Comfortable movement without any obvious restriction in the range of motion.  Cardiovascular: Upon ascultation I appreciate a regular heart rhythm and a normal rate with no murmurs, rubs or gallops  Thorax & Lungs: No respiratory distress. No wheezing, rales or rhonchi heard on ausculation.  there is no obvious chest wall tenderness. I appreciate normal air movement throughout.   Abdomen: The abdomen is not visibly distended. Upon palpation, I find it to be without tenderness.  No mass appreciated.  Skin: Right CVA nephro tube visible without induration, edema or erythema, the site is clean. The remaining exposed portions of skin reveal no obvious rash or other abnormalities.  Extremities/Musculoskeletal: no lower extremity edema with no asymmetry.  Neurologic: Alert & oriented. No focal deficits observed.   Psychiatric: Normal affect appropriate for the clinical situation.        Laboratory:  Recent Labs     10/11/21  1147 10/12/21  0351   WBC 7.9 8.7   RBC 4.21* 3.81*   HEMOGLOBIN 11.2* 10.5*   HEMATOCRIT 35.4* 31.7*   MCV 84.1 83.2   MCH 26.6* 27.6   MCHC 31.6* 33.1*   RDW 65.0* 63.9*   PLATELETCT  436 373   MPV 8.9* 9.3     Recent Labs     10/11/21  1147 10/12/21  0351   SODIUM 133* 137   POTASSIUM 4.1 3.9   CHLORIDE 96 103   CO2 23 22   GLUCOSE 123* 107*   BUN 7* 6*   CREATININE 0.80 0.72   CALCIUM 9.7 9.2     Recent Labs     10/11/21  1147 10/12/21  0351   ALTSGPT 18 18   ASTSGOT 19 25   ALKPHOSPHAT 156* 131*   TBILIRUBIN 0.3 0.3   LIPASE 21  --    GLUCOSE 123* 107*     Recent Labs     10/11/21  1513 10/11/21  2222   APTT 29.5  --    INR 1.11 1.09     No results for input(s): NTPROBNP in the last 72 hours.  Recent Labs     10/12/21  0351   TRIGLYCERIDE 102   HDL 43   LDL 89     No results for input(s): TROPONINT in the last 72 hours.    Imaging:  IR-NEPHROSTOGRAM THROUGH EXT CATH (ALL RADIOLOGY) RIGHT    (Results Pending)       X-Ray:  I have personally reviewed the images and compared with prior images.  EKG:  I have personally reviewed the images and compared with prior images.    Assessment/Plan:  I anticipate this patient is appropriate for observation status at this time.    UTI (urinary tract infection)- (present on admission)  Assessment & Plan  abx  Nephrostomy tube replaced.  Pt does not meet sepsis criteria or appear septic.    Nephrostomy tube displaced ([facility])  Assessment & Plan  S/p replacement by IR  F/u IR      Acute renal failure (ARF) ([facility])- (present on admission)  Assessment & Plan  S/p nephrostomy tube placement  Trend  IVF  F/u w/ IR in AM  Treat uti    Anxiety- (present on admission)  Assessment & Plan  Cont home meds    COPD (chronic obstructive pulmonary disease) ([facility])- (present on admission)  Assessment & Plan  Chronic, stable, cont. Home meds    Colon neoplasm- (present on admission)  Assessment & Plan  chronic      VTE prophylaxis: SCDs/TEDs and enoxaparin ppx

## 2021-10-12 NOTE — OR NURSING
"Page out to attending Acosta GRIFFIN, as pt arrived to PACU from IR complaining of \"my cancer pain.\" Page out to obtain floor orders including pain medication orders.  "

## 2021-10-12 NOTE — PROGRESS NOTES
Received bedside report and accepted care of patient.  Patient currently resting in bed in no visible or stated distress.  Bed controls on and bed in locked position.  Bed alarm on.  Call light and personal possessions within reach.  Plan of care to include pain management, refill of pain pump, monitoring of new neph tube, and assistance with ADL's.  Patient verbalizes agreement with plan of care, and has no additional questions or concerns at this time.  Will continue to update notes/plan of care as needed throughout shift.

## 2021-10-12 NOTE — PROGRESS NOTES
IR RN note    Reviewed sedation orders with MD  Patient underwent a Right Nephrostogram with possible nephrostomy tube replacement by Dr. Mejia.  Procedure site was verified by MD using imaging guidance. Consent was signed.  GIA Paiz and Lori assisted. Patient was placed in a prone position.  Vitals were taken every 5 minutes and remained stable during procedure (see doc flow sheet for results).  CO2 waveform capnography was monitored throughout procedure, see chart.  Right flank back access site.   A sutures, gauze and medical tape dressing was placed over surgical site. Report called to Melodie KWAN. Pt transported by vik with RN to PACU.       Right Nephrostomy Tube - "[employer] Flexima Regular Nephrostomy Catheter System 8 Fr  REF # [id number]  LOT # [id number]  Exp. 8/24/24

## 2021-10-12 NOTE — OR SURGEON
Immediate Post- Operative Note    PostOp Diagnosis: RENAL OBSTRUCTION, COLON CA. DECREASED OUTPUT FROM RIGHT PERC NEPH. CATHETER MIGRATED OUTSIDE OF UPPER COLLECTING SYSTEM.       Procedure(s): RIGHT PERCUTANEOUS NEPHROSTOMY PLACEMENT AND NEPHROSTOGRAM WITH U/S AND FLUOROSCOPIC GUIDANCE    8F LOCKING LOOP. NEW PUNCTURE RIGHT MID POLE CALYX.     MODERATE RIGHT HYDRO. SOME CLOT DEFECTS IN UPPER COLLECTING SYSTEM ON FINAL NEPHROSTOGRAM. CATHETER IN GOOD POSITION COILED IN RENAL PELVIS.     URETERAL STENT IN PLACE.       Estimated Blood Loss: <10CC        Complications: NONE          10/11/2021     7:31 PM     Gilles Mejia M.D.

## 2021-10-12 NOTE — PROGRESS NOTES
4 Eyes Skin Assessment Completed by AQUILES Salmeron and AQUILES Nguyen.    Head WDL  Ears WDL  Nose WDL  Mouth WDL  Neck WDL  Breast/Chest WDL  Shoulder Blades WDL  Spine WDL  (R) Arm/Elbow/Hand WDL  (L) Arm/Elbow/Hand WDL  Abdomen WDL  Groin WDL  Scrotum/Coccyx/Buttocks WDL  (R) Leg WDL  (L) Leg WDL  (R) Heel/Foot/Toe WDL  (L) Heel/Foot/Toe WDL          Devices In Places Pulse Ox and Nasal Cannula      Interventions In Place Gray Ear Foams and Pillows    Possible Skin Injury No    Pictures Uploaded Into Epic N/A  Wound Consult Placed N/A  RN Wound Prevention Protocol Ordered No

## 2021-10-12 NOTE — RESPIRATORY CARE
"COPD EDUCATION by COPD CLINICAL EDUCATOR  10/12/2021  at  3:22 PM by Denia Morales, RRT     Patient interviewed by COPD education team.  Patient unable to participate in full program.  A short intervention has been conducted.  A comprehensive packet including information about COPD, types of treatments to manage their disease and safe home Oxygen usage was provided and reviewed with patient at the bedside.   He has a referral to see [facility] and will schedule. He states he has lots of appointments with specialist at this time. He quit smoking in 2006  COPD Assessment  COPD Clinical Specialists ONLY  COPD Education Initiated: Yes--Short Intervention  DME Company: [employer] Equipment Type: Oxygen Nebulizer  Physician Name: Matthew Ingramthorne)  Pulmonologist Name: Provide contact information [facility] hasa referral and wanted to schedule himself  Is this a COPD exacerbation patient?: No  (OP) Pulmonary Function Testing: Yes (has referral pending)    Meds to Beds        MY COPD ACTION PLAN     It is recommended that patients and physicians /healthcare providers complete this action plan together. This plan should be discussed at each physician visit and updated as needed.    The green, yellow and red zones show groups of symptoms of COPD. This list of symptoms is not comprehensive, and you may experience other symptoms. In the \"Actions\" column, your healthcare provider has recommended actions for you to take based on your symptoms.    Patient Name: Erich Ingram   YOB: 1965   Last Updated on:     Green Zone:  I am doing well today Actions   •  Usual activitiy and exercise level •  Take daily medications   •  Usual amounts of cough and phlegm/mucus •  Use oxygen as prescribed   •  Sleep well at night •  Continue regular exercise/diet plan   •  Appetite is good •  At all times avoid cigarette smoke, inhaled irritants     Daily Medications (these medications are taken every day): " "  Fluticosone/Salmeterol (Advair)  Levalbuterol (Xopenex)  Ipratropium Bromide (Atrovent)  Tiotropium Bromide Monohydrate (Spiriva) 1 Puff  3mL via nebulizer  3mL via nebulizer Twice daily     Additional Information:  Remember to rinse mouth after using Advair  Use your nebulizer medications as directed by your Doctor    Discuss home medications with your Doctor use as directed    Yellow Zone:  I am having a bad day or a COPD flare Actions   •  More breathless than usual •  Continue daily medications   •  I have less energy for my daily activities •  Use quick relief inhaler as ordered   •  Increased or thicker phlegm/mucus •  Use oxygen as prescribed   •  Using quick relief inhaler/nebulizer more often •  Get plenty of rest   •  Swelling of ankles more than usual •  Use pursed lip breathing   •  More coughing than usual •  At all times avoid cigarette smoke, inhaled irritants   •  I feel like I have a \"chest cold\"   •  Poor sleep and my symptoms woke me up   •  My appetite is not good   •  My medicine is not helping    •  Call provider immediately if symptoms don’t improve     Continue daily medications, add rescue medications:               Medications to be used during a flare up, (as Discussed with Provider):              Red Zone:  I need urgent medical care Actions   •  Severe shortness of breath even at rest •  Call 911 or seek medical care immediately   •  Not able to do any activity because of breathing    •  Fever or shaking chills    •  Feeling confused or very drowsy     •  Chest pains    •  Coughing up blood              "

## 2021-10-12 NOTE — PROGRESS NOTES
Radiology Progress Note   Author: CHRISTY Grissom Date & Time created: 10/12/2021  12:00 PM   Date of admission  10/11/2021  Note to reader: this note follows the APSO format rather than the historical SOAP format. Assessment and plan located at the top of the note for ease of use.    Chief Complaint  56 y.o. male admitted 10/11/2021 with   Chief Complaint   Patient presents with   • Sent by MD   • Flank Pain       HPI  56M Hx right nephrostomy tube placed 3 weeks prior for hydronephrosis and nonfunctioning left kidney was seen at [facility] yesterday and was told to come to [facility] ED today after being seen by Urology consult at [place]. Patient is having sharp pains in his right flank and has noted decreased urine output than usual. Patients' nephrostomy tube was placed at [facility] by IR.     Assessment/Plan  Interval History   Active Problems:    Colon neoplasm    COPD (chronic obstructive pulmonary disease) ([facility])    Anxiety    UTI (urinary tract infection)    Acute renal failure (ARF) ([facility])    Nephrostomy tube displaced ([facility])      Plan IR  - Patient states that he is seeing a urologist and has an appointment coming up in the next week. He currently is managing his nephrostomy tube from home with assistance from his wife and they irrigate the catheter at least once a day with sterile supplies    - OK to NH home from IR standpoint once cleared by Hospitalist services   -Thank you for allowing Interventional Radiology team to participate in the patients care, if any additonal care or requests are needed in the future please do not hesitate call or place IR order. IR signing off      [id number]  IR:   10/11- 8 [place]  PIGTAIL LOCKING LOOP PERCUTANEOUS NEPHROSTOMY CATHETER.     10/12- Right neph tube with clear urine, Soriano with serosanguinous output but clearing          Review of Systems  Physical Exam   Review of Systems   Constitutional: Positive for malaise/fatigue and weight loss. Negative for  chills and fever.   HENT: Negative for hearing loss.    Eyes: Negative for blurred vision.   Respiratory: Negative for cough, hemoptysis and shortness of breath.    Cardiovascular: Negative for chest pain and palpitations.   Gastrointestinal: Negative for abdominal pain and vomiting.   Genitourinary: Positive for flank pain and hematuria. Negative for dysuria.   Musculoskeletal: Negative for myalgias.   Skin: Negative for rash.   Neurological: Positive for weakness. Negative for dizziness and headaches.   Endo/Heme/Allergies: Does not bruise/bleed easily.   Psychiatric/Behavioral: Negative for suicidal ideas.      Vitals:    10/12/21 1025   BP:    Pulse: 82   Resp: 16   Temp:    SpO2: 97%        Physical Exam  Vitals and nursing note reviewed.   Constitutional:       Appearance: He is underweight.      Comments: Frail   HENT:      Head: Normocephalic.      Nose: Nose normal.      Mouth/Throat:      Mouth: Mucous membranes are moist.   Eyes:      Pupils: Pupils are equal, round, and reactive to light.   Cardiovascular:      Rate and Rhythm: Normal rate.   Pulmonary:      Effort: Pulmonary effort is normal. No respiratory distress.   Abdominal:      General: Abdomen is flat.      Tenderness: There is abdominal tenderness.      Comments: Right flank neph tube, site CDI, no redness or swelling    Genitourinary:     Comments: Soriano  Musculoskeletal:         General: Tenderness present. No deformity.      Cervical back: Normal range of motion.   Skin:     General: Skin is warm and dry.      Capillary Refill: Capillary refill takes less than 2 seconds.      Coloration: Skin is pale. Skin is not jaundiced.   Neurological:      General: No focal deficit present.      Mental Status: He is alert.      Motor: Weakness present.   Psychiatric:         Mood and Affect: Mood normal.         Behavior: Behavior normal.             Labs    Recent Labs     10/11/21  1147 10/12/21  0351   WBC 7.9 8.7   RBC 4.21* 3.81*   HEMOGLOBIN 11.2*  10.5*   HEMATOCRIT 35.4* 31.7*   MCV 84.1 83.2   MCH 26.6* 27.6   MCHC 31.6* 33.1*   RDW 65.0* 63.9*   PLATELETCT 436 373   MPV 8.9* 9.3     Recent Labs     10/11/21  1147 10/12/21  0351   SODIUM 133* 137   POTASSIUM 4.1 3.9   CHLORIDE 96 103   CO2 23 22   GLUCOSE 123* 107*   BUN 7* 6*   CREATININE 0.80 0.72   CALCIUM 9.7 9.2     Recent Labs     10/11/21  1147 10/12/21  0351   ALBUMIN 4.6 3.8   TBILIRUBIN 0.3 0.3   ALKPHOSPHAT 156* 131*   TOTPROTEIN 8.0 6.9   ALTSGPT 18 18   ASTSGOT 19 25   CREATININE 0.80 0.72     IR-NEPHROSTOGRAM THROUGH EXT CATH (ALL RADIOLOGY) RIGHT   Final Result      1. ULTRASOUND AND FLUOROSCOPIC GUIDED PLACEMENT OF A NEW  RIGHT 8 Chinese  PIGTAIL LOCKING LOOP PERCUTANEOUS NEPHROSTOMY CATHETER.      2. NEPHROSTOGRAM SHOWING SATISFACTORY CATHETER POSITION WITHOUT EXTRAVASATION. MODERATE HYDRONEPHROSIS AND PROBABLY A SMALL AMOUNT OF BLOOD CLOT IN THE UPPER COLLECTING SYSTEM.      3. THE PREVIOUS EXISTING CATHETER HAD COMPLETELY MIGRATED OUTSIDE OF THE KIDNEY AND UPPER COLLECTING SYSTEM AND WAS REMOVED.          INR   Date Value Ref Range Status   10/11/2021 1.09 0.87 - 1.13 Final     Comment:     INR - Non-therapeutic Reference Range: 0.87-1.13  INR - Therapeutic Reference Range: 2.0-4.0       No results found for: POCINR     Intake/Output Summary (Last 24 hours) at 10/12/2021 1200  Last data filed at 10/12/2021 1025  Gross per 24 hour   Intake 360 ml   Output 2000 ml   Net -1640 ml      Labs not explicitly included in this progress note were reviewed by the author. Radiology/imaging not explicitly included in this progress note was reviewed by the author.   I have performed a physical exam and reviewed and updated ROS and Plan today (10/12/2021).     25 minutes in directly providing and coordinating care and extensive data review.  No time overlap and excludes procedures.

## 2021-10-12 NOTE — DISCHARGE PLANNING
Care Transition Team Assessment    LSW met with Pt at bedside for assessment and to verify facesheet information. Pt's wife helps him at home and will be his ride home at discharge. He see's Nina Hardy for PCP and Dr. Carrero for chronic pain and the management of his pain pump.  At home Pt uses a wheelchair and Oxygen which he gets from [facility]. He had his POLST and DPOA with him which he gave to LSW to scan into his chart. LSW scanned and returned to Pt. Pt currently has no needs of concerns for his d/c home.     Information Source  Orientation Level: Oriented X4  Information Given By: Patient  Who is responsible for making decisions for patient? : Patient    Readmission Evaluation  Is this a readmission?: Yes - unplanned readmission  Why do you think you were readmitted?: Pt dislodged Neph tube  Was an appointment arranged for you prior to discharge?: Yes, attended appointment  Were there new prescriptions you were supposed to fill after you were discharged?: Yes, prescriptions filled  Did you understand your discharge instructions?: Yes  Did you have enough support after your last discharge?: Yes    Elopement Risk  Legal Hold: No  Ambulatory or Self Mobile in Wheelchair: Yes  Disoriented: No  Psychiatric Symptoms: None  History of Wandering: No  Elopement this Admit: No  Vocalizing Wanting to Leave: No  Displays Behaviors, Body Language Wanting to Leave: No-Not at Risk for Elopement  Elopement Risk: Not at Risk for Elopement    Interdisciplinary Discharge Planning  Lives with - Patient's Self Care Capacity: Spouse  Housing / Facility: [address] [facility]  Prior Services: None    Discharge Preparedness  What is your plan after discharge?: Home with help  What are your discharge supports?: Spouse  Prior Functional Level: Independent with Activities of Daily Living, Independent with Medication Management, Ambulatory  Difficulity with ADLs: Walking  Difficulity with IADLs: Driving    Functional Assesment  Prior  Functional Level: Independent with Activities of Daily Living, Independent with Medication Management, Ambulatory    Finances  Financial Barriers to Discharge: No  Prescription Coverage: Yes    Vision / Hearing Impairment  Right Eye Vision: Impaired, Wears Glasses  Left Eye Vision: Impaired, Wears Glasses         Advance Directive  Advance Directive?: DPOA for Health Care  Durable Power of  Name and Contact : Janine Ma [phone]    Domestic Abuse  Have you ever been the victim of abuse or violence?: No    Psychological Assessment  History of Substance Abuse: None  History of Psychiatric Problems: No  Non-compliant with Treatment: No  Newly Diagnosed Illness: No    Discharge Risks or Barriers  Discharge risks or barriers?: Complex medical needs  Patient risk factors: Complex medical needs    Anticipated Discharge Information  Discharge Disposition: Discharged to home/self care (01)  Discharge Address: [place] [address]  Discharge Contact Phone Number: [phone]

## 2021-10-13 ENCOUNTER — PATIENT OUTREACH (OUTPATIENT)
Dept: HEALTH INFORMATION MANAGEMENT | Facility: OTHER | Age: 56
End: 2021-10-13

## 2021-10-13 VITALS
TEMPERATURE: 97.1 F | RESPIRATION RATE: 18 BRPM | BODY MASS INDEX: 16.23 KG/M2 | DIASTOLIC BLOOD PRESSURE: 95 MMHG | OXYGEN SATURATION: 92 % | SYSTOLIC BLOOD PRESSURE: 130 MMHG | HEIGHT: 75 IN | HEART RATE: 81 BPM | WEIGHT: 130.51 LBS

## 2021-10-13 LAB
ANION GAP SERPL CALC-SCNC: 12 MMOL/L (ref 7–16)
BASOPHILS # BLD AUTO: 0.9 % (ref 0–1.8)
BASOPHILS # BLD: 0.07 K/UL (ref 0–0.12)
BUN SERPL-MCNC: 5 MG/DL (ref 8–22)
CALCIUM SERPL-MCNC: 9.1 MG/DL (ref 8.5–10.5)
CHLORIDE SERPL-SCNC: 100 MMOL/L (ref 96–112)
CO2 SERPL-SCNC: 24 MMOL/L (ref 20–33)
CREAT SERPL-MCNC: 0.84 MG/DL (ref 0.5–1.4)
EOSINOPHIL # BLD AUTO: 0.27 K/UL (ref 0–0.51)
EOSINOPHIL NFR BLD: 3.3 % (ref 0–6.9)
ERYTHROCYTE [DISTWIDTH] IN BLOOD BY AUTOMATED COUNT: 64.6 FL (ref 35.9–50)
GLUCOSE BLD-MCNC: 129 MG/DL (ref 65–99)
GLUCOSE SERPL-MCNC: 105 MG/DL (ref 65–99)
HCT VFR BLD AUTO: 32 % (ref 42–52)
HGB BLD-MCNC: 10.4 G/DL (ref 14–18)
IMM GRANULOCYTES # BLD AUTO: 0.02 K/UL (ref 0–0.11)
IMM GRANULOCYTES NFR BLD AUTO: 0.2 % (ref 0–0.9)
LYMPHOCYTES # BLD AUTO: 1.96 K/UL (ref 1–4.8)
LYMPHOCYTES NFR BLD: 24 % (ref 22–41)
MCH RBC QN AUTO: 27.4 PG (ref 27–33)
MCHC RBC AUTO-ENTMCNC: 32.5 G/DL (ref 33.7–35.3)
MCV RBC AUTO: 84.4 FL (ref 81.4–97.8)
MONOCYTES # BLD AUTO: 0.8 K/UL (ref 0–0.85)
MONOCYTES NFR BLD AUTO: 9.8 % (ref 0–13.4)
NEUTROPHILS # BLD AUTO: 5.05 K/UL (ref 1.82–7.42)
NEUTROPHILS NFR BLD: 61.8 % (ref 44–72)
NRBC # BLD AUTO: 0 K/UL
NRBC BLD-RTO: 0 /100 WBC
PLATELET # BLD AUTO: 374 K/UL (ref 164–446)
PMV BLD AUTO: 9.4 FL (ref 9–12.9)
POTASSIUM SERPL-SCNC: 4 MMOL/L (ref 3.6–5.5)
RBC # BLD AUTO: 3.79 M/UL (ref 4.7–6.1)
SODIUM SERPL-SCNC: 136 MMOL/L (ref 135–145)
WBC # BLD AUTO: 8.2 K/UL (ref 4.8–10.8)

## 2021-10-13 PROCEDURE — 94760 N-INVAS EAR/PLS OXIMETRY 1: CPT

## 2021-10-13 PROCEDURE — 700101 HCHG RX REV CODE 250: Performed by: STUDENT IN AN ORGANIZED HEALTH CARE EDUCATION/TRAINING PROGRAM

## 2021-10-13 PROCEDURE — 99239 HOSP IP/OBS DSCHRG MGMT >30: CPT | Performed by: STUDENT IN AN ORGANIZED HEALTH CARE EDUCATION/TRAINING PROGRAM

## 2021-10-13 PROCEDURE — 94640 AIRWAY INHALATION TREATMENT: CPT

## 2021-10-13 PROCEDURE — 80048 BASIC METABOLIC PNL TOTAL CA: CPT

## 2021-10-13 PROCEDURE — 85025 COMPLETE CBC W/AUTO DIFF WBC: CPT

## 2021-10-13 PROCEDURE — 36415 COLL VENOUS BLD VENIPUNCTURE: CPT

## 2021-10-13 PROCEDURE — 51798 US URINE CAPACITY MEASURE: CPT

## 2021-10-13 PROCEDURE — 82962 GLUCOSE BLOOD TEST: CPT

## 2021-10-13 PROCEDURE — 700111 HCHG RX REV CODE 636 W/ 250 OVERRIDE (IP): Performed by: STUDENT IN AN ORGANIZED HEALTH CARE EDUCATION/TRAINING PROGRAM

## 2021-10-13 PROCEDURE — 700102 HCHG RX REV CODE 250 W/ 637 OVERRIDE(OP): Performed by: STUDENT IN AN ORGANIZED HEALTH CARE EDUCATION/TRAINING PROGRAM

## 2021-10-13 PROCEDURE — A9270 NON-COVERED ITEM OR SERVICE: HCPCS | Performed by: STUDENT IN AN ORGANIZED HEALTH CARE EDUCATION/TRAINING PROGRAM

## 2021-10-13 RX ADMIN — MORPHINE SULFATE 30 MG: 15 TABLET ORAL at 03:59

## 2021-10-13 RX ADMIN — HYDROMORPHONE HYDROCHLORIDE 4 MG: 4 TABLET ORAL at 05:56

## 2021-10-13 RX ADMIN — DOCUSATE SODIUM 50 MG AND SENNOSIDES 8.6 MG 2 TABLET: 8.6; 5 TABLET, FILM COATED ORAL at 05:55

## 2021-10-13 RX ADMIN — LOSARTAN POTASSIUM 50 MG: 50 TABLET, FILM COATED ORAL at 05:56

## 2021-10-13 RX ADMIN — IPRATROPIUM BROMIDE AND ALBUTEROL SULFATE 3 ML: .5; 2.5 SOLUTION RESPIRATORY (INHALATION) at 10:59

## 2021-10-13 RX ADMIN — TAMSULOSIN HYDROCHLORIDE 0.4 MG: 0.4 CAPSULE ORAL at 05:56

## 2021-10-13 RX ADMIN — PROCHLORPERAZINE EDISYLATE 10 MG: 5 INJECTION INTRAMUSCULAR; INTRAVENOUS at 07:30

## 2021-10-13 RX ADMIN — MORPHINE SULFATE 15 MG: 15 TABLET ORAL at 11:34

## 2021-10-13 RX ADMIN — ONDANSETRON 4 MG: 2 INJECTION INTRAMUSCULAR; INTRAVENOUS at 11:23

## 2021-10-13 RX ADMIN — IPRATROPIUM BROMIDE AND ALBUTEROL SULFATE 3 ML: .5; 2.5 SOLUTION RESPIRATORY (INHALATION) at 07:06

## 2021-10-13 RX ADMIN — HYDROMORPHONE HYDROCHLORIDE 4 MG: 4 TABLET ORAL at 09:40

## 2021-10-13 RX ADMIN — PREDNISONE 5 MG: 10 TABLET ORAL at 05:56

## 2021-10-13 RX ADMIN — ONDANSETRON 4 MG: 2 INJECTION INTRAMUSCULAR; INTRAVENOUS at 06:13

## 2021-10-13 RX ADMIN — OMEPRAZOLE 20 MG: 20 CAPSULE, DELAYED RELEASE ORAL at 05:56

## 2021-10-13 ASSESSMENT — PAIN DESCRIPTION - PAIN TYPE
TYPE: ACUTE PAIN
TYPE: ACUTE PAIN
TYPE: CHRONIC PAIN
TYPE: ACUTE PAIN

## 2021-10-13 NOTE — PROGRESS NOTES
Received report from day shift RN. Assumed pt care. Assessment completed. Pt A&O x4. Reports pain of 7/10, rest promoted. No s/s of discomfort/distress noted. Nephrostomy tube to the right flank noted. Bed in lowest position, bed locked, call light within reach.

## 2021-10-13 NOTE — DISCHARGE SUMMARY
Discharge Summary    CHIEF COMPLAINT ON ADMISSION  Chief Complaint   Patient presents with   • Sent by MD   • Flank Pain       Reason for Admission  other     Admission Date  10/11/2021    CODE STATUS  Full Code    HPI & HOSPITAL COURSE  56M Hx colon cancer with metastasis status post resection on chemo, nonfunctioning left kidney, recent renal stent and R nephrostomy tube placement 9/11/2021 for bilateral hydronephrosis. He was seen at [facility] yesterday and was told to come to [facility] ED today after being seen by Urology consult at [place]. Patient is having sharp pains in his right flank and has noted decreased urine output than usual. At [place] he was given one dose of Rocephin IM for UTI.    Here patients' nephrostomy tube was placed at [facility] by IR. He was treated with ceftriaxone. AYANA has been improving.     Therefore, he is discharged in fair and stable condition to home with close outpatient follow-up. He is advised to follow up with urology and PCP as arranged appointment. He is noted to have both oro and R nephrostomy tube. He is advised to discuss with his urologist to see if he still needs oro catheter (he has upcoming appointment 10/19/21). Patient voiced understanding.     The patient met 2-midnight criteria for an inpatient stay at the time of discharge.    Discharge Date  10/13/21    FOLLOW UP ITEMS POST DISCHARGE  PCP  urology    DISCHARGE DIAGNOSES  Active Problems:    Colon neoplasm POA: Yes    COPD (chronic obstructive pulmonary disease) ([facility] POA: Yes    Anxiety POA: Yes    Chronic, continuous use of opioids POA: Yes    UTI (urinary tract infection) POA: Yes    Acute renal failure (ARF) ([facility]) POA: Yes    Nephrostomy tube displaced ([facility]) POA: Unknown  Resolved Problems:    * No resolved hospital problems. *      FOLLOW UP  No future appointments.  Nina Hardy, P.A.  [address] [facility] [address]  [phone]    Go on 10/28/2021  Please go to your primary care  appointment at 3:15pm on 10/28/21. Thank you.     urology    Go to        MEDICATIONS ON DISCHARGE     Medication List      CONTINUE taking these medications      Instructions   docusate sodium 100 MG Caps  Commonly known as: COLACE   Take 100 mg by mouth 2 times a day.  Dose: 100 mg     hydrocortisone rectal 2.5 % Crea  Commonly known as: PROCTOZONE HC   Insert 1 Application in rectum 3 times a day as needed.  Dose: 1 Application     HYDROmorphone 4 MG Tabs  Commonly known as: DILAUDID   Take 4 mg by mouth every 4 hours while awake. Scheduled medication  Dose: 4 mg     ipratropium-albuterol 0.5-2.5 (3) MG/3ML nebulizer solution  Commonly known as: DUONEB   Take 3 mL by nebulization 4 times a day.  Dose: 3 mL     losartan 50 MG Tabs  Commonly known as: COZAAR   Take 50 mg by mouth every day.  Dose: 50 mg     Pain Pump Breana  Commonly known as: patient supplied   Inject  as directed continuous. Patient's Pain Pump (placed and maintained as an outpatient)    Medications/concentrations:   Dilaudid 20 mg/ml  Ketamine 1,500 mcg/ml  Route: Intrathecal  Last changed: 7/19/2021  Refill pump before date: 10/1/2021  Continuous infusion rates (Drug/Rate):   Dilaudid 7.994 mg/day or 0.333 mg/hr  Ketamine 599.6 mcg/day or 25.0 mcg/r  Patient activation dose:   Dilaudid 0.200 mg (Maximum daily dose of 9.939 mg)  Ketamine 15 mcg (Maximum daily dose of 745.4 mcg)  Patient activation lockout interval: 1hr  Maximum activations per day: 10  Dr Reina [phone]     pantoprazole 40 MG Tbec  Commonly known as: PROTONIX   Take 40 mg by mouth every day.  Dose: 40 mg     predniSONE 5 MG Tabs  Commonly known as: DELTASONE   Take 5 mg by mouth every day.  Dose: 5 mg     Symproic 0.2 MG Tabs  Generic drug: Naldemedine Tosylate   Take 0.2 mg by mouth every day.  Dose: 0.2 mg     tamsulosin 0.4 MG capsule  Commonly known as: FLOMAX   Take 0.4 mg by mouth every day.  Dose: 0.4 mg            Allergies  Allergies   Allergen Reactions   • Levaquin  "Vomiting   • Nubain [Nalbuphine] Anxiety     \"made him feel paranoid\"       DIET  Orders Placed This Encounter   Procedures   • Diet Order Diet: Regular     Standing Status:   Standing     Number of Occurrences:   1     Order Specific Question:   Diet:     Answer:   Regular [1]       ACTIVITY  As tolerated.  Weight bearing as tolerated    CONSULTATIONS  IR    PROCEDURES  S/p R nephrostomy tube placement    LABORATORY  Lab Results   Component Value Date    SODIUM 136 10/13/2021    POTASSIUM 4.0 10/13/2021    CHLORIDE 100 10/13/2021    CO2 24 10/13/2021    GLUCOSE 105 (H) 10/13/2021    BUN 5 (L) 10/13/2021    CREATININE 0.84 10/13/2021        Lab Results   Component Value Date    WBC 8.2 10/13/2021    HEMOGLOBIN 10.4 (L) 10/13/2021    HEMATOCRIT 32.0 (L) 10/13/2021    PLATELETCT 374 10/13/2021        Total time of the discharge process exceeds 32 minutes.  "

## 2021-10-13 NOTE — PROGRESS NOTES
1100  Soriano placed. Flushed with sterile water. Clear, yellow urine drained with minimal sediment.     1255  Nephrostomy tube flushed with 10ml of NS. Dressing to nephrostomy changed. Nephrostomy c/d/i,sutures intact. Pt discharged with NS flushes, dressing change supplies. Pt being dc'd to home. No new medications at dc. IV dc'd. Dc instructions discussed with patient. Covid shot declined. Flu vaccine declined.  All questions answered.  Patient  agreeable to dc plan. Patient has all belongings at [place].

## 2021-10-13 NOTE — DISCHARGE INSTRUCTIONS
Discharge Instructions    Discharged to home by car with relative. Discharged via wheelchair, hospital escort: Yes.  Special equipment needed: Not Applicable    Be sure to schedule a follow-up appointment with your primary care doctor or any specialists as instructed.     Discharge Plan:        I understand that a diet low in cholesterol, fat, and sodium is recommended for good health. Unless I have been given specific instructions below for another diet, I accept this instruction as my diet prescription.   Other diet: regular    Special Instructions: None    · Is patient discharged on Warfarin / Coumadin?   No       Urinary Tract Infection, Adult    A urinary tract infection (UTI) is an infection of any part of the urinary tract. The urinary tract includes the kidneys, ureters, bladder, and urethra. These organs make, store, and get rid of urine in the body.  Your health care provider may use other names to describe the infection. An upper UTI affects the ureters and kidneys (pyelonephritis). A lower UTI affects the bladder (cystitis) and urethra (urethritis).  What are the causes?  Most urinary tract infections are caused by bacteria in your genital area, around the entrance to your urinary tract (urethra). These bacteria grow and cause inflammation of your urinary tract.  What increases the risk?  You are more likely to develop this condition if:  · You have a urinary catheter that stays in place (indwelling).  · You are not able to control when you urinate or have a bowel movement (you have incontinence).  · You are female and you:  ? Use a spermicide or diaphragm for birth control.  ? Have low estrogen levels.  ? Are pregnant.  · You have certain genes that increase your risk (genetics).  · You are sexually active.  · You take antibiotic medicines.  · You have a condition that causes your flow of urine to slow down, such as:  ? An enlarged prostate, if you are male.  ? Blockage in your urethra (stricture).  ? A  kidney stone.  ? A nerve condition that affects your bladder control (neurogenic bladder).  ? Not getting enough to drink, or not urinating often.  · You have certain medical conditions, such as:  ? Diabetes.  ? A weak disease-fighting system (immunesystem).  ? Sickle cell disease.  ? Gout.  ? Spinal cord injury.  What are the signs or symptoms?  Symptoms of this condition include:  · Needing to urinate right away (urgently).  · Frequent urination or passing small amounts of urine frequently.  · Pain or burning with urination.  · Blood in the urine.  · Urine that smells bad or unusual.  · Trouble urinating.  · Cloudy urine.  · Vaginal discharge, if you are female.  · Pain in the abdomen or the lower back.  You may also have:  · Vomiting or a decreased appetite.  · Confusion.  · Irritability or tiredness.  · A fever.  · Diarrhea.  The first symptom in older adults may be confusion. In some cases, they may not have any symptoms until the infection has worsened.  How is this diagnosed?  This condition is diagnosed based on your medical history and a physical exam. You may also have other tests, including:  · Urine tests.  · Blood tests.  · Tests for sexually transmitted infections (STIs).  If you have had more than one UTI, a cystoscopy or imaging studies may be done to determine the cause of the infections.  How is this treated?  Treatment for this condition includes:  · Antibiotic medicine.  · Over-the-counter medicines to treat discomfort.  · Drinking enough water to stay hydrated.  If you have frequent infections or have other conditions such as a kidney stone, you may need to see a health care provider who specializes in the urinary tract (urologist).  In rare cases, urinary tract infections can cause sepsis. Sepsis is a life-threatening condition that occurs when the body responds to an infection. Sepsis is treated in the hospital with IV antibiotics, fluids, and other medicines.  Follow these instructions at  home:    Medicines  · Take over-the-counter and prescription medicines only as told by your health care provider.  · If you were prescribed an antibiotic medicine, take it as told by your health care provider. Do not stop using the antibiotic even if you start to feel better.  General instructions  · Make sure you:  ? Empty your bladder often and completely. Do not hold urine for long periods of time.  ? Empty your bladder after sex.  ? Wipe from front to back after a bowel movement if you are female. Use each tissue one time when you wipe.  · Drink enough fluid to keep your urine pale yellow.  · Keep all follow-up visits as told by your health care provider. This is important.  Contact a health care provider if:  · Your symptoms do not get better after 1-2 days.  · Your symptoms go away and then return.  Get help right away if you have:  · Severe pain in your back or your lower abdomen.  · A fever.  · Nausea or vomiting.  Summary  · A urinary tract infection (UTI) is an infection of any part of the urinary tract, which includes the kidneys, ureters, bladder, and urethra.  · Most urinary tract infections are caused by bacteria in your genital area, around the entrance to your urinary tract (urethra).  · Treatment for this condition often includes antibiotic medicines.  · If you were prescribed an antibiotic medicine, take it as told by your health care provider. Do not stop using the antibiotic even if you start to feel better.  · Keep all follow-up visits as told by your health care provider. This is important.  This information is not intended to replace advice given to you by your health care provider. Make sure you discuss any questions you have with your health care provider.  Document Released: 09/27/2006 Document Revised: 12/05/2019 Document Reviewed: 06/27/2019  Elsevier Patient Education © 2020 [employer]      Percutaneous Nephrostomy Home Guide  Percutaneous nephrostomy is a procedure to insert a  flexible tube into your kidney so that urine can leave your body. This procedure may be done if a medical condition prevents urine from leaving your kidney in the usual way. During the procedure, the nephrostomy tube is inserted in the right or left side of your lower back and is connected to an external drainage bag.  After you have a nephrostomy tube placed, urine will collect in the drainage bag outside of your body. You will need to empty and change the drainage bag as needed. You will also need to take steps to care for the area where the nephrostomy tube was inserted (tube insertion site).  How do I care for my nephrostomy tube?  · Always keep your tubing, the leg bag, or the bedside drainage bag below the level of your kidney so that your urine drains freely.  · Avoid activities that would cause bending or pulling of your tubing. Ask your health care provider what activities are safe for you.  · When connecting your nephrostomy tube to a drainage bag, make sure that there are no kinks in the tubing and that your urine is draining freely. You may want to gently wrap an elastic bandage over the tubing. This will help keep the tubing in place and prevent it from kinking. Make sure there is no tension on the tubing so it does not become dislodged.  · At night, you may want to connect your nephrostomy tube or the leg bag to a larger bedside drainage bag.  How do I empty the drainage bag?  Empty the leg bag or bedside drainage bag whenever it becomes ? full. Also empty it before you go to sleep. Most drainage bags have a drain at the bottom that allows urine to be emptied. Follow these basic steps:  1. Hold the drainage bag over a toilet or collection container. Use a measuring container if your health care provider told you to measure your urine.  2. Open the drain of the bag and allow the urine to drain out.  3. After all the urine has drained from the drainage bag, close the drain fully.  4. Flush the urine down  the toilet. If a collection container was used, rinse the container.  How do I change the dressing around the nephrostomy tube?  Change your dressing and clean your tube exit site as told by your health care provider. You may need to change the dressing every day for the first 2 weeks after having a nephrostomy tube inserted. After the first 2 weeks, you may be told to change the dressing two times a week.  Supplies needed:  · Mild soap and water.  · Split gauze pads, 4 × 4 inches (10 x 10 cm).  · Gauze pads, 4 × 4 inches (10 x 10 cm).  · Paper tape.  How to change the dressing:  Because of the location of your nephrostomy tube, you may need help from another person to complete dressing changes. Follow these basic steps:  2. Wash hands with soap and water.  3. Gently remove the tape and dressing from around the nephrostomy tube. Be careful not to pull on the tube while removing the dressing. Avoid using scissors because they may damage the tube.  4. Wash the skin around the tube with mild soap and water, rinse well, and pat the skin dry with a clean cloth.  5. Check the skin around the drain for redness, swelling, pus, warmth, or a bad smell.  6. If the drain was sutured to the skin, check the suture to verify that it is still anchored in the skin.  7. Place two split gauze pads in and around the tube exit site. Do not apply ointments or alcohol to the site.  8. Place a gauze pad on top of the split gauze pad.  9. Coil the tube on top of the gauze. The tubing should rest on the gauze, not on the skin.  10. Place tape around each edge of the gauze pad.  11. Secure the nephrostomy tubing. Make sure that the tube does not kink or become pinched. The tubing should rest on the gauze pad, not on the skin.  12. Dispose of used supplies properly.    How do I flush my nephrostomy tube?  Use a saline syringe to rinse out (flush) your nephrostomy tube as told by your health care provider. Flushing is easier if a three-way  stopcock is placed between the tube and the drainage bag. One connection of the stopcock connects to your tube, the second connects to the drainage bag, and the third is usually covered with a cap. The lever on the stopcock points to the direction on the stopcock that is closed to flow. Normally, the lever points in the direction of the cap to allow urine to drain from the tube to the drainage bag.  Supplies needed:  · Rubbing alcohol wipe.  · 10 mL 0.9% saline syringe.  How to flush the tube:  1. Move the lever of the three-way stopcock so it points toward the drainage bag.  2. Clean the cap with a rubbing alcohol wipe.  3. Screw the tip of a 10 mL 0.9% saline syringe onto the cap.  4. Using the syringe plunger, slowly push the 10 mL 0.9% saline in the syringe over 5-10 seconds. If resistance is met or pain occurs while pushing, stop pushing the saline.  5. Remove the syringe from the cap.  6. Return the stopcock lever to the usual position, pointing in the direction of the cap.  7. Dispose of used supplies properly.  How do I replace the drainage bag?  Replace the drainage bag, three-way stopcock, and any extension tubing as told by your health care provider. Make sure you always have an extra drainage bag and connecting tubing available.  1. Empty urine from your drainage bag.  2. Gather a new drainage bag, three-way stopcock, and any extension tubing.  3. Remove the drainage bag, three-way stopcock, and any extension tubing from the nephrostomy tube.  4. Attach the new leg bag or bedside drainage bag, three-way stopcock, and any extension tubing to the nephrostomy tube.  5. Dispose of the used drainage bag, three-way stopcock, and any extension.  Contact a health care provider if:  · You have problems with any of the valves or tubing.  · You have persistent pain or soreness in your back.  · You have more redness, swelling, or pain around your tube insertion site.  · You have more fluid or blood coming from your  tube insertion site.  · Your tube insertion site feels warm to the touch.  · You have pus or a bad smell coming from your tube insertion site.  · You have increased urine output or you feel burning when urinating.  Get help right away if:  · You have pain in your abdomen during the first week.  · You have chest pain or have trouble breathing.  · You have a new appearance of blood in your urine.  · You have a fever or chills.  · You have back pain that is not relieved by your medicine.  · You have decreased urine output.  · Your nephrostomy tube comes out.  This information is not intended to replace advice given to you by your health care provider. Make sure you discuss any questions you have with your health care provider.  Document Released: 10/08/2014 Document Revised: 04/09/2020 Document Reviewed: 09/29/2017  Elsevier Patient Education © 2020 [employer]      Indwelling Urinary Catheter Care, Adult  An indwelling urinary catheter is a thin tube that is put into your bladder. The tube helps to drain pee (urine) out of your body. The tube goes in through your urethra. Your urethra is where pee comes out of your body. Your pee will come out through the catheter, then it will go into a bag (drainage bag).  Take good care of your catheter so it will work well.  How to wear your catheter and bag  Supplies needed  Sticky tape (adhesive tape) or a leg strap.  Alcohol wipe or soap and water (if you use tape).  A clean towel (if you use tape).  Large overnight bag.  Smaller bag (leg bag).  Wearing your catheter  Attach your catheter to your leg with tape or a leg strap.  Make sure the catheter is not pulled tight.  If a leg strap gets wet, take it off and put on a dry strap.  If you use tape to hold the bag on your leg:  Use an alcohol wipe or soap and water to wash your skin where the tape made it sticky before.  Use a clean towel to pat-dry that skin.  Use new tape to make the bag stay on your leg.  Wearing your  bags  You should have been given a large overnight bag.  You may wear the overnight bag in the day or night.  Always have the overnight bag lower than your bladder.  Do not let the bag touch the floor.  Before you go to sleep, put a clean plastic bag in a wastebasket. Then hang the overnight bag inside the wastebasket.  You should also have a smaller leg bag that fits under your clothes.  Always wear the leg bag below your knee.  Do not wear your leg bag at night.  How to care for your skin and catheter  Supplies needed  A clean washcloth.  Water and mild soap.  A clean towel.  Caring for your skin and catheter         Clean the skin around your catheter every day:  Wash your hands with soap and water.  Wet a clean washcloth in warm water and mild soap.  Clean the skin around your urethra.  If you are female:  Gently spread the folds of skin around your vagina (labia).  With the washcloth in your other hand, wipe the inner side of your labia on each side. Wipe from front to back.  If you are male:  Pull back any skin that covers the end of your penis (foreskin).  With the washcloth in your other hand, wipe your penis in small circles. Start wiping at the tip of your penis, then move away from the catheter.  Move the foreskin back in place, if needed.  With your free hand, hold the catheter close to where it goes into your body.  Keep holding the catheter during cleaning so it does not get pulled out.  With the washcloth in your other hand, clean the catheter.  Only wipe downward on the catheter.  Do not wipe upward toward your body. Doing this may push germs into your urethra and cause infection.  Use a clean towel to pat-dry the catheter and the skin around it. Make sure to wipe off all soap.  Wash your hands with soap and water.  Shower every day. Do not take baths.  Do not use cream, ointment, or lotion on the area where the catheter goes into your body, unless your doctor tells you to.  Do not use powders,  sprays, or lotions on your genital area.  Check your skin around the catheter every day for signs of infection. Check for:  Redness, swelling, or pain.  Fluid or blood.  Warmth.  Pus or a bad smell.  How to empty the bag  Supplies needed  Rubbing alcohol.  Gauze pad or cotton ball.  Tape or a leg strap.  Emptying the bag  Pour the pee out of your bag when it is ?-½ full, or at least 2-3 times a day. Do this for your overnight bag and your leg bag.  Wash your hands with soap and water.  Separate (detach) the bag from your leg.  Hold the bag over the toilet or a clean pail. Keep the bag lower than your hips and bladder. This is so the pee (urine) does not go back into the tube.  Open the pour spout. It is at the bottom of the bag.  Empty the pee into the toilet or pail. Do not let the pour spout touch any surface.  Put rubbing alcohol on a gauze pad or cotton ball.  Use the gauze pad or cotton ball to clean the pour spout.  Close the pour spout.  Attach the bag to your leg with tape or a leg strap.  Wash your hands with soap and water.  Follow instructions for cleaning the drainage bag:  From the product maker.  As told by your doctor.  How to change the bag  Supplies needed  Alcohol wipes.  A clean bag.  Tape or a leg strap.  Changing the bag  Replace your bag when it starts to leak, smell bad, or look dirty.  Wash your hands with soap and water.  Separate the dirty bag from your leg.  Pinch the catheter with your fingers so that pee does not spill out.  Separate the catheter tube from the bag tube where these tubes connect (at the connection valve). Do not let the tubes touch any surface.  Clean the end of the catheter tube with an alcohol wipe. Use a different alcohol wipe to clean the end of the bag tube.  Connect the catheter tube to the tube of the clean bag.  Attach the clean bag to your leg with tape or a leg strap. Do not make the bag tight on your leg.  Wash your hands with soap and water.  General  rules    Never pull on your catheter. Never try to take it out. Doing that can hurt you.  Always wash your hands before and after you touch your catheter or bag. Use a mild, fragrance-free soap. If you do not have soap and water, use hand .  Always make sure there are no twists or bends (kinks) in the catheter tube.  Always make sure there are no leaks in the catheter or bag.  Drink enough fluid to keep your pee pale yellow.  Do not take baths, swim, or use a hot tub.  If you are female, wipe from front to back after you poop (have a bowel movement).  Contact a doctor if:  Your pee is cloudy.  Your pee smells worse than usual.  Your catheter gets clogged.  Your catheter leaks.  Your bladder feels full.  Get help right away if:  You have redness, swelling, or pain where the catheter goes into your body.  You have fluid, blood, pus, or a bad smell coming from the area where the catheter goes into your body.  Your skin feels warm where the catheter goes into your body.  You have a fever.  You have pain in your:  Belly (abdomen).  Legs.  Lower back.  Bladder.  You see blood in the catheter.  Your pee is pink or red.  You feel sick to your stomach (nauseous).  You throw up (vomit).  You have chills.  Your pee is not draining into the bag.  Your catheter gets pulled out.  Summary  An indwelling urinary catheter is a thin tube that is placed into the bladder to help drain pee (urine) out of the body.  The catheter is placed into the part of the body that drains pee from the bladder (urethra).  Taking good care of your catheter will keep it working properly and help prevent problems.  Always wash your hands before and after touching your catheter or bag.  Never pull on your catheter or try to take it out.  This information is not intended to replace advice given to you by your health care provider. Make sure you discuss any questions you have with your health care provider.  Document Released: 04/14/2014 Document  Revised: 04/10/2020 Document Reviewed: 08/03/2018  [employer] Patient Education © 2020 [employer]        Depression / Suicide Risk    As you are discharged from this [facility] facility, it is important to learn how to keep safe from harming yourself.    Recognize the warning signs:  · Abrupt changes in personality, positive or negative- including increase in energy   · Giving away possessions  · Change in eating patterns- significant weight changes-  positive or negative  · Change in sleeping patterns- unable to sleep or sleeping all the time   · Unwillingness or inability to communicate  · Depression  · Unusual sadness, discouragement and loneliness  · Talk of wanting to die  · Neglect of personal appearance   · Rebelliousness- reckless behavior  · Withdrawal from people/activities they love  · Confusion- inability to concentrate     If you or a loved one observes any of these behaviors or has concerns about self-harm, here's what you can do:  · Talk about it- your feelings and reasons for harming yourself  · Remove any means that you might use to hurt yourself (examples: pills, rope, extension cords, firearm)  · Get professional help from the community (Mental Health, Substance Abuse, psychological counseling)  · Do not be alone:Call your Safe Contact- someone whom you trust who will be there for you.  · Call your local CRISIS HOTLINE 861-2104 or 543-347-1958  · Call your local Children's Mobile Crisis Response Team [place] (837) 119-7188 or www.[employer]  · Call the toll free National Suicide Prevention Hotlines   · National Suicide Prevention Lifeline 730-857-PXMX (1963)  · National Hope Line Network 800-SUICIDE (012-6408)        Discharge Instructions per Andrzej Munoz M.D.    Please follow-up with PCP as outpatient.   Please follow up with your urologist per your appointment    DIET: regular diet    ACTIVITY: As tolerated    DIAGNOSIS: complicated UTI, hydronephrosis with R nephrostomy tube  placement, colon neoplasm     Return to ER in the event of new or worsening symptoms. Please note importance of compliance and the patient has agreed to proceed with all medical recommendations and follow up plan indicated above. All medications come with benefits and risks. Risks may include permanent injury or death and these risks can be minimized with close reassessment and monitoring. Please make it to your scheduled follow ups with PCP and urology

## 2021-10-13 NOTE — PROGRESS NOTES
Patient has a follow up appointment with Nina Hardy on 10/28/21 at 3:15pm. Patient informed via AVS and by voice message.

## 2021-10-14 LAB
BACTERIA UR CULT: ABNORMAL
BACTERIA UR CULT: ABNORMAL
SIGNIFICANT IND 70042: ABNORMAL
SITE SITE: ABNORMAL
SOURCE SOURCE: ABNORMAL

## 2021-10-14 NOTE — PROGRESS NOTES
Patient was discharged on 10/13. Urine culture preliminary result was negative upon discharge, however final result returned positive Enterococcus faecium with ,000 cfu/mL, which is sensitive to Linezolid. Patient has hx of VRE.    I curbsided with ID Dr. Aguirre, patient's VRE had low colony count.  Rec if no symptoms after nephrostomy tube is replaced, no needed to treat, but need to monitor for any recurrence of symptoms of UTI or pyelonephritis.    I called patient at [phone] and spoke to patient and his wife Janine. Patient denies any fever, chills, dysuria or new onset flank pain. I advised him to monitor any sign of recurrence of symptoms of UTi, if he develops any signs of infection, patient is instructed to go to hospital ASAP and get iv antibiotics treatment. Patient and his wife voiced understanding.

## 2021-10-17 ENCOUNTER — APPOINTMENT (OUTPATIENT)
Dept: RADIOLOGY | Facility: MEDICAL CENTER | Age: 56
End: 2021-10-17
Attending: EMERGENCY MEDICINE
Payer: COMMERCIAL

## 2021-10-17 ENCOUNTER — HOSPITAL ENCOUNTER (OUTPATIENT)
Facility: MEDICAL CENTER | Age: 56
End: 2021-10-19
Attending: EMERGENCY MEDICINE | Admitting: STUDENT IN AN ORGANIZED HEALTH CARE EDUCATION/TRAINING PROGRAM
Payer: COMMERCIAL

## 2021-10-17 DIAGNOSIS — C18.9 MALIGNANT NEOPLASM OF COLON, UNSPECIFIED PART OF COLON (HCC): ICD-10-CM

## 2021-10-17 DIAGNOSIS — N13.30 HYDRONEPHROSIS, UNSPECIFIED HYDRONEPHROSIS TYPE: ICD-10-CM

## 2021-10-17 PROBLEM — Z93.6 NEPHROSTOMY STATUS (HCC): Status: ACTIVE | Noted: 2021-10-17

## 2021-10-17 PROBLEM — D75.839 THROMBOCYTOSIS: Status: ACTIVE | Noted: 2021-10-17

## 2021-10-17 LAB
ALBUMIN SERPL BCP-MCNC: 4.6 G/DL (ref 3.2–4.9)
ALBUMIN/GLOB SERPL: 1.3 G/DL
ALP SERPL-CCNC: 145 U/L (ref 30–99)
ALT SERPL-CCNC: 25 U/L (ref 2–50)
ANION GAP SERPL CALC-SCNC: 13 MMOL/L (ref 7–16)
APPEARANCE UR: CLEAR
AST SERPL-CCNC: 39 U/L (ref 12–45)
BACTERIA #/AREA URNS HPF: NEGATIVE /HPF
BACTERIA BLD CULT: NORMAL
BACTERIA BLD CULT: NORMAL
BASOPHILS # BLD AUTO: 0.9 % (ref 0–1.8)
BASOPHILS # BLD: 0.08 K/UL (ref 0–0.12)
BILIRUB SERPL-MCNC: 0.4 MG/DL (ref 0.1–1.5)
BILIRUB UR QL STRIP.AUTO: NEGATIVE
BUN SERPL-MCNC: 8 MG/DL (ref 8–22)
CALCIUM SERPL-MCNC: 9.8 MG/DL (ref 8.5–10.5)
CHLORIDE SERPL-SCNC: 94 MMOL/L (ref 96–112)
CO2 SERPL-SCNC: 23 MMOL/L (ref 20–33)
COLOR UR: YELLOW
CREAT SERPL-MCNC: 0.86 MG/DL (ref 0.5–1.4)
EOSINOPHIL # BLD AUTO: 0.36 K/UL (ref 0–0.51)
EOSINOPHIL NFR BLD: 4 % (ref 0–6.9)
EPI CELLS #/AREA URNS HPF: NEGATIVE /HPF
ERYTHROCYTE [DISTWIDTH] IN BLOOD BY AUTOMATED COUNT: 63.6 FL (ref 35.9–50)
GLOBULIN SER CALC-MCNC: 3.6 G/DL (ref 1.9–3.5)
GLUCOSE SERPL-MCNC: 102 MG/DL (ref 65–99)
GLUCOSE UR STRIP.AUTO-MCNC: NEGATIVE MG/DL
HCT VFR BLD AUTO: 39.1 % (ref 42–52)
HGB BLD-MCNC: 12.7 G/DL (ref 14–18)
IMM GRANULOCYTES # BLD AUTO: 0.03 K/UL (ref 0–0.11)
IMM GRANULOCYTES NFR BLD AUTO: 0.3 % (ref 0–0.9)
KETONES UR STRIP.AUTO-MCNC: NEGATIVE MG/DL
LACTATE BLD-SCNC: 1.6 MMOL/L (ref 0.5–2)
LEUKOCYTE ESTERASE UR QL STRIP.AUTO: ABNORMAL
LYMPHOCYTES # BLD AUTO: 1.92 K/UL (ref 1–4.8)
LYMPHOCYTES NFR BLD: 21.4 % (ref 22–41)
MAGNESIUM SERPL-MCNC: 2 MG/DL (ref 1.5–2.5)
MCH RBC QN AUTO: 27.9 PG (ref 27–33)
MCHC RBC AUTO-ENTMCNC: 32.5 G/DL (ref 33.7–35.3)
MCV RBC AUTO: 85.7 FL (ref 81.4–97.8)
MICRO URNS: ABNORMAL
MONOCYTES # BLD AUTO: 0.69 K/UL (ref 0–0.85)
MONOCYTES NFR BLD AUTO: 7.7 % (ref 0–13.4)
NEUTROPHILS # BLD AUTO: 5.9 K/UL (ref 1.82–7.42)
NEUTROPHILS NFR BLD: 65.7 % (ref 44–72)
NITRITE UR QL STRIP.AUTO: NEGATIVE
NRBC # BLD AUTO: 0 K/UL
NRBC BLD-RTO: 0 /100 WBC
PH UR STRIP.AUTO: 6 [PH] (ref 5–8)
PLATELET # BLD AUTO: 452 K/UL (ref 164–446)
PMV BLD AUTO: 9.5 FL (ref 9–12.9)
POTASSIUM SERPL-SCNC: 4.7 MMOL/L (ref 3.6–5.5)
PROT SERPL-MCNC: 8.2 G/DL (ref 6–8.2)
PROT UR QL STRIP: 30 MG/DL
RBC # BLD AUTO: 4.56 M/UL (ref 4.7–6.1)
RBC # URNS HPF: ABNORMAL /HPF
RBC UR QL AUTO: ABNORMAL
SIGNIFICANT IND 70042: NORMAL
SIGNIFICANT IND 70042: NORMAL
SITE SITE: NORMAL
SITE SITE: NORMAL
SODIUM SERPL-SCNC: 130 MMOL/L (ref 135–145)
SOURCE SOURCE: NORMAL
SOURCE SOURCE: NORMAL
SP GR UR STRIP.AUTO: 1.01
UROBILINOGEN UR STRIP.AUTO-MCNC: 0.2 MG/DL
WBC # BLD AUTO: 9 K/UL (ref 4.8–10.8)
WBC #/AREA URNS HPF: ABNORMAL /HPF

## 2021-10-17 PROCEDURE — 99220 PR INITIAL OBSERVATION CARE,LEVL III: CPT | Performed by: STUDENT IN AN ORGANIZED HEALTH CARE EDUCATION/TRAINING PROGRAM

## 2021-10-17 PROCEDURE — G0378 HOSPITAL OBSERVATION PER HR: HCPCS

## 2021-10-17 PROCEDURE — 80053 COMPREHEN METABOLIC PANEL: CPT

## 2021-10-17 PROCEDURE — 83605 ASSAY OF LACTIC ACID: CPT

## 2021-10-17 PROCEDURE — 87186 SC STD MICRODIL/AGAR DIL: CPT

## 2021-10-17 PROCEDURE — 85025 COMPLETE CBC W/AUTO DIFF WBC: CPT

## 2021-10-17 PROCEDURE — 87040 BLOOD CULTURE FOR BACTERIA: CPT

## 2021-10-17 PROCEDURE — A9270 NON-COVERED ITEM OR SERVICE: HCPCS | Performed by: STUDENT IN AN ORGANIZED HEALTH CARE EDUCATION/TRAINING PROGRAM

## 2021-10-17 PROCEDURE — 700111 HCHG RX REV CODE 636 W/ 250 OVERRIDE (IP): Performed by: EMERGENCY MEDICINE

## 2021-10-17 PROCEDURE — 71045 X-RAY EXAM CHEST 1 VIEW: CPT

## 2021-10-17 PROCEDURE — 81001 URINALYSIS AUTO W/SCOPE: CPT

## 2021-10-17 PROCEDURE — 96375 TX/PRO/DX INJ NEW DRUG ADDON: CPT

## 2021-10-17 PROCEDURE — 99285 EMERGENCY DEPT VISIT HI MDM: CPT

## 2021-10-17 PROCEDURE — 36415 COLL VENOUS BLD VENIPUNCTURE: CPT

## 2021-10-17 PROCEDURE — 76775 US EXAM ABDO BACK WALL LIM: CPT

## 2021-10-17 PROCEDURE — 700111 HCHG RX REV CODE 636 W/ 250 OVERRIDE (IP): Performed by: STUDENT IN AN ORGANIZED HEALTH CARE EDUCATION/TRAINING PROGRAM

## 2021-10-17 PROCEDURE — 87077 CULTURE AEROBIC IDENTIFY: CPT

## 2021-10-17 PROCEDURE — 87086 URINE CULTURE/COLONY COUNT: CPT

## 2021-10-17 PROCEDURE — 96374 THER/PROPH/DIAG INJ IV PUSH: CPT

## 2021-10-17 PROCEDURE — 83735 ASSAY OF MAGNESIUM: CPT

## 2021-10-17 PROCEDURE — 700105 HCHG RX REV CODE 258: Performed by: EMERGENCY MEDICINE

## 2021-10-17 PROCEDURE — 700102 HCHG RX REV CODE 250 W/ 637 OVERRIDE(OP): Performed by: STUDENT IN AN ORGANIZED HEALTH CARE EDUCATION/TRAINING PROGRAM

## 2021-10-17 RX ORDER — LEVALBUTEROL INHALATION SOLUTION 1.25 MG/3ML
1.25 SOLUTION RESPIRATORY (INHALATION)
Status: DISCONTINUED | OUTPATIENT
Start: 2021-10-17 | End: 2021-10-19 | Stop reason: HOSPADM

## 2021-10-17 RX ORDER — CLONIDINE HYDROCHLORIDE 0.1 MG/1
0.1 TABLET ORAL EVERY 6 HOURS PRN
Status: DISCONTINUED | OUTPATIENT
Start: 2021-10-17 | End: 2021-10-19 | Stop reason: HOSPADM

## 2021-10-17 RX ORDER — PREDNISONE 5 MG/1
5 TABLET ORAL DAILY
Status: DISCONTINUED | OUTPATIENT
Start: 2021-10-17 | End: 2021-10-19 | Stop reason: HOSPADM

## 2021-10-17 RX ORDER — ONDANSETRON 2 MG/ML
4 INJECTION INTRAMUSCULAR; INTRAVENOUS EVERY 4 HOURS PRN
Status: DISCONTINUED | OUTPATIENT
Start: 2021-10-17 | End: 2021-10-19 | Stop reason: HOSPADM

## 2021-10-17 RX ORDER — ONDANSETRON 2 MG/ML
4 INJECTION INTRAMUSCULAR; INTRAVENOUS ONCE
Status: COMPLETED | OUTPATIENT
Start: 2021-10-17 | End: 2021-10-17

## 2021-10-17 RX ORDER — TAMSULOSIN HYDROCHLORIDE 0.4 MG/1
0.4 CAPSULE ORAL DAILY
Status: DISCONTINUED | OUTPATIENT
Start: 2021-10-17 | End: 2021-10-19 | Stop reason: HOSPADM

## 2021-10-17 RX ORDER — BISACODYL 10 MG
10 SUPPOSITORY, RECTAL RECTAL
Status: DISCONTINUED | OUTPATIENT
Start: 2021-10-17 | End: 2021-10-19 | Stop reason: HOSPADM

## 2021-10-17 RX ORDER — OXYCODONE HYDROCHLORIDE 5 MG/1
5 TABLET ORAL
Status: DISCONTINUED | OUTPATIENT
Start: 2021-10-17 | End: 2021-10-19 | Stop reason: HOSPADM

## 2021-10-17 RX ORDER — POLYETHYLENE GLYCOL 3350 17 G/17G
1 POWDER, FOR SOLUTION ORAL
Status: DISCONTINUED | OUTPATIENT
Start: 2021-10-17 | End: 2021-10-19 | Stop reason: HOSPADM

## 2021-10-17 RX ORDER — PANTOPRAZOLE SODIUM 40 MG/1
40 TABLET, DELAYED RELEASE ORAL DAILY
Status: DISCONTINUED | OUTPATIENT
Start: 2021-10-17 | End: 2021-10-17

## 2021-10-17 RX ORDER — OLANZAPINE 2.5 MG/1
2.5 TABLET, FILM COATED ORAL NIGHTLY
COMMUNITY

## 2021-10-17 RX ORDER — ENALAPRILAT 1.25 MG/ML
1.25 INJECTION INTRAVENOUS EVERY 6 HOURS PRN
Status: DISCONTINUED | OUTPATIENT
Start: 2021-10-17 | End: 2021-10-19 | Stop reason: HOSPADM

## 2021-10-17 RX ORDER — OXYCODONE HYDROCHLORIDE 10 MG/1
10 TABLET ORAL
Status: DISCONTINUED | OUTPATIENT
Start: 2021-10-17 | End: 2021-10-19 | Stop reason: HOSPADM

## 2021-10-17 RX ORDER — ACETAMINOPHEN 325 MG/1
650 TABLET ORAL EVERY 6 HOURS PRN
Status: DISCONTINUED | OUTPATIENT
Start: 2021-10-17 | End: 2021-10-19 | Stop reason: HOSPADM

## 2021-10-17 RX ORDER — LABETALOL HYDROCHLORIDE 5 MG/ML
10 INJECTION, SOLUTION INTRAVENOUS EVERY 4 HOURS PRN
Status: DISCONTINUED | OUTPATIENT
Start: 2021-10-17 | End: 2021-10-19 | Stop reason: HOSPADM

## 2021-10-17 RX ORDER — LOSARTAN POTASSIUM 50 MG/1
50 TABLET ORAL DAILY
Status: DISCONTINUED | OUTPATIENT
Start: 2021-10-17 | End: 2021-10-19 | Stop reason: HOSPADM

## 2021-10-17 RX ORDER — GUAIFENESIN/DEXTROMETHORPHAN 100-10MG/5
10 SYRUP ORAL EVERY 6 HOURS PRN
Status: DISCONTINUED | OUTPATIENT
Start: 2021-10-17 | End: 2021-10-19 | Stop reason: HOSPADM

## 2021-10-17 RX ORDER — HYDROMORPHONE HYDROCHLORIDE 1 MG/ML
0.5 INJECTION, SOLUTION INTRAMUSCULAR; INTRAVENOUS; SUBCUTANEOUS
Status: DISCONTINUED | OUTPATIENT
Start: 2021-10-17 | End: 2021-10-17

## 2021-10-17 RX ORDER — HYDROMORPHONE HYDROCHLORIDE 4 MG/1
4 TABLET ORAL EVERY 4 HOURS PRN
Status: DISCONTINUED | OUTPATIENT
Start: 2021-10-17 | End: 2021-10-19 | Stop reason: HOSPADM

## 2021-10-17 RX ORDER — BUDESONIDE AND FORMOTEROL FUMARATE DIHYDRATE 160; 4.5 UG/1; UG/1
2 AEROSOL RESPIRATORY (INHALATION)
Status: DISCONTINUED | OUTPATIENT
Start: 2021-10-17 | End: 2021-10-19 | Stop reason: HOSPADM

## 2021-10-17 RX ORDER — TIOTROPIUM BROMIDE INHALATION SPRAY 1.56 UG/1
1 SPRAY, METERED RESPIRATORY (INHALATION) 2 TIMES DAILY
COMMUNITY

## 2021-10-17 RX ORDER — PROMETHAZINE HYDROCHLORIDE 25 MG/1
12.5-25 TABLET ORAL EVERY 4 HOURS PRN
Status: DISCONTINUED | OUTPATIENT
Start: 2021-10-17 | End: 2021-10-19 | Stop reason: HOSPADM

## 2021-10-17 RX ORDER — LEVALBUTEROL INHALATION SOLUTION 1.25 MG/3ML
1.25 SOLUTION RESPIRATORY (INHALATION) EVERY 6 HOURS PRN
COMMUNITY

## 2021-10-17 RX ORDER — PROCHLORPERAZINE EDISYLATE 5 MG/ML
5-10 INJECTION INTRAMUSCULAR; INTRAVENOUS EVERY 4 HOURS PRN
Status: DISCONTINUED | OUTPATIENT
Start: 2021-10-17 | End: 2021-10-19 | Stop reason: HOSPADM

## 2021-10-17 RX ORDER — DOCUSATE SODIUM 100 MG/1
100 CAPSULE, LIQUID FILLED ORAL 2 TIMES DAILY
Status: DISCONTINUED | OUTPATIENT
Start: 2021-10-17 | End: 2021-10-19 | Stop reason: HOSPADM

## 2021-10-17 RX ORDER — AMOXICILLIN 250 MG
2 CAPSULE ORAL 2 TIMES DAILY
Status: DISCONTINUED | OUTPATIENT
Start: 2021-10-17 | End: 2021-10-19 | Stop reason: HOSPADM

## 2021-10-17 RX ORDER — SODIUM CHLORIDE 9 MG/ML
INJECTION, SOLUTION INTRAVENOUS CONTINUOUS
Status: DISCONTINUED | OUTPATIENT
Start: 2021-10-17 | End: 2021-10-19 | Stop reason: HOSPADM

## 2021-10-17 RX ORDER — HYDROMORPHONE HYDROCHLORIDE 2 MG/1
4 TABLET ORAL
Status: DISCONTINUED | OUTPATIENT
Start: 2021-10-17 | End: 2021-10-17

## 2021-10-17 RX ORDER — OMEPRAZOLE 20 MG/1
20 CAPSULE, DELAYED RELEASE ORAL DAILY
Status: DISCONTINUED | OUTPATIENT
Start: 2021-10-17 | End: 2021-10-19 | Stop reason: HOSPADM

## 2021-10-17 RX ORDER — ONDANSETRON 4 MG/1
4 TABLET, ORALLY DISINTEGRATING ORAL EVERY 4 HOURS PRN
Status: DISCONTINUED | OUTPATIENT
Start: 2021-10-17 | End: 2021-10-19 | Stop reason: HOSPADM

## 2021-10-17 RX ORDER — CEFTRIAXONE 1 G/1
1 INJECTION, POWDER, FOR SOLUTION INTRAMUSCULAR; INTRAVENOUS ONCE
Status: COMPLETED | OUTPATIENT
Start: 2021-10-17 | End: 2021-10-17

## 2021-10-17 RX ORDER — PROMETHAZINE HYDROCHLORIDE 25 MG/1
12.5-25 SUPPOSITORY RECTAL EVERY 4 HOURS PRN
Status: DISCONTINUED | OUTPATIENT
Start: 2021-10-17 | End: 2021-10-19 | Stop reason: HOSPADM

## 2021-10-17 RX ORDER — OLANZAPINE 5 MG/1
2.5 TABLET ORAL NIGHTLY
Status: DISCONTINUED | OUTPATIENT
Start: 2021-10-17 | End: 2021-10-19 | Stop reason: HOSPADM

## 2021-10-17 RX ADMIN — ONDANSETRON 4 MG: 2 INJECTION INTRAMUSCULAR; INTRAVENOUS at 09:30

## 2021-10-17 RX ADMIN — CEFTRIAXONE SODIUM 1 G: 1 INJECTION, POWDER, FOR SOLUTION INTRAMUSCULAR; INTRAVENOUS at 11:15

## 2021-10-17 RX ADMIN — ONDANSETRON 4 MG: 2 INJECTION INTRAMUSCULAR; INTRAVENOUS at 22:35

## 2021-10-17 RX ADMIN — SODIUM CHLORIDE: 9 INJECTION, SOLUTION INTRAVENOUS at 09:30

## 2021-10-17 RX ADMIN — PREDNISONE 5 MG: 5 TABLET ORAL at 12:37

## 2021-10-17 RX ADMIN — OXYCODONE HYDROCHLORIDE 10 MG: 10 TABLET ORAL at 20:31

## 2021-10-17 RX ADMIN — HYDROMORPHONE HYDROCHLORIDE 0.5 MG: 1 INJECTION, SOLUTION INTRAMUSCULAR; INTRAVENOUS; SUBCUTANEOUS at 14:52

## 2021-10-17 RX ADMIN — TIOTROPIUM BROMIDE INHALATION SPRAY 5 MCG: 3.12 SPRAY, METERED RESPIRATORY (INHALATION) at 14:03

## 2021-10-17 RX ADMIN — OLANZAPINE 2.5 MG: 5 TABLET, FILM COATED ORAL at 20:31

## 2021-10-17 RX ADMIN — LOSARTAN POTASSIUM 50 MG: 50 TABLET, FILM COATED ORAL at 13:19

## 2021-10-17 RX ADMIN — TAMSULOSIN HYDROCHLORIDE 0.4 MG: 0.4 CAPSULE ORAL at 12:37

## 2021-10-17 RX ADMIN — HYDROMORPHONE HYDROCHLORIDE 4 MG: 4 TABLET ORAL at 17:52

## 2021-10-17 RX ADMIN — BUDESONIDE AND FORMOTEROL FUMARATE DIHYDRATE 2 PUFF: 160; 4.5 AEROSOL RESPIRATORY (INHALATION) at 20:31

## 2021-10-17 RX ADMIN — ACETAMINOPHEN 650 MG: 325 TABLET ORAL at 12:37

## 2021-10-17 RX ADMIN — HYDROMORPHONE HYDROCHLORIDE 4 MG: 4 TABLET ORAL at 22:12

## 2021-10-17 RX ADMIN — FENTANYL CITRATE 50 MCG: 50 INJECTION INTRAMUSCULAR; INTRAVENOUS at 09:29

## 2021-10-17 RX ADMIN — HYDROMORPHONE HYDROCHLORIDE 4 MG: 2 TABLET ORAL at 13:20

## 2021-10-17 RX ADMIN — OXYCODONE HYDROCHLORIDE 10 MG: 10 TABLET ORAL at 14:25

## 2021-10-17 RX ADMIN — OMEPRAZOLE 20 MG: 20 CAPSULE, DELAYED RELEASE ORAL at 13:19

## 2021-10-17 RX ADMIN — BUDESONIDE AND FORMOTEROL FUMARATE DIHYDRATE 2 PUFF: 160; 4.5 AEROSOL RESPIRATORY (INHALATION) at 14:01

## 2021-10-17 ASSESSMENT — LIFESTYLE VARIABLES
EVER HAD A DRINK FIRST THING IN THE MORNING TO STEADY YOUR NERVES TO GET RID OF A HANGOVER: NO
HOW MANY TIMES IN THE PAST YEAR HAVE YOU HAD 5 OR MORE DRINKS IN A DAY: 0
TOTAL SCORE: 0
ON A TYPICAL DAY WHEN YOU DRINK ALCOHOL HOW MANY DRINKS DO YOU HAVE: 0
TOTAL SCORE: 0
HAVE YOU EVER FELT YOU SHOULD CUT DOWN ON YOUR DRINKING: NO
ALCOHOL_USE: NO
TOTAL SCORE: 0
HAVE PEOPLE ANNOYED YOU BY CRITICIZING YOUR DRINKING: NO
EVER FELT BAD OR GUILTY ABOUT YOUR DRINKING: NO
AVERAGE NUMBER OF DAYS PER WEEK YOU HAVE A DRINK CONTAINING ALCOHOL: 0
CONSUMPTION TOTAL: NEGATIVE

## 2021-10-17 ASSESSMENT — ENCOUNTER SYMPTOMS
BACK PAIN: 1
CHILLS: 0
WEIGHT LOSS: 1
DIARRHEA: 0
ABDOMINAL PAIN: 1
VOMITING: 1
FLANK PAIN: 1
NAUSEA: 1
FEVER: 0
EYES NEGATIVE: 1
WEAKNESS: 1
SHORTNESS OF BREATH: 0
COUGH: 0
CONSTIPATION: 1

## 2021-10-17 ASSESSMENT — COGNITIVE AND FUNCTIONAL STATUS - GENERAL
SUGGESTED CMS G CODE MODIFIER MOBILITY: CJ
WALKING IN HOSPITAL ROOM: A LITTLE
DAILY ACTIVITIY SCORE: 24
CLIMB 3 TO 5 STEPS WITH RAILING: A LITTLE
SUGGESTED CMS G CODE MODIFIER DAILY ACTIVITY: CH
STANDING UP FROM CHAIR USING ARMS: A LITTLE
MOBILITY SCORE: 21

## 2021-10-17 ASSESSMENT — PAIN DESCRIPTION - PAIN TYPE
TYPE: CHRONIC PAIN
TYPE: ACUTE PAIN
TYPE: CHRONIC PAIN
TYPE: ACUTE PAIN
TYPE: ACUTE PAIN

## 2021-10-17 ASSESSMENT — FIBROSIS 4 INDEX
FIB4 SCORE: 1
FIB4 SCORE: 0.91

## 2021-10-17 NOTE — ASSESSMENT & PLAN NOTE
Lung metastases  Oncology follow up as outpatient  10/18: Hospice evaluation ordered per wife request.

## 2021-10-17 NOTE — ASSESSMENT & PLAN NOTE
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  Hospice evaluation pending.

## 2021-10-17 NOTE — ED PROVIDER NOTES
"ED Provider Note    CHIEF COMPLAINT  Chief Complaint   Patient presents with   • N/V     X2 days, unable to keep anything down.  Pt reports having right nephrostomy tube replaced last week and Dr. Munoz called back with +BC.  Pt also has stage 4 colon CA with treatments on hold due to decline in Kidney function.        HPI  Erich Ingram is a 56 y.o. male who presents with nausea and vomiting.  The patient has metastatic colon cancer and has been suffering from an obstructive process in the right ureter.  He had a stent placed and was admitted about a week and a half ago.  He states he was discharged on Tuesday.  He states the hospitalist called him on Wednesday and told him he had a positive blood culture.  He states that on Thursday he was feeling fine but on Friday started having vomiting.  He lives in [facility] and cannot get a ride to [facility] until today.  He has not had any fevers.  He does have severe right back pain.  He has a Soriano catheter in place.  The patient is not currently on antibiotics.    REVIEW OF SYSTEMS  See HPI for further details. All other systems are negative.     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   • COPD (chronic obstructive pulmonary disease) (HCC) 2018   • Hepatitis C 2005    low viral load per spouse   • Breath shortness     COPD   • Cancer (HCC)     colon ca jan 2016   • Fall 2019   • Heart burn    • Hypertension    • Indigestion    • Psychiatric problem     anxiety   • Renal disorder     rt kidney is only functioning kidney due to cancer        FAMILY HISTORY  [unfilled]    SOCIAL HISTORY  Social History     Socioeconomic History   • Marital " status:      Spouse name: Not on file   • Number of children: Not on file   • Years of education: Not on file   • Highest education level: Not on file   Occupational History   • Not on file   Tobacco Use   • Smoking status: Former Smoker     Packs/day: 1.00     Years: 15.00     Pack years: 15.00     Start date: 2001     Quit date: 2015     Years since quittin.3   • Smokeless tobacco: Never Used   Vaping Use   • Vaping Use: Former   Substance and Sexual Activity   • Alcohol use: Not Currently     Alcohol/week: 0.0 oz   • Drug use: Yes     Types: Oral     Comment: marijuana   • Sexual activity: Not on file   Other Topics Concern   • Primary/coprimary nurse & associates Not Asked   • Family contact information Not Asked   • OK to release patient information to the following Not Asked   • Patient preferred routine/Privacy concerns Not Asked   • Patient likes and dislikes Not Asked   • Participating In Research Study Not Asked   • Miscellaneous Not Asked   Social History Narrative   • Not on file     Social Determinants of Health     Financial Resource Strain:    • Difficulty of Paying Living Expenses:    Food Insecurity:    • Worried About Running Out of Food in the Last Year:    • Ran Out of Food in the Last Year:    Transportation Needs:    • Lack of Transportation (Medical):    • Lack of Transportation (Non-Medical):    Physical Activity:    • Days of Exercise per Week:    • Minutes of Exercise per Session:    Stress:    • Feeling of Stress :    Social Connections:    • Frequency of Communication with Friends and Family:    • Frequency of Social Gatherings with Friends and Family:    • Attends Muslim Services:    • Active Member of Clubs or Organizations:    • Attends Club or Organization Meetings:    • Marital Status:    Intimate Partner Violence:    • Fear of Current or Ex-Partner:    • Emotionally Abused:    • Physically Abused:    • Sexually Abused:        SURGICAL HISTORY  Past Surgical  "History:   Procedure Laterality Date   • PUMP REVISION Left 1/28/2020    Procedure: REVISION, INSERTION, OR REPLACEMENT, INTRATHECAL ANALGESIC PUMP;  Surgeon: Candelario Reina M.D.;  Location: SURGERY [facility];  Service: Pain Management   • PUMP REVISION  7/9/2019    Procedure: REVISION, INSERTION, OR REPLACEMENT, INTRATHECAL ANALGESIC PUMP;  Surgeon: Candelario Reina M.D.;  Location: SURGERY [facility];  Service: Pain Management   • BRONCHOSCOPY-ENDO N/A 3/1/2019    Procedure: BRONCHOSCOPY-ENDO;  Surgeon: Katelin Garner M.D.;  Location: ENDOSCOPY [facility];  Service: Pulmonary   • UROLOGY SURGERY Left 2018    \"severed testicle\" per spouse   • CATH PLACEMENT  3/16/2016    Procedure: CATH PLACEMENT POWER PORT ;  Surgeon: Luke Lima M.D.;  Location: SURGERY [facility];  Service:    • LOW ANTERIOR RESECTION LAPAROSCOPIC  1/12/2016    Procedure: LOW ANTERIOR RESECTION LAPAROSCOPIC;  Surgeon: Luke Lima M.D.;  Location: SURGERY [facility];  Service:    • COLONOSCOPY N/A 2016   • OTHER ORTHOPEDIC SURGERY Right     Rt knee ACL   • OTHER ORTHOPEDIC SURGERY Right     Rt knee meniscus repair x2   • OTHER ORTHOPEDIC SURGERY Left     Lt knee meniscus repair x2       CURRENT MEDICATIONS  Home Medications     Reviewed by Char Olvera R.N. (Registered Nurse) on 10/17/21 at 0847  Med List Status: Partial   Medication Last Dose Status   docusate sodium (COLACE) 100 MG Cap  Active   hydrocortisone rectal (PROCTOZONE HC) 2.5 % Cream  Active   HYDROmorphone (DILAUDID) 4 MG Tab  Active   ipratropium-albuterol (DUONEB) 0.5-2.5 (3) MG/3ML nebulizer solution  Active   losartan (COZAAR) 50 MG Tab  Active   Naldemedine Tosylate (SYMPROIC) 0.2 MG Tab  Active   Pain Pump (PATIENT SUPPLIED) XX SHAWNA  Active   pantoprazole (PROTONIX) 40 MG Tablet Delayed Response  Active   predniSONE (DELTASONE) 5 MG Tab  Active   tamsulosin (FLOMAX) 0.4 MG capsule  Active                ALLERGIES  Allergies " "  Allergen Reactions   • Levaquin Vomiting   • Nubain [Nalbuphine] Anxiety     \"made him feel paranoid\"       PHYSICAL EXAM  VITAL SIGNS: /91   Pulse 93   Temp 36.4 °C (97.5 °F) (Temporal)   Resp 16   Ht 1.905 m (6' 3\")   Wt 63.5 kg (140 lb)   SpO2 97%   BMI 17.50 kg/m²       Constitutional: Cachectic and chronically ill in appearance  HENT: Normocephalic, Atraumatic, Bilateral external ears normal, Oropharynx dry, No oral exudates, Nose normal.   Eyes: PERRLA, EOMI, Conjunctiva normal, No discharge.   Neck: Normal range of motion, No tenderness, Supple, No stridor.   Lymphatic: No lymphadenopathy noted.   Cardiovascular: Normal heart rate, Normal rhythm, No murmurs, No rubs, No gallops.   Thorax & Lungs: Symmetrically diminished throughout, No respiratory distress, No wheezing, No chest tenderness.   Abdomen: Bowel sounds normal, Soft, No tenderness, No masses, No pulsatile masses.   Skin: Warm, Dry, No erythema, No rash.   Back: Urostomy tube in place on the right  Genitalia: Soriano catheter in place   Extremities: Intact distal pulses, No edema, No tenderness, No cyanosis, No clubbing.    Neurologic: Alert & oriented x 3, Normal motor function, Normal sensory function, No focal deficits noted.   Psychiatric: Affect normal, Judgment normal, Mood normal.     Results for orders placed or performed during the hospital encounter of 10/17/21   Lactic acid (lactate)   Result Value Ref Range    Lactic Acid 1.6 0.5 - 2.0 mmol/L   CBC WITH DIFFERENTIAL   Result Value Ref Range    WBC 9.0 4.8 - 10.8 K/uL    RBC 4.56 (L) 4.70 - 6.10 M/uL    Hemoglobin 12.7 (L) 14.0 - 18.0 g/dL    Hematocrit 39.1 (L) 42.0 - 52.0 %    MCV 85.7 81.4 - 97.8 fL    MCH 27.9 27.0 - 33.0 pg    MCHC 32.5 (L) 33.7 - 35.3 g/dL    RDW 63.6 (H) 35.9 - 50.0 fL    Platelet Count 452 (H) 164 - 446 K/uL    MPV 9.5 9.0 - 12.9 fL    Neutrophils-Polys 65.70 44.00 - 72.00 %    Lymphocytes 21.40 (L) 22.00 - 41.00 %    Monocytes 7.70 0.00 - 13.40 %    " Eosinophils 4.00 0.00 - 6.90 %    Basophils 0.90 0.00 - 1.80 %    Immature Granulocytes 0.30 0.00 - 0.90 %    Nucleated RBC 0.00 /100 WBC    Neutrophils (Absolute) 5.90 1.82 - 7.42 K/uL    Lymphs (Absolute) 1.92 1.00 - 4.80 K/uL    Monos (Absolute) 0.69 0.00 - 0.85 K/uL    Eos (Absolute) 0.36 0.00 - 0.51 K/uL    Baso (Absolute) 0.08 0.00 - 0.12 K/uL    Immature Granulocytes (abs) 0.03 0.00 - 0.11 K/uL    NRBC (Absolute) 0.00 K/uL   COMP METABOLIC PANEL   Result Value Ref Range    Sodium 130 (L) 135 - 145 mmol/L    Potassium 4.7 3.6 - 5.5 mmol/L    Chloride 94 (L) 96 - 112 mmol/L    Co2 23 20 - 33 mmol/L    Anion Gap 13.0 7.0 - 16.0    Glucose 102 (H) 65 - 99 mg/dL    Bun 8 8 - 22 mg/dL    Creatinine 0.86 0.50 - 1.40 mg/dL    Calcium 9.8 8.5 - 10.5 mg/dL    AST(SGOT) 39 12 - 45 U/L    ALT(SGPT) 25 2 - 50 U/L    Alkaline Phosphatase 145 (H) 30 - 99 U/L    Total Bilirubin 0.4 0.1 - 1.5 mg/dL    Albumin 4.6 3.2 - 4.9 g/dL    Total Protein 8.2 6.0 - 8.2 g/dL    Globulin 3.6 (H) 1.9 - 3.5 g/dL    A-G Ratio 1.3 g/dL   URINALYSIS    Specimen: Urine, Cath   Result Value Ref Range    Color Yellow     Character Clear     Specific Gravity 1.010 <1.035    Ph 6.0 5.0 - 8.0    Glucose Negative Negative mg/dL    Ketones Negative Negative mg/dL    Protein 30 (A) Negative mg/dL    Bilirubin Negative Negative    Urobilinogen, Urine 0.2 Negative    Nitrite Negative Negative    Leukocyte Esterase Moderate (A) Negative    Occult Blood Small (A) Negative    Micro Urine Req Microscopic    URINE MICROSCOPIC (W/UA)   Result Value Ref Range    WBC 2-5 (A) /hpf    RBC 0-2 (A) /hpf    Bacteria Negative None /hpf    Epithelial Cells Negative /hpf   ESTIMATED GFR   Result Value Ref Range    GFR If African American >60 >60 mL/min/1.73 m 2    GFR If Non African American >60 >60 mL/min/1.73 m 2       RADIOLOGY/PROCEDURES  US-RENAL   Final Result      Continued severe left hydronephrosis with stable renal cortical thinning      Right nephrostomy tube  with no hydronephrosis      Trace ascites      DX-CHEST-PORTABLE (1 VIEW)   Final Result      Worsening pulmonary nodules compatible with hematogenous metastases. These could obscure areas of consolidation            COURSE & MEDICAL DECISION MAKING  Pertinent Labs & Imaging studies reviewed. (See chart for details)  This is a chronically ill-appearing 56-year-old gentleman who presents the emergency department with back pain.  I suspect the pain is secondary to the nephrostomy tube as well as the hydronephrosis noted on the left.  From a pyelonephritis standpoint his urinalysis does show some blood as well as some suspected inflammation.  Not convinced this is from an infection.  We will culture his urine as well as obtain blood cultures as he did recently have a positive blood culture.  We will give the patient Rocephin as the cultures are currently pending.  Clinically the patient does not appear septic.  He does appear chronically ill.  Will admit the patient to the hospital pending cultures with urology in consultation as needed.  Of note clinically the patient appeared dehydrated as well and I did start normal saline at 250 cc an hour.    FINAL IMPRESSION  1.  Back pain suspect secondary to hydronephrosis  2.  Metastatic colon cancer  3.  Rule out bacteremia  4.  Nausea and vomiting  5.  Dehydration    Disposition  The patient will be admitted in stable condition         Electronically signed by: Alexei Gonzalez M.D., 10/17/2021 9:08 AM

## 2021-10-17 NOTE — ASSESSMENT & PLAN NOTE
S/p recent right nephrostomy for hydronephrosis  Kidney ultrasound showed nephrostomy tube in place with no hydronephrosis.  Follow-up with urology as an outpatient.

## 2021-10-17 NOTE — ED NOTES
Med rec completed per pt at bedside with medication list  List reviewed and returned  Allergies reviewed    Pt has a pain pump in place  The settings were last verified with pain pump office (Dr. Reina) on 10/12/21 during pt's last admission  Pt reports no changes to settings, he is due to have his pump refilled this upcoming Wednesday

## 2021-10-17 NOTE — H&P
Hospital Medicine History & Physical Note    Date of Service  10/17/2021    Primary Care Physician  YOU Sterling.    Consultants  None    Code Status  Full Code    Chief Complaint  Chief Complaint   Patient presents with   • N/V     X2 days, unable to keep anything down.  Pt reports having right nephrostomy tube replaced last week and Dr. Munoz called back with +BC.  Pt also has stage 4 colon CA with treatments on hold due to decline in Kidney function.        History of Presenting Illness  Erihc Ingram is a 56 y.o. male with h/o colon cancer (diagnosed 5 years ago), s/p colon resection, chemotherapy, metastases to lungs, HTN, COPD (on 2.5-3 L NC oxygen at night at home), urinary obstruction, with indwelling Soriano, s/p right nephrostomy, chronic pain, s/p pain pump placement who presented 10/17/2021 with worsening b/l back pain, nausea, vomiting for 2 days, poor appetite, weight loss over 35 lbs over one months. Pain in b/l back 8/10, dull, not radiating. Also reports constipation, abdominal pian. Patient denies fever, chills, chest pain, OB, diarrhea.    I discussed the plan of care with patient and ERP.    Review of Systems  Review of Systems   Constitutional: Positive for malaise/fatigue and weight loss. Negative for chills and fever.   HENT: Negative for hearing loss.    Eyes: Negative.    Respiratory: Negative for cough and shortness of breath.    Cardiovascular: Negative for chest pain and leg swelling.   Gastrointestinal: Positive for abdominal pain, constipation, nausea and vomiting. Negative for diarrhea.   Genitourinary: Positive for flank pain. Negative for dysuria.   Musculoskeletal: Positive for back pain.   Skin: Negative for rash.   Neurological: Positive for weakness.       Past Medical History   has a past medical history of Arthritis (01/24/2020), Bowel habit changes (01/24/2020), Breath shortness, Cancer (HCC), Cancer (HCC) (08/2018), COPD (chronic obstructive pulmonary disease)  "([facility]) (2018), Dental disorder (01/24/2020), Fall, Heart burn, Hepatitis C (2005), Hypertension, Indigestion, PICC (peripherally inserted central catheter) in place (05/2019), Psychiatric problem, and Renal disorder.    Surgical History   has a past surgical history that includes low anterior resection laparoscopic (1/12/2016); cath placement (3/16/2016); bronchoscopy-endo (N/A, 3/1/2019); other orthopedic surgery (Right); other orthopedic surgery (Right); other orthopedic surgery (Left); pump revision (7/9/2019); urology surgery (Left, 2018); colonoscopy (N/A, 2016); and pump revision (Left, 1/28/2020).     Family History  family history includes No Known Problems in his brother, father, maternal grandfather, maternal grandmother, mother, paternal grandfather, paternal grandmother, and sister. He was adopted.   Family history reviewed with patient. There is no family history that is pertinent to the chief complaint.     Social History   reports that he quit smoking about 6 years ago. He started smoking about 20 years ago. He has a 15.00 pack-year smoking history. He has never used smokeless tobacco. He reports previous alcohol use. He reports current drug use. Drug: Oral.    Allergies  Allergies   Allergen Reactions   • Levaquin Vomiting   • Nubain [Nalbuphine] Anxiety     \"made him feel paranoid\"       Medications  Prior to Admission Medications   Prescriptions Last Dose Informant Patient Reported? Taking?   HYDROmorphone (DILAUDID) 4 MG Tab 10/16/2021 at unknown Patient Yes No   Sig: Take 4 mg by mouth every 4 hours while awake. Scheduled medication   Naldemedine Tosylate (SYMPROIC) 0.2 MG Tab 10/16/2021 at unknown Patient Yes No   Sig: Take 0.2 mg by mouth every day.   OLANZapine (ZYPREXA) 2.5 MG Tab 10/16/2021 at unknown Patient Yes Yes   Sig: Take 2.5 mg by mouth every evening.   Pain Pump (PATIENT SUPPLIED) XX SHAWNA refilled 7/2021 at continuous Patient Yes No   Sig: Inject  as directed continuous. Patient's " Pain Pump (placed and maintained as an outpatient)    Medications/concentrations:   Dilaudid 20 mg/ml  Ketamine 1,500 mcg/ml  Route: Intrathecal  Last changed: 7/19/2021  Refill pump before date: 10/1/2021  Continuous infusion rates (Drug/Rate):   Dilaudid 7.994 mg/day or 0.333 mg/hr  Ketamine 599.6 mcg/day or 25.0 mcg/r  Patient activation dose:   Dilaudid 0.200 mg (Maximum daily dose of 9.939 mg)  Ketamine 15 mcg (Maximum daily dose of 745.4 mcg)  Patient activation lockout interval: 1hr  Maximum activations per day: 10  Dr Reina [phone]   Tiotropium Bromide Monohydrate (SPIRIVA RESPIMAT) 1.25 MCG/ACT Aero Soln 10/16/2021 at unknown Patient Yes Yes   Sig: Inhale 1 Package 2 times a day.   docusate sodium (COLACE) 100 MG Cap 10/16/2021 at unknown Patient Yes No   Sig: Take 100 mg by mouth 2 times a day.   fluticasone-salmeterol (ADVAIR DISKUS) 250-50 MCG/DOSE AEROSOL POWDER, BREATH ACTIVATED 10/16/2021 at unknown Patient Yes Yes   Sig: Inhale 1 Puff every 12 hours.   hydrocortisone rectal (PROCTOZONE HC) 2.5 % Cream 10/16/2021 at unknown Patient No No   Sig: Insert 1 Application in rectum 3 times a day as needed.   ipratropium (ATROVENT) 0.02 % Solution 10/17/2021 at 0600 Patient Yes Yes   Sig: Take 0.2 mg by nebulization every 6 hours as needed (shortness of breath). Use with levalbuterol for breathing treatment   levalbuterol (XOPENEX) 1.25 MG/3ML Nebu Soln 10/17/2021 at 0600 Patient Yes Yes   Sig: Take 1.25 mg by nebulization every 6 hours as needed for Shortness of Breath. Use with ipratropium for breathing treatment   losartan (COZAAR) 50 MG Tab 10/16/2021 at unknown Patient Yes No   Sig: Take 50 mg by mouth every day.   pantoprazole (PROTONIX) 40 MG Tablet Delayed Response 10/16/2021 at unknown Patient Yes No   Sig: Take 40 mg by mouth every day.   predniSONE (DELTASONE) 5 MG Tab 10/16/2021 at unknown Patient Yes No   Sig: Take 5 mg by mouth every day.   tamsulosin (FLOMAX) 0.4 MG capsule 10/16/2021 at  unknown Patient Yes No   Sig: Take 0.4 mg by mouth every day.      Facility-Administered Medications: None       Physical Exam  Temp:  [36.4 °C (97.5 °F)] 36.4 °C (97.5 °F)  Pulse:  [74-93] 74  Resp:  [12-16] 12  BP: (127-141)/() 131/85  SpO2:  [91 %-97 %] 91 %  Blood Pressure: 141/102   Temperature: 36.4 °C (97.5 °F)   Pulse: 76   Respiration: 16   Pulse Oximetry: 94 %       Physical Exam  Vitals and nursing note reviewed.   Constitutional:       Appearance: He is cachectic. He is ill-appearing.   HENT:      Head: Normocephalic.      Mouth/Throat:      Mouth: Mucous membranes are moist.      Pharynx: Oropharynx is clear.   Eyes:      Pupils: Pupils are equal, round, and reactive to light.   Cardiovascular:      Rate and Rhythm: Normal rate and regular rhythm.   Pulmonary:      Effort: Pulmonary effort is normal. No respiratory distress.      Breath sounds: No wheezing or rales.   Abdominal:      General: Abdomen is flat. Bowel sounds are normal. There is no distension.      Palpations: Abdomen is soft.      Tenderness: There is abdominal tenderness.      Comments: Pain pump in place in lower abdomen   Genitourinary:     Comments: Right nephrostoma in place, with yellow urine in the bag. Soriano catheter in place.  Musculoskeletal:         General: No swelling or deformity. Normal range of motion.      Cervical back: Normal range of motion.   Skin:     General: Skin is warm.   Neurological:      General: No focal deficit present.      Mental Status: He is alert and oriented to person, place, and time.         Laboratory:  Recent Labs     10/17/21  0712   WBC 9.0   RBC 4.56*   HEMOGLOBIN 12.7*   HEMATOCRIT 39.1*   MCV 85.7   MCH 27.9   MCHC 32.5*   RDW 63.6*   PLATELETCT 452*   MPV 9.5     Recent Labs     10/17/21  0712   SODIUM 130*   POTASSIUM 4.7   CHLORIDE 94*   CO2 23   GLUCOSE 102*   BUN 8   CREATININE 0.86   CALCIUM 9.8     Recent Labs     10/17/21  0712   ALTSGPT 25   ASTSGOT 39   ALKPHOSPHAT 145*    TBILIRUBIN 0.4   GLUCOSE 102*         No results for input(s): NTPROBNP in the last 72 hours.      No results for input(s): TROPONINT in the last 72 hours.    Imaging:  US-RENAL   Final Result      Continued severe left hydronephrosis with stable renal cortical thinning      Right nephrostomy tube with no hydronephrosis      Trace ascites      DX-CHEST-PORTABLE (1 VIEW)   Final Result      Worsening pulmonary nodules compatible with hematogenous metastases. These could obscure areas of consolidation          X-Ray:  I have personally reviewed the images and compared with prior images.    Assessment/Plan:  I anticipate this patient is appropriate for observation status at this time.    * UTI (urinary tract infection)- (present on admission)  Assessment & Plan  Recent UTI, hydronephrosis  S/p right nephrostomy  Blood, urine cultures  Ceftriaxone     Thrombocytosis  Assessment & Plan  Platelets on admission 452  Likely due to infection  Monitor     Nephrostomy status (HCC)  Assessment & Plan  S/p recent right nephrostomy for hydronephrosis    Protein calorie malnutrition (HCC)- (present on admission)  Assessment & Plan  Poor appetite  Weight loss over 35 lbs over one month  RD consult    Other constipation- (present on admission)  Assessment & Plan  Opioid induced  Bowel protocol    Chronic, continuous use of opioids- (present on admission)  Assessment & Plan  Chronic pain   S/p pain pump    Benign prostate hyperplasia- (present on admission)  Assessment & Plan  With urinary obstruction  With indwelling Soriano  Continue tamsulosin    Nausea & vomiting  Assessment & Plan  For 2 days  Antiemetics  IVF    Hypertension- (present on admission)  Assessment & Plan  Continue home medications  Monitor     Hyponatremia- (present on admission)  Assessment & Plan  Chronic  Mild  Monitor     COPD (chronic obstructive pulmonary disease) (HCC)- (present on admission)  Assessment & Plan  Not in acute exacerbation  On home oxygen  2.5-3 L NC at night  Oxygen per guidelines  Pulse oximetry  RT  Continue home medications    Metastatic Colon cancer (HCC)- (present on admission)  Assessment & Plan  Lung metastases  Oncology follow up as outpatient    Colon neoplasm- (present on admission)  Assessment & Plan  Diagnosed 5 years ago  S/p resection and chemotherapy  Oncology follow up as outpatient      VTE prophylaxis: enoxaparin ppx

## 2021-10-17 NOTE — ASSESSMENT & PLAN NOTE
Not in acute exacerbation  On home oxygen 2.5-3 L NC at night  Oxygen per guidelines  Pulse oximetry  RT  Continue home medications

## 2021-10-17 NOTE — ED TRIAGE NOTES
"Chief Complaint   Patient presents with   • N/V     X2 days, unable to keep anything down.  Pt reports having right nephrostomy tube replaced last week and Dr. Munoz called back with +BC.  Pt also has stage 4 colon CA with treatments on hold due to decline in Kidney function.      Pt to [facility] from [place] in  with above complaint.  Pt has right nephrostomy tube with urine bag, clear yellow.  Pt reports taking abx during last admit and \"thought it was better\".  Pt denies fevers at home.     /91   Pulse 93   Temp 36.4 °C (97.5 °F) (Temporal)   Resp 16   Ht 1.905 m (6' 3\")   Wt 63.5 kg (140 lb)   SpO2 97%   BMI 17.50 kg/m²     "

## 2021-10-17 NOTE — ED NOTES
Report received from AQUILES Ramos. Pt resting in [facility], call light in reach. No additional needs at this time

## 2021-10-17 NOTE — ASSESSMENT & PLAN NOTE
Recent UTI, hydronephrosis  S/p right nephrostomy  Blood, urine cultures  Ceftriaxone   10/18: UA negative.  No leukocytosis.  Lactic acid and procalcitonin negative.  Positive UA likely due to colonized bacteria.  No concern for UTI or pyelonephritis.  No need to treat after discussion with ID Dr. Segovia.  Will DC antibiotics.

## 2021-10-18 ENCOUNTER — APPOINTMENT (OUTPATIENT)
Dept: RADIOLOGY | Facility: MEDICAL CENTER | Age: 56
End: 2021-10-18
Attending: FAMILY MEDICINE
Payer: COMMERCIAL

## 2021-10-18 LAB
ALBUMIN SERPL BCP-MCNC: 3.9 G/DL (ref 3.2–4.9)
ALBUMIN/GLOB SERPL: 1.4 G/DL
ALP SERPL-CCNC: 116 U/L (ref 30–99)
ALT SERPL-CCNC: 19 U/L (ref 2–50)
ANION GAP SERPL CALC-SCNC: 11 MMOL/L (ref 7–16)
AST SERPL-CCNC: 18 U/L (ref 12–45)
BILIRUB SERPL-MCNC: 0.3 MG/DL (ref 0.1–1.5)
BUN SERPL-MCNC: 8 MG/DL (ref 8–22)
CALCIUM SERPL-MCNC: 8.9 MG/DL (ref 8.5–10.5)
CHLORIDE SERPL-SCNC: 99 MMOL/L (ref 96–112)
CHOLEST SERPL-MCNC: 144 MG/DL (ref 100–199)
CO2 SERPL-SCNC: 22 MMOL/L (ref 20–33)
CREAT SERPL-MCNC: 0.83 MG/DL (ref 0.5–1.4)
ERYTHROCYTE [DISTWIDTH] IN BLOOD BY AUTOMATED COUNT: 61.3 FL (ref 35.9–50)
GLOBULIN SER CALC-MCNC: 2.8 G/DL (ref 1.9–3.5)
GLUCOSE SERPL-MCNC: 103 MG/DL (ref 65–99)
HCT VFR BLD AUTO: 33.9 % (ref 42–52)
HDLC SERPL-MCNC: 47 MG/DL
HGB BLD-MCNC: 10.8 G/DL (ref 14–18)
LDLC SERPL CALC-MCNC: 83 MG/DL
LIPASE SERPL-CCNC: 13 U/L (ref 11–82)
MCH RBC QN AUTO: 27.1 PG (ref 27–33)
MCHC RBC AUTO-ENTMCNC: 31.9 G/DL (ref 33.7–35.3)
MCV RBC AUTO: 85.2 FL (ref 81.4–97.8)
PLATELET # BLD AUTO: 320 K/UL (ref 164–446)
PMV BLD AUTO: 9.3 FL (ref 9–12.9)
POTASSIUM SERPL-SCNC: 4.1 MMOL/L (ref 3.6–5.5)
PROCALCITONIN SERPL-MCNC: <0.05 NG/ML
PROT SERPL-MCNC: 6.7 G/DL (ref 6–8.2)
RBC # BLD AUTO: 3.98 M/UL (ref 4.7–6.1)
SODIUM SERPL-SCNC: 132 MMOL/L (ref 135–145)
TRIGL SERPL-MCNC: 72 MG/DL (ref 0–149)
WBC # BLD AUTO: 7.9 K/UL (ref 4.8–10.8)

## 2021-10-18 PROCEDURE — 700111 HCHG RX REV CODE 636 W/ 250 OVERRIDE (IP): Performed by: STUDENT IN AN ORGANIZED HEALTH CARE EDUCATION/TRAINING PROGRAM

## 2021-10-18 PROCEDURE — 85027 COMPLETE CBC AUTOMATED: CPT

## 2021-10-18 PROCEDURE — 700105 HCHG RX REV CODE 258: Performed by: EMERGENCY MEDICINE

## 2021-10-18 PROCEDURE — 74176 CT ABD & PELVIS W/O CONTRAST: CPT

## 2021-10-18 PROCEDURE — 96365 THER/PROPH/DIAG IV INF INIT: CPT

## 2021-10-18 PROCEDURE — 700102 HCHG RX REV CODE 250 W/ 637 OVERRIDE(OP): Performed by: STUDENT IN AN ORGANIZED HEALTH CARE EDUCATION/TRAINING PROGRAM

## 2021-10-18 PROCEDURE — 700101 HCHG RX REV CODE 250: Performed by: STUDENT IN AN ORGANIZED HEALTH CARE EDUCATION/TRAINING PROGRAM

## 2021-10-18 PROCEDURE — 84145 PROCALCITONIN (PCT): CPT

## 2021-10-18 PROCEDURE — 83690 ASSAY OF LIPASE: CPT

## 2021-10-18 PROCEDURE — A9270 NON-COVERED ITEM OR SERVICE: HCPCS | Performed by: STUDENT IN AN ORGANIZED HEALTH CARE EDUCATION/TRAINING PROGRAM

## 2021-10-18 PROCEDURE — 96372 THER/PROPH/DIAG INJ SC/IM: CPT

## 2021-10-18 PROCEDURE — 94760 N-INVAS EAR/PLS OXIMETRY 1: CPT

## 2021-10-18 PROCEDURE — 94640 AIRWAY INHALATION TREATMENT: CPT

## 2021-10-18 PROCEDURE — 99226 PR SUBSEQUENT OBSERVATION CARE,LEVEL III: CPT | Performed by: FAMILY MEDICINE

## 2021-10-18 PROCEDURE — 700105 HCHG RX REV CODE 258: Performed by: STUDENT IN AN ORGANIZED HEALTH CARE EDUCATION/TRAINING PROGRAM

## 2021-10-18 PROCEDURE — 96376 TX/PRO/DX INJ SAME DRUG ADON: CPT

## 2021-10-18 PROCEDURE — 87040 BLOOD CULTURE FOR BACTERIA: CPT

## 2021-10-18 PROCEDURE — 80053 COMPREHEN METABOLIC PANEL: CPT

## 2021-10-18 PROCEDURE — 94664 DEMO&/EVAL PT USE INHALER: CPT

## 2021-10-18 PROCEDURE — G0378 HOSPITAL OBSERVATION PER HR: HCPCS

## 2021-10-18 PROCEDURE — 80061 LIPID PANEL: CPT

## 2021-10-18 RX ADMIN — ENOXAPARIN SODIUM 30 MG: 30 INJECTION SUBCUTANEOUS at 05:26

## 2021-10-18 RX ADMIN — SODIUM CHLORIDE: 9 INJECTION, SOLUTION INTRAVENOUS at 05:22

## 2021-10-18 RX ADMIN — HYDROMORPHONE HYDROCHLORIDE 4 MG: 4 TABLET ORAL at 17:16

## 2021-10-18 RX ADMIN — OMEPRAZOLE 20 MG: 20 CAPSULE, DELAYED RELEASE ORAL at 05:25

## 2021-10-18 RX ADMIN — ONDANSETRON 4 MG: 2 INJECTION INTRAMUSCULAR; INTRAVENOUS at 09:48

## 2021-10-18 RX ADMIN — IPRATROPIUM BROMIDE 0.2 MG: 0.5 SOLUTION RESPIRATORY (INHALATION) at 10:29

## 2021-10-18 RX ADMIN — OXYCODONE HYDROCHLORIDE 10 MG: 10 TABLET ORAL at 05:25

## 2021-10-18 RX ADMIN — BUDESONIDE AND FORMOTEROL FUMARATE DIHYDRATE 2 PUFF: 160; 4.5 AEROSOL RESPIRATORY (INHALATION) at 09:51

## 2021-10-18 RX ADMIN — PROMETHAZINE HYDROCHLORIDE 25 MG: 25 TABLET ORAL at 01:56

## 2021-10-18 RX ADMIN — PREDNISONE 5 MG: 5 TABLET ORAL at 05:25

## 2021-10-18 RX ADMIN — DOCUSATE SODIUM 50 MG AND SENNOSIDES 8.6 MG 2 TABLET: 8.6; 5 TABLET, FILM COATED ORAL at 05:25

## 2021-10-18 RX ADMIN — OXYCODONE HYDROCHLORIDE 10 MG: 10 TABLET ORAL at 19:11

## 2021-10-18 RX ADMIN — DOCUSATE SODIUM 100 MG: 100 CAPSULE ORAL at 05:25

## 2021-10-18 RX ADMIN — OXYCODONE HYDROCHLORIDE 10 MG: 10 TABLET ORAL at 16:00

## 2021-10-18 RX ADMIN — HYDROMORPHONE HYDROCHLORIDE 4 MG: 4 TABLET ORAL at 21:42

## 2021-10-18 RX ADMIN — DOCUSATE SODIUM 100 MG: 100 CAPSULE ORAL at 17:16

## 2021-10-18 RX ADMIN — IPRATROPIUM BROMIDE 0.2 MG: 0.5 SOLUTION RESPIRATORY (INHALATION) at 16:32

## 2021-10-18 RX ADMIN — HYDROMORPHONE HYDROCHLORIDE 4 MG: 4 TABLET ORAL at 11:37

## 2021-10-18 RX ADMIN — TAMSULOSIN HYDROCHLORIDE 0.4 MG: 0.4 CAPSULE ORAL at 05:26

## 2021-10-18 RX ADMIN — LOSARTAN POTASSIUM 50 MG: 50 TABLET, FILM COATED ORAL at 05:25

## 2021-10-18 RX ADMIN — HYDROMORPHONE HYDROCHLORIDE 4 MG: 4 TABLET ORAL at 06:21

## 2021-10-18 RX ADMIN — HYDROMORPHONE HYDROCHLORIDE 4 MG: 4 TABLET ORAL at 02:15

## 2021-10-18 RX ADMIN — ONDANSETRON 4 MG: 2 INJECTION INTRAMUSCULAR; INTRAVENOUS at 19:16

## 2021-10-18 RX ADMIN — OLANZAPINE 2.5 MG: 5 TABLET, FILM COATED ORAL at 19:11

## 2021-10-18 RX ADMIN — DOCUSATE SODIUM 50 MG AND SENNOSIDES 8.6 MG 2 TABLET: 8.6; 5 TABLET, FILM COATED ORAL at 17:16

## 2021-10-18 RX ADMIN — BUDESONIDE AND FORMOTEROL FUMARATE DIHYDRATE 2 PUFF: 160; 4.5 AEROSOL RESPIRATORY (INHALATION) at 19:12

## 2021-10-18 RX ADMIN — CEFTRIAXONE SODIUM 1 G: 1 INJECTION, POWDER, FOR SOLUTION INTRAMUSCULAR; INTRAVENOUS at 05:23

## 2021-10-18 RX ADMIN — SODIUM CHLORIDE: 9 INJECTION, SOLUTION INTRAVENOUS at 09:54

## 2021-10-18 RX ADMIN — TIOTROPIUM BROMIDE INHALATION SPRAY 5 MCG: 3.12 SPRAY, METERED RESPIRATORY (INHALATION) at 16:01

## 2021-10-18 RX ADMIN — MAGNESIUM HYDROXIDE 30 ML: 400 SUSPENSION ORAL at 20:38

## 2021-10-18 RX ADMIN — OXYCODONE HYDROCHLORIDE 10 MG: 10 TABLET ORAL at 09:48

## 2021-10-18 RX ADMIN — SODIUM CHLORIDE: 9 INJECTION, SOLUTION INTRAVENOUS at 01:45

## 2021-10-18 ASSESSMENT — ENCOUNTER SYMPTOMS
FEVER: 0
WEIGHT LOSS: 1
DIZZINESS: 0
HEMOPTYSIS: 0
NECK PAIN: 0
VOMITING: 0
DEPRESSION: 0
MYALGIAS: 0
BLURRED VISION: 0
PALPITATIONS: 0
NAUSEA: 0
COUGH: 0
DOUBLE VISION: 0
HEARTBURN: 0
HEADACHES: 0

## 2021-10-18 ASSESSMENT — PAIN DESCRIPTION - PAIN TYPE
TYPE: CHRONIC PAIN

## 2021-10-18 NOTE — PROGRESS NOTES
[facility] Medicine Daily Progress Note    Date of Service  10/18/2021    Chief Complaint  Erich Ingram is a 56 y.o. male admitted 10/17/2021 with right flank pain associate with nausea and vomiting.    Hospital Course  This is a 56 years old male has past medical history of stage IV metastatic colon cancer to the lungs status post colon resection chemotherapy last treatment 6 weeks ago, history of urinary obstruction with indwelling Soriano catheter status post recent right nephrostomy tube placement due to hydronephrosis and history of nonfunctioning left kidney, history of chronic pain on pain pump who comes in due to the above-mentioned chief complaints.  Apparently patient received call from previous provider for positive VRE in the urine.  The provider discussed the case with ID who did not recommend treatment as bacteria could be colonized in the urinary tract.  Patient started having the symptoms and would present to the hospital thinking he might have an infection needs to be treated.  In ER noted to be hemodynamically stable and afebrile.  His UA was unremarkable for infection.  Procalcitonin and lactic acid were ordered negative.  No leukocytosis on CBC.  Ultrasound of the kidneys showed chronic severe left hydronephrosis with stable renal cortical thinning with right nephrostomy tube in place with no hydronephrosis.  Was initially started on IV antibiotics for presumed UTI.  The case was discussed with ID who did not feel that patient has an active infection and likely have colonized urinary tract with no indication to treat at this point.  Antibiotics were discontinued.  Due to the fact that patient has been having nausea vomiting, CT abdomen pelvis ordered.  Patient was offered MRI of the back but he declined.  Also declined IV contrast since he had only 1 functioning kidney left.  Interval Problem Update  Right flank pain has resolved.Patient sitting up in bed comfortably.  Stated that his no  nausea or vomiting.  Tolerating p.o.  Has been afebrile and hemodynamically stable.  Spoke with wife over the phone who requested hospice evaluation.  Referral was sent.    I have personally seen and examined the patient at bedside. I discussed the plan of care with patient, family and bedside RN.    Consultants/Specialty  None    Code Status  Full Code    Disposition  Patient is not medically cleared.   Anticipate discharge to to home with close outpatient follow-up.  I have placed the appropriate orders for post-discharge needs.    Review of Systems  Review of Systems   Constitutional: Positive for weight loss. Negative for fever.   HENT: Negative for hearing loss.    Eyes: Negative for blurred vision and double vision.   Respiratory: Negative for cough and hemoptysis.    Cardiovascular: Negative for chest pain and palpitations.   Gastrointestinal: Negative for heartburn, nausea and vomiting.   Genitourinary: Negative for dysuria, frequency and urgency.   Musculoskeletal: Negative for myalgias and neck pain.   Skin: Negative for rash.   Neurological: Negative for dizziness and headaches.   Psychiatric/Behavioral: Negative for depression and suicidal ideas.        Physical Exam  Temp:  [36.1 °C (97 °F)-36.9 °C (98.5 °F)] 36.9 °C (98.5 °F)  Pulse:  [72-99] 75  Resp:  [16-18] 18  BP: (135-167)/() 149/93  SpO2:  [92 %-100 %] 99 %    Physical Exam  Constitutional:       General: He is not in acute distress.     Appearance: He is cachectic.   HENT:      Head: Normocephalic and atraumatic.      Mouth/Throat:      Mouth: Mucous membranes are dry.   Eyes:      Extraocular Movements: Extraocular movements intact.      Pupils: Pupils are equal, round, and reactive to light.   Cardiovascular:      Rate and Rhythm: Normal rate and regular rhythm.      Heart sounds: No friction rub. No gallop.    Abdominal:      General: There is no distension.      Palpations: Abdomen is soft.      Tenderness: There is no abdominal  tenderness.   Genitourinary:     Comments: Right nephrostomy tube draining to bag with clear urine noted.  Musculoskeletal:      Right lower leg: No edema.      Left lower leg: No edema.   Neurological:      General: No focal deficit present.      Mental Status: He is alert and oriented to person, place, and time.   Psychiatric:         Mood and Affect: Mood normal.         Behavior: Behavior normal.         Fluids    Intake/Output Summary (Last 24 hours) at 10/18/2021 1445  Last data filed at 10/18/2021 0500  Gross per 24 hour   Intake 1040 ml   Output 850 ml   Net 190 ml       Laboratory  Recent Labs     10/17/21  0712 10/18/21  0314   WBC 9.0 7.9   RBC 4.56* 3.98*   HEMOGLOBIN 12.7* 10.8*   HEMATOCRIT 39.1* 33.9*   MCV 85.7 85.2   MCH 27.9 27.1   MCHC 32.5* 31.9*   RDW 63.6* 61.3*   PLATELETCT 452* 320   MPV 9.5 9.3     Recent Labs     10/17/21  0712 10/18/21  0314   SODIUM 130* 132*   POTASSIUM 4.7 4.1   CHLORIDE 94* 99   CO2 23 22   GLUCOSE 102* 103*   BUN 8 8   CREATININE 0.86 0.83   CALCIUM 9.8 8.9             Recent Labs     10/18/21  0314   TRIGLYCERIDE 72   HDL 47   LDL 83       Imaging  US-RENAL   Final Result      Continued severe left hydronephrosis with stable renal cortical thinning      Right nephrostomy tube with no hydronephrosis      Trace ascites      DX-CHEST-PORTABLE (1 VIEW)   Final Result      Worsening pulmonary nodules compatible with hematogenous metastases. These could obscure areas of consolidation      CT-ABDOMEN-PELVIS W/O    (Results Pending)        Assessment/Plan  * UTI (urinary tract infection)- (present on admission)  Assessment & Plan  Recent UTI, hydronephrosis  S/p right nephrostomy  Blood, urine cultures  Ceftriaxone   10/18: UA negative.  No leukocytosis.  Lactic acid and procalcitonin negative.  Positive UA likely due to colonized bacteria.  No concern for UTI or pyelonephritis.  No need to treat after discussion with ID Dr. Segovia.  Trace OMALLEY  antibiotics.    Thrombocytosis  Assessment & Plan  Platelets on admission 452  Likely reactive  Monitor     Nephrostomy status (HCC)  Assessment & Plan  S/p recent right nephrostomy for hydronephrosis  Kidney ultrasound showed nephrostomy tube in place with no hydronephrosis.  Follow-up with urology as an outpatient.    Protein calorie malnutrition (HCC)- (present on admission)  Assessment & Plan  Poor appetite  Weight loss over 35 lbs over one month  RD consult    Other constipation- (present on admission)  Assessment & Plan  Opioid induced  Bowel protocol    Chronic, continuous use of opioids- (present on admission)  Assessment & Plan  Chronic pain   S/p pain pump    Benign prostate hyperplasia- (present on admission)  Assessment & Plan  With urinary obstruction  With indwelling Soriano  Continue tamsulosin    Nausea & vomiting  Assessment & Plan  For 2 days  Antiemetics  IVF  10/18: Resolved.  Likely related to right flank pain.     Hypertension- (present on admission)  Assessment & Plan  Continue home medications  Monitor     Hyponatremia- (present on admission)  Assessment & Plan  Chronic  Mild  Monitor     COPD (chronic obstructive pulmonary disease) (HCC)- (present on admission)  Assessment & Plan  Not in acute exacerbation  On home oxygen 2.5-3 L NC at night  Oxygen per guidelines  Pulse oximetry  RT  Continue home medications    Metastatic Colon cancer (HCC)- (present on admission)  Assessment & Plan  Lung metastases  Oncology follow up as outpatient  10/18: Hospice evaluation ordered per wife request.    Colon neoplasm- (present on admission)  Assessment & Plan  Diagnosed 5 years ago, stage IV metastases to the lungs  S/p resection and chemotherapy  Oncology follow up as outpatient  Chest x-ray showed worsening nodules likely due to cancer progression.  Hospice evaluation pending.       VTE prophylaxis: enoxaparin ppx    I have performed a physical exam and reviewed and updated ROS and Plan today  (10/18/2021). In review of yesterday's note (10/17/2021), there are no changes except as documented above.

## 2021-10-18 NOTE — PROGRESS NOTES
4 Eyes Skin Assessment Completed by AQUILES Barker and AQUILES Moody.    Head WDL  Ears WDL  Nose WDL  Mouth WDL  Neck WDL  Breast/Chest WDL  Shoulder Blades WDL  Spine WDL  (R) Arm/Elbow/Hand WDL  (L) Arm/Elbow/Hand WDL  Abdomen WDL  Groin WDL  Scrotum/Coccyx/Buttocks WDL scrotum swollen and red  (R) Leg WDL  (L) Leg WDL  (R) Heel/Foot/Toe WDL  (L) Heel/Foot/Toe WDL inner to ankle small skin tag          Devices In Places Nephrostomy right side/  oro    Interventions In Place N/A    Possible Skin Injury No    Pictures Uploaded Into Epic N/A  Wound Consult Placed N/A  RN Wound Prevention Protocol Ordered No

## 2021-10-18 NOTE — ED NOTES
Pt to restroom via personal w/c. Pt transferred independently with steady gait    Hospital bed requested

## 2021-10-18 NOTE — DISCHARGE PLANNING
Care Transition Team Assessment      This RN MAGNOLIA spoke to pt, verified facesheet and obtained the following information. Pt lives in a one story house with his wife. Pt uses home oxygen, 2.5 LPM at night only, under service with [employer]. Per pt, he has a walker and a wheelchair at home that he uses for long distances, otherwise, he is able to talk around in the house. Pt has insurance coverage. Pt has a PCP. Pt's preferred pharmacy is [facility], [place].  Per pt, his wife will provide transportation at discharge.    Pt's physical address is:  [address] [place] [id number]          Information Source  Information Given By: Patient  Informant's Name: Juan Jose Erich   Who is responsible for making decisions for patient? : Patient       Elopement Risk  Legal Hold: No  Ambulatory or Self Mobile in Wheelchair: No-Not an Elopement Risk  Disoriented: No  Psychiatric Symptoms: None  History of Wandering: No  Elopement this Admit: No  Vocalizing Wanting to Leave: No  Displays Behaviors, Body Language Wanting to Leave: No-Not at Risk for Elopement    Interdisciplinary Discharge Planning  Primary Care Physician: DONNA GALLARDO, P.ALety  Lives with - Patient's Self Care Capacity: Spouse  Patient or legal guardian wants to designate a caregiver: No  Housing / Facility: 1 Story House  Assistance Needed: Unknown at this Time  Durable Medical Equipment: Home Oxygen, Walker, Other - Specify (wheelchair)  DME Provider / Phone: [employer] (2.5 LPM oxygen at night only)    Discharge Preparedness  What is your plan after discharge?: Uncertain - pending medical team collaboration  What are your discharge supports?: Spouse  Prior Functional Level: Uses Wheelchair, Independent with Activities of Daily Living, Independent with Medication Management, Ambulatory (able to mabulate, uses wheelchair for long distance)    Functional Assesment  Prior Functional Level: Uses Wheelchair, Independent with Activities of Daily Living,  Independent with Medication Management, Ambulatory (able to mabulate, uses wheelchair for long distance)    Finances  Financial Barriers to Discharge: No  Prescription Coverage: Yes    Vision / Hearing Impairment  Vision Impairment : Yes  Right Eye Vision: Impaired, Wears Glasses  Left Eye Vision: Impaired, Wears Glasses  Hearing Impairment : No       Advance Directive  Advance Directive?: POLST    Domestic Abuse  Have you ever been the victim of abuse or violence?: No  Physical Abuse or Sexual Abuse: No  Verbal Abuse or Emotional Abuse: No  Possible Abuse/Neglect Reported to:: Not Applicable    Psychological Assessment  History of Substance Abuse: None  History of Psychiatric Problems: No    Discharge Risks or Barriers  Discharge risks or barriers?: Complex medical needs  Patient risk factors: Complex medical needs    Anticipated Discharge Information  Discharge Disposition: Discharged to home/self care (01)

## 2021-10-18 NOTE — DISCHARGE PLANNING
Received Choice form at 8009  Agency/Facility Name: [facility]  Referral sent per Choice form @ [phone]

## 2021-10-18 NOTE — RESPIRATORY CARE
COPD EDUCATION by COPD CLINICAL EDUCATOR  10/18/2021  at  1:28 PM by Felicia Laurent, RRT     Patient interviewed by COPD education team.  Patient unable to participate in full program.  A short intervention has been conducted.  A comprehensive packet including information about COPD, types of treatments to manage their disease and safe home Oxygen usage was provided and reviewed with patient at the bedside.  Patient states he uses his nebulizer 3-4 times a day at times and sometimes not at all. Patient states his nebulizer has been beneficial when he is sob. While interviewing the patient, he requested a nebulizer treatment. Treatment given around 1030 this morning, will round back at 430 for another treatment per patient request..     COPD Screen  COPD Risk Screening  Do you have a history of COPD?: Yes  Do you have a Pulmonologist?: Yes    COPD Assessment  COPD Clinical Specialists ONLY  COPD Education Initiated: Yes--Short Intervention  DME Company: [employer] Equipment Type: oxygen (2.5-3L) and nebulizer  Physician Name: DONNA GALLARDO- family medicine in [place]  Pulmonologist Name: patient has a referral for [facility] since 7/21/21. patient is not scheduled at this time.  Referrals Initiated: Yes  Pulmonary Rehab: N/A  Smoking Cessation: N/A (quit smoking in 2015)  Palliative Care:  (not on services)  Hospice: N/A  Home Health Care: [facility] Outreach: N/A  [facility]iatric Specialty Group: N/A  [facility]: N/A (out of the area)  Private In-Home Care Agency: N/A  Is this a COPD exacerbation patient?: No  $ Demo/Eval of SVN's, MDI's and Aerosols: Yes  (OP) Pulmonary Function Testing:  (not on file)    Meds to Beds  Would the patient like to opt in for Bedside Medication Delivery at Discharge?: Yes, interested     MY COPD ACTION PLAN     It is recommended that patients and physicians /healthcare providers complete this action plan together. This plan should be discussed at each  "physician visit and updated as needed.    The green, yellow and red zones show groups of symptoms of COPD. This list of symptoms is not comprehensive, and you may experience other symptoms. In the \"Actions\" column, your healthcare provider has recommended actions for you to take based on your symptoms.    Patient Name: Erich Ingram   YOB: 1965   Last Updated on: 10/18/2021  1:19 PM   Green Zone:  I am doing well today Actions   •  Usual activitiy and exercise level •  Take daily medications   •  Usual amounts of cough and phlegm/mucus •  Use oxygen as prescribed   •  Sleep well at night •  Continue regular exercise/diet plan   •  Appetite is good •  At all times avoid cigarette smoke, inhaled irritants     Daily Medications (these medications are taken every day):   Fluticosone/Salmeterol (Advair)  Tiotropium Bromide Monohydrate (Spiriva)  Ipratropium Bromide (Atrovent) 1 Puff  1 Puff  3mL via nebulizer Twice daily  Twice daily     Additional Information:  Remember to rinse mouth after using Advair    Yellow Zone:  I am having a bad day or a COPD flare Actions   •  More breathless than usual •  Continue daily medications   •  I have less energy for my daily activities •  Use quick relief inhaler as ordered   •  Increased or thicker phlegm/mucus •  Use oxygen as prescribed   •  Using quick relief inhaler/nebulizer more often •  Get plenty of rest   •  Swelling of ankles more than usual •  Use pursed lip breathing   •  More coughing than usual •  At all times avoid cigarette smoke, inhaled irritants   •  I feel like I have a \"chest cold\"   •  Poor sleep and my symptoms woke me up   •  My appetite is not good   •  My medicine is not helping    •  Call provider immediately if symptoms don’t improve     Continue daily medications, add rescue medications:   Levalbuterol (Xopenex) 1.25mg via nebulizer Every 6 hours PRN       Medications to be used during a flare up, (as Discussed with Provider):     "       Additional Information:  Rinse nebulizer per  recommendation    Red Zone:  I need urgent medical care Actions   •  Severe shortness of breath even at rest •  Call 911 or seek medical care immediately   •  Not able to do any activity because of breathing    •  Fever or shaking chills    •  Feeling confused or very drowsy     •  Chest pains    •  Coughing up blood

## 2021-10-18 NOTE — DISCHARGE PLANNING
Anticipated Discharge Disposition:   Home    Action:   Discussed discharge planning needs during rounds. Per MD, pt has colon cancer, has nephrostomy tube, he will order CT and MRI, pending. Per MD, pt refused MRI    RN MAGNOLIA spoke to pt's wife. Per pt's wife, she spoke to [facility] but pt is not signed on to hospice. Pt's wife requested for a call from the doctor about plan and goals of care. MD and bedside RN informed via Voalte about pt's wife's request and possible Palliative Consult. Per MD, he will call pt's wife.     Pt is on 2.5 LPM oxygen at night only at home. Pending O2 eval if pt now needs O2 during the day. MD and bedside RN informed via Voalte.    Addendum:  1444: Per MD, he spoke to pt's wife. Hospice referral in place. Received hospice choice. Choice form faxed to [facility].    Barriers to Discharge:   Medical clearance  Hospice acceptance    Plan:   Hospital Care Management will continue to follow and assist with discharge planning needs.         negative

## 2021-10-18 NOTE — THERAPY
Physical Therapy Contact Note    PT consult received and acknowledged. Patient is familiar to this therapist, he was admitted one week ago and refused any acute PT needs. Chart indicates plan to return home with hospice and RN reported he mobilized with RN staff without deficits. Discussed with MD, SHAHRAMing order.    Beatriz Mendoza, PT, DPT  [phone]

## 2021-10-19 VITALS
HEART RATE: 93 BPM | TEMPERATURE: 98.8 F | DIASTOLIC BLOOD PRESSURE: 93 MMHG | SYSTOLIC BLOOD PRESSURE: 120 MMHG | OXYGEN SATURATION: 98 % | RESPIRATION RATE: 18 BRPM | WEIGHT: 129.63 LBS | HEIGHT: 75 IN | BODY MASS INDEX: 16.12 KG/M2

## 2021-10-19 PROCEDURE — 700111 HCHG RX REV CODE 636 W/ 250 OVERRIDE (IP): Performed by: STUDENT IN AN ORGANIZED HEALTH CARE EDUCATION/TRAINING PROGRAM

## 2021-10-19 PROCEDURE — A9270 NON-COVERED ITEM OR SERVICE: HCPCS | Performed by: STUDENT IN AN ORGANIZED HEALTH CARE EDUCATION/TRAINING PROGRAM

## 2021-10-19 PROCEDURE — 700101 HCHG RX REV CODE 250: Performed by: STUDENT IN AN ORGANIZED HEALTH CARE EDUCATION/TRAINING PROGRAM

## 2021-10-19 PROCEDURE — 96376 TX/PRO/DX INJ SAME DRUG ADON: CPT

## 2021-10-19 PROCEDURE — 700102 HCHG RX REV CODE 250 W/ 637 OVERRIDE(OP): Performed by: STUDENT IN AN ORGANIZED HEALTH CARE EDUCATION/TRAINING PROGRAM

## 2021-10-19 PROCEDURE — G0378 HOSPITAL OBSERVATION PER HR: HCPCS

## 2021-10-19 PROCEDURE — 94640 AIRWAY INHALATION TREATMENT: CPT

## 2021-10-19 PROCEDURE — 99217 PR OBSERVATION CARE DISCHARGE: CPT | Performed by: STUDENT IN AN ORGANIZED HEALTH CARE EDUCATION/TRAINING PROGRAM

## 2021-10-19 RX ADMIN — OXYCODONE HYDROCHLORIDE 10 MG: 10 TABLET ORAL at 04:41

## 2021-10-19 RX ADMIN — LOSARTAN POTASSIUM 50 MG: 50 TABLET, FILM COATED ORAL at 04:41

## 2021-10-19 RX ADMIN — DOCUSATE SODIUM 100 MG: 100 CAPSULE ORAL at 04:40

## 2021-10-19 RX ADMIN — DOCUSATE SODIUM 50 MG AND SENNOSIDES 8.6 MG 2 TABLET: 8.6; 5 TABLET, FILM COATED ORAL at 04:41

## 2021-10-19 RX ADMIN — PREDNISONE 5 MG: 5 TABLET ORAL at 04:41

## 2021-10-19 RX ADMIN — HYDROMORPHONE HYDROCHLORIDE 4 MG: 4 TABLET ORAL at 12:40

## 2021-10-19 RX ADMIN — TIOTROPIUM BROMIDE INHALATION SPRAY 5 MCG: 3.12 SPRAY, METERED RESPIRATORY (INHALATION) at 11:32

## 2021-10-19 RX ADMIN — OXYCODONE HYDROCHLORIDE 10 MG: 10 TABLET ORAL at 11:01

## 2021-10-19 RX ADMIN — OMEPRAZOLE 20 MG: 20 CAPSULE, DELAYED RELEASE ORAL at 04:41

## 2021-10-19 RX ADMIN — ONDANSETRON 4 MG: 2 INJECTION INTRAMUSCULAR; INTRAVENOUS at 04:41

## 2021-10-19 RX ADMIN — POLYETHYLENE GLYCOL 3350 1 PACKET: 17 POWDER, FOR SOLUTION ORAL at 04:40

## 2021-10-19 RX ADMIN — HYDROMORPHONE HYDROCHLORIDE 4 MG: 4 TABLET ORAL at 02:11

## 2021-10-19 RX ADMIN — OXYCODONE HYDROCHLORIDE 10 MG: 10 TABLET ORAL at 01:00

## 2021-10-19 RX ADMIN — LEVALBUTEROL 1.25 MG: 1.25 SOLUTION RESPIRATORY (INHALATION) at 12:30

## 2021-10-19 RX ADMIN — BUDESONIDE AND FORMOTEROL FUMARATE DIHYDRATE 2 PUFF: 160; 4.5 AEROSOL RESPIRATORY (INHALATION) at 11:33

## 2021-10-19 RX ADMIN — OXYCODONE HYDROCHLORIDE 10 MG: 10 TABLET ORAL at 14:12

## 2021-10-19 RX ADMIN — HYDROMORPHONE HYDROCHLORIDE 4 MG: 4 TABLET ORAL at 08:19

## 2021-10-19 RX ADMIN — TAMSULOSIN HYDROCHLORIDE 0.4 MG: 0.4 CAPSULE ORAL at 04:40

## 2021-10-19 RX ADMIN — IPRATROPIUM BROMIDE 0.2 MG: 0.5 SOLUTION RESPIRATORY (INHALATION) at 12:30

## 2021-10-19 ASSESSMENT — PAIN DESCRIPTION - PAIN TYPE
TYPE: CHRONIC PAIN

## 2021-10-19 NOTE — DISCHARGE PLANNING
Anticipated Discharge Disposition:   Home with Hospice    Action:   Discussed discharge planning needs during rounds. Per MD, pt is medically cleared to discharge home with hospice.     AQUILES MERIDA called [facility]. Per [facility], plan to evaluate pt today at the hospital, plan to meet pt and family, no time provided.    Addendum:  1200: RN CM received voicemail from pt's wife, Janine, about discharge plan, hospice. AQUILES MERIDA called Janine back. No answer, left voicemail for call back.    AQUILES MERIDA called [facility] Liaison. Per Norman, he will check on referral and call back this RN MAGNOLIA.     Received call back from Norman. Per [facility] accepted pt, [facility] RN needs to talk to pt's family about what time pt will be home. Plan will be for [facility] to meet pt and spouse at their home.     1225: RN CM called pt's wife, Janine. Discussed discharge planning. Per Janine, she spoke to [facility]. Informed Janine that [facility] was trying to reach her to arrange for a time to meet at their home. Per Janine, she will  contact [facility], and pt's friend, Bia, will  the pt, will be here in about 2.5 hours, will send oxygen tank if needed. MD and bedside RN updated via Voalte.     Barriers to Discharge:   None.    Plan:   Hospital Care Management will continue to follow and assist with discharge planning needs.

## 2021-10-19 NOTE — DISCHARGE SUMMARY
"Discharge Summary    CHIEF COMPLAINT ON ADMISSION  Chief Complaint   Patient presents with   • N/V     X2 days, unable to keep anything down.  Pt reports having right nephrostomy tube replaced last week and Dr. Munoz called back with +BC.  Pt also has stage 4 colon CA with treatments on hold due to decline in Kidney function.        Reason for Admission  N/V     Admission Date  10/17/2021    CODE STATUS  Full Code    HPI & HOSPITAL COURSE  Per HPI and Hospitalist on service interval updates:    \"This is a 56 years old male has past medical history of stage IV metastatic colon cancer to the lungs status post colon resection chemotherapy last treatment 6 weeks ago, history of urinary obstruction with indwelling Soriano catheter status post recent right nephrostomy tube placement due to hydronephrosis and history of nonfunctioning left kidney, history of chronic pain on pain pump who comes in due to the above-mentioned chief complaints.  Apparently patient received call from previous provider for positive VRE in the urine.  The provider discussed the case with ID who did not recommend treatment as bacteria could be colonized in the urinary tract.  Patient started having the symptoms and would present to the hospital thinking he might have an infection needs to be treated.  In ER, noted to be hemodynamically stable and afebrile.  His UA was unremarkable for infection.  Procalcitonin and lactic acid were ordered negative.  No leukocytosis on CBC.  Ultrasound of the kidneys showed chronic severe left hydronephrosis with stable renal cortical thinning with right nephrostomy tube in place with no hydronephrosis.  Was initially started on IV antibiotics for presumed UTI.  The case was discussed with ID who did not feel that patient has an active infection and likely have colonized urinary tract with no indication to treat at this point.  Antibiotics were discontinued.  Due to the fact that patient has been having nausea " "vomiting, CT abdomen pelvis ordered.  Patient was offered MRI of the back but he declined.  Also declined IV contrast since he had only 1 functioning kidney left.    Right flank pain has resolved.Patient sitting up in bed comfortably.  Stated that his no nausea or vomiting.  Tolerating p.o.  Has been afebrile and hemodynamically stable.  Spoke with wife over the phone who requested hospice evaluation.  Referral was sent.\"    10/19 under my care: Met with Pt and care plan discussed. Pt reports that his wife will continue to work with [facility] upon discharge. He would like to be DC as he has a clinic appointment with pain clinic this PM. No other issues at this point.        Therefore, he is discharged in guarded and stable condition to hospice.    The patient met 2-midnight criteria for an inpatient stay at the time of discharge.    Discharge Date  10/19/21    FOLLOW UP ITEMS POST DISCHARGE  N/A    DISCHARGE DIAGNOSES  Principal Problem:    UTI (urinary tract infection) POA: Yes  Active Problems:    Colon neoplasm POA: Yes    Metastatic Colon cancer (HCC) POA: Yes      Overview: diagnosed 2016 treated with resection and chemo      had recurrence 08/2018, pulmonary nodules biopsied at that time showed       adenocarcinoma    COPD (chronic obstructive pulmonary disease) (HCC) POA: Yes    Hyponatremia POA: Yes    Normocytic anemia POA: Yes    Hypertension POA: Yes    Nausea & vomiting POA: Yes    Benign prostate hyperplasia POA: Yes    Chronic, continuous use of opioids POA: Yes    Other constipation POA: Yes    Drug-induced constipation POA: Yes    Protein calorie malnutrition (HCC) POA: Yes    Nephrostomy status (HCC) POA: Yes    Thrombocytosis POA: Yes  Resolved Problems:    * No resolved hospital problems. *      FOLLOW UP  [address] [address]  [phone]          MEDICATIONS ON DISCHARGE     Medication List      CONTINUE taking these medications      Instructions "   Advair Diskus 250-50 MCG/DOSE Aepb  Generic drug: fluticasone-salmeterol   Inhale 1 Puff every 12 hours.  Dose: 1 Puff     docusate sodium 100 MG Caps  Commonly known as: COLACE   Take 100 mg by mouth 2 times a day.  Dose: 100 mg     hydrocortisone rectal 2.5 % Crea  Commonly known as: PROCTOZONE HC   Insert 1 Application in rectum 3 times a day as needed.  Dose: 1 Application     HYDROmorphone 4 MG Tabs  Commonly known as: DILAUDID   Take 4 mg by mouth every 4 hours while awake. Scheduled medication  Dose: 4 mg     ipratropium 0.02 % Soln  Commonly known as: ATROVENT   Take 0.2 mg by nebulization every 6 hours as needed (shortness of breath). Use with levalbuterol for breathing treatment  Dose: 0.2 mg     levalbuterol 1.25 MG/3ML Nebu  Commonly known as: XOPENEX   Take 1.25 mg by nebulization every 6 hours as needed for Shortness of Breath. Use with ipratropium for breathing treatment  Dose: 1.25 mg     losartan 50 MG Tabs  Commonly known as: COZAAR   Take 50 mg by mouth every day.  Dose: 50 mg     OLANZapine 2.5 MG Tabs  Commonly known as: ZYPREXA   Take 2.5 mg by mouth every evening.  Dose: 2.5 mg     Pain Pump Breana  Commonly known as: patient supplied   Inject  as directed continuous. Patient's Pain Pump (placed and maintained as an outpatient)    Medications/concentrations:   Dilaudid 20 mg/ml  Ketamine 1,500 mcg/ml  Route: Intrathecal  Last changed: 7/19/2021  Refill pump before date: 10/1/2021  Continuous infusion rates (Drug/Rate):   Dilaudid 7.994 mg/day or 0.333 mg/hr  Ketamine 599.6 mcg/day or 25.0 mcg/r  Patient activation dose:   Dilaudid 0.200 mg (Maximum daily dose of 9.939 mg)  Ketamine 15 mcg (Maximum daily dose of 745.4 mcg)  Patient activation lockout interval: 1hr  Maximum activations per day: 10  Dr Reina [phone]     pantoprazole 40 MG Tbec  Commonly known as: PROTONIX   Take 40 mg by mouth every day.  Dose: 40 mg     predniSONE 5 MG Tabs  Commonly known as: DELTASONE   Take 5 mg by mouth  "every day.  Dose: 5 mg     Spiriva Respimat 1.25 MCG/ACT Aers  Generic drug: Tiotropium Bromide Monohydrate   Inhale 1 Package 2 times a day.  Dose: 1 Package     Symproic 0.2 MG Tabs  Generic drug: Naldemedine Tosylate   Take 0.2 mg by mouth every day.  Dose: 0.2 mg     tamsulosin 0.4 MG capsule  Commonly known as: FLOMAX   Take 0.4 mg by mouth every day.  Dose: 0.4 mg            Allergies  Allergies   Allergen Reactions   • Levaquin Vomiting   • Nubain [Nalbuphine] Anxiety     \"made him feel paranoid\"       DIET  Orders Placed This Encounter   Procedures   • Diet Order Diet: Cardiac     Standing Status:   Standing     Number of Occurrences:   1     Order Specific Question:   Diet:     Answer:   Cardiac [6]       ACTIVITY  As tolerated.  Weight bearing as tolerated    CONSULTATIONS  Infectious disease     PROCEDURES  N/A    LABORATORY  Lab Results   Component Value Date    SODIUM 132 (L) 10/18/2021    POTASSIUM 4.1 10/18/2021    CHLORIDE 99 10/18/2021    CO2 22 10/18/2021    GLUCOSE 103 (H) 10/18/2021    BUN 8 10/18/2021    CREATININE 0.83 10/18/2021        Lab Results   Component Value Date    WBC 7.9 10/18/2021    HEMOGLOBIN 10.8 (L) 10/18/2021    HEMATOCRIT 33.9 (L) 10/18/2021    PLATELETCT 320 10/18/2021        Total time of the discharge process exceeds 35 minutes.  "

## 2021-10-19 NOTE — PROGRESS NOTES
Patient has been complaining of abdominal discomfort, states he has not had a bowel movement in 2 days. + for bowel sounds, patient given laxatives and requested an enema.   Medicated per MAR, patient requested to administer enema himself, educated on process.

## 2021-10-19 NOTE — DIETARY
"Nutrition services: Day 0 of admit.  Erich Ingram is a 56 y.o. male with admitting DX of UTI (urinary tract infection).    Consult received for BMI <19, wt loss (2-13 lbs in 6 months), poor PO per admit screen. Spoke with pt at bedside. Pt was sitting up in bed, appeared thin and older than stated age. Severe fat loss noted by pronounced zygomatic arches, severe loss of buccal fat pads; severe muscle loss noted by prominent brow bone.  Discussed appetite and wt hx with pt.  Pt stated a \"horrible\" appetite and a 35 lb wt loss in the past month. Attempted to discuss further, pt did not wish to continue interview as he was dressed and ready to discharge.     Assessment:  Height: 190.5 cm (6' 3\")  Weight: 58.8 kg (129 lb 10.1 oz) - bed scale wt 10/17  Body mass index is 16.2 kg/m²., BMI classification: underweight  Diet/Intake: Cardiac; PO >75% for two meals thus far    Evaluation:   1. Hx of metastatic colon cancer.   2. 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  3. Poor oral intake per MD H&P, protein calorie malnutrition noted in problems list..  4. Wt per chart review: 146 lbs 9/13, 173 lbs 8/10/20. Wt loss of 12% in one month is severe.    Malnutrition Risk: Pt with severe, chronic malnutrition related to hypermetabolic disease state (re-current cancer), as evidenced by physical markers for severe fat and severe muscle loss as noted above and wt loss of 12% in one month.    Recommendations/Plan:  1. Encourage intake of meals.  2. Document intake of all meals as % taken in ADLs to provide interdisciplinary communication across all shifts.   3. Monitor weight.  4. Nutrition rep will continue to see patient for ongoing meal and snack preferences.   5. Oral nutrition supplements per RD.    RD following.        "

## 2021-10-19 NOTE — CARE PLAN
Problem: Nutritional:  Goal: Achieve adequate nutritional intake  Description: Patient will consume 50% of meals  Outcome: Progressing   PO >75% for two meals thus far.  
The patient is Stable - Low risk of patient condition declining or worsening    Shift Goals  Clinical Goals: pain management, no nausea, rest  Patient Goals: pain control, rest    Progress made toward(s) clinical / shift goals:    Problem: Pain - Standard  Goal: Alleviation of pain or a reduction in pain to the patient’s comfort goal  Outcome: Progressing   Patient aware of pharmacological interventions available, verbalizes understanding. Education about non-pharmacological interventions provided, patient verbalizes understanding.   Problem: Knowledge Deficit - Standard  Goal: Patient and family/care givers will demonstrate understanding of plan of care, disease process/condition, diagnostic tests and medications  Outcome: Progressing   Patient has been updated in regards to plan of care including medications/procedures for this shift. Answered all patients questions accordingly, verbalizes understanding. Communicates needs effectively with use of call light, hourly rounding in progress.     Patient is not progressing towards the following goals:      
The patient is Watcher - Medium risk of patient condition declining or worsening         Progress made toward(s) clinical / shift goals:      Problem: Pain - Standard  Goal: Alleviation of pain or a reduction in pain to the patient’s comfort goal  Outcome: Progressing  Note: Pain medication is being utilized per parameters set on MAR for patient comfort and well-being.      Problem: Knowledge Deficit - Standard  Goal: Patient and family/care givers will demonstrate understanding of plan of care, disease process/condition, diagnostic tests and medications  Outcome: Progressing  Note: Patient's plan of care for the evening was discussed with him.        Patient is not progressing towards the following goals: N/A      
negative

## 2021-10-19 NOTE — PROGRESS NOTES
DC to home with friend in stable condition. Pt has supplies needed for nephrostomy tube and oro catheter emptying. Pt aware to monitor output on drains and document. Pt aware of HH will follow.

## 2021-10-19 NOTE — PROGRESS NOTES
Pt pending DC home today, awaiting his ride. He will go home with urethral oro and nephrostomy tube drain. He has been given a few basic supplies to go home with for nephrostomy site dressing changes, as well I changed the dressing at 1400 hrs with sponge gauzes & large tegaderms. He has been medicated for pain PRN and is not due any meds at this time.

## 2021-10-19 NOTE — DISCHARGE INSTRUCTIONS
Discharge Instructions    Discharged to home by car with relative. Discharged via wheelchair, hospital escort: Yes.  Special equipment needed: Not Applicable    Be sure to schedule a follow-up appointment with your primary care doctor or any specialists as instructed.     Discharge Plan:   Diet Plan: Discussed  Activity Level: Discussed  Confirmed Follow up Appointment: Patient to Call and Schedule Appointment  Confirmed Symptoms Management: Discussed  Medication Reconciliation Updated: Yes  Influenza Vaccine Indication: Patient Refuses    I understand that a diet low in cholesterol, fat, and sodium is recommended for good health. Unless I have been given specific instructions below for another diet, I accept this instruction as my diet prescription.   Other diet:     Special Instructions: None    · Is patient discharged on Warfarin / Coumadin?   No     Depression / Suicide Risk    As you are discharged from this [facility] facility, it is important to learn how to keep safe from harming yourself.    Recognize the warning signs:  · Abrupt changes in personality, positive or negative- including increase in energy   · Giving away possessions  · Change in eating patterns- significant weight changes-  positive or negative  · Change in sleeping patterns- unable to sleep or sleeping all the time   · Unwillingness or inability to communicate  · Depression  · Unusual sadness, discouragement and loneliness  · Talk of wanting to die  · Neglect of personal appearance   · Rebelliousness- reckless behavior  · Withdrawal from people/activities they love  · Confusion- inability to concentrate     If you or a loved one observes any of these behaviors or has concerns about self-harm, here's what you can do:  · Talk about it- your feelings and reasons for harming yourself  · Remove any means that you might use to hurt yourself (examples: pills, rope, extension cords, firearm)  · Get professional help from the community (Mental  Health, Substance Abuse, psychological counseling)  · Do not be alone:Call your Safe Contact- someone whom you trust who will be there for you.  · Call your local CRISIS HOTLINE 227-5370 or 745-843-6941  · Call your local Children's Mobile Crisis Response Team [place] (107) 241-9259 or www.[employer]  · Call the toll free National Suicide Prevention Hotlines   · National Suicide Prevention Lifeline 403-216-CJXJ (7906)  · National Hope Line Network 800-SUICIDE (194-8630)

## 2021-10-19 NOTE — PROGRESS NOTES
Arrive to dc lounge via wheelchair. A/O, no new complaints. DC instructions reviewed w/ pt, verbalized understanding. Waiting for ride home.

## 2021-10-19 NOTE — DISCHARGE PLANNING
Agency/Facility Name: [facility]  Spoke To: Denny  Outcome: Correct fax is [phone], resent referral.

## 2022-03-03 NOTE — ED NOTES
PAC  Pt is accepted by Dr Markos Avila to [facility] room #836  SLETS  time @ 7628  RN report to [facility] Natanael Morgan RN  03/02/22 3128 Pt medicated per mar for pain.

## 2023-03-23 NOTE — PROGRESS NOTES
Patient [id number], asking for medications for pain and nausea almost immediately when they became available throughout shift (PRNs).  Asked to be moved rooms after bunkmate and he grew agitated with one another, stated he was happy once moved.  Updated on plan of care, questions answered, family notified of changes to timeline as far as discharge, otherwise smooth shift.   Olanzapine Counseling- I discussed with the patient the common side effects of olanzapine including but are not limited to: lack of energy, dry mouth, increased appetite, sleepiness, tremor, constipation, dizziness, changes in behavior, or restlessness.  Explained that teenagers are more likely to experience headaches, abdominal pain, pain in the arms or legs, tiredness, and sleepiness.  Serious side effects include but are not limited: increased risk of death in elderly patients who are confused, have memory loss, or dementia-related psychosis; hyperglycemia; increased cholesterol and triglycerides; and weight gain.

## 2023-10-01 NOTE — PROGRESS NOTES
[facility] Daily Progress Note    Date of Service  2/11/2020    Chief Complaint  54 y.o. male admitted 1/28/2020 with open abdominal wound.    Hospital Course    Patient with history of pain pump implanted into the RLQ by Dr Reina, he presented to Dr Reina who admitted the patient for I&D and removal of pain pump on the right and implantation of device on the left.  Wound vac was placed to help facilitate wound healing from surgical dehiscence site.         Interval Problem Update  Patient states he feels okay but is having constipation and difficulty with hemorrhoid because of this.  He doesn't think our Miralax works as well as that which he has at home.  Given his large opiate requirement, he likely would respond favorably to Relistor - ordered.      Consultants/Specialty  [facility]  Wound care for vac management.    Code Status  full    Disposition  SNF vs home if wound vac removed.    Review of Systems  Review of Systems   Constitutional: Negative for chills and fever.   HENT: Negative for congestion.    Eyes: Negative for blurred vision and photophobia.   Respiratory: Negative for cough and shortness of breath.    Cardiovascular: Negative for chest pain, claudication and leg swelling.   Gastrointestinal: Positive for constipation. Negative for abdominal pain, diarrhea, heartburn and vomiting.        Hemorrhoid     Genitourinary: Negative for dysuria and hematuria.   Musculoskeletal: Negative for joint pain and myalgias.   Skin: Negative for itching and rash.   Neurological: Negative for dizziness, sensory change, speech change, weakness and headaches.   Psychiatric/Behavioral: Negative for depression. The patient is not nervous/anxious and does not have insomnia.         Physical Exam  Temp:  [36.3 °C (97.4 °F)-36.6 °C (97.9 °F)] 36.6 °C (97.9 °F)  Pulse:  [] 86  Resp:  [18-20] 18  BP: (117-141)/(81-91) 117/82  SpO2:  [92 %-95 %] 93 %    Physical Exam  Vitals signs and nursing note reviewed.    Constitutional:       General: He is not in acute distress.     Appearance: Normal appearance.   HENT:      Head: Normocephalic and atraumatic.   Eyes:      General: No scleral icterus.     Extraocular Movements: Extraocular movements intact.   Neck:      Musculoskeletal: Normal range of motion and neck supple.   Cardiovascular:      Rate and Rhythm: Normal rate and regular rhythm.      Pulses: Normal pulses.      Heart sounds: Normal heart sounds. No murmur.   Pulmonary:      Effort: Pulmonary effort is normal. No respiratory distress.      Breath sounds: Normal breath sounds. No wheezing, rhonchi or rales.   Abdominal:      General: Abdomen is flat. Bowel sounds are normal. There is no distension.      Palpations: Abdomen is soft.      Tenderness: There is no rebound.   Musculoskeletal:         General: No swelling or tenderness.   Lymphadenopathy:      Cervical: No cervical adenopathy.   Skin:     Coloration: Skin is not jaundiced.      Findings: No erythema.   Neurological:      General: No focal deficit present.      Mental Status: He is alert and oriented to person, place, and time. Mental status is at baseline.      Cranial Nerves: No cranial nerve deficit.   Psychiatric:         Mood and Affect: Mood normal.         Behavior: Behavior normal.         Fluids    Intake/Output Summary (Last 24 hours) at 2/11/2020 1255  Last data filed at 2/11/2020 0715  Gross per 24 hour   Intake 300 ml   Output 750 ml   Net -450 ml       Laboratory  Recent Labs     02/09/20  0326   WBC 10.0   RBC 4.13*   HEMOGLOBIN 11.3*   HEMATOCRIT 35.6*   MCV 86.2   MCH 27.4   MCHC 31.7*   RDW 39.9   PLATELETCT 333   MPV 9.0     Recent Labs     02/09/20  0326   SODIUM 135   POTASSIUM 4.3   CHLORIDE 98   CO2 25   GLUCOSE 122*   BUN 15   CREATININE 0.88   CALCIUM 9.2                   Imaging  CT-CHEST,ABDOMEN,PELVIS WITH   Final Result      1.  Multiple pulmonary metastases, increased since outside CT chest 7/23/2019      2.  Left  retroperitoneal mass is probably unchanged and produces severe obstruction of the left proximal ureter with associated cortical thinning of the left kidney      3.  No other metastatic disease identified      4.  Marked hepatomegaly      5.  Increase in expected amount of stool within the colon      DX-CHEST-PORTABLE (1 VIEW)   Final Result      No focal consolidation or pleural effusions.           Assessment/Plan  * Infection of superficial incisional surgical site after procedure- (present on admission)  Assessment & Plan  He had infection of his surgical site where his right-sided pain pump was and had dehiscence of his wound.  On 1/28 he had debridement of this location and his pain pump removed and changed to the other side.  Right side wound is healing well, needs continued wound vac care, reviewed with wound care today    Metastatic Colon cancer (HCC)- (present on admission)  Assessment & Plan  He was diagnosed in 2016, treated with resection and chemotherapy.  He had recurrence in August 2018, found to have metastases to the lung.  He has been on chemotherapy however, this was held in preparation for his pain pump replacement procedure  Continue to hold chemo for now due to open wound per Dr. Urbina and follow up after discharge  Anemia stable    Colon neoplasm- (present on admission)  Assessment & Plan  Follow up after discharge with Dr. Urbina,  Increase in lung mets seen on CT scan      Drug-induced constipation  Assessment & Plan  Likely secondary to opiates  Relistor ordered     Leukocytosis- (present on admission)  Assessment & Plan  Infection clearing clinically      Chronic, continuous use of opioids- (present on admission)  Assessment & Plan  Pain is controlled with assistance from his pain management physician, Dr. Reina.  Continue p.o. Dilaudid 4 mg every 4 hours  He has a pain pump with Dilaudid 8 mg/day and ketamine, he has additional boluses  patient ambulates steadily   independently      Essential hypertension- (present on admission)  Assessment & Plan  Continue Cozaar    COPD (chronic obstructive pulmonary disease) (HCC)- (present on admission)  Assessment & Plan  No need for supplemental oxygen,   Continue prednisone 5mg daily, patient is on chronically and failed a taper         VTE prophylaxis: ambulatory       Applied

## 2024-01-26 NOTE — ED NOTES
Problem: Fatigue  Goal: Improved Activity Tolerance  Outcome: Ongoing, Progressing  Intervention: Promote Improved Energy  Flowsheets (Taken 1/26/2024 1335)  Fatigue Management: frequent rest breaks encouraged  Sleep/Rest Enhancement: regular sleep/rest pattern promoted  Activity Management:   Ambulated -L4   Up in chair - L3      Rounded on patient, pt sleeping, vss denies needs at this time

## 2024-04-09 NOTE — ASSESSMENT & PLAN NOTE
- Patient reports history of COPD, does not use oxygen at home.  - Pt quit smoking couple years ago but was previously smoking 1 pack/day for 10 years   - Saturating at 94% on room air, and no respiratory distress.  - Pt denies any SOB or wheezing   - Respiratory care per protocol    echo/EKG

## 2024-07-11 NOTE — ED NOTES
Problem: PAIN - ADULT  Goal: Verbalizes/displays adequate comfort level or baseline comfort level  Description: Interventions:  - Encourage patient to monitor pain and request assistance  - Assess pain using appropriate pain scale  - Administer analgesics based on type and severity of pain and evaluate response  - Implement non-pharmacological measures as appropriate and evaluate response  - Consider cultural and social influences on pain and pain management  - Notify physician/advanced practitioner if interventions unsuccessful or patient reports new pain  Outcome: Progressing     Problem: INFECTION - ADULT  Goal: Absence or prevention of progression during hospitalization  Description: INTERVENTIONS:  - Assess and monitor for signs and symptoms of infection  - Monitor lab/diagnostic results  - Monitor all insertion sites, i.e. indwelling lines, tubes, and drains  - Monitor endotracheal if appropriate and nasal secretions for changes in amount and color  - [place] appropriate cooling/warming therapies per order  - Administer medications as ordered  - Instruct and encourage patient and family to use good hand hygiene technique  - Identify and instruct in appropriate isolation precautions for identified infection/condition  Outcome: Progressing  Goal: Absence of fever/infection during neutropenic period  Description: INTERVENTIONS:  - Monitor WBC    Outcome: Progressing     Problem: SAFETY ADULT  Goal: Patient will remain free of falls  Description: INTERVENTIONS:  - Educate patient/family on patient safety including physical limitations  - Instruct patient to call for assistance with activity   - Consult OT/PT to assist with strengthening/mobility   - Keep Call bell within reach  - Keep bed low and locked with side rails adjusted as appropriate  - Keep care items and personal belongings within reach  - Initiate and maintain comfort rounds  - Make Fall Risk Sign visible to staff  - Offer Toileting every 2 Hours,  Pt repositioned for comfort, resting in NAD with vital signs stable.    in advance of need  - Initiate/Maintain bed alarm  - Apply yellow socks and bracelet for high fall risk patients  - Consider moving patient to room near nurses station  Outcome: Progressing  Goal: Maintain or return to baseline ADL function  Description: INTERVENTIONS:  -  Assess patient's ability to carry out ADLs; assess patient's baseline for ADL function and identify physical deficits which impact ability to perform ADLs (bathing, care of mouth/teeth, toileting, grooming, dressing, etc.)  - Assess/evaluate cause of self-care deficits   - Assess range of motion  - Assess patient's mobility; develop plan if impaired  - Assess patient's need for assistive devices and provide as appropriate  - Encourage maximum independence but intervene and supervise when necessary  - Involve family in performance of ADLs  - Assess for home care needs following discharge   - Consider OT consult to assist with ADL evaluation and planning for discharge  - Provide patient education as appropriate  Outcome: Progressing     Problem: DISCHARGE PLANNING  Goal: Discharge to home or other facility with appropriate resources  Description: INTERVENTIONS:  - Identify barriers to discharge w/patient and caregiver  - Arrange for needed discharge resources and transportation as appropriate  - Identify discharge learning needs (meds, wound care, etc.)  - Arrange for interpretive services to assist at discharge as needed  - Refer to Case Management Department for coordinating discharge planning if the patient needs post-hospital services based on physician/advanced practitioner order or complex needs related to functional status, cognitive ability, or social support system  Outcome: Progressing     Problem: CARDIOVASCULAR - ADULT  Goal: Maintains optimal cardiac output and hemodynamic stability  Description: INTERVENTIONS:  - Monitor I/O, vital signs and rhythm  - Monitor for S/S and trends of decreased cardiac output  - Administer and titrate  ordered vasoactive medications to optimize hemodynamic stability  - Assess quality of pulses, skin color and temperature  - Assess for signs of decreased coronary artery perfusion  - Instruct patient to report change in severity of symptoms  Outcome: Progressing  Goal: Absence of cardiac dysrhythmias or at baseline rhythm  Description: INTERVENTIONS:  - Continuous cardiac monitoring, vital signs, obtain 12 lead EKG if ordered  - Administer antiarrhythmic and heart rate control medications as ordered  - Monitor electrolytes and administer replacement therapy as ordered  Outcome: Progressing     Problem: RESPIRATORY - ADULT  Goal: Achieves optimal ventilation and oxygenation  Description: INTERVENTIONS:  - Assess for changes in respiratory status  - Assess for changes in mentation and behavior  - Position to facilitate oxygenation and minimize respiratory effort  - Oxygen administered by appropriate delivery if ordered  - Initiate smoking cessation education as indicated  - Encourage broncho-pulmonary hygiene including cough, deep breathe, Incentive Spirometry  - Assess the need for suctioning and aspirate as needed  - Assess and instruct to report SOB or any respiratory difficulty  - Respiratory Therapy support as indicated  Outcome: Progressing     Problem: METABOLIC, FLUID AND ELECTROLYTES - ADULT  Goal: Fluid balance maintained  Description: INTERVENTIONS:  - Monitor labs   - Monitor I/O and WT  - Instruct patient on fluid and nutrition as appropriate  - Assess for signs & symptoms of volume excess or deficit  Outcome: Progressing  Goal: Glucose maintained within target range  Description: INTERVENTIONS:  - Monitor Blood Glucose as ordered  - Assess for signs and symptoms of hyperglycemia and hypoglycemia  - Administer ordered medications to maintain glucose within target range  - Assess nutritional intake and initiate nutrition service referral as needed  Outcome: Progressing

## (undated) DEVICE — SPONGE GAUZESTER. 2X2 4-PL - (2/PK 50PK/BX 30BX/CS)

## (undated) DEVICE — SPONGE GAUZESTER 4 X 4 4PLY - (128PK/CA)

## (undated) DEVICE — DRAPE C-ARM LARGE 41IN X 74 IN - (10/BX 2BX/CA)

## (undated) DEVICE — NEPTUNE 4 PORT MANIFOLD - (20/PK)

## (undated) DEVICE — GOWN WARMING STANDARD FLEX - (30/CA)

## (undated) DEVICE — SENSOR SPO2 NEO LNCS ADHESIVE (20/BX) SEE USER NOTES

## (undated) DEVICE — MASK ANESTHESIA ADULT  - (100/CA)

## (undated) DEVICE — CATHETER PASSER

## (undated) DEVICE — BLADE SURGICAL #15 - (50/BX 3BX/CA)

## (undated) DEVICE — PROTECTOR ULNA NERVE - (36PR/CA)

## (undated) DEVICE — GLOVE BIOGEL PI ULTRATOUCH SZ 7.5 SURGICAL PF LF -(50/BX 4BX/CA)

## (undated) DEVICE — CANISTER SUCTION RIGID RED 1500CC (40EA/CA)

## (undated) DEVICE — SUTURE GENERAL

## (undated) DEVICE — ELECTRODE 850 FOAM ADHESIVE - HYDROGEL RADIOTRNSPRNT (50/PK)

## (undated) DEVICE — GLOVE, LITE (PAIR)

## (undated) DEVICE — DRAPE STRLE REG TOWEL 18X24 - (10/BX 4BX/CA)"

## (undated) DEVICE — LACTATED RINGERS INJ 1000 ML - (14EA/CA 60CA/PF)

## (undated) DEVICE — GLOVE BIOGEL SZ 7.5 SURGICAL PF LTX - (50PR/BX 4BX/CA)

## (undated) DEVICE — DRESSING TEGADERM 8 X 12 - (10/BX 8BX/CA)

## (undated) DEVICE — SODIUM CHL IRRIGATION 0.9% 1000ML (12EA/CA)

## (undated) DEVICE — DRAPE IOBAN II INCISE 23X17 - (10EA/BX 4BX/CA)

## (undated) DEVICE — CHLORAPREP 26 ML APPLICATOR - ORANGE TINT(25/CA)

## (undated) DEVICE — SHEET TRANSVERSE LAP - (12EA/CA)

## (undated) DEVICE — SUCTION INSTRUMENT YANKAUER BULBOUS TIP W/O VENT (50EA/CA)

## (undated) DEVICE — DRESSING TRANSPARENT FILM TEGADERM 4 X 4.75" (50EA/BX)"

## (undated) DEVICE — TUBE CONNECTING SUCTION - CLEAR PLASTIC STERILE 72 IN (50EA/CA)

## (undated) DEVICE — CON SEDATION/>5 YR 1ST 15 MIN

## (undated) DEVICE — BOVIE NEEDLE TIP INSULATD NON-SAFETY 2CM (50/PK)

## (undated) DEVICE — SUTURE 3-0 VICRYL PLUS SH - 8X 18 INCH (12/BX)

## (undated) DEVICE — PACK MINOR BASIN - (2EA/CA)

## (undated) DEVICE — SUTURE 2-0 ETHILON FS - (36/BX) 18 INCH

## (undated) DEVICE — DRESSING XEROFORM 1X8 - (50/BX 4BX/CA)

## (undated) DEVICE — HEAD HOLDER JUNIOR/ADULT

## (undated) DEVICE — KIT ANESTHESIA W/CIRCUIT & 3/LT BAG W/FILTER (20EA/CA)

## (undated) DEVICE — DRESSING PETROLEUM GAUZE 5 X 9" (50EA/BX 4BX/CA)"

## (undated) DEVICE — WATER IRRIGATION STERILE 1000ML (12EA/CA)

## (undated) DEVICE — DRAPE LARGE 3 QUARTER - (20/CA)

## (undated) DEVICE — STAPLER SKIN DISP - (6/BX 10BX/CA) VISISTAT

## (undated) DEVICE — GLOVE BIOGEL PI INDICATOR SZ 7.5 SURGICAL PF LF -(50/BX 4BX/CA)

## (undated) DEVICE — HUMID-VENT HEAT AND MOISTURE EXCHANGE- (50/BX)

## (undated) DEVICE — CON SEDATION EA ADDL 15 MIN

## (undated) DEVICE — BAG, SPONGE COUNT 50600

## (undated) DEVICE — SUTURE 0 ETHIBOND MO6 C/R - (12/BX) 8-18 INCH ETHICON

## (undated) DEVICE — SPONGE GAUZE STER 4X4 8-PL - (2/PK 50PK/BX 12BX/CS)

## (undated) DEVICE — SYRINGE 10 ML CONTROL LL (25EA/BX 4BX/CA)

## (undated) DEVICE — MEDICINE CUP STERILE 2 OZ - (100/CA)

## (undated) DEVICE — ELECTRODE DUAL RETURN W/ CORD - (50/PK)

## (undated) DEVICE — PAD LAP STERILE 18 X 18 - (5/PK 40PK/CA)

## (undated) DEVICE — Device

## (undated) DEVICE — SYRINGE 6 CC 20 GA X 1 1/2 - NDL SAFETY  (50/BX)

## (undated) DEVICE — KIT ROOM DECONTAMINATION